# Patient Record
Sex: FEMALE | Race: OTHER | Employment: OTHER | URBAN - METROPOLITAN AREA
[De-identification: names, ages, dates, MRNs, and addresses within clinical notes are randomized per-mention and may not be internally consistent; named-entity substitution may affect disease eponyms.]

---

## 2017-01-02 ENCOUNTER — APPOINTMENT (OUTPATIENT)
Dept: RADIATION THERAPY | Age: 47
End: 2017-01-02

## 2017-01-08 ENCOUNTER — HOSPITAL ENCOUNTER (EMERGENCY)
Age: 47
Discharge: HOME | End: 2017-01-08
Attending: EMERGENCY MEDICINE
Payer: MEDICAID

## 2017-01-08 ENCOUNTER — APPOINTMENT (OUTPATIENT)
Dept: GENERAL RADIOLOGY | Age: 47
End: 2017-01-08
Attending: NURSE PRACTITIONER
Payer: MEDICAID

## 2017-01-08 VITALS
OXYGEN SATURATION: 99 % | BODY MASS INDEX: 27.42 KG/M2 | TEMPERATURE: 99.3 F | DIASTOLIC BLOOD PRESSURE: 75 MMHG | HEART RATE: 75 BPM | RESPIRATION RATE: 16 BRPM | SYSTOLIC BLOOD PRESSURE: 117 MMHG | HEIGHT: 62 IN | WEIGHT: 149 LBS

## 2017-01-08 DIAGNOSIS — M54.50 ACUTE BILATERAL LOW BACK PAIN WITHOUT SCIATICA: Primary | ICD-10-CM
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
## 2017-01-08 PROCEDURE — 72080 X-RAY EXAM THORACOLMB 2/> VW: CPT

## 2017-01-08 PROCEDURE — 99283 EMERGENCY DEPT VISIT LOW MDM: CPT

## 2017-01-08 NOTE — ED PROVIDER NOTES
HPI Comments: Denae Coyle is a 51year old female who presents to the ED with low and mid back pain post fall 2 days ago. States that 2 days ago she felt dizzy. He right leg gave out on her, and she fell landing on her back. This is causing her pain at this time. She has retained hardware in her back, and fears she may have damaged it. Patient is a 51 y.o. female presenting with fall. The history is provided by the patient and the EMS personnel. History limited by: no communication barrier. Fall   The accident occurred 2 days ago. Fall occurred: while walking in the home. Past Medical History:   Diagnosis Date    Alcohol abuse     Cancer [facility])      breast cancer, right    Cancer ([facility] [address]      Breast CA    Depression     Diabetes ([facility] [address]     Drug abuse     Gastrointestinal disorder      cholitis    Hyperlipidemia     Hypertension     Spine injury        Past Surgical History:   Procedure Laterality Date    Hx breast lumpectomy  06/2013     right    Hx other surgical       mediport    Hx tubal ligation      Hx tonsillectomy      Hx orthopaedic       Back fusion surgery 4/18/14.  Hx hysterectomy      Colonoscopy N/A 7/18/2016     COLONOSCOPY performed by Silver Pastor MD at [facility]COPY         Family History:   Problem Relation Age of Onset    Heart Disease Mother     Stroke Father     Heart Disease Father 48    Diabetes Other     Hypertension Other     Cancer Maternal Grandmother        Social History     Social History    Marital status:      Spouse name: N/A    Number of children: N/A    Years of education: N/A     Occupational History    Not on file.      Social History Main Topics    Smoking status: Never Smoker    Smokeless tobacco: Never Used    Alcohol use No      Comment: \"Occasionally\"    Drug use: No    Sexual activity: No     Other Topics Concern    Not on file     Social History Narrative    ** Merged History Encounter ** ALLERGIES: Latex; Other food; Amoxicillin; Aspirin; Fentanyl; Levaquin [levofloxacin]; Chlorhexidine; Norco [hydrocodone-acetaminophen]; Acetaminophen; Harrison; Codeine; Glycopyrrolate; Morphine; Pcn [penicillins]; Percocet [oxycodone-acetaminophen]; Tape [adhesive]; and Tramadol    Review of Systems   Constitutional:        Fall     HENT: Negative. Eyes: Negative. Respiratory: Negative. Cardiovascular: Negative. Gastrointestinal: Negative. Endocrine: Negative. Genitourinary: Negative. Musculoskeletal:        Back pain     Skin: Negative. Allergic/Immunologic: Negative. Neurological: Negative. Hematological: Negative. Psychiatric/Behavioral: Negative. Vitals:    01/08/17 1740   Weight: 67.6 kg (149 lb)   Height: 5' 2\" (1.575 m)            Physical Exam   Constitutional: She is oriented to person, place, and time. She appears well-developed and well-nourished. No distress. Pt is sobbing secondary to her pain. HENT:   Head: Normocephalic and atraumatic. Eyes: EOM are normal. Pupils are equal, round, and reactive to light. Neck: Normal range of motion. Neck supple. Cardiovascular: Normal rate, regular rhythm and normal heart sounds. Pulmonary/Chest: Effort normal and breath sounds normal. No respiratory distress. She has no wheezes. She has no rales. Abdominal: Soft. Bowel sounds are normal. There is no tenderness. There is no rebound and no guarding. Genitourinary:   Genitourinary Comments: NE   Musculoskeletal: Normal range of motion. She exhibits tenderness. She exhibits no edema. Bilateral lumbar Paraspinous muscles TTP. No vertebral bony deformity noted. Neurological: She is alert and oriented to person, place, and time. No cranial nerve deficit. She exhibits normal muscle tone. Coordination normal.   Skin: Skin is warm and dry. Psychiatric: She has a normal mood and affect. Nursing note and vitals reviewed.        MDM  Number of Diagnoses or Management Options  Diagnosis management comments: PROGRESS NOTE:  Care of the Pt turned over to JESSICA Quan Pa-C at time of shift change.   Toñito Clay NP  6:10 PM        Risk of Complications, Morbidity, and/or Mortality  Presenting problems: moderate  Diagnostic procedures: moderate  Management options: moderate      ED Course       Procedures

## 2017-01-08 NOTE — ED NOTES
Purposeful rounding completed:    Side rails up x 2:  YES  Bed in low position and wheels locked: YES  Call bell within reach: YES  Comfort addressed: YES    Toileting needs addressed: YES  Plan of care reviewed/updated with patient and or family members: YES  IV site assessed: N/A  Pain assessed and addressed: YES, 7

## 2017-01-08 NOTE — ED NOTES
0600 PM:  Accepted care of patient from Eboni Solano NP. Pt with dizziness and fall 2 days ago. Fell onto back. Concerned about prior hardware. XR's pending. Perri Davis PA-C    1408 PM:  NAP on XRs. Will DC home with pcp follow-up. At this time, patient is stable and appropriate for discharge home. Patient demonstrates understanding of current diagnoses and is in agreement with the treatment plan. They are advised that while the likelihood of serious underlying condition is low at this point given the evaluation performed today, we cannot fully rule it out. They are advised to immediately return with any new symptoms or worsening of current condition. All questions have been answered. Patient is given educational material regarding their diagnoses, including danger symptoms and when to return to the ED. Follow-up with pcp.   Perri Davis PA-C

## 2017-01-09 NOTE — DISCHARGE INSTRUCTIONS
Back Pain, Emergency or Urgent Symptoms: Care Instructions  Your Care Instructions  Many people have back pain at one time or another. In most cases, pain gets better with self-care that includes over-the-counter pain medicine, ice, heat, and exercises. Unless you have symptoms of a severe injury or heart attack, you may be able to give yourself a few days before you call a doctor. But some back problems are very serious. Do not ignore symptoms that need to be checked right away. Follow-up care is a key part of your treatment and safety. Be sure to make and go to all appointments, and call your doctor if you are having problems. It's also a good idea to know your test results and keep a list of the medicines you take. How can you care for yourself at home? · Sit or lie in positions that are most comfortable and that reduce your pain. Try one of these positions when you lie down:  ¨ Lie on your back with your knees bent and supported by large pillows. ¨ Lie on the floor with your legs on the seat of a sofa or chair. Rekha Settle on your side with your knees and hips bent and a pillow between your legs. ¨ Lie on your stomach if it does not make pain worse. · Do not sit up in bed, and avoid soft couches and twisted positions. Bed rest can help relieve pain at first, but it delays healing. Avoid bed rest after the first day. · Change positions every 30 minutes. If you must sit for long periods of time, take breaks from sitting. Get up and walk around, or lie flat. · Try using a heating pad on a low or medium setting, for 15 to 20 minutes every 2 or 3 hours. Try a warm shower in place of one session with the heating pad. You can also buy single-use heat wraps that last up to 8 hours. You can also try ice or cold packs on your back for 10 to 20 minutes at a time, several times a day. (Put a thin cloth between the ice pack and your skin.) This reduces pain and makes it easier to be active and exercise.   · Take pain medicines exactly as directed. ¨ If the doctor gave you a prescription medicine for pain, take it as prescribed. ¨ If you are not taking a prescription pain medicine, ask your doctor if you can take an over-the-counter medicine. When should you call for help? Call 911 anytime you think you may need emergency care. For example, call if:  · You are unable to move a leg at all. · You have back pain with severe belly pain. · You have symptoms of a heart attack. These may include:  ¨ Chest pain or pressure, or a strange feeling in the chest.  ¨ Sweating. ¨ Shortness of breath. ¨ Nausea or vomiting. ¨ Pain, pressure, or a strange feeling in the back, neck, jaw, or upper belly or in one or both shoulders or arms. ¨ Lightheadedness or sudden weakness. ¨ A fast or irregular heartbeat. After you call 911, the  may tell you to chew 1 adult-strength or 2 to 4 low-dose aspirin. Wait for an ambulance. Do not try to drive yourself. Call your doctor now or seek immediate medical care if:  · You have new or worse symptoms in your arms, legs, chest, belly, or buttocks. Symptoms may include:  ¨ Numbness or tingling. ¨ Weakness. ¨ Pain. · You lose bladder or bowel control. · You have back pain and:  ¨ You have injured your back while lifting or doing some other activity. Call if the pain is severe, has not gone away after 1 or 2 days, and you cannot do your normal daily activities. ¨ You have had a back injury before that needed treatment. ¨ Your pain has lasted longer than 4 weeks. ¨ You have had weight loss you cannot explain. ¨ You are age 48 or older. ¨ You have cancer now or have had it before. Watch closely for changes in your health, and be sure to contact your doctor if you are not getting better as expected. Where can you learn more? Go to http://imani-jenny.info/.   Enter Z041 in the search box to learn more about \"Back Pain, Emergency or Urgent Symptoms: Care Instructions. \"  Current as of: May 27, 2016  Content Version: 11.1  © 6242-4070 [employer]. Care instructions adapted under license by [employer] (which disclaims liability or warranty for this information). If you have questions about a medical condition or this instruction, always ask your healthcare professional. [address] any warranty or liability for your use of this information. Preventing Falls: Care Instructions  Your Care Instructions  Getting around your home safely can be a challenge if you have injuries or health problems that make it easy for you to fall. Loose rugs and furniture in walkways are among the dangers for many older people who have problems walking or who have poor eyesight. People who have conditions such as arthritis, osteoporosis, or dementia also have to be careful not to fall. You can make your home safer with a few simple measures. Follow-up care is a key part of your treatment and safety. Be sure to make and go to all appointments, and call your doctor if you are having problems. It's also a good idea to know your test results and keep a list of the medicines you take. How can you care for yourself at home? Taking care of yourself  · You may get dizzy if you do not drink enough water. To prevent dehydration, drink plenty of fluids, enough so that your urine is light yellow or clear like water. Choose water and other caffeine-free clear liquids. If you have kidney, heart, or liver disease and have to limit fluids, talk with your doctor before you increase the amount of fluids you drink. · Exercise regularly to improve your strength, muscle tone, and balance. Walk if you can. Swimming may be a good choice if you cannot walk easily. · Have your vision and hearing checked each year or any time you notice a change.  If you have trouble seeing and hearing, you might not be able to avoid objects and could lose your balance. · Know the side effects of the medicines you take. Ask your doctor or pharmacist whether the medicines you take can affect your balance. Sleeping pills or sedatives can affect your balance. · Limit the amount of alcohol you drink. Alcohol can impair your balance and other senses. · Ask your doctor whether calluses or corns on your feet need to be removed. If you wear loose-fitting shoes because of calluses or corns, you can lose your balance and fall. · Talk to your doctor if you have numbness in your feet. Preventing falls at home  · Remove raised doorway thresholds, throw rugs, and clutter. Repair loose carpet or raised areas in the floor. · Move furniture and electrical cords to keep them out of walking paths. · Use nonskid floor wax, and wipe up spills right away, especially on ceramic tile floors. · If you use a walker or cane, put rubber tips on it. If you use crutches, clean the bottoms of them regularly with an abrasive pad, such as steel wool. · Keep your house well lit, especially [place], and outside walkways. Use night-lights in areas such as hallways and bathrooms. Add extra light switches or use remote switches (such as switches that go on or off when you clap your hands) to make it easier to turn lights on if you have to get up during the night. · Install sturdy handrails on stairways. · Move items in your cabinets so that the things you use a lot are on the lower shelves (about waist level). · Keep a cordless phone and a flashlight with new batteries by your bed. If possible, put a phone in each of the main rooms of your house, or carry a cell phone in case you fall and cannot reach a phone. Or, you can wear a device around your neck or wrist. You push a button that sends a signal for help. · Wear low-heeled shoes that fit well and give your feet good support. Use footwear with nonskid soles. Check the heels and soles of your shoes for wear.  Repair or replace worn heels or soles. · Do not wear socks without shoes on wood floors. · Walk on the grass when the sidewalks are slippery. If you live in an area that gets snow and ice in the winter, sprinkle salt on slippery steps and sidewalks. Preventing falls in the bath  · Install grab bars and nonskid mats inside and outside your shower or tub and near the toilet and sinks. · Use shower chairs and bath benches. · Use a hand-held shower head that will allow you to sit while showering. · Get into a tub or shower by putting the weaker leg in first. Get out of a tub or shower with your strong side first.  · Repair loose toilet seats and consider installing a raised toilet seat to make getting on and off the toilet easier. · Keep your bathroom door unlocked while you are in the shower. Where can you learn more? Go to http://imani-jenny.info/. Enter 0476 [id number] 69 [id number] in the search box to learn more about \"Preventing Falls: Care Instructions. \"  Current as of: August 4, 2016  Content Version: 11.1  © 3999-7224 [employer]. Care instructions adapted under license by [employer] (which disclaims liability or warranty for this information). If you have questions about a medical condition or this instruction, always ask your healthcare professional. [place] [address] any warranty or liability for your use of this information.

## 2017-01-09 NOTE — ED NOTES
Bedside shift change report given to Xiao Carrizales RN (oncoming nurse) by Jesus Olivo RN (offgoing nurse). Report included the following information SBAR.

## 2017-01-23 ENCOUNTER — HOSPITAL ENCOUNTER (OUTPATIENT)
Dept: LAB | Age: 47
Discharge: HOME | End: 2017-01-23

## 2017-01-23 ENCOUNTER — OFFICE VISIT (OUTPATIENT)
Dept: FAMILY MEDICINE CLINIC | Age: 47
End: 2017-01-23

## 2017-01-23 VITALS
HEIGHT: 62 IN | WEIGHT: 148.6 LBS | SYSTOLIC BLOOD PRESSURE: 142 MMHG | RESPIRATION RATE: 19 BRPM | BODY MASS INDEX: 27.34 KG/M2 | OXYGEN SATURATION: 97 % | HEART RATE: 94 BPM | TEMPERATURE: 98 F | DIASTOLIC BLOOD PRESSURE: 94 MMHG

## 2017-01-23 DIAGNOSIS — T40.2X5A THERAPEUTIC OPIOID INDUCED CONSTIPATION: ICD-10-CM

## 2017-01-23 DIAGNOSIS — Z79.4 TYPE 2 DIABETES MELLITUS WITH HYPERGLYCEMIA, WITH LONG-TERM CURRENT USE OF INSULIN (HCC): ICD-10-CM

## 2017-01-23 DIAGNOSIS — E11.65 TYPE 2 DIABETES MELLITUS WITH HYPERGLYCEMIA, WITH LONG-TERM CURRENT USE OF INSULIN (HCC): ICD-10-CM

## 2017-01-23 DIAGNOSIS — I10 ESSENTIAL HYPERTENSION: ICD-10-CM

## 2017-01-23 DIAGNOSIS — K59.03 THERAPEUTIC OPIOID INDUCED CONSTIPATION: ICD-10-CM

## 2017-01-23 DIAGNOSIS — G89.4 CHRONIC PAIN SYNDROME: Primary | ICD-10-CM

## 2017-01-23 PROCEDURE — 99001 SPECIMEN HANDLING PT-LAB: CPT | Performed by: FAMILY MEDICINE

## 2017-01-23 RX ORDER — LISINOPRIL 10 MG/1
10 TABLET ORAL DAILY
Qty: 30 TAB | Refills: 1 | Status: SHIPPED | OUTPATIENT
Start: 2017-01-23 | End: 2018-03-07 | Stop reason: ALTCHOICE

## 2017-01-23 RX ORDER — DULOXETIN HYDROCHLORIDE 20 MG/1
20 CAPSULE, DELAYED RELEASE ORAL DAILY
Qty: 30 CAP | Refills: 1 | Status: SHIPPED | OUTPATIENT
Start: 2017-01-23 | End: 2017-03-23 | Stop reason: DRUGHIGH

## 2017-01-23 RX ORDER — KETOROLAC TROMETHAMINE 30 MG/ML
60 INJECTION, SOLUTION INTRAMUSCULAR; INTRAVENOUS ONCE
Qty: 1 VIAL | Refills: 0
Start: 2017-01-23 | End: 2017-01-23

## 2017-01-23 RX ORDER — POLYETHYLENE GLYCOL 3350 17 G/17G
17 POWDER, FOR SOLUTION ORAL DAILY
Qty: 30 EACH | Refills: 1 | Status: SHIPPED | OUTPATIENT
Start: 2017-01-23 | End: 2018-09-05 | Stop reason: SDUPTHER

## 2017-01-23 NOTE — MR AVS SNAPSHOT
Visit Information Date & Time Provider Department Dept. Phone Encounter #  
 1/23/2017  3:00 PM José Chowdhury [phone] [id number] Follow-up Instructions Return in about 1 month (around 2/23/2017) for pain. Your Appointments 1/25/2017  2:15 PM  
Office Visit with Merlin Grandchild, MD  
[facility] ([address] [facility]) Appt Note: 8 WK  [facility] [address] [address]  
[address]  
  
   
 [address] [facility] [place] [address] [address] Upcoming Health Maintenance Date Due  
 FOOT EXAM Q1 11/21/1979 MICROALBUMIN Q1 11/21/1979 EYE EXAM RETINAL OR DILATED Q1 11/21/1979 DTaP/Tdap/Td series (1 - Tdap) 11/21/1990 PAP AKA CERVICAL CYTOLOGY 11/21/1990 LIPID PANEL Q1 12/26/2014 HEMOGLOBIN A1C Q6M 12/22/2016 Allergies as of 1/23/2017  Review Complete On: 1/8/2017 By: Pio Kerr RN Severity Noted Reaction Type Reactions Latex High 04/26/2010    Hives, Itching Other Food  07/15/2016    Rash Almonds Amoxicillin High 11/26/2013    Swelling Other reaction(s): mild rash/itching Aspirin High 09/04/2013    Not Reported This Time, Swelling Mouth swells Fentanyl High 12/17/2013   Systemic Anaphylaxis, Swelling Patches cause mouth to swell Swelling in mouth from fentanyl patch Levaquin [Levofloxacin] High 09/04/2013    Not Reported This Time, Swelling Other reaction(s): mild rash/itching Chlorhexidine Medium 04/18/2014   Topical Rash Norco [Hydrocodone-acetaminophen] Medium 03/12/2015    Nausea and Vomiting Other reaction(s): gi distress Acetaminophen  07/28/2016    Other (comments) [place]  04/03/2016    Rash, Itching Codeine  11/26/2013    Other (comments) Glycopyrrolate  07/04/2014    Swelling Pt  States  faciAL  SWELLING   POST  ROBINUL  IV Pt  States  faciAL  SWELLING   POST  ROBINUL  IV   
 Morphine  05/31/2016    Nausea and Vomiting Pcn [Penicillins]  11/26/2013    Swelling Percocet [Oxycodone-acetaminophen]  01/30/2015    Nausea and Vomiting Other reaction(s): gi distress 
nausea Other reaction(s): anaphylaxis/angioedema Per pt. She is allergic Tape [Adhesive]  05/31/2016    Rash Tramadol  12/05/2013    Swelling Current Immunizations  Reviewed on 8/3/2016 Name Date Influenza Vaccine 11/7/2016, 2/24/2016, 3/23/2015, 12/1/2014, 10/9/2013, 10/20/2012 Pneumococcal Polysaccharide (PPSV-23) 3/22/2015, 10/22/2012 Pneumococcal Vaccine (Unspecified Type) 3/4/2016, 1/2/2013 Not reviewed this visit You Were Diagnosed With   
  
 Codes Comments Chronic pain syndrome    -  Primary ICD-10-CM: G89.4 ICD-9-CM: 338. 4 Type 2 diabetes mellitus with hyperglycemia, with long-term current use of insulin (HCC)     ICD-10-CM: E11.65, Z79.4 ICD-9-CM: 250.00, 790.29, V58.67 Essential hypertension     ICD-10-CM: I10 
ICD-9-CM: 401.9 Therapeutic opioid induced constipation     ICD-10-CM: K59.03, T40.2X5A 
ICD-9-CM: 564.09, E935.2 Vitals BP Pulse Temp Resp Height(growth percentile) Weight(growth percentile) (!) 142/94 94 98 °F (36.7 °C) (Oral) 19 5' 2\" (1.575 m) 148 lb 9.6 oz (67.4 kg) LMP SpO2 BMI OB Status Smoking Status 07/04/2013 97% 27.18 kg/m2 Hysterectomy Never Smoker Vitals History BMI and BSA Data Body Mass Index Body Surface Area  
 27.18 kg/m 2 1.72 m 2 Preferred Pharmacy Pharmacy Name Phone [facility] [address] Your Updated Medication List  
  
   
This list is accurate as of: 1/23/17  4:06 PM.  Always use your most recent med list.  
  
  
  
  
 ALBUTEROL IN Take  by inhalation. DULoxetine 20 mg capsule Commonly known as:  CYMBALTA Take 1 Cap by mouth daily. EPINEPHrine 0.3 mg/0.3 mL injection Commonly known as:  EPIPEN  
0.3 mL by IntraMUSCular route once as needed for up to 1 dose. gabapentin 300 mg capsule Commonly known as:  NEURONTIN Take 1 capsule by mouth three (3) times daily. insulin nph-regular human rec 100 unit/mL (70-30) Inpn Commonly known as:  HUMULIN 70-30 Give 25 units in the QAM and 30 units at bedtime  
  
 ketorolac tromethamine 60 mg/2 mL Soln Commonly known as:  TORADOL  
2 mL by IntraMUSCular route once for 1 dose. lisinopril 10 mg tablet Commonly known as:  Mavis November Take 1 Tab by mouth daily. LORazepam 1 mg tablet Commonly known as:  ATIVAN  
1 mg.  
  
 ondansetron 8 mg disintegrating tablet Commonly known as:  ZOFRAN ODT Take 1 Tab by mouth every eight (8) hours as needed for Nausea. polyethylene glycol 17 gram packet Commonly known as:  Taras Deck Take 1 Packet by mouth daily. traZODone 300 mg tablet Commonly known as:  Getachew [place] Take 300 mg by mouth nightly. Prescriptions Sent to Pharmacy Refills  
 lisinopril (PRINIVIL, ZESTRIL) 10 mg tablet 1 Sig: Take 1 Tab by mouth daily. Class: Normal  
 Pharmacy: [address] [place] [address] Ph #: [phone] Route: Oral  
 DULoxetine (CYMBALTA) 20 mg capsule 1 Sig: Take 1 Cap by mouth daily. Class: Normal  
 Pharmacy: [address] [place] [address] Ph #: [phone] Route: Oral  
 polyethylene glycol (MIRALAX) 17 gram packet 1 Sig: Take 1 Packet by mouth daily. Class: Normal  
 Pharmacy: [address] [place] [address] Ph #: [phone] Route: Oral  
 insulin nph-regular human rec (HUMULIN 70-30) 100 unit/mL (70-30) inpn 3 Sig: Give 25 units in the QAM and 30 units at bedtime  Class: Normal  
 Pharmacy: [address] [place] [address], [address] [place] [address] #: [phone] We Performed the Following  DIABETES FOOT EXAM [7 Custom] KETOROLAC TROMETHAMINE INJ [ [facility]] AL THER/PROPH/DIAG INJECTION, SUBCUT/IM [id number] CPT(R)] Follow-up Instructions Return in about 1 month (around 2/23/2017) for pain. To-Do List   
 01/23/2017 Lab:  HEMOGLOBIN A1C WITH EAG   
  
 01/23/2017 Lab:  LIPID PANEL   
  
 01/23/2017 Lab:  MICROALBUMIN, UR, RAND W/ MICROALBUMIN/CREA RATIO Patient Instructions Chronic Pain: Care Instructions Your Care Instructions Chronic pain is pain that lasts a long time (months or even years) and may or may not have a clear cause. It is different from acute pain, which usually does have a clear causelike an injury or illnessand gets better over time. Chronic pain: 
· Lasts over time but may vary from day to day. · Does not go away despite efforts to end it. · May disrupt your sleep and lead to fatigue. · May cause depression or anxiety. · May make your muscles tense, causing more pain. · Can disrupt your work, hobbies, home life, and relationships with friends and family. Chronic pain is a very real condition. It is not just in your head. Treatment can help and usually includes several methods used together, such as medicines, physical therapy, exercise, and other treatments. Learning how to relax and changing negative thought patterns can also help you cope. Chronic pain is complex. Taking an active role in your treatment will help you better manage your pain. Tell your doctor if you have trouble dealing with your pain. You may have to try several things before you find what works best for you. Follow-up care is a key part of your treatment and safety. Be sure to make and go to all appointments, and call your doctor if you are having problems. Its also a good idea to know your test results and keep a list of the medicines you take. How can you care for yourself at home? · Pace yourself. Break up large jobs into smaller tasks. Save harder tasks for days when you have less pain, or go back and forth between hard tasks and easier ones. Take rest breaks. · Relax, and reduce stress. Relaxation techniques such as deep breathing or meditation can help. · Keep moving. Gentle, daily exercise can help reduce pain over the long run. Try low- or no-impact exercises such as walking, swimming, and stationary biking. Do stretches to stay flexible. · Try heat, cold packs, and massage. · Get enough sleep. Chronic pain can make you tired and drain your energy. Talk with your doctor if you have trouble sleeping because of pain. · Think positive. Your thoughts can affect your pain level. Do things that you enjoy to distract yourself when you have pain instead of focusing on the pain. See a movie, read a book, listen to music, or spend time with a friend. · If you think you are depressed, talk to your doctor about treatment. · Keep a daily pain diary. Record how your moods, thoughts, sleep patterns, activities, and medicine affect your pain. You may find that your pain is worse during or after certain activities or when you are feeling a certain emotion. Having a record of your pain can help you and your doctor find the best ways to treat your pain. · Take pain medicines exactly as directed. ¨ If the doctor gave you a prescription medicine for pain, take it as prescribed. ¨ If you are not taking a prescription pain medicine, ask your doctor if you can take an over-the-counter medicine. Reducing constipation caused by pain medicine · Include fruits, vegetables, beans, and whole grains in your diet each day. These foods are high in fiber. · Drink plenty of fluids, enough so that your urine is light yellow or clear like water.  If you have kidney, heart, or liver disease and have to limit fluids, talk with your doctor before you increase the amount of fluids you drink. · If your doctor recommends it, get more exercise. Walking is a good choice. Bit by bit, increase the amount you walk every day. Try for at least 30 minutes on most days of the week. · Schedule time each day for a bowel movement. A daily routine may help. Take your time and do not strain when having a bowel movement. When should you call for help? Call your doctor now or seek immediate medical care if: 
· Your pain gets worse or is out of control. · You feel down or blue, or you do not enjoy things like you once did. You may be depressed, which is common in people with chronic pain. Depression can be treated. · You have vomiting or cramps for more than 2 hours. Watch closely for changes in your health, and be sure to contact your doctor if: 
· You cannot sleep because of pain. · You are very worried or anxious about your pain. · You have trouble taking your pain medicine. · You have any concerns about your pain medicine. · You have trouble with bowel movements, such as: 
¨ No bowel movement in 3 days. ¨ Blood in the anal area, in your stool, or on the toilet paper. ¨ Diarrhea for more than 24 hours. Where can you learn more? Go to http://imani-jenny.info/. Enter N004 in the search box to learn more about \"Chronic Pain: Care Instructions. \" Current as of: February 19, 2016 Content Version: 11.1 © 5100-5379 [employer]. Care instructions adapted under license by [employer] (which disclaims liability or warranty for this information). If you have questions about a medical condition or this instruction, always ask your healthcare professional. [address] any warranty or liability for your use of this information. Introducing [facility]! Ame Lopez introduces [employer] patient portal. Now you can access parts of your medical record, email your doctor's office, and request medication refills online. 1. In your internet browser, go to [email or web address] 2. Click on the First Time User? Click Here link in the Sign In box. You will see the New Member Sign Up page. 3. Enter your [employer] Access Code exactly as it appears below. You will not need to use this code after youve completed the sign-up process. If you do not sign up before the expiration date, you must request a new code. · [employer] Access Code: [email or web address] Expires: 2/4/2017  7:44 PM 
 
4. Enter the last four digits of your Social Security Number (xxxx) and Date of Birth (mm/dd/yyyy) as indicated and click Submit. You will be taken to the next sign-up page. 5. Create a Hyper[employer] ID. This will be your [employer] login ID and cannot be changed, so think of one that is secure and easy to remember. 6. Create a [employer] password. You can change your password at any time. 7. Enter your Password Reset Question and Answer. This can be used at a later time if you forget your password. 8. Enter your e-mail address. You will receive e-mail notification when new information is available in [address] 9. Click Sign Up. You can now view and download portions of your medical record. 10. Click the Download Summary menu link to download a portable copy of your medical information. If you have questions, please visit the Frequently Asked Questions section of the [employer] website. Remember, [employer] is NOT to be used for urgent needs. For medical emergencies, dial 911. Now available from your iPhone and Android! Please provide this summary of care documentation to your next provider. Your primary care clinician is listed as Dash Dai. If you have any questions after today's visit, please call [phone].

## 2017-01-23 NOTE — PATIENT INSTRUCTIONS

## 2017-01-23 NOTE — PROGRESS NOTES
Lauren Ledezma is a 51 y.o.  female and presents with    Chief Complaint   Patient presents with   [facility] Follow Up       Subjective:  Ms. Burke Lora presents with low and mid back pain post fall 17 days ago. She went to the ER 2 weeks ago. She states that 2 days prior to ER visit she felt dizzy. He right leg gave out on her, and she fell landing on her back. This is causing her pain at this time. She has retained hardware in her back, and fears she may have damaged it.      Osteoarthritis and Chronic Pain:  Patient has myalgias, primarily affecting the legs. Symptoms onset: problem is longstanding. Rheumatological ROS: ongoing significant pain in legs which is stable and controlled by PRN meds. Response to treatment plan: waxing and waning but worse overall. Diabetes Mellitus:  She has diabetes mellitus, and hypertension and hyperlipidemia. Diabetic ROS - medication compliance: compliant all of the time, diabetic diet compliance: compliant most of the time, home glucose monitoring: fasting values range . Lab review: labs reviewed, I note that glycosylated hemoglobin abnormal 9.8. Depression Review:  Patient is seen for followup of depression. Treatment includes depakote and no other therapies. Ongoing symptoms include depressed mood, weight gain, insomnia, fatigue, feelings of worthlessness/guilt, difficulty concentrating and hopelessness. She denies hypersomnia and recurrent thoughts of death. She experiences the following side effects from the treatment: none.         Patient Active Problem List   Diagnosis Code    Breast cancer ([address] C50.919    Diabetes mellitus ([address] E11.9    Chronic pain G89.29    Hypercholesterolemia E78.00    Essential hypertension I10    Depression F32.9    Nausea & vomiting R11.2    Chemotherapy induced nausea and vomiting R11.2, T45.1X5A    Antineoplastic chemotherapy induced anemia D64.81, T45.1X5A    Mouth sore secondary to chemotherapy K13.79    Anemia D64.9    Chronic abdominal pain R10.9, G89.29    Gastrointestinal hemorrhage K92.2    Anemia due to antineoplastic chemotherapy D64.81    Bipolar affective disorder (HCC) F31.9    Cellulitis of breast N61.0    Chest pain R07.9    Chronic low back pain M54.5, G89.29    Narcotic drug use F11.90    Secondary diabetes mellitus ([address] E13.9    Persistent vomiting R11.10    Family history of coronary artery disease Z82.49    Type 2 diabetes mellitus with autonomic neuropathy (HCC) E11.43    Abnormal glucose R73.09    Leukocytopenia D72.819    Menorrhagia N92.0    Nausea with vomiting R11.2    History of bilateral mastectomy Z90.13    History of bilateral oophorectomy Z90.722    History of hysterectomy Z90.710    Type 2 diabetes mellitus (HCC) E11.9    Urinary tract infection N39.0    Peptic ulcer disease K27.9     Patient Active Problem List    Diagnosis Date Noted    [facility] disorder ([facility] 07/28/2016    Chronic low back pain 07/28/2016    Family history of coronary artery disease 07/28/2016    Type 2 diabetes mellitus ([address] 07/28/2016    Peptic ulcer disease 07/28/2016    Gastrointestinal hemorrhage 07/18/2016    Chronic abdominal pain 07/14/2016    Anemia 06/22/2016    Mouth sore secondary to chemotherapy 06/16/2016    Nausea & vomiting 04/07/2016    Chemotherapy induced nausea and vomiting 04/07/2016    Antineoplastic chemotherapy induced anemia 04/07/2016    Persistent vomiting 04/03/2016    Leukocytopenia 04/03/2016    Urinary tract infection 04/03/2016    Secondary diabetes mellitus ([address] 03/25/2016    Narcotic drug use 02/24/2016    Cellulitis of breast 02/23/2016    History of bilateral mastectomy 11/13/2015    Depression 04/28/2015    Type 2 diabetes mellitus with autonomic neuropathy ([address] 03/25/2015    Chest pain 12/18/2014    Hypercholesterolemia 12/01/2014    Essential hypertension 12/01/2014    History of bilateral oophorectomy 06/19/2014    History of hysterectomy 06/19/2014    Menorrhagia 05/22/2014    Abnormal glucose 01/31/2014    Chronic pain 12/26/2013    Anemia due to antineoplastic chemotherapy 09/26/2013    Nausea with vomiting 09/24/2013    Breast cancer ([address] 09/05/2013    Diabetes mellitus ([address] 09/05/2013     Current Outpatient Prescriptions   Medication Sig Dispense Refill    traZODone (DESYREL) 300 mg tablet Take 300 mg by mouth nightly.  ALBUTEROL IN Take  by inhalation.  LORazepam (ATIVAN) 1 mg tablet 1 mg.  ondansetron (ZOFRAN ODT) 8 mg disintegrating tablet Take 1 Tab by mouth every eight (8) hours as needed for Nausea. 20 Tab 0    insulin nph-regular human rec (HUMULIN 70-30) 100 unit/mL (70-30) flexpen Give 20 units in the QAM and 30 units at bedtime (Patient taking differently: Give 25 units in the QAM and 30 units at bedtime) 1 Package 2    gabapentin (NEURONTIN) 300 mg capsule Take 1 capsule by mouth three (3) times daily. 90 capsule 3    EPINEPHrine (EPIPEN) 0.3 mg/0.3 mL (1:1,000) injection 0.3 mL by IntraMUSCular route once as needed for up to 1 dose.  1 Each 1     Allergies   Allergen Reactions    Latex Hives and Itching    Other Food Rash     Almonds    Amoxicillin Swelling     Other reaction(s): mild rash/itching    Aspirin Not Reported This Time and Swelling     Mouth swells    Fentanyl Anaphylaxis and Swelling     Patches cause mouth to swell  Swelling in mouth from fentanyl patch    Levaquin [Levofloxacin] Not Reported This Time and Swelling     Other reaction(s): mild rash/itching    Chlorhexidine Rash    Norco [Hydrocodone-Acetaminophen] Nausea and Vomiting     Other reaction(s): gi distress    Acetaminophen Other (comments)    Buckner Rash and Itching    Codeine Other (comments)    Glycopyrrolate Swelling     Pt  States  faciAL  SWELLING   POST  ROBINUL  IV   Pt  States  faciAL  SWELLING   POST  ROBINUL  IV     Morphine Nausea and Vomiting    Pcn [Penicillins] Swelling    Percocet [Oxycodone-Acetaminophen] Nausea and Vomiting     Other reaction(s): gi distress  nausea  Other reaction(s): anaphylaxis/angioedema  Per pt. She is allergic    Tape [Adhesive] Rash    Tramadol Swelling     Past Medical History   Diagnosis Date    Alcohol abuse     Cancer [address]      breast cancer, right    Cancer ([facility] [facility]      Breast CA    Depression     Diabetes ([facility]     Drug abuse     Gastrointestinal disorder      cholitis    Hyperlipidemia     Hypertension     Spine injury      Past Surgical History   Procedure Laterality Date    Hx breast lumpectomy  06/2013     right    Hx other surgical       mediport    Hx tubal ligation      Hx tonsillectomy      Hx orthopaedic       Back fusion surgery 4/18/14.      Hx hysterectomy      Colonoscopy N/A 7/18/2016     COLONOSCOPY performed by Reji Gifford MD at [facility] ENDOSCOPY     Family History   Problem Relation Age of Onset    Heart Disease Mother     Stroke Father     Heart Disease Father 48    Diabetes Other     Hypertension Other     Cancer Maternal Grandmother      Social History   Substance Use Topics    Smoking status: Never Smoker    Smokeless tobacco: Never Used    Alcohol use No      Comment: \"Occasionally\"       ROS   General ROS: positive for  - hot flashes  negative for - chills  Psychological ROS: positive for - anxiety  Ophthalmic ROS: positive for - uses glasses  ENT ROS: negative for - headaches  Endocrine ROS: negative for - polydipsia/polyuria or temperature intolerance  Respiratory ROS: no cough, shortness of breath, or wheezing  Cardiovascular ROS: no chest pain or dyspnea on exertion  Gastrointestinal ROS: no abdominal pain, change in bowel habits, or black or bloody stools  Genito-Urinary ROS: no dysuria, trouble voiding, or hematuria  Musculoskeletal ROS: positive for - pain in back - bilateral  Neurological ROS: no TIA or stroke symptoms  Dermatological ROS: negative for - rash or skin lesion changes    All other systems reviewed and are negative. Objective:  Vitals:    01/23/17 1511 01/23/17 1516   BP: 147/86 (!) 142/94   Pulse: 94    Resp: 19    Temp: 98 °F (36.7 °C)    TempSrc: Oral    SpO2: 97%    Height: 5' 2\" (1.575 m)    PainSc:   8    PainLoc: Leg    LMP: 07/04/2013       alert, well appearing, and in no distress, oriented to person, place, and time and overweight  Mental status - normal mood, behavior, speech, dress, motor activity, and thought processes  Chest - clear to auscultation, no wheezes, rales or rhonchi, symmetric air entry  Heart - normal rate, regular rhythm, normal S1, S2, no murmurs, rubs, clicks or gallops  Neurological - cranial nerves II through XII intact, antalgic gait and station    Diabetic foot exam:     Left: Filament test reduced sensation with micro filament   Pulse DP: 2+ (normal)   Deformities: None  Right:  Filament test reduced sensation with micro filament   Pulse DP: 2+ (normal)   Deformities: None    LABS   Hgb A1C 7.7  TESTS  Xray spine  IMPRESSION:     Postsurgical changes and mild degenerative discogenic changes as above without  evidence for acute fracture or dislocation. Assessment/Plan:    1. Chronic pain syndrome  Reviewed ; pain improved to 2/10 after toradol injection; start duloxetine at home  - ketorolac tromethamine (TORADOL) 60 mg/2 mL soln; 2 mL by IntraMUSCular route once for 1 dose. Dispense: 1 Vial; Refill: 0  - KETOROLAC TROMETHAMINE INJ  - [place] THER/PROPH/DIAG INJECTION, SUBCUT/IM  - DULoxetine (CYMBALTA) 20 mg capsule; Take 1 Cap by mouth daily. Dispense: 30 Cap; Refill: 1    2. Type 2 diabetes mellitus with hyperglycemia, with long-term current use of insulin ([facility])  Borderline controlled; goal Hgb a1C <7; foot care reivewed  -  DIABETES FOOT EXAM  - HEMOGLOBIN A1C WITH EAG; Future  - MICROALBUMIN, UR, RAND W/ MICROALBUMIN/CREA RATIO; Future  - LIPID PANEL;  Future  - insulin nph-regular human rec (HUMULIN 70-30) 100 unit/mL (70-30) inpn; Give 25 units in the QAM and 30 units at bedtime  Dispense: 5 Pen; Refill: 3    3. Essential hypertension  Goal < 140/90; continue medication and encourage exercise as tolerated  - lisinopril (PRINIVIL, ZESTRIL) 10 mg tablet; Take 1 Tab by mouth daily. Dispense: 30 Tab; Refill: 1    4. Therapeutic opioid induced constipation  Start treatment  - polyethylene glycol (MIRALAX) 17 gram packet; Take 1 Packet by mouth daily. Dispense: 30 Each; Refill: 1      Lab review: labs reviewed, I note that glycosylated hemoglobin borderlined , orders written for new lab studies as appropriate; see orders      I have discussed the diagnosis with the patient and the intended plan as seen in the above orders. The patient has received an after-visit summary and questions were answered concerning future plans. I have discussed medication side effects and warnings with the patient as well. I have reviewed the plan of care with the patient, accepted their input and they are in agreement with the treatment goals. Follow-up Disposition:  Return in about 1 month (around 2/23/2017) for pain.

## 2017-01-23 NOTE — PROGRESS NOTES
Ane Seen is a 51 y.o. female presents today for ER follow up for a fall. Patient c/o of swelling of her legs .

## 2017-01-24 ENCOUNTER — DOCUMENTATION ONLY (OUTPATIENT)
Dept: FAMILY MEDICINE CLINIC | Age: 47
End: 2017-01-24

## 2017-01-24 LAB
ALBUMIN/CREAT UR: 63.2 MG/G CREAT (ref 0–30)
CHOLEST SERPL-MCNC: 220 MG/DL (ref 100–199)
CREAT UR-MCNC: 204.3 MG/DL
EST. AVERAGE GLUCOSE BLD GHB EST-MCNC: 174 MG/DL
HBA1C MFR BLD: 7.7 % (ref 4.8–5.6)
HDLC SERPL-MCNC: 48 MG/DL
INTERPRETATION, 910389: NORMAL
LDLC SERPL CALC-MCNC: 144 MG/DL (ref 0–99)
MICROALBUMIN UR-MCNC: 129.2 UG/ML
TRIGL SERPL-MCNC: 140 MG/DL (ref 0–149)
VLDLC SERPL CALC-MCNC: 28 MG/DL (ref 5–40)

## 2017-01-24 NOTE — LETTER
1/24/2017 Raphael Bonner [address] [facility] [phone] Dear Ms. Raphael Bonner, We had an appointment reserved for you 1/12/2017 and were concerned when you did not show or call within 24 hours to cancel the appointment. As mentioned in a previous letter to you, appointment time is limited and no-showed appointments may prevent another sick individual who needs to be seen from getting a preferred appointment time. Please note that a continued pattern of not showing for appointments may result in your inability to pre-book future appointments as well as possible discharge from the practice. Please call us at your earliest convenience to reschedule your appointment as your provider felt it was important to see you. Thank you for your anticipated cooperation. Sincerely, The scheduling staff [address] [place] [phone]

## 2017-01-25 ENCOUNTER — HOSPITAL ENCOUNTER (OUTPATIENT)
Dept: ONCOLOGY | Age: 47
Discharge: HOME | End: 2017-01-25

## 2017-01-25 ENCOUNTER — OFFICE VISIT (OUTPATIENT)
Dept: ONCOLOGY | Age: 47
End: 2017-01-25

## 2017-01-25 VITALS — HEART RATE: 95 BPM | DIASTOLIC BLOOD PRESSURE: 82 MMHG | SYSTOLIC BLOOD PRESSURE: 130 MMHG | TEMPERATURE: 98.1 F

## 2017-01-25 DIAGNOSIS — T45.1X5A ANTINEOPLASTIC CHEMOTHERAPY INDUCED ANEMIA: ICD-10-CM

## 2017-01-25 DIAGNOSIS — T45.1X5A CHEMOTHERAPY INDUCED NEUTROPENIA (HCC): ICD-10-CM

## 2017-01-25 DIAGNOSIS — C50.919 MALIGNANT NEOPLASM OF FEMALE BREAST, UNSPECIFIED LATERALITY, UNSPECIFIED SITE OF BREAST: ICD-10-CM

## 2017-01-25 DIAGNOSIS — D64.81 ANTINEOPLASTIC CHEMOTHERAPY INDUCED ANEMIA: ICD-10-CM

## 2017-01-25 DIAGNOSIS — C50.411 MALIGNANT NEOPLASM OF UPPER-OUTER QUADRANT OF RIGHT FEMALE BREAST (HCC): Primary | ICD-10-CM

## 2017-01-25 DIAGNOSIS — D70.1 CHEMOTHERAPY INDUCED NEUTROPENIA (HCC): ICD-10-CM

## 2017-01-25 LAB
BASO+EOS+MONOS # BLD AUTO: 0.4 K/UL (ref 0–2.3)
BASO+EOS+MONOS # BLD AUTO: 6 % (ref 0.1–17)
DIFFERENTIAL METHOD BLD: ABNORMAL
ERYTHROCYTE [DISTWIDTH] IN BLOOD BY AUTOMATED COUNT: 14.4 % (ref 11.5–14.5)
HCT VFR BLD AUTO: 30.5 % (ref 36–48)
HGB BLD-MCNC: 10.1 G/DL (ref 12–16)
LYMPHOCYTES # BLD AUTO: 22 % (ref 14–44)
LYMPHOCYTES # BLD: 1.3 K/UL (ref 1.1–5.9)
MCH RBC QN AUTO: 26.6 PG (ref 25–35)
MCHC RBC AUTO-ENTMCNC: 33.1 G/DL (ref 31–37)
MCV RBC AUTO: 80.5 FL (ref 78–102)
NEUTS SEG # BLD: 4.2 K/UL (ref 1.8–9.5)
NEUTS SEG NFR BLD AUTO: 72 % (ref 40–70)
PLATELET # BLD AUTO: 261 K/UL (ref 140–440)
RBC # BLD AUTO: 3.79 M/UL (ref 4.1–5.1)
WBC # BLD AUTO: 5.9 K/UL (ref 4.5–13)

## 2017-01-25 NOTE — PROGRESS NOTES
Hematology/Oncology  Progress Note    Name: John Mccullough  Date: 2017  : 1970    Anastacia Neville MD     Ms. Zunilda Fabian is a 51 y.o. woman who has a history of invasive ductal adenocarcinoma the right breast with locally advanced disease. Current therapy: The patient completed a full course of carboplatin, Taxotere, Herceptin, and Perjeda 8/3/2016. She is currently receiving adjuvant Herceptin ongoing but has missed several clinic visits due to personal and family circumstances. Subjective:     Mrs. Zunilda Fabian is a 71-year-old woman who has locally advanced invasive ductal adenocarcinoma the right breast.  She has undergone a modified radical mastectomy and completed neoadjuvant systemic chemotherapy and monoclonal antibody therapy against HER-2/karolina. She has been scheduled to receive ongoing adjuvant Herceptin but due to family dynamics she has not been able to keep to her schedule. She also was not able to keep to her radiation treatment schedule. She is here today explaining how her seemingly noncompliance was not done on purpose. She states that her family situation is under better control now and she desires to resume her treatments. She does have occasional right sided chest pain and some discomfort in her right axilla as well. Past medical history, family history, and social history: these were reviewed and remains unchanged. Past Medical History   Diagnosis Date    Alcohol abuse     Cancer [facility])      breast cancer, right    Cancer ([address]      Breast CA    Depression     Diabetes ([address]     Drug abuse     Gastrointestinal disorder      cholitis    Hyperlipidemia     Hypertension     Spine injury      Past Surgical History   Procedure Laterality Date    Hx breast lumpectomy  2013     right    Hx other surgical       mediport    Hx tubal ligation      Hx tonsillectomy      Hx orthopaedic       Back fusion surgery 14.      Hx hysterectomy      Colonoscopy N/A 2016 COLONOSCOPY performed by Maria Luisa Benjamin MD at [facility] ENDOSCOPY     Social History     Social History    Marital status:      Spouse name: N/A    Number of children: N/A    Years of education: N/A     Occupational History    Not on file. Social History Main Topics    Smoking status: Never Smoker    Smokeless tobacco: Never Used    Alcohol use No      Comment: \"Occasionally\"    Drug use: No    Sexual activity: No     Other Topics Concern    Not on file     Social History Narrative    ** Merged History Encounter **          Family History   Problem Relation Age of Onset    Heart Disease Mother     Stroke Father     Heart Disease Father 48    Diabetes Other     Hypertension Other     Cancer Maternal Grandmother      Current Outpatient Prescriptions   Medication Sig Dispense Refill    lisinopril (PRINIVIL, ZESTRIL) 10 mg tablet Take 1 Tab by mouth daily. 30 Tab 1    DULoxetine (CYMBALTA) 20 mg capsule Take 1 Cap by mouth daily. 30 Cap 1    polyethylene glycol (MIRALAX) 17 gram packet Take 1 Packet by mouth daily. 30 Each 1    insulin nph-regular human rec (HUMULIN 70-30) 100 unit/mL (70-30) inpn Give 25 units in the QAM and 30 units at bedtime 5 Pen 3    traZODone (DESYREL) 300 mg tablet Take 300 mg by mouth nightly.  ALBUTEROL IN Take  by inhalation.  LORazepam (ATIVAN) 1 mg tablet 1 mg.  ondansetron (ZOFRAN ODT) 8 mg disintegrating tablet Take 1 Tab by mouth every eight (8) hours as needed for Nausea. 20 Tab 0    EPINEPHrine (EPIPEN) 0.3 mg/0.3 mL (1:1,000) injection 0.3 mL by IntraMUSCular route once as needed for up to 1 dose. 1 Each 1    gabapentin (NEURONTIN) 300 mg capsule Take 1 capsule by mouth three (3) times daily. 90 capsule 3       Review of Systems  Constitutional: The patient has complaints of a moderate degree of fatigue.   HEENT: The patient denies recent head trauma, eye pain, blurred vision,  hearing deficit, oropharyngeal mucosal pain or lesions, and the patient denies throat pain or discomfort. Lymphatics: The patient denies palpable peripheral lymphadenopathy. Hematologic: The patient denies having bruising, bleeding, or progressive fatigue. Respiratory: Patient denies having shortness of breath, cough, sputum production, fever, or dyspnea on exertion. Cardiovascular: The patient denies having leg pain, leg swelling, heart palpitations, chest permit, chest pain, or lightheadedness. The patient denies having dyspnea on exertion. Gastrointestinal: The patient denies having nausea, emesis, or diarrhea. The patient denies having any hematemesis or blood in the stool. Genitourinary: Patient denies having urinary urgency, frequency, or dysuria. The patient denies having blood in the urine. Psychological: The patient denies having symptoms of nervousness, anxiety, depression, or thoughts of harming self. Skin: Patient denies having skin rashes, skin, ulcerations, or unexplained itching or pruritus. Musculoskeletal: The patient complains of some right-sided chest pain and some discomfort in the axilla. Objective:     Visit Vitals    /82    Pulse 95    Temp 98.1 °F (36.7 °C)    LMP 07/04/2013     ECOG PS=0; Pain score 2/10  Physical Exam:   Gen. Appearance: The patient is in no acute distress. Skin: There is no bruise or rash. HEENT: The exam is unremarkable. Neck: Supple without lymphadenopathy or thyromegaly. Lungs: Clear to auscultation and percussion; there are no wheezes or rhonchi. Heart: Regular rate and rhythm; there are no murmurs, gallops, or rubs. Anterior chest wall and breast: The patient is status post right modified radical mastectomy. The skin is healed well. There is no evidence of local recurrence of disease. She has full range of motion of the right arm at the shoulder joint. Abdomen: Bowel sounds are present and normal.  There is no guarding, tenderness, or hepatosplenomegaly. Extremities:  There is no clubbing, cyanosis, or edema. Neurologic: There are no focal neurologic deficits. Lymphatics: There is no palpable peripheral lymphadenopathy. Musculoskeletal: The patient has full range of motion at all joints. There is no evidence of joint deformity or effusions. There is no focal joint tenderness. Psychological/psychiatric: There is no clinical evidence of anxiety, depression, or melancholy. Lab data:      Results for orders placed or performed during the hospital encounter of 01/25/17   CBC WITH 3 PART DIFF     Status: Abnormal   Result Value Ref Range Status    WBC 5.9 4.5 - 13.0 K/uL Final    RBC 3.79 (L) 4.10 - 5.10 M/uL Final    HGB 10.1 (L) 12.0 - 16.0 g/dL Final    HCT 30.5 (L) 36 - 48 % Final    MCV 80.5 78 - 102 FL Final    MCH 26.6 25.0 - 35.0 PG Final    MCHC 33.1 31 - 37 g/dL Final    RDW 14.4 11.5 - 14.5 % Final    PLATELET 470 979 - 729 K/uL Final    NEUTROPHILS 72 (H) 40 - 70 % Final    MIXED CELLS 6 0.1 - 17 % Final    LYMPHOCYTES 22 14 - 44 % Final    ABS. NEUTROPHILS 4.2 1.8 - 9.5 K/UL Final    ABS. MIXED CELLS 0.4 0.0 - 2.3 K/uL Final    ABS. LYMPHOCYTES 1.3 1.1 - 5.9 K/UL Final     Comment: Test performed at [address] Results Reviewed by Medical Director. DF AUTOMATED   Final           Assessment:     1. Malignant neoplasm of upper-outer quadrant of right female breast ([address]    2. Antineoplastic chemotherapy induced anemia    3. Chemotherapy induced neutropenia (HCC)        Plan:   Malignant neoplasm of the right breast: I will reschedule the patient to resume her Herceptin therapy every 3 weeks at 6 mg. At this time I will check a CA-27-29 level and plan to see her back in clinic in 8 weeks. Chemotherapy induced anemia: I have explained to the patient that her hemoglobin today is 10.1 g/dL with hematocrit of 30.5%. She was instructed to begin taking an over-the-counter iron supplement in the form of ferrous sulfate 1 tablet twice daily.   I will check her iron profile and ferritin levels at this time. Chemotherapy-induced neutropenia: I have explained to the patient that her neutropenia has resolved. Her WBC count today is 5.9 with an absolute neutrophil count of 4.2 and an absolute lymphocyte count of 1.3. I will continue to monitor blood counts at 8 week intervals in the future. Follow-up in 8 weeks. Orders Placed This Encounter    COMPLETE CBC & AUTO DIFF WBC    InHouse CBC ([employer])     Standing Status:   Future     Number of Occurrences:   1     Standing Expiration Date:   2/1/2017    FERRITIN     Standing Status:   Future     Standing Expiration Date:   7/56/9394    METABOLIC PANEL, COMPREHENSIVE     Standing Status:   Future     Standing Expiration Date:   1/26/2018    IRON PROFILE     Standing Status:   Future     Standing Expiration Date:   1/26/2018    CA 27.29     Standing Status:   Future     Standing Expiration Date:   1/26/2018       Nishi Camacho MD  1/25/2017      Please note: This document has been produced using voice recognition software. Unrecognized errors in transcription may be present.

## 2017-01-25 NOTE — MR AVS SNAPSHOT
Visit Information Date & Time Provider Department Dept. Phone Encounter #  
 1/25/2017  3:00 PM Teresa Us MD [facility] [phone] [id number] Follow-up Instructions Return in about 8 weeks (around 3/22/2017). Your Appointments 3/22/2017  2:30 PM  
[facility] Appt Note: 8 WK  [facility] [address] [address]  
[address]  
  
   
 [facility] Kristian [address] [address] Upcoming Health Maintenance Date Due  
 EYE EXAM RETINAL OR DILATED Q1 11/21/1979 DTaP/Tdap/Td series (1 - Tdap) 11/21/1990 PAP AKA CERVICAL CYTOLOGY 11/21/1990 HEMOGLOBIN A1C Q6M 7/23/2017 FOOT EXAM Q1 1/23/2018 MICROALBUMIN Q1 1/23/2018 LIPID PANEL Q1 1/23/2018 Allergies as of 1/25/2017  Review Complete On: 1/8/2017 By: Lloyd Pina RN Severity Noted Reaction Type Reactions Latex High 04/26/2010    Hives, Itching Other Food  07/15/2016    Rash Almonds Amoxicillin High 11/26/2013    Swelling Other reaction(s): mild rash/itching Aspirin High 09/04/2013    Not Reported This Time, Swelling Mouth swells Fentanyl High 12/17/2013   Systemic Anaphylaxis, Swelling Patches cause mouth to swell Swelling in mouth from fentanyl patch Levaquin [Levofloxacin] High 09/04/2013    Not Reported This Time, Swelling Other reaction(s): mild rash/itching Chlorhexidine Medium 04/18/2014   Topical Rash Norco [Hydrocodone-acetaminophen] Medium 03/12/2015    Nausea and Vomiting Other reaction(s): gi distress Acetaminophen  07/28/2016    Other (comments) [place]  04/03/2016    Rash, Itching Codeine  11/26/2013    Other (comments) Glycopyrrolate  07/04/2014    Swelling Pt  States  faciAL  SWELLING   POST  ROBINUL  IV Pt  States  faciAL  SWELLING   POST  ROBINUL  IV   
 Morphine  05/31/2016    Nausea and Vomiting Pcn [Penicillins]  11/26/2013    Swelling Percocet [Oxycodone-acetaminophen]  01/30/2015    Nausea and Vomiting Other reaction(s): gi distress 
nausea Other reaction(s): anaphylaxis/angioedema Per pt. She is allergic Tape [Adhesive]  05/31/2016    Rash Tramadol  12/05/2013    Swelling Current Immunizations  Reviewed on 8/3/2016 Name Date Influenza Vaccine 11/7/2016, 2/24/2016, 3/23/2015, 12/1/2014, 10/9/2013, 10/20/2012 Pneumococcal Polysaccharide (PPSV-23) 3/22/2015, 10/22/2012 Pneumococcal Vaccine (Unspecified Type) 3/4/2016, 1/2/2013 Not reviewed this visit You Were Diagnosed With   
  
 Codes Comments Malignant neoplasm of upper-outer quadrant of right female breast ([facility] [address]    -  Primary ICD-10-CM: C50.411 ICD-9-CM: 174.4 Antineoplastic chemotherapy induced anemia     ICD-10-CM: D64.81, T45.1X5A 
ICD-9-CM: 285.3, E933.1 Chemotherapy induced neutropenia (HCC)     ICD-10-CM: D70.1, T45.1X5A 
ICD-9-CM: 288.03, E933.1 Vitals BP Pulse Temp LMP OB Status Smoking Status 130/82 95 98.1 °F (36.7 °C) 07/04/2013 Hysterectomy Never Smoker Preferred Pharmacy Pharmacy Name Phone [address] Your Updated Medication List  
  
   
This list is accurate as of: 1/25/17  4:09 PM.  Always use your most recent med list.  
  
  
  
  
 ALBUTEROL IN Take  by inhalation. DULoxetine 20 mg capsule Commonly known as:  CYMBALTA Take 1 Cap by mouth daily. EPINEPHrine 0.3 mg/0.3 mL injection Commonly known as:  EPIPEN  
0.3 mL by IntraMUSCular route once as needed for up to 1 dose. gabapentin 300 mg capsule Commonly known as:  NEURONTIN Take 1 capsule by mouth three (3) times daily. insulin nph-regular human rec 100 unit/mL (70-30) Inpn Commonly known as:  HUMULIN 70-30 Give 25 units in the QAM and 30 units at bedtime  
  
 lisinopril 10 mg tablet Commonly known as:  Thersia Kubas Take 1 Tab by mouth daily. LORazepam 1 mg tablet Commonly known as:  ATIVAN  
1 mg.  
  
 ondansetron 8 mg disintegrating tablet Commonly known as:  ZOFRAN ODT Take 1 Tab by mouth every eight (8) hours as needed for Nausea. polyethylene glycol 17 gram packet Commonly known as:  Patricia Mt Take 1 Packet by mouth daily. traZODone 300 mg tablet Commonly known as:  Madolyn Saas Take 300 mg by mouth nightly. We Performed the Following COMPLETE CBC & AUTO DIFF WBC [37755 CPT(R)] Follow-up Instructions Return in about 8 weeks (around 3/22/2017). To-Do List   
 01/25/2017 Lab:  CA 27.29   
  
 01/25/2017 Lab:  CBC WITH 3 PART DIFF   
  
 01/25/2017 Lab:  FERRITIN   
  
 01/25/2017 Lab:  IRON PROFILE   
  
 01/25/2017 Lab:  METABOLIC PANEL, COMPREHENSIVE   
  
 01/30/2017 11:00 AM  
  Appointment with [address] [place] [address] at [facility] ([phone]) Introducing [facility]! [employer] introduces [employer] patient portal. Now you can access parts of your medical record, email your doctor's office, and request medication refills online. 1. In your internet browser, go to [email or web address] 2. Click on the First Time User? Click Here link in the Sign In box. You will see the New Member Sign Up page. 3. Enter your [employer] Access Code exactly as it appears below. You will not need to use this code after youve completed the sign-up process. If you do not sign up before the expiration date, you must request a new code. · [employer] Access Code: [email or web address] Expires: 2/4/2017  7:44 PM 
 
4. Enter the last four digits of your Social Security Number (xxxx) and Date of Birth (mm/dd/yyyy) as indicated and click Submit. You will be taken to the next sign-up page. 5. Create a DuckDuckGo ID. This will be your [employer] login ID and cannot be changed, so think of one that is secure and easy to remember. 6. Create a [employer]uck[employer] password. You can change your password at any time. 7. Enter your Password Reset Question and Answer. This can be used at a later time if you forget your password. 8. Enter your e-mail address. You will receive e-mail notification when new information is available in [address] 9. Click Sign Up. You can now view and download portions of your medical record. 10. Click the Download Summary menu link to download a portable copy of your medical information. If you have questions, please visit the Frequently Asked Questions section of the [employer] website. Remember, [employer]uckGo is NOT to be used for urgent needs. For medical emergencies, dial 911. Now available from your iPhone and Android! Please provide this summary of care documentation to your next provider. Your primary care clinician is listed as Shakira Mckeon. If you have any questions after today's visit, please call [phone].

## 2017-01-26 DIAGNOSIS — R11.0 NAUSEA: Primary | ICD-10-CM

## 2017-01-26 LAB
ALBUMIN SERPL-MCNC: 4.2 G/DL (ref 3.5–5.5)
ALBUMIN/GLOB SERPL: 1.4 {RATIO} (ref 1.1–2.5)
ALP SERPL-CCNC: 147 IU/L (ref 39–117)
ALT SERPL-CCNC: 18 IU/L (ref 0–32)
AST SERPL-CCNC: 17 IU/L (ref 0–40)
BILIRUB SERPL-MCNC: <0.2 MG/DL (ref 0–1.2)
BUN SERPL-MCNC: 15 MG/DL (ref 6–24)
BUN/CREAT SERPL: 23 (ref 9–23)
CALCIUM SERPL-MCNC: 9.1 MG/DL (ref 8.7–10.2)
CANCER AG27-29 SERPL-ACNC: 7.9 U/ML (ref 0–38.6)
CHLORIDE SERPL-SCNC: 105 MMOL/L (ref 96–106)
CO2 SERPL-SCNC: 24 MMOL/L (ref 18–29)
CREAT SERPL-MCNC: 0.66 MG/DL (ref 0.57–1)
FERRITIN SERPL-MCNC: 11 NG/ML (ref 15–150)
GLOBULIN SER CALC-MCNC: 2.9 G/DL (ref 1.5–4.5)
GLUCOSE SERPL-MCNC: 205 MG/DL (ref 65–99)
IRON SATN MFR SERPL: 9 % (ref 15–55)
IRON SERPL-MCNC: 35 UG/DL (ref 27–159)
POTASSIUM SERPL-SCNC: 3.7 MMOL/L (ref 3.5–5.2)
PROT SERPL-MCNC: 7.1 G/DL (ref 6–8.5)
SODIUM SERPL-SCNC: 143 MMOL/L (ref 134–144)
TIBC SERPL-MCNC: 383 UG/DL (ref 250–450)
UIBC SERPL-MCNC: 348 UG/DL (ref 131–425)

## 2017-01-26 RX ORDER — ONDANSETRON 8 MG/1
8 TABLET, ORALLY DISINTEGRATING ORAL
Qty: 20 TAB | Refills: 0 | Status: SHIPPED | OUTPATIENT
Start: 2017-01-26 | End: 2017-02-03

## 2017-01-26 NOTE — PROGRESS NOTES
Pt called reporting that after starting duloxetine she had nausea. She called her pharmacist and was told this is an allergic reaction. She is not have any rash, wheezing or swelling. She was reassured that nausea is a common initial side effect when starting duloxetine. Reorder zofran for symptom relief.

## 2017-01-30 ENCOUNTER — HOSPITAL ENCOUNTER (OUTPATIENT)
Dept: INFUSION THERAPY | Age: 47
Discharge: HOME | End: 2017-01-30
Payer: MEDICAID

## 2017-01-30 VITALS
SYSTOLIC BLOOD PRESSURE: 121 MMHG | BODY MASS INDEX: 27.27 KG/M2 | TEMPERATURE: 97 F | DIASTOLIC BLOOD PRESSURE: 80 MMHG | WEIGHT: 148.2 LBS | OXYGEN SATURATION: 99 % | RESPIRATION RATE: 18 BRPM | HEIGHT: 62 IN | HEART RATE: 74 BPM

## 2017-01-30 PROCEDURE — 77030012965 HC NDL HUBR BBMI -A

## 2017-01-30 PROCEDURE — 74011250636 HC RX REV CODE- 250/636: Performed by: INTERNAL MEDICINE

## 2017-01-30 PROCEDURE — 96375 TX/PRO/DX INJ NEW DRUG ADDON: CPT

## 2017-01-30 PROCEDURE — 96417 CHEMO IV INFUS EACH ADDL SEQ: CPT

## 2017-01-30 PROCEDURE — 96413 CHEMO IV INFUSION 1 HR: CPT

## 2017-01-30 RX ORDER — SODIUM CHLORIDE 9 MG/ML
25 INJECTION, SOLUTION INTRAVENOUS ONCE
Status: COMPLETED | OUTPATIENT
Start: 2017-01-30 | End: 2017-01-30

## 2017-01-30 RX ORDER — ONDANSETRON 2 MG/ML
8 INJECTION INTRAMUSCULAR; INTRAVENOUS ONCE
Status: COMPLETED | OUTPATIENT
Start: 2017-01-30 | End: 2017-01-30

## 2017-01-30 RX ORDER — HEPARIN 100 UNIT/ML
500 SYRINGE INTRAVENOUS AS NEEDED
Status: DISCONTINUED | OUTPATIENT
Start: 2017-01-30 | End: 2017-02-02 | Stop reason: HOSPADM

## 2017-01-30 RX ORDER — SODIUM CHLORIDE 0.9 % (FLUSH) 0.9 %
10-40 SYRINGE (ML) INJECTION AS NEEDED
Status: DISCONTINUED | OUTPATIENT
Start: 2017-01-30 | End: 2017-02-02 | Stop reason: HOSPADM

## 2017-01-30 RX ADMIN — SODIUM CHLORIDE 25 ML/HR: 900 INJECTION, SOLUTION INTRAVENOUS at 11:54

## 2017-01-30 RX ADMIN — TRASTUZUMAB 402 MG: KIT at 13:04

## 2017-01-30 RX ADMIN — ONDANSETRON 8 MG: 2 INJECTION INTRAMUSCULAR; INTRAVENOUS at 11:57

## 2017-01-30 NOTE — PROGRESS NOTES
[facility] Progress Note    Date: 2017    Name: Christine Ledezma              MRN: [id number]              : 1970    Chemotherapy Cycle:7    Ms. Caro was assessed and education was provided. Ms. Betzaida Bueno vitals were reviewed and patient was observed for 5 minutes prior to treatment. Visit Vitals    /76 (BP 1 Location: Left arm, BP Patient Position: Sitting)    Pulse (!) 106    Temp 98.2 °F (36.8 °C)    Resp 18    Ht 5' 2\" (1.575 m)    Wt 67.2 kg (148 lb 3.2 oz)    BMI 27.11 kg/m2       Ms Natalie Ochoa was accompanied by  at this visit. Lab results were obtained opn 17 and reviewed OK for chemo. No results found for this or any previous visit (from the past 12 hour(s)). Pre-medications were administered as ordered  Zofran 8mg IVP and chemotherapy was initiated. Herceptin 402 mg was infused at  166.7 ml/hr. After infusion complete line flushed with normal saline followed by Heparin per protocol. Jeong needle removed band aid placed at site. Ms. Natalie Ochoa tolerated infusion, and had no complaints at this time. Patient armband removed and shredded. Ms. Natalie Ochoa was discharged from [place] [address] in stable condition at 1455. She is to return on 17 at 1100 for her next appointment.      Yovani Max RN  2017  3:41 PM

## 2017-02-02 PROCEDURE — 96374 THER/PROPH/DIAG INJ IV PUSH: CPT

## 2017-02-02 PROCEDURE — 99283 EMERGENCY DEPT VISIT LOW MDM: CPT

## 2017-02-02 PROCEDURE — 96375 TX/PRO/DX INJ NEW DRUG ADDON: CPT

## 2017-02-03 ENCOUNTER — HOSPITAL ENCOUNTER (EMERGENCY)
Age: 47
Discharge: HOME | End: 2017-02-03
Attending: EMERGENCY MEDICINE | Admitting: EMERGENCY MEDICINE
Payer: MEDICAID

## 2017-02-03 VITALS
RESPIRATION RATE: 16 BRPM | TEMPERATURE: 100 F | DIASTOLIC BLOOD PRESSURE: 62 MMHG | HEART RATE: 98 BPM | SYSTOLIC BLOOD PRESSURE: 117 MMHG | BODY MASS INDEX: 27.07 KG/M2 | OXYGEN SATURATION: 99 % | WEIGHT: 148 LBS

## 2017-02-03 DIAGNOSIS — G89.4 CHRONIC PAIN SYNDROME: ICD-10-CM

## 2017-02-03 DIAGNOSIS — R11.0 NAUSEA: ICD-10-CM

## 2017-02-03 DIAGNOSIS — R11.2 NON-INTRACTABLE VOMITING WITH NAUSEA, UNSPECIFIED VOMITING TYPE: ICD-10-CM

## 2017-02-03 DIAGNOSIS — R10.9 RECURRENT ABDOMINAL PAIN: Primary | ICD-10-CM

## 2017-02-03 LAB
ALBUMIN SERPL BCP-MCNC: 3.7 G/DL (ref 3.4–5)
ALBUMIN/GLOB SERPL: 1 {RATIO} (ref 0.8–1.7)
ALP SERPL-CCNC: 150 U/L (ref 45–117)
ALT SERPL-CCNC: 24 U/L (ref 13–56)
ANION GAP BLD CALC-SCNC: 8 MMOL/L (ref 3–18)
AST SERPL W P-5'-P-CCNC: 15 U/L (ref 15–37)
BASOPHILS # BLD AUTO: 0 K/UL (ref 0–0.06)
BASOPHILS # BLD: 0 % (ref 0–2)
BILIRUB SERPL-MCNC: 0.3 MG/DL (ref 0.2–1)
BUN SERPL-MCNC: 13 MG/DL (ref 7–18)
BUN/CREAT SERPL: 19 (ref 12–20)
CALCIUM SERPL-MCNC: 8.6 MG/DL (ref 8.5–10.1)
CHLORIDE SERPL-SCNC: 104 MMOL/L (ref 100–108)
CO2 SERPL-SCNC: 29 MMOL/L (ref 21–32)
CREAT SERPL-MCNC: 0.67 MG/DL (ref 0.6–1.3)
DIFFERENTIAL METHOD BLD: ABNORMAL
EOSINOPHIL # BLD: 0.1 K/UL (ref 0–0.4)
EOSINOPHIL NFR BLD: 1 % (ref 0–5)
ERYTHROCYTE [DISTWIDTH] IN BLOOD BY AUTOMATED COUNT: 15.2 % (ref 11.6–14.5)
GLOBULIN SER CALC-MCNC: 3.6 G/DL (ref 2–4)
GLUCOSE SERPL-MCNC: 166 MG/DL (ref 74–99)
HCT VFR BLD AUTO: 31.8 % (ref 35–45)
HGB BLD-MCNC: 10.3 G/DL (ref 12–16)
LIPASE SERPL-CCNC: 221 U/L (ref 73–393)
LYMPHOCYTES # BLD AUTO: 5 % (ref 21–52)
LYMPHOCYTES # BLD: 0.6 K/UL (ref 0.9–3.6)
MCH RBC QN AUTO: 26.5 PG (ref 24–34)
MCHC RBC AUTO-ENTMCNC: 32.4 G/DL (ref 31–37)
MCV RBC AUTO: 81.7 FL (ref 74–97)
MONOCYTES # BLD: 0.4 K/UL (ref 0.05–1.2)
MONOCYTES NFR BLD AUTO: 3 % (ref 3–10)
NEUTS SEG # BLD: 10.8 K/UL (ref 1.8–8)
NEUTS SEG NFR BLD AUTO: 91 % (ref 40–73)
PLATELET # BLD AUTO: 221 K/UL (ref 135–420)
PMV BLD AUTO: 9.7 FL (ref 9.2–11.8)
POTASSIUM SERPL-SCNC: 3.5 MMOL/L (ref 3.5–5.5)
PROT SERPL-MCNC: 7.3 G/DL (ref 6.4–8.2)
RBC # BLD AUTO: 3.89 M/UL (ref 4.2–5.3)
SODIUM SERPL-SCNC: 141 MMOL/L (ref 136–145)
WBC # BLD AUTO: 11.9 K/UL (ref 4.6–13.2)

## 2017-02-03 PROCEDURE — 74011250636 HC RX REV CODE- 250/636

## 2017-02-03 PROCEDURE — 74011250636 HC RX REV CODE- 250/636: Performed by: EMERGENCY MEDICINE

## 2017-02-03 PROCEDURE — 74011000250 HC RX REV CODE- 250: Performed by: EMERGENCY MEDICINE

## 2017-02-03 PROCEDURE — 80053 COMPREHEN METABOLIC PANEL: CPT | Performed by: EMERGENCY MEDICINE

## 2017-02-03 PROCEDURE — 77030003560 HC NDL HUBR BARD -A

## 2017-02-03 PROCEDURE — 85025 COMPLETE CBC W/AUTO DIFF WBC: CPT | Performed by: EMERGENCY MEDICINE

## 2017-02-03 PROCEDURE — 83690 ASSAY OF LIPASE: CPT | Performed by: EMERGENCY MEDICINE

## 2017-02-03 RX ORDER — LORAZEPAM 2 MG/ML
1 INJECTION INTRAMUSCULAR
Status: COMPLETED | OUTPATIENT
Start: 2017-02-03 | End: 2017-02-03

## 2017-02-03 RX ORDER — ONDANSETRON 8 MG/1
8 TABLET, ORALLY DISINTEGRATING ORAL
Qty: 20 TAB | Refills: 0 | Status: SHIPPED | OUTPATIENT
Start: 2017-02-03 | End: 2018-01-03

## 2017-02-03 RX ORDER — LORAZEPAM 2 MG/ML
INJECTION INTRAMUSCULAR
Status: COMPLETED
Start: 2017-02-03 | End: 2017-02-03

## 2017-02-03 RX ORDER — PROMETHAZINE HYDROCHLORIDE 25 MG/ML
INJECTION, SOLUTION INTRAMUSCULAR; INTRAVENOUS
Status: DISCONTINUED
Start: 2017-02-03 | End: 2017-02-03 | Stop reason: HOSPADM

## 2017-02-03 RX ORDER — PROMETHAZINE HYDROCHLORIDE 25 MG/1
25 TABLET ORAL
Qty: 12 TAB | Refills: 0 | Status: SHIPPED | OUTPATIENT
Start: 2017-02-03 | End: 2017-07-27

## 2017-02-03 RX ORDER — HEPARIN 100 UNIT/ML
SYRINGE INTRAVENOUS
Status: DISCONTINUED
Start: 2017-02-03 | End: 2017-02-03 | Stop reason: HOSPADM

## 2017-02-03 RX ORDER — HEPARIN SODIUM (PORCINE) LOCK FLUSH IV SOLN 100 UNIT/ML 100 UNIT/ML
500 SOLUTION INTRAVENOUS
Status: COMPLETED | OUTPATIENT
Start: 2017-02-03 | End: 2017-02-03

## 2017-02-03 RX ADMIN — LORAZEPAM 1 MG: 2 INJECTION INTRAMUSCULAR at 01:37

## 2017-02-03 RX ADMIN — HEPARIN SODIUM (PORCINE) LOCK FLUSH IV SOLN 100 UNIT/ML 500 UNITS: 100 SOLUTION at 03:01

## 2017-02-03 RX ADMIN — PROMETHAZINE HYDROCHLORIDE 25 MG: 25 INJECTION INTRAMUSCULAR; INTRAVENOUS at 01:35

## 2017-02-03 RX ADMIN — LORAZEPAM 1 MG: 2 INJECTION, SOLUTION INTRAMUSCULAR; INTRAVENOUS at 01:37

## 2017-02-03 NOTE — ED NOTES
I have reviewed discharge instructions with the patient. The patient verbalized understanding. Patient armband removed and shredded. Patient discharged with family.

## 2017-02-03 NOTE — ED PROVIDER NOTES
HPI Comments: Waqar Sanchez is a 51 y.o. Female who is well known to this facility with h/o recurrent abd pain, nv who was with daughter at [facility] eating and started having abd pain, nv, diarrhea shortly after with chills. Tried to take zofran at home without relief. C/o diffuse, severe cramping, nausea, worse with attempted po intake  Just at [facility] 2/1 for similar issue, left ama and refused treatment when she discovered she was not going to get dilaudid    The history is provided by the patient and medical records. Past Medical History:   Diagnosis Date    Alcohol abuse     Cancer [facility])      breast cancer, right    Cancer ([facility] [address]      Breast CA    Depression     Diabetes ([facility] [address]     Drug abuse     Gastrointestinal disorder      cholitis    Hyperlipidemia     Hypertension     Spine injury        Past Surgical History:   Procedure Laterality Date    Hx breast lumpectomy  06/2013     right    Hx other surgical       mediport    Hx tubal ligation      Hx tonsillectomy      Hx orthopaedic       Back fusion surgery 4/18/14.  Hx hysterectomy      Colonoscopy N/A 7/18/2016     COLONOSCOPY performed by Jono Ruano MD at [facility] ENDOSCOPY         Family History:   Problem Relation Age of Onset    Heart Disease Mother     Stroke Father     Heart Disease Father 48    Diabetes Other     Hypertension Other     Cancer Maternal Grandmother        Social History     Social History    Marital status:      Spouse name: N/A    Number of children: N/A    Years of education: N/A     Occupational History    Not on file. Social History Main Topics    Smoking status: Never Smoker    Smokeless tobacco: Never Used    Alcohol use No      Comment: \"Occasionally\"    Drug use: No    Sexual activity: No     Other Topics Concern    Not on file     Social History Narrative    ** Merged History Encounter **              ALLERGIES: Latex; Other food;  Amoxicillin; Aspirin; Fentanyl; Levaquin [levofloxacin]; Chlorhexidine; Norco [hydrocodone-acetaminophen]; Acetaminophen; Hegins; Codeine; Glycopyrrolate; Morphine; Pcn [penicillins]; Percocet [oxycodone-acetaminophen]; Tape [adhesive]; and Tramadol    Review of Systems   Constitutional: Positive for chills. HENT: Negative for sore throat. Eyes: Negative for visual disturbance. Respiratory: Negative for shortness of breath. Cardiovascular: Negative for chest pain. Gastrointestinal: Positive for abdominal pain. Negative for blood in stool. Endocrine: Negative for polyuria. Genitourinary: Negative for difficulty urinating. Musculoskeletal: Positive for gait problem (due to pain and chronic condition). Skin: Negative for rash. Neurological: Positive for weakness (generalized). Psychiatric/Behavioral: Positive for sleep disturbance. Vitals:    02/02/17 2341 02/03/17 0149   BP: (!) 131/95 130/87   Pulse: 98    Resp: 16    Temp: 100 °F (37.8 °C)    SpO2: 99%    Weight: 67.1 kg (148 lb)             Physical Exam   Constitutional: She is oriented to person, place, and time. She appears well-developed and well-nourished. No distress. HENT:   Head: Normocephalic and atraumatic. Right Ear: External ear normal.   Left Ear: External ear normal.   Nose: Nose normal.   Mouth/Throat: Uvula is midline, oropharynx is clear and moist and mucous membranes are normal.   Eyes: Conjunctivae are normal. No scleral icterus. Neck: Neck supple. Cardiovascular: Normal rate, regular rhythm, normal heart sounds and intact distal pulses. Pulmonary/Chest: Effort normal and breath sounds normal.   Abdominal: Soft. She exhibits no distension and no mass. There is tenderness. There is no rebound and no guarding. Mild diffuse ttp. No rigidity   Musculoskeletal: She exhibits no edema. Neurological: She is alert and oriented to person, place, and time. Gait normal.   Skin: Skin is warm and dry. She is not diaphoretic.    Psychiatric: Her behavior is normal.   Nursing note and vitals reviewed. [facility]  ED Course       Procedures  Vitals:  Patient Vitals for the past 12 hrs:   Temp Pulse Resp BP SpO2   02/03/17 0149 - - - 130/87 -   02/02/17 2341 100 °F (37.8 °C) 98 16 (!) 131/95 99 %         Medications ordered:   Medications   promethazine (PHENERGAN) 25 mg/mL injection (0 mg  Held 2/3/17 0119)   promethazine (PHENERGAN) with saline injection 25 mg (25 mg IntraVENous Given 2/3/17 0135)   LORazepam (ATIVAN) injection 1 mg (1 mg IntraVENous Given 2/3/17 0137)         Lab findings:  Recent Results (from the past 12 hour(s))   CBC WITH AUTOMATED DIFF    Collection Time: 02/03/17  1:35 AM   Result Value Ref Range    WBC 11.9 4.6 - 13.2 K/uL    RBC 3.89 (L) 4.20 - 5.30 M/uL    HGB 10.3 (L) 12.0 - 16.0 g/dL    HCT 31.8 (L) 35.0 - 45.0 %    MCV 81.7 74.0 - 97.0 FL    MCH 26.5 24.0 - 34.0 PG    MCHC 32.4 31.0 - 37.0 g/dL    RDW 15.2 (H) 11.6 - 14.5 %    PLATELET 033 915 - 193 K/uL    MPV 9.7 9.2 - 11.8 FL    NEUTROPHILS 91 (H) 40 - 73 %    LYMPHOCYTES 5 (L) 21 - 52 %    MONOCYTES 3 3 - 10 %    EOSINOPHILS 1 0 - 5 %    BASOPHILS 0 0 - 2 %    ABS. NEUTROPHILS 10.8 (H) 1.8 - 8.0 K/UL    ABS. LYMPHOCYTES 0.6 (L) 0.9 - 3.6 K/UL    ABS. MONOCYTES 0.4 0.05 - 1.2 K/UL    ABS. EOSINOPHILS 0.1 0.0 - 0.4 K/UL    ABS.  BASOPHILS 0.0 0.0 - 0.06 K/UL    DF AUTOMATED     LIPASE    Collection Time: 02/03/17  1:35 AM   Result Value Ref Range    Lipase 221 73 - 745 U/L   METABOLIC PANEL, COMPREHENSIVE    Collection Time: 02/03/17  1:35 AM   Result Value Ref Range    Sodium 141 136 - 145 mmol/L    Potassium 3.5 3.5 - 5.5 mmol/L    Chloride 104 100 - 108 mmol/L    CO2 29 21 - 32 mmol/L    Anion gap 8 3.0 - 18 mmol/L    Glucose 166 (H) 74 - 99 mg/dL    BUN 13 7.0 - 18 MG/DL    Creatinine 0.67 0.6 - 1.3 MG/DL    BUN/Creatinine ratio 19 12 - 20      GFR est AA >60 >60 ml/min/1.73m2    GFR est non-AA >60 >60 ml/min/1.73m2    Calcium 8.6 8.5 - 10.1 MG/DL    Bilirubin, total 0.3 0.2 - 1.0 MG/DL ALT (SGPT) 24 13 - 56 U/L    AST (SGOT) 15 15 - 37 U/L    Alk. phosphatase 150 (H) 45 - 117 U/L    Protein, total 7.3 6.4 - 8.2 g/dL    Albumin 3.7 3.4 - 5.0 g/dL    Globulin 3.6 2.0 - 4.0 g/dL    A-G Ratio 1.0 0.8 - 1.7         EKG interpretation by ED Physician:      X-Ray, CT or other radiology findings or impressions:  No orders to display       Progress notes, Consult notes or additional Procedure notes:   Sig improved. No further vomiting. Doubt need for imaging, other work up  D/w pt results, rec treatment plan    Reevaluation of patient:   Stable for d/c     Disposition:  Diagnosis:   1. Recurrent abdominal pain    2. Chronic pain syndrome    3. Non-intractable vomiting with nausea, unspecified vomiting type    4. Food poisoning, accidental or unintentional, initial encounter    5. Nausea        Disposition: home      Follow-up Information     Follow up With Details Comments [address], [place] Schedule an appointment as soon as possible for a visit  [id number] [place] [address]  [phone]              Patient's Medications   Start Taking    PROMETHAZINE (PHENERGAN) 25 MG TABLET    Take 1 Tab by mouth every six (6) hours as needed. Continue Taking    ALBUTEROL IN    Take  by inhalation. DULOXETINE (CYMBALTA) 20 MG CAPSULE    Take 1 Cap by mouth daily. EPINEPHRINE (EPIPEN) 0.3 MG/0.3 ML (1:1,000) INJECTION    0.3 mL by IntraMUSCular route once as needed for up to 1 dose. GABAPENTIN (NEURONTIN) 300 MG CAPSULE    Take 1 capsule by mouth three (3) times daily. INSULIN NPH-REGULAR HUMAN REC (HUMULIN 70-30) 100 UNIT/ML (70-30) INPN    Give 25 units in the QAM and 30 units at bedtime    LISINOPRIL (PRINIVIL, ZESTRIL) 10 MG TABLET    Take 1 Tab by mouth daily. LORAZEPAM (ATIVAN) 1 MG TABLET    1 mg. POLYETHYLENE GLYCOL (MIRALAX) 17 GRAM PACKET    Take 1 Packet by mouth daily.     TRAZODONE (DESYREL) 300 MG TABLET    Take 300 mg by mouth nightly. These Medications have changed    Modified Medication Previous Medication    ONDANSETRON (ZOFRAN ODT) 8 MG DISINTEGRATING TABLET ondansetron (ZOFRAN ODT) 8 mg disintegrating tablet       Take 1 Tab by mouth every eight (8) hours as needed for Nausea. Take 1 Tab by mouth every eight (8) hours as needed for Nausea.    Stop Taking    No medications on file

## 2017-02-17 RX ORDER — SODIUM CHLORIDE 0.9 % (FLUSH) 0.9 %
5-10 SYRINGE (ML) INJECTION AS NEEDED
Status: CANCELLED | OUTPATIENT
Start: 2017-02-20

## 2017-02-17 RX ORDER — HEPARIN 100 UNIT/ML
500 SYRINGE INTRAVENOUS AS NEEDED
Status: CANCELLED | OUTPATIENT
Start: 2017-02-20

## 2017-02-17 RX ORDER — ONDANSETRON 2 MG/ML
8 INJECTION INTRAMUSCULAR; INTRAVENOUS ONCE
Status: CANCELLED | OUTPATIENT
Start: 2017-02-20 | End: 2017-02-20

## 2017-02-20 ENCOUNTER — HOSPITAL ENCOUNTER (OUTPATIENT)
Dept: INFUSION THERAPY | Age: 47
Discharge: HOME | End: 2017-02-20
Payer: MEDICAID

## 2017-02-20 VITALS
SYSTOLIC BLOOD PRESSURE: 150 MMHG | HEART RATE: 95 BPM | HEIGHT: 62 IN | DIASTOLIC BLOOD PRESSURE: 99 MMHG | OXYGEN SATURATION: 97 % | WEIGHT: 151.4 LBS | BODY MASS INDEX: 27.86 KG/M2 | TEMPERATURE: 97.8 F | RESPIRATION RATE: 18 BRPM

## 2017-02-20 LAB
BASO+EOS+MONOS # BLD AUTO: 0.4 K/UL (ref 0–2.3)
BASO+EOS+MONOS # BLD AUTO: 7 % (ref 0.1–17)
DIFFERENTIAL METHOD BLD: ABNORMAL
ERYTHROCYTE [DISTWIDTH] IN BLOOD BY AUTOMATED COUNT: 14.1 % (ref 11.5–14.5)
HCT VFR BLD AUTO: 31.5 % (ref 36–48)
HGB BLD-MCNC: 10 G/DL (ref 12–16)
LYMPHOCYTES # BLD AUTO: 22 % (ref 14–44)
LYMPHOCYTES # BLD: 1.4 K/UL (ref 1.1–5.9)
MCH RBC QN AUTO: 26 PG (ref 25–35)
MCHC RBC AUTO-ENTMCNC: 31.7 G/DL (ref 31–37)
MCV RBC AUTO: 82 FL (ref 78–102)
NEUTS SEG # BLD: 4.3 K/UL (ref 1.8–9.5)
NEUTS SEG NFR BLD AUTO: 71 % (ref 40–70)
PLATELET # BLD AUTO: 225 K/UL (ref 140–440)
RBC # BLD AUTO: 3.84 M/UL (ref 4.1–5.1)
WBC # BLD AUTO: 6.1 K/UL (ref 4.5–13)

## 2017-02-20 PROCEDURE — 36591 DRAW BLOOD OFF VENOUS DEVICE: CPT

## 2017-02-20 PROCEDURE — 96375 TX/PRO/DX INJ NEW DRUG ADDON: CPT

## 2017-02-20 PROCEDURE — 74011250636 HC RX REV CODE- 250/636: Performed by: INTERNAL MEDICINE

## 2017-02-20 PROCEDURE — 96413 CHEMO IV INFUSION 1 HR: CPT

## 2017-02-20 PROCEDURE — 77030012965 HC NDL HUBR BBMI -A

## 2017-02-20 PROCEDURE — 85025 COMPLETE CBC W/AUTO DIFF WBC: CPT | Performed by: INTERNAL MEDICINE

## 2017-02-20 RX ORDER — HEPARIN 100 UNIT/ML
500 SYRINGE INTRAVENOUS AS NEEDED
Status: DISCONTINUED | OUTPATIENT
Start: 2017-02-20 | End: 2017-02-23 | Stop reason: HOSPADM

## 2017-02-20 RX ORDER — SODIUM CHLORIDE 0.9 % (FLUSH) 0.9 %
5-10 SYRINGE (ML) INJECTION AS NEEDED
Status: DISCONTINUED | OUTPATIENT
Start: 2017-02-20 | End: 2017-02-23 | Stop reason: HOSPADM

## 2017-02-20 RX ORDER — SODIUM CHLORIDE 0.9 % (FLUSH) 0.9 %
10-40 SYRINGE (ML) INJECTION AS NEEDED
Status: DISCONTINUED | OUTPATIENT
Start: 2017-02-20 | End: 2017-02-23 | Stop reason: HOSPADM

## 2017-02-20 RX ORDER — ONDANSETRON 2 MG/ML
8 INJECTION INTRAMUSCULAR; INTRAVENOUS ONCE
Status: COMPLETED | OUTPATIENT
Start: 2017-02-20 | End: 2017-02-20

## 2017-02-20 RX ORDER — SODIUM CHLORIDE 9 MG/ML
25 INJECTION, SOLUTION INTRAVENOUS ONCE
Status: COMPLETED | OUTPATIENT
Start: 2017-02-20 | End: 2017-02-20

## 2017-02-20 RX ADMIN — Medication 10 ML: at 14:51

## 2017-02-20 RX ADMIN — SODIUM CHLORIDE 25 ML/HR: 900 INJECTION, SOLUTION INTRAVENOUS at 13:03

## 2017-02-20 RX ADMIN — Medication 500 UNITS: at 14:51

## 2017-02-20 RX ADMIN — TRASTUZUMAB 402 MG: KIT at 14:25

## 2017-02-20 RX ADMIN — ONDANSETRON 8 MG: 2 INJECTION INTRAMUSCULAR; INTRAVENOUS at 13:10

## 2017-02-20 NOTE — PROGRESS NOTES
[facility] Progress Note    Date: 2017    Name: Dominic Leyva    MRN: [id number]         : 1970      Ms. Caro [facility] to [facility] at [phone]. Pt is here today for Herceptin, Cycle 8. Ms. Olivia Lim was assessed and education was provided. Ms. Henrietta Welsh vitals were reviewed. Visit Vitals    BP (!) 150/99 (BP 1 Location: Left arm, BP Patient Position: Sitting)    Pulse 95    Temp 97.8 °F (36.6 °C)    Resp 18    Ht 5' 2\" (1.575 m)    Wt 68.7 kg (151 lb 6.4 oz)    SpO2 97%    BMI 27.69 kg/m2       Patient's left upper chest port accessed and blood drawn for labs. Lab results were obtained and reviewed. Labs okay for chemo. Recent Results (from the past 12 hour(s))   CBC WITH 3 PART DIFF    Collection Time: 17 12:40 PM   Result Value Ref Range    WBC 6.1 4.5 - 13.0 K/uL    RBC 3.84 (L) 4.10 - 5.10 M/uL    HGB 10.0 (L) 12.0 - 16.0 g/dL    HCT 31.5 (L) 36 - 48 %    MCV 82.0 78 - 102 FL    MCH 26.0 25.0 - 35.0 PG    MCHC 31.7 31 - 37 g/dL    RDW 14.1 11.5 - 14.5 %    PLATELET 738 634 - 156 K/uL    NEUTROPHILS 71 (H) 40 - 70 %    MIXED CELLS 7 0.1 - 17 %    LYMPHOCYTES 22 14 - 44 %    ABS. NEUTROPHILS 4.3 1.8 - 9.5 K/UL    ABS. MIXED CELLS 0.4 0.0 - 2.3 K/uL    ABS. LYMPHOCYTES 1.4 1.1 - 5.9 K/UL    DF AUTOMATED       Ms Olivia Lim c/o pain to foot and was requesting pain medicine. Attempt made to call but after being on hold, advised patient to wait until she gets home. She reports she did not take her pain medicine before coming to her appointment    Pre-medications were administered as ordered Zofran 8mg and chemotherapy was initiated. Herceptin 402 mg administered as ordered. Ms. Olivia Lim tolerated infusion without complaints. Port flushed with heparin per order and de-accessed. Band-aid applied to site. Ms. Olivia Lim was discharged from [place] [address] in stable condition at 1455. She is to return on  at 1100 for her next appointment.     Dana URIBE Russ Gustafson  February 20, 2017

## 2017-02-21 ENCOUNTER — TELEPHONE (OUTPATIENT)
Dept: FAMILY MEDICINE CLINIC | Age: 47
End: 2017-02-21

## 2017-02-21 NOTE — TELEPHONE ENCOUNTER
Patient called in stating her pre op was never sent to her dentist office she states her procedure is at 8 Am

## 2017-02-21 NOTE — TELEPHONE ENCOUNTER
The pre-op has been faxed to [facility] @ [phone]. Conformation received at 3:42pm. I also notified Shanta Longo that patient was cleared for surgery. I called and informed patient, that this was already taken care of. Patient stated an understanding. Forms placed in scanning.

## 2017-03-10 RX ORDER — ONDANSETRON 2 MG/ML
8 INJECTION INTRAMUSCULAR; INTRAVENOUS
Status: CANCELLED | OUTPATIENT
Start: 2017-03-13

## 2017-03-10 RX ORDER — SODIUM CHLORIDE 0.9 % (FLUSH) 0.9 %
5-10 SYRINGE (ML) INJECTION AS NEEDED
Status: CANCELLED | OUTPATIENT
Start: 2017-03-13

## 2017-03-10 RX ORDER — HEPARIN 100 UNIT/ML
500 SYRINGE INTRAVENOUS AS NEEDED
Status: CANCELLED | OUTPATIENT
Start: 2017-03-13

## 2017-03-13 ENCOUNTER — HOSPITAL ENCOUNTER (OUTPATIENT)
Dept: INFUSION THERAPY | Age: 47
Discharge: HOME | End: 2017-03-13
Payer: MEDICAID

## 2017-03-21 RX ORDER — ONDANSETRON 2 MG/ML
8 INJECTION INTRAMUSCULAR; INTRAVENOUS
Status: CANCELLED | OUTPATIENT
Start: 2017-03-22 | End: 2017-03-23

## 2017-03-22 ENCOUNTER — HOSPITAL ENCOUNTER (OUTPATIENT)
Dept: INFUSION THERAPY | Age: 47
Discharge: HOME | End: 2017-03-22
Payer: MEDICAID

## 2017-03-23 ENCOUNTER — OFFICE VISIT (OUTPATIENT)
Dept: FAMILY MEDICINE CLINIC | Age: 47
End: 2017-03-23

## 2017-03-23 VITALS
TEMPERATURE: 97.6 F | SYSTOLIC BLOOD PRESSURE: 124 MMHG | HEIGHT: 62 IN | HEART RATE: 115 BPM | DIASTOLIC BLOOD PRESSURE: 92 MMHG | OXYGEN SATURATION: 99 % | WEIGHT: 157 LBS | RESPIRATION RATE: 16 BRPM | BODY MASS INDEX: 28.89 KG/M2

## 2017-03-23 DIAGNOSIS — E11.65 TYPE 2 DIABETES MELLITUS WITH HYPERGLYCEMIA, WITH LONG-TERM CURRENT USE OF INSULIN (HCC): ICD-10-CM

## 2017-03-23 DIAGNOSIS — F31.32 BIPOLAR AFFECTIVE DISORDER, CURRENTLY DEPRESSED, MODERATE (HCC): ICD-10-CM

## 2017-03-23 DIAGNOSIS — R10.13 EPIGASTRIC PAIN: Primary | ICD-10-CM

## 2017-03-23 DIAGNOSIS — G89.4 CHRONIC PAIN SYNDROME: ICD-10-CM

## 2017-03-23 DIAGNOSIS — Z79.4 TYPE 2 DIABETES MELLITUS WITH HYPERGLYCEMIA, WITH LONG-TERM CURRENT USE OF INSULIN (HCC): ICD-10-CM

## 2017-03-23 RX ORDER — ONDANSETRON 2 MG/ML
8 INJECTION INTRAMUSCULAR; INTRAVENOUS ONCE
Status: CANCELLED | OUTPATIENT
Start: 2017-03-24 | End: 2017-03-24

## 2017-03-23 RX ORDER — DULOXETIN HYDROCHLORIDE 60 MG/1
60 CAPSULE, DELAYED RELEASE ORAL DAILY
Qty: 30 CAP | Refills: 1 | Status: SHIPPED | OUTPATIENT
Start: 2017-03-23 | End: 2018-01-03 | Stop reason: CLARIF

## 2017-03-23 RX ORDER — HEPARIN 100 UNIT/ML
500 SYRINGE INTRAVENOUS AS NEEDED
Status: CANCELLED | OUTPATIENT
Start: 2017-03-24

## 2017-03-23 RX ORDER — ESOMEPRAZOLE MAGNESIUM 20 MG/1
20 TABLET, DELAYED RELEASE ORAL DAILY
Qty: 30 TAB | Refills: 0 | Status: SHIPPED | OUTPATIENT
Start: 2017-03-23 | End: 2018-03-07

## 2017-03-23 RX ORDER — SODIUM CHLORIDE 0.9 % (FLUSH) 0.9 %
5-10 SYRINGE (ML) INJECTION ONCE
Status: CANCELLED | OUTPATIENT
Start: 2017-03-24 | End: 2017-03-24

## 2017-03-23 NOTE — PROGRESS NOTES
Reuben Hadley is a 51 y.o.  female and presents with    Chief Complaint   Patient presents with    Abdominal Pain     2 days      Subjective:  Abdominal Pain  Patient complains of abdominal pain. The pain is described as stabbing, and is 8/10 in intensity. Pain is located in the epigastric without radiation. Onset was 2 days ago. Symptoms have been unchanged since. Aggravating factors: pressure and eating. Alleviating factors: miralax. Associated symptoms: nausea and vomiting. The patient denies arthralgias. She reports that she has had increased stress after her son was imprisoned. She has worry. She feels like she has a rock in her stomach. Osteoarthritis and Chronic Pain:  Patient has myalgias, primarily affecting the legs. Symptoms onset: problem is longstanding. Rheumatological ROS: ongoing significant pain in legs which is stable and controlled by PRN meds. Response to treatment plan: waxing and waning but worse overall. Diabetes Mellitus:  She has diabetes mellitus, and  hypertension and hyperlipidemia. Diabetic ROS - medication compliance: compliant most of the time, diabetic diet compliance: noncompliant some of the time. Lab review: labs reviewed, I note that glycosylated hemoglobin abnormal.   Anxiety Review:  Patient is seen for anxiety disorder. Current treatment includes trazodone and duloxetine. Ongoing symptoms include: palpitations, sweating, chest pain, insomnia, racing thoughts, feelings of losing control, difficulty concentrating. Patient denies: suicidal ideation. Reported side effects from the treatment: none.         Patient Active Problem List   Diagnosis Code    Breast cancer ([address] C50.919    Diabetes mellitus ([facility] E11.9    Chronic pain G89.29    Hypercholesterolemia E78.00    Essential hypertension I10    Depression F32.9    Nausea & vomiting R11.2    Chemotherapy induced nausea and vomiting R11.2, T45.1X5A    Antineoplastic chemotherapy induced anemia D64.81, T45.1X5A    Mouth sore secondary to chemotherapy K13.79    Anemia D64.9    Chronic abdominal pain R10.9, G89.29    Gastrointestinal hemorrhage K92.2    Anemia due to antineoplastic chemotherapy D64.81    Bipolar affective disorder (HCC) F31.9    Cellulitis of breast N61.0    Chest pain R07.9    Chronic low back pain M54.5, G89.29    Narcotic drug use F11.90    Secondary diabetes mellitus (HCC) E13.9    Persistent vomiting R11.10    Family history of coronary artery disease Z82.49    Type 2 diabetes mellitus with autonomic neuropathy (HCC) E11.43    Abnormal glucose R73.09    Leukocytopenia D72.819    Menorrhagia N92.0    Nausea with vomiting R11.2    History of bilateral mastectomy Z90.13    History of bilateral oophorectomy Z90.722    History of hysterectomy Z90.710    Type 2 diabetes mellitus (HCC) E11.9    Urinary tract infection N39.0    Peptic ulcer disease K27.9    Chemotherapy induced neutropenia (HCC) D70.1, T45.1X5A    Malignant neoplasm of upper-outer quadrant of right female breast ([address] C50.411     Patient Active Problem List    Diagnosis Date Noted    Chemotherapy induced neutropenia ([address] 01/25/2017    Malignant neoplasm of upper-outer quadrant of right female breast ([address] 01/25/2017    Bipolar affective disorder ([address] 07/28/2016    Chronic low back pain 07/28/2016    Family history of coronary artery disease 07/28/2016    Type 2 diabetes mellitus ([address] 07/28/2016    Peptic ulcer disease 07/28/2016    Gastrointestinal hemorrhage 07/18/2016    Chronic abdominal pain 07/14/2016    Anemia 06/22/2016    Mouth sore secondary to chemotherapy 06/16/2016    Nausea & vomiting 04/07/2016    Chemotherapy induced nausea and vomiting 04/07/2016    Antineoplastic chemotherapy induced anemia 04/07/2016    Persistent vomiting 04/03/2016    Leukocytopenia 04/03/2016    Urinary tract infection 04/03/2016    Secondary diabetes mellitus ([address] 03/25/2016    Narcotic drug use 02/24/2016    Cellulitis of breast 02/23/2016    History of bilateral mastectomy 11/13/2015    Depression 04/28/2015    Type 2 diabetes mellitus with autonomic neuropathy ([facility] 03/25/2015    Chest pain 12/18/2014    Hypercholesterolemia 12/01/2014    Essential hypertension 12/01/2014    History of bilateral oophorectomy 06/19/2014    History of hysterectomy 06/19/2014    Menorrhagia 05/22/2014    Abnormal glucose 01/31/2014    Chronic pain 12/26/2013    Anemia due to antineoplastic chemotherapy 09/26/2013    Nausea with vomiting 09/24/2013    Breast cancer ([facility] 09/05/2013    Diabetes mellitus ([facility] 09/05/2013     Current Outpatient Prescriptions   Medication Sig Dispense Refill    dicyclomine (BENTYL) 20 mg tablet Take 1 Tab by mouth every six (6) hours. Prn abdominal pain 15 Tab 0    ondansetron (ZOFRAN ODT) 8 mg disintegrating tablet Take 1 Tab by mouth every eight (8) hours as needed for Nausea. 20 Tab 0    promethazine (PHENERGAN) 25 mg tablet Take 1 Tab by mouth every six (6) hours as needed. 12 Tab 0    lisinopril (PRINIVIL, ZESTRIL) 10 mg tablet Take 1 Tab by mouth daily. 30 Tab 1    DULoxetine (CYMBALTA) 20 mg capsule Take 1 Cap by mouth daily. 30 Cap 1    polyethylene glycol (MIRALAX) 17 gram packet Take 1 Packet by mouth daily. 30 Each 1    insulin nph-regular human rec (HUMULIN 70-30) 100 unit/mL (70-30) inpn Give 25 units in the QAM and 30 units at bedtime 5 Pen 3    traZODone (DESYREL) 300 mg tablet Take 300 mg by mouth nightly.  ALBUTEROL IN Take  by inhalation.  LORazepam (ATIVAN) 1 mg tablet 1 mg.  EPINEPHrine (EPIPEN) 0.3 mg/0.3 mL (1:1,000) injection 0.3 mL by IntraMUSCular route once as needed for up to 1 dose. 1 Each 1    gabapentin (NEURONTIN) 300 mg capsule Take 1 capsule by mouth three (3) times daily.  90 capsule 3     Allergies   Allergen Reactions    Latex Hives and Itching    Other Food Rash     Almonds    Amoxicillin Swelling Other reaction(s): mild rash/itching    Aspirin Not Reported This Time and Swelling     Mouth swells    Fentanyl Anaphylaxis and Swelling     Patches cause mouth to swell  Swelling in mouth from fentanyl patch    Levaquin [Levofloxacin] Not Reported This Time and Swelling     Other reaction(s): mild rash/itching    Chlorhexidine Rash    Norco [Hydrocodone-Acetaminophen] Nausea and Vomiting     Other reaction(s): gi distress    Acetaminophen Other (comments)    Emerado Rash and Itching    Codeine Other (comments)    Glycopyrrolate Swelling     Pt  States  faciAL  SWELLING   POST  ROBINUL  IV   Pt  States  faciAL  SWELLING   POST  ROBINUL  IV     Morphine Nausea and Vomiting    Pcn [Penicillins] Swelling    Percocet [Oxycodone-Acetaminophen] Nausea and Vomiting     Other reaction(s): gi distress  nausea  Other reaction(s): anaphylaxis/angioedema  Per pt. She is allergic    Tape [Adhesive] Rash    Tramadol Swelling     Past Medical History:   Diagnosis Date    Alcohol abuse     Cancer ([address]     breast cancer, right    Cancer ([address]     Breast CA    Depression     Diabetes ([address]     Drug abuse     Gastrointestinal disorder     cholitis    Hyperlipidemia     Hypertension     Spine injury      Past Surgical History:   Procedure Laterality Date    COLONOSCOPY N/A 7/18/2016    COLONOSCOPY performed by Floresita Weaver [facility] at [facility] END[facility]COPY    HX BREAST LUMPECTOMY  06/2013    right    HX HYSTERECTOMY      HX ORTHOPAEDIC      Back fusion surgery 4/18/14.      HX OTHER SURGICAL      mediport    HX TONSILLECTOMY      HX TUBAL LIGATION       Family History   Problem Relation Age of Onset    Heart Disease Mother     Stroke Father     Heart Disease Father 48    Diabetes Other     Hypertension Other     Cancer Maternal Grandmother      Social History   Substance Use Topics    Smoking status: Never Smoker    Smokeless tobacco: Never Used    Alcohol use No      Comment: \"Occasionally\"       ROS Constitutional: denies fever, fatigue, chills, changes in weight or appetite  HEENT: denies rhinorrhea, discharge, sinus pain, ear pain  CV: denies chest pain, palpitations, swelling of the extremities, orthopnea  Resp: denies SOB, wheezing, cough  GI: Endorses nausea with 1 episode of non-bloody emesis containing undigested food; denies changes in bowel habits  : denies burning on urination, frequency, or hesitancy  MSK: denies muscle pain, back pain, joint pain, or stiffness  Neuro: denies headaches, weakness, numbness, tingling, or ataxia  Skin: denies new rash or itching    All other systems reviewed and are negative. Objective:  Vitals:    03/23/17 1259   BP: (!) 124/92   Pulse: (!) 115   Resp: 16   Temp: 97.6 °F (36.4 °C)   TempSrc: Oral   SpO2: 99%   Weight: 157 lb (71.2 kg)   Height: 5' 2\" (1.575 m)   PainSc:   9   PainLoc: Abdomen   LMP: 07/04/2013       alert, well appearing, and in no distress, oriented to person, place, and time and overweight  Mental status - normal mood, behavior, speech, dress, motor activity, and thought processes  Chest - clear to auscultation, no wheezes, rales or rhonchi, symmetric air entry  Heart - normal rate, regular rhythm, normal S1, S2, no murmurs, rubs, clicks or gallops  Abdomen - TTP epigastrium; soft, no masses  Neurological - cranial nerves II through XII intact, antalgic gait and station    Assessment/Plan:    1. Chronic pain syndrome  Tolerated duloxetine 20 mg; ongoing pain; increase dose to 60 mg  - DULoxetine (CYMBALTA) 60 mg capsule; Take 1 Cap by mouth daily. Dispense: 30 Cap; Refill: 1    2. Epigastric pain  Start PPI; no melena  - esomeprazole magnesium (NEXIUM 24HR) 20 mg TbEC; Take 20 mg by mouth daily. Dispense: 30 Tab; Refill: 0    3. Bipolar affective disorder, currently depressed, moderate ([address]  F/u with psychiatrist as scheduled    4.  Type 2 diabetes mellitus with hyperglycemia, with long-term current use of insulin (HCC)  Goal hgb a1c <7; poorly controlled; encourage healthy diet and exercise      Lab review: labs reviewed, I note that glycosylated hemoglobin abnormal high      I have discussed the diagnosis with the patient and the intended plan as seen in the above orders. The patient has received an after-visit summary and questions were answered concerning future plans. I have discussed medication side effects and warnings with the patient as well. I have reviewed the plan of care with the patient, accepted their input and they are in agreement with the treatment goals. Follow-up Disposition:  Return in about 2 weeks (around 4/6/2017) for medication evaluation.

## 2017-03-23 NOTE — MR AVS SNAPSHOT
Visit Information Date & Time Provider Department Dept. Phone Encounter #  
 3/23/2017  1:00 PM Gianna Cevallos, [address] [facility] [phone] [id number] Follow-up Instructions Return in about 2 weeks (around 4/6/2017) for medication evaluation. Your Appointments 3/29/2017  2:00 PM  
Office Visit with Yusuf Hughes MD  
[facility] Appt Note: [address]; [address] [place] [address] [address] [facility] [address] [address]  
[address]  
  
   
 [address] [facility] [address] [address] Upcoming Health Maintenance Date Due  
 EYE EXAM RETINAL OR DILATED Q1 11/21/1979 DTaP/Tdap/Td series (1 - Tdap) 11/21/1990 PAP AKA CERVICAL CYTOLOGY 11/21/1990 HEMOGLOBIN A1C Q6M 7/23/2017 FOOT EXAM Q1 1/23/2018 MICROALBUMIN Q1 1/23/2018 LIPID PANEL Q1 1/23/2018 Allergies as of 3/23/2017  Review Complete On: 3/23/2017 By: Gianna Cevallos MD  
  
 Severity Noted Reaction Type Reactions Latex High 04/26/2010    Hives, Itching Other Food  07/15/2016    Rash Almonds Amoxicillin High 11/26/2013    Swelling Other reaction(s): mild rash/itching Aspirin High 09/04/2013    Not Reported This Time, Swelling Mouth swells Fentanyl High 12/17/2013   Systemic Anaphylaxis, Swelling Patches cause mouth to swell Swelling in mouth from fentanyl patch Levaquin [Levofloxacin] High 09/04/2013    Not Reported This Time, Swelling Other reaction(s): mild rash/itching Chlorhexidine Medium 04/18/2014   Topical Rash Norco [Hydrocodone-acetaminophen] Medium 03/12/2015    Nausea and Vomiting Other reaction(s): gi distress Acetaminophen  07/28/2016    Other (comments) [place]  04/03/2016    Rash, Itching Codeine  11/26/2013    Other (comments) Glycopyrrolate  07/04/2014    Swelling Pt  States  faciAL  SWELLING   POST  ROBINUL  IV Pt  States  faciAL  SWELLING   POST  ROBINUL  IV   
 Morphine  05/31/2016    Nausea and Vomiting Pcn [Penicillins]  11/26/2013    Swelling Percocet [Oxycodone-acetaminophen]  01/30/2015    Nausea and Vomiting Other reaction(s): gi distress 
nausea Other reaction(s): anaphylaxis/angioedema Per pt. She is allergic Tape [Adhesive]  05/31/2016    Rash Tramadol  12/05/2013    Swelling Current Immunizations  Reviewed on 2/20/2017 Name Date Influenza Vaccine 11/7/2016, 2/24/2016, 3/23/2015, 12/1/2014, 10/9/2013, 10/20/2012 Pneumococcal Polysaccharide (PPSV-23) 3/22/2015, 10/22/2012 Pneumococcal Vaccine (Unspecified Type) 3/4/2016, 1/2/2013 Not reviewed this visit You Were Diagnosed With   
  
 Codes Comments Epigastric pain    -  Primary ICD-10-CM: R10.13 ICD-9-CM: 789.06 Chronic pain syndrome     ICD-10-CM: G89.4 ICD-9-CM: 338.4 Bipolar affective disorder, currently depressed, moderate ([facility]     ICD-10-CM: F31.32 
ICD-9-CM: 296.52 Type 2 diabetes mellitus with hyperglycemia, with long-term current use of insulin ([facility])     ICD-10-CM: E11.65, Z79.4 ICD-9-CM: 250.00, 790.29, V58.67 Vitals BP Pulse Temp Resp Height(growth percentile) Weight(growth percentile) (!) 124/92 (BP 1 Location: Left arm, BP Patient Position: Sitting) (!) 115 97.6 °F (36.4 °C) (Oral) 16 5' 2\" (1.575 m) 157 lb (71.2 kg) LMP SpO2 BMI OB Status Smoking Status 07/04/2013 99% 28.72 kg/m2 Hysterectomy Never Smoker Vitals History BMI and BSA Data Body Mass Index Body Surface Area 28.72 kg/m 2 1.76 m 2 Preferred Pharmacy Pharmacy Name Phone [address] [place] [address] Your Updated Medication List  
  
   
This list is accurate as of: 3/23/17  1:31 PM.  Always use your most recent med list.  
  
  
  
  
 ALBUTEROL IN Take  by inhalation. dicyclomine 20 mg tablet Commonly known as:  BENTYL Take 1 Tab by mouth every six (6) hours. Prn abdominal pain DULoxetine 60 mg capsule Commonly known as:  CYMBALTA Take 1 Cap by mouth daily. EPINEPHrine 0.3 mg/0.3 mL injection Commonly known as:  EPIPEN  
0.3 mL by IntraMUSCular route once as needed for up to 1 dose. esomeprazole magnesium 20 mg Tbec Commonly known as:  NexIUM 24HR Take 20 mg by mouth daily. gabapentin 300 mg capsule Commonly known as:  NEURONTIN Take 1 capsule by mouth three (3) times daily. insulin nph-regular human rec 100 unit/mL (70-30) Inpn Commonly known as:  HUMULIN 70-30 Give 25 units in the QAM and 30 units at bedtime  
  
 lisinopril 10 mg tablet Commonly known as:  Robertha Roup Take 1 Tab by mouth daily. LORazepam 1 mg tablet Commonly known as:  ATIVAN  
1 mg.  
  
 ondansetron 8 mg disintegrating tablet Commonly known as:  ZOFRAN ODT Take 1 Tab by mouth every eight (8) hours as needed for Nausea. polyethylene glycol 17 gram packet Commonly known as:  Lelan Situ Take 1 Packet by mouth daily. promethazine 25 mg tablet Commonly known as:  PHENERGAN Take 1 Tab by mouth every six (6) hours as needed. traZODone 300 mg tablet Commonly known as:  Dee Salome Take 300 mg by mouth nightly. Prescriptions Sent to Pharmacy Refills DULoxetine (CYMBALTA) 60 mg capsule 1 Sig: Take 1 Cap by mouth daily. Class: Normal  
 Pharmacy: [address] [place] [address] Ph #: [phone] Route: Oral  
 esomeprazole magnesium (NEXIUM 24HR) 20 mg TbEC 0 Sig: Take 20 mg by mouth daily. Class: Normal  
 Pharmacy: [address] [place] [address] Ph #: [phone] Route: Oral  
  
Follow-up Instructions Return in about 2 weeks (around 4/6/2017) for medication evaluation.   
  
To-Do List   
 03/24/2017 8:00 AM  
 Appointment with [facility]IR 7 at [address] [facility] ([phone]) Patient Instructions Abdominal Pain: Care Instructions Your Care Instructions Abdominal pain has many possible causes. Some aren't serious and get better on their own in a few days. Others need more testing and treatment. If your pain continues or gets worse, you need to be rechecked and may need more tests to find out what is wrong. You may need surgery to correct the problem. Don't ignore new symptoms, such as fever, nausea and vomiting, urination problems, pain that gets worse, and dizziness. These may be signs of a more serious problem. Your doctor may have recommended a follow-up visit in the next 8 to 12 hours. If you are not getting better, you may need more tests or treatment. The doctor has checked you carefully, but problems can develop later. If you notice any problems or new symptoms, get medical treatment right away. Follow-up care is a key part of your treatment and safety. Be sure to make and go to all appointments, and call your doctor if you are having problems. It's also a good idea to know your test results and keep a list of the medicines you take. How can you care for yourself at home? · Rest until you feel better. · To prevent dehydration, drink plenty of fluids, enough so that your urine is light yellow or clear like water. Choose water and other caffeine-free clear liquids until you feel better. If you have kidney, heart, or liver disease and have to limit fluids, talk with your doctor before you increase the amount of fluids you drink. · If your stomach is upset, eat mild foods, such as rice, dry toast or crackers, bananas, and applesauce. Try eating several small meals instead of two or three large ones. · Wait until 48 hours after all symptoms have gone away before you have spicy foods, alcohol, and drinks that contain caffeine. · Do not eat foods that are high in fat. · Avoid anti-inflammatory medicines such as aspirin, ibuprofen (Advil, Motrin), and naproxen (Aleve). These can cause stomach upset. Talk to your doctor if you take daily aspirin for another health problem. When should you call for help? Call 911 anytime you think you may need emergency care. For example, call if: 
· You passed out (lost consciousness). · You pass maroon or very bloody stools. · You vomit blood or what looks like coffee grounds. · You have new, severe belly pain. Call your doctor now or seek immediate medical care if: 
· Your pain gets worse, especially if it becomes focused in one area of your belly. · You have a new or higher fever. · Your stools are black and look like tar, or they have streaks of blood. · You have unexpected vaginal bleeding. · You have symptoms of a urinary tract infection. These may include: 
¨ Pain when you urinate. ¨ Urinating more often than usual. 
¨ Blood in your urine. · You are dizzy or lightheaded, or you feel like you may faint. Watch closely for changes in your health, and be sure to contact your doctor if: 
· You are not getting better after 1 day (24 hours). Where can you learn more? Go to http://imani-jenny.info/. Enter H660 in the search box to learn more about \"Abdominal Pain: Care Instructions. \" Current as of: May 27, 2016 Content Version: 11.1 © 3655-1921 [employer]. Care instructions adapted under license by [employer] (which disclaims liability or warranty for this information). If you have questions about a medical condition or this instruction, always ask your healthcare professional. [address] any warranty or liability for your use of this information. Introducing [facility]! Kilo Emery introduces [employer] patient portal. Now you can access parts of your medical record, email your doctor's office, and request medication refills online. 1. In your internet browser, go to [email or web address] 2. Click on the First Time User? Click Here link in the Sign In box. You will see the New Member Sign Up page. 3. Enter your i-marker Access Code exactly as it appears below. You will not need to use this code after youve completed the sign-up process. If you do not sign up before the expiration date, you must request a new code. · i-marker Access Code: [email or web address] Expires: 5/14/2017  1:28 PM 
 
4. Enter the last four digits of your Social Security Number (xxxx) and Date of Birth (mm/dd/yyyy) as indicated and click Submit. You will be taken to the next sign-up page. 5. Create a WeLiket ID. This will be your i-marker login ID and cannot be changed, so think of one that is secure and easy to remember. 6. Create a i-marker password. You can change your password at any time. 7. Enter your Password Reset Question and Answer. This can be used at a later time if you forget your password. 8. Enter your e-mail address. You will receive e-mail notification when new information is available in [address] 9. Click Sign Up. You can now view and download portions of your medical record. 10. Click the Download Summary menu link to download a portable copy of your medical information. If you have questions, please visit the Frequently Asked Questions section of the i-marker website. Remember, i-marker is NOT to be used for urgent needs. For medical emergencies, dial 911. Now available from your iPhone and Android! Please provide this summary of care documentation to your next provider. Your primary care clinician is listed as Balta Ortega. If you have any questions after today's visit, please call [phone].

## 2017-03-23 NOTE — PATIENT INSTRUCTIONS

## 2017-03-24 ENCOUNTER — HOSPITAL ENCOUNTER (OUTPATIENT)
Dept: INFUSION THERAPY | Age: 47
Discharge: HOME | End: 2017-03-24
Payer: MEDICAID

## 2017-03-29 ENCOUNTER — OFFICE VISIT (OUTPATIENT)
Dept: ONCOLOGY | Age: 47
End: 2017-03-29

## 2017-03-29 ENCOUNTER — HOSPITAL ENCOUNTER (OUTPATIENT)
Dept: ONCOLOGY | Age: 47
Discharge: HOME | End: 2017-03-29

## 2017-03-29 VITALS
BODY MASS INDEX: 28.89 KG/M2 | HEART RATE: 91 BPM | SYSTOLIC BLOOD PRESSURE: 143 MMHG | DIASTOLIC BLOOD PRESSURE: 96 MMHG | TEMPERATURE: 97.9 F | HEIGHT: 62 IN | WEIGHT: 157 LBS

## 2017-03-29 DIAGNOSIS — D64.2: ICD-10-CM

## 2017-03-29 DIAGNOSIS — D70.1 CHEMOTHERAPY INDUCED NEUTROPENIA (HCC): ICD-10-CM

## 2017-03-29 DIAGNOSIS — T45.1X5A CHEMOTHERAPY INDUCED NEUTROPENIA (HCC): ICD-10-CM

## 2017-03-29 DIAGNOSIS — C50.919 MALIGNANT NEOPLASM OF FEMALE BREAST, UNSPECIFIED LATERALITY, UNSPECIFIED SITE OF BREAST: ICD-10-CM

## 2017-03-29 DIAGNOSIS — C50.919 MALIGNANT NEOPLASM OF FEMALE BREAST, UNSPECIFIED LATERALITY, UNSPECIFIED SITE OF BREAST: Primary | ICD-10-CM

## 2017-03-29 LAB
BASO+EOS+MONOS # BLD AUTO: 0.3 K/UL (ref 0–2.3)
BASO+EOS+MONOS # BLD AUTO: 7 % (ref 0.1–17)
DIFFERENTIAL METHOD BLD: ABNORMAL
ERYTHROCYTE [DISTWIDTH] IN BLOOD BY AUTOMATED COUNT: 14 % (ref 11.5–14.5)
HCT VFR BLD AUTO: 33.5 % (ref 36–48)
HGB BLD-MCNC: 10.6 G/DL (ref 12–16)
LYMPHOCYTES # BLD AUTO: 26 % (ref 14–44)
LYMPHOCYTES # BLD: 1.2 K/UL (ref 1.1–5.9)
MCH RBC QN AUTO: 25.5 PG (ref 25–35)
MCHC RBC AUTO-ENTMCNC: 31.6 G/DL (ref 31–37)
MCV RBC AUTO: 80.5 FL (ref 78–102)
NEUTS SEG # BLD: 3.2 K/UL (ref 1.8–9.5)
NEUTS SEG NFR BLD AUTO: 66 % (ref 40–70)
PLATELET # BLD AUTO: 267 K/UL (ref 140–440)
RBC # BLD AUTO: 4.16 M/UL (ref 4.1–5.1)
WBC # BLD AUTO: 4.7 K/UL (ref 4.5–13)

## 2017-03-29 NOTE — PATIENT INSTRUCTIONS
Peptic Ulcer Disease: Care Instructions  Your Care Instructions    Peptic ulcers are sores on the inside of the stomach or the small intestine. They are usually caused by an infection with bacteria or from use of nonsteroidal anti-inflammatory drugs (NSAIDs). NSAIDs include aspirin, ibuprofen (Advil), and naproxen (Aleve). Your doctor may have prescribed medicine to reduce stomach acid. You also may need to take antibiotics if your peptic ulcers are caused by an infection. You can help your stomach heal and keep ulcers from coming back by making some changes in your lifestyle. Quit smoking, limit caffeine and alcohol, and reduce stress. Follow-up care is a key part of your treatment and safety. Be sure to make and go to all appointments, and call your doctor if you are having problems. Its also a good idea to know your test results and keep a list of the medicines you take. How can you care for yourself at home? · Take your medicines exactly as prescribed. Call your doctor if you think you are having a problem with your medicine. · Do not take aspirin or other NSAIDs such as ibuprofen (Advil or Motrin) or naproxen (Aleve). Ask your doctor what you can take for pain. · Do not smoke. Smoking can make ulcers worse. If you need help quitting, talk to your doctor about stop-smoking programs and medicines. These can increase your chances of quitting for good. · Drink in moderation or avoid drinking alcohol. · Do not drink beverages that have caffeine if they bother your stomach. These include coffee, tea, and soda. · Eat a balanced diet of small, frequent meals. Make an appointment with a dietitian if you need help planning your meals. · Reduce stress. Avoid people and places that make you feel anxious, if you can. Learn ways to reduce stress, such as biofeedback, guided imagery, and meditation. When should you call for help? Call 911 anytime you think you may need emergency care.  For example, call if:  · You passed out (lost consciousness). · You vomit blood or what looks like coffee grounds. · You pass maroon or very bloody stools. Call your doctor now or seek immediate medical care if:  · You have severe pain in your belly, back, or shoulders. · You have new or worsening belly pain. · You are dizzy or lightheaded, or you feel like you may faint. · Your stools are black and tarlike or have streaks of blood. Watch closely for changes in your health, and be sure to contact your doctor if:  · You have new symptoms such as weight loss, nausea or vomiting. · You do not feel better as expected. Where can you learn more? Go to http://imani-jenny.info/. Enter V019 in the search box to learn more about \"Peptic Ulcer Disease: Care Instructions. \"  Current as of: August 9, 2016  Content Version: 11.2  © 3236-9067 [employer]. Care instructions adapted under license by [employer] (which disclaims liability or warranty for this information). If you have questions about a medical condition or this instruction, always ask your healthcare professional. [address] any warranty or liability for your use of this information. Upper Digestive Tract: Anatomy Sketch    Current as of: January 5, 2017  Content Version: 11.2  © 1797-8525 [employer]. Care instructions adapted under license by [employer] (which disclaims liability or warranty for this information). If you have questions about a medical condition or this instruction, always ask your healthcare professional. [address] any warranty or liability for your use of this information. Upper Digestive Tract: Anatomy Sketch    Current as of: January 5, 2017  Content Version: 11.2  © 1002-0141 [employer]. Care instructions adapted under license by [employer] (which disclaims liability or warranty for this information). If you have questions about a medical condition or this instruction, always ask your healthcare professional. [place] [id number] any warranty or liability for your use of this information.

## 2017-03-29 NOTE — MR AVS SNAPSHOT
Visit Information Date & Time Provider Department Dept. Phone Encounter #  
 3/29/2017  2:00 PM Earnstine Bence, MD [facility] [phone] [id number] Follow-up Instructions Return in about 2 months (around 5/29/2017). Your Appointments 4/6/2017 11:00 AM  
Follow Up with Curt Mac MD  
[address] [address] [facility]) Appt Note: Return in 2 weeks (4/6/17) for Med Eval.  
 [address]  
  
   
 [address] 5/31/2017  2:00 PM  
[facility] App[facility]: 2 MO  [facility] [address] [address]  
[phone]  
  
   
 [address] [facility] [place] [address] [address] Upcoming Health Maintenance Date Due  
 EYE EXAM RETINAL OR DILATED Q1 11/21/1979 DTaP/Tdap/Td series (1 - Tdap) 11/21/1990 PAP AKA CERVICAL CYTOLOGY 11/21/1990 HEMOGLOBIN A1C Q6M 7/23/2017 FOOT EXAM Q1 1/23/2018 MICROALBUMIN Q1 1/23/2018 LIPID PANEL Q1 1/23/2018 Allergies as of 3/29/2017  Review Complete On: 3/29/2017 By: Shahriar Richter NP Severity Noted Reaction Type Reactions Latex High 04/26/2010    Hives, Itching Other Food  07/15/2016    Rash Almonds Amoxicillin High 11/26/2013    Swelling Other reaction(s): mild rash/itching Aspirin High 09/04/2013    Not Reported This Time, Swelling Mouth swells Fentanyl High 12/17/2013   Systemic Anaphylaxis, Swelling Patches cause mouth to swell Swelling in mouth from fentanyl patch Levaquin [Levofloxacin] High 09/04/2013    Not Reported This Time, Swelling Other reaction(s): mild rash/itching Chlorhexidine Medium 04/18/2014   Topical Rash Norco [Hydrocodone-acetaminophen] Medium 03/12/2015    Nausea and Vomiting Other reaction(s): gi distress Acetaminophen  07/28/2016    Other (comments) Bennett  04/03/2016    Rash, Itching Codeine  11/26/2013    Other (comments) Glycopyrrolate  07/04/2014    Swelling Pt  States  faciAL  SWELLING   POST  ROBINUL  IV Pt  States  faciAL  SWELLING   POST  ROBINUL  IV Morphine  05/31/2016    Nausea and Vomiting Pcn [Penicillins]  11/26/2013    Swelling Percocet [Oxycodone-acetaminophen]  01/30/2015    Nausea and Vomiting Other reaction(s): gi distress 
nausea Other reaction(s): anaphylaxis/angioedema Per pt. She is allergic Tape [Adhesive]  05/31/2016    Rash Tramadol  12/05/2013    Swelling Current Immunizations  Reviewed on 2/20/2017 Name Date Influenza Vaccine 11/7/2016, 2/24/2016, 3/23/2015, 12/1/2014, 10/9/2013, 10/20/2012 Pneumococcal Polysaccharide (PPSV-23) 3/22/2015, 10/22/2012 Pneumococcal Vaccine (Unspecified Type) 3/4/2016, 1/2/2013 Not reviewed this visit You Were Diagnosed With   
  
 Codes Comments Malignant neoplasm of female breast, unspecified laterality, unspecified site of breast ([facility] [address]    -  Primary ICD-10-CM: C50.919 ICD-9-CM: 174.9 Chemotherapy induced neutropenia (HCC)     ICD-10-CM: D70.1, T45.1X5A 
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  
 (!) 143/96 (BP 1 Location: Left arm, BP Patient Position: Sitting) 91 97.9 °F (36.6 °C) (Oral) 5' 2\" (1.575 m) 157 lb (71.2 kg) 07/04/2013 BMI OB Status Smoking Status 28.72 kg/m2 Hysterectomy Never Smoker Vitals History BMI and BSA Data Body Mass Index Body Surface Area 28.72 kg/m 2 1.76 m 2 Preferred Pharmacy Pharmacy Name Phone [facility] [address] [place] [address] Your Updated Medication List  
  
   
 This list is accurate as of: 3/29/17  2:46 PM.  Always use your most recent med list.  
  
  
  
  
 ALBUTEROL IN Take  by inhalation. dicyclomine 20 mg tablet Commonly known as:  BENTYL Take 1 Tab by mouth every six (6) hours. Prn abdominal pain DULoxetine 60 mg capsule Commonly known as:  CYMBALTA Take 1 Cap by mouth daily. EPINEPHrine 0.3 mg/0.3 mL injection Commonly known as:  EPIPEN  
0.3 mL by IntraMUSCular route once as needed for up to 1 dose. esomeprazole magnesium 20 mg Tbec Commonly known as:  NexIUM 24HR Take 20 mg by mouth daily. gabapentin 300 mg capsule Commonly known as:  NEURONTIN Take 1 capsule by mouth three (3) times daily. insulin nph-regular human rec 100 unit/mL (70-30) Inpn Commonly known as:  HUMULIN 70-30 Give 25 units in the QAM and 30 units at bedtime  
  
 lisinopril 10 mg tablet Commonly known as:  [place] Take 1 Tab by mouth daily. LORazepam 1 mg tablet Commonly known as:  ATIVAN  
1 mg.  
  
 ondansetron 8 mg disintegrating tablet Commonly known as:  ZOFRAN ODT Take 1 Tab by mouth every eight (8) hours as needed for Nausea. polyethylene glycol 17 gram packet Commonly known as:  Reda Harinder Take 1 Packet by mouth daily. promethazine 25 mg tablet Commonly known as:  PHENERGAN Take 1 Tab by mouth every six (6) hours as needed. traZODone 300 mg tablet Commonly known as:  [place] Take 300 mg by mouth nightly. We Performed the Following COMPLETE CBC & AUTO DIFF WBC [79462 CPT(R)] Follow-up Instructions Return in about 2 months (around 5/29/2017). To-Do List   
 03/29/2017 Lab:  CBC WITH 3 PART DIFF   
  
 03/30/2017 Lab:  CA 27.29   
  
 03/30/2017 Lab:  FERRITIN   
  
 03/30/2017 Lab:  IRON PROFILE   
  
 03/30/2017   Lab:  METABOLIC PANEL, COMPREHENSIVE   
  
 03/31/2017 11:00 AM  
 Appointment with [facility] INFUSION CHAIR 4 at [facility] ([phone]) Patient Instructions Peptic Ulcer Disease: Care Instructions Your Care Instructions Peptic ulcers are sores on the inside of the stomach or the small intestine. They are usually caused by an infection with bacteria or from use of nonsteroidal anti-inflammatory drugs (NSAIDs). NSAIDs include aspirin, ibuprofen (Advil), and naproxen (Aleve). Your doctor may have prescribed medicine to reduce stomach acid. You also may need to take antibiotics if your peptic ulcers are caused by an infection. You can help your stomach heal and keep ulcers from coming back by making some changes in your lifestyle. Quit smoking, limit caffeine and alcohol, and reduce stress. Follow-up care is a key part of your treatment and safety. Be sure to make and go to all appointments, and call your doctor if you are having problems. Its also a good idea to know your test results and keep a list of the medicines you take. How can you care for yourself at home? · Take your medicines exactly as prescribed. Call your doctor if you think you are having a problem with your medicine. · Do not take aspirin or other NSAIDs such as ibuprofen (Advil or Motrin) or naproxen (Aleve). Ask your doctor what you can take for pain. · Do not smoke. Smoking can make ulcers worse. If you need help quitting, talk to your doctor about stop-smoking programs and medicines. These can increase your chances of quitting for good. · Drink in moderation or avoid drinking alcohol. · Do not drink beverages that have caffeine if they bother your stomach. These include coffee, tea, and soda. · Eat a balanced diet of small, frequent meals. Make an appointment with a dietitian if you need help planning your meals. · Reduce stress. Avoid people and places that make you feel anxious, if you can. Learn ways to reduce stress, such as biofeedback, guided imagery, and meditation. When should you call for help? Call 911 anytime you think you may need emergency care. For example, call if: 
· You passed out (lost consciousness). · You vomit blood or what looks like coffee grounds. · You pass maroon or very bloody stools. Call your doctor now or seek immediate medical care if: 
· You have severe pain in your belly, back, or shoulders. · You have new or worsening belly pain. · You are dizzy or lightheaded, or you feel like you may faint. · Your stools are black and tarlike or have streaks of blood. Watch closely for changes in your health, and be sure to contact your doctor if: 
· You have new symptoms such as weight loss, nausea or vomiting. · You do not feel better as expected. Where can you learn more? Go to http://imani-jenny.info/. Enter Z872 in the search box to learn more about \"Peptic Ulcer Disease: Care Instructions. \" Current as of: August 9, 2016 Content Version: 11.2 © 4519-3366 [employer]. Care instructions adapted under license by [employer] (which disclaims liability or warranty for this information). If you have questions about a medical condition or this instruction, always ask your healthcare professional. [address] any warranty or liability for your use of this information. Upper Digestive Tract: Anatomy Sketch Current as of: January 5, 2017 Content Version: 11.2 © 8302-7974 [employer]. Care instructions adapted under license by [employer] (which disclaims liability or warranty for this information). If you have questions about a medical condition or this instruction, always ask your healthcare professional. [address] any warranty or liability for your use of this information. Upper Digestive Tract: Anatomy Sketch Current as of: January 5, 2017 Content Version: 11.2 © 3810-7234 Healthwise[employer] Incorporated. Care instructions adapted under license by [employer] (which disclaims liability or warranty for this information). If you have questions about a medical condition or this instruction, always ask your healthcare professional. [address] any warranty or liability for your use of this information. Introducing [facility]! Monica Muhammad introduces Lango patient portal. Now you can access parts of your medical record, email your doctor's office, and request medication refills online. 1. In your internet browser, go to [email or web address] 2. Click on the First Time User? Click Here link in the Sign In box. You will see the New Member Sign Up page. 3. Enter your Lango Access Code exactly as it appears below. You will not need to use this code after youve completed the sign-up process. If you do not sign up before the expiration date, you must request a new code. · Lango Access Code: [email or web address] Expires: 5/14/2017  1:28 PM 
 
4. Enter the last four digits of your Social Security Number (xxxx) and Date of Birth (mm/dd/yyyy) as indicated and click Submit. You will be taken to the next sign-up page. 5. Create a Lango ID. This will be your Lango login ID and cannot be changed, so think of one that is secure and easy to remember. 6. Create a Lango password. You can change your password at any time. 7. Enter your Password Reset Question and Answer. This can be used at a later time if you forget your password. 8. Enter your e-mail address. You will receive e-mail notification when new information is available in [address] 9. Click Sign Up. You can now view and download portions of your medical record. 10. Click the Download Summary menu link to download a portable copy of your medical information.  
 
If you have questions, please visit the Frequently Asked Questions section of the Discourse. Remember, iFithart is NOT to be used for urgent needs. For medical emergencies, dial 911. Now available from your iPhone and Android! Please provide this summary of care documentation to your next provider. Your primary care clinician is listed as Viki Bolden. If you have any questions after today's visit, please call [phone].

## 2017-03-29 NOTE — PROGRESS NOTES
Hematology/Oncology  Progress Note    Name: Reuben Hadley  Date: 3/29/2017  : 1970    Carlo Fenton MD     Ms. Kate Newby is a 51 y.o. woman who has a history of invasive ductal adenocarcinoma the right breast with locally advanced disease. Current therapy: The patient completed a full course of carboplatin, Taxotere, Herceptin, and Perjeda 8/3/2016. She is currently receiving adjuvant Herceptin ongoing but has missed several clinic visits due to personal and family circumstances. Subjective:     Mrs. Kate Newby is a 43-year-old woman who has locally advanced invasive ductal adenocarcinoma the right breast.  She has undergone a modified radical mastectomy and completed neoadjuvant systemic chemotherapy and monoclonal antibody therapy against HER-2/karolina. She states she missed cycle 9 due to pain in her stomach but is rescheduled to receive it on 2017. She states she has been diagnosed to have ulcers but has not been seen for follow up since being in the hospital. She states she still experiences discomfort in the right side of her chest and axilla but this is manageable. She has no other complaints today. Past medical history, family history, and social history: these were reviewed and remains unchanged. Past Medical History:   Diagnosis Date    Alcohol abuse     Cancer [facility])     breast cancer, right    Cancer ([facility] [address]     Breast CA    Depression     Diabetes ([facility] [address]     Drug abuse     Gastrointestinal disorder     cholitis    Hyperlipidemia     Hypertension     Spine injury      Past Surgical History:   Procedure Laterality Date    COLONOSCOPY N/A 2016    COL[facility]COPY    HX BREAST LUMPECTOMY  2013    right    HX HYSTERECTOMY      HX ORTHOPAEDIC      Back fusion surgery 14.      HX OTHER SURGICAL      mediport    HX TONSILLECTOMY      HX TUBAL LIGATION       Social History     Social History    Marital status:      Spouse name: N/A    Number of children: N/A    Years of education: N/A     Occupational History    Not on file. Social History Main Topics    Smoking status: Never Smoker    Smokeless tobacco: Never Used    Alcohol use No      Comment: \"Occasionally\"    Drug use: No    Sexual activity: No     Other Topics Concern    Not on file     Social History Narrative    ** Merged History Encounter **          Family History   Problem Relation Age of Onset    Heart Disease Mother     Stroke Father     Heart Disease Father 48    Diabetes Other     Hypertension Other     Cancer Maternal Grandmother      Current Outpatient Prescriptions   Medication Sig Dispense Refill    DULoxetine (CYMBALTA) 60 mg capsule Take 1 Cap by mouth daily. 30 Cap 1    esomeprazole magnesium (NEXIUM 24HR) 20 mg TbEC Take 20 mg by mouth daily. 30 Tab 0    dicyclomine (BENTYL) 20 mg tablet Take 1 Tab by mouth every six (6) hours. Prn abdominal pain 15 Tab 0    ondansetron (ZOFRAN ODT) 8 mg disintegrating tablet Take 1 Tab by mouth every eight (8) hours as needed for Nausea. 20 Tab 0    promethazine (PHENERGAN) 25 mg tablet Take 1 Tab by mouth every six (6) hours as needed. 12 Tab 0    lisinopril (PRINIVIL, ZESTRIL) 10 mg tablet Take 1 Tab by mouth daily. 30 Tab 1    polyethylene glycol (MIRALAX) 17 gram packet Take 1 Packet by mouth daily. 30 Each 1    insulin nph-regular human rec (HUMULIN 70-30) 100 unit/mL (70-30) inpn Give 25 units in the QAM and 30 units at bedtime 5 Pen 3    traZODone (DESYREL) 300 mg tablet Take 300 mg by mouth nightly.  ALBUTEROL IN Take  by inhalation.  LORazepam (ATIVAN) 1 mg tablet 1 mg.  EPINEPHrine (EPIPEN) 0.3 mg/0.3 mL (1:1,000) injection 0.3 mL by IntraMUSCular route once as needed for up to 1 dose. 1 Each 1    gabapentin (NEURONTIN) 300 mg capsule Take 1 capsule by mouth three (3) times daily.  90 capsule 3       Review of Systems  Constitutional: The patient has complaints of a moderate degree of fatigue. HEENT: The patient denies recent head trauma, eye pain, blurred vision,  hearing deficit, oropharyngeal mucosal pain or lesions, and the patient denies throat pain or discomfort. Lymphatics: The patient denies palpable peripheral lymphadenopathy. Hematologic: The patient denies having bruising, bleeding, or progressive fatigue. Respiratory: Patient denies having shortness of breath, cough, sputum production, fever, or dyspnea on exertion. Cardiovascular: The patient denies having leg pain, leg swelling, heart palpitations, chest permit, chest pain, or lightheadedness. The patient denies having dyspnea on exertion. Gastrointestinal: The patient denies having nausea, emesis, or diarrhea. The patient denies having any hematemesis or blood in the stool. Genitourinary: Patient denies having urinary urgency, frequency, or dysuria. The patient denies having blood in the urine. Psychological: The patient denies having symptoms of nervousness, anxiety, depression, or thoughts of harming self. Skin: Patient denies having skin rashes, skin, ulcerations, or unexplained itching or pruritus. Musculoskeletal: The patient complains of some right-sided chest pain and some discomfort in the axilla. Objective:     Visit Vitals    BP (!) 143/96 (BP 1 Location: Left arm, BP Patient Position: Sitting)    Pulse 91    Temp 97.9 °F (36.6 °C) (Oral)    Ht 5' 2\" (1.575 m)    Wt 71.2 kg (157 lb)    LMP 07/04/2013    BMI 28.72 kg/m2     ECOG PS=0; Pain score 2/10  Physical Exam:   Gen. Appearance: The patient is in no acute distress. Skin: There is no bruise or rash. HEENT: The exam is unremarkable. Neck: Supple without lymphadenopathy or thyromegaly. Lungs: Clear to auscultation and percussion; there are no wheezes or rhonchi. Heart: Regular rate and rhythm; there are no murmurs, gallops, or rubs.   Anterior chest wall and breast: The patient is status post right modified radical mastectomy. The skin is healed well. There is no evidence of local recurrence of disease. She has full range of motion of the right arm at the shoulder joint. Abdomen: Bowel sounds are present and normal.  There is no guarding, tenderness, or hepatosplenomegaly. Extremities: There is no clubbing, cyanosis, or edema. Neurologic: There are no focal neurologic deficits. Lymphatics: There is no palpable peripheral lymphadenopathy. Musculoskeletal: The patient has full range of motion at all joints. There is no evidence of joint deformity or effusions. There is no focal joint tenderness. Psychological/psychiatric: There is no clinical evidence of anxiety, depression, or melancholy. Lab data:      Results for orders placed or performed during the hospital encounter of 03/29/17   CBC WITH 3 PART DIFF     Status: Abnormal   Result Value Ref Range Status    WBC 4.7 4.5 - 13.0 K/uL Final    RBC 4.16 4.10 - 5.10 M/uL Final    HGB 10.6 (L) 12.0 - 16.0 g/dL Final    HCT 33.5 (L) 36 - 48 % Final    MCV 80.5 78 - 102 FL Final    MCH 25.5 25.0 - 35.0 PG Final    MCHC 31.6 31 - 37 g/dL Final    RDW 14.0 11.5 - 14.5 % Final    PLATELET 481 180 - 972 K/uL Final    NEUTROPHILS 66 40 - 70 % Final    MIXED CELLS 7 0.1 - 17 % Final    LYMPHOCYTES 26 14 - 44 % Final    ABS. NEUTROPHILS 3.2 1.8 - 9.5 K/UL Final    ABS. MIXED CELLS 0.3 0.0 - 2.3 K/uL Final    ABS. LYMPHOCYTES 1.2 1.1 - 5.9 K/UL Final     Comment: Test performed at [place] [address] Location. Results Reviewed by Medical Director. DF AUTOMATED   Final           Assessment:     1. Malignant neoplasm of female breast, unspecified laterality, unspecified site of breast ([facility] [address]    2. Chemotherapy induced neutropenia (HCC)    3. 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)        Plan:   Malignant neoplasm of the right breast: She will continue the  Herceptin therapy every 3 weeks at 6 mg.   At this time I will check a CA-27-29 level and plan to see her back in clinic in 8 weeks. Her last CA 27.29 level on 1/25/17 was 7.9. Chemotherapy induced anemia: I have explained to the patient that her hemoglobin today is 10.6 g/dL with hematocrit of 33.5%. She was instructed to begin taking an over-the-counter iron supplement in the form of ferrous sulfate 1 tablet twice daily. I will check her iron profile and ferritin levels at this time. If her ferritin level is markedly decreased she il be offered IV iron in the form of venofer or injectofer. Chemotherapy-induced neutropenia: I have explained to the patient that her neutropenia has resolved. Her WBC count today is 4.7 with an absolute neutrophil count of 3.2 and an absolute lymphocyte count of 1.2. I will continue to monitor blood counts at 8 week intervals in the future. Follow-up in 8 weeks. Orders Placed This Encounter    COMPLETE CBC & AUTO DIFF WBC    InHouse CBC ([employer])     Standing Status:   Future     Number of Occurrences:   1     Standing Expiration Date:   3/5/0768    METABOLIC PANEL, COMPREHENSIVE     Standing Status:   Future     Standing Expiration Date:   3/30/2018    IRON PROFILE     Standing Status:   Future     Standing Expiration Date:   3/30/2018    FERRITIN     Standing Status:   Future     Standing Expiration Date:   3/30/2018    CA 27.29     Standing Status:   Future     Standing Expiration Date:   3/30/2018       Amina Lou NP  3/29/2017       I have assessed the patient independently and  agree with the full assessment as outlined.   Keara Kelley MD, [address]

## 2017-03-30 LAB
ALBUMIN SERPL-MCNC: 4.5 G/DL (ref 3.5–5.5)
ALBUMIN/GLOB SERPL: 1.6 {RATIO} (ref 1.2–2.2)
ALP SERPL-CCNC: 187 IU/L (ref 39–117)
ALT SERPL-CCNC: 15 IU/L (ref 0–32)
AST SERPL-CCNC: 18 IU/L (ref 0–40)
BILIRUB SERPL-MCNC: 0.3 MG/DL (ref 0–1.2)
BUN SERPL-MCNC: 16 MG/DL (ref 6–24)
BUN/CREAT SERPL: 24 (ref 9–23)
CALCIUM SERPL-MCNC: 9.6 MG/DL (ref 8.7–10.2)
CANCER AG27-29 SERPL-ACNC: 13.6 U/ML (ref 0–38.6)
CHLORIDE SERPL-SCNC: 98 MMOL/L (ref 96–106)
CO2 SERPL-SCNC: 25 MMOL/L (ref 18–29)
CREAT SERPL-MCNC: 0.66 MG/DL (ref 0.57–1)
FERRITIN SERPL-MCNC: 8 NG/ML (ref 15–150)
GLOBULIN SER CALC-MCNC: 2.8 G/DL (ref 1.5–4.5)
GLUCOSE SERPL-MCNC: 213 MG/DL (ref 65–99)
IRON SATN MFR SERPL: 8 % (ref 15–55)
IRON SERPL-MCNC: 40 UG/DL (ref 27–159)
POTASSIUM SERPL-SCNC: 4.1 MMOL/L (ref 3.5–5.2)
PROT SERPL-MCNC: 7.3 G/DL (ref 6–8.5)
SODIUM SERPL-SCNC: 138 MMOL/L (ref 134–144)
TIBC SERPL-MCNC: 481 UG/DL (ref 250–450)
UIBC SERPL-MCNC: 441 UG/DL (ref 131–425)

## 2017-03-31 ENCOUNTER — HOSPITAL ENCOUNTER (OUTPATIENT)
Dept: INFUSION THERAPY | Age: 47
Discharge: HOME | End: 2017-03-31
Payer: MEDICAID

## 2017-03-31 VITALS
DIASTOLIC BLOOD PRESSURE: 75 MMHG | SYSTOLIC BLOOD PRESSURE: 133 MMHG | HEART RATE: 76 BPM | TEMPERATURE: 97 F | RESPIRATION RATE: 18 BRPM | OXYGEN SATURATION: 99 %

## 2017-03-31 LAB
BASO+EOS+MONOS # BLD AUTO: 0.4 K/UL (ref 0–2.3)
BASO+EOS+MONOS # BLD AUTO: 7 % (ref 0.1–17)
DIFFERENTIAL METHOD BLD: ABNORMAL
ERYTHROCYTE [DISTWIDTH] IN BLOOD BY AUTOMATED COUNT: 14.1 % (ref 11.5–14.5)
HCT VFR BLD AUTO: 32.8 % (ref 36–48)
HGB BLD-MCNC: 10.1 G/DL (ref 12–16)
LYMPHOCYTES # BLD AUTO: 25 % (ref 14–44)
LYMPHOCYTES # BLD: 1.3 K/UL (ref 1.1–5.9)
MCH RBC QN AUTO: 24.9 PG (ref 25–35)
MCHC RBC AUTO-ENTMCNC: 30.8 G/DL (ref 31–37)
MCV RBC AUTO: 81 FL (ref 78–102)
NEUTS SEG # BLD: 3.7 K/UL (ref 1.8–9.5)
NEUTS SEG NFR BLD AUTO: 69 % (ref 40–70)
PLATELET # BLD AUTO: 241 K/UL (ref 140–440)
RBC # BLD AUTO: 4.05 M/UL (ref 4.1–5.1)
WBC # BLD AUTO: 5.4 K/UL (ref 4.5–13)

## 2017-03-31 PROCEDURE — 74011250636 HC RX REV CODE- 250/636: Performed by: INTERNAL MEDICINE

## 2017-03-31 PROCEDURE — 77030012965 HC NDL HUBR BBMI -A

## 2017-03-31 PROCEDURE — 96375 TX/PRO/DX INJ NEW DRUG ADDON: CPT

## 2017-03-31 PROCEDURE — 85025 COMPLETE CBC W/AUTO DIFF WBC: CPT | Performed by: INTERNAL MEDICINE

## 2017-03-31 PROCEDURE — 96413 CHEMO IV INFUSION 1 HR: CPT

## 2017-03-31 RX ORDER — SODIUM CHLORIDE 0.9 % (FLUSH) 0.9 %
10-40 SYRINGE (ML) INJECTION AS NEEDED
Status: DISCONTINUED | OUTPATIENT
Start: 2017-03-31 | End: 2017-04-04 | Stop reason: HOSPADM

## 2017-03-31 RX ORDER — HEPARIN 100 UNIT/ML
500 SYRINGE INTRAVENOUS AS NEEDED
Status: DISCONTINUED | OUTPATIENT
Start: 2017-03-31 | End: 2017-04-04 | Stop reason: HOSPADM

## 2017-03-31 RX ORDER — ONDANSETRON 2 MG/ML
8 INJECTION INTRAMUSCULAR; INTRAVENOUS ONCE
Status: COMPLETED | OUTPATIENT
Start: 2017-03-31 | End: 2017-03-31

## 2017-03-31 RX ORDER — SODIUM CHLORIDE 0.9 % (FLUSH) 0.9 %
5-10 SYRINGE (ML) INJECTION ONCE
Status: COMPLETED | OUTPATIENT
Start: 2017-03-31 | End: 2017-03-31

## 2017-03-31 RX ORDER — SODIUM CHLORIDE 9 MG/ML
25 INJECTION, SOLUTION INTRAVENOUS ONCE
Status: COMPLETED | OUTPATIENT
Start: 2017-03-31 | End: 2017-03-31

## 2017-03-31 RX ADMIN — TRASTUZUMAB 402 MG: KIT at 13:51

## 2017-03-31 RX ADMIN — Medication 500 UNITS: at 14:37

## 2017-03-31 RX ADMIN — Medication 10 ML: at 11:50

## 2017-03-31 RX ADMIN — SODIUM CHLORIDE 25 ML/HR: 0.9 INJECTION, SOLUTION INTRAVENOUS at 11:58

## 2017-03-31 RX ADMIN — ONDANSETRON 8 MG: 2 INJECTION INTRAMUSCULAR; INTRAVENOUS at 12:46

## 2017-03-31 RX ADMIN — Medication 10 ML: at 14:37

## 2017-03-31 NOTE — PROGRESS NOTES
[facility] Progress Note    Date: 2017    Name: Penny Coronel    MRN: [id number]         : 1970      Ms. Caro arrived to [facility] at 1140. Pt is here today for Herceptin, Cycle 9. Ms. J Carlos Hatfield was assessed and education was provided. Ms. Malinda Bey vitals were reviewed. Visit Vitals    /75 (BP 1 Location: Right arm, BP Patient Position: Sitting)    Pulse 76    Temp 97 °F (36.1 °C)    Resp 18    SpO2 99%       Patient's left upper chest port accessed and blood drawn for labs. Lab results were obtained and reviewed. Labs okay for chemo. Recent Results (from the past 12 hour(s))   CBC WITH 3 PART DIFF    Collection Time: 17 11:55 AM   Result Value Ref Range    WBC 5.4 4.5 - 13.0 K/uL    RBC 4.05 (L) 4.10 - 5.10 M/uL    HGB 10.1 (L) 12.0 - 16.0 g/dL    HCT 32.8 (L) 36 - 48 %    MCV 81.0 78 - 102 FL    MCH 24.9 (L) 25.0 - 35.0 PG    MCHC 30.8 (L) 31 - 37 g/dL    RDW 14.1 11.5 - 14.5 %    PLATELET 897 691 - 557 K/uL    NEUTROPHILS 69 40 - 70 %    MIXED CELLS 7 0.1 - 17 %    LYMPHOCYTES 25 14 - 44 %    ABS. NEUTROPHILS 3.7 1.8 - 9.5 K/UL    ABS. MIXED CELLS 0.4 0.0 - 2.3 K/uL    ABS. LYMPHOCYTES 1.3 1.1 - 5.9 K/UL    DF AUTOMATED         Pre-medications were administered as ordered Zofran 8mg and chemotherapy was initiated. Herceptin 402 mg administered as ordered. Ms. J Carlos Hatfield tolerated infusion without complaints. Port flushed with heparin per order and de-accessed. Band-aid applied to site. Ms. J Carlos Hatfield was discharged from [place] [address] in stable condition at 1440. She is to return on 17 at 0800 for her next appointment.     Tiffanie Mendoza RN  2017

## 2017-04-03 ENCOUNTER — APPOINTMENT (OUTPATIENT)
Dept: INFUSION THERAPY | Age: 47
End: 2017-04-03

## 2017-04-06 ENCOUNTER — HOSPITAL ENCOUNTER (EMERGENCY)
Age: 47
Discharge: HOME | End: 2017-04-06
Attending: EMERGENCY MEDICINE | Admitting: EMERGENCY MEDICINE
Payer: MEDICAID

## 2017-04-06 VITALS
WEIGHT: 160 LBS | SYSTOLIC BLOOD PRESSURE: 126 MMHG | TEMPERATURE: 98.4 F | HEIGHT: 62 IN | DIASTOLIC BLOOD PRESSURE: 70 MMHG | RESPIRATION RATE: 16 BRPM | OXYGEN SATURATION: 99 % | HEART RATE: 88 BPM | BODY MASS INDEX: 29.44 KG/M2

## 2017-04-06 DIAGNOSIS — E86.0 DEHYDRATION: ICD-10-CM

## 2017-04-06 DIAGNOSIS — R73.9 HYPERGLYCEMIA: ICD-10-CM

## 2017-04-06 DIAGNOSIS — R11.2 NON-INTRACTABLE VOMITING WITH NAUSEA, UNSPECIFIED VOMITING TYPE: ICD-10-CM

## 2017-04-06 DIAGNOSIS — G89.29 CHRONIC GENERALIZED ABDOMINAL PAIN: Primary | ICD-10-CM

## 2017-04-06 DIAGNOSIS — R10.84 CHRONIC GENERALIZED ABDOMINAL PAIN: Primary | ICD-10-CM

## 2017-04-06 LAB
ALBUMIN SERPL BCP-MCNC: 3.4 G/DL (ref 3.4–5)
ALBUMIN/GLOB SERPL: 0.9 {RATIO} (ref 0.8–1.7)
ALP SERPL-CCNC: 190 U/L (ref 45–117)
ALT SERPL-CCNC: 24 U/L (ref 13–56)
ANION GAP BLD CALC-SCNC: 9 MMOL/L (ref 3–18)
APPEARANCE UR: CLEAR
AST SERPL W P-5'-P-CCNC: 16 U/L (ref 15–37)
BACTERIA URNS QL MICRO: NEGATIVE /HPF
BASOPHILS # BLD AUTO: 0 K/UL (ref 0–0.06)
BASOPHILS # BLD: 0 % (ref 0–2)
BILIRUB DIRECT SERPL-MCNC: <0.1 MG/DL (ref 0–0.2)
BILIRUB SERPL-MCNC: 0.2 MG/DL (ref 0.2–1)
BILIRUB UR QL: NEGATIVE
BUN SERPL-MCNC: 15 MG/DL (ref 7–18)
BUN/CREAT SERPL: 16 (ref 12–20)
CALCIUM SERPL-MCNC: 8.5 MG/DL (ref 8.5–10.1)
CHLORIDE SERPL-SCNC: 107 MMOL/L (ref 100–108)
CO2 SERPL-SCNC: 27 MMOL/L (ref 21–32)
COLOR UR: YELLOW
CREAT SERPL-MCNC: 0.92 MG/DL (ref 0.6–1.3)
DIFFERENTIAL METHOD BLD: ABNORMAL
EOSINOPHIL # BLD: 0.1 K/UL (ref 0–0.4)
EOSINOPHIL NFR BLD: 1 % (ref 0–5)
EPITH CASTS URNS QL MICRO: NEGATIVE /LPF (ref 0–5)
ERYTHROCYTE [DISTWIDTH] IN BLOOD BY AUTOMATED COUNT: 15.2 % (ref 11.6–14.5)
GLOBULIN SER CALC-MCNC: 3.7 G/DL (ref 2–4)
GLUCOSE SERPL-MCNC: 298 MG/DL (ref 74–99)
GLUCOSE UR STRIP.AUTO-MCNC: >1000 MG/DL
HCT VFR BLD AUTO: 28.9 % (ref 35–45)
HGB BLD-MCNC: 9.2 G/DL (ref 12–16)
HGB UR QL STRIP: NEGATIVE
KETONES UR QL STRIP.AUTO: NEGATIVE MG/DL
LEUKOCYTE ESTERASE UR QL STRIP.AUTO: NEGATIVE
LIPASE SERPL-CCNC: 234 U/L (ref 73–393)
LYMPHOCYTES # BLD AUTO: 21 % (ref 21–52)
LYMPHOCYTES # BLD: 1.5 K/UL (ref 0.9–3.6)
MCH RBC QN AUTO: 25.5 PG (ref 24–34)
MCHC RBC AUTO-ENTMCNC: 31.8 G/DL (ref 31–37)
MCV RBC AUTO: 80.1 FL (ref 74–97)
MONOCYTES # BLD: 0.4 K/UL (ref 0.05–1.2)
MONOCYTES NFR BLD AUTO: 6 % (ref 3–10)
NEUTS SEG # BLD: 5 K/UL (ref 1.8–8)
NEUTS SEG NFR BLD AUTO: 72 % (ref 40–73)
NITRITE UR QL STRIP.AUTO: NEGATIVE
PH UR STRIP: 6 [PH] (ref 5–8)
PLATELET # BLD AUTO: 196 K/UL (ref 135–420)
PMV BLD AUTO: 9.4 FL (ref 9.2–11.8)
POTASSIUM SERPL-SCNC: 3.4 MMOL/L (ref 3.5–5.5)
PROT SERPL-MCNC: 7.1 G/DL (ref 6.4–8.2)
PROT UR STRIP-MCNC: 30 MG/DL
RBC # BLD AUTO: 3.61 M/UL (ref 4.2–5.3)
RBC #/AREA URNS HPF: NORMAL /HPF (ref 0–5)
SODIUM SERPL-SCNC: 143 MMOL/L (ref 136–145)
SP GR UR REFRACTOMETRY: >1.03 (ref 1–1.03)
UROBILINOGEN UR QL STRIP.AUTO: 0.2 EU/DL (ref 0.2–1)
WBC # BLD AUTO: 6.9 K/UL (ref 4.6–13.2)
WBC URNS QL MICRO: NORMAL /HPF (ref 0–4)

## 2017-04-06 PROCEDURE — 80048 BASIC METABOLIC PNL TOTAL CA: CPT | Performed by: PHYSICIAN ASSISTANT

## 2017-04-06 PROCEDURE — 96374 THER/PROPH/DIAG INJ IV PUSH: CPT

## 2017-04-06 PROCEDURE — 96376 TX/PRO/DX INJ SAME DRUG ADON: CPT

## 2017-04-06 PROCEDURE — 80076 HEPATIC FUNCTION PANEL: CPT | Performed by: PHYSICIAN ASSISTANT

## 2017-04-06 PROCEDURE — 96361 HYDRATE IV INFUSION ADD-ON: CPT

## 2017-04-06 PROCEDURE — 96375 TX/PRO/DX INJ NEW DRUG ADDON: CPT

## 2017-04-06 PROCEDURE — 81001 URINALYSIS AUTO W/SCOPE: CPT | Performed by: PHYSICIAN ASSISTANT

## 2017-04-06 PROCEDURE — 74011000250 HC RX REV CODE- 250: Performed by: PHYSICIAN ASSISTANT

## 2017-04-06 PROCEDURE — 99284 EMERGENCY DEPT VISIT MOD MDM: CPT

## 2017-04-06 PROCEDURE — 77030003560 HC NDL HUBR BARD -A

## 2017-04-06 PROCEDURE — 74011250637 HC RX REV CODE- 250/637: Performed by: PHYSICIAN ASSISTANT

## 2017-04-06 PROCEDURE — 85025 COMPLETE CBC W/AUTO DIFF WBC: CPT | Performed by: PHYSICIAN ASSISTANT

## 2017-04-06 PROCEDURE — 74011250636 HC RX REV CODE- 250/636: Performed by: PHYSICIAN ASSISTANT

## 2017-04-06 PROCEDURE — 83690 ASSAY OF LIPASE: CPT | Performed by: PHYSICIAN ASSISTANT

## 2017-04-06 RX ORDER — DICYCLOMINE HYDROCHLORIDE 10 MG/1
20 CAPSULE ORAL
Status: COMPLETED | OUTPATIENT
Start: 2017-04-06 | End: 2017-04-06

## 2017-04-06 RX ORDER — ONDANSETRON 4 MG/1
4 TABLET, ORALLY DISINTEGRATING ORAL
Qty: 15 TAB | Refills: 0 | Status: SHIPPED | OUTPATIENT
Start: 2017-04-06 | End: 2018-01-03

## 2017-04-06 RX ORDER — ONDANSETRON 2 MG/ML
4 INJECTION INTRAMUSCULAR; INTRAVENOUS ONCE
Status: COMPLETED | OUTPATIENT
Start: 2017-04-06 | End: 2017-04-06

## 2017-04-06 RX ORDER — DICYCLOMINE HYDROCHLORIDE 10 MG/1
10 CAPSULE ORAL
Status: DISCONTINUED | OUTPATIENT
Start: 2017-04-06 | End: 2017-04-06

## 2017-04-06 RX ORDER — HEPARIN 100 UNIT/ML
300 SYRINGE INTRAVENOUS AS NEEDED
Status: DISCONTINUED | OUTPATIENT
Start: 2017-04-06 | End: 2017-04-06 | Stop reason: HOSPADM

## 2017-04-06 RX ORDER — DICYCLOMINE HYDROCHLORIDE 20 MG/1
20 TABLET ORAL EVERY 6 HOURS
Qty: 10 TAB | Refills: 0 | Status: SHIPPED | OUTPATIENT
Start: 2017-04-06 | End: 2017-04-11

## 2017-04-06 RX ADMIN — DICYCLOMINE HYDROCHLORIDE 20 MG: 10 CAPSULE ORAL at 01:23

## 2017-04-06 RX ADMIN — Medication 500 UNITS: at 01:52

## 2017-04-06 RX ADMIN — ONDANSETRON 4 MG: 2 INJECTION INTRAMUSCULAR; INTRAVENOUS at 00:41

## 2017-04-06 RX ADMIN — ONDANSETRON 4 MG: 2 INJECTION INTRAMUSCULAR; INTRAVENOUS at 01:24

## 2017-04-06 RX ADMIN — SODIUM CHLORIDE 1000 ML: 900 INJECTION, SOLUTION INTRAVENOUS at 00:41

## 2017-04-06 RX ADMIN — LIDOCAINE HYDROCHLORIDE 50 ML: 20 SOLUTION ORAL; TOPICAL at 00:55

## 2017-04-06 NOTE — ED NOTES
Pt reports sx improved at time of dc, pain 7/10, tolerates PO. Pt ambulatory to ED lobby accompanied by significant other, pt in nad at time of dc, agreeable to dc plan. I have reviewed discharge instructions with the patient. The patient verbalized understanding.

## 2017-04-06 NOTE — DISCHARGE INSTRUCTIONS
Take suggested medications for control of abdominal pain. Increase fluid intake (water). Return to ER if pain continues in 12-24 hours. Return to ER immediately if you develop new or worsening symptoms including vomiting, high fevers, worsening pain. Abdominal Pain: Care Instructions  Your Care Instructions    Abdominal pain has many possible causes. Some aren't serious and get better on their own in a few days. Others need more testing and treatment. If your pain continues or gets worse, you need to be rechecked and may need more tests to find out what is wrong. You may need surgery to correct the problem. Don't ignore new symptoms, such as fever, nausea and vomiting, urination problems, pain that gets worse, and dizziness. These may be signs of a more serious problem. Your doctor may have recommended a follow-up visit in the next 8 to 12 hours. If you are not getting better, you may need more tests or treatment. The doctor has checked you carefully, but problems can develop later. If you notice any problems or new symptoms, get medical treatment right away. Follow-up care is a key part of your treatment and safety. Be sure to make and go to all appointments, and call your doctor if you are having problems. It's also a good idea to know your test results and keep a list of the medicines you take. How can you care for yourself at home? · Rest until you feel better. · To prevent dehydration, drink plenty of fluids, enough so that your urine is light yellow or clear like water. Choose water and other caffeine-free clear liquids until you feel better. If you have kidney, heart, or liver disease and have to limit fluids, talk with your doctor before you increase the amount of fluids you drink. · If your stomach is upset, eat mild foods, such as rice, dry toast or crackers, bananas, and applesauce. Try eating several small meals instead of two or three large ones.   · Wait until 48 hours after all symptoms have gone away before you have spicy foods, alcohol, and drinks that contain caffeine. · Do not eat foods that are high in fat. · Avoid anti-inflammatory medicines such as aspirin, ibuprofen (Advil, Motrin), and naproxen (Aleve). These can cause stomach upset. Talk to your doctor if you take daily aspirin for another health problem. When should you call for help? Call 911 anytime you think you may need emergency care. For example, call if:  · You passed out (lost consciousness). · You pass maroon or very bloody stools. · You vomit blood or what looks like coffee grounds. · You have new, severe belly pain. Call your doctor now or seek immediate medical care if:  · Your pain gets worse, especially if it becomes focused in one area of your belly. · You have a new or higher fever. · Your stools are black and look like tar, or they have streaks of blood. · You have unexpected vaginal bleeding. · You have symptoms of a urinary tract infection. These may include:  ¨ Pain when you urinate. ¨ Urinating more often than usual.  ¨ Blood in your urine. · You are dizzy or lightheaded, or you feel like you may faint. Watch closely for changes in your health, and be sure to contact your doctor if:  · You are not getting better after 1 day (24 hours). Where can you learn more? Go to http://imani-jenny.info/. Enter W521 in the search box to learn more about \"Abdominal Pain: Care Instructions. \"  Current as of: May 27, 2016  Content Version: 11.2  © 8948-3958 "[employer]. Care instructions adapted under license by [employer] (which disclaims liability or warranty for this information). If you have questions about a medical condition or this instruction, always ask your healthcare professional. [address] any warranty or liability for your use of this information.          Dehydration: Care Instructions  Your Care Instructions  Dehydration happens when your body loses too much fluid. This might happen when you do not drink enough water or you lose large amounts of fluids from your body because of diarrhea, vomiting, or sweating. Severe dehydration can be life-threatening. Water and minerals called electrolytes help put your body fluids back in balance. Learn the early signs of fluid loss, and drink more fluids to prevent dehydration. Follow-up care is a key part of your treatment and safety. Be sure to make and go to all appointments, and call your doctor if you are having problems. It's also a good idea to know your test results and keep a list of the medicines you take. How can you care for yourself at home? · To prevent dehydration, drink plenty of fluids, enough so that your urine is light yellow or clear like water. Choose water and other caffeine-free clear liquids until you feel better. If you have kidney, heart, or liver disease and have to limit fluids, talk with your doctor before you increase the amount of fluids you drink. · If you do not feel like eating or drinking, try taking small sips of water, sports drinks, or other rehydration drinks. · Get plenty of rest.  To prevent dehydration  · Add more fluids to your diet and daily routine, unless your doctor has told you not to. · During hot weather, drink more fluids. Drink even more fluids if you exercise a lot. Stay away from drinks with alcohol or caffeine. · Watch for the symptoms of dehydration. These include:  ¨ A dry, sticky mouth. ¨ Dark yellow urine, and not much of it. ¨ Dry and sunken eyes. ¨ Feeling very tired. · Learn what problems can lead to dehydration. These include:  ¨ Diarrhea, fever, and vomiting. ¨ Any illness with a fever, such as pneumonia or the flu. ¨ Activities that cause heavy sweating, such as endurance races and heavy outdoor work in hot or humid weather.   ¨ Alcohol or drug abuse or withdrawal.  ¨ Certain medicines, such as cold and allergy pills (antihistamines), diet pills (diuretics), and laxatives. ¨ Certain diseases, such as diabetes, cancer, and heart or kidney disease. When should you call for help? Call 911 anytime you think you may need emergency care. For example, call if:  · You passed out (lost consciousness). Call your doctor now or seek immediate medical care if:  · You are confused and cannot think clearly. · You are dizzy or lightheaded, or you feel like you may faint. · You have signs of needing more fluids. You have sunken eyes and a dry mouth, and you pass only a little dark urine. · You cannot keep fluids down. Watch closely for changes in your health, and be sure to contact your doctor if:  · You are not making tears. · Your skin is very dry and sags slowly back into place after you pinch it. · Your mouth and eyes are very dry. Where can you learn more? Go to http://imani-jenny.info/. Enter X315 in the search box to learn more about \"Dehydration: Care Instructions. \"  Current as of: May 27, 2016  Content Version: 11.2  © 4270-4192 [employer]. Care instructions adapted under license by [employer] (which disclaims liability or warranty for this information). If you have questions about a medical condition or this instruction, always ask your healthcare professional. [address] any warranty or liability for your use of this information.

## 2017-04-06 NOTE — ED PROVIDER NOTES
HPI Comments: 54yo F c/o abdominal pain, diarrhea, nausea and vomiting x 1 week. Symptoms worsen after times of stress which occurred today. She has seen her PCP who treated her with duloxetine for chronic abdominal pain and referred her to GI; she has an appt later this month per pt. She has been unable to keep down food or liquids this week. Pain is mostly on the right but is also all over. Pain in lower back as well. She was also seen at [facility] yesterday but states that they \"did nothing\". Patient had work up at [facility] and left [facility] after being told that she would not be treated with dilaudid. She has history of chronic abdominal pain, HTN, DM, colitis, opiate dependency, drug abuse, right breast cancer, and depression. She has frequent visits to the ED and multiple negative scans. Patient is a 51 y.o. female presenting with abdominal pain. Abdominal Pain    Associated symptoms include diarrhea, nausea and vomiting. Pertinent negatives include no fever, no constipation, no dysuria and no chest pain. Past Medical History:   Diagnosis Date    Alcohol abuse     Cancer [facility])     breast cancer, right    Cancer ([address]     Breast CA    Depression     Diabetes ([address]     Drug abuse     Gastrointestinal disorder     cholitis    Hyperlipidemia     Hypertension     Spine injury        Past Surgical History:   Procedure Laterality Date    COLONOSCOPY N/A 7/18/2016    COLONOSCOPY performed by Dorita Drake MD at [facility] ENDOSCOPY    HX BREAST LUMPECTOMY  06/2013    right    HX HYSTERECTOMY      HX ORTHOPAEDIC      Back fusion surgery 4/18/14.      HX OTHER SURGICAL      mediport    HX TONSILLECTOMY      HX TUBAL LIGATION           Family History:   Problem Relation Age of Onset    Heart Disease Mother     Stroke Father     Heart Disease Father 48    Diabetes Other     Hypertension Other     Cancer Maternal Grandmother        Social History     Social History    Marital status:      Spouse name: N/A    Number of children: N/A    Years of education: N/A     Occupational History    Not on file. Social History Main Topics    Smoking status: Never Smoker    Smokeless tobacco: Never Used    Alcohol use No      Comment: \"Occasionally\"    Drug use: No    Sexual activity: No     Other Topics Concern    Not on file     Social History Narrative    ** Merged History Encounter **              ALLERGIES: Latex; Other food; Amoxicillin; Aspirin; Fentanyl; Levaquin [levofloxacin]; Chlorhexidine; Norco [hydrocodone-acetaminophen]; Acetaminophen; Holly; Codeine; Glycopyrrolate; Morphine; Pcn [penicillins]; Percocet [oxycodone-acetaminophen]; Tape [adhesive]; and Tramadol    Review of Systems   Constitutional: Negative for fever. HENT: Negative for facial swelling. Eyes: Negative for visual disturbance. Respiratory: Negative for shortness of breath. Cardiovascular: Negative for chest pain. Gastrointestinal: Positive for abdominal pain, diarrhea, nausea and vomiting. Negative for constipation. Genitourinary: Negative for dysuria. Musculoskeletal: Negative for neck pain. Skin: Negative for rash. Neurological: Negative for dizziness. Psychiatric/Behavioral: Negative for confusion. All other systems reviewed and are negative. Vitals:    04/06/17 0022   BP: 147/79   Pulse: 94   Resp: 16   Temp: 98.4 °F (36.9 °C)   SpO2: 99%   Weight: 72.6 kg (160 lb)   Height: 5' 2\" (1.575 m)            Physical Exam   Constitutional: She is oriented to person, place, and time. She appears well-developed and well-nourished. No distress. HENT:   Head: Normocephalic and atraumatic. Eyes: Conjunctivae are normal.   Neck: Normal range of motion. Cardiovascular: Normal rate and regular rhythm. Pulmonary/Chest: Effort normal.   Abdominal: Soft. She exhibits no distension. There is generalized tenderness. There is no CVA tenderness. Musculoskeletal: Normal range of motion. Neurological: She is alert and oriented to person, place, and time. Skin: Skin is warm and dry. She is not diaphoretic. Psychiatric: She has a normal mood and affect. Nursing note and vitals reviewed. MDM  Number of Diagnoses or Management Options  Chronic generalized abdominal pain: established and worsening  Dehydration: new and requires workup  Hyperglycemia: new and requires workup  Non-intractable vomiting with nausea, unspecified vomiting type: new and requires workup  Diagnosis management comments: 52yo F with h/o chronic abdominal pain c/o generalized abdominal pain x 1 week. Already evaluated by PCP and at [facility] for same. Vitals stable, afebrile. Avoid narcotics due to h/o drug seeking behavior and opiate dependency. This is a chronic issue and pain should be managed by PCP or pain management specialist.      6527: Labs WNL. Hyperglycemia and dehydration treated with fluids. No signs of acute abdominal pathology. Since this is a chronic issue recommend GI f/u for continued workup. Discussed treatment plan, return precautions, symptomatic relief, and expected time to improvement. All questions answered. Patient is stable for discharge and outpatient management. ED Course       Procedures      Diagnosis:   1. Chronic generalized abdominal pain    2. Non-intractable vomiting with nausea, unspecified vomiting type    3. Dehydration    4.  Hyperglycemia          Disposition: home    Follow-up Information     Follow up With Details Comments [address], [place] Schedule an appointment as soon as possible for a visit  [address] [place] [address]  [phone]      [facility] EMERGENCY DEPT  Immediately if symptoms worsen [address] [place] [address]  [phone]    Indio Kern MD Schedule an appointment as soon as possible for a visit  [address] [facility]  [address] [place] [address],[address]  [address] [place] [address]  [phone]            Patient's Medications   Start Taking    DICYCLOMINE (BENTYL) 20 MG TABLET    Take 1 Tab by mouth every six (6) hours for 20 doses. ONDANSETRON (ZOFRAN ODT) 4 MG DISINTEGRATING TABLET    Take 1 Tab by mouth every eight (8) hours as needed for Nausea. Continue Taking    ALBUTEROL IN    Take  by inhalation. DICYCLOMINE (BENTYL) 20 MG TABLET    Take 1 Tab by mouth every six (6) hours. Prn abdominal pain    DULOXETINE (CYMBALTA) 60 MG CAPSULE    Take 1 Cap by mouth daily. EPINEPHRINE (EPIPEN) 0.3 MG/0.3 ML (1:1,000) INJECTION    0.3 mL by IntraMUSCular route once as needed for up to 1 dose. ESOMEPRAZOLE MAGNESIUM (NEXIUM 24HR) 20 MG TBEC    Take 20 mg by mouth daily. GABAPENTIN (NEURONTIN) 300 MG CAPSULE    Take 1 capsule by mouth three (3) times daily. INSULIN NPH-REGULAR HUMAN REC (HUMULIN 70-30) 100 UNIT/ML (70-30) INPN    Give 25 units in the QAM and 30 units at bedtime    LISINOPRIL (PRINIVIL, ZESTRIL) 10 MG TABLET    Take 1 Tab by mouth daily. LORAZEPAM (ATIVAN) 1 MG TABLET    1 mg. ONDANSETRON (ZOFRAN ODT) 8 MG DISINTEGRATING TABLET    Take 1 Tab by mouth every eight (8) hours as needed for Nausea. POLYETHYLENE GLYCOL (MIRALAX) 17 GRAM PACKET    Take 1 Packet by mouth daily. PROMETHAZINE (PHENERGAN) 25 MG TABLET    Take 1 Tab by mouth every six (6) hours as needed. TRAZODONE (DESYREL) 300 MG TABLET    Take 300 mg by mouth nightly.    These Medications have changed    No medications on file   Stop Taking    No medications on file     Adam Alonso PA-C

## 2017-04-06 NOTE — ED NOTES
Pt reports sx unimproved, danny charles notified, await further orders. Pt is tolerating po at this time, still c/o nausea.
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
## 2017-04-14 RX ORDER — SODIUM CHLORIDE 9 MG/ML
25 INJECTION, SOLUTION INTRAVENOUS CONTINUOUS
Status: CANCELLED | OUTPATIENT
Start: 2017-04-18 | End: 2017-04-18

## 2017-04-18 ENCOUNTER — HOSPITAL ENCOUNTER (OUTPATIENT)
Dept: INFUSION THERAPY | Age: 47
Discharge: HOME | End: 2017-04-18
Payer: MEDICAID

## 2017-04-21 ENCOUNTER — APPOINTMENT (OUTPATIENT)
Dept: INFUSION THERAPY | Age: 47
End: 2017-04-21
Payer: MEDICAID

## 2017-04-21 RX ORDER — ONDANSETRON 2 MG/ML
8 INJECTION INTRAMUSCULAR; INTRAVENOUS
Status: DISCONTINUED | OUTPATIENT
Start: 2017-04-24 | End: 2017-04-21

## 2017-04-21 RX ORDER — SODIUM CHLORIDE 0.9 % (FLUSH) 0.9 %
10-40 SYRINGE (ML) INJECTION AS NEEDED
Status: DISCONTINUED | OUTPATIENT
Start: 2017-04-24 | End: 2017-04-21

## 2017-04-21 RX ORDER — HEPARIN 100 UNIT/ML
500 SYRINGE INTRAVENOUS AS NEEDED
Status: DISCONTINUED | OUTPATIENT
Start: 2017-04-24 | End: 2017-04-21

## 2017-04-24 ENCOUNTER — APPOINTMENT (OUTPATIENT)
Dept: INFUSION THERAPY | Age: 47
End: 2017-04-24

## 2017-04-24 ENCOUNTER — HOSPITAL ENCOUNTER (OUTPATIENT)
Dept: INFUSION THERAPY | Age: 47
Discharge: HOME | End: 2017-04-24
Payer: MEDICAID

## 2017-04-24 VITALS
OXYGEN SATURATION: 100 % | RESPIRATION RATE: 18 BRPM | BODY MASS INDEX: 29.04 KG/M2 | DIASTOLIC BLOOD PRESSURE: 85 MMHG | SYSTOLIC BLOOD PRESSURE: 141 MMHG | WEIGHT: 157.8 LBS | TEMPERATURE: 97.3 F | HEIGHT: 62 IN | HEART RATE: 95 BPM

## 2017-04-24 LAB
BASO+EOS+MONOS # BLD AUTO: 0.5 K/UL (ref 0–2.3)
BASO+EOS+MONOS # BLD AUTO: 8 % (ref 0.1–17)
DIFFERENTIAL METHOD BLD: ABNORMAL
ERYTHROCYTE [DISTWIDTH] IN BLOOD BY AUTOMATED COUNT: 13.7 % (ref 11.5–14.5)
HCT VFR BLD AUTO: 30.6 % (ref 36–48)
HGB BLD-MCNC: 9.8 G/DL (ref 12–16)
LYMPHOCYTES # BLD AUTO: 23 % (ref 14–44)
LYMPHOCYTES # BLD: 1.6 K/UL (ref 1.1–5.9)
MCH RBC QN AUTO: 25.3 PG (ref 25–35)
MCHC RBC AUTO-ENTMCNC: 32 G/DL (ref 31–37)
MCV RBC AUTO: 79.1 FL (ref 78–102)
NEUTS SEG # BLD: 5.1 K/UL (ref 1.8–9.5)
NEUTS SEG NFR BLD AUTO: 70 % (ref 40–70)
PLATELET # BLD AUTO: 248 K/UL (ref 140–440)
RBC # BLD AUTO: 3.87 M/UL (ref 4.1–5.1)
WBC # BLD AUTO: 7.2 K/UL (ref 4.5–13)

## 2017-04-24 PROCEDURE — 36591 DRAW BLOOD OFF VENOUS DEVICE: CPT

## 2017-04-24 PROCEDURE — 77030012965 HC NDL HUBR BBMI -A

## 2017-04-24 PROCEDURE — 85025 COMPLETE CBC W/AUTO DIFF WBC: CPT | Performed by: INTERNAL MEDICINE

## 2017-04-24 PROCEDURE — 74011250636 HC RX REV CODE- 250/636: Performed by: INTERNAL MEDICINE

## 2017-04-24 PROCEDURE — 96375 TX/PRO/DX INJ NEW DRUG ADDON: CPT

## 2017-04-24 PROCEDURE — 96413 CHEMO IV INFUSION 1 HR: CPT

## 2017-04-24 RX ORDER — ONDANSETRON 2 MG/ML
8 INJECTION INTRAMUSCULAR; INTRAVENOUS
Status: DISCONTINUED | OUTPATIENT
Start: 2017-04-24 | End: 2017-04-28 | Stop reason: HOSPADM

## 2017-04-24 RX ORDER — SODIUM CHLORIDE 0.9 % (FLUSH) 0.9 %
10-40 SYRINGE (ML) INJECTION AS NEEDED
Status: DISCONTINUED | OUTPATIENT
Start: 2017-04-24 | End: 2017-04-28 | Stop reason: HOSPADM

## 2017-04-24 RX ORDER — HEPARIN 100 UNIT/ML
500 SYRINGE INTRAVENOUS AS NEEDED
Status: DISCONTINUED | OUTPATIENT
Start: 2017-04-24 | End: 2017-04-28 | Stop reason: HOSPADM

## 2017-04-24 RX ADMIN — Medication 500 UNITS: at 13:45

## 2017-04-24 RX ADMIN — Medication 10 ML: at 13:45

## 2017-04-24 RX ADMIN — TRASTUZUMAB 402 MG: KIT at 12:35

## 2017-04-24 RX ADMIN — ONDANSETRON 8 MG: 2 INJECTION INTRAMUSCULAR; INTRAVENOUS at 11:40

## 2017-04-24 NOTE — PROGRESS NOTES
[facility] Progress Note    Date: 2017    Name: Arnoldo Benitez    MRN: [id number]         : 1970      Ms. Caro arrived to [facility] at 1020. Pt is here today for Herceptin, Cycle 10. Ms. Marlen Gimenez was assessed and education was provided. Ms. Vlad Recio vitals were reviewed. Visit Vitals    /85 (BP 1 Location: Right arm, BP Patient Position: Sitting)    Pulse 95    Temp 97.3 °F (36.3 °C)    Resp 18    Ht 5' 2\" (1.575 m)    Wt 71.6 kg (157 lb 12.8 oz)    SpO2 100%    BMI 28.86 kg/m2       Patient's left upper chest port accessed and blood drawn for labs. Lab results were obtained and reviewed. Labs okay for chemo. Recent Results (from the past 12 hour(s))   CBC WITH 3 PART DIFF    Collection Time: 17 10:45 AM   Result Value Ref Range    WBC 7.2 4.5 - 13.0 K/uL    RBC 3.87 (L) 4.10 - 5.10 M/uL    HGB 9.8 (L) 12.0 - 16.0 g/dL    HCT 30.6 (L) 36 - 48 %    MCV 79.1 78 - 102 FL    MCH 25.3 25.0 - 35.0 PG    MCHC 32.0 31 - 37 g/dL    RDW 13.7 11.5 - 14.5 %    PLATELET 225 339 - 615 K/uL    NEUTROPHILS 70 40 - 70 %    MIXED CELLS 8 0.1 - 17 %    LYMPHOCYTES 23 14 - 44 %    ABS. NEUTROPHILS 5.1 1.8 - 9.5 K/UL    ABS. MIXED CELLS 0.5 0.0 - 2.3 K/uL    ABS. LYMPHOCYTES 1.6 1.1 - 5.9 K/UL    DF AUTOMATED         Pre-medications were administered as ordered Zofran 8mg and chemotherapy was initiated. Herceptin 402 mg administered as ordered. Ms. Marlen Gimenez tolerated infusion without complaints. Port flushed with heparin per order and de-accessed. Band-aid applied to site. Ms. Marlen Gimenez was discharged from [place] [address] in stable condition at 1350. She is to return on 17 at 1100 for her next appointment for injectafer.     Laura Corey RN  2017

## 2017-04-25 ENCOUNTER — APPOINTMENT (OUTPATIENT)
Dept: INFUSION THERAPY | Age: 47
End: 2017-04-25
Payer: MEDICAID

## 2017-04-27 RX ORDER — SODIUM CHLORIDE 9 MG/ML
25 INJECTION, SOLUTION INTRAVENOUS ONCE
Status: CANCELLED | OUTPATIENT
Start: 2017-04-28 | End: 2017-04-28

## 2017-04-28 ENCOUNTER — HOSPITAL ENCOUNTER (OUTPATIENT)
Dept: INFUSION THERAPY | Age: 47
Discharge: HOME | End: 2017-04-28
Payer: MEDICAID

## 2017-05-12 ENCOUNTER — APPOINTMENT (OUTPATIENT)
Dept: INFUSION THERAPY | Age: 47
End: 2017-05-12

## 2017-05-14 RX ORDER — ONDANSETRON 2 MG/ML
8 INJECTION INTRAMUSCULAR; INTRAVENOUS
Status: CANCELLED | OUTPATIENT
Start: 2017-05-15

## 2017-05-14 RX ORDER — HEPARIN 100 UNIT/ML
500 SYRINGE INTRAVENOUS AS NEEDED
Status: CANCELLED | OUTPATIENT
Start: 2017-05-15

## 2017-05-14 RX ORDER — SODIUM CHLORIDE 0.9 % (FLUSH) 0.9 %
10-40 SYRINGE (ML) INJECTION AS NEEDED
Status: CANCELLED | OUTPATIENT
Start: 2017-05-15

## 2017-05-15 ENCOUNTER — HOSPITAL ENCOUNTER (OUTPATIENT)
Dept: INFUSION THERAPY | Age: 47
Discharge: HOME | End: 2017-05-15

## 2017-05-15 ENCOUNTER — APPOINTMENT (OUTPATIENT)
Dept: INFUSION THERAPY | Age: 47
End: 2017-05-15

## 2017-05-18 RX ORDER — ONDANSETRON 2 MG/ML
8 INJECTION INTRAMUSCULAR; INTRAVENOUS
Status: CANCELLED | OUTPATIENT
Start: 2017-05-19

## 2017-05-18 RX ORDER — SODIUM CHLORIDE 0.9 % (FLUSH) 0.9 %
10-40 SYRINGE (ML) INJECTION AS NEEDED
Status: CANCELLED | OUTPATIENT
Start: 2017-05-19

## 2017-05-18 RX ORDER — HEPARIN 100 UNIT/ML
500 SYRINGE INTRAVENOUS AS NEEDED
Status: CANCELLED | OUTPATIENT
Start: 2017-05-19

## 2017-05-19 ENCOUNTER — HOSPITAL ENCOUNTER (OUTPATIENT)
Dept: INFUSION THERAPY | Age: 47
Discharge: HOME | End: 2017-05-19

## 2017-05-25 RX ORDER — SODIUM CHLORIDE 9 MG/ML
25 INJECTION, SOLUTION INTRAVENOUS CONTINUOUS
Status: CANCELLED | OUTPATIENT
Start: 2017-05-26

## 2017-05-25 RX ORDER — SODIUM CHLORIDE 0.9 % (FLUSH) 0.9 %
10-40 SYRINGE (ML) INJECTION AS NEEDED
Status: CANCELLED | OUTPATIENT
Start: 2017-05-26

## 2017-05-26 ENCOUNTER — HOSPITAL ENCOUNTER (OUTPATIENT)
Dept: INFUSION THERAPY | Age: 47
Discharge: HOME | End: 2017-05-26

## 2017-06-02 ENCOUNTER — PATIENT OUTREACH (OUTPATIENT)
Dept: FAMILY MEDICINE CLINIC | Age: 47
End: 2017-06-02
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
## 2017-06-05 ENCOUNTER — APPOINTMENT (OUTPATIENT)
Dept: INFUSION THERAPY | Age: 47
End: 2017-06-05
Payer: MEDICAID

## 2017-06-07 ENCOUNTER — PATIENT OUTREACH (OUTPATIENT)
Dept: FAMILY MEDICINE CLINIC | Age: 47
End: 2017-06-07

## 2017-06-07 NOTE — PROGRESS NOTES
Patient scheduled for PCP appt today 06/07/17 at 2:30pm, appt cancelled by patient. Attempted to reach patient to follow up. Unable to reach. Left message on voicemail requesting call back. Also noted patient was seen at [facility] ED 06/03/17. Will call patient again to f/u and re-schedule PCP appt.

## 2017-06-26 ENCOUNTER — HOSPITAL ENCOUNTER (OUTPATIENT)
Dept: INFUSION THERAPY | Age: 47
Discharge: HOME | End: 2017-06-26
Payer: MEDICAID

## 2017-06-26 ENCOUNTER — APPOINTMENT (OUTPATIENT)
Dept: INFUSION THERAPY | Age: 47
End: 2017-06-26
Payer: MEDICAID

## 2017-06-27 ENCOUNTER — HOSPITAL ENCOUNTER (OUTPATIENT)
Dept: INFUSION THERAPY | Age: 47
Discharge: HOME | End: 2017-06-27
Payer: MEDICAID

## 2017-06-27 VITALS
SYSTOLIC BLOOD PRESSURE: 148 MMHG | RESPIRATION RATE: 18 BRPM | DIASTOLIC BLOOD PRESSURE: 91 MMHG | BODY MASS INDEX: 29.26 KG/M2 | TEMPERATURE: 97.5 F | OXYGEN SATURATION: 99 % | WEIGHT: 160 LBS | HEART RATE: 73 BPM

## 2017-06-27 PROCEDURE — 77030012965 HC NDL HUBR BBMI -A

## 2017-06-27 PROCEDURE — 74011250636 HC RX REV CODE- 250/636: Performed by: INTERNAL MEDICINE

## 2017-06-27 PROCEDURE — 96365 THER/PROPH/DIAG IV INF INIT: CPT

## 2017-06-27 PROCEDURE — 96368 THER/DIAG CONCURRENT INF: CPT

## 2017-06-27 RX ORDER — SODIUM CHLORIDE 0.9 % (FLUSH) 0.9 %
10-40 SYRINGE (ML) INJECTION AS NEEDED
Status: DISCONTINUED | OUTPATIENT
Start: 2017-06-27 | End: 2017-07-01 | Stop reason: HOSPADM

## 2017-06-27 RX ORDER — HEPARIN 100 UNIT/ML
500 SYRINGE INTRAVENOUS AS NEEDED
Status: DISCONTINUED | OUTPATIENT
Start: 2017-06-27 | End: 2017-07-01 | Stop reason: HOSPADM

## 2017-06-27 RX ORDER — SODIUM CHLORIDE 9 MG/ML
25 INJECTION, SOLUTION INTRAVENOUS ONCE
Status: COMPLETED | OUTPATIENT
Start: 2017-06-27 | End: 2017-06-27

## 2017-06-27 RX ADMIN — Medication 500 UNITS: at 15:15

## 2017-06-27 RX ADMIN — Medication 10 ML: at 15:15

## 2017-06-27 RX ADMIN — FERRIC CARBOXYMALTOSE INJECTION 750 MG: 50 INJECTION, SOLUTION INTRAVENOUS at 14:20

## 2017-06-27 RX ADMIN — SODIUM CHLORIDE 25 ML/HR: 900 INJECTION, SOLUTION INTRAVENOUS at 14:18

## 2017-06-27 NOTE — PROGRESS NOTES
[facility] Progress Note    Date: 2017    Name: Zahira Pacheco    MRN: [id number]         : 1970    Ms. Caro arrived at [facility] ambulatory at 1400. Ms. Stanley Naranjo was assessed and education was provided. Ms. Caitlin Bowman vitals were reviewed and patient was observed for 5 minutes prior to treatment. Visit Vitals    BP (!) 148/91 (BP 1 Location: Right arm, BP Patient Position: Sitting)    Pulse 73    Temp 97.5 °F (36.4 °C)    Resp 18    Wt 72.6 kg (160 lb)    SpO2 99%    BMI 29.26 kg/m2       No results found for this or any previous visit (from the past 12 hour(s)). Mediport accessed under sterile technique using 20g 1\" levy needle x1 attempt blood visualized, line flushed. Injectafer 750 mg was infused over 15 mins. After infusion complete line flushed with normal saline followed by Heparin then de -accessed. Bandaid placed at site. Ms. Stanley Naranjo tolerated the infusion, and had no complaints. Patient armband removed and shredded. Ms. Stanley Naranjo was discharged from [place] [address] in stable condition at 1515. She is to return on 17 at 1500 for her next appointment.     Ruth Oh RN  2017  4:51 PM

## 2017-07-05 ENCOUNTER — HOSPITAL ENCOUNTER (OUTPATIENT)
Dept: INFUSION THERAPY | Age: 47
Discharge: HOME | End: 2017-07-05
Payer: MEDICAID

## 2017-07-05 NOTE — PROGRESS NOTES
Patient did not show for 1500 Injectafer appointment. Patient called and stated she was unable to come today. Patient transferred to [facility] to reschedule [facility] appointment for a different day.

## 2017-07-13 ENCOUNTER — HOSPITAL ENCOUNTER (OUTPATIENT)
Dept: INFUSION THERAPY | Age: 47
End: 2017-07-13
Payer: MEDICAID

## 2017-07-17 ENCOUNTER — APPOINTMENT (OUTPATIENT)
Dept: INFUSION THERAPY | Age: 47
End: 2017-07-17

## 2017-07-19 ENCOUNTER — HOSPITAL ENCOUNTER (EMERGENCY)
Age: 47
Discharge: HOME | End: 2017-07-20
Attending: EMERGENCY MEDICINE
Payer: MEDICAID

## 2017-07-19 ENCOUNTER — HOSPITAL ENCOUNTER (OUTPATIENT)
Dept: INFUSION THERAPY | Age: 47
Discharge: HOME | End: 2017-07-19
Payer: MEDICAID

## 2017-07-19 VITALS
SYSTOLIC BLOOD PRESSURE: 138 MMHG | RESPIRATION RATE: 18 BRPM | OXYGEN SATURATION: 98 % | DIASTOLIC BLOOD PRESSURE: 93 MMHG | TEMPERATURE: 97.6 F | HEART RATE: 79 BPM

## 2017-07-19 DIAGNOSIS — F41.1 ANXIETY STATE: ICD-10-CM

## 2017-07-19 DIAGNOSIS — G89.29 CHRONIC ABDOMINAL PAIN: Primary | ICD-10-CM

## 2017-07-19 DIAGNOSIS — R10.9 CHRONIC ABDOMINAL PAIN: Primary | ICD-10-CM

## 2017-07-19 DIAGNOSIS — F10.929 ALCOHOL INTOXICATION, WITH UNSPECIFIED COMPLICATION (HCC): ICD-10-CM

## 2017-07-19 LAB
AMPHET UR QL SCN: NEGATIVE
APPEARANCE UR: CLEAR
BARBITURATES UR QL SCN: NEGATIVE
BASOPHILS # BLD AUTO: 0 K/UL (ref 0–0.06)
BASOPHILS # BLD: 0 % (ref 0–2)
BENZODIAZ UR QL: NEGATIVE
BILIRUB UR QL: NEGATIVE
CANNABINOIDS UR QL SCN: NEGATIVE
COCAINE UR QL SCN: NEGATIVE
COLOR UR: YELLOW
DIFFERENTIAL METHOD BLD: ABNORMAL
EOSINOPHIL # BLD: 0 K/UL (ref 0–0.4)
EOSINOPHIL NFR BLD: 0 % (ref 0–5)
ERYTHROCYTE [DISTWIDTH] IN BLOOD BY AUTOMATED COUNT: 22.4 % (ref 11.6–14.5)
GLUCOSE UR STRIP.AUTO-MCNC: >1000 MG/DL
HCT VFR BLD AUTO: 36.2 % (ref 35–45)
HDSCOM,HDSCOM: NORMAL
HGB BLD-MCNC: 12 G/DL (ref 12–16)
HGB UR QL STRIP: NEGATIVE
KETONES UR QL STRIP.AUTO: NEGATIVE MG/DL
LEUKOCYTE ESTERASE UR QL STRIP.AUTO: NEGATIVE
LYMPHOCYTES # BLD AUTO: 25 % (ref 21–52)
LYMPHOCYTES # BLD: 1.3 K/UL (ref 0.9–3.6)
MCH RBC QN AUTO: 27.7 PG (ref 24–34)
MCHC RBC AUTO-ENTMCNC: 33.1 G/DL (ref 31–37)
MCV RBC AUTO: 83.6 FL (ref 74–97)
METHADONE UR QL: NEGATIVE
MONOCYTES # BLD: 0.2 K/UL (ref 0.05–1.2)
MONOCYTES NFR BLD AUTO: 4 % (ref 3–10)
NEUTS SEG # BLD: 3.7 K/UL (ref 1.8–8)
NEUTS SEG NFR BLD AUTO: 71 % (ref 40–73)
NITRITE UR QL STRIP.AUTO: NEGATIVE
OPIATES UR QL: NEGATIVE
PCP UR QL: NEGATIVE
PH UR STRIP: 6 [PH] (ref 5–8)
PLATELET # BLD AUTO: 214 K/UL (ref 135–420)
PMV BLD AUTO: 9.3 FL (ref 9.2–11.8)
PROT UR STRIP-MCNC: NEGATIVE MG/DL
RBC # BLD AUTO: 4.33 M/UL (ref 4.2–5.3)
SP GR UR REFRACTOMETRY: 1.01 (ref 1–1.03)
UROBILINOGEN UR QL STRIP.AUTO: 0.2 EU/DL (ref 0.2–1)
WBC # BLD AUTO: 5.2 K/UL (ref 4.6–13.2)

## 2017-07-19 PROCEDURE — 74011250636 HC RX REV CODE- 250/636: Performed by: EMERGENCY MEDICINE

## 2017-07-19 PROCEDURE — 81003 URINALYSIS AUTO W/O SCOPE: CPT | Performed by: EMERGENCY MEDICINE

## 2017-07-19 PROCEDURE — 96360 HYDRATION IV INFUSION INIT: CPT

## 2017-07-19 PROCEDURE — 80307 DRUG TEST PRSMV CHEM ANLYZR: CPT | Performed by: EMERGENCY MEDICINE

## 2017-07-19 PROCEDURE — 96365 THER/PROPH/DIAG IV INF INIT: CPT

## 2017-07-19 PROCEDURE — 74011250636 HC RX REV CODE- 250/636

## 2017-07-19 PROCEDURE — 85025 COMPLETE CBC W/AUTO DIFF WBC: CPT | Performed by: EMERGENCY MEDICINE

## 2017-07-19 PROCEDURE — 80053 COMPREHEN METABOLIC PANEL: CPT | Performed by: EMERGENCY MEDICINE

## 2017-07-19 PROCEDURE — 83690 ASSAY OF LIPASE: CPT | Performed by: EMERGENCY MEDICINE

## 2017-07-19 PROCEDURE — 99285 EMERGENCY DEPT VISIT HI MDM: CPT

## 2017-07-19 PROCEDURE — 74011250636 HC RX REV CODE- 250/636: Performed by: INTERNAL MEDICINE

## 2017-07-19 PROCEDURE — 96374 THER/PROPH/DIAG INJ IV PUSH: CPT

## 2017-07-19 PROCEDURE — 77030012965 HC NDL HUBR BBMI -A

## 2017-07-19 RX ORDER — HEPARIN SODIUM (PORCINE) LOCK FLUSH IV SOLN 100 UNIT/ML 100 UNIT/ML
500 SOLUTION INTRAVENOUS AS NEEDED
Status: DISPENSED | OUTPATIENT
Start: 2017-07-19 | End: 2017-07-20

## 2017-07-19 RX ORDER — FAMOTIDINE 10 MG/ML
20 INJECTION INTRAVENOUS
Status: DISCONTINUED | OUTPATIENT
Start: 2017-07-19 | End: 2017-07-20 | Stop reason: HOSPADM

## 2017-07-19 RX ORDER — SODIUM CHLORIDE 9 MG/ML
25 INJECTION, SOLUTION INTRAVENOUS ONCE
Status: COMPLETED | OUTPATIENT
Start: 2017-07-19 | End: 2017-07-19

## 2017-07-19 RX ORDER — DICYCLOMINE HYDROCHLORIDE 10 MG/ML
20 INJECTION INTRAMUSCULAR
Status: DISCONTINUED | OUTPATIENT
Start: 2017-07-19 | End: 2017-07-20 | Stop reason: HOSPADM

## 2017-07-19 RX ORDER — ONDANSETRON 2 MG/ML
4 INJECTION INTRAMUSCULAR; INTRAVENOUS
Status: DISCONTINUED | OUTPATIENT
Start: 2017-07-19 | End: 2017-07-20 | Stop reason: HOSPADM

## 2017-07-19 RX ORDER — HEPARIN 100 UNIT/ML
SYRINGE INTRAVENOUS
Status: COMPLETED
Start: 2017-07-19 | End: 2017-07-19

## 2017-07-19 RX ORDER — SODIUM CHLORIDE 0.9 % (FLUSH) 0.9 %
10-40 SYRINGE (ML) INJECTION AS NEEDED
Status: DISCONTINUED | OUTPATIENT
Start: 2017-07-19 | End: 2017-07-22 | Stop reason: HOSPADM

## 2017-07-19 RX ADMIN — HEPARIN 500 UNITS: 100 SYRINGE at 14:46

## 2017-07-19 RX ADMIN — FERRIC CARBOXYMALTOSE INJECTION 750 MG: 50 INJECTION, SOLUTION INTRAVENOUS at 14:19

## 2017-07-19 RX ADMIN — Medication 20 ML: at 14:19

## 2017-07-19 RX ADMIN — SODIUM CHLORIDE 1000 ML: 900 INJECTION, SOLUTION INTRAVENOUS at 23:09

## 2017-07-19 RX ADMIN — SODIUM CHLORIDE 25 ML/HR: 900 INJECTION, SOLUTION INTRAVENOUS at 14:19

## 2017-07-19 NOTE — PROGRESS NOTES
[facility] Progress Note    Date: 2017    Name: Heidi Islas    MRN: [id number]         : 1970    Ms. Caro arrived at [facility] ambulatory at 1400. Ms. Jone Bone was assessed and education was provided. Ms. Radha Solano vitals were reviewed and patient was observed for 5 minutes prior to treatment. Visit Vitals    BP (!) 138/93 (BP 1 Location: Left arm, BP Patient Position: At rest)    Pulse 79    Temp 97.6 °F (36.4 °C)    Resp 18    SpO2 98%     Mediport accessed under sterile technique using 20g 1\" levy needle x1 attempt blood visualized, line flushed. Injectafer 750 mg was infused over 20 mins as ordered. After infusion complete line flushed with normal saline followed by Heparin then de -accessed. Bandaid placed at site. Ms. Jone Bone tolerated the infusion, and had no complaints. Patient armband removed and shredded. Ms. Jone Bone was discharged from [place] [address] in stable condition at 1515. She is to follow up with Dr. Massiel Leon.     Jules Patrick RN  2017

## 2017-07-20 VITALS
OXYGEN SATURATION: 95 % | SYSTOLIC BLOOD PRESSURE: 149 MMHG | HEART RATE: 87 BPM | HEIGHT: 62 IN | RESPIRATION RATE: 16 BRPM | BODY MASS INDEX: 29.44 KG/M2 | WEIGHT: 160 LBS | DIASTOLIC BLOOD PRESSURE: 91 MMHG | TEMPERATURE: 97.8 F

## 2017-07-20 LAB
ALBUMIN SERPL BCP-MCNC: 3.9 G/DL (ref 3.4–5)
ALBUMIN/GLOB SERPL: 1 {RATIO} (ref 0.8–1.7)
ALP SERPL-CCNC: 169 U/L (ref 45–117)
ALT SERPL-CCNC: 36 U/L (ref 13–56)
ANION GAP BLD CALC-SCNC: 13 MMOL/L (ref 3–18)
APAP SERPL-MCNC: <2 UG/ML (ref 10–30)
AST SERPL W P-5'-P-CCNC: 26 U/L (ref 15–37)
BILIRUB SERPL-MCNC: 0.2 MG/DL (ref 0.2–1)
BUN SERPL-MCNC: 7 MG/DL (ref 7–18)
BUN/CREAT SERPL: 9 (ref 12–20)
CALCIUM SERPL-MCNC: 9.6 MG/DL (ref 8.5–10.1)
CHLORIDE SERPL-SCNC: 102 MMOL/L (ref 100–108)
CO2 SERPL-SCNC: 25 MMOL/L (ref 21–32)
CREAT SERPL-MCNC: 0.81 MG/DL (ref 0.6–1.3)
ETHANOL SERPL-MCNC: 143 MG/DL (ref 0–3)
GLOBULIN SER CALC-MCNC: 4 G/DL (ref 2–4)
GLUCOSE SERPL-MCNC: 292 MG/DL (ref 74–99)
LIPASE SERPL-CCNC: 264 U/L (ref 73–393)
POTASSIUM SERPL-SCNC: 3.4 MMOL/L (ref 3.5–5.5)
PROT SERPL-MCNC: 7.9 G/DL (ref 6.4–8.2)
SALICYLATES SERPL-MCNC: <2.8 MG/DL (ref 2.8–20)
SODIUM SERPL-SCNC: 140 MMOL/L (ref 136–145)

## 2017-07-20 PROCEDURE — 74011250636 HC RX REV CODE- 250/636: Performed by: EMERGENCY MEDICINE

## 2017-07-20 RX ORDER — HEPARIN 100 UNIT/ML
SYRINGE INTRAVENOUS
Status: DISCONTINUED
Start: 2017-07-20 | End: 2017-07-20 | Stop reason: HOSPADM

## 2017-07-20 RX ORDER — HEPARIN SODIUM (PORCINE) LOCK FLUSH IV SOLN 100 UNIT/ML 100 UNIT/ML
500 SOLUTION INTRAVENOUS AS NEEDED
Status: DISCONTINUED | OUTPATIENT
Start: 2017-07-20 | End: 2017-07-20 | Stop reason: HOSPADM

## 2017-07-20 RX ADMIN — HEPARIN SODIUM (PORCINE) LOCK FLUSH IV SOLN 100 UNIT/ML 500 UNITS: 100 SOLUTION at 00:44

## 2017-07-20 NOTE — ED PROVIDER NOTES
HPI Comments: Marlee Bernstein is a 51 y.o. Female well known to our er with h/o chronic abd pain who was brought in by police because there was some concern she felt suicidal after drinking a lot of alcohol today after being upset over loss of her friend a month ago. States the alcohol made her abd worse. No blood in stool. Adamantly denies any current thoughts of suicide or plan to harm herself. Has been seen mult times recently at [facility] for abd pain, vomiting with unremarkable labs. She typically will leave ama once she is told she will not get narcotics. Pain is constant, sharp, cramping in mid abd same as previous episodes. The history is provided by the patient. Past Medical History:   Diagnosis Date    Alcohol abuse     Cancer [facility])     breast cancer, right    Cancer ([facility] [address]     Breast CA    Depression     [facility]     Drug abuse     Gastrointestinal disorder     cholitis    Hyperlipidemia     Hypertension     Spine injury        Past Surgical History:   Procedure Laterality Date    COLONOSCOPY N/A 7/18/2016    COLONOSCOPY performed by Swathi Barron MD at [facility] ENDOSCOPY    HX BREAST LUMPECTOMY  06/2013    right    HX HYSTERECTOMY      HX ORTHOPAEDIC      Back fusion surgery 4/18/14.  HX OTHER SURGICAL      mediport    HX TONSILLECTOMY      HX TUBAL LIGATION           Family History:   Problem Relation Age of Onset    Heart Disease Mother     Stroke Father     Heart Disease Father 48    Diabetes Other     Hypertension Other     Cancer Maternal Grandmother        Social History     Social History    Marital status:      Spouse name: N/A    Number of children: N/A    Years of education: N/A     Occupational History    Not on file.      Social History Main Topics    Smoking status: Never Smoker    Smokeless tobacco: Never Used    Alcohol use No      Comment: \"Occasionally\"    Drug use: No    Sexual activity: No     Other Topics Concern    Not on file     Social History Narrative    ** Merged History Encounter **              ALLERGIES: Latex; Other food; Amoxicillin; Aspirin; Fentanyl; Levaquin [levofloxacin]; Chlorhexidine; Norco [hydrocodone-acetaminophen]; Acetaminophen; [place]; Codeine; Glycopyrrolate; Morphine; Pcn [penicillins]; Percocet [oxycodone-acetaminophen]; Tape [adhesive]; and Tramadol    Review of Systems   Constitutional: Negative for fever. HENT: Negative for sore throat. Eyes: Negative for visual disturbance. Respiratory: Negative for shortness of breath. Cardiovascular: Negative for leg swelling. Gastrointestinal: Positive for abdominal pain. Negative for blood in stool. Endocrine: Negative for polyuria. Genitourinary: Positive for frequency. Negative for difficulty urinating. Musculoskeletal: Negative for gait problem. Skin: Negative for rash. Neurological: Negative for syncope. Hematological: Does not bruise/bleed easily. Psychiatric/Behavioral: Positive for sleep disturbance. Negative for suicidal ideas. The patient is nervous/anxious. Vitals:    07/19/17 2200   BP: 156/72   Pulse: 89   Resp: 18   Temp: 98.2 °F (36.8 °C)   SpO2: 98%   Weight: 72.6 kg (160 lb)   Height: 5' 2\" (1.575 m)            Physical Exam   Constitutional: She is oriented to person, place, and time. She appears well-developed and well-nourished. No distress. Pt was very tearful, anxious upon arrival initially but very calm when I saw in her bed. HENT:   Head: Normocephalic and atraumatic. Right Ear: External ear normal.   Left Ear: External ear normal.   Nose: Nose normal.   Mouth/Throat: Uvula is midline, oropharynx is clear and moist and mucous membranes are normal.   Eyes: Conjunctivae are normal. No scleral icterus. Neck: Neck supple. Cardiovascular: Normal rate, regular rhythm, normal heart sounds and intact distal pulses. Pulmonary/Chest: Effort normal and breath sounds normal.   Abdominal: Soft.  Normal appearance. She exhibits no distension and no mass. There is no hepatosplenomegaly. There is tenderness. There is no rigidity, no rebound, no guarding and no CVA tenderness. Musculoskeletal: She exhibits no edema. Neurological: She is alert and oriented to person, place, and time. Gait normal.   Skin: Skin is warm and dry. She is not diaphoretic. Psychiatric: Her speech is normal and behavior is normal. Her mood appears not anxious. Her affect is not angry. Thought content is not paranoid. She exhibits a depressed mood. She expresses no homicidal and no suicidal ideation. Nursing note and vitals reviewed.        [facility]  ED Course       Procedures    Vitals:  Patient Vitals for the past 12 hrs:   Temp Pulse Resp BP SpO2   07/19/17 2200 98.2 °F (36.8 °C) 89 18 156/72 98 %         Medications ordered:   Medications   dicyclomine (BENTYL) 10 mg/mL injection 20 mg (20 mg IntraMUSCular Refused 7/19/17 2325)   ondansetron (ZOFRAN) injection 4 mg (4 mg IntraVENous Refused 7/19/17 2325)   famotidine (PF) (PEPCID) injection 20 mg (20 mg IntraVENous Refused 7/19/17 2325)   sodium chloride 0.9 % bolus infusion 1,000 mL (1,000 mL IntraVENous New Bag 7/19/17 2309)         Lab findings:  Recent Results (from the past 12 hour(s))   URINALYSIS W/ RFLX MICROSCOPIC    Collection Time: 07/19/17 10:59 PM   Result Value Ref Range    Color YELLOW      Appearance CLEAR      Specific gravity 1.011 1.005 - 1.030      pH (UA) 6.0 5.0 - 8.0      Protein NEGATIVE  NEG mg/dL    Glucose >1000 (A) NEG mg/dL    Ketone NEGATIVE  NEG mg/dL    Bilirubin NEGATIVE  NEG      Blood NEGATIVE  NEG      Urobilinogen 0.2 0.2 - 1.0 EU/dL    Nitrites NEGATIVE  NEG      Leukocyte Esterase NEGATIVE  NEG     DRUG SCREEN, URINE    Collection Time: 07/19/17 10:59 PM   Result Value Ref Range    BENZODIAZEPINE NEGATIVE  NEG      BARBITURATES NEGATIVE  NEG      THC (TH-CANNABINOL) NEGATIVE  NEG      OPIATES NEGATIVE  NEG      PCP(PHENCYCLIDINE) NEGATIVE  NEG COCAINE NEGATIVE  NEG      AMPHETAMINES NEGATIVE  NEG      METHADONE NEGATIVE       HDSCOM (NOTE)    CBC WITH AUTOMATED DIFF    Collection Time: 07/19/17 11:09 PM   Result Value Ref Range    WBC 5.2 4.6 - 13.2 K/uL    RBC 4.33 4.20 - 5.30 M/uL    HGB 12.0 12.0 - 16.0 g/dL    HCT 36.2 35.0 - 45.0 %    MCV 83.6 74.0 - 97.0 FL    MCH 27.7 24.0 - 34.0 PG    MCHC 33.1 31.0 - 37.0 g/dL    RDW 22.4 (H) 11.6 - 14.5 %    PLATELET 052 000 - 535 K/uL    MPV 9.3 9.2 - 11.8 FL    NEUTROPHILS 71 40 - 73 %    LYMPHOCYTES 25 21 - 52 %    MONOCYTES 4 3 - 10 %    EOSINOPHILS 0 0 - 5 %    BASOPHILS 0 0 - 2 %    ABS. NEUTROPHILS 3.7 1.8 - 8.0 K/UL    ABS. LYMPHOCYTES 1.3 0.9 - 3.6 K/UL    ABS. MONOCYTES 0.2 0.05 - 1.2 K/UL    ABS. EOSINOPHILS 0.0 0.0 - 0.4 K/UL    ABS. BASOPHILS 0.0 0.0 - 0.06 K/UL    DF AUTOMATED     METABOLIC PANEL, COMPREHENSIVE    Collection Time: 07/19/17 11:09 PM   Result Value Ref Range    Sodium 140 136 - 145 mmol/L    Potassium 3.4 (L) 3.5 - 5.5 mmol/L    Chloride 102 100 - 108 mmol/L    CO2 25 21 - 32 mmol/L    Anion gap 13 3.0 - 18 mmol/L    Glucose 292 (H) 74 - 99 mg/dL    BUN 7 7.0 - 18 MG/DL    Creatinine 0.81 0.6 - 1.3 MG/DL    BUN/Creatinine ratio 9 (L) 12 - 20      GFR est AA >60 >60 ml/min/1.73m2    GFR est non-AA >60 >60 ml/min/1.73m2    Calcium 9.6 8.5 - 10.1 MG/DL    Bilirubin, total 0.2 0.2 - 1.0 MG/DL    ALT (SGPT) 36 13 - 56 U/L    AST (SGOT) 26 15 - 37 U/L    Alk.  phosphatase 169 (H) 45 - 117 U/L    Protein, total 7.9 6.4 - 8.2 g/dL    Albumin 3.9 3.4 - 5.0 g/dL    Globulin 4.0 2.0 - 4.0 g/dL    A-G Ratio 1.0 0.8 - 1.7     LIPASE    Collection Time: 07/19/17 11:09 PM   Result Value Ref Range    Lipase 264 73 - 261 U/L   SALICYLATE    Collection Time: 07/19/17 11:09 PM   Result Value Ref Range    SALICYLATE <4.1 (L) 2.8 - 20.0 MG/DL   ACETAMINOPHEN    Collection Time: 07/19/17 11:09 PM   Result Value Ref Range    Acetaminophen level <2 (L) 10 - 30 ug/mL   ETHYL ALCOHOL    Collection Time: 07/19/17 11:09 PM   Result Value Ref Range    ALCOHOL(ETHYL),SERUM 143 (H) 0 - 3 MG/DL       EKG interpretation by ED Physician:      X-Ray, CT or other radiology findings or impressions:  No orders to display       Progress notes, Consult notes or additional Procedure notes:   Doubt suicidal at risk for self harm or need for telepsych evaluation. No acute medical condition that would warrant imaging, other work up at this time. Pt told would not give her narcotics for pain and offered alternative treatment which she refused  I have discussed with patient and/or family/sig other the results, interpretation of any imaging if performed, suspected diagnosis and treatment plan to include instructions regarding the diagnoses listed to which understanding was expressed with all questions answered      Reevaluation of patient:   Stable for dc    Disposition:  Diagnosis:   1. Chronic abdominal pain    2. Anxiety state    3. Alcohol intoxication, with unspecified complication ([facility] [address]        Disposition: home      Follow-up Information     Follow up With Details Comments [address], [place] Schedule an appointment as soon as possible for a visit  [address] [place] [address]  [phone]      [facility] EMERGENCY DEPT  If symptoms worsen [address]  [phone]            Patient's Medications   Start Taking    No medications on file   Continue Taking    ALBUTEROL IN    Take  by inhalation. DICYCLOMINE (BENTYL) 20 MG TABLET    Take 1 Tab by mouth every six (6) hours. Prn abdominal pain    DULOXETINE (CYMBALTA) 60 MG CAPSULE    Take 1 Cap by mouth daily. EPINEPHRINE (EPIPEN) 0.3 MG/0.3 ML (1:1,000) INJECTION    0.3 mL by IntraMUSCular route once as needed for up to 1 dose. ESOMEPRAZOLE MAGNESIUM (NEXIUM 24HR) 20 MG TBEC    Take 20 mg by mouth daily. GABAPENTIN (NEURONTIN) 300 MG CAPSULE    Take 1 capsule by mouth three (3) times daily. INSULIN NPH-REGULAR HUMAN REC (HUMULIN 70-30) 100 UNIT/ML (70-30) INPN    Give 25 units in the QAM and 30 units at bedtime    LISINOPRIL (PRINIVIL, ZESTRIL) 10 MG TABLET    Take 1 Tab by mouth daily. LORAZEPAM (ATIVAN) 1 MG TABLET    1 mg. ONDANSETRON (ZOFRAN ODT) 4 MG DISINTEGRATING TABLET    Take 1 Tab by mouth every eight (8) hours as needed for Nausea. ONDANSETRON (ZOFRAN ODT) 8 MG DISINTEGRATING TABLET    Take 1 Tab by mouth every eight (8) hours as needed for Nausea. POLYETHYLENE GLYCOL (MIRALAX) 17 GRAM PACKET    Take 1 Packet by mouth daily. PROMETHAZINE (PHENERGAN) 25 MG TABLET    Take 1 Tab by mouth every six (6) hours as needed. TRAZODONE (DESYREL) 300 MG TABLET    Take 300 mg by mouth nightly.    These Medications have changed    No medications on file   Stop Taking    No medications on file

## 2017-07-20 NOTE — ED TRIAGE NOTES
Pt arrived via [employer] after calling from home with suicidal thoughts. Pt admits to \"drinking a lot\" today. Pt very tearful and upset \"I lost my best friend to colon cancer\". Pt very uncooperative and non-compliant at first, then more cooperative with [employer] persuasion.

## 2017-07-20 NOTE — ED NOTES
Patient brought in by [employer]. Patient appears to be intoxicated and states she is SI, Patientty doesn't have a plan but is sad and depressed that her friend  today.

## 2017-07-20 NOTE — ED NOTES
Patient discharged home with family, A&Ox4, no apparent distress. Patient verbalized understanding of discharge instructions and follow up.

## 2017-07-26 ENCOUNTER — OFFICE VISIT (OUTPATIENT)
Dept: ONCOLOGY | Age: 47
End: 2017-07-26

## 2017-07-26 ENCOUNTER — HOSPITAL ENCOUNTER (OUTPATIENT)
Dept: ONCOLOGY | Age: 47
Discharge: HOME | End: 2017-07-26

## 2017-07-26 DIAGNOSIS — D70.1 CHEMOTHERAPY INDUCED NEUTROPENIA (HCC): ICD-10-CM

## 2017-07-26 DIAGNOSIS — C50.411 MALIGNANT NEOPLASM OF UPPER-OUTER QUADRANT OF RIGHT FEMALE BREAST (HCC): Primary | ICD-10-CM

## 2017-07-26 DIAGNOSIS — T45.1X5A ANEMIA DUE TO ANTINEOPLASTIC CHEMOTHERAPY: ICD-10-CM

## 2017-07-26 DIAGNOSIS — T45.1X5A CHEMOTHERAPY INDUCED NEUTROPENIA (HCC): ICD-10-CM

## 2017-07-26 DIAGNOSIS — D64.81 ANEMIA DUE TO ANTINEOPLASTIC CHEMOTHERAPY: ICD-10-CM

## 2017-07-26 DIAGNOSIS — C50.411 MALIGNANT NEOPLASM OF UPPER-OUTER QUADRANT OF RIGHT FEMALE BREAST (HCC): ICD-10-CM

## 2017-07-26 NOTE — MR AVS SNAPSHOT
Visit Information Date & Time Provider Department Dept. Phone Encounter #  
 7/26/2017  4:00 PM [phone] [id number] Follow-up Instructions Return in about 3 months (around 10/26/2017). Your Appointments 10/25/2017  2:15 PM  
[place] ([facility] Appt Note: 3 MO  [facility] [address] [address]  
[address]  
  
   
 [address] [facility] Kristian [address] [address] Upcoming Health Maintenance Date Due  
 EYE EXAM RETINAL OR DILATED Q1 11/21/1979 DTaP/Tdap/Td series (1 - Tdap) 11/21/1990 PAP AKA CERVICAL CYTOLOGY 11/21/1990 HEMOGLOBIN A1C Q6M 7/23/2017 INFLUENZA AGE 9 TO ADULT 8/1/2017 FOOT EXAM Q1 1/23/2018 MICROALBUMIN Q1 1/23/2018 LIPID PANEL Q1 1/23/2018 Allergies as of 7/26/2017  Review Complete On: 7/19/2017 By: Sanket Brewster RN Severity Noted Reaction Type Reactions Latex High 04/26/2010    Hives, Itching Other Food  07/15/2016    Rash Almonds Amoxicillin High 11/26/2013    Swelling Other reaction(s): mild rash/itching Aspirin High 09/04/2013    Not Reported This Time, Swelling Mouth swells Fentanyl High 12/17/2013   Systemic Anaphylaxis, Swelling Patches cause mouth to swell Swelling in mouth from fentanyl patch Levaquin [Levofloxacin] High 09/04/2013    Not Reported This Time, Swelling Other reaction(s): mild rash/itching Chlorhexidine Medium 04/18/2014   Topical Rash Norco [Hydrocodone-acetaminophen] Medium 03/12/2015    Nausea and Vomiting Other reaction(s): gi distress Acetaminophen  07/28/2016    Other (comments) [place]  04/03/2016    Rash, Itching Codeine  11/26/2013    Other (comments) Glycopyrrolate  07/04/2014    Swelling Pt  States  faciAL  SWELLING   POST  ROBINUL  IV Pt  States  faciAL  SWELLING   POST  ROBINUL  IV   
 Morphine  05/31/2016    Nausea and Vomiting Pt states she is not allergic Pcn [Penicillins]  11/26/2013    Swelling Percocet [Oxycodone-acetaminophen]  01/30/2015    Nausea and Vomiting Other reaction(s): gi distress 
nausea Other reaction(s): anaphylaxis/angioedema Per pt. She is allergic Tape [Adhesive]  05/31/2016    Rash Tramadol  12/05/2013    Swelling Current Immunizations  Reviewed on 4/24/2017 Name Date Influenza Vaccine 11/7/2016, 2/24/2016, 3/23/2015, 12/1/2014, 10/9/2013, 10/20/2012 Pneumococcal Polysaccharide (PPSV-23) 3/22/2015, 10/22/2012 Pneumococcal Vaccine (Unspecified Type) 3/4/2016, 1/2/2013 Not reviewed this visit You Were Diagnosed With   
  
 Codes Comments Malignant neoplasm of upper-outer quadrant of right female breast ([facility] [address]    -  Primary ICD-10-CM: C50.411 ICD-9-CM: 174.4 Vitals LMP OB Status Smoking Status 07/04/2013 Hysterectomy Never Smoker Preferred Pharmacy Pharmacy Name Phone [facility] [address] Your Updated Medication List  
  
   
This list is accurate as of: 7/26/17  5:06 PM.  Always use your most recent med list.  
  
  
  
  
 ALBUTEROL IN Take  by inhalation. dicyclomine 20 mg tablet Commonly known as:  BENTYL Take 1 Tab by mouth every six (6) hours. Prn abdominal pain DULoxetine 60 mg capsule Commonly known as:  CYMBALTA Take 1 Cap by mouth daily. EPINEPHrine 0.3 mg/0.3 mL injection Commonly known as:  EPIPEN  
0.3 mL by IntraMUSCular route once as needed for up to 1 dose. esomeprazole magnesium 20 mg Tbec Commonly known as:  NexIUM 24HR Take 20 mg by mouth daily. gabapentin 300 mg capsule Commonly known as:  NEURONTIN Take 1 capsule by mouth three (3) times daily. insulin nph-regular human rec 100 unit/mL (70-30) Inpn Commonly known as:  HUMULIN 70-30 Give 25 units in the QAM and 30 units at bedtime  
  
 lisinopril 10 mg tablet Commonly known as:  Abdirashid Stacey Take 1 Tab by mouth daily. LORazepam 1 mg tablet Commonly known as:  ATIVAN  
1 mg.  
  
 * ondansetron 8 mg disintegrating tablet Commonly known as:  ZOFRAN ODT Take 1 Tab by mouth every eight (8) hours as needed for Nausea. * ondansetron 4 mg disintegrating tablet Commonly known as:  ZOFRAN ODT Take 1 Tab by mouth every eight (8) hours as needed for Nausea. polyethylene glycol 17 gram packet Commonly known as:  Oralee Donath Take 1 Packet by mouth daily. promethazine 25 mg tablet Commonly known as:  PHENERGAN Take 1 Tab by mouth every six (6) hours as needed. traZODone 300 mg tablet Commonly known as:  Rayma Medal Take 300 mg by mouth nightly. * Notice: This list has 2 medication(s) that are the same as other medications prescribed for you. Read the directions carefully, and ask your doctor or other care provider to review them with you. We Performed the Following CA 27.29 [84929 CPT(R)] COMPLETE CBC & AUTO DIFF WBC [11319 CPT(R)] FERRITIN [75775 CPT(R)] IRON PROFILE U351990 CPT(R)] METABOLIC PANEL, COMPREHENSIVE [54540 CPT(R)] Follow-up Instructions Return in about 3 months (around 10/26/2017). To-Do List   
 07/26/2017 Lab:  CA 27.29   
  
 07/26/2017 Lab:  CBC WITH 3 PART DIFF   
  
 07/26/2017 Lab:  FERRITIN   
  
 07/26/2017 Lab:  IRON PROFILE   
  
 07/26/2017 Lab:  METABOLIC PANEL, COMPREHENSIVE Patient Instructions Breast Cancer: Care Instructions Your Care Instructions Breast cancer occurs when abnormal cells grow out of control in the breast. These cancer cells can spread within the breast, to nearby lymph nodes and other tissues, and to other parts of the body.  
Being treated for cancer can weaken your body, and you may feel very tired. Get the rest your body needs so you can feel better. Finding out that you have cancer is scary. You may feel many emotions and may need some help coping. Seek out family, friends, and counselors for support. You also can do things at home to make yourself feel better while you go through treatment. Call the [employer] ([phone]) or visit its website at [address] for more information. Follow-up care is a key part of your treatment and safety. Be sure to make and go to all appointments, and call your doctor if you are having problems. It's also a good idea to know your test results and keep a list of the medicines you take. How can you care for yourself at home? · Take your medicines exactly as prescribed. Call your doctor if you think you are having a problem with your medicine. You may get medicine for nausea and vomiting if you have these side effects. · Follow your doctor's instructions to relieve pain. Pain from cancer and surgery can almost always be controlled. Use pain medicine when you first notice pain, before it becomes severe. · Eat healthy food. If you do not feel like eating, try to eat food that has protein and extra calories to keep up your strength and prevent weight loss. Drink liquid meal replacements for extra calories and protein. Try to eat your main meal early. · Get some physical activity every day, but do not get too tired. Keep doing the hobbies you enjoy as your energy allows. · Do not smoke. Smoking can make your cancer worse. If you need help quitting, talk to your doctor about stop-smoking programs and medicines. These can increase your chances of quitting for good. · Take steps to control your stress and workload. Learn relaxation techniques. ¨ Share your feelings. Stress and tension affect our emotions. By expressing your feelings to others, you may be able to understand and cope with them. ¨ Consider joining a support group. Talking about a problem with your spouse, a good friend, or other people with similar problems is a good way to reduce tension and stress. ¨ Express yourself through art. Try writing, crafts, dance, or art to relieve stress. Some dance, writing, or art groups may be available just for people who have cancer. ¨ Be kind to your body and mind. Getting enough sleep, eating a healthy diet, and taking time to do things you enjoy can contribute to an overall feeling of balance in your life and can help reduce stress. ¨ Get help if you need it. Discuss your concerns with your doctor or counselor. · If you are vomiting or have diarrhea: ¨ Drink plenty of fluids (enough so that your urine is light yellow or clear like water) to prevent dehydration. Choose water and other caffeine-free clear liquids. If you have kidney, heart, or liver disease and have to limit fluids, talk with your doctor before you increase the amount of fluids you drink. ¨ When you are able to eat, try clear soups, mild foods, and liquids until all symptoms are gone for 12 to 48 hours. Other good choices include dry toast, crackers, cooked cereal, and gelatin dessert, such as Jell-O. · If you have not already done so, prepare a list of advance directives. Advance directives are instructions to your doctor and family members about what kind of care you want if you become unable to speak or express yourself. When should you call for help? Call your doctor now or seek immediate medical care if: 
· You have a fever. · Any part of your breast becomes red, tender, swollen, or hot. · You have pain, redness, or swelling in the arm on the same side as your breast cancer. Watch closely for changes in your health, and be sure to contact your doctor if: 
· You have pain that is not controlled by medicine. · You have nausea or vomiting. · You are constipated or have diarrhea. Where can you learn more? Go to http://imani-jenny.info/. Enter V321 in the search box to learn more about \"Breast Cancer: Care Instructions. \" Current as of: July 26, 2016 Content Version: 11.3 © 7012-6806 [employer] [place], [employer]. Care instructions adapted under license by [employer] (which disclaims liability or warranty for this information). If you have questions about a medical condition or this instruction, always ask your healthcare professional. [address] any warranty or liability for your use of this information. Introducing [facility]! Pam Galan introduces [facility] patient portal. Now you can access parts of your medical record, email your doctor's office, and request medication refills online. 1. In your internet browser, go to [email or web address] 2. Click on the First Time User? Click Here link in the Sign In box. You will see the New Member Sign Up page. 3. Enter your [facility] Access Code exactly as it appears below. You will not need to use this code after youve completed the sign-up process. If you do not sign up before the expiration date, you must request a new code. · [facility] Access Code: [email or web address] Expires: 8/13/2017  7:51 AM 
 
4. Enter the last four digits of your Social Security Number (xxxx) and Date of Birth (mm/dd/yyyy) as indicated and click Submit. You will be taken to the next sign-up page. 5. Create a [facility] ID. This will be your [facility] login ID and cannot be changed, so think of one that is secure and easy to remember. 6. Create a [facility] password. You can change your password at any time. 7. Enter your Password Reset Question and Answer. This can be used at a later time if you forget your password. 8. Enter your e-mail address. You will receive e-mail notification when new information is available in [address] 9. Click Sign Up. You can now view and download portions of your medical record. 10. Click the Download Summary menu link to download a portable copy of your medical information. If you have questions, please visit the Frequently Asked Questions section of the [employer] website. Remember, [employer] is NOT to be used for urgent needs. For medical emergencies, dial 911. Now available from your iPhone and Android! Please provide this summary of care documentation to your next provider. Your primary care clinician is listed as [facility] [address] If you have any questions after today's visit, please call [phone].

## 2017-07-26 NOTE — PATIENT INSTRUCTIONS
Breast Cancer: Care Instructions  Your Care Instructions  Breast cancer occurs when abnormal cells grow out of control in the breast. These cancer cells can spread within the breast, to nearby lymph nodes and other tissues, and to other parts of the body. Being treated for cancer can weaken your body, and you may feel very tired. Get the rest your body needs so you can feel better. Finding out that you have cancer is scary. You may feel many emotions and may need some help coping. Seek out family, friends, and counselors for support. You also can do things at home to make yourself feel better while you go through treatment. Call the [facility] [employer] (8-496.342.8344) or visit its website at [employer] for more information. Follow-up care is a key part of your treatment and safety. Be sure to make and go to all appointments, and call your doctor if you are having problems. It's also a good idea to know your test results and keep a list of the medicines you take. How can you care for yourself at home? · Take your medicines exactly as prescribed. Call your doctor if you think you are having a problem with your medicine. You may get medicine for nausea and vomiting if you have these side effects. · Follow your doctor's instructions to relieve pain. Pain from cancer and surgery can almost always be controlled. Use pain medicine when you first notice pain, before it becomes severe. · Eat healthy food. If you do not feel like eating, try to eat food that has protein and extra calories to keep up your strength and prevent weight loss. Drink liquid meal replacements for extra calories and protein. Try to eat your main meal early. · Get some physical activity every day, but do not get too tired. Keep doing the hobbies you enjoy as your energy allows. · Do not smoke. Smoking can make your cancer worse. If you need help quitting, talk to your doctor about stop-smoking programs and medicines.  These can increase your chances of quitting for good. · Take steps to control your stress and workload. Learn relaxation techniques. ¨ Share your feelings. Stress and tension affect our emotions. By expressing your feelings to others, you may be able to understand and cope with them. ¨ Consider joining a support group. Talking about a problem with your spouse, a good friend, or other people with similar problems is a good way to reduce tension and stress. ¨ Express yourself through art. Try writing, crafts, dance, or art to relieve stress. Some dance, writing, or art groups may be available just for people who have cancer. ¨ Be kind to your body and mind. Getting enough sleep, eating a healthy diet, and taking time to do things you enjoy can contribute to an overall feeling of balance in your life and can help reduce stress. ¨ Get help if you need it. Discuss your concerns with your doctor or counselor. · If you are vomiting or have diarrhea:  ¨ Drink plenty of fluids (enough so that your urine is light yellow or clear like water) to prevent dehydration. Choose water and other caffeine-free clear liquids. If you have kidney, heart, or liver disease and have to limit fluids, talk with your doctor before you increase the amount of fluids you drink. ¨ When you are able to eat, try clear soups, mild foods, and liquids until all symptoms are gone for 12 to 48 hours. Other good choices include dry toast, crackers, cooked cereal, and gelatin dessert, such as Jell-O.  · If you have not already done so, prepare a list of advance directives. Advance directives are instructions to your doctor and family members about what kind of care you want if you become unable to speak or express yourself. When should you call for help? Call your doctor now or seek immediate medical care if:  · You have a fever. · Any part of your breast becomes red, tender, swollen, or hot.   · You have pain, redness, or swelling in the arm on the same side as your breast cancer. Watch closely for changes in your health, and be sure to contact your doctor if:  · You have pain that is not controlled by medicine. · You have nausea or vomiting. · You are constipated or have diarrhea. Where can you learn more? Go to http://imani-jenny.info/. Enter V321 in the search box to learn more about \"Breast Cancer: Care Instructions. \"  Current as of: July 26, 2016  Content Version: 11.3  © 9625-1164 [employer]. Care instructions adapted under license by [employer] (which disclaims liability or warranty for this information). If you have questions about a medical condition or this instruction, always ask your healthcare professional. [address] any warranty or liability for your use of this information.

## 2017-07-26 NOTE — PROGRESS NOTES
Hematology/Oncology  Progress Note    Name: Everardo Chavarria  Date: 2017  : 1970    Kristen Castellanos MD     Ms. Elena Little is a 51 y.o. woman who has a history of invasive ductal adenocarcinoma the right breast with locally advanced disease. Current therapy: The patient completed a full course of carboplatin, Taxotere, Herceptin, and Perjeda 8/3/2016. She is here today in the clinic and states that she is feeling tired and fatigued. She has no other concerns to report at this time. Subjective:     Mrs. Elena Little is a 54-year-old woman who has locally advanced invasive ductal adenocarcinoma the right breast.  She has undergone a modified radical mastectomy and completed neoadjuvant systemic chemotherapy and monoclonal antibody therapy against HER-2/karolina. Past medical history, family history, and social history: these were reviewed and remains unchanged. Past Medical History:   Diagnosis Date    Alcohol abuse     Cancer [facility])     breast cancer, right    Cancer ([facility] [address]     Breast CA    Depression     Diabetes ([facility] [address]     Drug abuse     Gastrointestinal disorder     cholitis    Hyperlipidemia     Hypertension     Spine injury      Past Surgical History:   Procedure Laterality Date    COLONOSCOPY N/A 2016    COLONOSCOPY performed by Mayur Martinez MD at [address] [place] [address] Drive ENDOSCOPY    HX BREAST LUMPECTOMY  2013    right    HX HYSTERECTOMY      HX ORTHOPAEDIC      Back fusion surgery 14.  HX OTHER SURGICAL      mediport    HX TONSILLECTOMY      HX TUBAL LIGATION       Social History     Social History    Marital status:      Spouse name: N/A    Number of children: N/A    Years of education: N/A     Occupational History    Not on file.      Social History Main Topics    Smoking status: Never Smoker    Smokeless tobacco: Never Used    Alcohol use No      Comment: \"Occasionally\"    Drug use: No    Sexual activity: No     Other Topics Concern    Not on file     Social History Narrative    ** Merged History Encounter **          Family History   Problem Relation Age of Onset    Heart Disease Mother     Stroke Father     Heart Disease Father 48    Diabetes Other     Hypertension Other     Cancer Maternal Grandmother      Current Outpatient Prescriptions   Medication Sig Dispense Refill    promethazine (PHENERGAN) 25 mg tablet Take 1 Tab by mouth every six (6) hours as needed. 12 Tab 0    dicyclomine (BENTYL) 20 mg tablet Take 1 Tab by mouth every six (6) hours. Prn abdominal pain 15 Tab 0    ondansetron (ZOFRAN ODT) 4 mg disintegrating tablet Take 1 Tab by mouth every eight (8) hours as needed for Nausea. 15 Tab 0    DULoxetine (CYMBALTA) 60 mg capsule Take 1 Cap by mouth daily. 30 Cap 1    esomeprazole magnesium (NEXIUM 24HR) 20 mg TbEC Take 20 mg by mouth daily. 30 Tab 0    ondansetron (ZOFRAN ODT) 8 mg disintegrating tablet Take 1 Tab by mouth every eight (8) hours as needed for Nausea. 20 Tab 0    lisinopril (PRINIVIL, ZESTRIL) 10 mg tablet Take 1 Tab by mouth daily. 30 Tab 1    polyethylene glycol (MIRALAX) 17 gram packet Take 1 Packet by mouth daily. 30 Each 1    insulin nph-regular human rec (HUMULIN 70-30) 100 unit/mL (70-30) inpn Give 25 units in the QAM and 30 units at bedtime 5 Pen 3    traZODone (DESYREL) 300 mg tablet Take 300 mg by mouth nightly.  ALBUTEROL IN Take  by inhalation.  LORazepam (ATIVAN) 1 mg tablet 1 mg.  EPINEPHrine (EPIPEN) 0.3 mg/0.3 mL (1:1,000) injection 0.3 mL by IntraMUSCular route once as needed for up to 1 dose. 1 Each 1    gabapentin (NEURONTIN) 300 mg capsule Take 1 capsule by mouth three (3) times daily. 90 capsule 3       Review of Systems  Constitutional: The patient has complaints of a moderate degree of fatigue. HEENT: The patient denies recent head trauma, eye pain, blurred vision,  hearing deficit, oropharyngeal mucosal pain or lesions, and the patient denies throat pain or discomfort. Lymphatics:  The patient denies palpable peripheral lymphadenopathy. Hematologic: The patient denies having bruising, bleeding, or progressive fatigue. Respiratory: Patient denies having shortness of breath, cough, sputum production, fever, or dyspnea on exertion. Cardiovascular: The patient denies having leg pain, leg swelling, heart palpitations, chest permit, chest pain, or lightheadedness. The patient denies having dyspnea on exertion. Gastrointestinal: The patient denies having nausea, emesis, or diarrhea. The patient denies having any hematemesis or blood in the stool. Genitourinary: Patient denies having urinary urgency, frequency, or dysuria. The patient denies having blood in the urine. Psychological: The patient denies having symptoms of nervousness, anxiety, depression, or thoughts of harming self. Skin: Patient denies having skin rashes, skin, ulcerations, or unexplained itching or pruritus. Musculoskeletal: The patient complains of some right-sided chest pain and some discomfort in the axilla. Objective:     Visit Vitals    LMP 07/04/2013     ECOG PS=0; Pain score 2/10  Physical Exam:   Gen. Appearance: The patient is in no acute distress. Skin: There is no bruise or rash. HEENT: The exam is unremarkable. Neck: Supple without lymphadenopathy or thyromegaly. Lungs: Clear to auscultation and percussion; there are no wheezes or rhonchi. Heart: Regular rate and rhythm; there are no murmurs, gallops, or rubs. Anterior chest wall and breast: The patient is status post right modified radical mastectomy. The skin is healed well. There is no evidence of local recurrence of disease. She has full range of motion of the right arm at the shoulder joint. Abdomen: Bowel sounds are present and normal.  There is no guarding, tenderness, or hepatosplenomegaly. Extremities: There is no clubbing, cyanosis, or edema. Neurologic: There are no focal neurologic deficits. Lymphatics:  There is no palpable peripheral lymphadenopathy. Musculoskeletal: The patient has full range of motion at all joints. There is no evidence of joint deformity or effusions. There is no focal joint tenderness. Psychological/psychiatric: There is no clinical evidence of anxiety, depression, or melancholy. Lab data:      Results for orders placed or performed during the hospital encounter of 04/24/17   CBC WITH 3 PART DIFF     Status: Abnormal   Result Value Ref Range Status    WBC 7.2 4.5 - 13.0 K/uL Final    RBC 3.87 (L) 4.10 - 5.10 M/uL Final    HGB 9.8 (L) 12.0 - 16.0 g/dL Final    HCT 30.6 (L) 36 - 48 % Final    MCV 79.1 78 - 102 FL Final    MCH 25.3 25.0 - 35.0 PG Final    MCHC 32.0 31 - 37 g/dL Final    RDW 13.7 11.5 - 14.5 % Final    PLATELET 724 150 - 409 K/uL Final    NEUTROPHILS 70 40 - 70 % Final    MIXED CELLS 8 0.1 - 17 % Final    LYMPHOCYTES 23 14 - 44 % Final    ABS. NEUTROPHILS 5.1 1.8 - 9.5 K/UL Final    ABS. MIXED CELLS 0.5 0.0 - 2.3 K/uL Final    ABS. LYMPHOCYTES 1.6 1.1 - 5.9 K/UL Final     Comment: Test performed at [place] [address] Location. Results Reviewed by Medical Director. DF AUTOMATED   Final           Assessment:     1. Malignant neoplasm of upper-outer quadrant of right female breast ([address]    2. Chemotherapy induced neutropenia (HCC)    3. Anemia due to antineoplastic chemotherapy        Plan:     Malignant neoplasm of the right breast: On 3/29 her CA27.29 was 13.6. We plan to see her back in the clinic in 3 months. Chemotherapy induced anemia: I have explained to the patient that her hemoglobin on 07/21 was 12.4g/dL with hematocrit of 37.3%. She states that she is feeling tired all the time. I advised the patient to begin an exercise program to build her metabolism. Chemotherapy-induced neutropenia: I have explained to the patient that her neutropenia has resolved. Her WBC count on 7/21 was 8.2 with an absolute neutrophil count of 5.9 and an absolute lymphocyte count of 1.62.   I will continue to monitor blood counts at 3 month intervals. Follow-up in 3 months or sooner if indicated.     Orders Placed This Encounter    COMPLETE CBC & AUTO DIFF WBC       Wm Bradford MD  7/26/2017

## 2017-07-31 DIAGNOSIS — E11.65 TYPE 2 DIABETES MELLITUS WITH HYPERGLYCEMIA, WITH LONG-TERM CURRENT USE OF INSULIN (HCC): ICD-10-CM

## 2017-07-31 DIAGNOSIS — Z79.4 TYPE 2 DIABETES MELLITUS WITH HYPERGLYCEMIA, WITH LONG-TERM CURRENT USE OF INSULIN (HCC): ICD-10-CM

## 2017-07-31 NOTE — TELEPHONE ENCOUNTER
Patient is out of the Testing strips for the True Results     Patient is also requesting an order Alcohol pad

## 2017-08-01 NOTE — TELEPHONE ENCOUNTER
Patient requesting the following medication refill     Medication requesting Insulin nph regular human (Humulin 70-30) 100 unit/ml     Date last prescribed 1/23/2017    QTY 5 Refills 3    Date patient last seen 03/23/2017    Follow up appt scheduled for No Scheduled Appt    Please advise: Please see message below.  Could not pend

## 2017-08-07 ENCOUNTER — APPOINTMENT (OUTPATIENT)
Dept: INFUSION THERAPY | Age: 47
End: 2017-08-07
Payer: MEDICAID

## 2017-08-28 ENCOUNTER — APPOINTMENT (OUTPATIENT)
Dept: INFUSION THERAPY | Age: 47
End: 2017-08-28
Payer: MEDICAID

## 2017-10-17 ENCOUNTER — DOCUMENTATION ONLY (OUTPATIENT)
Dept: FAMILY MEDICINE CLINIC | Age: 47
End: 2017-10-17

## 2017-10-17 NOTE — LETTER
10/17/2017 John Dodgeileana [address] [facility] [phone] Dear Ms. John Mccullough, We had an appointment reserved for you 8/8/2017 and were concerned when you did not show or call within 24 hours to cancel the appointment. As indicated in a previous letter, we will no longer be able to schedule appointments for you in advance. If you need to make an appointment, please call the office and we will attempt to schedule, but cannot guarantee, a work-in appointment. In order to provide optimal care to you, we expect you to make it to your appointments. Regrettably, if you miss a work-in appointment, we may have to discharge you from the practice. Sincerely, The scheduling staff [address] [place] [id number] [phone]

## 2017-10-24 ENCOUNTER — DOCUMENTATION ONLY (OUTPATIENT)
Dept: FAMILY MEDICINE CLINIC | Age: 47
End: 2017-10-24

## 2017-10-24 NOTE — PROGRESS NOTES
Patient walked in the office asking for Dr. Eliezer Tripp to fill out her paperwork so that her power could be turned back on. I then informed her that I was told by Dr. Arron Hsieh nurse that she will need an appointment. However, after patient getting her health insurance card, I informed her that I was told by 's nurse (Nurse Odilon Bruner) that she will not be able to be seen due to multiple no shows. I also informed patient that she is not discharged as of yet when she asked. I offered the patient to maybe see other doctor from [facility], if she really needs her paperwork filled out, but she will have to be seen there permanently. Patient then stated that it is okay and she will just call her insurance to switch provider.

## 2017-10-25 ENCOUNTER — OFFICE VISIT (OUTPATIENT)
Dept: ONCOLOGY | Age: 47
End: 2017-10-25

## 2017-10-25 ENCOUNTER — HOSPITAL ENCOUNTER (OUTPATIENT)
Dept: ONCOLOGY | Age: 47
Discharge: HOME | End: 2017-10-25

## 2017-10-25 VITALS — DIASTOLIC BLOOD PRESSURE: 93 MMHG | HEART RATE: 87 BPM | SYSTOLIC BLOOD PRESSURE: 136 MMHG | TEMPERATURE: 97.6 F

## 2017-10-25 DIAGNOSIS — D64.81 ANEMIA DUE TO ANTINEOPLASTIC CHEMOTHERAPY: ICD-10-CM

## 2017-10-25 DIAGNOSIS — C50.411 MALIGNANT NEOPLASM OF UPPER-OUTER QUADRANT OF RIGHT BREAST IN FEMALE, ESTROGEN RECEPTOR NEGATIVE (HCC): Primary | ICD-10-CM

## 2017-10-25 DIAGNOSIS — D64.81 ANTINEOPLASTIC CHEMOTHERAPY INDUCED ANEMIA: ICD-10-CM

## 2017-10-25 DIAGNOSIS — T45.1X5A ANTINEOPLASTIC CHEMOTHERAPY INDUCED ANEMIA: ICD-10-CM

## 2017-10-25 DIAGNOSIS — Z17.1 MALIGNANT NEOPLASM OF UPPER-OUTER QUADRANT OF RIGHT BREAST IN FEMALE, ESTROGEN RECEPTOR NEGATIVE (HCC): Primary | ICD-10-CM

## 2017-10-25 DIAGNOSIS — C50.411 MALIGNANT NEOPLASM OF UPPER-OUTER QUADRANT OF RIGHT BREAST IN FEMALE, ESTROGEN RECEPTOR NEGATIVE (HCC): ICD-10-CM

## 2017-10-25 DIAGNOSIS — Z17.1 MALIGNANT NEOPLASM OF UPPER-OUTER QUADRANT OF RIGHT BREAST IN FEMALE, ESTROGEN RECEPTOR NEGATIVE (HCC): ICD-10-CM

## 2017-10-25 DIAGNOSIS — T45.1X5A ANEMIA DUE TO ANTINEOPLASTIC CHEMOTHERAPY: ICD-10-CM

## 2017-10-25 LAB
BASO+EOS+MONOS # BLD AUTO: 0.3 K/UL (ref 0–2.3)
BASO+EOS+MONOS # BLD AUTO: 4 % (ref 0.1–17)
DIFFERENTIAL METHOD BLD: NORMAL
ERYTHROCYTE [DISTWIDTH] IN BLOOD BY AUTOMATED COUNT: 11.7 % (ref 11.5–14.5)
HCT VFR BLD AUTO: 40.7 % (ref 36–48)
HGB BLD-MCNC: 14.3 G/DL (ref 12–16)
LYMPHOCYTES # BLD: 2.1 K/UL (ref 1.1–5.9)
LYMPHOCYTES NFR BLD: 26 % (ref 14–44)
MCH RBC QN AUTO: 32.9 PG (ref 25–35)
MCHC RBC AUTO-ENTMCNC: 35.1 G/DL (ref 31–37)
MCV RBC AUTO: 93.6 FL (ref 78–102)
NEUTS SEG # BLD: 5.7 K/UL (ref 1.8–9.5)
NEUTS SEG NFR BLD: 70 % (ref 40–70)
PLATELET # BLD AUTO: 229 K/UL (ref 140–440)
RBC # BLD AUTO: 4.35 M/UL (ref 4.1–5.1)
WBC # BLD AUTO: 8.1 K/UL (ref 4.5–13)

## 2017-10-25 NOTE — PROGRESS NOTES
Hematology/Oncology  Progress Note    Name: Sarah Gorman  Date: 2017  : 1970    Viki Bolden MD     Ms. Ramonita Valladares is a 51 y.o. woman who has a history of invasive ductal adenocarcinoma the right breast with locally advanced disease. Current therapy: The patient completed a full course of carboplatin, Taxotere, Herceptin, and Perjeda 8/3/2016. She is here today in the clinic and states that she is feeling tired and fatigued. She has no other concerns to report at this time. Subjective:     Mrs. Ramonita Valladares is a 65-year-old woman who has locally advanced invasive ductal adenocarcinoma the right breast.  She has undergone a modified radical mastectomy and completed neoadjuvant systemic chemotherapy and monoclonal antibody therapy against HER-2/karolina. Past medical history, family history, and social history: these were reviewed and remains unchanged. Past Medical History:   Diagnosis Date    Alcohol abuse     Cancer [facility])     breast cancer, right    Cancer ([facility] [address]     Breast CA    Depression     Diabetes ([facility] [address]     Drug abuse     Gastrointestinal disorder     cholitis    Hyperlipidemia     Hypertension     Spine injury      Past Surgical History:   Procedure Laterality Date    COLONOSCOPY N/A 2016    COLONOSCOPY performed by Silver Pastor MD at [facility] ENDOSCOPY    HX BREAST LUMPECTOMY  2013    right    HX HYSTERECTOMY      HX ORTHOPAEDIC      Back fusion surgery 14.  HX OTHER SURGICAL      mediport    HX TONSILLECTOMY      HX TUBAL LIGATION       Social History     Social History    Marital status:      Spouse name: N/A    Number of children: N/A    Years of education: N/A     Occupational History    Not on file.      Social History Main Topics    Smoking status: Never Smoker    Smokeless tobacco: Never Used    Alcohol use No      Comment: \"Occasionally\"    Drug use: No    Sexual activity: No     Other Topics Concern    Not on file     Social History Narrative    ** Merged History Encounter **          Family History   Problem Relation Age of Onset    Heart Disease Mother     Stroke Father     Heart Disease Father 48    Diabetes Other     Hypertension Other     Cancer Maternal Grandmother      Current Outpatient Prescriptions   Medication Sig Dispense Refill    promethazine (PHENERGAN) 25 mg tablet Take 1 Tab by mouth every six (6) hours as needed. 12 Tab 0    dicyclomine (BENTYL) 20 mg tablet Take 1 Tab by mouth every six (6) hours. Prn abdominal pain 15 Tab 0    ondansetron (ZOFRAN ODT) 4 mg disintegrating tablet Take 1 Tab by mouth every eight (8) hours as needed for Nausea. 15 Tab 0    DULoxetine (CYMBALTA) 60 mg capsule Take 1 Cap by mouth daily. 30 Cap 1    esomeprazole magnesium (NEXIUM 24HR) 20 mg TbEC Take 20 mg by mouth daily. 30 Tab 0    ondansetron (ZOFRAN ODT) 8 mg disintegrating tablet Take 1 Tab by mouth every eight (8) hours as needed for Nausea. 20 Tab 0    lisinopril (PRINIVIL, ZESTRIL) 10 mg tablet Take 1 Tab by mouth daily. 30 Tab 1    polyethylene glycol (MIRALAX) 17 gram packet Take 1 Packet by mouth daily. 30 Each 1    insulin nph-regular human rec (HUMULIN 70-30) 100 unit/mL (70-30) inpn Give 25 units in the QAM and 30 units at bedtime 5 Pen 3    traZODone (DESYREL) 300 mg tablet Take 300 mg by mouth nightly.  ALBUTEROL IN Take  by inhalation.  LORazepam (ATIVAN) 1 mg tablet 1 mg.  EPINEPHrine (EPIPEN) 0.3 mg/0.3 mL (1:1,000) injection 0.3 mL by IntraMUSCular route once as needed for up to 1 dose. 1 Each 1    gabapentin (NEURONTIN) 300 mg capsule Take 1 capsule by mouth three (3) times daily. 90 capsule 3       Review of Systems  Constitutional: The patient has complaints of a moderate degree of fatigue. HEENT: The patient denies recent head trauma, eye pain, blurred vision,  hearing deficit, oropharyngeal mucosal pain or lesions, and the patient denies throat pain or discomfort. Lymphatics:  The patient denies palpable peripheral lymphadenopathy. Hematologic: The patient denies having bruising, bleeding, or progressive fatigue. Respiratory: Patient denies having shortness of breath, cough, sputum production, fever, or dyspnea on exertion. Cardiovascular: The patient denies having leg pain, leg swelling, heart palpitations, chest permit, chest pain, or lightheadedness. The patient denies having dyspnea on exertion. Gastrointestinal: The patient denies having nausea, emesis, or diarrhea. The patient denies having any hematemesis or blood in the stool. Genitourinary: Patient denies having urinary urgency, frequency, or dysuria. The patient denies having blood in the urine. Psychological: The patient denies having symptoms of nervousness, anxiety, depression, or thoughts of harming self. Skin: Patient denies having skin rashes, skin, ulcerations, or unexplained itching or pruritus. Musculoskeletal: The patient complains of some right-sided chest pain and some discomfort in the axilla. Objective:     Visit Vitals    LMP 07/04/2013     ECOG PS=0; Pain score 2/10  Physical Exam:   Gen. Appearance: The patient is in no acute distress. Skin: There is no bruise or rash. HEENT: The exam is unremarkable. Neck: Supple without lymphadenopathy or thyromegaly. Lungs: Clear to auscultation and percussion; there are no wheezes or rhonchi. Heart: Regular rate and rhythm; there are no murmurs, gallops, or rubs. Anterior chest wall and breast: The patient is status post right modified radical mastectomy. The skin is healed well. There is no evidence of local recurrence of disease. She has full range of motion of the right arm at the shoulder joint. Abdomen: Bowel sounds are present and normal.  There is no guarding, tenderness, or hepatosplenomegaly. Extremities: There is no clubbing, cyanosis, or edema. Neurologic: There are no focal neurologic deficits. Lymphatics:  There is no palpable peripheral the patient that her neutropenia has resolved. Her WBC count today, 10/25/2017 is 8.1 with an absolute neutrophil count of 5.7 and an absolute lymphocyte count of 2.1. I will continue to monitor blood counts at 3 month intervals. Follow-up in 3 months or sooner if indicated.     Orders Placed This Encounter    METABOLIC PANEL, COMPREHENSIVE     Standing Status:   Future     Standing Expiration Date:   10/26/2018    CA 27.29     Standing Status:   Future     Standing Expiration Date:   10/26/2018       Doris Juan MD  10/25/2017

## 2017-10-25 NOTE — MR AVS SNAPSHOT
Visit Information Date & Time Provider Department Dept. Phone Encounter #  
 10/25/2017  2:15 PM Glendon Hammans, MD [facility] Office [id number] Follow-up Instructions Return in about 3 months (around 1/25/2018). Follow-up and Disposition History Your Appointments 1/24/2018  2:30 PM  
Office Visit with [facility]on [facility]) Appt Note: [address] [address]  
[address]  
  
   
 [facility] [address] [address] Upcoming Health Maintenance Date Due  
 EYE EXAM RETINAL OR DILATED Q1 11/21/1979 DTaP/Tdap/Td series (1 - Tdap) 11/21/1990 PAP AKA CERVICAL CYTOLOGY 11/21/1990 HEMOGLOBIN A1C Q6M 7/23/2017 INFLUENZA AGE 9 TO ADULT 8/1/2017 FOOT EXAM Q1 1/23/2018 MICROALBUMIN Q1 1/23/2018 LIPID PANEL Q1 1/23/2018 Allergies as of 10/25/2017  Review Complete On: 7/26/2017 By: Cloyde Halsted, RN Severity Noted Reaction Type Reactions Latex High 04/26/2010    Hives, Itching Other Food  07/15/2016    Rash Almonds Amoxicillin High 11/26/2013    Swelling Other reaction(s): mild rash/itching Aspirin High 09/04/2013    Not Reported This Time, Swelling Mouth swells Fentanyl High 12/17/2013   Systemic Anaphylaxis, Swelling Patches cause mouth to swell Swelling in mouth from fentanyl patch Levaquin [Levofloxacin] High 09/04/2013    Not Reported This Time, Swelling Other reaction(s): mild rash/itching Chlorhexidine Medium 04/18/2014   Topical Rash Norco [Hydrocodone-acetaminophen] Medium 03/12/2015    Nausea and Vomiting Other reaction(s): gi distress Acetaminophen  07/28/2016    Other (comments) [place]  04/03/2016    Rash, Itching Codeine  11/26/2013    Other (comments) Glycopyrrolate  07/04/2014    Swelling  Pt  States  faciAL  SWELLING   POST  ROBINUL  IV  
 Pt  States  faciAL  SWELLING   POST  ROBINUL  IV Morphine  05/31/2016    Nausea and Vomiting Pt states she is not allergic Pcn [Penicillins]  11/26/2013    Swelling Percocet [Oxycodone-acetaminophen]  01/30/2015    Nausea and Vomiting Other reaction(s): gi distress 
nausea Other reaction(s): anaphylaxis/angioedema Per pt. She is allergic Tape [Adhesive]  05/31/2016    Rash Tramadol  12/05/2013    Swelling Current Immunizations  Reviewed on 4/24/2017 Name Date Influenza Vaccine 11/7/2016, 2/24/2016, 3/23/2015, 12/1/2014, 10/9/2013, 10/20/2012 Pneumococcal Polysaccharide (PPSV-23) 3/22/2015, 10/22/2012 Pneumococcal Vaccine (Unspecified Type) 3/4/2016, 1/2/2013 Not reviewed this visit You Were Diagnosed With   
  
 Codes Comments Malignant neoplasm of upper-outer quadrant of right breast in female, estrogen receptor negative ([facility] [address]    -  Primary ICD-10-CM: C50.411, Z17.1 ICD-9-CM: 174.4, V86.1 Antineoplastic chemotherapy induced anemia     ICD-10-CM: D64.81, T45.1X5A 
ICD-9-CM: 285.3, E933.1 Anemia due to antineoplastic chemotherapy     ICD-10-CM: D64.81, T45.1X5A 
ICD-9-CM: 285.3, E933.1 Vitals BP Pulse Temp LMP OB Status Smoking Status (!) 136/93 (BP 1 Location: Left arm, BP Patient Position: Sitting) 87 97.6 °F (36.4 °C) (Oral) 07/04/2013 Hysterectomy Never Smoker Preferred Pharmacy Pharmacy Name Phone [facility] [address] [place] [address] Your Updated Medication List  
  
   
This list is accurate as of: 10/25/17  3:24 PM.  Always use your most recent med list.  
  
  
  
  
 ALBUTEROL IN Take  by inhalation. dicyclomine 20 mg tablet Commonly known as:  BENTYL Take 1 Tab by mouth every six (6) hours. Prn abdominal pain DULoxetine 60 mg capsule Commonly known as:  CYMBALTA Take 1 Cap by mouth daily. EPINEPHrine 0.3 mg/0.3 mL injection Commonly known as:  EPIPEN  
0.3 mL by IntraMUSCular route once as needed for up to 1 dose. esomeprazole magnesium 20 mg Tbec Commonly known as:  NexIUM 24HR Take 20 mg by mouth daily. gabapentin 300 mg capsule Commonly known as:  NEURONTIN Take 1 capsule by mouth three (3) times daily. insulin nph-regular human rec 100 unit/mL (70-30) Inpn Commonly known as:  HUMULIN 70-30 Give 25 units in the QAM and 30 units at bedtime  
  
 lisinopril 10 mg tablet Commonly known as:  Merilynn [place] Take 1 Tab by mouth daily. LORazepam 1 mg tablet Commonly known as:  ATIVAN  
1 mg.  
  
 * ondansetron 8 mg disintegrating tablet Commonly known as:  ZOFRAN ODT Take 1 Tab by mouth every eight (8) hours as needed for Nausea. * ondansetron 4 mg disintegrating tablet Commonly known as:  ZOFRAN ODT Take 1 Tab by mouth every eight (8) hours as needed for Nausea. polyethylene glycol 17 gram packet Commonly known as:  [place] Take 1 Packet by mouth daily. promethazine 25 mg tablet Commonly known as:  PHENERGAN Take 1 Tab by mouth every six (6) hours as needed. traZODone 300 mg tablet Commonly known as:  Jr Lipps Take 300 mg by mouth nightly. * Notice: This list has 2 medication(s) that are the same as other medications prescribed for you. Read the directions carefully, and ask your doctor or other care provider to review them with you. We Performed the Following COMPLETE CBC & AUTO DIFF WBC [54790 CPT(R)] Follow-up Instructions Return in about 3 months (around 1/25/2018). To-Do List   
 10/25/2017 Lab:  CA 27.29   
  
 10/25/2017 Lab:  CBC WITH 3 PART DIFF   
  
 10/25/2017 Lab:  METABOLIC PANEL, COMPREHENSIVE Patient Instructions Breast Cancer: Care Instructions Your Care Instructions Breast cancer occurs when abnormal cells grow out of control in the breast. These cancer cells can spread within the breast, to nearby lymph nodes and other tissues, and to other parts of the body. Being treated for cancer can weaken your body, and you may feel very tired. Get the rest your body needs so you can feel better. Finding out that you have cancer is scary. You may feel many emotions and may need some help coping. Seek out family, friends, and counselors for support. You also can do things at home to make yourself feel better while you go through treatment. Call the [employer] (0-848.250.9409) or visit its website at [address] [employer] for more information. Follow-up care is a key part of your treatment and safety. Be sure to make and go to all appointments, and call your doctor if you are having problems. It's also a good idea to know your test results and keep a list of the medicines you take. How can you care for yourself at home? · Take your medicines exactly as prescribed. Call your doctor if you think you are having a problem with your medicine. You may get medicine for nausea and vomiting if you have these side effects. · Follow your doctor's instructions to relieve pain. Pain from cancer and surgery can almost always be controlled. Use pain medicine when you first notice pain, before it becomes severe. · Eat healthy food. If you do not feel like eating, try to eat food that has protein and extra calories to keep up your strength and prevent weight loss. Drink liquid meal replacements for extra calories and protein. Try to eat your main meal early. · Get some physical activity every day, but do not get too tired. Keep doing the hobbies you enjoy as your energy allows. · Do not smoke. Smoking can make your cancer worse. If you need help quitting, talk to your doctor about stop-smoking programs and medicines. These can increase your chances of quitting for good. · Take steps to control your stress and workload. Learn relaxation techniques. ¨ Share your feelings. Stress and tension affect our emotions. By expressing your feelings to others, you may be able to understand and cope with them. ¨ Consider joining a support group. Talking about a problem with your spouse, a good friend, or other people with similar problems is a good way to reduce tension and stress. ¨ Express yourself through art. Try writing, crafts, dance, or art to relieve stress. Some dance, writing, or art groups may be available just for people who have cancer. ¨ Be kind to your body and mind. Getting enough sleep, eating a healthy diet, and taking time to do things you enjoy can contribute to an overall feeling of balance in your life and can help reduce stress. ¨ Get help if you need it. Discuss your concerns with your doctor or counselor. · If you are vomiting or have diarrhea: ¨ Drink plenty of fluids (enough so that your urine is light yellow or clear like water) to prevent dehydration. Choose water and other caffeine-free clear liquids. If you have kidney, heart, or liver disease and have to limit fluids, talk with your doctor before you increase the amount of fluids you drink. ¨ When you are able to eat, try clear soups, mild foods, and liquids until all symptoms are gone for 12 to 48 hours. Other good choices include dry toast, crackers, cooked cereal, and gelatin dessert, such as Jell-O. · If you have not already done so, prepare a list of advance directives. Advance directives are instructions to your doctor and family members about what kind of care you want if you become unable to speak or express yourself. When should you call for help? Call your doctor now or seek immediate medical care if: 
· You have a fever. · Any part of your breast becomes red, tender, swollen, or hot. · You have pain, redness, or swelling in the arm on the same side as your breast cancer. Watch closely for changes in your health, and be sure to contact your doctor if: · You have pain that is not controlled by medicine. · You have nausea or vomiting. · You are constipated or have diarrhea. Where can you learn more? Go to http://imani-jenny.info/. Enter V321 in the search box to learn more about \"Breast Cancer: Care Instructions. \" Current as of: July 26, 2016 Content Version: 11.3 © 2324-6860 [employer]. Care instructions adapted under license by [employer] (which disclaims liability or warranty for this information). If you have questions about a medical condition or this instruction, always ask your healthcare professional. [address] any warranty or liability for your use of this information. Introducing \[facility]\"" & HEALTH SERVICES! Steve Matute introduces Accu[employer]raft patient portal. Now you can access parts of your medical record, email your doctor's office, and request medication refills online. 1. In your internet browser, go to [email or web address] 2. Click on the First Time User? Click Here link in the Sign In box. You will see the New Member Sign Up page. 3. Enter your AccuDraft Access Code exactly as it appears below. You will not need to use this code after youve completed the sign-up process. If you do not sign up before the expiration date, you must request a new code. · AccuDraft Access Code: [email or web address] Expires: 12/5/2017  3:22 PM 
 
4. Enter the last four digits of your Social Security Number (xxxx) and Date of Birth (mm/dd/yyyy) as indicated and click Submit. You will be taken to the next sign-up page. 5. Create a AccuDraft ID. This will be your AccuDraft login ID and cannot be changed, so think of one that is secure and easy to remember. 6. Create a Acc[employer]Draft password. You can change your password at any time. 7. Enter your Password Reset Question and Answer. This can be used at a later time if you forget your password. 8. Enter your e-mail address. You will receive e-mail notification when new information is available in [address] 9. Click Sign Up. You can now view and download portions of your medical record. 10. Click the Download Summary menu link to download a portable copy of your medical information. If you have questions, please visit the Frequently Asked Questions section of the [employer] website. Remember, [employer] is NOT to be used for urgent needs. For medical emergencies, dial 911. Now available from your iPhone and Android! Please provide this summary of care documentation to your next provider. Your primary care clinician is listed as Dash Dai. If you have any questions after today's visit, please call [phone].

## 2017-10-25 NOTE — PATIENT INSTRUCTIONS
Breast Cancer: Care Instructions  Your Care Instructions  Breast cancer occurs when abnormal cells grow out of control in the breast. These cancer cells can spread within the breast, to nearby lymph nodes and other tissues, and to other parts of the body. Being treated for cancer can weaken your body, and you may feel very tired. Get the rest your body needs so you can feel better. Finding out that you have cancer is scary. You may feel many emotions and may need some help coping. Seek out family, friends, and counselors for support. You also can do things at home to make yourself feel better while you go through treatment. Call the [employer] (3-595.202.4954) or visit its website at [employer] for more information. Follow-up care is a key part of your treatment and safety. Be sure to make and go to all appointments, and call your doctor if you are having problems. It's also a good idea to know your test results and keep a list of the medicines you take. How can you care for yourself at home? · Take your medicines exactly as prescribed. Call your doctor if you think you are having a problem with your medicine. You may get medicine for nausea and vomiting if you have these side effects. · Follow your doctor's instructions to relieve pain. Pain from cancer and surgery can almost always be controlled. Use pain medicine when you first notice pain, before it becomes severe. · Eat healthy food. If you do not feel like eating, try to eat food that has protein and extra calories to keep up your strength and prevent weight loss. Drink liquid meal replacements for extra calories and protein. Try to eat your main meal early. · Get some physical activity every day, but do not get too tired. Keep doing the hobbies you enjoy as your energy allows. · Do not smoke. Smoking can make your cancer worse. If you need help quitting, talk to your doctor about stop-smoking programs and medicines.  These can increase your chances of quitting for good. · Take steps to control your stress and workload. Learn relaxation techniques. ¨ Share your feelings. Stress and tension affect our emotions. By expressing your feelings to others, you may be able to understand and cope with them. ¨ Consider joining a support group. Talking about a problem with your spouse, a good friend, or other people with similar problems is a good way to reduce tension and stress. ¨ Express yourself through art. Try writing, crafts, dance, or art to relieve stress. Some dance, writing, or art groups may be available just for people who have cancer. ¨ Be kind to your body and mind. Getting enough sleep, eating a healthy diet, and taking time to do things you enjoy can contribute to an overall feeling of balance in your life and can help reduce stress. ¨ Get help if you need it. Discuss your concerns with your doctor or counselor. · If you are vomiting or have diarrhea:  ¨ Drink plenty of fluids (enough so that your urine is light yellow or clear like water) to prevent dehydration. Choose water and other caffeine-free clear liquids. If you have kidney, heart, or liver disease and have to limit fluids, talk with your doctor before you increase the amount of fluids you drink. ¨ When you are able to eat, try clear soups, mild foods, and liquids until all symptoms are gone for 12 to 48 hours. Other good choices include dry toast, crackers, cooked cereal, and gelatin dessert, such as Jell-O.  · If you have not already done so, prepare a list of advance directives. Advance directives are instructions to your doctor and family members about what kind of care you want if you become unable to speak or express yourself. When should you call for help? Call your doctor now or seek immediate medical care if:  · You have a fever. · Any part of your breast becomes red, tender, swollen, or hot.   · You have pain, redness, or swelling in the arm on the same side as your breast cancer. Watch closely for changes in your health, and be sure to contact your doctor if:  · You have pain that is not controlled by medicine. · You have nausea or vomiting. · You are constipated or have diarrhea. Where can you learn more? Go to http://imani-jenny.info/. Enter V321 in the search box to learn more about \"Breast Cancer: Care Instructions. \"  Current as of: July 26, 2016  Content Version: 11.3  © 0050-7751 [employer]. Care instructions adapted under license by [employer] (which disclaims liability or warranty for this information). If you have questions about a medical condition or this instruction, always ask your healthcare professional. [address] any warranty or liability for your use of this information.

## 2017-11-16 ENCOUNTER — TELEPHONE (OUTPATIENT)
Dept: ONCOLOGY | Age: 47
End: 2017-11-16

## 2017-11-16 NOTE — TELEPHONE ENCOUNTER
Lilibeth PT-Pt states she saw Dr Manuel Dunham in Oct and the MA was supposed to be calling her regarding an apt with the plastic Surgeon for reconstructive surgery. She states that no one has called her regarding an apt. Also pt states that someone was supposed to be calling her regarding getting her port flushed. She states she has had this port for over a year and have not had it flushed yet.

## 2017-11-20 NOTE — TELEPHONE ENCOUNTER
LMOM regarding pt appt for PF 11/28/17 @ 11am [facility].   appt for 1/3/18 @ 5pm with DR Sachin Conner

## 2017-11-28 ENCOUNTER — HOSPITAL ENCOUNTER (OUTPATIENT)
Dept: INFUSION THERAPY | Age: 47
End: 2017-11-28

## 2017-12-05 ENCOUNTER — HOSPITAL ENCOUNTER (EMERGENCY)
Age: 47
Discharge: HOME | End: 2017-12-05
Attending: EMERGENCY MEDICINE
Payer: MEDICAID

## 2017-12-05 VITALS
TEMPERATURE: 97.8 F | SYSTOLIC BLOOD PRESSURE: 160 MMHG | OXYGEN SATURATION: 100 % | DIASTOLIC BLOOD PRESSURE: 110 MMHG | HEART RATE: 110 BPM | RESPIRATION RATE: 14 BRPM

## 2017-12-05 DIAGNOSIS — R03.0 ELEVATED BLOOD PRESSURE READING: ICD-10-CM

## 2017-12-05 DIAGNOSIS — R58 BLEEDING: ICD-10-CM

## 2017-12-05 DIAGNOSIS — R23.8 PIMPLES: Primary | ICD-10-CM

## 2017-12-05 PROCEDURE — 99283 EMERGENCY DEPT VISIT LOW MDM: CPT

## 2017-12-05 RX ORDER — BACITRACIN 500 UNIT/G
1 PACKET (EA) TOPICAL ONCE
Status: DISCONTINUED | OUTPATIENT
Start: 2017-12-05 | End: 2017-12-06 | Stop reason: HOSPADM

## 2017-12-06 NOTE — ED PROVIDER NOTES
EMERGENCY DEPARTMENT HISTORY AND PHYSICAL EXAM    10:03 PM      Date: 12/5/2017  Patient Name: Tracy Zambrano    History of Presenting Illness     Chief Complaint   Patient presents with    Wound Check         History Provided By: Patient    Chief Complaint: wound   Duration:  just PTA   Timing:  Improving  Location: right side of mouth  Quality:   Severity: 6 out of 10  Modifying Factors: none  Associated Symptoms:        Additional History (Context): Tracy Zambrano is a 49 y.o. female who presents to the ED via EMS  complaining of a painful pimple to the right side of her mouth that she popped just PTA. The patient explains that she picked her pimple this evening and when she started to eat dinner the pimple began to bleed and would not stop. Pressure applied via medic en route, there is no active bleeding upon arrival. The patient denies taking blood thinners. No other complaints or concerns at this time. PCP: None     Current Facility-Administered Medications   Medication Dose Route Frequency Provider Last Rate Last Dose    bacitracin 500 unit/gram packet 1 Packet  1 Packet Topical ONCE Rosalinda Lara, DO         Current Outpatient Prescriptions   Medication Sig Dispense Refill    promethazine (PHENERGAN) 25 mg tablet Take 1 Tab by mouth every six (6) hours as needed. 12 Tab 0    dicyclomine (BENTYL) 20 mg tablet Take 1 Tab by mouth every six (6) hours. Prn abdominal pain 15 Tab 0    ondansetron (ZOFRAN ODT) 4 mg disintegrating tablet Take 1 Tab by mouth every eight (8) hours as needed for Nausea. 15 Tab 0    DULoxetine (CYMBALTA) 60 mg capsule Take 1 Cap by mouth daily. 30 Cap 1    esomeprazole magnesium (NEXIUM 24HR) 20 mg TbEC Take 20 mg by mouth daily. 30 Tab 0    ondansetron (ZOFRAN ODT) 8 mg disintegrating tablet Take 1 Tab by mouth every eight (8) hours as needed for Nausea. 20 Tab 0    lisinopril (PRINIVIL, ZESTRIL) 10 mg tablet Take 1 Tab by mouth daily.  30 Tab 1    polyethylene glycol (MIRALAX) 17 gram packet Take 1 Packet by mouth daily. 30 Each 1    insulin nph-regular human rec (HUMULIN 70-30) 100 unit/mL (70-30) inpn Give 25 units in the QAM and 30 units at bedtime 5 Pen 3    traZODone (DESYREL) 300 mg tablet Take 300 mg by mouth nightly.  ALBUTEROL IN Take  by inhalation.  LORazepam (ATIVAN) 1 mg tablet 1 mg.  EPINEPHrine (EPIPEN) 0.3 mg/0.3 mL (1:1,000) injection 0.3 mL by IntraMUSCular route once as needed for up to 1 dose. 1 Each 1    gabapentin (NEURONTIN) 300 mg capsule Take 1 capsule by mouth three (3) times daily. 90 capsule 3       Past History     Past Medical History:  Past Medical History:   Diagnosis Date    Alcohol abuse     Cancer ([address]     breast cancer, right    Cancer ([address]     Breast CA    Depression     Diabetes [address] [facility]     Drug abuse     Gastrointestinal disorder     cholitis    Hyperlipidemia     Hypertension     Spine injury        Past Surgical History:  Past Surgical History:   Procedure Laterality Date    COLONOSCOPY N/A 7/18/2016    COL[facility]COPY performed [facility] ENDOSCOPY    HX BREAST LUMPECTOMY  06/2013    right    HX HYSTERECTOMY      HX ORTHOPAEDIC      Back fusion surgery 4/18/14.  HX OTHER SURGICAL      mediport    HX TONSILLECTOMY      HX TUBAL LIGATION         Family History:  Family History   Problem Relation Age of Onset    Heart Disease Mother     Stroke Father     Heart Disease Father 48    Diabetes Other     Hypertension Other     Cancer Maternal Grandmother        Social History:  Social History   Substance Use Topics    Smoking status: Never Smoker    Smokeless tobacco: Never Used    Alcohol use No      Comment: \"Occasionally\"       Allergies:   Allergies   Allergen Reactions    Latex Hives and Itching    Other Food Rash     Almonds    Amoxicillin Swelling     Other reaction(s): mild rash/itching    Aspirin Not Reported This Time and Swelling     Mouth swells    Fentanyl Anaphylaxis and Swelling     Patches cause mouth to swell  Swelling in mouth from fentanyl patch    Levaquin [Levofloxacin] Not Reported This Time and Swelling     Other reaction(s): mild rash/itching    Chlorhexidine Rash    Norco [Hydrocodone-Acetaminophen] Nausea and Vomiting     Other reaction(s): gi distress    Acetaminophen Other (comments)    [place] Rash and Itching    Codeine Other (comments)    Glycopyrrolate Swelling     Pt  States  faciAL  SWELLING   POST  ROBINUL  IV   Pt  States  faciAL  SWELLING   POST  ROBINUL  IV     Morphine Nausea and Vomiting     Pt states she is not allergic    Pcn [Penicillins] Swelling    Percocet [Oxycodone-Acetaminophen] Nausea and Vomiting     Other reaction(s): gi distress  nausea  Other reaction(s): anaphylaxis/angioedema  Per pt. She is allergic    Tape [Adhesive] Rash    Tramadol Swelling         Review of Systems       Review of Systems   Skin: Positive for wound (popped pimple right side of mouth). Hematological: Does not bruise/bleed easily. All other systems reviewed and are negative. Physical Exam     Visit Vitals    BP (!) 160/110    Pulse (!) 110    Temp 97.8 °F (36.6 °C)    Resp 14    LMP 07/04/2013    SpO2 100%         Physical Exam   Constitutional: She is oriented to person, place, and time. She appears well-developed and well-nourished. HENT:   Head: Normocephalic and atraumatic. Neck: Neck supple. No JVD present. Musculoskeletal: She exhibits no edema. Neurological: She is alert and oriented to person, place, and time. Skin: Skin is warm and dry. No erythema. Medical Decision Making   I am the first provider for this patient. I reviewed the vital signs, available nursing notes, past medical history, past surgical history, family history and social history. Vital Signs-Reviewed the patient's vital signs.     Provider Notes (Medical Decision Making): 48 y/o female presents with bleeding from pimple, now resolved. Will observe  Pt not on anticoagulation, no indication for labs. No sign of overlying infection. 10:42 PM No continued bleeding. Will apply bacitracin and discharge home. Pt noted to have elevated BP. Pt is asymptomatic and was referred to PCP for follow up. Diagnosis     Clinical Impression:   1. Pimples    2. Bleeding    3. Elevated blood pressure reading        Disposition: Discharge     Follow-up Information     None           Patient's Medications   Start Taking    No medications on file   Continue Taking    ALBUTEROL IN    Take  by inhalation. DICYCLOMINE (BENTYL) 20 MG TABLET    Take 1 Tab by mouth every six (6) hours. Prn abdominal pain    DULOXETINE (CYMBALTA) 60 MG CAPSULE    Take 1 Cap by mouth daily. EPINEPHRINE (EPIPEN) 0.3 MG/0.3 ML (1:1,000) INJECTION    0.3 mL by IntraMUSCular route once as needed for up to 1 dose. ESOMEPRAZOLE MAGNESIUM (NEXIUM 24HR) 20 MG TBEC    Take 20 mg by mouth daily. GABAPENTIN (NEURONTIN) 300 MG CAPSULE    Take 1 capsule by mouth three (3) times daily. INSULIN NPH-REGULAR HUMAN REC (HUMULIN 70-30) 100 UNIT/ML (70-30) INPN    Give 25 units in the QAM and 30 units at bedtime    LISINOPRIL (PRINIVIL, ZESTRIL) 10 MG TABLET    Take 1 Tab by mouth daily. LORAZEPAM (ATIVAN) 1 MG TABLET    1 mg. ONDANSETRON (ZOFRAN ODT) 4 MG DISINTEGRATING TABLET    Take 1 Tab by mouth every eight (8) hours as needed for Nausea. ONDANSETRON (ZOFRAN ODT) 8 MG DISINTEGRATING TABLET    Take 1 Tab by mouth every eight (8) hours as needed for Nausea. POLYETHYLENE GLYCOL (MIRALAX) 17 GRAM PACKET    Take 1 Packet by mouth daily. PROMETHAZINE (PHENERGAN) 25 MG TABLET    Take 1 Tab by mouth every six (6) hours as needed. TRAZODONE (DESYREL) 300 MG TABLET    Take 300 mg by mouth nightly.    These Medications have changed    No medications on file   Stop Taking    No medications on file     _______________________________    Attestations:  Hugo Attestation     [place] Held acting as a scribe for and in the presence of Darylene Richard, DO      December 05, 2017 at 10:03 PM       Provider Attestation:      I personally performed the services described in the documentation, reviewed the documentation, as recorded by the scribe in my presence, and it accurately and completely records my words and actions.  December 05, 2017 at 10:03 PM - Darylene Richard, DO    _______________________________

## 2017-12-06 NOTE — ED NOTES
10:55 PM  12/05/17     Discharge instructions given to patient (name) with verbalization of understanding. Patient accompanied by spouse. Patient discharged with the following prescriptions none. Patient discharged to home (destination).       Miracle Dasilva RN

## 2017-12-06 NOTE — ED TRIAGE NOTES
Presents after picking a \"pimple\" on her face. Unable to control bleeding. Pressure applied en route via medic, and bleeding currently controlled.

## 2017-12-06 NOTE — DISCHARGE INSTRUCTIONS
Elevated Blood Pressure: Care Instructions  Your Care Instructions    Blood pressure is a measure of how hard the blood pushes against the walls of your arteries. It's normal for blood pressure to go up and down throughout the day. But if it stays up over time, you have high blood pressure. Two numbers tell you your blood pressure. The first number is the systolic pressure. It shows how hard the blood pushes when your heart is pumping. The second number is the diastolic pressure. It shows how hard the blood pushes between heartbeats, when your heart is relaxed and filling with blood. An ideal blood pressure in adults is less than 120/80 (say \"120 over 80\"). High blood pressure is 140/90 or higher. You have high blood pressure if your top number is 140 or higher or your bottom number is 90 or higher, or both. The main test for high blood pressure is simple, fast, and painless. To diagnose high blood pressure, your doctor will test your blood pressure at different times. After testing your blood pressure, your doctor may ask you to test it again when you are home. If you are diagnosed with high blood pressure, you can work with your doctor to make a long-term plan to manage it. Follow-up care is a key part of your treatment and safety. Be sure to make and go to all appointments, and call your doctor if you are having problems. It's also a good idea to know your test results and keep a list of the medicines you take. How can you care for yourself at home? · Do not smoke. Smoking increases your risk for heart attack and stroke. If you need help quitting, talk to your doctor about stop-smoking programs and medicines. These can increase your chances of quitting for good. · Stay at a healthy weight. · Try to limit how much sodium you eat to less than 2,300 milligrams (mg) a day. Your doctor may ask you to try to eat less than 1,500 mg a day. · Be physically active.  Get at least 30 minutes of exercise on most days of the week. Walking is a good choice. You also may want to do other activities, such as running, swimming, cycling, or playing tennis or team sports. · Avoid or limit alcohol. Talk to your doctor about whether you can drink any alcohol. · Eat plenty of fruits, vegetables, and low-fat dairy products. Eat less saturated and total fats. · Learn how to check your blood pressure at home. When should you call for help? Call your doctor now or seek immediate medical care if:  ? · Your blood pressure is much higher than normal (such as 180/110 or higher). ? · You think high blood pressure is causing symptoms such as:  ¨ Severe headache. ¨ Blurry vision. ? Watch closely for changes in your health, and be sure to contact your doctor if:  ? · You do not get better as expected. Where can you learn more? Go to http://imaniInsightixjenny.info/. Enter Q623 in the search box to learn more about \"Elevated Blood Pressure: Care Instructions. \"  Current as of: September 21, 2016  Content Version: 11.4  © 8966-2757 [employer]. Care instructions adapted under license by [employer] (which disclaims liability or warranty for this information). If you have questions about a medical condition or this instruction, always ask your healthcare professional. [address] any warranty or liability for your use of this information. Acne: Care Instructions  Your Care Instructions  Acne is a skin problem that shows up as blackheads, whiteheads, and pimples. It most often affects the face, neck, and upper body. Acne occurs when oil and dead skin cells clog the skin's pores. Acne usually starts during the teen years and often lasts into adulthood. Gentle cleansing every day controls most mild acne. If home treatment does not work, your doctor may prescribe creams, antibiotics, or a stronger medicine called isotretinoin.  Sometimes birth control pills help women who have monthly acne flare-ups. Follow-up care is a key part of your treatment and safety. Be sure to make and go to all appointments, and call your doctor if you are having problems. It's also a good idea to know your test results and keep a list of the medicines you take. How can you care for yourself at home? · Gently wash your face 1 or 2 times a day with warm (not hot) water and a mild soap or cleanser. Always rinse well. · Use an over-the-counter lotion or gel that contains benzoyl peroxide. Start with a small amount of 2.5% benzoyl peroxide and increase the strength as needed. Benzoyl peroxide works well for acne, but you may need to use it for up to 2 months before your acne starts to improve. · Apply acne cream, lotion, or gel to all the places you get pimples, blackheads, or whiteheads, not just where you have them now. Follow the instructions carefully. If your skin gets too dry and scaly or red and sore, reduce the amount. For the best results, apply medicines as directed. Try not to miss doses. · Do not squeeze or pick pimples and blackheads. This can cause infection and scarring. · Use only oil-free makeup, sunscreen, and other skin care products that will not clog your pores. · Wash your hair every day, and try to keep it off your face and shoulders. Consider pinning it back or cutting it short. When should you call for help? Watch closely for changes in your health, and be sure to contact your doctor if:  ? · You have tried home treatment for 6 to 8 weeks and your acne is not better or gets worse. Your doctor may need to add to or change your treatment. ? · Your pimples become large and hard or filled with fluid. ? · Scars form after pimples heal.   ? · You feel sad or hopeless, lack energy, or have other signs of depression while you are taking the prescription medicine isotretinoin. ? · You start to have other symptoms, such as facial hair growth in women or bone and muscle pain.    Where can you learn more? Go to http://imani-jenny.info/. Enter V108 in the search box to learn more about \"Acne: Care Instructions. \"  Current as of: October 13, 2016  Content Version: 11.4  © 7268-1165 [employer]. Care instructions adapted under license by [employer] (which disclaims liability or warranty for this information). If you have questions about a medical condition or this instruction, always ask your healthcare professional. [place] [address] any warranty or liability for your use of this information.

## 2017-12-22 ENCOUNTER — HOSPITAL ENCOUNTER (OUTPATIENT)
Dept: INFUSION THERAPY | Age: 47
Discharge: HOME | End: 2017-12-22
Payer: MEDICAID

## 2017-12-22 VITALS
HEART RATE: 96 BPM | OXYGEN SATURATION: 96 % | TEMPERATURE: 97.4 F | DIASTOLIC BLOOD PRESSURE: 97 MMHG | SYSTOLIC BLOOD PRESSURE: 141 MMHG

## 2017-12-22 PROCEDURE — 77030012965 HC NDL HUBR BBMI -A

## 2017-12-22 PROCEDURE — 96523 IRRIG DRUG DELIVERY DEVICE: CPT

## 2017-12-22 RX ORDER — HEPARIN 100 UNIT/ML
SYRINGE INTRAVENOUS
Status: DISPENSED
Start: 2017-12-22 | End: 2017-12-22

## 2017-12-22 RX ORDER — SODIUM CHLORIDE 0.9 % (FLUSH) 0.9 %
10-40 SYRINGE (ML) INJECTION AS NEEDED
Status: DISCONTINUED | OUTPATIENT
Start: 2017-12-22 | End: 2017-12-26 | Stop reason: HOSPADM

## 2017-12-22 RX ORDER — HEPARIN 100 UNIT/ML
500 SYRINGE INTRAVENOUS ONCE
Status: ACTIVE | OUTPATIENT
Start: 2017-12-22 | End: 2017-12-22

## 2017-12-22 NOTE — PROGRESS NOTES
[facility] Progress Note    Date: 2017    Name: Sebastian Suarez    MRN: [id number]         : 1970    Monthly Port flush     Ms. Omkar Connor to [facility], ambulatory, at 1115. Pt was assessed and education was provided. Ms. Steinberg Dear vitals were reviewed. Visit Vitals    BP (!) 141/97    Pulse 96    Temp 97.4 °F (36.3 °C)    SpO2 96%       Left chest mediport was accessed with 20 gauge 1in levy(s) after chloroprep. Port flushed easily and had brisk blood return. Mediport flushed with NS 20 ml and Heparin 500 units then de-accessed. No irritation or bleeding noted. Band-Aid applied. Ms. Omkar Connor tolerated the procedure, and had no complaints. Patient armband removed and shredded. Ms. Omkar Connor was discharged from [place] [id number] in stable condition at 1130. She is to return on 2018 at 1100 for her next appointment.     Opal Castillo RN  2017

## 2018-01-02 ENCOUNTER — HOSPITAL ENCOUNTER (EMERGENCY)
Age: 48
Discharge: HOME | End: 2018-01-02
Attending: EMERGENCY MEDICINE | Admitting: EMERGENCY MEDICINE
Payer: MEDICAID

## 2018-01-02 VITALS
DIASTOLIC BLOOD PRESSURE: 105 MMHG | TEMPERATURE: 97.4 F | RESPIRATION RATE: 16 BRPM | OXYGEN SATURATION: 98 % | HEART RATE: 89 BPM | SYSTOLIC BLOOD PRESSURE: 174 MMHG

## 2018-01-02 DIAGNOSIS — K08.89 PAIN, DENTAL: Primary | ICD-10-CM

## 2018-01-02 PROCEDURE — 96372 THER/PROPH/DIAG INJ SC/IM: CPT

## 2018-01-02 PROCEDURE — 99283 EMERGENCY DEPT VISIT LOW MDM: CPT

## 2018-01-02 PROCEDURE — 74011250636 HC RX REV CODE- 250/636: Performed by: EMERGENCY MEDICINE

## 2018-01-02 RX ORDER — CLINDAMYCIN HYDROCHLORIDE 300 MG/1
300 CAPSULE ORAL 3 TIMES DAILY
Qty: 21 CAP | Refills: 0 | Status: SHIPPED | OUTPATIENT
Start: 2018-01-02 | End: 2018-01-09

## 2018-01-02 RX ORDER — INDOMETHACIN 25 MG/1
25 CAPSULE ORAL 3 TIMES DAILY
Qty: 30 CAP | Refills: 0 | Status: SHIPPED | OUTPATIENT
Start: 2018-01-02 | End: 2018-01-03

## 2018-01-02 RX ORDER — KETOROLAC TROMETHAMINE 30 MG/ML
30 INJECTION, SOLUTION INTRAMUSCULAR; INTRAVENOUS ONCE
Status: COMPLETED | OUTPATIENT
Start: 2018-01-02 | End: 2018-01-02

## 2018-01-02 RX ADMIN — KETOROLAC TROMETHAMINE 30 MG: 30 INJECTION, SOLUTION INTRAMUSCULAR at 05:56

## 2018-01-02 NOTE — ED NOTES
I have reviewed discharge instructions with the patient. The patient verbalized understanding.  Pt agreeable to dc plan, pt in no distress ambulatory to ED lobby to meet taxicab

## 2018-01-02 NOTE — ED PROVIDER NOTES
EMERGENCY DEPARTMENT HISTORY AND PHYSICAL EXAM    5:44 AM      Date: 1/2/2018  Patient Name: Sandeep Barnett    History of Presenting Illness     Chief Complaint   Patient presents with    Dental Pain         History Provided By: Patient    Chief Complaint: Dental pain  Duration:3  Days  Timing:  Constant  Location: Right upper back tooth  Quality: Aching  Severity: Severe  Modifying Factors: Short term relief from Naprosyn and Oral Gel  Associated Symptoms: facial swelling      Additional History (Context): 5:46 AM Sandeep Barnett is a 49 y.o. female with h/o HTN, DM, and Cancer who presents to ED complaining of aching severe constant right upper back dental pain associated with right sided facial swelling onset 3 days ago. Patient states that she has been taking naprosyn and using oral gel at home with mild short term relief. States she had an infection a couple years ago and was treated but does not have the same bump that presented with that infection. No other concerns or symptoms at this time. PCP: None      Current Outpatient Prescriptions   Medication Sig Dispense Refill    clindamycin (CLEOCIN) 300 mg capsule Take 1 Cap by mouth three (3) times daily for 7 days. 21 Cap 0    indomethacin (INDOCIN) 25 mg capsule Take 1 Cap by mouth three (3) times daily for 10 days. PRN pain. With food 30 Cap 0    promethazine (PHENERGAN) 25 mg tablet Take 1 Tab by mouth every six (6) hours as needed. 12 Tab 0    dicyclomine (BENTYL) 20 mg tablet Take 1 Tab by mouth every six (6) hours. Prn abdominal pain 15 Tab 0    ondansetron (ZOFRAN ODT) 4 mg disintegrating tablet Take 1 Tab by mouth every eight (8) hours as needed for Nausea. 15 Tab 0    DULoxetine (CYMBALTA) 60 mg capsule Take 1 Cap by mouth daily. 30 Cap 1    esomeprazole magnesium (NEXIUM 24HR) 20 mg TbEC Take 20 mg by mouth daily.  30 Tab 0    ondansetron (ZOFRAN ODT) 8 mg disintegrating tablet Take 1 Tab by mouth every eight (8) hours as needed for Nausea. 20 Tab 0    lisinopril (PRINIVIL, ZESTRIL) 10 mg tablet Take 1 Tab by mouth daily. 30 Tab 1    polyethylene glycol (MIRALAX) 17 gram packet Take 1 Packet by mouth daily. 30 Each 1    insulin nph-regular human rec (HUMULIN 70-30) 100 unit/mL (70-30) inpn Give 25 units in the QAM and 30 units at bedtime 5 Pen 3    traZODone (DESYREL) 300 mg tablet Take 300 mg by mouth nightly.  ALBUTEROL IN Take  by inhalation.  LORazepam (ATIVAN) 1 mg tablet 1 mg.  EPINEPHrine (EPIPEN) 0.3 mg/0.3 mL (1:1,000) injection 0.3 mL by IntraMUSCular route once as needed for up to 1 dose. 1 Each 1    gabapentin (NEURONTIN) 300 mg capsule Take 1 capsule by mouth three (3) times daily. 90 capsule 3       Past History     Past Medical History:  Past Medical History:   Diagnosis Date    Alcohol abuse     Cancer ([address]     breast cancer, right    Cancer ([address]     Breast CA    Depression     Diabetes ([address]     Drug abuse     Gastrointestinal disorder     cholitis    Hyperlipidemia     Hypertension     Spine injury        Past Surgical History:  Past Surgical History:   Procedure Laterality Date    COLONOSCOPY N/A 7/18/2016    COLONOS[facility] performed by Sahil Rios [facility] at [facility] ENDOSCOPY    HX BREAST LUMPECTOMY  06/2013    right    HX HYSTERECTOMY      HX ORTHOPAEDIC      Back fusion surgery 4/18/14.  HX OTHER SURGICAL      mediport    HX TONSILLECTOMY      HX TUBAL LIGATION         Family History:  Family History   Problem Relation Age of Onset    Heart Disease Mother     Stroke Father     Heart Disease Father 48    Diabetes Other     Hypertension Other     Cancer Maternal Grandmother        Social History:  Social History   Substance Use Topics    Smoking status: Never Smoker    Smokeless tobacco: Never Used    Alcohol use No      Comment: \"Occasionally\"       Allergies:   Allergies   Allergen Reactions    Latex Hives and Itching    Other Food Rash     Almonds    Amoxicillin Swelling Other reaction(s): mild rash/itching    Aspirin Not Reported This Time and Swelling     Mouth swells    Fentanyl Anaphylaxis and Swelling     Patches cause mouth to swell  Swelling in mouth from fentanyl patch    Levaquin [Levofloxacin] Not Reported This Time and Swelling     Other reaction(s): mild rash/itching    Chlorhexidine Rash    Norco [Hydrocodone-Acetaminophen] Nausea and Vomiting     Other reaction(s): gi distress    Acetaminophen Other (comments)    Jachin Rash and Itching    Codeine Other (comments)    Glycopyrrolate Swelling     Pt  States  faciAL  SWELLING   POST  ROBINUL  IV   Pt  States  faciAL  SWELLING   POST  ROBINUL  IV     Morphine Nausea and Vomiting     Pt states she is not allergic    Pcn [Penicillins] Swelling    Percocet [Oxycodone-Acetaminophen] Nausea and Vomiting     Other reaction(s): gi distress  nausea  Other reaction(s): anaphylaxis/angioedema  Per pt. She is allergic    Tape [Adhesive] Rash    Tramadol Swelling         Review of Systems       Review of Systems   Constitutional: Negative for chills and fever. HENT: Positive for dental problem and facial swelling. Negative for rhinorrhea. Respiratory: Negative for shortness of breath. Cardiovascular: Negative for chest pain. Gastrointestinal: Negative for abdominal pain, nausea and vomiting. Endocrine: Negative for polyuria. Genitourinary: Negative for dysuria. Musculoskeletal: Negative for back pain. Skin: Negative for rash. Neurological: Negative for headaches. Physical Exam     Patient Vitals for the past 12 hrs:   Temp Pulse Resp BP SpO2   01/02/18 0540 97.4 °F (36.3 °C) 89 16 (!) 174/105 98 %         Physical Exam   Constitutional: She is oriented to person, place, and time. She appears well-developed and well-nourished. Speaking in full sentences   HENT:   Head: Normocephalic and atraumatic. Mouth/Throat: No trismus in the jaw. Poor dentition.   Tender along superior lateral gumline without swelling, erythema, and periapical abscess. No facial swelling. Eyes: Conjunctivae are normal. Pupils are equal, round, and reactive to light. Neck: Normal range of motion. Neck supple. Cardiovascular: Normal rate and regular rhythm. Pulmonary/Chest: Effort normal and breath sounds normal. No respiratory distress. She has no wheezes. Abdominal: Soft. Bowel sounds are normal. She exhibits no distension. There is no tenderness. There is no rebound and no guarding. Musculoskeletal: Normal range of motion. Neurological: She is alert and oriented to person, place, and time. Skin: Skin is warm and dry. Psychiatric: She has a normal mood and affect. Thought content normal.   Nursing note and vitals reviewed. Diagnostic Study Results     Labs -  No results found for this or any previous visit (from the past 12 hour(s)). Radiologic Studies -   No results found. Medical Decision Making   I am the first provider for this patient. I reviewed the vital signs, available nursing notes, past medical history, past surgical history, family history and social history. Vital Signs-Reviewed the patient's vital signs. Records Reviewed: Nursing Notes and Old Medical Records (Time of Review: 5:44 AM)    Provider Notes (Medical Decision Making):   Patient with chronic dental pain - no signs of abscess or intervention needed. Will give abx and start on indomethacin. Diagnosis     Clinical Impression:   1. Pain, dental        Disposition: DC    Follow-up Information     Follow up With Details Comments Contact Info    [facility] EMERGENCY DEPT  If symptoms worsen [address] [address] [phone]           Patient's Medications   Start Taking    CLINDAMYCIN (CLEOCIN) 300 MG CAPSULE    Take 1 Cap by mouth three (3) times daily for 7 days. INDOMETHACIN (INDOCIN) 25 MG CAPSULE    Take 1 Cap by mouth three (3) times daily for 10 days. PRN pain.  With food   Continue Taking    ALBUTEROL IN    Take  by inhalation. DICYCLOMINE (BENTYL) 20 MG TABLET    Take 1 Tab by mouth every six (6) hours. Prn abdominal pain    DULOXETINE (CYMBALTA) 60 MG CAPSULE    Take 1 Cap by mouth daily. EPINEPHRINE (EPIPEN) 0.3 MG/0.3 ML (1:1,000) INJECTION    0.3 mL by IntraMUSCular route once as needed for up to 1 dose. ESOMEPRAZOLE MAGNESIUM (NEXIUM 24HR) 20 MG TBEC    Take 20 mg by mouth daily. GABAPENTIN (NEURONTIN) 300 MG CAPSULE    Take 1 capsule by mouth three (3) times daily. INSULIN NPH-REGULAR HUMAN REC (HUMULIN 70-30) 100 UNIT/ML (70-30) INPN    Give 25 units in the QAM and 30 units at bedtime    LISINOPRIL (PRINIVIL, ZESTRIL) 10 MG TABLET    Take 1 Tab by mouth daily. LORAZEPAM (ATIVAN) 1 MG TABLET    1 mg. ONDANSETRON (ZOFRAN ODT) 4 MG DISINTEGRATING TABLET    Take 1 Tab by mouth every eight (8) hours as needed for Nausea. ONDANSETRON (ZOFRAN ODT) 8 MG DISINTEGRATING TABLET    Take 1 Tab by mouth every eight (8) hours as needed for Nausea. POLYETHYLENE GLYCOL (MIRALAX) 17 GRAM PACKET    Take 1 Packet by mouth daily. PROMETHAZINE (PHENERGAN) 25 MG TABLET    Take 1 Tab by mouth every six (6) hours as needed. TRAZODONE (DESYREL) 300 MG TABLET    Take 300 mg by mouth nightly. These Medications have changed    No medications on file   Stop Taking    No medications on file     _______________________________    Attestations:  [facility] Amandeep Rod acting as a scribe for and in the presence of Qi Gleason MD      January 02, 2018 at 5:44 AM       Provider Attestation:      I personally performed the services described in the documentation, reviewed the documentation, as recorded by the scribe in my presence, and it accurately and completely records my words and actions.  January 02, 2018 at 5:44 AM - Qi Gleason MD    _______________________________

## 2018-01-02 NOTE — DISCHARGE INSTRUCTIONS
Tooth and Gum Pain: Care Instructions  Your Care Instructions    The most common causes of dental pain are tooth decay and gum disease. Pain can also be caused by an infection of the tooth (abscess) or the gums. Or you may have pain from a broken or cracked tooth. Other causes of pain include infection and damage to a tooth from nervous grinding of your teeth. A wisdom tooth can be painful when it is coming in but cannot break through the gum. It can also be painful when the tooth is only partway in and extra gum tissue has formed around it. The tissue can get inflamed (pericoronitis), and sometimes it gets infected. Prompt dental care can help find the cause of your toothache and keep the tooth from dying or gum disease from getting worse. Self-care at home may reduce your pain and discomfort. Follow-up care is a key part of your treatment and safety. Be sure to make and go to all appointments, and call your dentist or doctor if you are having problems. It's also a good idea to know your test results and keep a list of the medicines you take. How can you care for yourself at home? · To reduce pain and facial swelling, put an ice or cold pack on the outside of your cheek for 10 to 20 minutes at a time. Put a thin cloth between the ice and your skin. Do not use heat. · If your doctor prescribed antibiotics, take them as directed. Do not stop taking them just because you feel better. You need to take the full course of antibiotics. · Ask your doctor if you can take an over-the-counter pain medicine, such as acetaminophen (Tylenol), ibuprofen (Advil, Motrin), or naproxen (Aleve). Be safe with medicines. Read and follow all instructions on the label. · Avoid very hot, cold, or sweet foods and drinks if they increase your pain. · Rinse your mouth with warm salt water every 2 hours to help relieve pain and swelling. Mix 1 teaspoon of salt in 8 ounces of water.   · Talk to your dentist about using special toothpaste for sensitive teeth. To reduce pain on contact with heat or cold or when brushing, brush with this toothpaste regularly or rub a small amount of the paste on the sensitive area with a clean finger 2 or 3 times a day. Floss gently between your teeth. · Do not smoke or use spit tobacco. Tobacco use can make gum problems worse, decreases your ability to fight infection in your gums, and delays healing. If you need help quitting, talk to your doctor about stop-smoking programs and medicines. These can increase your chances of quitting for good. When should you call for help? Call 911 anytime you think you may need emergency care. For example, call if:  ? · You have trouble breathing. ?Call your dentist or doctor now or seek immediate medical care if:  ? · You have signs of infection, such as:  ¨ Increased pain, swelling, warmth, or redness. ¨ Red streaks leading from the area. ¨ Pus draining from the area. ¨ A fever. ? Watch closely for changes in your health, and be sure to contact your doctor if:  ? · You do not get better as expected. Where can you learn more? Go to http://imani-jenny.info/. Enter 0363 [id number] in the search box to learn more about \"Tooth and Gum Pain: Care Instructions. \"  Current as of: May 12, 2017  Content Version: 11.4  © 9589-5888 [employer]. Care instructions adapted under license by [employer] (which disclaims liability or warranty for this information). If you have questions about a medical condition or this instruction, always ask your healthcare professional. [place] [address] any warranty or liability for your use of this information.

## 2018-01-03 ENCOUNTER — HOSPITAL ENCOUNTER (EMERGENCY)
Age: 48
Discharge: HOME | End: 2018-01-03
Attending: EMERGENCY MEDICINE
Payer: MEDICAID

## 2018-01-03 ENCOUNTER — APPOINTMENT (OUTPATIENT)
Dept: GENERAL RADIOLOGY | Age: 48
End: 2018-01-03
Attending: EMERGENCY MEDICINE
Payer: MEDICAID

## 2018-01-03 VITALS
WEIGHT: 148 LBS | OXYGEN SATURATION: 98 % | RESPIRATION RATE: 18 BRPM | DIASTOLIC BLOOD PRESSURE: 81 MMHG | BODY MASS INDEX: 27.23 KG/M2 | HEART RATE: 93 BPM | SYSTOLIC BLOOD PRESSURE: 145 MMHG | HEIGHT: 62 IN | TEMPERATURE: 97.5 F

## 2018-01-03 DIAGNOSIS — R09.81 SINUS CONGESTION: ICD-10-CM

## 2018-01-03 DIAGNOSIS — R07.9 CHEST PAIN, UNSPECIFIED TYPE: ICD-10-CM

## 2018-01-03 DIAGNOSIS — R11.2 NON-INTRACTABLE VOMITING WITH NAUSEA, UNSPECIFIED VOMITING TYPE: Primary | ICD-10-CM

## 2018-01-03 LAB
ANION GAP SERPL CALC-SCNC: 8 MMOL/L (ref 3–18)
BASOPHILS # BLD: 0 K/UL (ref 0–0.06)
BASOPHILS NFR BLD: 0 % (ref 0–2)
BUN SERPL-MCNC: 10 MG/DL (ref 7–18)
BUN/CREAT SERPL: 12 (ref 12–20)
CALCIUM SERPL-MCNC: 9.4 MG/DL (ref 8.5–10.1)
CHLORIDE SERPL-SCNC: 100 MMOL/L (ref 100–108)
CK MB CFR SERPL CALC: ABNORMAL % (ref 0–4)
CK MB SERPL-MCNC: <1 NG/ML (ref 5–25)
CK SERPL-CCNC: 19 U/L (ref 26–192)
CO2 SERPL-SCNC: 28 MMOL/L (ref 21–32)
CREAT SERPL-MCNC: 0.83 MG/DL (ref 0.6–1.3)
D DIMER PPP FEU-MCNC: 0.46 UG/ML(FEU)
DIFFERENTIAL METHOD BLD: ABNORMAL
EOSINOPHIL # BLD: 0.1 K/UL (ref 0–0.4)
EOSINOPHIL NFR BLD: 1 % (ref 0–5)
ERYTHROCYTE [DISTWIDTH] IN BLOOD BY AUTOMATED COUNT: 12.9 % (ref 11.6–14.5)
GLUCOSE SERPL-MCNC: 249 MG/DL (ref 74–99)
HCT VFR BLD AUTO: 37.6 % (ref 35–45)
HGB BLD-MCNC: 12.3 G/DL (ref 12–16)
LIPASE SERPL-CCNC: 116 U/L (ref 73–393)
LYMPHOCYTES # BLD: 1.7 K/UL (ref 0.9–3.6)
LYMPHOCYTES NFR BLD: 24 % (ref 21–52)
MCH RBC QN AUTO: 29.4 PG (ref 24–34)
MCHC RBC AUTO-ENTMCNC: 32.7 G/DL (ref 31–37)
MCV RBC AUTO: 89.7 FL (ref 74–97)
MONOCYTES # BLD: 0.4 K/UL (ref 0.05–1.2)
MONOCYTES NFR BLD: 6 % (ref 3–10)
NEUTS SEG # BLD: 4.9 K/UL (ref 1.8–8)
NEUTS SEG NFR BLD: 69 % (ref 40–73)
PLATELET # BLD AUTO: 206 K/UL (ref 135–420)
PMV BLD AUTO: 10.3 FL (ref 9.2–11.8)
POTASSIUM SERPL-SCNC: 3.5 MMOL/L (ref 3.5–5.5)
RBC # BLD AUTO: 4.19 M/UL (ref 4.2–5.3)
SODIUM SERPL-SCNC: 136 MMOL/L (ref 136–145)
TROPONIN I SERPL-MCNC: <0.02 NG/ML (ref 0–0.06)
WBC # BLD AUTO: 7.1 K/UL (ref 4.6–13.2)

## 2018-01-03 PROCEDURE — 83690 ASSAY OF LIPASE: CPT | Performed by: EMERGENCY MEDICINE

## 2018-01-03 PROCEDURE — 82550 ASSAY OF CK (CPK): CPT | Performed by: EMERGENCY MEDICINE

## 2018-01-03 PROCEDURE — 96361 HYDRATE IV INFUSION ADD-ON: CPT

## 2018-01-03 PROCEDURE — 93005 ELECTROCARDIOGRAM TRACING: CPT

## 2018-01-03 PROCEDURE — 80048 BASIC METABOLIC PNL TOTAL CA: CPT | Performed by: EMERGENCY MEDICINE

## 2018-01-03 PROCEDURE — 85379 FIBRIN DEGRADATION QUANT: CPT | Performed by: EMERGENCY MEDICINE

## 2018-01-03 PROCEDURE — 74011250636 HC RX REV CODE- 250/636: Performed by: EMERGENCY MEDICINE

## 2018-01-03 PROCEDURE — 99283 EMERGENCY DEPT VISIT LOW MDM: CPT

## 2018-01-03 PROCEDURE — 96374 THER/PROPH/DIAG INJ IV PUSH: CPT

## 2018-01-03 PROCEDURE — 96375 TX/PRO/DX INJ NEW DRUG ADDON: CPT

## 2018-01-03 PROCEDURE — 85025 COMPLETE CBC W/AUTO DIFF WBC: CPT | Performed by: EMERGENCY MEDICINE

## 2018-01-03 PROCEDURE — 71046 X-RAY EXAM CHEST 2 VIEWS: CPT

## 2018-01-03 RX ORDER — ONDANSETRON 2 MG/ML
4 INJECTION INTRAMUSCULAR; INTRAVENOUS
Status: COMPLETED | OUTPATIENT
Start: 2018-01-03 | End: 2018-01-03

## 2018-01-03 RX ORDER — GUAIFENESIN 600 MG/1
600 TABLET, EXTENDED RELEASE ORAL 2 TIMES DAILY
Qty: 8 TAB | Refills: 0 | Status: SHIPPED | OUTPATIENT
Start: 2018-01-03 | End: 2018-01-07

## 2018-01-03 RX ORDER — ONDANSETRON 4 MG/1
4 TABLET, ORALLY DISINTEGRATING ORAL
Qty: 14 TAB | Refills: 0 | Status: SHIPPED | OUTPATIENT
Start: 2018-01-03 | End: 2018-03-29

## 2018-01-03 RX ORDER — FAMOTIDINE 20 MG/1
20 TABLET, FILM COATED ORAL DAILY
Qty: 10 TAB | Refills: 0 | Status: SHIPPED | OUTPATIENT
Start: 2018-01-03 | End: 2018-01-13

## 2018-01-03 RX ORDER — SODIUM CHLORIDE 9 MG/ML
1000 INJECTION, SOLUTION INTRAVENOUS ONCE
Status: COMPLETED | OUTPATIENT
Start: 2018-01-03 | End: 2018-01-03

## 2018-01-03 RX ORDER — HYDROMORPHONE HYDROCHLORIDE 1 MG/ML
0.5 INJECTION, SOLUTION INTRAMUSCULAR; INTRAVENOUS; SUBCUTANEOUS ONCE
Status: COMPLETED | OUTPATIENT
Start: 2018-01-03 | End: 2018-01-03

## 2018-01-03 RX ORDER — HEPARIN SODIUM (PORCINE) LOCK FLUSH IV SOLN 100 UNIT/ML 100 UNIT/ML
500 SOLUTION INTRAVENOUS ONCE
Status: COMPLETED | OUTPATIENT
Start: 2018-01-03 | End: 2018-01-03

## 2018-01-03 RX ADMIN — ONDANSETRON 4 MG: 2 INJECTION INTRAMUSCULAR; INTRAVENOUS at 19:47

## 2018-01-03 RX ADMIN — HEPARIN SODIUM (PORCINE) LOCK FLUSH IV SOLN 100 UNIT/ML 500 UNITS: 100 SOLUTION at 21:50

## 2018-01-03 RX ADMIN — SODIUM CHLORIDE 1000 ML: 900 INJECTION, SOLUTION INTRAVENOUS at 19:48

## 2018-01-03 RX ADMIN — HYDROMORPHONE HYDROCHLORIDE 0.5 MG: 1 INJECTION, SOLUTION INTRAMUSCULAR; INTRAVENOUS; SUBCUTANEOUS at 19:48

## 2018-01-03 NOTE — ED TRIAGE NOTES
Pt  Co  epigastric pain and cp.  Pt also co dental pain r side states saw PMD yest for dental pain,  Prescribed  Clindamycin  And another med  States  Took  One dose of clindamycin  And started vomiting  About 10 minutes later  Pt  States  She becomes sob   With the pain

## 2018-01-04 LAB
ATRIAL RATE: 97 BPM
CALCULATED P AXIS, ECG09: 50 DEGREES
CALCULATED R AXIS, ECG10: -38 DEGREES
CALCULATED T AXIS, ECG11: 70 DEGREES
DIAGNOSIS, 93000: NORMAL
P-R INTERVAL, ECG05: 152 MS
Q-T INTERVAL, ECG07: 370 MS
QRS DURATION, ECG06: 80 MS
QTC CALCULATION (BEZET), ECG08: 469 MS
VENTRICULAR RATE, ECG03: 97 BPM

## 2018-01-04 NOTE — ED NOTES
MD made aware that patient is c/o difficulty breathing. Patient points to nose and throat when c/o congestion and difficulty breathing.

## 2018-01-04 NOTE — DISCHARGE INSTRUCTIONS
Return for any new or worsening pain, any fever, shortness of breath, vomiting, decreased fluid intake, weakness, numbness, dizziness, or any change or concerns. Nausea and Vomiting: Care Instructions  Your Care Instructions    When you are nauseated, you may feel weak and sweaty and notice a lot of saliva in your mouth. Nausea often leads to vomiting. Most of the time you do not need to worry about nausea and vomiting, but they can be signs of other illnesses. Two common causes of nausea and vomiting are stomach flu and food poisoning. Nausea and vomiting from viral stomach flu will usually start to improve within 24 hours. Nausea and vomiting from food poisoning may last from 12 to 48 hours. The doctor has checked you carefully, but problems can develop later. If you notice any problems or new symptoms, get medical treatment right away. Follow-up care is a key part of your treatment and safety. Be sure to make and go to all appointments, and call your doctor if you are having problems. It's also a good idea to know your test results and keep a list of the medicines you take. How can you care for yourself at home? · To prevent dehydration, drink plenty of fluids, enough so that your urine is light yellow or clear like water. Choose water and other caffeine-free clear liquids until you feel better. If you have kidney, heart, or liver disease and have to limit fluids, talk with your doctor before you increase the amount of fluids you drink. · Rest in bed until you feel better. · When you are able to eat, try clear soups, mild foods, and liquids until all symptoms are gone for 12 to 48 hours. Other good choices include dry toast, crackers, cooked cereal, and gelatin dessert, such as Jell-O. When should you call for help? Call 911 anytime you think you may need emergency care. For example, call if:  ? · You passed out (lost consciousness).    ?Call your doctor now or seek immediate medical care if:  ? · You have symptoms of dehydration, such as:  ¨ Dry eyes and a dry mouth. ¨ Passing only a little dark urine. ¨ Feeling thirstier than usual.   ? · You have new or worsening belly pain. ? · You have a new or higher fever. ? · You vomit blood or what looks like coffee grounds. ? Watch closely for changes in your health, and be sure to contact your doctor if:  ? · You have ongoing nausea and vomiting. ? · Your vomiting is getting worse. ? · Your vomiting lasts longer than 2 days. ? · You are not getting better as expected. Where can you learn more? Go to http://imani-jenny.info/. Enter [id number] in the search box to learn more about \"Nausea and Vomiting: Care Instructions. \"  Current as of: March 20, 2017  Content Version: 11.4  © 3880-4955 [employer]. Care instructions adapted under license by [employer] (which disclaims liability or warranty for this information). If you have questions about a medical condition or this instruction, always ask your healthcare professional. [place] [address] any warranty or liability for your use of this information. Chest Pain: Care Instructions  Your Care Instructions    There are many things that can cause chest pain. Some are not serious and will get better on their own in a few days. But some kinds of chest pain need more testing and treatment. Your doctor may have recommended a follow-up visit in the next 8 to 12 hours. If you are not getting better, you may need more tests or treatment. Even though your doctor has released you, you still need to watch for any problems. The doctor carefully checked you, but sometimes problems can develop later. If you have new symptoms or if your symptoms do not get better, get medical care right away.   If you have worse or different chest pain or pressure that lasts more than 5 minutes or you passed out (lost consciousness), call 911 or seek other emergency help right away. A medical visit is only one step in your treatment. Even if you feel better, you still need to do what your doctor recommends, such as going to all suggested follow-up appointments and taking medicines exactly as directed. This will help you recover and help prevent future problems. How can you care for yourself at home? · Rest until you feel better. · Take your medicine exactly as prescribed. Call your doctor if you think you are having a problem with your medicine. · Do not drive after taking a prescription pain medicine. When should you call for help? Call 911 if:  ? · You passed out (lost consciousness). ? · You have severe difficulty breathing. ? · You have symptoms of a heart attack. These may include:  ¨ Chest pain or pressure, or a strange feeling in your chest.  ¨ Sweating. ¨ Shortness of breath. ¨ Nausea or vomiting. ¨ Pain, pressure, or a strange feeling in your back, neck, jaw, or upper belly or in one or both shoulders or arms. ¨ Lightheadedness or sudden weakness. ¨ A fast or irregular heartbeat. After you call 911, the  may tell you to chew 1 adult-strength or 2 to 4 low-dose aspirin. Wait for an ambulance. Do not try to drive yourself. ?Call your doctor today if:  ? · You have any trouble breathing. ? · Your chest pain gets worse. ? · You are dizzy or lightheaded, or you feel like you may faint. ? · You are not getting better as expected. ? · You are having new or different chest pain. Where can you learn more? Go to http://imani-jenny.info/. Enter A120 in the search box to learn more about \"Chest Pain: Care Instructions. \"  Current as of: March 20, 2017  Content Version: 11.4  © 4091-3487 [employer]. Care instructions adapted under license by [employer] (which disclaims liability or warranty for this information).  If you have questions about a medical condition or this instruction, always ask your healthcare professional. [place] [address] any warranty or liability for your use of this information.

## 2018-01-04 NOTE — ED PROVIDER NOTES
EMERGENCY DEPARTMENT HISTORY AND PHYSICAL EXAM    7:15 PM      Date: 1/3/2018  Patient Name: Bridger Pérez    History of Presenting Illness     Chief Complaint   Patient presents with    Chest Pain    Shortness of Breath         History Provided By: Patient    Chief Complaint: Chest Pain   Duration:Couple Hours  Timing:  Intermittent  Location: Midsternal   Quality: N/A  Severity: 10 out of 10  Modifying Factors: CP worsens with movement   Associated Symptoms: nausea, vomiting, chills, generalized body aches, SOB, cough and bilateral leg pain      Additional History (Context): Bridger Pérez is a 49 y.o. female with hx of HTN, DM, HLD, colitis, breast cancer, spine injury, chronic back pain, drug and alcohol abuse presenting to the ED with c/o intermittent midsternal CP that began a couple hours ago. Pt was seen in the ED yesterday for dental pain, reports nausea and vomiting began shortly after ED visit in the evening. States she has had 4-5 vomiting episodes today and notes she has CP when she vomits as well as with movements. Pt denies fever, diaphoresis, leg swelling, diarrhea, abdominal pain or any urinary sx. Associated sx include SOB, chills, generalized body aches, cough and bilateral leg pain. Severity is 10/10. Notes SOB began an hour ago, \"I feel like I can't finish my sentences\". Denies smoking. Pt also is c/o back pain due to vomiting, but this is chronic. States she is not followed by an orthopedics or in pain management for back pain. No other sx or complaints given at this time. PCP: None    Current Outpatient Prescriptions   Medication Sig Dispense Refill    ondansetron (ZOFRAN ODT) 4 mg disintegrating tablet Take 1 Tab by mouth every eight (8) hours as needed for Nausea. 14 Tab 0    famotidine (PEPCID) 20 mg tablet Take 1 Tab by mouth daily for 10 days. 10 Tab 0    guaiFENesin ER (MUCINEX) 600 mg ER tablet Take 1 Tab by mouth two (2) times a day for 4 days.  8 Tab 0    clindamycin (CLEOCIN) 300 mg capsule Take 1 Cap by mouth three (3) times daily for 7 days. 21 Cap 0    lisinopril (PRINIVIL, ZESTRIL) 10 mg tablet Take 1 Tab by mouth daily. 30 Tab 1    polyethylene glycol (MIRALAX) 17 gram packet Take 1 Packet by mouth daily. 30 Each 1    insulin nph-regular human rec (HUMULIN 70-30) 100 unit/mL (70-30) inpn Give 25 units in the QAM and 30 units at bedtime 5 Pen 3    traZODone (DESYREL) 300 mg tablet Take 300 mg by mouth nightly.  ALBUTEROL IN Take  by inhalation.  LORazepam (ATIVAN) 1 mg tablet 1 mg.  EPINEPHrine (EPIPEN) 0.3 mg/0.3 mL (1:1,000) injection 0.3 mL by IntraMUSCular route once as needed for up to 1 dose. 1 Each 1    esomeprazole magnesium (NEXIUM 24HR) 20 mg TbEC Take 20 mg by mouth daily. 30 Tab 0       Past History     Past Medical History:  Past Medical History:   Diagnosis Date    Alcohol abuse     Cancer ([address]     breast cancer, right    Cancer ([address]     Breast CA    Depression     Diabetes [address] [facility]     Drug abuse     Gastrointestinal disorder     cholitis    Hyperlipidemia     Hypertension     Spine injury        Past Surgical History:  Past Surgical History:   Procedure Laterality Date    COLONOSCOPY N/A 7/18/2016    COLONOSCOPY performed by [facility] Fel[facility] at [facility] ENDOSCOPY    HX BREAST LUMPECTOMY  06/2013    right    HX HYSTERECTOMY      HX ORTHOPAEDIC      Back fusion surgery 4/18/14.  HX OTHER SURGICAL      mediport    HX TONSILLECTOMY      HX TUBAL LIGATION         Family History:  Family History   Problem Relation Age of Onset    Heart Disease Mother     Stroke Father     Heart Disease Father 48    Diabetes Other     Hypertension Other     Cancer Maternal Grandmother        Social History:  Social History   Substance Use Topics    Smoking status: Never Smoker    Smokeless tobacco: Never Used    Alcohol use No      Comment: \"Occasionally\"       Allergies:   Allergies   Allergen Reactions    Latex Hives and Itching  Other Food Rash     Almonds    Amoxicillin Swelling     Other reaction(s): mild rash/itching    Aspirin Not Reported This Time and Swelling     Mouth swells    Fentanyl Anaphylaxis and Swelling     Patches cause mouth to swell  Swelling in mouth from fentanyl patch    Levaquin [Levofloxacin] Not Reported This Time and Swelling     Other reaction(s): mild rash/itching    Chlorhexidine Rash    Norco [Hydrocodone-Acetaminophen] Nausea and Vomiting     Other reaction(s): gi distress    Acetaminophen Other (comments)    [place] Rash and Itching    Codeine Other (comments)    Glycopyrrolate Swelling     Pt  States  faciAL  SWELLING   POST  ROBINUL  IV   Pt  States  faciAL  SWELLING   POST  ROBINUL  IV     Morphine Nausea and Vomiting     Pt states she is not allergic    Pcn [Penicillins] Swelling    Percocet [Oxycodone-Acetaminophen] Nausea and Vomiting     Other reaction(s): gi distress  nausea  Other reaction(s): anaphylaxis/angioedema  Per pt. She is allergic    Tape [Adhesive] Rash    Tramadol Swelling         Review of Systems       Review of Systems   Constitutional: Positive for chills. Negative for fever. Generalized body aches    HENT: Negative for congestion. Respiratory: Positive for cough and shortness of breath. Cardiovascular: Positive for chest pain. Gastrointestinal: Positive for nausea and vomiting. Negative for abdominal pain, blood in stool and diarrhea. Musculoskeletal: Positive for back pain. Skin: Negative for rash. Neurological: Negative for light-headedness. All other systems reviewed and are negative. Physical Exam     Visit Vitals    /81    Pulse 93    Temp 97.5 °F (36.4 °C)    Resp 18    Ht 5' 2\" (1.575 m)    Wt 67.1 kg (148 lb)    LMP 08/20/2013    SpO2 98%    BMI 27.07 kg/m2         Physical Exam   Constitutional: She is oriented to person, place, and time. She appears well-developed. HENT:   Head: Normocephalic. Mouth/Throat: Oropharynx is clear and moist.   Eyes: Pupils are equal, round, and reactive to light. Neck: Normal range of motion. Cardiovascular: Normal rate and normal heart sounds. No murmur heard. Pulmonary/Chest: Effort normal. She has no wheezes. She has no rales. Midsternal chest wall tenderness with palpation    Abdominal: Soft. There is no tenderness. Musculoskeletal: Normal range of motion. She exhibits no edema. Diffused back tenderness      Neurological: She is alert and oriented to person, place, and time. Skin: Skin is warm and dry. Nursing note and vitals reviewed. Diagnostic Study Results     Labs -  No results found for this or any previous visit (from the past 12 hour(s)). Radiologic Studies -   XR CHEST PA LAT   IMPRESSION:  No radiographic findings of acute cardiopulmonary disease.                   Medical Decision Making   I am the first provider for this patient. I reviewed the vital signs, available nursing notes, past medical history, past surgical history, family history and social history. Vital Signs-Reviewed the patient's vital signs. Pulse Oximetry Analysis -  100% on room air (Interpretation), stable     Cardiac Monitor:  Rate: 93 bpm   Rhythm:  Normal Sinus Rhythm     EKG: Interpreted by the EP. Time Interpreted:    Rate:    Rhythm: Normal Sinus Rhythm    Interpretation:   Comparison: no st/t changes, no hypertrophy, no ectopy    Records Reviewed: Nursing Notes and Old Medical Records (Time of Review: 7:15 PM)    ED Course: Progress Notes, Reevaluation, and Consults:    9:32 PM: Pt states she is feeling better and declines any further treatment. Provider Notes (Medical Decision Making): not c/w cad/pe/ptx/pna. Stable for dc and close f/u. Detailed ret inst given. No emc. Stable for dc and close f/u. Diagnosis     Clinical Impression:   1. Non-intractable vomiting with nausea, unspecified vomiting type    2.  Chest pain, unspecified type    3. Sinus congestion        Disposition: Discharge     Follow-up Information     Follow up With Details Comments [address] Schedule an appointment as soon as possible for a visit in 1 day or your physician Mary [address] [facility]  [phone]           Discharge Medication List as of 1/3/2018  9:36 PM      START taking these medications    Details   famotidine (PEPCID) 20 mg tablet Take 1 Tab by mouth daily for 10 days. , Print, Disp-10 Tab, R-0      guaiFENesin ER (MUCINEX) 600 mg ER tablet Take 1 Tab by mouth two (2) times a day for 4 days. , Print, Disp-8 Tab, R-0         CONTINUE these medications which have CHANGED    Details   ondansetron (ZOFRAN ODT) 4 mg disintegrating tablet Take 1 Tab by mouth every eight (8) hours as needed for Nausea. , Print, Disp-14 Tab, R-0         CONTINUE these medications which have NOT CHANGED    Details   clindamycin (CLEOCIN) 300 mg capsule Take 1 Cap by mouth three (3) times daily for 7 days. , Print, Disp-21 Cap, R-0      lisinopril (PRINIVIL, ZESTRIL) 10 mg tablet Take 1 Tab by mouth daily. , Normal, Disp-30 Tab, R-1      polyethylene glycol (MIRALAX) 17 gram packet Take 1 Packet by mouth daily. , Normal, Disp-30 Each, R-1      insulin nph-regular human rec (HUMULIN 70-30) 100 unit/mL (70-30) inpn Give 25 units in the QAM and 30 units at bedtime, Normal, Disp-5 Pen, R-3      traZODone (DESYREL) 300 mg tablet Take 300 mg by mouth nightly., Historical Med      ALBUTEROL IN Take  by inhalation. , Historical Med      LORazepam (ATIVAN) 1 mg tablet 1 mg., Historical Med      EPINEPHrine (EPIPEN) 0.3 mg/0.3 mL (1:1,000) injection 0.3 mL by IntraMUSCular route once as needed for up to 1 dose., Normal, Disp-1 Each, R-1      esomeprazole magnesium (NEXIUM 24HR) 20 mg TbEC Take 20 mg by mouth daily. , Normal, Disp-30 Tab, R-0           _______________________________    Attestations:  Hugo [address] [place] [address] acting as a scribe for and in the presence of Jose Butler MD      January 03, 2018 at 7:15 PM       Provider Attestation:      I personally performed the services described in the documentation, reviewed the documentation, as recorded by the scribe in my presence, and it accurately and completely records my words and actions.  January 03, 2018 at 7:15 PM - Jose Butler MD    _______________________________

## 2018-02-16 ENCOUNTER — APPOINTMENT (OUTPATIENT)
Dept: INFUSION THERAPY | Age: 48
End: 2018-02-16

## 2018-03-01 ENCOUNTER — HOSPITAL ENCOUNTER (OUTPATIENT)
Dept: INFUSION THERAPY | Age: 48
Discharge: HOME | End: 2018-03-01
Payer: MEDICAID

## 2018-03-01 RX ORDER — SODIUM CHLORIDE 0.9 % (FLUSH) 0.9 %
10-40 SYRINGE (ML) INJECTION AS NEEDED
Status: CANCELLED | OUTPATIENT
Start: 2018-03-01

## 2018-03-01 RX ORDER — HEPARIN 100 UNIT/ML
500 SYRINGE INTRAVENOUS AS NEEDED
Status: CANCELLED | OUTPATIENT
Start: 2018-03-01

## 2018-03-01 NOTE — PROGRESS NOTES
Phoned patient to remind her of appointment. States that she is still not feeling well and needs to reschedule for Monday, 3/5/2018. States she will call back with time that works for her. Given [phone] as available for an appointment.

## 2018-03-05 ENCOUNTER — HOSPITAL ENCOUNTER (OUTPATIENT)
Dept: INFUSION THERAPY | Age: 48
Discharge: HOME | End: 2018-03-05
Payer: MEDICAID

## 2018-03-05 VITALS
OXYGEN SATURATION: 99 % | RESPIRATION RATE: 18 BRPM | TEMPERATURE: 97.4 F | HEART RATE: 88 BPM | DIASTOLIC BLOOD PRESSURE: 98 MMHG | SYSTOLIC BLOOD PRESSURE: 151 MMHG

## 2018-03-05 PROCEDURE — 96523 IRRIG DRUG DELIVERY DEVICE: CPT

## 2018-03-05 PROCEDURE — 77030012965 HC NDL HUBR BBMI -A

## 2018-03-05 PROCEDURE — 74011250636 HC RX REV CODE- 250/636

## 2018-03-05 RX ORDER — SODIUM CHLORIDE 0.9 % (FLUSH) 0.9 %
10-40 SYRINGE (ML) INJECTION AS NEEDED
Status: DISCONTINUED | OUTPATIENT
Start: 2018-03-05 | End: 2018-03-09 | Stop reason: HOSPADM

## 2018-03-05 RX ORDER — HEPARIN 100 UNIT/ML
500 SYRINGE INTRAVENOUS AS NEEDED
Status: DISCONTINUED | OUTPATIENT
Start: 2018-03-05 | End: 2018-03-09 | Stop reason: HOSPADM

## 2018-03-05 RX ORDER — HEPARIN 100 UNIT/ML
SYRINGE INTRAVENOUS
Status: COMPLETED
Start: 2018-03-05 | End: 2018-03-05

## 2018-03-05 RX ADMIN — HEPARIN 500 UNITS: 100 SYRINGE at 10:50

## 2018-03-05 RX ADMIN — Medication 500 UNITS: at 10:50

## 2018-03-05 RX ADMIN — Medication 20 ML: at 10:50

## 2018-03-05 NOTE — PROGRESS NOTES
[facility] Progress Note    Date: 2018    Name: Jamel Milton    MRN: [id number]         : 1970    [place]    Ms. Jenny Urbina arrived to [facility] at [id number]. Ms. Jenny Urbina was assessed and education was provided. Ms. Laura Kenney vitals were reviewed. Visit Vitals    BP (!) 151/98    Pulse 88    Temp 97.4 °F (36.3 °C)    Resp 18    SpO2 99%       Ms. Jenny Urbina reports that she was late for her appointment due to issues at home last night with her son. Patient's Left upper chest port accessed. Blood return visualized. Flushed with normal saline and heparin per protocol. De-accessed and band-aid applied to site. Ms. Jenny Urbina tolerated well without complaints. Ms. Jenny Urbina was discharged from [place] [address] in stable condition at 1100. She is to return on 18 at 0900 for her next appointment.     Henry Cabrera RN  2018  4:35 PM

## 2018-03-07 ENCOUNTER — OFFICE VISIT (OUTPATIENT)
Dept: FAMILY MEDICINE CLINIC | Facility: CLINIC | Age: 48
End: 2018-03-07

## 2018-03-07 VITALS
SYSTOLIC BLOOD PRESSURE: 146 MMHG | TEMPERATURE: 98.4 F | HEART RATE: 95 BPM | DIASTOLIC BLOOD PRESSURE: 85 MMHG | OXYGEN SATURATION: 98 % | BODY MASS INDEX: 27.79 KG/M2 | HEIGHT: 62 IN | WEIGHT: 151 LBS | RESPIRATION RATE: 16 BRPM

## 2018-03-07 DIAGNOSIS — E11.8 TYPE 2 DIABETES MELLITUS WITH COMPLICATION, WITH LONG-TERM CURRENT USE OF INSULIN (HCC): ICD-10-CM

## 2018-03-07 DIAGNOSIS — Z23 ENCOUNTER FOR IMMUNIZATION: ICD-10-CM

## 2018-03-07 DIAGNOSIS — I10 ESSENTIAL HYPERTENSION: Primary | ICD-10-CM

## 2018-03-07 DIAGNOSIS — Z79.4 TYPE 2 DIABETES MELLITUS WITH COMPLICATION, WITH LONG-TERM CURRENT USE OF INSULIN (HCC): ICD-10-CM

## 2018-03-07 DIAGNOSIS — Z00.00 ROUTINE GENERAL MEDICAL EXAMINATION AT A HEALTH CARE FACILITY: ICD-10-CM

## 2018-03-07 DIAGNOSIS — M79.662 PAIN OF LEFT CALF: ICD-10-CM

## 2018-03-07 DIAGNOSIS — E78.5 HYPERLIPIDEMIA, UNSPECIFIED HYPERLIPIDEMIA TYPE: ICD-10-CM

## 2018-03-07 RX ORDER — LISINOPRIL AND HYDROCHLOROTHIAZIDE 12.5; 2 MG/1; MG/1
1 TABLET ORAL DAILY
Qty: 30 TAB | Refills: 1 | Status: SHIPPED | OUTPATIENT
Start: 2018-03-07 | End: 2018-09-05 | Stop reason: SDUPTHER

## 2018-03-07 NOTE — MR AVS SNAPSHOT
[address] [place] [address] 
[phone] Patient: Henri Dakins MRN: [id number] :1970 Visit Information Date & Time Provider Department Dept. Phone Encounter #  
 3/7/2018  8:00 AM Alexander Hogan  Rodrigo Roman [phone] [id number] Your Appointments 3/15/2018  3:15 PM  
Office Visit with Keiko Lee MD  
[facility] ([facility]) Appt Note: OV SICK VISIT  
 [address] [facility] [address] [address]  
[address]  
  
   
 640 Formerly named [facility] Kristian [address] [address] Upcoming Health Maintenance Date Due  
 EYE EXAM RETINAL OR DILATED Q1 1979 DTaP/Tdap/Td series (1 - Tdap) 1990 PAP AKA CERVICAL CYTOLOGY 1990 HEMOGLOBIN A1C Q6M 2017 Influenza Age 5 to Adult 2017 FOOT EXAM Q1 2018 MICROALBUMIN Q1 2018 LIPID PANEL Q1 2018 Allergies as of 3/7/2018  Review Complete On: 3/7/2018 By: Shea Richardson LPN Severity Noted Reaction Type Reactions Latex High 2010    Hives, Itching Other Food  07/15/2016    Rash Almonds Amoxicillin High 2013    Swelling Other reaction(s): mild rash/itching Aspirin High 2013    Not Reported This Time, Swelling Mouth swells Fentanyl High 2013   Systemic Anaphylaxis, Swelling Patches cause mouth to swell Swelling in mouth from fentanyl patch Levaquin [Levofloxacin] High 2013    Not Reported This Time, Swelling Other reaction(s): mild rash/itching Chlorhexidine Medium 2014   Topical Rash Norco [Hydrocodone-acetaminophen] Medium 2015    Nausea and Vomiting Other reaction(s): gi distress Acetaminophen  2016    Other (comments) [place]  2016    Rash, Itching Codeine  2013    Other (comments) Glycopyrrolate  2014    Swelling Pt  States  faciAL  SWELLING   POST  ROBINUL  IV Pt  States  faciAL  SWELLING   POST  ROBINUL  IV Morphine  05/31/2016    Nausea and Vomiting Pt states she is not allergic Pcn [Penicillins]  11/26/2013    Swelling Percocet [Oxycodone-acetaminophen]  01/30/2015    Nausea and Vomiting Other reaction(s): gi distress 
nausea Other reaction(s): anaphylaxis/angioedema Per pt. She is allergic Tape [Adhesive]  05/31/2016    Rash Tramadol  12/05/2013    Swelling Current Immunizations  Reviewed on 4/24/2017 Name Date Influenza Vaccine 11/7/2016, 2/24/2016, 3/23/2015, 12/1/2014, 10/9/2013, 10/20/2012 Pneumococcal Polysaccharide (PPSV-23) 3/22/2015, 10/22/2012 Pneumococcal Vaccine (Unspecified Type) 3/4/2016, 1/2/2013 Not reviewed this visit You Were Diagnosed With   
  
 Codes Comments Essential hypertension    -  Primary ICD-10-CM: I10 
ICD-9-CM: 401.9 Encounter for immunization     ICD-10-CM: G13 ICD-9-CM: V03.89 Hyperlipidemia, unspecified hyperlipidemia type     ICD-10-CM: E78.5 ICD-9-CM: 272.4 Routine general medical examination at a health care facility     ICD-10-CM: Z00.00 ICD-9-CM: V70.0 Type 2 diabetes mellitus with complication, with long-term current use of insulin (HCC)     ICD-10-CM: E11.8, Z79.4 ICD-9-CM: 250.90, V58.67 Pain of left calf     ICD-10-CM: O85.167 ICD-9-CM: 729.5 Vitals BP Pulse Temp Resp Height(growth percentile) Weight(growth percentile) 146/85 (BP 1 Location: Right arm, BP Patient Position: Sitting) 95 98.4 °F (36.9 °C) (Oral) 16 5' 2\" (1.575 m) 151 lb (68.5 kg) LMP SpO2 BMI OB Status Smoking Status 08/20/2013 98% 27.62 kg/m2 Hysterectomy Never Smoker BMI and BSA Data Body Mass Index Body Surface Area  
 27.62 kg/m 2 1.73 m 2 Preferred Pharmacy Pharmacy Name Phone [facility] [phone] Your Updated Medication List  
  
   
This list is accurate as of 3/7/18  9:29 AM.  Always use your most recent med list.  
  
  
  
  
 ALBUTEROL IN Take  by inhalation. diph,Pertuss(Acell),Tet Vac-PF 2 Lf-(2.5-5-3-5 mcg)-5Lf/0.5 mL susp Commonly known as:  ADACEL  
0.5 mL by IntraMUSCular route once for 1 dose. EPINEPHrine 0.3 mg/0.3 mL injection Commonly known as:  EPIPEN  
0.3 mL by IntraMUSCular route once as needed for up to 1 dose. insulin nph-regular human rec 100 unit/mL (70-30) Inpn Commonly known as:  HUMULIN 70-30 Give 25 units in the QAM and 30 units at bedtime  
  
 lisinopril-hydroCHLOROthiazide 20-12.5 mg per tablet Commonly known as:  Hiled Saint Take 1 Tab by mouth daily. LORazepam 1 mg tablet Commonly known as:  ATIVAN  
1 mg.  
  
 ondansetron 4 mg disintegrating tablet Commonly known as:  ZOFRAN ODT Take 1 Tab by mouth every eight (8) hours as needed for Nausea. polyethylene glycol 17 gram packet Commonly known as:  Sigrid Session Take 1 Packet by mouth daily. traZODone 300 mg tablet Commonly known as:  Natali Pacific Take 300 mg by mouth nightly. Prescriptions Printed Refills diph,Pertuss,Acell,,Tet Vac-PF (ADACEL) 2 Lf-(2.5-5-3-5 mcg)-5Lf/0.5 mL susp 0 Si.5 mL by IntraMUSCular route once for 1 dose. Class: Print Route: IntraMUSCular Prescriptions Sent to Pharmacy Refills  
 lisinopril-hydroCHLOROthiazide (PRINZIDE, ZESTORETIC) 20-12.5 mg per tablet 1 Sig: Take 1 Tab by mouth daily. Class: Normal  
 Pharmacy: [facility] [address] [place] [address] Ph #: [phone] Route: Oral  
  
To-Do List   
 2018 Lab:  CBC WITH AUTOMATED DIFF   
  
 2018 Imaging:  DUPLEX LOWER EXT VENOUS LEFT   
  
 2018 Lab:  HEMOGLOBIN A1C WITH EAG   
  
 2018 Lab:  LIPID PANEL   
  
 2018 Lab: METABOLIC PANEL, COMPREHENSIVE   
  
 03/07/2018 Lab:  MICROALBUMIN, UR, RAND W/ MICROALB/CREAT RATIO   
  
 03/07/2018 Lab:  T4, FREE   
  
 03/07/2018 Lab:  TSH 3RD GENERATION   
  
 03/07/2018 Lab:  URINALYSIS W/ RFLX MICROSCOPIC   
  
 03/07/2018 Lab:  VITAMIN D, 25 HYDROXY   
  
 04/02/2018 9:00 AM  
  Appointment with [address] [facility] [address] at [facility] ([phone]) 05/07/2018 9:00 AM  
  Appointment with [address] [facility] [address] at [facility] ([phone]) Patient Instructions Learning About Meal Planning for Diabetes Why plan your meals? Meal planning can be a key part of managing diabetes. Planning meals and snacks with the right balance of carbohydrate, protein, and fat can help you keep your blood sugar at the target level you set with your doctor. You don't have to eat special foods. You can eat what your family eats, including sweets once in a while. But you do have to pay attention to how often you eat and how much you eat of certain foods. You may want to work with a dietitian or a certified diabetes educator. He or she can give you tips and meal ideas and can answer your questions about meal planning. This health professional can also help you reach a healthy weight if that is one of your goals. What plan is right for you? Your dietitian or diabetes educator may suggest that you start with the plate format or carbohydrate counting. The plate format The plate format is a simple way to help you manage how you eat. You plan meals by learning how much space each food should take on a plate. Using the plate format helps you spread carbohydrate throughout the day. It can make it easier to keep your blood sugar level within your target range. It also helps you see if you're eating healthy portion sizes.  
To use the plate format, you put non-starchy vegetables on half your plate. Add meat or meat substitutes on one-quarter of the plate. Put a grain or starchy vegetable (such as brown rice or a potato) on the final quarter of the plate. You can add a small piece of fruit and some low-fat or fat-free milk or yogurt, depending on your carbohydrate goal for each meal. 
Here are some tips for using the plate format: · Make sure that you are not using an oversized plate. A 9-inch plate is best. Many restaurants use larger plates. · Get used to using the plate format at home. Then you can use it when you eat out. · Write down your questions about using the plate format. Talk to your doctor, a dietitian, or a diabetes educator about your concerns. Carbohydrate counting With carbohydrate counting, you plan meals based on the amount of carbohydrate in each food. Carbohydrate raises blood sugar higher and more quickly than any other nutrient. It is found in desserts, breads and cereals, and fruit. It's also found in starchy vegetables such as potatoes and corn, grains such as rice and pasta, and milk and yogurt. Spreading carbohydrate throughout the day helps keep your blood sugar levels within your target range. Your daily amount depends on several things, including your weight, how active you are, which diabetes medicines you take, and what your goals are for your blood sugar levels. A registered dietitian or diabetes educator can help you plan how much carbohydrate to include in each meal and snack. A guideline for your daily amount of carbohydrate is: · 45 to 60 grams at each meal. That's about the same as 3 to 4 carbohydrate servings. · 15 to 20 grams at each snack. That's about the same as 1 carbohydrate serving. The Nutrition Facts label on packaged foods tells you how much carbohydrate is in a serving of the food. First, look at the serving size on the food label. Is that the amount you eat in a serving?  All of the nutrition information on a food label is based on that serving size. So if you eat more or less than that, you'll need to adjust the other numbers. Total carbohydrate is the next thing you need to look for on the label. If you count carbohydrate servings, one serving of carbohydrate is 15 grams. For foods that don't come with labels, such as fresh fruits and vegetables, you'll need a guide that lists carbohydrate in these foods. Ask your doctor, dietitian, or diabetes educator about books or other nutrition guides you can use. If you take insulin, you need to know how many grams of carbohydrate are in a meal. This lets you know how much rapid-acting insulin to take before you eat. If you use an insulin pump, you get a constant rate of insulin during the day. So the pump must be programmed at meals to give you extra insulin to cover the rise in blood sugar after meals. When you know how much carbohydrate you will eat, you can take the right amount of insulin. Or, if you always use the same amount of insulin, you need to make sure that you eat the same amount of carbohydrate at meals. If you need more help to understand carbohydrate counting and food labels, ask your doctor, dietitian, or diabetes educator. How do you get started with meal planning? Here are some tips to get started: 
· Plan your meals a week at a time. Don't forget to include snacks too. · Use cookbooks or online recipes to plan several main meals. Plan some quick meals for busy nights. You also can double some recipes that freeze well. Then you can save half for other busy nights when you don't have time to cook. · Make sure you have the ingredients you need for your recipes. If you're running low on basic items, put these items on your shopping list too. · List foods that you use to make breakfasts, lunches, and snacks. List plenty of fruits and vegetables. · Post this list on the refrigerator.  Add to it as you think of more things you need. · Take the list to the store to do your weekly shopping. Follow-up care is a key part of your treatment and safety. Be sure to make and go to all appointments, and call your doctor if you are having problems. It's also a good idea to know your test results and keep a list of the medicines you take. Where can you learn more? Go to http://imani-jenny.info/. Aidee Miranda in the search box to learn more about \"Learning About Meal Planning for Diabetes. \" Current as of: March 13, 2017 Content Version: 11.4 © 9567-5050 [facility]. Care instructions adapted under license by [employer] (which disclaims liability or warranty for this information). If you have questions about a medical condition or this instruction, always ask your healthcare professional. [address] any warranty or liability for your use of this information. Learning About Diabetes and Your Teeth How does diabetes affect your teeth and gums? When you have diabetes, managing blood sugar levels and taking good care of your teeth and gums are both important. When blood sugar levels are high, there's a greater risk for: · Gum (periodontal) disease. · Tooth decay. · Fungal infections in the mouth, like thrush. · Dry mouth, or xerostomia (say \"spencer-oscar-STO-sia-\"). The mouth needs saliva to neutralize the acids in your mouth. These acids can lead to gum disease and tooth decay. Keeping your blood sugar levels in your target range can help prevent problems with the teeth and gums. If you have any problems with your teeth or gums, see your dentist. 
How do you care for your teeth and gums when you have diabetes? · Brush your teeth twice a day. · Floss daily. Make sure to press the floss against your teeth and not your gums. · Check each day for areas where your gums might be red or painful. Be sure to let your dentist know of any sores in your mouth. · See your dentist regularly for professional cleaning of your teeth and to look for gum problems. Many dentists recommend getting checkups twice a year. Remind your dentist that you have diabetes before any work is done. · Don't smoke or use smokeless tobacco. Tobacco use with diabetes can lead to a greater risk of severe gum disease. If you need help quitting, talk to your doctor about stop-smoking programs and medicines. These can increase your chances of quitting for good. Follow-up care is a key part of your treatment and safety. Be sure to make and go to all appointments, and call your doctor if you are having problems. It's also a good idea to know your test results and keep a list of the medicines you take. Where can you learn more? Go to http://imani-jenny.info/. Enter R214 in the search box to learn more about \"Learning About Diabetes and Your Teeth. \" 
Current as of: March 13, 2017 Content Version: 11.4 © 1814-7162 [facility]. Care instructions adapted under license by [employer] (which disclaims liability or warranty for this information). If you have questions about a medical condition or this instruction, always ask your healthcare professional. [place] [address] any warranty or liability for your use of this information. High Blood Pressure: Care Instructions Your Care Instructions If your blood pressure is usually above 140/90, you have high blood pressure, or hypertension. That means the top number is 140 or higher or the bottom number is 90 or higher, or both. Despite what a lot of people think, high blood pressure usually doesn't cause headaches or make you feel dizzy or lightheaded. It usually has no symptoms. But it does increase your risk for heart attack, stroke, and kidney or eye damage. The higher your blood pressure, the more your risk increases. Your doctor will give you a goal for your blood pressure. Your goal will be based on your health and your age. An example of a goal is to keep your blood pressure below 140/90. Lifestyle changes, such as eating healthy and being active, are always important to help lower blood pressure. You might also take medicine to reach your blood pressure goal. 
Follow-up care is a key part of your treatment and safety. Be sure to make and go to all appointments, and call your doctor if you are having problems. It's also a good idea to know your test results and keep a list of the medicines you take. How can you care for yourself at home? Medical treatment · If you stop taking your medicine, your blood pressure will go back up. You may take one or more types of medicine to lower your blood pressure. Be safe with medicines. Take your medicine exactly as prescribed. Call your doctor if you think you are having a problem with your medicine. · Talk to your doctor before you start taking aspirin every day. Aspirin can help certain people lower their risk of a heart attack or stroke. But taking aspirin isn't right for everyone, because it can cause serious bleeding. · See your doctor regularly. You may need to see the doctor more often at first or until your blood pressure comes down. · If you are taking blood pressure medicine, talk to your doctor before you take decongestants or anti-inflammatory medicine, such as ibuprofen. Some of these medicines can raise blood pressure. · Learn how to check your blood pressure at home. Lifestyle changes · Stay at a healthy weight. This is especially important if you put on weight around the waist. Losing even 10 pounds can help you lower your blood pressure. · If your doctor recommends it, get more exercise. Walking is a good choice. Bit by bit, increase the amount you walk every day. Try for at least 30 minutes on most days of the week.  You also may want to swim, bike, or do other activities. · Avoid or limit alcohol. Talk to your doctor about whether you can drink any alcohol. · Try to limit how much sodium you eat to less than 2,300 milligrams (mg) a day. Your doctor may ask you to try to eat less than 1,500 mg a day. · Eat plenty of fruits (such as bananas and oranges), vegetables, legumes, whole grains, and low-fat dairy products. · Lower the amount of saturated fat in your diet. Saturated fat is found in animal products such as milk, cheese, and meat. Limiting these foods may help you lose weight and also lower your risk for heart disease. · Do not smoke. Smoking increases your risk for heart attack and stroke. If you need help quitting, talk to your doctor about stop-smoking programs and medicines. These can increase your chances of quitting for good. When should you call for help? Call 911 anytime you think you may need emergency care. This may mean having symptoms that suggest that your blood pressure is causing a serious heart or blood vessel problem. Your blood pressure may be over 180/110. ? For example, call 911 if: 
? · You have symptoms of a heart attack. These may include: ¨ Chest pain or pressure, or a strange feeling in the chest. 
¨ Sweating. ¨ Shortness of breath. ¨ Nausea or vomiting. ¨ Pain, pressure, or a strange feeling in the back, neck, jaw, or upper belly or in one or both shoulders or arms. ¨ Lightheadedness or sudden weakness. ¨ A fast or irregular heartbeat. ? · You have symptoms of a stroke. These may include: 
¨ Sudden numbness, tingling, weakness, or loss of movement in your face, arm, or leg, especially on only one side of your body. ¨ Sudden vision changes. ¨ Sudden trouble speaking. ¨ Sudden confusion or trouble understanding simple statements. ¨ Sudden problems with walking or balance. ¨ A sudden, severe headache that is different from past headaches. ? · You have severe back or belly pain. ?Do not wait until your blood pressure comes down on its own. Get help right away. ?Call your doctor now or seek immediate care if: 
? · Your blood pressure is much higher than normal (such as 180/110 or higher), but you don't have symptoms. ? · You think high blood pressure is causing symptoms, such as: ¨ Severe headache. ¨ Blurry vision. ? Watch closely for changes in your health, and be sure to contact your doctor if: 
? · Your blood pressure measures 140/90 or higher at least 2 times. That means the top number is 140 or higher or the bottom number is 90 or higher, or both. ? · You think you may be having side effects from your blood pressure medicine. ? · Your blood pressure is usually normal, but it goes above normal at least 2 times. Where can you learn more? Go to http://imani-jenny.info/. Enter P502 in the search box to learn more about \"High Blood Pressure: Care Instructions. \" Current as of: September 21, 2016 Content Version: 11.4 © 9520-4854 [employer]. Care instructions adapted under license by [employer] (which disclaims liability or warranty for this information). If you have questions about a medical condition or this instruction, always ask your healthcare professional. [place] [address] any warranty or liability for your use of this information. Type 2 Diabetes: Care Instructions Your Care Instructions Type 2 diabetes is a disease that develops when the body's tissues cannot use insulin properly. Over time, the pancreas cannot make enough insulin. Insulin is a hormone that helps the body's cells use sugar (glucose) for energy. It also helps the body store extra sugar in muscle, fat, and liver cells. Without insulin, the sugar cannot get into the cells to do its work. It stays in the blood instead. This can cause high blood sugar levels.  A person has diabetes when the blood sugar stays too high too much of the time. Over time, diabetes can lead to diseases of the heart, blood vessels, nerves, kidneys, and eyes. You may be able to control your blood sugar by losing weight, eating a healthy diet, and getting daily exercise. You may also have to take insulin or other diabetes medicine. Follow-up care is a key part of your treatment and safety. Be sure to make and go to all appointments. Call your doctor if you are having problems. It's also a good idea to know your test results and keep a list of the medicines you take. How can you care for yourself at home? · Keep your blood sugar at a target level (which you set with your doctor). ¨ Eat a good diet that spreads carbohydrate throughout the day. Carbohydrate-the body's main source of fuel-affects blood sugar more than any other nutrient. Carbohydrate is in fruits, vegetables, milk, and yogurt. It also is in breads, cereals, vegetables such as potatoes and corn, and sugary foods such as candy and cakes. ¨ Aim for 30 minutes of exercise on most, preferably all, days of the week. Walking is a good choice. You also may want to do other activities, such as running, swimming, cycling, or playing tennis or team sports. If your doctor says it's okay, do muscle-strengthening exercises at least 2 times a week. ¨ Take your medicines exactly as prescribed. Call your doctor if you think you are having a problem with your medicine. You will get more details on the specific medicines your doctor prescribes. · Check your blood sugar as often as your doctor recommends. It is important to keep track of any symptoms you have, such as low blood sugar. Also tell your doctor if you have any changes in your activities, diet, or insulin use. · Talk to your doctor before you start taking aspirin every day. Aspirin can help certain people lower their risk of a heart attack or stroke. But taking aspirin isn't right for everyone, because it can cause serious bleeding. · Do not smoke. If you need help quitting, talk to your doctor about stop-smoking programs and medicines. These can increase your chances of quitting for good. · Keep your cholesterol and blood pressure at normal levels. You may need to take one or more medicines to reach your goals. Take them exactly as directed. Do not stop or change a medicine without talking to your doctor first. 
When should you call for help? Call 911 anytime you think you may need emergency care. For example, call if: 
? · You passed out (lost consciousness), or you suddenly become very sleepy or confused. (You may have very low blood sugar.) ? Call your doctor now or seek immediate medical care if: 
? · Your blood sugar is 300 mg/dL or is higher than the level your doctor has set for you. ? · You have symptoms of low blood sugar, such as: ¨ Sweating. ¨ Feeling nervous, shaky, and weak. ¨ Extreme hunger and slight nausea. ¨ Dizziness and headache. ¨ Blurred vision. ¨ Confusion. ? Watch closely for changes in your health, and be sure to contact your doctor if: 
? · You often have problems controlling your blood sugar. ? · You have symptoms of long-term diabetes problems, such as: ¨ New vision changes. ¨ New pain, numbness, or tingling in your hands or feet. ¨ Skin problems. Where can you learn more? Go to http://imani-jenny.info/. Enter C553 in the search box to learn more about \"Type 2 Diabetes: Care Instructions. \" Current as of: March 13, 2017 Content Version: 11.4 © 7732-2400 [employer]. Care instructions adapted under license by [employer] (which disclaims liability or warranty for this information). If you have questions about a medical condition or this instruction, always ask your healthcare professional. [address] any warranty or liability for your use of this information. Tdap (Tetanus, Diphtheria, Pertussis) Vaccine: What You Need to Know Why get vaccinated? Tetanus, diphtheria, and pertussis are very serious diseases. Tdap vaccine can protect us from these diseases. And Tdap vaccine given to pregnant women can protect  babies against pertussis. Tetanus (lockjaw) is rare in the [facility] today. It causes painful muscle tightening and stiffness, usually all over the body. · It can lead to tightening of muscles in the head and neck so you can't open your mouth, swallow, or sometimes even breathe. Tetanus kills about 1 out of 10 people who are infected even after receiving the best medical care. Diphtheria is also rare in the [place] today. It can cause a thick coating to form in the back of the throat. · It can lead to breathing problems, heart failure, paralysis, and death. Pertussis (whooping cough) causes severe coughing spells, which can cause difficulty breathing, vomiting, and disturbed sleep. · It can also lead to weight loss, incontinence, and rib fractures. Up to 2 in 100 adolescents and 5 in 100 adults with pertussis are hospitalized or have complications, which could include pneumonia or death. These diseases are caused by bacteria. Diphtheria and pertussis are spread from person to person through secretions from coughing or sneezing. Tetanus enters the body through cuts, scratches, or wounds. Before vaccines, as many as 200,000 cases of diphtheria, 200,000 cases of pertussis, and hundreds of cases of tetanus were reported in the [place] each year. Since vaccination began, reports of cases for tetanus and diphtheria have dropped by about 99% and for pertussis by about 80%. Tdap vaccine The Tdap vaccine can protect adolescents and adults from tetanus, diphtheria, and pertussis. One dose of Tdap is routinely given at age 6 or 15. People who did not get Tdap at that age should get it as soon as possible. Tdap is especially important for health care professionals and anyone having close contact with a baby younger than 12 months. Pregnant women should get a dose of Tdap during every pregnancy, to protect the  from pertussis. Infants are most at risk for severe, life-threatening complications from pertussis. Another vaccine, called Td, protects against tetanus and diphtheria, but not pertussis. A Td booster should be given every 10 years. Tdap may be given as one of these boosters if you have never gotten Tdap before. Tdap may also be given after a severe cut or burn to prevent tetanus infection. Your doctor or the person giving you the vaccine can give you more information. Tdap may safely be given at the same time as other vaccines. Some people should not get this vaccine · A person who has ever had a life-threatening allergic reaction after a previous dose of any diphtheria-, tetanus-, or pertussis-containing vaccine, OR has a severe allergy to any part of this vaccine, should not get Tdap vaccine. Tell the person giving the vaccine about any severe allergies. · Anyone who had coma or long repeated seizures within 7 days after a childhood dose of DTP or DTaP, or a previous dose of Tdap, should not get Tdap, unless a cause other than the vaccine was found. They can still get Td. · Talk to your doctor if you: 
¨ Have seizures or another nervous system problem. ¨ Had severe pain or swelling after any vaccine containing diphtheria, tetanus, or pertussis. ¨ Ever had a condition called Guillain-Barré Syndrome (GBS). ¨ Aren't feeling well on the day the shot is scheduled. Risks With any medicine, including vaccines, there is a chance of side effects. These are usually mild and go away on their own. Serious reactions are also possible but are rare. Most people who get Tdap vaccine do not have any problems with it. Mild problems following Tdap 
(Did not interfere with activities) · Pain where the shot was given (about 3 in 4 adolescents or 2 in 3 adults) · Redness or swelling where the shot was given (about 1 person in 5) · Mild fever of at least 100.4°F (up to about 1 in 25 adolescents or 1 in 100 adults) · Headache (about 3 or 4 people in 10) · Tiredness (about 1 person in 3 or 4) · Nausea, vomiting, diarrhea, stomachache (up to 1 in 4 adolescents or 1 in 10 adults) · Chills, sore joints (about 1 person in 10) · Body aches (about 1 person in 3 or 4) · Rash, swollen glands (uncommon) Moderate problems following Tdap (Interfered with activities, but did not require medical attention) · Pain where the shot was given (up to 1 in 5 or 6) · Redness or swelling where the shot was given (up to about 1 in 16 adolescents or 1 in 12 adults) · Fever over 102°F (about 1 in 100 adolescents or 1 in 250 adults) · Headache (about 1 in 7 adolescents or 1 in 10 adults) · Nausea, vomiting, diarrhea, stomachache (up to 1 to 3 people in 100) · Swelling of the entire arm where the shot was given (up to about 1 in 500) Severe problems following Tdap 
(Unable to perform usual activities; required medical attention) · Swelling, severe pain, bleeding and redness in the arm where the shot was given (rare) Problems that could happen after any vaccine: · People sometimes faint after a medical procedure, including vaccination. Sitting or lying down for about 15 minutes can help prevent fainting, and injuries caused by a fall. Tell your doctor if you feel dizzy or have vision changes or ringing in the ears. · Some people get severe pain in the shoulder and have difficulty moving the arm where a shot was given. This happens very rarely. · Any medication can cause a severe allergic reaction. Such reactions from a vaccine are very rare, estimated at fewer than 1 in a million doses, and would happen within a few minutes to a few hours after the vaccination. As with any medicine, there is a very remote chance of a vaccine causing a serious injury or death. The safety of vaccines is always being monitored. For more information, visit: www.cdc.gov/vaccinesafety. What if there is a serious problem? What should I look for? · Look for anything that concerns you, such as signs of a severe allergic reaction, very high fever, or unusual behavior. Signs of a severe allergic reaction can include hives, swelling of the face and throat, difficulty breathing, a fast heartbeat, dizziness, and weakness. These would usually start a few minutes to a few hours after the vaccination. What should I do? · If you think it is a severe allergic reaction or other emergency that can't wait, call 9-1-1 or get the person to the nearest hospital. Otherwise, call your doctor. · Afterward, the reaction should be reported to the Vaccine Adverse Event Reporting System (VAERS). Your doctor might file this report, or you can do it yourself through the VAERS web site at www.vaers. [facility].gov, or by calling 5-516.724.3943. VAERS does not give medical advice. The National Vaccine Injury Compensation Program 
The National Vaccine Injury Compensation Program (VICP) is a federal program that was created to compensate people who may have been injured by certain vaccines. Persons who believe they may have been injured by a vaccine can learn about the program and about filing a claim by calling 0-413.636.2511 or visiting the [employer] website at www.[facility].gov/vaccinecompensation. There is a time limit to file a claim for compensation. How can I learn more? · Ask your doctor. He or she can give you the vaccine package insert or suggest other sources of information. · Call your local or state health department. · Contact the Centers for Disease Control and Prevention (CDC): 
¨ Call 8-570.528.1921 (1-800-CDC-INFO) or ¨ Visit CDC's website at www.cdc.gov/vaccines Vaccine Information Statement (Interim) Tdap Vaccine 
(2/24/15) 42 JESUS Villalobos 430QF-31 [facility] and [employer] Centers for Disease Control and Prevention Many Vaccine Information Statements are available in [place] and other languages. See www.immunize.org/vis. Muchas hojas de información sobre vacunas están disponibles en español y en otros idiomas. Visite www.immunize.org/vis. Care instructions adapted under license by [employer] (which disclaims liability or warranty for this information). If you have questions about a medical condition or this instruction, always ask your healthcare professional. [place] [address] any warranty or liability for your use of this information. Well Visit, Ages 25 to 48: Care Instructions Your Care Instructions Physical exams can help you stay healthy. Your doctor has checked your overall health and may have suggested ways to take good care of yourself. He or she also may have recommended tests. At home, you can help prevent illness with healthy eating, regular exercise, and other steps. Follow-up care is a key part of your treatment and safety. Be sure to make and go to all appointments, and call your doctor if you are having problems. It's also a good idea to know your test results and keep a list of the medicines you take. How can you care for yourself at home? · Reach and stay at a healthy weight. This will lower your risk for many problems, such as obesity, diabetes, heart disease, and high blood pressure. · Get at least 30 minutes of physical activity on most days of the week. Walking is a good choice. You also may want to do other activities, such as running, swimming, cycling, or playing tennis or team sports. Discuss any changes in your exercise program with your doctor. · Do not smoke or allow others to smoke around you. If you need help quitting, talk to your doctor about stop-smoking programs and medicines. These can increase your chances of quitting for good. · Talk to your doctor about whether you have any risk factors for sexually transmitted infections (STIs). Having one sex partner (who does not have STIs and does not have sex with anyone else) is a good way to avoid these infections. · Use birth control if you do not want to have children at this time. Talk with your doctor about the choices available and what might be best for you. · Protect your skin from too much sun. When you're outdoors from 10 a.m. to 4 p.m., stay in the shade or cover up with clothing and a hat with a wide brim. Wear sunglasses that block UV rays. Even when it's cloudy, put broad-spectrum sunscreen (SPF 30 or higher) on any exposed skin. · See a dentist one or two times a year for checkups and to have your teeth cleaned. · Wear a seat belt in the car. · Drink alcohol in moderation, if at all. That means no more than 2 drinks a day for men and 1 drink a day for women. Follow your doctor's advice about when to have certain tests. These tests can spot problems early. For everyone · Cholesterol. Have the fat (cholesterol) in your blood tested after age 21. Your doctor will tell you how often to have this done based on your age, family history, or other things that can increase your risk for heart disease. · Blood pressure. Have your blood pressure checked during a routine doctor visit. Your doctor will tell you how often to check your blood pressure based on your age, your blood pressure results, and other factors. · Vision. Talk with your doctor about how often to have a glaucoma test. 
· Diabetes. Ask your doctor whether you should have tests for diabetes. · Colon cancer. Have a test for colon cancer at age 48. You may have one of several tests. If you are younger than 48, you may need a test earlier if you have any risk factors.  Risk factors include whether you already had a precancerous polyp removed from your colon or whether your parent, brother, sister, or child has had colon cancer. For women · Breast exam and mammogram. Talk to your doctor about when you should have a clinical breast exam and a mammogram. Medical experts differ on whether and how often women under 50 should have these tests. Your doctor can help you decide what is right for you. · Pap test and pelvic exam. Begin Pap tests at age 24. A Pap test is the best way to find cervical cancer. The test often is part of a pelvic exam. Ask how often to have this test. 
· Tests for sexually transmitted infections (STIs). Ask whether you should have tests for STIs. You may be at risk if you have sex with more than one person, especially if your partners do not wear condoms. For men · Tests for sexually transmitted infections (STIs). Ask whether you should have tests for STIs. You may be at risk if you have sex with more than one person, especially if you do not wear a condom. · Testicular cancer exam. Ask your doctor whether you should check your testicles regularly. · Prostate exam. Talk to your doctor about whether you should have a blood test (called a PSA test) for prostate cancer. Experts differ on whether and when men should have this test. Some experts suggest it if you are older than 39 and are -American or have a father or brother who got prostate cancer when he was younger than 72. When should you call for help? Watch closely for changes in your health, and be sure to contact your doctor if you have any problems or symptoms that concern you. Where can you learn more? Go to http://imani-jenny.info/. Enter P072 in the search box to learn more about \"Well Visit, Ages 25 to 48: Care Instructions. \" Current as of: May 12, 2017 Content Version: 11.4 © 5040-5788 [employer]wise[employer].  Care instructions adapted under license by [address] (which disclaims liability or warranty for this information). If you have questions about a medical condition or this instruction, always ask your healthcare professional. [address] any warranty or liability for your use of this information. Leg Pain: Care Instructions Your Care Instructions Many things can cause leg pain. Too much exercise or overuse can cause a muscle cramp (or charley horse). You can get leg cramps from not eating a balanced diet that has enough potassium, calcium, and other minerals. If you do not drink enough fluids or are taking certain medicines, you may develop leg cramps. Other causes of leg pain include injuries, blood flow problems, nerve damage, and twisted and enlarged veins (varicose veins). You can usually ease pain with self-care. Your doctor may recommend that you rest your leg and keep it elevated. Follow-up care is a key part of your treatment and safety. Be sure to make and go to all appointments, and call your doctor if you are having problems. It's also a good idea to know your test results and keep a list of the medicines you take. How can you care for yourself at home? · Take pain medicines exactly as directed. ¨ If the doctor gave you a prescription medicine for pain, take it as prescribed. ¨ If you are not taking a prescription pain medicine, ask your doctor if you can take an over-the-counter medicine. · Take any other medicines exactly as prescribed. Call your doctor if you think you are having a problem with your medicine. · Rest your leg while you have pain, and avoid standing for long periods of time. · Prop up your leg at or above the level of your heart when possible. · Make sure you are eating a balanced diet that is rich in calcium, potassium, and magnesium, especially if you are pregnant.  
· If directed by your doctor, put ice or a cold pack on the area for 10 to 20 minutes at a time. Put a thin cloth between the ice and your skin. · Your leg may be in a splint, a brace, or an elastic bandage, and you may have crutches to help you walk. Follow your doctor's directions about how long to wear supports and how to use the crutches. When should you call for help? Call 911 anytime you think you may need emergency care. For example, call if: 
? · You have sudden chest pain and shortness of breath, or you cough up blood. ? · Your leg is cool or pale or changes color. ?Call your doctor now or seek immediate medical care if: 
? · You have increasing or severe pain. ? · Your leg suddenly feels weak and you cannot move it. ? · You have signs of a blood clot, such as: 
¨ Pain in your calf, back of the knee, thigh, or groin. ¨ Redness and swelling in your leg or groin. ? · You have signs of infection, such as: 
¨ Increased pain, swelling, warmth, or redness. ¨ Red streaks leading from the sore area. ¨ Pus draining from a place on your leg. ¨ A fever. ? · You cannot bear weight on your leg. ? Watch closely for changes in your health, and be sure to contact your doctor if: 
? · You do not get better as expected. Where can you learn more? Go to http://imani-jenny.info/. Enter X522 in the search box to learn more about \"Leg Pain: Care Instructions. \" Current as of: March 20, 2017 Content Version: 11.4 © 4950-8024 [employer]. Care instructions adapted under license by [employer] (which disclaims liability or warranty for this information). If you have questions about a medical condition or this instruction, always ask your healthcare professional. [place] [address] any warranty or liability for your use of this information. Introducing [facility] [place] [facility]!    
 [facility] introduces [employer] patient portal. Now you can access parts of your medical record, email your doctor's office, and request medication refills online. 1. In your internet browser, go to [email or web address] 2. Click on the First Time User? Click Here link in the Sign In box. You will see the New Member Sign Up page. 3. Enter your [employer] Access Code exactly as it appears below. You will not need to use this code after youve completed the sign-up process. If you do not sign up before the expiration date, you must request a new code. · [employer] Access Code: [email or web address] Expires: 6/5/2018  9:29 AM 
 
4. Enter the last four digits of your Social Security Number (xxxx) and Date of Birth (mm/dd/yyyy) as indicated and click Submit. You will be taken to the next sign-up page. 5. Create a DCWafers ID. This will be your [employer] login ID and cannot be changed, so think of one that is secure and easy to remember. 6. Create a DC[employer] password. You can change your password at any time. 7. Enter your Password Reset Question and Answer. This can be used at a later time if you forget your password. 8. Enter your e-mail address. You will receive e-mail notification when new information is available in [address] 9. Click Sign Up. You can now view and download portions of your medical record. 10. Click the Download Summary menu link to download a portable copy of your medical information. If you have questions, please visit the Frequently Asked Questions section of the [employer] website. Remember, [employer] is NOT to be used for urgent needs. For medical emergencies, dial 911. Now available from your iPhone and Android! Please provide this summary of care documentation to your next provider. Your primary care clinician is listed as Sarah Vogel. If you have any questions after today's visit, please call [phone].

## 2018-03-07 NOTE — PROGRESS NOTES
Teresa Monte is a 49 y.o.  female presents today for office visit for establish care. Pt would also like to discuss diabetes and hypertension. Pt is  fasting. Pt is in Room # 8      1. Have you been to the ER, urgent care clinic since your last visit? Hospitalized since your last visit? No    2. Have you seen or consulted any other health care providers outside of the [address] [place] [address] since your last visit? Include any pap smears or colon screening. Dr. Erie Robes Dr. Shauna Meckel [facility]    [place] Copier: No orders of the defined types were placed in this encounter.   Kenny Thompson LPN

## 2018-03-07 NOTE — PATIENT INSTRUCTIONS
Learning About Meal Planning for Diabetes  Why plan your meals? Meal planning can be a key part of managing diabetes. Planning meals and snacks with the right balance of carbohydrate, protein, and fat can help you keep your blood sugar at the target level you set with your doctor. You don't have to eat special foods. You can eat what your family eats, including sweets once in a while. But you do have to pay attention to how often you eat and how much you eat of certain foods. You may want to work with a dietitian or a certified diabetes educator. He or she can give you tips and meal ideas and can answer your questions about meal planning. This health professional can also help you reach a healthy weight if that is one of your goals. What plan is right for you? Your dietitian or diabetes educator may suggest that you start with the plate format or carbohydrate counting. The plate format  The plate format is a simple way to help you manage how you eat. You plan meals by learning how much space each food should take on a plate. Using the plate format helps you spread carbohydrate throughout the day. It can make it easier to keep your blood sugar level within your target range. It also helps you see if you're eating healthy portion sizes. To use the plate format, you put non-starchy vegetables on half your plate. Add meat or meat substitutes on one-quarter of the plate. Put a grain or starchy vegetable (such as brown rice or a potato) on the final quarter of the plate. You can add a small piece of fruit and some low-fat or fat-free milk or yogurt, depending on your carbohydrate goal for each meal.  Here are some tips for using the plate format:  · Make sure that you are not using an oversized plate. A 9-inch plate is best. Many restaurants use larger plates. · Get used to using the plate format at home. Then you can use it when you eat out. · Write down your questions about using the plate format.  Talk to your doctor, a dietitian, or a diabetes educator about your concerns. Carbohydrate counting  With carbohydrate counting, you plan meals based on the amount of carbohydrate in each food. Carbohydrate raises blood sugar higher and more quickly than any other nutrient. It is found in desserts, breads and cereals, and fruit. It's also found in starchy vegetables such as potatoes and corn, grains such as rice and pasta, and milk and yogurt. Spreading carbohydrate throughout the day helps keep your blood sugar levels within your target range. Your daily amount depends on several things, including your weight, how active you are, which diabetes medicines you take, and what your goals are for your blood sugar levels. A registered dietitian or diabetes educator can help you plan how much carbohydrate to include in each meal and snack. A guideline for your daily amount of carbohydrate is:  · 45 to 60 grams at each meal. That's about the same as 3 to 4 carbohydrate servings. · 15 to 20 grams at each snack. That's about the same as 1 carbohydrate serving. The Nutrition Facts label on packaged foods tells you how much carbohydrate is in a serving of the food. First, look at the serving size on the food label. Is that the amount you eat in a serving? All of the nutrition information on a food label is based on that serving size. So if you eat more or less than that, you'll need to adjust the other numbers. Total carbohydrate is the next thing you need to look for on the label. If you count carbohydrate servings, one serving of carbohydrate is 15 grams. For foods that don't come with labels, such as fresh fruits and vegetables, you'll need a guide that lists carbohydrate in these foods. Ask your doctor, dietitian, or diabetes educator about books or other nutrition guides you can use.   If you take insulin, you need to know how many grams of carbohydrate are in a meal. This lets you know how much rapid-acting insulin to take before you eat. If you use an insulin pump, you get a constant rate of insulin during the day. So the pump must be programmed at meals to give you extra insulin to cover the rise in blood sugar after meals. When you know how much carbohydrate you will eat, you can take the right amount of insulin. Or, if you always use the same amount of insulin, you need to make sure that you eat the same amount of carbohydrate at meals. If you need more help to understand carbohydrate counting and food labels, ask your doctor, dietitian, or diabetes educator. How do you get started with meal planning? Here are some tips to get started:  · Plan your meals a week at a time. Don't forget to include snacks too. · Use cookbooks or online recipes to plan several main meals. Plan some quick meals for busy nights. You also can double some recipes that freeze well. Then you can save half for other busy nights when you don't have time to cook. · Make sure you have the ingredients you need for your recipes. If you're running low on basic items, put these items on your shopping list too. · List foods that you use to make breakfasts, lunches, and snacks. List plenty of fruits and vegetables. · Post this list on the refrigerator. Add to it as you think of more things you need. · Take the list to the store to do your weekly shopping. Follow-up care is a key part of your treatment and safety. Be sure to make and go to all appointments, and call your doctor if you are having problems. It's also a good idea to know your test results and keep a list of the medicines you take. Where can you learn more? Go to http://imani-jenny.info/. Melissa Hernandez in the search box to learn more about \"Learning About Meal Planning for Diabetes. \"  Current as of: March 13, 2017  Content Version: 11.4  © 2384-7894 [employer].  Care instructions adapted under license by [employer] (which disclaims liability or warranty for this information). If you have questions about a medical condition or this instruction, always ask your healthcare professional. [address] any warranty or liability for your use of this information. Learning About Diabetes and Your Teeth  How does diabetes affect your teeth and gums? When you have diabetes, managing blood sugar levels and taking good care of your teeth and gums are both important. When blood sugar levels are high, there's a greater risk for:  · Gum (periodontal) disease. · Tooth decay. · Fungal infections in the mouth, like thrush. · Dry mouth, or xerostomia (say \"zee-ruh-STO-sia-uh\"). The mouth needs saliva to neutralize the acids in your mouth. These acids can lead to gum disease and tooth decay. Keeping your blood sugar levels in your target range can help prevent problems with the teeth and gums. If you have any problems with your teeth or gums, see your dentist.  How do you care for your teeth and gums when you have diabetes? · Brush your teeth twice a day. · Floss daily. Make sure to press the floss against your teeth and not your gums. · Check each day for areas where your gums might be red or painful. Be sure to let your dentist know of any sores in your mouth. · See your dentist regularly for professional cleaning of your teeth and to look for gum problems. Many dentists recommend getting checkups twice a year. Remind your dentist that you have diabetes before any work is done. · Don't smoke or use smokeless tobacco. Tobacco use with diabetes can lead to a greater risk of severe gum disease. If you need help quitting, talk to your doctor about stop-smoking programs and medicines. These can increase your chances of quitting for good. Follow-up care is a key part of your treatment and safety. Be sure to make and go to all appointments, and call your doctor if you are having problems.  It's also a good idea to know your test results and keep a list of the medicines you take. Where can you learn more? Go to http://imani-jenny.info/. Enter C198 in the search box to learn more about \"Learning About Diabetes and Your Teeth. \"  Current as of: March 13, 2017  Content Version: 11.4  © 6946-4952 [employer]. Care instructions adapted under license by [employer] (which disclaims liability or warranty for this information). If you have questions about a medical condition or this instruction, always ask your healthcare professional. [address] any warranty or liability for your use of this information. High Blood Pressure: Care Instructions  Your Care Instructions    If your blood pressure is usually above 140/90, you have high blood pressure, or hypertension. That means the top number is 140 or higher or the bottom number is 90 or higher, or both. Despite what a lot of people think, high blood pressure usually doesn't cause headaches or make you feel dizzy or lightheaded. It usually has no symptoms. But it does increase your risk for heart attack, stroke, and kidney or eye damage. The higher your blood pressure, the more your risk increases. Your doctor will give you a goal for your blood pressure. Your goal will be based on your health and your age. An example of a goal is to keep your blood pressure below 140/90. Lifestyle changes, such as eating healthy and being active, are always important to help lower blood pressure. You might also take medicine to reach your blood pressure goal.  Follow-up care is a key part of your treatment and safety. Be sure to make and go to all appointments, and call your doctor if you are having problems. It's also a good idea to know your test results and keep a list of the medicines you take. How can you care for yourself at home? Medical treatment  · If you stop taking your medicine, your blood pressure will go back up.  You may take one or more types of medicine to lower your blood pressure. Be safe with medicines. Take your medicine exactly as prescribed. Call your doctor if you think you are having a problem with your medicine. · Talk to your doctor before you start taking aspirin every day. Aspirin can help certain people lower their risk of a heart attack or stroke. But taking aspirin isn't right for everyone, because it can cause serious bleeding. · See your doctor regularly. You may need to see the doctor more often at first or until your blood pressure comes down. · If you are taking blood pressure medicine, talk to your doctor before you take decongestants or anti-inflammatory medicine, such as ibuprofen. Some of these medicines can raise blood pressure. · Learn how to check your blood pressure at home. Lifestyle changes  · Stay at a healthy weight. This is especially important if you put on weight around the waist. Losing even 10 pounds can help you lower your blood pressure. · If your doctor recommends it, get more exercise. Walking is a good choice. Bit by bit, increase the amount you walk every day. Try for at least 30 minutes on most days of the week. You also may want to swim, bike, or do other activities. · Avoid or limit alcohol. Talk to your doctor about whether you can drink any alcohol. · Try to limit how much sodium you eat to less than 2,300 milligrams (mg) a day. Your doctor may ask you to try to eat less than 1,500 mg a day. · Eat plenty of fruits (such as bananas and oranges), vegetables, legumes, whole grains, and low-fat dairy products. · Lower the amount of saturated fat in your diet. Saturated fat is found in animal products such as milk, cheese, and meat. Limiting these foods may help you lose weight and also lower your risk for heart disease. · Do not smoke. Smoking increases your risk for heart attack and stroke. If you need help quitting, talk to your doctor about stop-smoking programs and medicines.  These can increase your chances of quitting for good. When should you call for help? Call 911 anytime you think you may need emergency care. This may mean having symptoms that suggest that your blood pressure is causing a serious heart or blood vessel problem. Your blood pressure may be over 180/110. ? For example, call 911 if:  ? · You have symptoms of a heart attack. These may include:  ¨ Chest pain or pressure, or a strange feeling in the chest.  ¨ Sweating. ¨ Shortness of breath. ¨ Nausea or vomiting. ¨ Pain, pressure, or a strange feeling in the back, neck, jaw, or upper belly or in one or both shoulders or arms. ¨ Lightheadedness or sudden weakness. ¨ A fast or irregular heartbeat. ? · You have symptoms of a stroke. These may include:  ¨ Sudden numbness, tingling, weakness, or loss of movement in your face, arm, or leg, especially on only one side of your body. ¨ Sudden vision changes. ¨ Sudden trouble speaking. ¨ Sudden confusion or trouble understanding simple statements. ¨ Sudden problems with walking or balance. ¨ A sudden, severe headache that is different from past headaches. ? · You have severe back or belly pain. ?Do not wait until your blood pressure comes down on its own. Get help right away. ?Call your doctor now or seek immediate care if:  ? · Your blood pressure is much higher than normal (such as 180/110 or higher), but you don't have symptoms. ? · You think high blood pressure is causing symptoms, such as:  ¨ Severe headache. ¨ Blurry vision. ? Watch closely for changes in your health, and be sure to contact your doctor if:  ? · Your blood pressure measures 140/90 or higher at least 2 times. That means the top number is 140 or higher or the bottom number is 90 or higher, or both. ? · You think you may be having side effects from your blood pressure medicine. ? · Your blood pressure is usually normal, but it goes above normal at least 2 times. Where can you learn more?   Go to http://imani-jenny.info/. Enter C848 in the search box to learn more about \"High Blood Pressure: Care Instructions. \"  Current as of: September 21, 2016  Content Version: 11.4  © 2006-2017 [employer]. Care instructions adapted under license by [employer] (which disclaims liability or warranty for this information). If you have questions about a medical condition or this instruction, always ask your healthcare professional. [place] [address] any warranty or liability for your use of this information. Type 2 Diabetes: Care Instructions  Your Care Instructions    Type 2 diabetes is a disease that develops when the body's tissues cannot use insulin properly. Over time, the pancreas cannot make enough insulin. Insulin is a hormone that helps the body's cells use sugar (glucose) for energy. It also helps the body store extra sugar in muscle, fat, and liver cells. Without insulin, the sugar cannot get into the cells to do its work. It stays in the blood instead. This can cause high blood sugar levels. A person has diabetes when the blood sugar stays too high too much of the time. Over time, diabetes can lead to diseases of the heart, blood vessels, nerves, kidneys, and eyes. You may be able to control your blood sugar by losing weight, eating a healthy diet, and getting daily exercise. You may also have to take insulin or other diabetes medicine. Follow-up care is a key part of your treatment and safety. Be sure to make and go to all appointments. Call your doctor if you are having problems. It's also a good idea to know your test results and keep a list of the medicines you take. How can you care for yourself at home? · Keep your blood sugar at a target level (which you set with your doctor). ¨ Eat a good diet that spreads carbohydrate throughout the day.  Carbohydrate-the body's main source of fuel-affects blood sugar more than any other nutrient. Carbohydrate is in fruits, vegetables, milk, and yogurt. It also is in breads, cereals, vegetables such as potatoes and corn, and sugary foods such as candy and cakes. ¨ Aim for 30 minutes of exercise on most, preferably all, days of the week. Walking is a good choice. You also may want to do other activities, such as running, swimming, cycling, or playing tennis or team sports. If your doctor says it's okay, do muscle-strengthening exercises at least 2 times a week. ¨ Take your medicines exactly as prescribed. Call your doctor if you think you are having a problem with your medicine. You will get more details on the specific medicines your doctor prescribes. · Check your blood sugar as often as your doctor recommends. It is important to keep track of any symptoms you have, such as low blood sugar. Also tell your doctor if you have any changes in your activities, diet, or insulin use. · Talk to your doctor before you start taking aspirin every day. Aspirin can help certain people lower their risk of a heart attack or stroke. But taking aspirin isn't right for everyone, because it can cause serious bleeding. · Do not smoke. If you need help quitting, talk to your doctor about stop-smoking programs and medicines. These can increase your chances of quitting for good. · Keep your cholesterol and blood pressure at normal levels. You may need to take one or more medicines to reach your goals. Take them exactly as directed. Do not stop or change a medicine without talking to your doctor first.  When should you call for help? Call 911 anytime you think you may need emergency care. For example, call if:  ? · You passed out (lost consciousness), or you suddenly become very sleepy or confused. (You may have very low blood sugar.)   ? Call your doctor now or seek immediate medical care if:  ? · Your blood sugar is 300 mg/dL or is higher than the level your doctor has set for you.    ? · You have symptoms of low blood sugar, such as:  ¨ Sweating. ¨ Feeling nervous, shaky, and weak. ¨ Extreme hunger and slight nausea. ¨ Dizziness and headache. ¨ Blurred vision. ¨ Confusion. ? Watch closely for changes in your health, and be sure to contact your doctor if:  ? · You often have problems controlling your blood sugar. ? · You have symptoms of long-term diabetes problems, such as:  ¨ New vision changes. ¨ New pain, numbness, or tingling in your hands or feet. ¨ Skin problems. Where can you learn more? Go to http://imani-jenny.info/. Enter C553 in the search box to learn more about \"Type 2 Diabetes: Care Instructions. \"  Current as of: 2017  Content Version: 11.4  © 7015-5145 [employer]. Care instructions adapted under license by [employer] (which disclaims liability or warranty for this information). If you have questions about a medical condition or this instruction, always ask your healthcare professional. [place] [address] any warranty or liability for your use of this information. Tdap (Tetanus, Diphtheria, Pertussis) Vaccine: What You Need to Know  Why get vaccinated? Tetanus, diphtheria, and pertussis are very serious diseases. Tdap vaccine can protect us from these diseases. And Tdap vaccine given to pregnant women can protect  babies against pertussis. Tetanus (lockjaw) is rare in the [facility] today. It causes painful muscle tightening and stiffness, usually all over the body. · It can lead to tightening of muscles in the head and neck so you can't open your mouth, swallow, or sometimes even breathe. Tetanus kills about 1 out of 10 people who are infected even after receiving the best medical care. Diphtheria is also rare in the [place] today. It can cause a thick coating to form in the back of the throat. · It can lead to breathing problems, heart failure, paralysis, and death.   Pertussis (whooping cough) causes severe coughing spells, which can cause difficulty breathing, vomiting, and disturbed sleep. · It can also lead to weight loss, incontinence, and rib fractures. Up to 2 in 100 adolescents and 5 in 100 adults with pertussis are hospitalized or have complications, which could include pneumonia or death. These diseases are caused by bacteria. Diphtheria and pertussis are spread from person to person through secretions from coughing or sneezing. Tetanus enters the body through cuts, scratches, or wounds. Before vaccines, as many as 200,000 cases of diphtheria, 200,000 cases of pertussis, and hundreds of cases of tetanus were reported in the [place] each year. Since vaccination began, reports of cases for tetanus and diphtheria have dropped by about 99% and for pertussis by about 80%. Tdap vaccine  The Tdap vaccine can protect adolescents and adults from tetanus, diphtheria, and pertussis. One dose of Tdap is routinely given at age 6 or 15. People who did not get Tdap at that age should get it as soon as possible. Tdap is especially important for health care professionals and anyone having close contact with a baby younger than 12 months. Pregnant women should get a dose of Tdap during every pregnancy, to protect the  from pertussis. Infants are most at risk for severe, life-threatening complications from pertussis. Another vaccine, called Td, protects against tetanus and diphtheria, but not pertussis. A Td booster should be given every 10 years. Tdap may be given as one of these boosters if you have never gotten Tdap before. Tdap may also be given after a severe cut or burn to prevent tetanus infection. Your doctor or the person giving you the vaccine can give you more information. Tdap may safely be given at the same time as other vaccines.   Some people should not get this vaccine  · A person who has ever had a life-threatening allergic reaction after a previous dose of any diphtheria-, tetanus-, or pertussis-containing vaccine, OR has a severe allergy to any part of this vaccine, should not get Tdap vaccine. Tell the person giving the vaccine about any severe allergies. · Anyone who had coma or long repeated seizures within 7 days after a childhood dose of DTP or DTaP, or a previous dose of Tdap, should not get Tdap, unless a cause other than the vaccine was found. They can still get Td. · Talk to your doctor if you:  ¨ Have seizures or another nervous system problem. ¨ Had severe pain or swelling after any vaccine containing diphtheria, tetanus, or pertussis. ¨ Ever had a condition called Guillain-Barré Syndrome (GBS). ¨ Aren't feeling well on the day the shot is scheduled. Risks  With any medicine, including vaccines, there is a chance of side effects. These are usually mild and go away on their own. Serious reactions are also possible but are rare. Most people who get Tdap vaccine do not have any problems with it.   Mild problems following Tdap  (Did not interfere with activities)  · Pain where the shot was given (about 3 in 4 adolescents or 2 in 3 adults)  · Redness or swelling where the shot was given (about 1 person in 5)  · Mild fever of at least 100.4°F (up to about 1 in 25 adolescents or 1 in 100 adults)  · Headache (about 3 or 4 people in 10)  · Tiredness (about 1 person in 3 or 4)  · Nausea, vomiting, diarrhea, stomachache (up to 1 in 4 adolescents or 1 in 10 adults)  · Chills, sore joints (about 1 person in 10)  · Body aches (about 1 person in 3 or 4)  · Rash, swollen glands (uncommon)  Moderate problems following Tdap  (Interfered with activities, but did not require medical attention)  · Pain where the shot was given (up to 1 in 5 or 6)  · Redness or swelling where the shot was given (up to about 1 in 16 adolescents or 1 in 12 adults)  · Fever over 102°F (about 1 in 100 adolescents or 1 in 250 adults)  · Headache (about 1 in 7 adolescents or 1 in 10 adults)  · Nausea, vomiting, diarrhea, stomachache (up to 1 to 3 people in 100)  · Swelling of the entire arm where the shot was given (up to about 1 in 500)  Severe problems following Tdap  (Unable to perform usual activities; required medical attention)  · Swelling, severe pain, bleeding and redness in the arm where the shot was given (rare)  Problems that could happen after any vaccine:  · People sometimes faint after a medical procedure, including vaccination. Sitting or lying down for about 15 minutes can help prevent fainting, and injuries caused by a fall. Tell your doctor if you feel dizzy or have vision changes or ringing in the ears. · Some people get severe pain in the shoulder and have difficulty moving the arm where a shot was given. This happens very rarely. · Any medication can cause a severe allergic reaction. Such reactions from a vaccine are very rare, estimated at fewer than 1 in a million doses, and would happen within a few minutes to a few hours after the vaccination. As with any medicine, there is a very remote chance of a vaccine causing a serious injury or death. The safety of vaccines is always being monitored. For more information, visit: www.cdc.gov/vaccinesafety. What if there is a serious problem? What should I look for? · Look for anything that concerns you, such as signs of a severe allergic reaction, very high fever, or unusual behavior. Signs of a severe allergic reaction can include hives, swelling of the face and throat, difficulty breathing, a fast heartbeat, dizziness, and weakness. These would usually start a few minutes to a few hours after the vaccination. What should I do? · If you think it is a severe allergic reaction or other emergency that can't wait, call 9-1-1 or get the person to the nearest hospital. Otherwise, call your doctor. · Afterward, the reaction should be reported to the Vaccine Adverse Event Reporting System (VAERS).  Your doctor might file this report, or you can do it yourself through the VAERS web site at www.vaers. [facility].gov, or by calling 0-157.158.1940. [facility] does not give medical advice. The National Vaccine Injury Compensation Program  The National Vaccine Injury Compensation Program (VICP) is a federal program that was created to compensate people who may have been injured by certain vaccines. Persons who believe they may have been injured by a vaccine can learn about the program and about filing a claim by calling 7-549.500.8858 or visiting the [employer] website at www.[facility].gov/vaccinecompensation. There is a time limit to file a claim for compensation. How can I learn more? · Ask your doctor. He or she can give you the vaccine package insert or suggest other sources of information. · Call your local or state health department. · Contact the Centers for Disease Control and Prevention (CDC):  ¨ Call 4-719.819.6888 (1-800-CDC-INFO) or  ¨ Visit CDC's website at www.cdc.gov/vaccines  Vaccine Information Statement (Interim)  Tdap Vaccine  (2/24/15)  [address] [id number]-[id number]  Department of Health and Human Services  Centers for Disease Control and Prevention  Many Vaccine Information Statements are available in Faroese and other languages. See www.immunize.org/vis. Muchas hojas de información sobre vacunas están disponibles en español y en otros idiomas. Visite www.immunize.org/vis. Care instructions adapted under license by [employer] (which disclaims liability or warranty for this information). If you have questions about a medical condition or this instruction, always ask your healthcare professional. [place] [address] any warranty or liability for your use of this information. Well Visit, Ages 25 to 48: Care Instructions  Your Care Instructions    Physical exams can help you stay healthy. Your doctor has checked your overall health and may have suggested ways to take good care of yourself. He or she also may have recommended tests.  At home, you can help prevent illness with healthy eating, regular exercise, and other steps. Follow-up care is a key part of your treatment and safety. Be sure to make and go to all appointments, and call your doctor if you are having problems. It's also a good idea to know your test results and keep a list of the medicines you take. How can you care for yourself at home? · Reach and stay at a healthy weight. This will lower your risk for many problems, such as obesity, diabetes, heart disease, and high blood pressure. · Get at least 30 minutes of physical activity on most days of the week. Walking is a good choice. You also may want to do other activities, such as running, swimming, cycling, or playing tennis or team sports. Discuss any changes in your exercise program with your doctor. · Do not smoke or allow others to smoke around you. If you need help quitting, talk to your doctor about stop-smoking programs and medicines. These can increase your chances of quitting for good. · Talk to your doctor about whether you have any risk factors for sexually transmitted infections (STIs). Having one sex partner (who does not have STIs and does not have sex with anyone else) is a good way to avoid these infections. · Use birth control if you do not want to have children at this time. Talk with your doctor about the choices available and what might be best for you. · Protect your skin from too much sun. When you're outdoors from 10 a.m. to 4 p.m., stay in the shade or cover up with clothing and a hat with a wide brim. Wear sunglasses that block UV rays. Even when it's cloudy, put broad-spectrum sunscreen (SPF 30 or higher) on any exposed skin. · See a dentist one or two times a year for checkups and to have your teeth cleaned. · Wear a seat belt in the car. · Drink alcohol in moderation, if at all. That means no more than 2 drinks a day for men and 1 drink a day for women.   Follow your doctor's advice about when to have certain tests. These tests can spot problems early. For everyone  · Cholesterol. Have the fat (cholesterol) in your blood tested after age 21. Your doctor will tell you how often to have this done based on your age, family history, or other things that can increase your risk for heart disease. · Blood pressure. Have your blood pressure checked during a routine doctor visit. Your doctor will tell you how often to check your blood pressure based on your age, your blood pressure results, and other factors. · Vision. Talk with your doctor about how often to have a glaucoma test.  · Diabetes. Ask your doctor whether you should have tests for diabetes. · Colon cancer. Have a test for colon cancer at age 48. You may have one of several tests. If you are younger than 48, you may need a test earlier if you have any risk factors. Risk factors include whether you already had a precancerous polyp removed from your colon or whether your parent, brother, sister, or child has had colon cancer. For women  · Breast exam and mammogram. Talk to your doctor about when you should have a clinical breast exam and a mammogram. Medical experts differ on whether and how often women under 50 should have these tests. Your doctor can help you decide what is right for you. · Pap test and pelvic exam. Begin Pap tests at age 24. A Pap test is the best way to find cervical cancer. The test often is part of a pelvic exam. Ask how often to have this test.  · Tests for sexually transmitted infections (STIs). Ask whether you should have tests for STIs. You may be at risk if you have sex with more than one person, especially if your partners do not wear condoms. For men  · Tests for sexually transmitted infections (STIs). Ask whether you should have tests for STIs. You may be at risk if you have sex with more than one person, especially if you do not wear a condom.   · Testicular cancer exam. Ask your doctor whether you should check your testicles regularly. · Prostate exam. Talk to your doctor about whether you should have a blood test (called a PSA test) for prostate cancer. Experts differ on whether and when men should have this test. Some experts suggest it if you are older than 39 and are -American or have a father or brother who got prostate cancer when he was younger than 72. When should you call for help? Watch closely for changes in your health, and be sure to contact your doctor if you have any problems or symptoms that concern you. Where can you learn more? Go to http://imani-jenny.info/. Enter P072 in the search box to learn more about \"Well Visit, Ages 25 to 48: Care Instructions. \"  Current as of: May 12, 2017  Content Version: 11.4  © 4941-7075 [employer]. Care instructions adapted under license by [employer] (which disclaims liability or warranty for this information). If you have questions about a medical condition or this instruction, always ask your healthcare professional. [place] [address] any warranty or liability for your use of this information. Leg Pain: Care Instructions  Your Care Instructions  Many things can cause leg pain. Too much exercise or overuse can cause a muscle cramp (or charley horse). You can get leg cramps from not eating a balanced diet that has enough potassium, calcium, and other minerals. If you do not drink enough fluids or are taking certain medicines, you may develop leg cramps. Other causes of leg pain include injuries, blood flow problems, nerve damage, and twisted and enlarged veins (varicose veins). You can usually ease pain with self-care. Your doctor may recommend that you rest your leg and keep it elevated. Follow-up care is a key part of your treatment and safety. Be sure to make and go to all appointments, and call your doctor if you are having problems.  It's also a good idea to know your test results and keep a list of the medicines you take. How can you care for yourself at home? · Take pain medicines exactly as directed. ¨ If the doctor gave you a prescription medicine for pain, take it as prescribed. ¨ If you are not taking a prescription pain medicine, ask your doctor if you can take an over-the-counter medicine. · Take any other medicines exactly as prescribed. Call your doctor if you think you are having a problem with your medicine. · Rest your leg while you have pain, and avoid standing for long periods of time. · Prop up your leg at or above the level of your heart when possible. · Make sure you are eating a balanced diet that is rich in calcium, potassium, and magnesium, especially if you are pregnant. · If directed by your doctor, put ice or a cold pack on the area for 10 to 20 minutes at a time. Put a thin cloth between the ice and your skin. · Your leg may be in a splint, a brace, or an elastic bandage, and you may have crutches to help you walk. Follow your doctor's directions about how long to wear supports and how to use the crutches. When should you call for help? Call 911 anytime you think you may need emergency care. For example, call if:  ? · You have sudden chest pain and shortness of breath, or you cough up blood. ? · Your leg is cool or pale or changes color. ?Call your doctor now or seek immediate medical care if:  ? · You have increasing or severe pain. ? · Your leg suddenly feels weak and you cannot move it. ? · You have signs of a blood clot, such as:  ¨ Pain in your calf, back of the knee, thigh, or groin. ¨ Redness and swelling in your leg or groin. ? · You have signs of infection, such as:  ¨ Increased pain, swelling, warmth, or redness. ¨ Red streaks leading from the sore area. ¨ Pus draining from a place on your leg. ¨ A fever. ? · You cannot bear weight on your leg. ? Watch closely for changes in your health, and be sure to contact your doctor if:  ? · You do not get better as expected. Where can you learn more? Go to http://imani-jenny.info/. Enter F601 in the search box to learn more about \"Leg Pain: Care Instructions. \"  Current as of: March 20, 2017  Content Version: 11.4  © 6287-8909 [employer]. Care instructions adapted under license by [employer] (which disclaims liability or warranty for this information). If you have questions about a medical condition or this instruction, always ask your healthcare professional. [address] any warranty or liability for your use of this information.

## 2018-03-07 NOTE — PROGRESS NOTES
Today's Date:  3/7/2018   Patient's Name: Sandeep Barnett   Patient's :  1970     History:     Chief Complaint   Patient presents with    Diabetes    Hypertension    Cholesterol Problem       Sandeep Barnett is a 49 y.o. female presenting for initial visit to establish care. She does not have a PCP. She see psych. Dr. Lyudmila Nina  She sees oncology Dr. Bradley Burleson    PCP-Dr. Fco Moreno saw PCP last year  stopped seeing Dr. Serenity Chavez  5/15/17. Started seeing Dr. Roxanne Shields  may 2017. She states she did not like her previous PCP and she was discharged by Dr. Serafin Brand office due to not showing up for appointments. HTN- she takes Lisinopril 10 mg her blood pressure is elevated today. DM- She is on insulin which she started two years ago. Novolin 70/30. Her last A1c was 9.6 on 2016 Will check lab  HDL- she is on no medication. Will check lab  She is complaining of left calf pain. Breast cancer-Diagnosed two years ago. She has a Mediport in her leftt chest. Being followed by Oncology Dr. Bradley Burleson. Next thomas is 2/15/18. She is supposed to be having bilateral breast reconstructive surgery with expander. Was told by surgeon that she needs to see a pcp to manage her blood pressure and diabetes prior to surgery. [facility] . Past Medical History:   Diagnosis Date    Alcohol abuse     Cancer [facility])     breast cancer, right    Cancer ([address]     Breast CA    Depression     Diabetes ([address]     Drug abuse     Gastrointestinal disorder     cholitis    Hyperlipidemia     Hypertension     Spine injury      Past Surgical History:   Procedure Laterality Date    COLONOSCOPY N/A 2016    COLONOS[facility] at [facility] ENDOSCOPY    HX BREAST LUMPECTOMY  2013    right    HX HYSTERECTOMY      HX ORTHOPAEDIC      Back fusion surgery 14.      HX OTHER SURGICAL      mediport    HX TONSILLECTOMY      HX TUBAL LIGATION        reports that she has never smoked. She has never used smokeless tobacco. She reports that she does not drink alcohol or use illicit drugs. Family History   Problem Relation Age of Onset    Heart Disease Mother     Stroke Father     Heart Disease Father 48    Diabetes Other     Hypertension Other     Cancer Maternal Grandmother      Allergies   Allergen Reactions    Latex Hives and Itching    Other Food Rash     Almonds    Amoxicillin Swelling     Other reaction(s): mild rash/itching    Aspirin Not Reported This Time and Swelling     Mouth swells    Fentanyl Anaphylaxis and Swelling     Patches cause mouth to swell  Swelling in mouth from fentanyl patch    Levaquin [Levofloxacin] Not Reported This Time and Swelling     Other reaction(s): mild rash/itching    Chlorhexidine Rash    Norco [Hydrocodone-Acetaminophen] Nausea and Vomiting     Other reaction(s): gi distress    Acetaminophen Other (comments)    [place] Rash and Itching    Codeine Other (comments)    Glycopyrrolate Swelling     Pt  States  faciAL  SWELLING   POST  ROBINUL  IV   Pt  States  faciAL  SWELLING   POST  ROBINUL  IV     Morphine Nausea and Vomiting     Pt states she is not allergic    Pcn [Penicillins] Swelling    Percocet [Oxycodone-Acetaminophen] Nausea and Vomiting     Other reaction(s): gi distress  nausea  Other reaction(s): anaphylaxis/angioedema  Per pt.  She is allergic    Tape [Adhesive] Rash    Tramadol Swelling       Problem List:      Patient Active Problem List   Diagnosis Code    Breast cancer ([address] C50.919    Diabetes mellitus ([address] E11.9    Chronic pain G89.29    Hypercholesterolemia E78.00    Essential hypertension I10    Depression F32.9    Nausea & vomiting R11.2    Chemotherapy induced nausea and vomiting R11.2, T45.1X5A    Antineoplastic chemotherapy induced anemia D64.81, T45.1X5A    Mouth sore secondary to chemotherapy K13.79    Anemia D64.9    Chronic abdominal pain R10.9, G89.29    Gastrointestinal hemorrhage K92.2    Anemia due to antineoplastic chemotherapy D64.81, T45.1X5A    Bipolar affective disorder (HCC) F31.9    Cellulitis of breast N61.0    Chest pain R07.9    Chronic low back pain M54.5, G89.29    Narcotic drug use F11.90    Secondary diabetes mellitus (HCC) E13.9    Persistent vomiting R11.10    Family history of coronary artery disease Z82.49    Type 2 diabetes mellitus with autonomic neuropathy (HCC) E11.43    Abnormal glucose R73.09    Leukocytopenia D72.819    Menorrhagia N92.0    Nausea with vomiting R11.2    History of bilateral mastectomy Z90.13    History of bilateral oophorectomy Z90.722    History of hysterectomy Z90.710    Type 2 diabetes mellitus (HCC) E11.9    Urinary tract infection N39.0    Peptic ulcer disease K27.9    Chemotherapy induced neutropenia ([facility]) D70.1, T45.1X5A    Malignant neoplasm of upper-outer quadrant of right female breast (HCC) C50.411       Medications:     Current Outpatient Prescriptions   Medication Sig    traZODone (DESYREL) 300 mg tablet Take 300 mg by mouth nightly.  LORazepam (ATIVAN) 1 mg tablet 1 mg.  ondansetron (ZOFRAN ODT) 4 mg disintegrating tablet Take 1 Tab by mouth every eight (8) hours as needed for Nausea.  esomeprazole magnesium (NEXIUM 24HR) 20 mg TbEC Take 20 mg by mouth daily.  lisinopril (PRINIVIL, ZESTRIL) 10 mg tablet Take 1 Tab by mouth daily.  polyethylene glycol (MIRALAX) 17 gram packet Take 1 Packet by mouth daily.  insulin nph-regular human rec (HUMULIN 70-30) 100 unit/mL (70-30) inpn Give 25 units in the QAM and 30 units at bedtime    ALBUTEROL IN Take  by inhalation.  EPINEPHrine (EPIPEN) 0.3 mg/0.3 mL (1:1,000) injection 0.3 mL by IntraMUSCular route once as needed for up to 1 dose. No current facility-administered medications for this visit.       Facility-Administered Medications Ordered in Other Visits   Medication Dose Route Frequency    sodium chloride (NS) flush 10-40 mL  10-40 mL IntraVENous PRN    heparin (porcine) pf 500 Units  500 Units InterCATHeter PRN       Review of Systems:   CONST:   Denies fatigue, weight change, appetite change  HEAD:   Denies headaches, dizziness, loss of consciousness  EENT:  Denies vision changes, dysphagia, hearing loss  CV:      Denies chest pain, palpitations, orthopnea, PND  PULM: Denies SOB, wheezing, cough, hemoptysis  GI:             Denies nausea, vomiting, abdominal pain, greasy stools, blood in stool, diarrhea, constipation  :       Denies dysuria, hematuria, change in urine  MSK:      muscle joint pain, joint swelling  SKIN:        Denies rashes, skin changes  ALLERGY: Denies seasonal allergies, itchy eyes  HEME: Denies easy bleeding/bruising  PSYCH: Denies changes in mood or anxiety    Physical Assessment:   VS:    Visit Vitals    /85 (BP 1 Location: Right arm, BP Patient Position: Sitting)    Pulse 95    Temp 98.4 °F (36.9 °C) (Oral)    Resp 16    Ht 5' 2\" (1.575 m)    Wt 151 lb (68.5 kg)    LMP 08/20/2013    SpO2 98%    BMI 27.62 kg/m2     General:   Well-groomed, well-nourished, alert, appropriate and conversant  Eyes:    PERRL, EOMI  Mouth:  MMM, good dentition, oropharynx WNL  Neck:   Neck supple, no swelling, mass or tenderness, no thyromegaly, no LAD  Cardiovasc:   No JVD. RRR, no MRG. Pulses 2+ and symmetric at distal extremities. Pulmonary:   Lungs clear bilaterally. Normal respiratory effort. Abdomen:   Abdomen soft, NT, ND, NAB. No masses or organomegaly. Extremities:   LEs warm and well-perfused. Left leg swelling x one week. Neuro:   Alert and oriented, no focal deficits. No facial asymmetry noted. Skin:    No rash or jaundice mediport to left chest.  MSK:   Normal ROM, 5/5 muscle strength  Psych:  No pressured speech or abnormal thought content    Assessment/Plan & Orders:       1. Essential hypertension    2. Encounter for immunization    3. Hyperlipidemia, unspecified hyperlipidemia type    4.  Routine general medical examination at a health care facility    5. Type 2 diabetes mellitus with complication, with long-term current use of insulin (HCC)    6. Pain of left calf        Orders Placed This Encounter    DUPLEX LOWER EXT VENOUS LEFT    CBC WITH AUTOMATED DIFF    METABOLIC PANEL, COMPREHENSIVE    LIPID PANEL    T4, FREE    TSH 3RD GENERATION    HEMOGLOBIN A1C WITH EAG    VITAMIN D, 25 HYDROXY    MICROALBUMIN, UR, RAND W/ MICROALB/CREAT RATIO    URINALYSIS W/ RFLX MICROSCOPIC    diph,Pertuss,Acell,,Tet Vac-PF (ADACEL) 2 Lf-(2.5-5-3-5 mcg)-5Lf/0.5 mL susp    lisinopril-hydroCHLOROthiazide (PRINZIDE, ZESTORETIC) 20-12.5 mg per tablet         Health maintenance screening    -- Dyslipidemia: will check FLP and CMP   -- Diabetes mellitus: will check FBG, HbA1C     -- Vaccinations:     Influenza vaccine: done    Pneumococcal vaccine: Declined     (after age 65x 2 doses, 5 years apart; Risk factors: influenza, smoking, COPD/asthma, hyposplenism/splenectomy, immunocompromise (MM, SLE, s/p transplant), homeless, incarceration, pregnancy, crack cocaine use)    Tdap: ordered     (1 booster of Td after age 48 or 1 booster Tdap after age 72)       (liver dz, DM 19-63)   -- Weight:  Body mass index is 27.62 kg/(m^2). Discussed the patient's BMI with her.   The BMI follow up plan is as follows: Improve diet and 30 min of moderate activity at least 5 times a week   -- Cervical cancer:  pap smear due      (annually age 21-65, then q6y; every 2-3 yrs if 3 consecutive negatives)   -- Breast Cancer: will order mammogram    (baseline at 36, every 1-2 yrs ages 54-69)     Follow up in 1 month for well woman exam    Felisa CASEY  [facility]  [address] [place], [address]  Phone [phone]  Fax [phone]

## 2018-03-09 ENCOUNTER — HOSPITAL ENCOUNTER (OUTPATIENT)
Dept: VASCULAR SURGERY | Age: 48
Discharge: HOME | End: 2018-03-09
Attending: NURSE PRACTITIONER
Payer: MEDICAID

## 2018-03-09 DIAGNOSIS — M79.662 PAIN OF LEFT CALF: ICD-10-CM

## 2018-03-09 PROCEDURE — 93971 EXTREMITY STUDY: CPT

## 2018-03-09 NOTE — PROCEDURES
[facility]  *** FINAL REPORT ***    Name: Magui Klein  MRN: [id number]    Outpatient  : 1970  HIS Order #: [id number]  [address] [facility] Visit #: [id number]  Date: 09 Mar 2018    TYPE OF TEST: Peripheral Venous Testing    REASON FOR TEST  Pain in limb    Left Leg:-  Deep venous thrombosis:           No  Superficial venous thrombosis:    No  Deep venous insufficiency:        No  Superficial venous insufficiency: Not examined      INTERPRETATION/FINDINGS  Duplex images were obtained using 2-D gray scale, color flow, and  spectral Doppler analysis. Left leg :  1. Deep vein(s) visualized include the common femoral, deep femoral,  proximal femoral, mid femoral, distal femoral, popliteal(above knee),  popliteal(fossa), popliteal(below knee), gastrocnemius, posterior  tibial and peroneal veins. 2. No evidence of deep venous thrombosis detected in the veins  visualized. 3. No evidence of deep vein thrombosis in the contralateral common  femoral vein. 4. Superficial vein(s) visualized include the great saphenous and  small saphenous veins. 5. No evidence of superficial thrombosis detected. 6. No evidence of reflux detected in the deep veins visualized. ADDITIONAL COMMENTS  No change since previous examination done on 16. I have personally reviewed the data relevant to the interpretation of  this  study. TECHNOLOGIST: Brett Castro. Ventura Claros  Signed: 2018 02:02 PM    PHYSICIAN: Tejas Watson.  Alejandra Rabago MD  Signed: 03/10/2018 12:46 AM

## 2018-03-13 ENCOUNTER — TELEPHONE (OUTPATIENT)
Dept: FAMILY MEDICINE CLINIC | Facility: CLINIC | Age: 48
End: 2018-03-13

## 2018-03-13 DIAGNOSIS — G89.4 CHRONIC PAIN SYNDROME: Primary | ICD-10-CM

## 2018-03-13 NOTE — TELEPHONE ENCOUNTER
Patient would like pain medication for left leg pain. Per patient has tried tylenol and ibuprofen medications are not helping.

## 2018-03-14 DIAGNOSIS — G89.4 CHRONIC PAIN SYNDROME: Primary | Chronic | ICD-10-CM

## 2018-03-14 RX ORDER — TRAZODONE HYDROCHLORIDE 300 MG/1
300 TABLET ORAL
Qty: 30 TAB | Refills: 0 | Status: SHIPPED | OUTPATIENT
Start: 2018-03-14 | End: 2018-09-05

## 2018-03-14 NOTE — TELEPHONE ENCOUNTER
Patient declined trazodone for pain. Patient wanted percocet or dilaudid. PCP declined and reccommended pain management.

## 2018-03-15 ENCOUNTER — HOSPITAL ENCOUNTER (OUTPATIENT)
Dept: ONCOLOGY | Age: 48
Discharge: HOME | End: 2018-03-15

## 2018-03-15 ENCOUNTER — HOSPITAL ENCOUNTER (OUTPATIENT)
Dept: LAB | Age: 48
Discharge: HOME | End: 2018-03-15
Payer: MEDICAID

## 2018-03-15 ENCOUNTER — OFFICE VISIT (OUTPATIENT)
Dept: ONCOLOGY | Age: 48
End: 2018-03-15

## 2018-03-15 VITALS
HEART RATE: 112 BPM | DIASTOLIC BLOOD PRESSURE: 94 MMHG | TEMPERATURE: 98 F | WEIGHT: 149 LBS | SYSTOLIC BLOOD PRESSURE: 150 MMHG | BODY MASS INDEX: 27.25 KG/M2

## 2018-03-15 DIAGNOSIS — Z17.1 MALIGNANT NEOPLASM OF UPPER-OUTER QUADRANT OF RIGHT BREAST IN FEMALE, ESTROGEN RECEPTOR NEGATIVE (HCC): ICD-10-CM

## 2018-03-15 DIAGNOSIS — C50.411 MALIGNANT NEOPLASM OF UPPER-OUTER QUADRANT OF RIGHT BREAST IN FEMALE, ESTROGEN RECEPTOR NEGATIVE (HCC): ICD-10-CM

## 2018-03-15 DIAGNOSIS — Z17.1 MALIGNANT NEOPLASM OF UPPER-OUTER QUADRANT OF RIGHT BREAST IN FEMALE, ESTROGEN RECEPTOR NEGATIVE (HCC): Primary | ICD-10-CM

## 2018-03-15 DIAGNOSIS — D72.819 CHRONIC LEUKOPENIA: ICD-10-CM

## 2018-03-15 DIAGNOSIS — M79.18 MUSCULOSKELETAL PAIN: ICD-10-CM

## 2018-03-15 DIAGNOSIS — C50.411 MALIGNANT NEOPLASM OF UPPER-OUTER QUADRANT OF RIGHT BREAST IN FEMALE, ESTROGEN RECEPTOR NEGATIVE (HCC): Primary | ICD-10-CM

## 2018-03-15 LAB
ALBUMIN SERPL-MCNC: 4.7 G/DL (ref 3.4–5)
ALBUMIN/GLOB SERPL: 1.2 {RATIO} (ref 0.8–1.7)
ALP SERPL-CCNC: 178 U/L (ref 45–117)
ALT SERPL-CCNC: 41 U/L (ref 13–56)
ANION GAP SERPL CALC-SCNC: 10 MMOL/L (ref 3–18)
AST SERPL-CCNC: 25 U/L (ref 15–37)
BASO+EOS+MONOS # BLD AUTO: 0.4 K/UL (ref 0–2.3)
BASO+EOS+MONOS # BLD AUTO: 4 % (ref 0.1–17)
BILIRUB SERPL-MCNC: 0.8 MG/DL (ref 0.2–1)
BUN SERPL-MCNC: 23 MG/DL (ref 7–18)
BUN/CREAT SERPL: 22 (ref 12–20)
CALCIUM SERPL-MCNC: 10.1 MG/DL (ref 8.5–10.1)
CHLORIDE SERPL-SCNC: 100 MMOL/L (ref 100–108)
CO2 SERPL-SCNC: 26 MMOL/L (ref 21–32)
CREAT SERPL-MCNC: 1.05 MG/DL (ref 0.6–1.3)
DIFFERENTIAL METHOD BLD: NORMAL
ERYTHROCYTE [DISTWIDTH] IN BLOOD BY AUTOMATED COUNT: 13 % (ref 11.5–14.5)
GLOBULIN SER CALC-MCNC: 3.8 G/DL (ref 2–4)
GLUCOSE SERPL-MCNC: 135 MG/DL (ref 74–99)
HCT VFR BLD AUTO: 41.7 % (ref 36–48)
HGB BLD-MCNC: 14.2 G/DL (ref 12–16)
LYMPHOCYTES # BLD: 2.4 K/UL (ref 1.1–5.9)
LYMPHOCYTES NFR BLD: 26 % (ref 14–44)
MCH RBC QN AUTO: 31.2 PG (ref 25–35)
MCHC RBC AUTO-ENTMCNC: 34.1 G/DL (ref 31–37)
MCV RBC AUTO: 91.6 FL (ref 78–102)
NEUTS SEG # BLD: 6.6 K/UL (ref 1.8–9.5)
NEUTS SEG NFR BLD: 70 % (ref 40–70)
PLATELET # BLD AUTO: 307 K/UL (ref 140–440)
POTASSIUM SERPL-SCNC: 3.5 MMOL/L (ref 3.5–5.5)
PROT SERPL-MCNC: 8.5 G/DL (ref 6.4–8.2)
RBC # BLD AUTO: 4.55 M/UL (ref 4.1–5.1)
SODIUM SERPL-SCNC: 136 MMOL/L (ref 136–145)
WBC # BLD AUTO: 9.4 K/UL (ref 4.5–13)

## 2018-03-15 PROCEDURE — 86300 IMMUNOASSAY TUMOR CA 15-3: CPT | Performed by: INTERNAL MEDICINE

## 2018-03-15 PROCEDURE — 36415 COLL VENOUS BLD VENIPUNCTURE: CPT | Performed by: INTERNAL MEDICINE

## 2018-03-15 PROCEDURE — 80053 COMPREHEN METABOLIC PANEL: CPT | Performed by: INTERNAL MEDICINE

## 2018-03-15 RX ORDER — IBUPROFEN 800 MG/1
800 TABLET ORAL
Qty: 90 TAB | Refills: 3 | Status: SHIPPED | OUTPATIENT
Start: 2018-03-15 | End: 2018-03-30

## 2018-03-15 NOTE — PROGRESS NOTES
Hematology/Oncology  Progress Note    Name: Toy Barron  Date: 3/15/2018  : 1970    Shaw Reza NP     Ms. Lonell Brittle is a 49 y.o. woman who has a history of invasive ductal adenocarcinoma the right breast with locally advanced disease. Current therapy: The patient completed a full course of carboplatin, Taxotere, Herceptin, and Perjeda 8/3/2016. She is here today in the clinic and states that she is feeling tired and fatigued. Over the past several weeks she has noticed progressive recurrent pain over the right anterior lateral chest wall. She has taken Tylenol and over-the-counter ibuprofen with marginal benefit. She has no other concerns to report at this time. Subjective:     Mrs. Lonell Brittle is a 79-year-old woman who has locally advanced invasive ductal adenocarcinoma the right breast.  She has undergone a modified radical mastectomy and completed neoadjuvant systemic chemotherapy and monoclonal antibody therapy against HER-2/karolina. Her primary complaints of the musculoskeletal pain over the right anterior chest wall and rib cage. Past medical history, family history, and social history: these were reviewed and remains unchanged. Past Medical History:   Diagnosis Date    Alcohol abuse     Cancer [facility])     breast cancer, right    Cancer ([facility] [address]     Breast CA    Depression     Diabetes ([facility] [address]     Drug abuse     Gastrointestinal disorder     cholitis    Hyperlipidemia     Hypertension     Spine injury      Past Surgical History:   Procedure Laterality Date    COLONOSCOPY N/A 2016    COLONOSCOPY performed by Ramses Sheldon MD at [facility] ENDOSCOPY    HX BREAST LUMPECTOMY  2013    right    HX HYSTERECTOMY      HX ORTHOPAEDIC      Back fusion surgery 14.      HX OTHER SURGICAL      mediport    HX TONSILLECTOMY      HX TUBAL LIGATION       Social History     Social History    Marital status:      Spouse name: N/A    Number of children: N/A    Years of education: N/A     Occupational History    Not on file. Social History Main Topics    Smoking status: Never Smoker    Smokeless tobacco: Never Used    Alcohol use No      Comment: \"Occasionally\"    Drug use: No    Sexual activity: No     Other Topics Concern    Not on file     Social History Narrative    ** Merged History Encounter **          Family History   Problem Relation Age of Onset    Heart Disease Mother     Stroke Father     Heart Disease Father 48    Diabetes Other     Hypertension Other     Cancer Maternal Grandmother      Current Outpatient Prescriptions   Medication Sig Dispense Refill    ibuprofen (MOTRIN) 800 mg tablet Take 1 Tab by mouth every eight (8) hours as needed for Pain. Take every 8 hours with food 90 Tab 3    traZODone (DESYREL) 300 mg tablet Take 1 Tab by mouth nightly. 30 Tab 0    lisinopril-hydroCHLOROthiazide (PRINZIDE, ZESTORETIC) 20-12.5 mg per tablet Take 1 Tab by mouth daily. 30 Tab 1    ondansetron (ZOFRAN ODT) 4 mg disintegrating tablet Take 1 Tab by mouth every eight (8) hours as needed for Nausea. 14 Tab 0    polyethylene glycol (MIRALAX) 17 gram packet Take 1 Packet by mouth daily. 30 Each 1    insulin nph-regular human rec (HUMULIN 70-30) 100 unit/mL (70-30) inpn Give 25 units in the QAM and 30 units at bedtime 5 Pen 3    ALBUTEROL IN Take  by inhalation.  LORazepam (ATIVAN) 1 mg tablet 1 mg.  EPINEPHrine (EPIPEN) 0.3 mg/0.3 mL (1:1,000) injection 0.3 mL by IntraMUSCular route once as needed for up to 1 dose. 1 Each 1       Review of Systems  Constitutional: The patient has complaints of a moderate degree of fatigue. HEENT: The patient denies recent head trauma, eye pain, blurred vision,  hearing deficit, oropharyngeal mucosal pain or lesions, and the patient denies throat pain or discomfort. Lymphatics: The patient denies palpable peripheral lymphadenopathy.   Hematologic: The patient denies having bruising, bleeding, or progressive fatigue. Respiratory: Patient denies having shortness of breath, cough, sputum production, fever, or dyspnea on exertion. Cardiovascular: The patient denies having leg pain, leg swelling, heart palpitations, chest permit, chest pain, or lightheadedness. The patient denies having dyspnea on exertion. Gastrointestinal: The patient denies having nausea, emesis, or diarrhea. The patient denies having any hematemesis or blood in the stool. Genitourinary: Patient denies having urinary urgency, frequency, or dysuria. The patient denies having blood in the urine. Psychological: The patient denies having symptoms of nervousness, anxiety, depression, or thoughts of harming self. Skin: Patient denies having skin rashes, skin, ulcerations, or unexplained itching or pruritus. Musculoskeletal: The patient complains of some right-sided chest pain, musculoskeletal discomfort. Objective:     Visit Vitals    LMP 08/20/2013     ECOG PS=0; Pain score 2/10  Physical Exam:   Gen. Appearance: The patient is in no acute distress. Skin: There is no bruise or rash. HEENT: The exam is unremarkable. Neck: Supple without lymphadenopathy or thyromegaly. Lungs: Clear to auscultation and percussion; there are no wheezes or rhonchi. Heart: Regular rate and rhythm; there are no murmurs, gallops, or rubs. Anterior chest wall and breast: The patient is status post right modified radical mastectomy. The skin is healed well. There is no evidence of local recurrence of disease. She has full range of motion of the right arm at the shoulder joint. Abdomen: Bowel sounds are present and normal.  There is no guarding, tenderness, or hepatosplenomegaly. Extremities: There is no clubbing, cyanosis, or edema. Neurologic: There are no focal neurologic deficits. Lymphatics: There is no palpable peripheral lymphadenopathy. Musculoskeletal: The patient has full range of motion at all joints.   There is no evidence of joint deformity or effusions. There is focal tenderness over the right anterior lateral rib cage. Psychological/psychiatric: There is no clinical evidence of anxiety, depression, or melancholy. Lab data:      Results for orders placed or performed during the hospital encounter of 03/15/18   CBC WITH 3 PART DIFF     Status: None   Result Value Ref Range Status    WBC 9.4 4.5 - 13.0 K/uL Final    RBC 4.55 4.10 - 5.10 M/uL Final    HGB 14.2 12.0 - 16.0 g/dL Final    HCT 41.7 36 - 48 % Final    MCV 91.6 78 - 102 FL Final    MCH 31.2 25.0 - 35.0 PG Final    MCHC 34.1 31 - 37 g/dL Final    RDW 13.0 11.5 - 14.5 % Final    PLATELET 534 772 - 006 K/uL Final    NEUTROPHILS 70 40 - 70 % Final    MIXED CELLS 4 0.1 - 17 % Final    LYMPHOCYTES 26 14 - 44 % Final    ABS. NEUTROPHILS 6.6 1.8 - 9.5 K/UL Final    ABS. MIXED CELLS 0.4 0.0 - 2.3 K/uL Final    ABS. LYMPHOCYTES 2.4 1.1 - 5.9 K/UL Final     Comment: Test performed at [place] [address] Location. Results Reviewed by Medical Director. DF AUTOMATED   Final           Assessment:     1. Malignant neoplasm of upper-outer quadrant of right breast in female, estrogen receptor negative ([facility] [address]    2. Musculoskeletal pain    3. Chronic leukopenia        Plan:     Malignant neoplasm of the right breast: On 3/29 her CA27.29 was 13.6. This time I will check her CA-27-29 level. Musculoskeletal chest pain (new problem): At this time I will order an x-ray of the chest wall. She will be given a prescription for Motrin 800 mg with instructions to take 1 tablet every 8 hours with food. Chemotherapy induced anemia: I have explained to the patient that her hemoglobin today, 3/15/2018, remains normal at 14.2 g/dL with hematocrit of 41.7%. She states that she is feeling tired all the time. I advised the patient to begin an exercise program to build her metabolism. Chemotherapy-induced neutropenia: I have explained to the patient that her neutropenia has resolved.   The CBC from today shows that her WBC count is normal at 9.4 with a normal absolute neutrophil count of 6.4 and normal absolute lymphocyte count of 2.4. Follow-up in 2 weeks to discuss the x-ray findings and to determine whether or not additional pain medication is required. Orders Placed This Encounter    COMPLETE CBC & AUTO DIFF WBC    InHouse CBC ([employer])     Standing Status:   Future     Number of Occurrences:   1     Standing Expiration Date:   2/56/4069    METABOLIC PANEL, COMPREHENSIVE     Standing Status:   Future     Standing Expiration Date:   3/16/2019    CA 27.29     Standing Status:   Future     Standing Expiration Date:   3/16/2019    ibuprofen (MOTRIN) 800 mg tablet     Sig: Take 1 Tab by mouth every eight (8) hours as needed for Pain.  Take every 8 hours with food     Dispense:  90 Tab     Refill:  3       Keiko Lee MD  3/15/2018

## 2018-03-17 LAB — CANCER AG27-29 SERPL-ACNC: 14.6 U/ML (ref 0–38.6)

## 2018-03-28 ENCOUNTER — HOSPITAL ENCOUNTER (EMERGENCY)
Age: 48
Discharge: HOME | End: 2018-03-29
Attending: EMERGENCY MEDICINE
Payer: MEDICAID

## 2018-03-28 VITALS
DIASTOLIC BLOOD PRESSURE: 78 MMHG | HEIGHT: 62 IN | BODY MASS INDEX: 27.23 KG/M2 | HEART RATE: 84 BPM | WEIGHT: 148 LBS | OXYGEN SATURATION: 99 % | RESPIRATION RATE: 15 BRPM | TEMPERATURE: 97.9 F | SYSTOLIC BLOOD PRESSURE: 127 MMHG

## 2018-03-28 DIAGNOSIS — N39.0 ACUTE UTI: ICD-10-CM

## 2018-03-28 DIAGNOSIS — R11.2 NON-INTRACTABLE VOMITING WITH NAUSEA, UNSPECIFIED VOMITING TYPE: ICD-10-CM

## 2018-03-28 DIAGNOSIS — R10.9 RECURRENT ABDOMINAL PAIN: Primary | ICD-10-CM

## 2018-03-28 LAB
ALBUMIN SERPL-MCNC: 3.9 G/DL (ref 3.4–5)
ALBUMIN/GLOB SERPL: 1.1 {RATIO} (ref 0.8–1.7)
ALP SERPL-CCNC: 132 U/L (ref 45–117)
ALT SERPL-CCNC: 44 U/L (ref 13–56)
ANION GAP SERPL CALC-SCNC: 9 MMOL/L (ref 3–18)
AST SERPL-CCNC: 24 U/L (ref 15–37)
BASOPHILS # BLD: 0 K/UL (ref 0–0.06)
BASOPHILS NFR BLD: 0 % (ref 0–2)
BILIRUB DIRECT SERPL-MCNC: 0.1 MG/DL (ref 0–0.2)
BILIRUB SERPL-MCNC: 0.4 MG/DL (ref 0.2–1)
BUN SERPL-MCNC: 31 MG/DL (ref 7–18)
BUN/CREAT SERPL: 26 (ref 12–20)
CALCIUM SERPL-MCNC: 9.3 MG/DL (ref 8.5–10.1)
CHLORIDE SERPL-SCNC: 106 MMOL/L (ref 100–108)
CO2 SERPL-SCNC: 25 MMOL/L (ref 21–32)
CREAT SERPL-MCNC: 1.17 MG/DL (ref 0.6–1.3)
DIFFERENTIAL METHOD BLD: ABNORMAL
EOSINOPHIL # BLD: 0.2 K/UL (ref 0–0.4)
EOSINOPHIL NFR BLD: 2 % (ref 0–5)
ERYTHROCYTE [DISTWIDTH] IN BLOOD BY AUTOMATED COUNT: 13.5 % (ref 11.6–14.5)
GLOBULIN SER CALC-MCNC: 3.5 G/DL (ref 2–4)
GLUCOSE SERPL-MCNC: 125 MG/DL (ref 74–99)
HCT VFR BLD AUTO: 36.8 % (ref 35–45)
HGB BLD-MCNC: 12.5 G/DL (ref 12–16)
INR PPP: 1 (ref 0.8–1.2)
LIPASE SERPL-CCNC: 145 U/L (ref 73–393)
LYMPHOCYTES # BLD: 2.6 K/UL (ref 0.9–3.6)
LYMPHOCYTES NFR BLD: 31 % (ref 21–52)
MCH RBC QN AUTO: 30.9 PG (ref 24–34)
MCHC RBC AUTO-ENTMCNC: 34 G/DL (ref 31–37)
MCV RBC AUTO: 90.9 FL (ref 74–97)
MONOCYTES # BLD: 0.5 K/UL (ref 0.05–1.2)
MONOCYTES NFR BLD: 5 % (ref 3–10)
NEUTS SEG # BLD: 5.1 K/UL (ref 1.8–8)
NEUTS SEG NFR BLD: 62 % (ref 40–73)
PLATELET # BLD AUTO: 235 K/UL (ref 135–420)
PMV BLD AUTO: 10 FL (ref 9.2–11.8)
POTASSIUM SERPL-SCNC: 3.8 MMOL/L (ref 3.5–5.5)
PROT SERPL-MCNC: 7.4 G/DL (ref 6.4–8.2)
PROTHROMBIN TIME: 12.9 SEC (ref 11.5–15.2)
RBC # BLD AUTO: 4.05 M/UL (ref 4.2–5.3)
SODIUM SERPL-SCNC: 140 MMOL/L (ref 136–145)
WBC # BLD AUTO: 8.3 K/UL (ref 4.6–13.2)

## 2018-03-28 PROCEDURE — 80048 BASIC METABOLIC PNL TOTAL CA: CPT | Performed by: EMERGENCY MEDICINE

## 2018-03-28 PROCEDURE — 85610 PROTHROMBIN TIME: CPT | Performed by: EMERGENCY MEDICINE

## 2018-03-28 PROCEDURE — 96375 TX/PRO/DX INJ NEW DRUG ADDON: CPT

## 2018-03-28 PROCEDURE — 99284 EMERGENCY DEPT VISIT MOD MDM: CPT

## 2018-03-28 PROCEDURE — 74011000258 HC RX REV CODE- 258: Performed by: EMERGENCY MEDICINE

## 2018-03-28 PROCEDURE — 85025 COMPLETE CBC W/AUTO DIFF WBC: CPT | Performed by: EMERGENCY MEDICINE

## 2018-03-28 PROCEDURE — 96361 HYDRATE IV INFUSION ADD-ON: CPT

## 2018-03-28 PROCEDURE — 74011250636 HC RX REV CODE- 250/636: Performed by: EMERGENCY MEDICINE

## 2018-03-28 PROCEDURE — 77030003560 HC NDL HUBR BARD -A

## 2018-03-28 PROCEDURE — 96372 THER/PROPH/DIAG INJ SC/IM: CPT

## 2018-03-28 PROCEDURE — 80076 HEPATIC FUNCTION PANEL: CPT | Performed by: EMERGENCY MEDICINE

## 2018-03-28 PROCEDURE — 94762 N-INVAS EAR/PLS OXIMTRY CONT: CPT

## 2018-03-28 PROCEDURE — 74011000250 HC RX REV CODE- 250: Performed by: EMERGENCY MEDICINE

## 2018-03-28 PROCEDURE — 83690 ASSAY OF LIPASE: CPT | Performed by: EMERGENCY MEDICINE

## 2018-03-28 RX ORDER — FAMOTIDINE 10 MG/ML
20 INJECTION INTRAVENOUS
Status: COMPLETED | OUTPATIENT
Start: 2018-03-28 | End: 2018-03-28

## 2018-03-28 RX ORDER — ONDANSETRON 2 MG/ML
4 INJECTION INTRAMUSCULAR; INTRAVENOUS
Status: COMPLETED | OUTPATIENT
Start: 2018-03-28 | End: 2018-03-28

## 2018-03-28 RX ORDER — KETOROLAC TROMETHAMINE 30 MG/ML
15 INJECTION, SOLUTION INTRAMUSCULAR; INTRAVENOUS
Status: COMPLETED | OUTPATIENT
Start: 2018-03-28 | End: 2018-03-28

## 2018-03-28 RX ORDER — DIPHENHYDRAMINE HYDROCHLORIDE 50 MG/ML
25 INJECTION, SOLUTION INTRAMUSCULAR; INTRAVENOUS
Status: DISCONTINUED | OUTPATIENT
Start: 2018-03-28 | End: 2018-03-29 | Stop reason: HOSPADM

## 2018-03-28 RX ORDER — DICYCLOMINE HYDROCHLORIDE 10 MG/ML
20 INJECTION INTRAMUSCULAR
Status: COMPLETED | OUTPATIENT
Start: 2018-03-28 | End: 2018-03-28

## 2018-03-28 RX ADMIN — ONDANSETRON 4 MG: 2 INJECTION INTRAMUSCULAR; INTRAVENOUS at 22:24

## 2018-03-28 RX ADMIN — DICYCLOMINE HYDROCHLORIDE 20 MG: 20 INJECTION, SOLUTION INTRAMUSCULAR at 22:24

## 2018-03-28 RX ADMIN — PROMETHAZINE HYDROCHLORIDE 25 MG: 25 INJECTION INTRAMUSCULAR; INTRAVENOUS at 22:53

## 2018-03-28 RX ADMIN — SODIUM CHLORIDE 1000 ML: 900 INJECTION, SOLUTION INTRAVENOUS at 22:01

## 2018-03-28 RX ADMIN — FAMOTIDINE 20 MG: 10 INJECTION, SOLUTION INTRAVENOUS at 22:24

## 2018-03-28 RX ADMIN — KETOROLAC TROMETHAMINE 15 MG: 30 INJECTION, SOLUTION INTRAMUSCULAR at 22:53

## 2018-03-29 LAB
APPEARANCE UR: ABNORMAL
BACTERIA URNS QL MICRO: ABNORMAL /HPF
BILIRUB UR QL: NEGATIVE
COLOR UR: YELLOW
EPITH CASTS URNS QL MICRO: ABNORMAL /LPF (ref 0–5)
GLUCOSE UR STRIP.AUTO-MCNC: NEGATIVE MG/DL
HGB UR QL STRIP: NEGATIVE
HYALINE CASTS URNS QL MICRO: ABNORMAL /LPF (ref 0–2)
KETONES UR QL STRIP.AUTO: NEGATIVE MG/DL
LEUKOCYTE ESTERASE UR QL STRIP.AUTO: ABNORMAL
NITRITE UR QL STRIP.AUTO: NEGATIVE
PH UR STRIP: 5.5 [PH] (ref 5–8)
PROT UR STRIP-MCNC: NEGATIVE MG/DL
RBC #/AREA URNS HPF: NEGATIVE /HPF (ref 0–5)
SP GR UR REFRACTOMETRY: 1.02 (ref 1–1.03)
UROBILINOGEN UR QL STRIP.AUTO: 0.2 EU/DL (ref 0.2–1)
WBC URNS QL MICRO: ABNORMAL /HPF (ref 0–4)

## 2018-03-29 PROCEDURE — 81001 URINALYSIS AUTO W/SCOPE: CPT | Performed by: EMERGENCY MEDICINE

## 2018-03-29 PROCEDURE — 74011000258 HC RX REV CODE- 258: Performed by: EMERGENCY MEDICINE

## 2018-03-29 PROCEDURE — 87077 CULTURE AEROBIC IDENTIFY: CPT | Performed by: EMERGENCY MEDICINE

## 2018-03-29 PROCEDURE — 87186 SC STD MICRODIL/AGAR DIL: CPT | Performed by: EMERGENCY MEDICINE

## 2018-03-29 PROCEDURE — 87086 URINE CULTURE/COLONY COUNT: CPT | Performed by: EMERGENCY MEDICINE

## 2018-03-29 PROCEDURE — 74011250636 HC RX REV CODE- 250/636: Performed by: EMERGENCY MEDICINE

## 2018-03-29 PROCEDURE — 74011250637 HC RX REV CODE- 250/637: Performed by: EMERGENCY MEDICINE

## 2018-03-29 PROCEDURE — 96365 THER/PROPH/DIAG IV INF INIT: CPT

## 2018-03-29 RX ORDER — ONDANSETRON 4 MG/1
4-8 TABLET, ORALLY DISINTEGRATING ORAL
Qty: 15 TAB | Refills: 0 | Status: SHIPPED | OUTPATIENT
Start: 2018-03-29 | End: 2018-03-30

## 2018-03-29 RX ORDER — ACETAMINOPHEN 500 MG
1000 TABLET ORAL
Status: COMPLETED | OUTPATIENT
Start: 2018-03-29 | End: 2018-03-29

## 2018-03-29 RX ORDER — CEFDINIR 300 MG/1
300 CAPSULE ORAL 2 TIMES DAILY
Qty: 14 CAP | Refills: 0 | Status: SHIPPED | OUTPATIENT
Start: 2018-03-29 | End: 2018-09-05 | Stop reason: ALTCHOICE

## 2018-03-29 RX ADMIN — CEFTRIAXONE 1 G: 1 INJECTION, POWDER, FOR SOLUTION INTRAMUSCULAR; INTRAVENOUS at 01:42

## 2018-03-29 RX ADMIN — ACETAMINOPHEN 1000 MG: 500 TABLET ORAL at 01:42

## 2018-03-29 NOTE — ED PROVIDER NOTES
HPI Comments: Naveen Monreal is a 49 y.o. Female with h/o cancer, recurrent pain, dsb, with c/o onset of left sided abd pain that started earlier today. Vomited 3 times without blood. Diarrhea with small amount of brb on tissue. No melena. No urinary sx. No hematuria. No fever. No syncope. Pain is constant, severe, worse with movement. Nothing taken    The history is provided by the patient and medical records. Past Medical History:   Diagnosis Date    Alcohol abuse     Cancer [facility])     breast cancer, right    Cancer ([facility] [address]     Breast CA    Depression     Diabetes ([facility]     Drug abuse     Gastrointestinal disorder     cholitis    Hyperlipidemia     Hypertension     Spine injury        Past Surgical History:   Procedure Laterality Date    COLONOSCOPY N/A 7/18/2016    COL[facility] by Yung Richards [facility] at [facility] ENDOSCOPY    HX BREAST LUMPECTOMY  06/2013    right    HX HYSTERECTOMY      HX ORTHOPAEDIC      Back fusion surgery 4/18/14.  HX OTHER SURGICAL      mediport    HX TONSILLECTOMY      HX TUBAL LIGATION           Family History:   Problem Relation Age of Onset    Heart Disease Mother     Stroke Father     Heart Disease Father 48    Diabetes Other     Hypertension Other     Cancer Maternal Grandmother        Social History     Social History    Marital status:      Spouse name: N/A    Number of children: N/A    Years of education: N/A     Occupational History    Not on file. Social History Main Topics    Smoking status: Never Smoker    Smokeless tobacco: Never Used    Alcohol use No      Comment: \"Occasionally\"    Drug use: No    Sexual activity: No     Other Topics Concern    Not on file     Social History Narrative    ** Merged History Encounter **              ALLERGIES: Latex; Other food; Amoxicillin; Aspirin; Fentanyl; Levaquin [levofloxacin]; Chlorhexidine; Norco [hydrocodone-acetaminophen];  Acetaminophen; [place]; Codeine; Glycopyrrolate; Morphine; Pcn [penicillins]; Percocet [oxycodone-acetaminophen]; Tape [adhesive]; and Tramadol    Review of Systems   Constitutional: Negative for fever. HENT: Negative for sore throat. Eyes: Negative for visual disturbance. Respiratory: Negative for cough. Cardiovascular: Negative for chest pain. Gastrointestinal: Positive for abdominal pain. Endocrine: Negative for polyuria. Genitourinary: Positive for flank pain. Negative for difficulty urinating. Musculoskeletal: Negative for gait problem. Skin: Negative for rash. Allergic/Immunologic: Negative for immunocompromised state. Neurological: Negative for syncope. Psychiatric/Behavioral: Positive for sleep disturbance. Vitals:    03/28/18 2200 03/28/18 2204 03/28/18 2230 03/28/18 2300   BP: 103/73 103/73 116/81 127/78   Pulse: 94  (!) 104 84   Resp: 12  26 15   Temp:       SpO2: 100%  100% 99%   Weight:       Height:                Physical Exam   Constitutional: She is oriented to person, place, and time. She appears well-developed and well-nourished. She appears distressed (appears mildly uncomfortable but no ill, toxic). HENT:   Head: Normocephalic and atraumatic. Right Ear: External ear normal.   Left Ear: External ear normal.   Nose: Nose normal.   Mouth/Throat: Uvula is midline, oropharynx is clear and moist and mucous membranes are normal.   Eyes: Conjunctivae are normal. No scleral icterus. Neck: Neck supple. Cardiovascular: Normal rate, regular rhythm, normal heart sounds and intact distal pulses. Pulmonary/Chest: Effort normal and breath sounds normal.   Abdominal: Soft. Normal appearance. She exhibits no distension and no mass. There is no hepatosplenomegaly. There is tenderness. There is CVA tenderness. There is no rebound and no guarding. Musculoskeletal: She exhibits no edema. Neurological: She is alert and oriented to person, place, and time. Gait normal.   Skin: Skin is warm and dry.  She is not diaphoretic. Psychiatric: Her behavior is normal.   Nursing note and vitals reviewed. [facility]      ED Course       Procedures  Vitals:  Patient Vitals for the past 12 hrs:   Temp Pulse Resp BP SpO2   03/28/18 2300 - 84 15 127/78 99 %   03/28/18 2230 - (!) 104 26 116/81 100 %   03/28/18 2204 - - - 103/73 -   03/28/18 2200 - 94 12 103/73 100 %   03/28/18 2130 - 94 15 136/87 100 %   03/28/18 2031 97.9 °F (36.6 °C) (!) 106 16 123/87 100 %         Medications ordered:   Medications   diphenhydrAMINE (BENADRYL) injection 25 mg (25 mg IntraVENous Refused 3/28/18 2254)   cefTRIAXone (ROCEPHIN) 1 g in 0.9% sodium chloride (MBP/ADV) 50 mL MBP (not administered)   acetaminophen (TYLENOL) tablet 1,000 mg (not administered)   sodium chloride 0.9 % bolus infusion 1,000 mL (0 mL IntraVENous IV Completed 3/28/18 2352)   ondansetron (ZOFRAN) injection 4 mg (4 mg IntraVENous Given 3/28/18 2224)   dicyclomine (BENTYL) 10 mg/mL injection 20 mg (20 mg IntraMUSCular Given 3/28/18 2224)   famotidine (PF) (PEPCID) injection 20 mg (20 mg IntraVENous Given 3/28/18 2224)   ketorolac (TORADOL) injection 15 mg (15 mg IntraVENous Given 3/28/18 2253)   promethazine (PHENERGAN) 25 mg in 0.9% sodium chloride 50 mL IVPB (25 mg IntraVENous Given 3/28/18 2253)         Lab findings:  Recent Results (from the past 12 hour(s))   CBC WITH AUTOMATED DIFF    Collection Time: 03/28/18 10:00 PM   Result Value Ref Range    WBC 8.3 4.6 - 13.2 K/uL    RBC 4.05 (L) 4.20 - 5.30 M/uL    HGB 12.5 12.0 - 16.0 g/dL    HCT 36.8 35.0 - 45.0 %    MCV 90.9 74.0 - 97.0 FL    MCH 30.9 24.0 - 34.0 PG    MCHC 34.0 31.0 - 37.0 g/dL    RDW 13.5 11.6 - 14.5 %    PLATELET 816 340 - 902 K/uL    MPV 10.0 9.2 - 11.8 FL    NEUTROPHILS 62 40 - 73 %    LYMPHOCYTES 31 21 - 52 %    MONOCYTES 5 3 - 10 %    EOSINOPHILS 2 0 - 5 %    BASOPHILS 0 0 - 2 %    ABS. NEUTROPHILS 5.1 1.8 - 8.0 K/UL    ABS. LYMPHOCYTES 2.6 0.9 - 3.6 K/UL    ABS. MONOCYTES 0.5 0.05 - 1.2 K/UL    ABS.  EOSINOPHILS 0.2 0.0 - 0.4 K/UL    ABS. BASOPHILS 0.0 0.0 - 0.06 K/UL    DF AUTOMATED     PROTHROMBIN TIME + INR    Collection Time: 03/28/18 10:00 PM   Result Value Ref Range    Prothrombin time 12.9 11.5 - 15.2 sec    INR 1.0 0.8 - 1.2     HEPATIC FUNCTION PANEL    Collection Time: 03/28/18 10:00 PM   Result Value Ref Range    Protein, total 7.4 6.4 - 8.2 g/dL    Albumin 3.9 3.4 - 5.0 g/dL    Globulin 3.5 2.0 - 4.0 g/dL    A-G Ratio 1.1 0.8 - 1.7      Bilirubin, total 0.4 0.2 - 1.0 MG/DL    Bilirubin, direct 0.1 0.0 - 0.2 MG/DL    Alk.  phosphatase 132 (H) 45 - 117 U/L    AST (SGOT) 24 15 - 37 U/L    ALT (SGPT) 44 13 - 56 U/L   METABOLIC PANEL, BASIC    Collection Time: 03/28/18 10:00 PM   Result Value Ref Range    Sodium 140 136 - 145 mmol/L    Potassium 3.8 3.5 - 5.5 mmol/L    Chloride 106 100 - 108 mmol/L    CO2 25 21 - 32 mmol/L    Anion gap 9 3.0 - 18 mmol/L    Glucose 125 (H) 74 - 99 mg/dL    BUN 31 (H) 7.0 - 18 MG/DL    Creatinine 1.17 0.6 - 1.3 MG/DL    BUN/Creatinine ratio 26 (H) 12 - 20      GFR est AA 60 (L) >60 ml/min/1.73m2    GFR est non-AA 49 (L) >60 ml/min/1.73m2    Calcium 9.3 8.5 - 10.1 MG/DL   LIPASE    Collection Time: 03/28/18 10:00 PM   Result Value Ref Range    Lipase 145 73 - 393 U/L   URINALYSIS W/ RFLX MICROSCOPIC    Collection Time: 03/29/18 12:45 AM   Result Value Ref Range    Color YELLOW      Appearance CLOUDY      Specific gravity 1.017 1.005 - 1.030      pH (UA) 5.5 5.0 - 8.0      Protein NEGATIVE  NEG mg/dL    Glucose NEGATIVE  NEG mg/dL    Ketone NEGATIVE  NEG mg/dL    Bilirubin NEGATIVE  NEG      Blood NEGATIVE  NEG      Urobilinogen 0.2 0.2 - 1.0 EU/dL    Nitrites NEGATIVE  NEG      Leukocyte Esterase SMALL (A) NEG     URINE MICROSCOPIC ONLY    Collection Time: 03/29/18 12:45 AM   Result Value Ref Range    WBC 4 to 10 0 - 4 /hpf    RBC NEGATIVE  0 - 5 /hpf    Epithelial cells 4+ 0 - 5 /lpf    Bacteria 4+ (A) NEG /hpf    Hyaline cast 0 to 3 0 - 2 /lpf       EKG interpretation by ED Physician:      X-Ray, CT or other radiology findings or impressions:  No orders to display       Progress notes, Consult notes or additional Procedure notes:   Feeling better doubt need for imaging. Will treat for uti. abx selected based upon previous culture  Pt with sig dsb history so will withhold from narcotics  I have discussed with patient and/or family/sig other the results, interpretation of any imaging if performed, suspected diagnosis and treatment plan to include instructions regarding the diagnoses listed to which understanding was expressed with all questions answered      Reevaluation of patient:   stable    Disposition:  Diagnosis:   1. Recurrent abdominal pain    2. Acute UTI    3. Non-intractable vomiting with nausea, unspecified vomiting type        Disposition: home    Follow-up Information     Follow up With Details Comments [address] [facility], NP Schedule an appointment as soon as possible for a visit  [address] [place] [address]  [phone]              Patient's Medications   Start Taking    CEFDINIR (OMNICEF) 300 MG CAPSULE    Take 1 Cap by mouth two (2) times a day. ONDANSETRON (ZOFRAN ODT) 4 MG DISINTEGRATING TABLET    Take 1-2 Tabs by mouth every eight (8) hours as needed for Nausea. Continue Taking    ALBUTEROL IN    Take  by inhalation. EPINEPHRINE (EPIPEN) 0.3 MG/0.3 ML (1:1,000) INJECTION    0.3 mL by IntraMUSCular route once as needed for up to 1 dose. IBUPROFEN (MOTRIN) 800 MG TABLET    Take 1 Tab by mouth every eight (8) hours as needed for Pain. Take every 8 hours with food    INSULIN NPH-REGULAR HUMAN REC (HUMULIN 70-30) 100 UNIT/ML (70-30) INPN    Give 25 units in the QAM and 30 units at bedtime    LISINOPRIL-HYDROCHLOROTHIAZIDE (PRINZIDE, ZESTORETIC) 20-12.5 MG PER TABLET    Take 1 Tab by mouth daily. LORAZEPAM (ATIVAN) 1 MG TABLET    1 mg. POLYETHYLENE GLYCOL (MIRALAX) 17 GRAM PACKET    Take 1 Packet by mouth daily. TRAZODONE (DESYREL) 300 MG TABLET    Take 1 Tab by mouth nightly. These Medications have changed    No medications on file   Stop Taking    ONDANSETRON (ZOFRAN ODT) 4 MG DISINTEGRATING TABLET    Take 1 Tab by mouth every eight (8) hours as needed for Nausea.

## 2018-03-29 NOTE — ED NOTES
2:22 AM  03/29/18     Discharge instructions given to PATIENT (name) with verbalization of understanding. Patient accompanied by Danielle Barnes. Patient discharged with the following prescriptions ZOFRAN, CEFDINIR. Patient discharged to HOME (destination).       Benja Hood RN

## 2018-03-30 ENCOUNTER — APPOINTMENT (OUTPATIENT)
Dept: CT IMAGING | Age: 48
End: 2018-03-30
Attending: EMERGENCY MEDICINE
Payer: MEDICAID

## 2018-03-30 ENCOUNTER — HOSPITAL ENCOUNTER (EMERGENCY)
Age: 48
Discharge: HOME | End: 2018-03-30
Attending: EMERGENCY MEDICINE | Admitting: EMERGENCY MEDICINE
Payer: MEDICAID

## 2018-03-30 VITALS
OXYGEN SATURATION: 99 % | SYSTOLIC BLOOD PRESSURE: 112 MMHG | RESPIRATION RATE: 14 BRPM | HEIGHT: 62 IN | WEIGHT: 147.93 LBS | HEART RATE: 84 BPM | TEMPERATURE: 98.2 F | BODY MASS INDEX: 27.22 KG/M2 | DIASTOLIC BLOOD PRESSURE: 68 MMHG

## 2018-03-30 DIAGNOSIS — R11.2 NON-INTRACTABLE VOMITING WITH NAUSEA, UNSPECIFIED VOMITING TYPE: ICD-10-CM

## 2018-03-30 DIAGNOSIS — R19.7 DIARRHEA, UNSPECIFIED TYPE: ICD-10-CM

## 2018-03-30 DIAGNOSIS — R10.84 GENERALIZED ABDOMINAL CRAMPING: Primary | ICD-10-CM

## 2018-03-30 LAB
ALBUMIN SERPL-MCNC: 3.5 G/DL (ref 3.4–5)
ALBUMIN/GLOB SERPL: 1 {RATIO} (ref 0.8–1.7)
ALP SERPL-CCNC: 149 U/L (ref 45–117)
ALT SERPL-CCNC: 34 U/L (ref 13–56)
ANION GAP SERPL CALC-SCNC: 7 MMOL/L (ref 3–18)
APPEARANCE UR: CLEAR
AST SERPL-CCNC: 15 U/L (ref 15–37)
BASOPHILS # BLD: 0 K/UL (ref 0–0.06)
BASOPHILS NFR BLD: 0 % (ref 0–2)
BILIRUB SERPL-MCNC: 0.1 MG/DL (ref 0.2–1)
BILIRUB UR QL: NEGATIVE
BUN SERPL-MCNC: 23 MG/DL (ref 7–18)
BUN/CREAT SERPL: 24 (ref 12–20)
CALCIUM SERPL-MCNC: 9 MG/DL (ref 8.5–10.1)
CHLORIDE SERPL-SCNC: 108 MMOL/L (ref 100–108)
CO2 SERPL-SCNC: 27 MMOL/L (ref 21–32)
COLOR UR: YELLOW
CREAT SERPL-MCNC: 0.95 MG/DL (ref 0.6–1.3)
DIFFERENTIAL METHOD BLD: ABNORMAL
EOSINOPHIL # BLD: 0.3 K/UL (ref 0–0.4)
EOSINOPHIL NFR BLD: 4 % (ref 0–5)
ERYTHROCYTE [DISTWIDTH] IN BLOOD BY AUTOMATED COUNT: 13.4 % (ref 11.6–14.5)
GLOBULIN SER CALC-MCNC: 3.4 G/DL (ref 2–4)
GLUCOSE SERPL-MCNC: 162 MG/DL (ref 74–99)
GLUCOSE UR STRIP.AUTO-MCNC: NEGATIVE MG/DL
HCT VFR BLD AUTO: 35.3 % (ref 35–45)
HGB BLD-MCNC: 11.7 G/DL (ref 12–16)
HGB UR QL STRIP: NEGATIVE
KETONES UR QL STRIP.AUTO: NEGATIVE MG/DL
LEUKOCYTE ESTERASE UR QL STRIP.AUTO: NEGATIVE
LIPASE SERPL-CCNC: 280 U/L (ref 73–393)
LYMPHOCYTES # BLD: 2.7 K/UL (ref 0.9–3.6)
LYMPHOCYTES NFR BLD: 38 % (ref 21–52)
MCH RBC QN AUTO: 30.6 PG (ref 24–34)
MCHC RBC AUTO-ENTMCNC: 33.1 G/DL (ref 31–37)
MCV RBC AUTO: 92.4 FL (ref 74–97)
MONOCYTES # BLD: 0.4 K/UL (ref 0.05–1.2)
MONOCYTES NFR BLD: 5 % (ref 3–10)
NEUTS SEG # BLD: 3.7 K/UL (ref 1.8–8)
NEUTS SEG NFR BLD: 53 % (ref 40–73)
NITRITE UR QL STRIP.AUTO: NEGATIVE
PH UR STRIP: 6 [PH] (ref 5–8)
PLATELET # BLD AUTO: 205 K/UL (ref 135–420)
PMV BLD AUTO: 10.3 FL (ref 9.2–11.8)
POTASSIUM SERPL-SCNC: 4.1 MMOL/L (ref 3.5–5.5)
PROT SERPL-MCNC: 6.9 G/DL (ref 6.4–8.2)
PROT UR STRIP-MCNC: NEGATIVE MG/DL
RBC # BLD AUTO: 3.82 M/UL (ref 4.2–5.3)
SODIUM SERPL-SCNC: 142 MMOL/L (ref 136–145)
SP GR UR REFRACTOMETRY: 1.02 (ref 1–1.03)
UROBILINOGEN UR QL STRIP.AUTO: 0.2 EU/DL (ref 0.2–1)
WBC # BLD AUTO: 7.1 K/UL (ref 4.6–13.2)

## 2018-03-30 PROCEDURE — 74011636320 HC RX REV CODE- 636/320: Performed by: EMERGENCY MEDICINE

## 2018-03-30 PROCEDURE — 96361 HYDRATE IV INFUSION ADD-ON: CPT

## 2018-03-30 PROCEDURE — 99284 EMERGENCY DEPT VISIT MOD MDM: CPT

## 2018-03-30 PROCEDURE — 96372 THER/PROPH/DIAG INJ SC/IM: CPT

## 2018-03-30 PROCEDURE — 85025 COMPLETE CBC W/AUTO DIFF WBC: CPT | Performed by: EMERGENCY MEDICINE

## 2018-03-30 PROCEDURE — 74177 CT ABD & PELVIS W/CONTRAST: CPT

## 2018-03-30 PROCEDURE — 80053 COMPREHEN METABOLIC PANEL: CPT | Performed by: EMERGENCY MEDICINE

## 2018-03-30 PROCEDURE — 81003 URINALYSIS AUTO W/O SCOPE: CPT | Performed by: EMERGENCY MEDICINE

## 2018-03-30 PROCEDURE — 74011250636 HC RX REV CODE- 250/636: Performed by: EMERGENCY MEDICINE

## 2018-03-30 PROCEDURE — 96374 THER/PROPH/DIAG INJ IV PUSH: CPT

## 2018-03-30 PROCEDURE — 74011250636 HC RX REV CODE- 250/636: Performed by: PHYSICIAN ASSISTANT

## 2018-03-30 PROCEDURE — 96375 TX/PRO/DX INJ NEW DRUG ADDON: CPT

## 2018-03-30 PROCEDURE — 83690 ASSAY OF LIPASE: CPT | Performed by: EMERGENCY MEDICINE

## 2018-03-30 RX ORDER — HEPARIN 100 UNIT/ML
300 SYRINGE INTRAVENOUS
Status: COMPLETED | OUTPATIENT
Start: 2018-03-30 | End: 2018-03-30

## 2018-03-30 RX ORDER — MORPHINE SULFATE 4 MG/ML
2 INJECTION, SOLUTION INTRAMUSCULAR; INTRAVENOUS
Status: COMPLETED | OUTPATIENT
Start: 2018-03-30 | End: 2018-03-30

## 2018-03-30 RX ORDER — HEPARIN 100 UNIT/ML
300 SYRINGE INTRAVENOUS AS NEEDED
Status: DISCONTINUED | OUTPATIENT
Start: 2018-03-30 | End: 2018-03-30

## 2018-03-30 RX ORDER — IBUPROFEN 600 MG/1
600 TABLET ORAL
Qty: 20 TAB | Refills: 0 | Status: SHIPPED | OUTPATIENT
Start: 2018-03-30 | End: 2018-07-22

## 2018-03-30 RX ORDER — DICYCLOMINE HYDROCHLORIDE 20 MG/1
20 TABLET ORAL
Qty: 20 TAB | Refills: 0 | Status: SHIPPED | OUTPATIENT
Start: 2018-03-30 | End: 2019-01-10

## 2018-03-30 RX ORDER — ONDANSETRON 2 MG/ML
4 INJECTION INTRAMUSCULAR; INTRAVENOUS
Status: COMPLETED | OUTPATIENT
Start: 2018-03-30 | End: 2018-03-30

## 2018-03-30 RX ORDER — KETOROLAC TROMETHAMINE 30 MG/ML
30 INJECTION, SOLUTION INTRAMUSCULAR; INTRAVENOUS
Status: COMPLETED | OUTPATIENT
Start: 2018-03-30 | End: 2018-03-30

## 2018-03-30 RX ORDER — DICYCLOMINE HYDROCHLORIDE 10 MG/ML
20 INJECTION INTRAMUSCULAR
Status: COMPLETED | OUTPATIENT
Start: 2018-03-30 | End: 2018-03-30

## 2018-03-30 RX ORDER — ONDANSETRON 4 MG/1
TABLET, ORALLY DISINTEGRATING ORAL
Qty: 12 TAB | Refills: 0 | Status: SHIPPED | OUTPATIENT
Start: 2018-03-30 | End: 2018-09-05 | Stop reason: ALTCHOICE

## 2018-03-30 RX ORDER — DICYCLOMINE HYDROCHLORIDE 10 MG/ML
20 INJECTION INTRAMUSCULAR
Status: DISCONTINUED | OUTPATIENT
Start: 2018-03-30 | End: 2018-03-30

## 2018-03-30 RX ADMIN — MORPHINE SULFATE 2 MG: 4 INJECTION, SOLUTION INTRAMUSCULAR; INTRAVENOUS at 09:35

## 2018-03-30 RX ADMIN — IOPAMIDOL 100 ML: 612 INJECTION, SOLUTION INTRAVENOUS at 08:34

## 2018-03-30 RX ADMIN — SODIUM CHLORIDE 1000 ML: 900 INJECTION, SOLUTION INTRAVENOUS at 07:26

## 2018-03-30 RX ADMIN — KETOROLAC TROMETHAMINE 30 MG: 30 INJECTION, SOLUTION INTRAMUSCULAR at 07:50

## 2018-03-30 RX ADMIN — Medication 300 UNITS: at 09:45

## 2018-03-30 RX ADMIN — DICYCLOMINE HYDROCHLORIDE 20 MG: 20 INJECTION, SOLUTION INTRAMUSCULAR at 09:44

## 2018-03-30 RX ADMIN — ONDANSETRON 4 MG: 2 INJECTION INTRAMUSCULAR; INTRAVENOUS at 07:26

## 2018-03-30 NOTE — DISCHARGE INSTRUCTIONS
Abdominal Pain: Care Instructions  Your Care Instructions    Abdominal pain has many possible causes. Some aren't serious and get better on their own in a few days. Others need more testing and treatment. If your pain continues or gets worse, you need to be rechecked and may need more tests to find out what is wrong. You may need surgery to correct the problem. Don't ignore new symptoms, such as fever, nausea and vomiting, urination problems, pain that gets worse, and dizziness. These may be signs of a more serious problem. Your doctor may have recommended a follow-up visit in the next 8 to 12 hours. If you are not getting better, you may need more tests or treatment. The doctor has checked you carefully, but problems can develop later. If you notice any problems or new symptoms, get medical treatment right away. Follow-up care is a key part of your treatment and safety. Be sure to make and go to all appointments, and call your doctor if you are having problems. It's also a good idea to know your test results and keep a list of the medicines you take. How can you care for yourself at home? · Rest until you feel better. · To prevent dehydration, drink plenty of fluids, enough so that your urine is light yellow or clear like water. Choose water and other caffeine-free clear liquids until you feel better. If you have kidney, heart, or liver disease and have to limit fluids, talk with your doctor before you increase the amount of fluids you drink. · If your stomach is upset, eat mild foods, such as rice, dry toast or crackers, bananas, and applesauce. Try eating several small meals instead of two or three large ones. · Wait until 48 hours after all symptoms have gone away before you have spicy foods, alcohol, and drinks that contain caffeine. · Do not eat foods that are high in fat. · Avoid anti-inflammatory medicines such as aspirin, ibuprofen (Advil, Motrin), and naproxen (Aleve).  These can cause stomach upset. Talk to your doctor if you take daily aspirin for another health problem. When should you call for help? Call 911 anytime you think you may need emergency care. For example, call if:  ? · You passed out (lost consciousness). ? · You pass maroon or very bloody stools. ? · You vomit blood or what looks like coffee grounds. ? · You have new, severe belly pain. ?Call your doctor now or seek immediate medical care if:  ? · Your pain gets worse, especially if it becomes focused in one area of your belly. ? · You have a new or higher fever. ? · Your stools are black and look like tar, or they have streaks of blood. ? · You have unexpected vaginal bleeding. ? · You have symptoms of a urinary tract infection. These may include:  ¨ Pain when you urinate. ¨ Urinating more often than usual.  ¨ Blood in your urine. ? · You are dizzy or lightheaded, or you feel like you may faint. ? Watch closely for changes in your health, and be sure to contact your doctor if:  ? · You are not getting better after 1 day (24 hours). Where can you learn more? Go to http://imaniFotechjenny.info/. Enter M650 in the search box to learn more about \"Abdominal Pain: Care Instructions. \"  Current as of: March 20, 2017  Content Version: 11.4  © 0759-5072 [employer]. Care instructions adapted under license by [employer] (which disclaims liability or warranty for this information). If you have questions about a medical condition or this instruction, always ask your healthcare professional. [place] [address] any warranty or liability for your use of this information. Diarrhea: Care Instructions  Your Care Instructions    Diarrhea is loose, watery stools (bowel movements). The exact cause is often hard to find. Sometimes diarrhea is your body's way of getting rid of what caused an upset stomach.  Viruses, food poisoning, and many medicines can cause diarrhea. Some people get diarrhea in response to emotional stress, anxiety, or certain foods. Almost everyone has diarrhea now and then. It usually isn't serious, and your stools will return to normal soon. The important thing to do is replace the fluids you have lost, so you can prevent dehydration. The doctor has checked you carefully, but problems can develop later. If you notice any problems or new symptoms, get medical treatment right away. Follow-up care is a key part of your treatment and safety. Be sure to make and go to all appointments, and call your doctor if you are having problems. It's also a good idea to know your test results and keep a list of the medicines you take. How can you care for yourself at home? · Watch for signs of dehydration, which means your body has lost too much water. Dehydration is a serious condition and should be treated right away. Signs of dehydration are:  ¨ Increasing thirst and dry eyes and mouth. ¨ Feeling faint or lightheaded. ¨ Darker urine, and a smaller amount of urine than normal.  · To prevent dehydration, drink plenty of fluids, enough so that your urine is light yellow or clear like water. Choose water and other caffeine-free clear liquids until you feel better. If you have kidney, heart, or liver disease and have to limit fluids, talk with your doctor before you increase the amount of fluids you drink. · Begin eating small amounts of mild foods the next day, if you feel like it. ¨ Try yogurt that has live cultures of Lactobacillus. (Check the label.)  ¨ Avoid spicy foods, fruits, alcohol, and caffeine until 48 hours after all symptoms are gone. ¨ Avoid chewing gum that contains sorbitol. ¨ Avoid dairy products (except for yogurt with Lactobacillus) while you have diarrhea and for 3 days after symptoms are gone. · The doctor may recommend that you take over-the-counter medicine, such as loperamide (Imodium), if you still have diarrhea after 6 hours. Read and follow all instructions on the label. Do not use this medicine if you have bloody diarrhea, a high fever, or other signs of serious illness. Call your doctor if you think you are having a problem with your medicine. When should you call for help? Call 911 anytime you think you may need emergency care. For example, call if:  ? · You passed out (lost consciousness). ? · Your stools are maroon or very bloody. ?Call your doctor now or seek immediate medical care if:  ? · You are dizzy or lightheaded, or you feel like you may faint. ? · Your stools are black and look like tar, or they have streaks of blood. ? · You have new or worse belly pain. ? · You have symptoms of dehydration, such as:  ¨ Dry eyes and a dry mouth. ¨ Passing only a little dark urine. ¨ Feeling thirstier than usual.   ? · You have a new or higher fever. ? Watch closely for changes in your health, and be sure to contact your doctor if:  ? · Your diarrhea is getting worse. ? · You see pus in the diarrhea. ? · You are not getting better after 2 days (48 hours). Where can you learn more? Go to http://imani-jenny.info/. Enter H038 in the search box to learn more about \"Diarrhea: Care Instructions. \"  Current as of: March 20, 2017  Content Version: 11.4  © 5750-9091 [employer]. Care instructions adapted under license by [employer] (which disclaims liability or warranty for this information). If you have questions about a medical condition or this instruction, always ask your healthcare professional. [place] [address] any warranty or liability for your use of this information.

## 2018-03-30 NOTE — ED NOTES
Assume care of patient, patient getting [employer] accessed at this time, Nader Ledesma in seeing patient, patient with persistent abdominal pain that she has had for 1 week, patient also with nausea,  Purposeful rounding completed:    Side rails up x 1:  YES  Bed in low position and wheels locked: YES  Call bell within reach: YES  Comfort addressed: YES    Toileting needs addressed: YES  Plan of care reviewed/updated with patient and or family members: YES  IV site assessed: N/A  Pain assessed and addressed: YES, 8

## 2018-03-30 NOTE — ED PROVIDER NOTES
EMERGENCY DEPARTMENT HISTORY AND PHYSICAL EXAM    Date: 3/30/2018  Patient Name: Henri Dakins    History of Presenting Illness     Chief Complaint   Patient presents with    Abdominal Pain    Vomiting    Diarrhea         History Provided By: Patient    Chief Complaint: abd pain  Duration: 4 Days  Timing:  Constant and Worsening  Location: generalized abd  Quality: Stabbing  Severity: 10 out of 10  Modifying Factors: nausea worse with eating  Associated Symptoms: N/V, diarrhea with occasional \"red flecks\" in it      Additional History (Context): Henri Dakins is a 49 y.o. female with diabetes, hypertension, hyperlipidemia and malignancy (breast cancer, not on chemo, cancer free x 1 year) who presents with abd pain x 4 days. States pain is generalized, constant, stabbing in quality, worsening, currently 10/10 in severity. Also notes N/V only with eating; diarrhea, approx 3-4 episodes per day, with some \"red flecks\" in it, no profuse bleeding. Urine is slightly darker than normal.  No decrease in appetite and can tolerate fluids PO. Denies CP, SOB, cough, URI sx's, dysuria, urinary frequency/urgency, urine odor appearance. Treatments tried: abx for recent dx UTI here 2 days ago. Most recent other abx was clindamycin \"a couple of months ago\" for dental infection. Treatments tried: advil, zofran. Pt currently waiting for pain management appt, scheduled 05/2018.   Pt concerned this could be an obstruction, mentions that she has had this in the past.      PCP: Alexander Hogan NP    Current Facility-Administered Medications   Medication Dose Route Frequency Provider Last Rate Last Dose    morphine injection 2 mg  2 mg IntraVENous NOW Valencia Peters PA-C        dicyclomine (BENTYL) 10 mg/mL injection 20 mg  20 mg IntraMUSCular AC&HS Valencia Peters PA-C        heparin (porcine) pf 300 Units  300 Units InterCATHeter PRN Valencia Peters PA-C         Current Outpatient Prescriptions   Medication Sig Dispense Refill    dicyclomine (BENTYL) 20 mg tablet Take 1 Tab by mouth every six (6) hours as needed (abdominal cramps) for up to 20 doses. 20 Tab 0    ondansetron (ZOFRAN ODT) 4 mg disintegrating tablet Take 1-2 tablets every 6-8 hours as needed for nausea and vomiting. 12 Tab 0    ibuprofen (MOTRIN) 600 mg tablet Take 1 Tab by mouth every six (6) hours as needed for Pain. 20 Tab 0    cefdinir (OMNICEF) 300 mg capsule Take 1 Cap by mouth two (2) times a day. 14 Cap 0    traZODone (DESYREL) 300 mg tablet Take 1 Tab by mouth nightly. 30 Tab 0    lisinopril-hydroCHLOROthiazide (PRINZIDE, ZESTORETIC) 20-12.5 mg per tablet Take 1 Tab by mouth daily. 30 Tab 1    polyethylene glycol (MIRALAX) 17 gram packet Take 1 Packet by mouth daily. 30 Each 1    insulin nph-regular human rec (HUMULIN 70-30) 100 unit/mL (70-30) inpn Give 25 units in the QAM and 30 units at bedtime 5 Pen 3    ALBUTEROL IN Take  by inhalation.  LORazepam (ATIVAN) 1 mg tablet 1 mg.  EPINEPHrine (EPIPEN) 0.3 mg/0.3 mL (1:1,000) injection 0.3 mL by IntraMUSCular route once as needed for up to 1 dose. 1 Each 1       Past History     Past Medical History:  Past Medical History:   Diagnosis Date    Alcohol abuse     Cancer ([address]     breast cancer, right    Cancer ([facility] [address]     Breast CA    Depression     Diabetes ([facility] [address]     Drug abuse     Gastrointestinal disorder     cholitis    Hyperlipidemia     Hypertension     Spine injury        Past Surgical History:  Past Surgical History:   Procedure Laterality Date    COLONOSCOPY N/A 7/18/2016    COLONOSCOPY performed by Ramses Sheldon [facility] at [facility] ENDOSCOPY    HX BREAST LUMPECTOMY  06/2013    right    HX HYSTERECTOMY      HX ORTHOPAEDIC      Back fusion surgery 4/18/14.      HX OTHER SURGICAL      mediport    HX TONSILLECTOMY      HX TUBAL LIGATION         Family History:  Family History   Problem Relation Age of Onset    Heart Disease Mother     Stroke Father     Heart Disease Father 48    Diabetes Other     Hypertension Other     Cancer Maternal Grandmother        Social History:  Social History   Substance Use Topics    Smoking status: Never Smoker    Smokeless tobacco: Never Used    Alcohol use No      Comment: \"Occasionally\"       Allergies: Allergies   Allergen Reactions    Latex Hives and Itching    Other Food Rash     Almonds    Amoxicillin Swelling     Other reaction(s): mild rash/itching    Aspirin Not Reported This Time and Swelling     Mouth swells    Fentanyl Anaphylaxis and Swelling     Patches cause mouth to swell  Swelling in mouth from fentanyl patch    Levaquin [Levofloxacin] Not Reported This Time and Swelling     Other reaction(s): mild rash/itching    Chlorhexidine Rash    Norco [Hydrocodone-Acetaminophen] Nausea and Vomiting     Other reaction(s): gi distress    Acetaminophen Other (comments)    [place] Rash and Itching    Codeine Other (comments)    Glycopyrrolate Swelling     Pt  States  faciAL  SWELLING   POST  ROBINUL  IV   Pt  States  faciAL  SWELLING   POST  ROBINUL  IV     Morphine Nausea and Vomiting     Pt states she is not allergic    Pcn [Penicillins] Swelling    Percocet [Oxycodone-Acetaminophen] Nausea and Vomiting     Other reaction(s): gi distress  nausea  Other reaction(s): anaphylaxis/angioedema  Per pt. She is allergic    Tape [Adhesive] Rash    Tramadol Swelling         Review of Systems   Review of Systems   Constitutional: Positive for chills and fatigue. Negative for activity change, appetite change and fever. HENT: Negative for congestion, rhinorrhea and sore throat. Eyes: Negative for pain, discharge and itching. Respiratory: Negative for cough, chest tightness, shortness of breath and wheezing. Cardiovascular: Negative for chest pain and leg swelling. Gastrointestinal: Positive for abdominal pain, blood in stool (\"flecks of red\"), nausea and vomiting. Negative for constipation.    Genitourinary: Negative for difficulty urinating, dysuria, flank pain, frequency, pelvic pain and urgency. + urine darker than normal   Musculoskeletal: Negative for myalgias. Skin: Negative for color change and rash. All other systems reviewed and are negative. All Other Systems Negative  Physical Exam     Vitals:    03/30/18 0730 03/30/18 0745 03/30/18 0800 03/30/18 0815   BP: 119/70 113/63 131/76 117/66   Pulse:       Resp:       Temp:       SpO2: 100% 100%  99%   Weight:       Height:         Physical Exam   Constitutional: Vital signs are normal. She appears well-developed and well-nourished. Non-toxic appearance. She does not have a sickly appearance. She does not appear ill. She appears distressed (appears uncomfortable, holding abd). HENT:   Head: Normocephalic and atraumatic. Eyes: Conjunctivae are normal. No scleral icterus. Neck: Normal range of motion. Neck supple. No JVD present. Cardiovascular: Normal rate, regular rhythm and intact distal pulses. Pulmonary/Chest: Effort normal. No respiratory distress. Abdominal: Soft. Normal appearance and bowel sounds are normal. There is generalized tenderness (moderate). There is no rigidity, no rebound, no guarding, no CVA tenderness, no tenderness at McBurney's point and negative Mayo's sign. Genitourinary: Rectal exam shows internal hemorrhoid. Rectal exam shows no external hemorrhoid, no fissure, no mass, no tenderness, anal tone normal and guaiac negative stool. Musculoskeletal: Normal range of motion. Neurological: She is alert. Skin: Skin is warm and dry. Psychiatric: Judgment and thought content normal.   Nursing note and vitals reviewed.      Diagnostic Study Results     Labs -     Recent Results (from the past 12 hour(s))   CBC WITH AUTOMATED DIFF    Collection Time: 03/30/18  7:15 AM   Result Value Ref Range    WBC 7.1 4.6 - 13.2 K/uL    RBC 3.82 (L) 4.20 - 5.30 M/uL    HGB 11.7 (L) 12.0 - 16.0 g/dL    HCT 35.3 35.0 - 45.0 %    MCV 92.4 74.0 - 97.0 FL    MCH 30.6 24.0 - 34.0 PG    MCHC 33.1 31.0 - 37.0 g/dL    RDW 13.4 11.6 - 14.5 %    PLATELET 140 609 - 744 K/uL    MPV 10.3 9.2 - 11.8 FL    NEUTROPHILS 53 40 - 73 %    LYMPHOCYTES 38 21 - 52 %    MONOCYTES 5 3 - 10 %    EOSINOPHILS 4 0 - 5 %    BASOPHILS 0 0 - 2 %    ABS. NEUTROPHILS 3.7 1.8 - 8.0 K/UL    ABS. LYMPHOCYTES 2.7 0.9 - 3.6 K/UL    ABS. MONOCYTES 0.4 0.05 - 1.2 K/UL    ABS. EOSINOPHILS 0.3 0.0 - 0.4 K/UL    ABS. BASOPHILS 0.0 0.0 - 0.06 K/UL    DF AUTOMATED     METABOLIC PANEL, COMPREHENSIVE    Collection Time: 03/30/18  7:15 AM   Result Value Ref Range    Sodium 142 136 - 145 mmol/L    Potassium 4.1 3.5 - 5.5 mmol/L    Chloride 108 100 - 108 mmol/L    CO2 27 21 - 32 mmol/L    Anion gap 7 3.0 - 18 mmol/L    Glucose 162 (H) 74 - 99 mg/dL    BUN 23 (H) 7.0 - 18 MG/DL    Creatinine 0.95 0.6 - 1.3 MG/DL    BUN/Creatinine ratio 24 (H) 12 - 20      GFR est AA >60 >60 ml/min/1.73m2    GFR est non-AA >60 >60 ml/min/1.73m2    Calcium 9.0 8.5 - 10.1 MG/DL    Bilirubin, total 0.1 (L) 0.2 - 1.0 MG/DL    ALT (SGPT) 34 13 - 56 U/L    AST (SGOT) 15 15 - 37 U/L    Alk.  phosphatase 149 (H) 45 - 117 U/L    Protein, total 6.9 6.4 - 8.2 g/dL    Albumin 3.5 3.4 - 5.0 g/dL    Globulin 3.4 2.0 - 4.0 g/dL    A-G Ratio 1.0 0.8 - 1.7     LIPASE    Collection Time: 03/30/18  7:15 AM   Result Value Ref Range    Lipase 280 73 - 393 U/L   URINALYSIS W/ RFLX MICROSCOPIC    Collection Time: 03/30/18  7:58 AM   Result Value Ref Range    Color YELLOW      Appearance CLEAR      Specific gravity 1.022 1.005 - 1.030      pH (UA) 6.0 5.0 - 8.0      Protein NEGATIVE  NEG mg/dL    Glucose NEGATIVE  NEG mg/dL    Ketone NEGATIVE  NEG mg/dL    Bilirubin NEGATIVE  NEG      Blood NEGATIVE  NEG      Urobilinogen 0.2 0.2 - 1.0 EU/dL    Nitrites NEGATIVE  NEG      Leukocyte Esterase NEGATIVE  NEG         Radiologic Studies -   CT ABD PELV W CONT   Final Result        CT Results  (Last 48 hours)               03/30/18 0843  CT ABD PELV W CONT Final result    Impression:  IMPRESSION:       1. No acute intra-abdominal or pelvic abnormality. 2. No hydronephrosis. No nephrolithiasis. 3. Normal caliber small and large intestine. No bowel obstruction. Narrative:  EXAM: CT of the abdomen and pelvis       INDICATION: Abdominal pain, radiating features to the back. Vomiting. COMPARISON: CT 9/21/2016; CT chest 11/6/2016       TECHNIQUE: Axial CT imaging of the abdomen and pelvis was performed without oral   and with intravenous contrast. Multiplanar reformats were generated. One or more dose reduction techniques were used on this CT: automated exposure   control, adjustment of the mAs and/or kVp according to patient's size, and   iterative reconstruction techniques. The specific techniques utilized on this CT   exam have been documented in the patient's electronic medical record.   _______________       FINDINGS:       LOWER CHEST: Minor dependent atelectasis is noted at each lung base. No pleural   effusion nor pneumonic opacity. Partial visualization of a MediPort catheter. Normal cardiac size. No pericardial effusion. LIVER, BILIARY: Hepatic parenchymal enhancement is uniform. Small subcapsular   right hepatic lobe cyst noted, unchanged. No biliary dilation. Gallbladder is   unremarkable. PANCREAS: Normal.       SPLEEN: Normal.       ADRENALS: Normal.       KIDNEYS/URETERS/BLADDER: Renal parenchymal enhancement is symmetric. No   hydronephrosis. No nephrolithiasis. No distal ureteral or bladder stone. PELVIC ORGANS: Prior hysterectomy. VASCULATURE: Normal aortic course and caliber. There is a circumaortic left   renal vein. LYMPH NODES: No enlarged lymph nodes. GASTROINTESTINAL TRACT: The stomach, small intestine, and large intestine are   normal in course and caliber. No bowel obstruction. No free intraperitoneal gas.    Scattered colonic diverticula are present without evidence of diverticulitis. BONES: No acute or aggressive osseous abnormalities identified. Prior posterior   decompression and instrumented fusion L4-S1. Interbody fixation L5-S1.       OTHER: None.       _______________               CXR Results  (Last 48 hours)    None            Medical Decision Making   I am the first provider for this patient. I reviewed the vital signs, available nursing notes, past medical history, past surgical history, family history and social history. Vital Signs-Reviewed the patient's vital signs. Pulse Oximetry Analysis - 100% on RA    Records Reviewed: Nursing Notes and Old Medical Records    Procedures:  Procedures    Provider Notes (Medical Decision Making):   49 y.o. presenting with NVD and generalized abd cramping x 4 days. Exam with generalized TTP, no guarding, nonsurgical abd, otherwise unremarkable. Hemoccult negative. VSS. Labs, fluids, imaging ordered prior to my arrival by night physician Dr Renée Morrow.    UA, CBC, CMP, lipase WNL. CT abd/pelvis:   \" 1.  No acute intra-abdominal or pelvic abnormality. 2. No hydronephrosis. No nephrolithiasis. 3. Normal caliber small and large intestine. No bowel obstruction. \"    No evidence of intraabdominal pathology, including but not limited to obstruction, UTI, cholecystitis, appendicitits, diverticulitis, colitis, urinary calculus, GI hemorrhage. I don't believe this is C diff as diarrhea is not profuse/bloody, and most recent abx prior to 2 days ago (which was initiated 2 days after onset of sx's) were 1-2 months ago; additionally. Pain improved with ED interventions. Will treat symptomatically, rx provided.     MED RECONCILIATION:  Current Facility-Administered Medications   Medication Dose Route Frequency    morphine injection 2 mg  2 mg IntraVENous NOW    dicyclomine (BENTYL) 10 mg/mL injection 20 mg  20 mg IntraMUSCular AC&HS    heparin (porcine) pf 300 Units  300 Units InterCATHeter PRN     Current Outpatient Prescriptions   Medication Sig    dicyclomine (BENTYL) 20 mg tablet Take 1 Tab by mouth every six (6) hours as needed (abdominal cramps) for up to 20 doses.  ondansetron (ZOFRAN ODT) 4 mg disintegrating tablet Take 1-2 tablets every 6-8 hours as needed for nausea and vomiting.  ibuprofen (MOTRIN) 600 mg tablet Take 1 Tab by mouth every six (6) hours as needed for Pain.  cefdinir (OMNICEF) 300 mg capsule Take 1 Cap by mouth two (2) times a day.  traZODone (DESYREL) 300 mg tablet Take 1 Tab by mouth nightly.  lisinopril-hydroCHLOROthiazide (PRINZIDE, ZESTORETIC) 20-12.5 mg per tablet Take 1 Tab by mouth daily.  polyethylene glycol (MIRALAX) 17 gram packet Take 1 Packet by mouth daily.  insulin nph-regular human rec (HUMULIN 70-30) 100 unit/mL (70-30) inpn Give 25 units in the QAM and 30 units at bedtime    ALBUTEROL IN Take  by inhalation.  LORazepam (ATIVAN) 1 mg tablet 1 mg.  EPINEPHrine (EPIPEN) 0.3 mg/0.3 mL (1:1,000) injection 0.3 mL by IntraMUSCular route once as needed for up to 1 dose. Disposition:  Home    DISCHARGE NOTE:     Pt has been reexamined. Feeling better. Patient has no new complaints, changes, or physical findings. Care plan outlined and precautions discussed. Results of exam, labs, imaging were reviewed with the patient. All medications were reviewed with the patient; will d/c home with bentyl, zofran, motrin. All of pt's questions and concerns were addressed. Patient was instructed and agrees to follow up with PCP, gastroenterologist, as well as to return to the ED upon further deterioration. Patient is ready to go home.     Follow-up Information     Follow up With Details Comments [address] Rosalinda Bowden, NP Schedule an appointment as soon as possible for a visit in 1 day As needed [address] [facility] [id number]  [phone]      [facility] EMERGENCY DEPT Go to As needed [id number]  [phone] Current Discharge Medication List      START taking these medications    Details   dicyclomine (BENTYL) 20 mg tablet Take 1 Tab by mouth every six (6) hours as needed (abdominal cramps) for up to 20 doses. Qty: 20 Tab, Refills: 0         CONTINUE these medications which have CHANGED    Details   ondansetron (ZOFRAN ODT) 4 mg disintegrating tablet Take 1-2 tablets every 6-8 hours as needed for nausea and vomiting. Qty: 12 Tab, Refills: 0      ibuprofen (MOTRIN) 600 mg tablet Take 1 Tab by mouth every six (6) hours as needed for Pain. Qty: 20 Tab, Refills: 0           Diagnosis     Clinical Impression:   1. Generalized abdominal cramping    2. Diarrhea, unspecified type    3.  Non-intractable vomiting with nausea, unspecified vomiting type

## 2018-03-30 NOTE — ED NOTES
Patient discharged to home with Rx and instructions given to patient, patient voices understanding, home with family

## 2018-03-30 NOTE — ED NOTES
Pt with c/o abdominal pain and diarrhea x 4 days, Not improving. Has hx of Right breast CA in remission.

## 2018-03-31 LAB
BACTERIA SPEC CULT: ABNORMAL
SERVICE CMNT-IMP: ABNORMAL

## 2018-04-05 ENCOUNTER — APPOINTMENT (OUTPATIENT)
Dept: INFUSION THERAPY | Age: 48
End: 2018-04-05

## 2018-04-11 ENCOUNTER — HOSPITAL ENCOUNTER (EMERGENCY)
Age: 48
Discharge: HOME | End: 2018-04-11
Attending: EMERGENCY MEDICINE
Payer: MEDICAID

## 2018-04-11 VITALS
DIASTOLIC BLOOD PRESSURE: 79 MMHG | TEMPERATURE: 98.5 F | OXYGEN SATURATION: 100 % | HEART RATE: 90 BPM | SYSTOLIC BLOOD PRESSURE: 126 MMHG | RESPIRATION RATE: 18 BRPM

## 2018-04-11 DIAGNOSIS — R11.0 NAUSEA WITHOUT VOMITING: ICD-10-CM

## 2018-04-11 DIAGNOSIS — R10.84 ABDOMINAL PAIN, GENERALIZED: Primary | ICD-10-CM

## 2018-04-11 LAB
ALBUMIN SERPL-MCNC: 3.8 G/DL (ref 3.4–5)
ALBUMIN/GLOB SERPL: 1.1 {RATIO} (ref 0.8–1.7)
ALP SERPL-CCNC: 124 U/L (ref 45–117)
ALT SERPL-CCNC: 51 U/L (ref 13–56)
ANION GAP SERPL CALC-SCNC: 9 MMOL/L (ref 3–18)
AST SERPL-CCNC: 29 U/L (ref 15–37)
BASOPHILS # BLD: 0 K/UL (ref 0–0.06)
BASOPHILS NFR BLD: 0 % (ref 0–2)
BILIRUB DIRECT SERPL-MCNC: <0.1 MG/DL (ref 0–0.2)
BILIRUB SERPL-MCNC: 0.2 MG/DL (ref 0.2–1)
BUN SERPL-MCNC: 22 MG/DL (ref 7–18)
BUN/CREAT SERPL: 27 (ref 12–20)
CALCIUM SERPL-MCNC: 9 MG/DL (ref 8.5–10.1)
CHLORIDE SERPL-SCNC: 105 MMOL/L (ref 100–108)
CO2 SERPL-SCNC: 25 MMOL/L (ref 21–32)
CREAT SERPL-MCNC: 0.81 MG/DL (ref 0.6–1.3)
DIFFERENTIAL METHOD BLD: ABNORMAL
EOSINOPHIL # BLD: 0.2 K/UL (ref 0–0.4)
EOSINOPHIL NFR BLD: 2 % (ref 0–5)
ERYTHROCYTE [DISTWIDTH] IN BLOOD BY AUTOMATED COUNT: 13.4 % (ref 11.6–14.5)
GLOBULIN SER CALC-MCNC: 3.6 G/DL (ref 2–4)
GLUCOSE SERPL-MCNC: 214 MG/DL (ref 74–99)
HCT VFR BLD AUTO: 36.2 % (ref 35–45)
HGB BLD-MCNC: 12 G/DL (ref 12–16)
LIPASE SERPL-CCNC: 291 U/L (ref 73–393)
LYMPHOCYTES # BLD: 2.3 K/UL (ref 0.9–3.6)
LYMPHOCYTES NFR BLD: 27 % (ref 21–52)
MCH RBC QN AUTO: 30.5 PG (ref 24–34)
MCHC RBC AUTO-ENTMCNC: 33.1 G/DL (ref 31–37)
MCV RBC AUTO: 92.1 FL (ref 74–97)
MONOCYTES # BLD: 0.6 K/UL (ref 0.05–1.2)
MONOCYTES NFR BLD: 6 % (ref 3–10)
NEUTS SEG # BLD: 5.6 K/UL (ref 1.8–8)
NEUTS SEG NFR BLD: 65 % (ref 40–73)
PLATELET # BLD AUTO: 294 K/UL (ref 135–420)
PMV BLD AUTO: 9.8 FL (ref 9.2–11.8)
POTASSIUM SERPL-SCNC: 3.8 MMOL/L (ref 3.5–5.5)
PROT SERPL-MCNC: 7.4 G/DL (ref 6.4–8.2)
RBC # BLD AUTO: 3.93 M/UL (ref 4.2–5.3)
SODIUM SERPL-SCNC: 139 MMOL/L (ref 136–145)
WBC # BLD AUTO: 8.6 K/UL (ref 4.6–13.2)

## 2018-04-11 PROCEDURE — 96375 TX/PRO/DX INJ NEW DRUG ADDON: CPT

## 2018-04-11 PROCEDURE — 85025 COMPLETE CBC W/AUTO DIFF WBC: CPT | Performed by: EMERGENCY MEDICINE

## 2018-04-11 PROCEDURE — 96372 THER/PROPH/DIAG INJ SC/IM: CPT

## 2018-04-11 PROCEDURE — 80048 BASIC METABOLIC PNL TOTAL CA: CPT | Performed by: EMERGENCY MEDICINE

## 2018-04-11 PROCEDURE — 74011250636 HC RX REV CODE- 250/636: Performed by: PHYSICIAN ASSISTANT

## 2018-04-11 PROCEDURE — 74011000258 HC RX REV CODE- 258: Performed by: EMERGENCY MEDICINE

## 2018-04-11 PROCEDURE — 74011250636 HC RX REV CODE- 250/636: Performed by: EMERGENCY MEDICINE

## 2018-04-11 PROCEDURE — 99282 EMERGENCY DEPT VISIT SF MDM: CPT

## 2018-04-11 PROCEDURE — 96374 THER/PROPH/DIAG INJ IV PUSH: CPT

## 2018-04-11 PROCEDURE — 96361 HYDRATE IV INFUSION ADD-ON: CPT

## 2018-04-11 PROCEDURE — 80076 HEPATIC FUNCTION PANEL: CPT | Performed by: EMERGENCY MEDICINE

## 2018-04-11 PROCEDURE — 83690 ASSAY OF LIPASE: CPT | Performed by: EMERGENCY MEDICINE

## 2018-04-11 PROCEDURE — 74011250636 HC RX REV CODE- 250/636

## 2018-04-11 RX ORDER — DICYCLOMINE HYDROCHLORIDE 10 MG/1
10 CAPSULE ORAL 4 TIMES DAILY
Qty: 20 CAP | Refills: 0 | Status: SHIPPED | OUTPATIENT
Start: 2018-04-11 | End: 2018-04-16

## 2018-04-11 RX ORDER — ONDANSETRON 4 MG/1
4 TABLET, ORALLY DISINTEGRATING ORAL
Qty: 15 TAB | Refills: 0 | Status: SHIPPED | OUTPATIENT
Start: 2018-04-11 | End: 2019-01-10

## 2018-04-11 RX ORDER — DICYCLOMINE HYDROCHLORIDE 10 MG/ML
20 INJECTION INTRAMUSCULAR
Status: COMPLETED | OUTPATIENT
Start: 2018-04-11 | End: 2018-04-11

## 2018-04-11 RX ORDER — HEPARIN 100 UNIT/ML
SYRINGE INTRAVENOUS
Status: COMPLETED
Start: 2018-04-11 | End: 2018-04-11

## 2018-04-11 RX ORDER — HEPARIN SODIUM (PORCINE) LOCK FLUSH IV SOLN 100 UNIT/ML 100 UNIT/ML
500 SOLUTION INTRAVENOUS AS NEEDED
Status: DISCONTINUED | OUTPATIENT
Start: 2018-04-11 | End: 2018-04-12 | Stop reason: HOSPADM

## 2018-04-11 RX ORDER — KETOROLAC TROMETHAMINE 30 MG/ML
30 INJECTION, SOLUTION INTRAMUSCULAR; INTRAVENOUS
Status: COMPLETED | OUTPATIENT
Start: 2018-04-11 | End: 2018-04-11

## 2018-04-11 RX ORDER — HEPARIN SODIUM 200 [USP'U]/100ML
500 INJECTION, SOLUTION INTRAVENOUS CONTINUOUS
Status: DISCONTINUED | OUTPATIENT
Start: 2018-04-11 | End: 2018-04-11

## 2018-04-11 RX ADMIN — KETOROLAC TROMETHAMINE 30 MG: 30 INJECTION, SOLUTION INTRAMUSCULAR at 22:34

## 2018-04-11 RX ADMIN — SODIUM CHLORIDE 1000 ML: 900 INJECTION, SOLUTION INTRAVENOUS at 20:21

## 2018-04-11 RX ADMIN — DICYCLOMINE HYDROCHLORIDE 20 MG: 10 INJECTION INTRAMUSCULAR at 20:47

## 2018-04-11 RX ADMIN — PROMETHAZINE HYDROCHLORIDE 25 MG: 25 INJECTION INTRAMUSCULAR; INTRAVENOUS at 20:20

## 2018-04-11 RX ADMIN — Medication 500 UNITS: at 22:36

## 2018-04-12 NOTE — ED TRIAGE NOTES
\"I still have abdominal pain. \" Pt states she has an appt scheduled tomorrow. Has been returning mult times to ED for same problem.

## 2018-04-12 NOTE — DISCHARGE INSTRUCTIONS
Abdominal Pain: Care Instructions  Your Care Instructions    Abdominal pain has many possible causes. Some aren't serious and get better on their own in a few days. Others need more testing and treatment. If your pain continues or gets worse, you need to be rechecked and may need more tests to find out what is wrong. You may need surgery to correct the problem. Don't ignore new symptoms, such as fever, nausea and vomiting, urination problems, pain that gets worse, and dizziness. These may be signs of a more serious problem. Your doctor may have recommended a follow-up visit in the next 8 to 12 hours. If you are not getting better, you may need more tests or treatment. The doctor has checked you carefully, but problems can develop later. If you notice any problems or new symptoms, get medical treatment right away. Follow-up care is a key part of your treatment and safety. Be sure to make and go to all appointments, and call your doctor if you are having problems. It's also a good idea to know your test results and keep a list of the medicines you take. How can you care for yourself at home? · Rest until you feel better. · To prevent dehydration, drink plenty of fluids, enough so that your urine is light yellow or clear like water. Choose water and other caffeine-free clear liquids until you feel better. If you have kidney, heart, or liver disease and have to limit fluids, talk with your doctor before you increase the amount of fluids you drink. · If your stomach is upset, eat mild foods, such as rice, dry toast or crackers, bananas, and applesauce. Try eating several small meals instead of two or three large ones. · Wait until 48 hours after all symptoms have gone away before you have spicy foods, alcohol, and drinks that contain caffeine. · Do not eat foods that are high in fat. · Avoid anti-inflammatory medicines such as aspirin, ibuprofen (Advil, Motrin), and naproxen (Aleve).  These can cause stomach upset. Talk to your doctor if you take daily aspirin for another health problem. When should you call for help? Call 911 anytime you think you may need emergency care. For example, call if:  ? · You passed out (lost consciousness). ? · You pass maroon or very bloody stools. ? · You vomit blood or what looks like coffee grounds. ? · You have new, severe belly pain. ?Call your doctor now or seek immediate medical care if:  ? · Your pain gets worse, especially if it becomes focused in one area of your belly. ? · You have a new or higher fever. ? · Your stools are black and look like tar, or they have streaks of blood. ? · You have unexpected vaginal bleeding. ? · You have symptoms of a urinary tract infection. These may include:  ¨ Pain when you urinate. ¨ Urinating more often than usual.  ¨ Blood in your urine. ? · You are dizzy or lightheaded, or you feel like you may faint. ? Watch closely for changes in your health, and be sure to contact your doctor if:  ? · You are not getting better after 1 day (24 hours). Where can you learn more? Go to http://imani-jenny.info/. Enter H179 in the search box to learn more about \"Abdominal Pain: Care Instructions. \"  Current as of: March 20, 2017  Content Version: 11.4  © 2773-4543 [employer]. Care instructions adapted under license by [employer] (which disclaims liability or warranty for this information). If you have questions about a medical condition or this instruction, always ask your healthcare professional. [place] [address] any warranty or liability for your use of this information.

## 2018-04-12 NOTE — ED PROVIDER NOTES
HPI Comments: Patient is a 49 y. o.female with h/o cancer, recurrent pain, who presents to the ER c/o abd pain that started earlier today. Patient states the pain is in her right lower quadrant. She rates her pain 10/10 and has had some associated nausea as well. She has been seen multiple times for this same complaint, and reports she has a GI appt scheduled for tomorrow. She ran out of her Bentyl and Zofran at home. Patient states these meds have been helping her. She has had normal bowel movements, and denied any fevers, chills, dysuria and has no other complaints. Patient is a 49 y.o. female presenting with abdominal pain. The history is provided by the patient. Abdominal Pain    Pertinent negatives include no fever, no diarrhea, no nausea, no vomiting, no constipation, no dysuria, no headaches and no chest pain. Past Medical History:   Diagnosis Date    Alcohol abuse     Cancer [facility])     breast cancer, right    Cancer ([facility] [address]     Breast CA    Depression     Diabetes ([facility] [address]     Drug abuse     Gastrointestinal disorder     cholitis    Hyperlipidemia     Hypertension     Spine injury        Past Surgical History:   Procedure Laterality Date    COLONOSCOPY N/A 7/18/2016    COLONOSCOPY performed by Linda Guzman MD at [facility] ENDOSCOPY    HX BREAST LUMPECTOMY  06/2013    right    HX HYSTERECTOMY      HX ORTHOPAEDIC      Back fusion surgery 4/18/14.  HX OTHER SURGICAL      mediport    HX TONSILLECTOMY      HX TUBAL LIGATION           Family History:   Problem Relation Age of Onset    Heart Disease Mother     Stroke Father     Heart Disease Father 48    Diabetes Other     Hypertension Other     Cancer Maternal Grandmother        Social History     Social History    Marital status:      Spouse name: N/A    Number of children: N/A    Years of education: N/A     Occupational History    Not on file.      Social History Main Topics    Smoking status: Never Smoker    Smokeless tobacco: Never Used    Alcohol use No      Comment: \"Occasionally\"    Drug use: No    Sexual activity: No     Other Topics Concern    Not on file     Social History Narrative    ** Merged History Encounter **              ALLERGIES: Latex; Other food; Amoxicillin; Aspirin; Fentanyl; Levaquin [levofloxacin]; Chlorhexidine; Norco [hydrocodone-acetaminophen]; Acetaminophen; [place]; Codeine; Glycopyrrolate; Morphine; Pcn [penicillins]; Percocet [oxycodone-acetaminophen]; Tape [adhesive]; and Tramadol    Review of Systems   Constitutional: Negative for chills, fatigue and fever. HENT: Negative. Negative for sore throat. Eyes: Negative. Respiratory: Negative for cough and shortness of breath. Cardiovascular: Negative for chest pain and palpitations. Gastrointestinal: Positive for abdominal pain. Negative for constipation, diarrhea, nausea and vomiting. Genitourinary: Negative for dysuria. Musculoskeletal: Negative. Skin: Negative. Neurological: Negative for dizziness, weakness, light-headedness and headaches. Psychiatric/Behavioral: Negative. All other systems reviewed and are negative. Vitals:    04/11/18 2013   BP: 126/79   Pulse: 90   Resp: 18   Temp: 98.5 °F (36.9 °C)   SpO2: 100%            Physical Exam   Constitutional: She is oriented to person, place, and time. She appears well-developed and well-nourished. HENT:   Head: Normocephalic and atraumatic. Mouth/Throat: Oropharynx is clear and moist.   Eyes: Conjunctivae are normal. Pupils are equal, round, and reactive to light. No scleral icterus. Neck: Normal range of motion. Neck supple. No JVD present. No tracheal deviation present. Cardiovascular: Normal rate, regular rhythm and normal heart sounds. Pulmonary/Chest: Effort normal and breath sounds normal. No respiratory distress. She has no wheezes. Port noted at left upper chest wall; BL well healed breast scars    Abdominal: Soft.  Normal appearance and bowel sounds are normal. She exhibits no distension and no mass. There is tenderness in the right lower quadrant and periumbilical area. There is no rigidity, no rebound, no guarding, no CVA tenderness, no tenderness at McBurney's point and negative Mayo's sign. No peritoneal signs   Musculoskeletal: Normal range of motion. Neurological: She is alert and oriented to person, place, and time. She has normal strength. Gait normal. GCS eye subscore is 4. GCS verbal subscore is 5. GCS motor subscore is 6. Skin: Skin is warm and dry. Psychiatric: She has a normal mood and affect. Nursing note and vitals reviewed. MDM  Number of Diagnoses or Management Options  Abdominal pain, generalized:   Nausea without vomiting:   Diagnosis management comments: 9:08 PM  50 y/o female c/o acute on chronic abd pain. Was seen about 2 weeks prior and prescribed bentyl and zofran for pain and nausea. Pt states this helped her symptoms, however she is out of her meds. She has a f/u appt with GI tomorrow. No peritoneal signs on exam.  Pt noted to have frequent visits for same complaints. Will plan on lab, meds and will reeval.  Shyann Staley PA-C    10:15 PM  Pt reports improvement in nausea, however still having abd pain. Requesting morphine. Had conversation with pt and advised we will not treat her pain with narcotics; negative work up. Pt has GI appt tomorrow and I instructed her on the importance of keeping appt. All questions answered and patient in agreement with plan of care. Will plan for discharge.   Shyann Staley PA-C      Clinical Impression:  abd pain, nausea         Amount and/or Complexity of Data Reviewed  Clinical lab tests: ordered and reviewed    Risk of Complications, Morbidity, and/or Mortality  Presenting problems: moderate  Diagnostic procedures: moderate  Management options: moderate    Patient Progress  Patient progress: stable        ED Course       Procedures      Vitals:  Patient Vitals for the past 12 hrs:   Temp Pulse Resp BP SpO2   04/11/18 2013 98.5 °F (36.9 °C) 90 18 126/79 100 %         Medications ordered:   Medications   heparin (PF) 2 units/ml in NS infusion (not administered)   ketorolac (TORADOL) injection 30 mg (not administered)   sodium chloride 0.9 % bolus infusion 1,000 mL (1,000 mL IntraVENous New Bag 4/11/18 2021)   promethazine (PHENERGAN) 25 mg in 0.9% sodium chloride 50 mL IVPB (25 mg IntraVENous Given 4/11/18 2020)   dicyclomine (BENTYL) 10 mg/mL injection 20 mg (20 mg IntraMUSCular Given 4/11/18 2047)         Lab findings:  Recent Results (from the past 12 hour(s))   CBC WITH AUTOMATED DIFF    Collection Time: 04/11/18  8:36 PM   Result Value Ref Range    WBC 8.6 4.6 - 13.2 K/uL    RBC 3.93 (L) 4.20 - 5.30 M/uL    HGB 12.0 12.0 - 16.0 g/dL    HCT 36.2 35.0 - 45.0 %    MCV 92.1 74.0 - 97.0 FL    MCH 30.5 24.0 - 34.0 PG    MCHC 33.1 31.0 - 37.0 g/dL    RDW 13.4 11.6 - 14.5 %    PLATELET 126 159 - 951 K/uL    MPV 9.8 9.2 - 11.8 FL    NEUTROPHILS 65 40 - 73 %    LYMPHOCYTES 27 21 - 52 %    MONOCYTES 6 3 - 10 %    EOSINOPHILS 2 0 - 5 %    BASOPHILS 0 0 - 2 %    ABS. NEUTROPHILS 5.6 1.8 - 8.0 K/UL    ABS. LYMPHOCYTES 2.3 0.9 - 3.6 K/UL    ABS. MONOCYTES 0.6 0.05 - 1.2 K/UL    ABS. EOSINOPHILS 0.2 0.0 - 0.4 K/UL    ABS. BASOPHILS 0.0 0.0 - 0.06 K/UL    DF AUTOMATED     HEPATIC FUNCTION PANEL    Collection Time: 04/11/18  8:36 PM   Result Value Ref Range    Protein, total 7.4 6.4 - 8.2 g/dL    Albumin 3.8 3.4 - 5.0 g/dL    Globulin 3.6 2.0 - 4.0 g/dL    A-G Ratio 1.1 0.8 - 1.7      Bilirubin, total 0.2 0.2 - 1.0 MG/DL    Bilirubin, direct <0.1 0.0 - 0.2 MG/DL    Alk.  phosphatase 124 (H) 45 - 117 U/L    AST (SGOT) 29 15 - 37 U/L    ALT (SGPT) 51 13 - 56 U/L   LIPASE    Collection Time: 04/11/18  8:36 PM   Result Value Ref Range    Lipase 291 73 - 155 U/L   METABOLIC PANEL, BASIC    Collection Time: 04/11/18  8:36 PM   Result Value Ref Range    Sodium 139 136 - 145 mmol/L Potassium 3.8 3.5 - 5.5 mmol/L    Chloride 105 100 - 108 mmol/L    CO2 25 21 - 32 mmol/L    Anion gap 9 3.0 - 18 mmol/L    Glucose 214 (H) 74 - 99 mg/dL    BUN 22 (H) 7.0 - 18 MG/DL    Creatinine 0.81 0.6 - 1.3 MG/DL    BUN/Creatinine ratio 27 (H) 12 - 20      GFR est AA >60 >60 ml/min/1.73m2    GFR est non-AA >60 >60 ml/min/1.73m2    Calcium 9.0 8.5 - 10.1 MG/DL       EKG interpretation by ED Physician:      X-Ray, CT or other radiology findings or impressions:  No orders to display       Progress notes, Consult notes or additional Procedure notes:       Reevaluation of patient:       Disposition:  Diagnosis:   1. Abdominal pain, generalized    2. Nausea without vomiting        Disposition: Discharged    Follow-up Information     Follow up With Details Comments Contact [facility], located within [facility] EMERGENCY DEPT  If symptoms worsen [address] [place] [address] 104, NP Call in 1 day As needed for ER follow up [address] [place] [address]  [phone]             Patient's Medications   Start Taking    DICYCLOMINE (BENTYL) 10 MG CAPSULE    Take 1 Cap by mouth four (4) times daily for 5 days. ONDANSETRON (ZOFRAN ODT) 4 MG DISINTEGRATING TABLET    Take 1 Tab by mouth every eight (8) hours as needed for Nausea. Continue Taking    ALBUTEROL IN    Take  by inhalation. CEFDINIR (OMNICEF) 300 MG CAPSULE    Take 1 Cap by mouth two (2) times a day. DICYCLOMINE (BENTYL) 20 MG TABLET    Take 1 Tab by mouth every six (6) hours as needed (abdominal cramps) for up to 20 doses. EPINEPHRINE (EPIPEN) 0.3 MG/0.3 ML (1:1,000) INJECTION    0.3 mL by IntraMUSCular route once as needed for up to 1 dose. IBUPROFEN (MOTRIN) 600 MG TABLET    Take 1 Tab by mouth every six (6) hours as needed for Pain.     INSULIN NPH-REGULAR HUMAN REC (HUMULIN 70-30) 100 UNIT/ML (70-30) INPN    Give 25 units in the QAM and 30 units at bedtime    LISINOPRIL-HYDROCHLOROTHIAZIDE (PRINZIDE, ZESTORETIC) 20-12.5 MG PER TABLET    Take 1 Tab by mouth daily. LORAZEPAM (ATIVAN) 1 MG TABLET    1 mg. ONDANSETRON (ZOFRAN ODT) 4 MG DISINTEGRATING TABLET    Take 1-2 tablets every 6-8 hours as needed for nausea and vomiting. POLYETHYLENE GLYCOL (MIRALAX) 17 GRAM PACKET    Take 1 Packet by mouth daily. TRAZODONE (DESYREL) 300 MG TABLET    Take 1 Tab by mouth nightly.    These Medications have changed    No medications on file   Stop Taking    No medications on file

## 2018-04-25 ENCOUNTER — HOSPITAL ENCOUNTER (OUTPATIENT)
Dept: LAB | Age: 48
Discharge: HOME | End: 2018-04-25
Payer: MEDICAID

## 2018-04-25 ENCOUNTER — OFFICE VISIT (OUTPATIENT)
Dept: FAMILY MEDICINE CLINIC | Facility: CLINIC | Age: 48
End: 2018-04-25

## 2018-04-25 VITALS
WEIGHT: 160 LBS | BODY MASS INDEX: 29.44 KG/M2 | TEMPERATURE: 97.9 F | RESPIRATION RATE: 16 BRPM | HEART RATE: 98 BPM | DIASTOLIC BLOOD PRESSURE: 93 MMHG | SYSTOLIC BLOOD PRESSURE: 153 MMHG | HEIGHT: 62 IN | OXYGEN SATURATION: 99 %

## 2018-04-25 DIAGNOSIS — T45.1X5A ANEMIA DUE TO ANTINEOPLASTIC CHEMOTHERAPY: ICD-10-CM

## 2018-04-25 DIAGNOSIS — E11.8 TYPE 2 DIABETES MELLITUS WITH COMPLICATION, WITH LONG-TERM CURRENT USE OF INSULIN (HCC): ICD-10-CM

## 2018-04-25 DIAGNOSIS — E78.5 HYPERLIPIDEMIA, UNSPECIFIED HYPERLIPIDEMIA TYPE: ICD-10-CM

## 2018-04-25 DIAGNOSIS — Z79.4 TYPE 2 DIABETES MELLITUS WITH COMPLICATION, WITH LONG-TERM CURRENT USE OF INSULIN (HCC): ICD-10-CM

## 2018-04-25 DIAGNOSIS — R10.9 CHRONIC ABDOMINAL PAIN: ICD-10-CM

## 2018-04-25 DIAGNOSIS — J30.9 ALLERGIC RHINITIS, UNSPECIFIED SEASONALITY, UNSPECIFIED TRIGGER: ICD-10-CM

## 2018-04-25 DIAGNOSIS — D64.81 ANEMIA DUE TO ANTINEOPLASTIC CHEMOTHERAPY: ICD-10-CM

## 2018-04-25 DIAGNOSIS — E11.65 TYPE 2 DIABETES MELLITUS WITH HYPERGLYCEMIA, WITH LONG-TERM CURRENT USE OF INSULIN (HCC): ICD-10-CM

## 2018-04-25 DIAGNOSIS — Z79.4 TYPE 2 DIABETES MELLITUS WITH HYPERGLYCEMIA, WITH LONG-TERM CURRENT USE OF INSULIN (HCC): ICD-10-CM

## 2018-04-25 DIAGNOSIS — L98.9 SKIN LESIONS: ICD-10-CM

## 2018-04-25 DIAGNOSIS — I10 ESSENTIAL HYPERTENSION: Primary | ICD-10-CM

## 2018-04-25 DIAGNOSIS — G89.29 CHRONIC ABDOMINAL PAIN: ICD-10-CM

## 2018-04-25 DIAGNOSIS — Z00.00 ROUTINE GENERAL MEDICAL EXAMINATION AT A HEALTH CARE FACILITY: ICD-10-CM

## 2018-04-25 LAB
25(OH)D3 SERPL-MCNC: 9.5 NG/ML (ref 30–100)
ALBUMIN SERPL-MCNC: 3.9 G/DL (ref 3.4–5)
ALBUMIN/GLOB SERPL: 1 {RATIO} (ref 0.8–1.7)
ALP SERPL-CCNC: 146 U/L (ref 45–117)
ALT SERPL-CCNC: 29 U/L (ref 13–56)
ANION GAP SERPL CALC-SCNC: 8 MMOL/L (ref 3–18)
APPEARANCE UR: CLEAR
AST SERPL-CCNC: 14 U/L (ref 15–37)
BACTERIA URNS QL MICRO: NEGATIVE /HPF
BASOPHILS # BLD: 0 K/UL (ref 0–0.06)
BASOPHILS NFR BLD: 0 % (ref 0–2)
BILIRUB SERPL-MCNC: 0.2 MG/DL (ref 0.2–1)
BILIRUB UR QL: NEGATIVE
BUN SERPL-MCNC: 18 MG/DL (ref 7–18)
BUN/CREAT SERPL: 20 (ref 12–20)
CALCIUM SERPL-MCNC: 9.5 MG/DL (ref 8.5–10.1)
CHLORIDE SERPL-SCNC: 104 MMOL/L (ref 100–108)
CHOLEST SERPL-MCNC: 244 MG/DL
CO2 SERPL-SCNC: 24 MMOL/L (ref 21–32)
COLOR UR: YELLOW
CREAT SERPL-MCNC: 0.9 MG/DL (ref 0.6–1.3)
CREAT UR-MCNC: 132.99 MG/DL (ref 30–125)
DIFFERENTIAL METHOD BLD: ABNORMAL
EOSINOPHIL # BLD: 0.1 K/UL (ref 0–0.4)
EOSINOPHIL NFR BLD: 2 % (ref 0–5)
EPITH CASTS URNS QL MICRO: NORMAL /LPF (ref 0–5)
ERYTHROCYTE [DISTWIDTH] IN BLOOD BY AUTOMATED COUNT: 13.4 % (ref 11.6–14.5)
EST. AVERAGE GLUCOSE BLD GHB EST-MCNC: 171 MG/DL
GLOBULIN SER CALC-MCNC: 3.9 G/DL (ref 2–4)
GLUCOSE SERPL-MCNC: 221 MG/DL (ref 74–99)
GLUCOSE UR STRIP.AUTO-MCNC: 500 MG/DL
HBA1C MFR BLD: 7.6 % (ref 4.2–5.6)
HCT VFR BLD AUTO: 37.3 % (ref 35–45)
HDLC SERPL-MCNC: 46 MG/DL (ref 40–60)
HDLC SERPL: 5.3 {RATIO} (ref 0–5)
HGB BLD-MCNC: 12.3 G/DL (ref 12–16)
HGB UR QL STRIP: NEGATIVE
KETONES UR QL STRIP.AUTO: NEGATIVE MG/DL
LDLC SERPL CALC-MCNC: 160 MG/DL (ref 0–100)
LEUKOCYTE ESTERASE UR QL STRIP.AUTO: ABNORMAL
LIPID PROFILE,FLP: ABNORMAL
LYMPHOCYTES # BLD: 1.8 K/UL (ref 0.9–3.6)
LYMPHOCYTES NFR BLD: 29 % (ref 21–52)
MCH RBC QN AUTO: 30.4 PG (ref 24–34)
MCHC RBC AUTO-ENTMCNC: 33 G/DL (ref 31–37)
MCV RBC AUTO: 92.3 FL (ref 74–97)
MICROALBUMIN UR-MCNC: 1.5 MG/DL (ref 0–3)
MICROALBUMIN/CREAT UR-RTO: 11 MG/G (ref 0–30)
MONOCYTES # BLD: 0.3 K/UL (ref 0.05–1.2)
MONOCYTES NFR BLD: 6 % (ref 3–10)
NEUTS SEG # BLD: 3.8 K/UL (ref 1.8–8)
NEUTS SEG NFR BLD: 63 % (ref 40–73)
NITRITE UR QL STRIP.AUTO: NEGATIVE
PH UR STRIP: 5 [PH] (ref 5–8)
PLATELET # BLD AUTO: 272 K/UL (ref 135–420)
PMV BLD AUTO: 10.6 FL (ref 9.2–11.8)
POTASSIUM SERPL-SCNC: 4 MMOL/L (ref 3.5–5.5)
PROT SERPL-MCNC: 7.8 G/DL (ref 6.4–8.2)
PROT UR STRIP-MCNC: NEGATIVE MG/DL
RBC # BLD AUTO: 4.04 M/UL (ref 4.2–5.3)
RBC #/AREA URNS HPF: NEGATIVE /HPF (ref 0–5)
SODIUM SERPL-SCNC: 136 MMOL/L (ref 136–145)
SP GR UR REFRACTOMETRY: 1.03 (ref 1–1.03)
T4 FREE SERPL-MCNC: 1.2 NG/DL (ref 0.7–1.5)
TRIGL SERPL-MCNC: 190 MG/DL (ref ?–150)
TSH SERPL DL<=0.05 MIU/L-ACNC: 1.8 UIU/ML (ref 0.36–3.74)
UROBILINOGEN UR QL STRIP.AUTO: 0.2 EU/DL (ref 0.2–1)
VLDLC SERPL CALC-MCNC: 38 MG/DL
WBC # BLD AUTO: 6 K/UL (ref 4.6–13.2)
WBC URNS QL MICRO: NORMAL /HPF (ref 0–4)

## 2018-04-25 PROCEDURE — 84439 ASSAY OF FREE THYROXINE: CPT | Performed by: NURSE PRACTITIONER

## 2018-04-25 PROCEDURE — 82043 UR ALBUMIN QUANTITATIVE: CPT | Performed by: NURSE PRACTITIONER

## 2018-04-25 PROCEDURE — 82306 VITAMIN D 25 HYDROXY: CPT | Performed by: NURSE PRACTITIONER

## 2018-04-25 PROCEDURE — 80061 LIPID PANEL: CPT | Performed by: NURSE PRACTITIONER

## 2018-04-25 PROCEDURE — 81001 URINALYSIS AUTO W/SCOPE: CPT | Performed by: NURSE PRACTITIONER

## 2018-04-25 PROCEDURE — 36415 COLL VENOUS BLD VENIPUNCTURE: CPT | Performed by: NURSE PRACTITIONER

## 2018-04-25 PROCEDURE — 80053 COMPREHEN METABOLIC PANEL: CPT | Performed by: NURSE PRACTITIONER

## 2018-04-25 PROCEDURE — 84443 ASSAY THYROID STIM HORMONE: CPT | Performed by: NURSE PRACTITIONER

## 2018-04-25 PROCEDURE — 83036 HEMOGLOBIN GLYCOSYLATED A1C: CPT | Performed by: NURSE PRACTITIONER

## 2018-04-25 PROCEDURE — 85025 COMPLETE CBC W/AUTO DIFF WBC: CPT | Performed by: NURSE PRACTITIONER

## 2018-04-25 RX ORDER — INSULIN PUMP SYRINGE, 3 ML
EACH MISCELLANEOUS
Qty: 1 KIT | Refills: 0 | Status: SHIPPED | OUTPATIENT
Start: 2018-04-25 | End: 2019-01-10

## 2018-04-25 RX ORDER — LORATADINE 10 MG/1
10 TABLET ORAL DAILY
Qty: 30 TAB | Refills: 3 | Status: SHIPPED | OUTPATIENT
Start: 2018-04-25 | End: 2018-09-05 | Stop reason: ALTCHOICE

## 2018-04-25 RX ORDER — MUPIROCIN 20 MG/G
OINTMENT TOPICAL
COMMUNITY
Start: 2018-04-17 | End: 2019-01-11

## 2018-04-25 RX ORDER — FLUTICASONE PROPIONATE 50 MCG
SPRAY, SUSPENSION (ML) NASAL
Qty: 1 BOTTLE | Refills: 3 | Status: SHIPPED | OUTPATIENT
Start: 2018-04-25 | End: 2018-09-05 | Stop reason: ALTCHOICE

## 2018-04-25 RX ORDER — LANOLIN ALCOHOL/MO/W.PET/CERES
325 CREAM (GRAM) TOPICAL
Qty: 30 TAB | Refills: 3 | Status: SHIPPED | OUTPATIENT
Start: 2018-04-25 | End: 2018-09-05 | Stop reason: SDUPTHER

## 2018-04-25 RX ORDER — VALACYCLOVIR HYDROCHLORIDE 1 G/1
TABLET, FILM COATED ORAL
COMMUNITY
End: 2019-01-11

## 2018-04-25 NOTE — PATIENT INSTRUCTIONS
Iron-Rich Diet: Care Instructions  Your Care Instructions    Your body needs iron to make hemoglobin. Hemoglobin is a substance in red blood cells that carries oxygen from the lungs to cells all through your body. If you do not get enough iron, your body makes fewer and smaller red blood cells. As a result, your body's cells may not get enough oxygen. Adult men need 8 milligrams of iron a day; adult women need 18 milligrams of iron a day. After menopause, women need 8 milligrams of iron a day. A pregnant woman needs 27 milligrams of iron a day. Infants and young children have higher iron needs relative to their size than other age groups. People who have lost blood because of ulcers or heavy menstrual periods may become very low in iron and may develop anemia. Most people can get the iron their bodies need by eating enough of certain iron-rich foods. Your doctor may recommend that you take an iron supplement along with eating an iron-rich diet. Follow-up care is a key part of your treatment and safety. Be sure to make and go to all appointments, and call your doctor if you are having problems. It's also a good idea to know your test results and keep a list of the medicines you take. How can you care for yourself at home? · Make iron-rich foods a part of your daily diet. Iron-rich foods include:  ¨ All meats, such as chicken, beef, lamb, pork, fish, and shellfish. Liver is especially high in iron. ¨ Leafy green vegetables. ¨ Raisins, peas, beans, lentils, barley, and eggs. ¨ Iron-fortified breakfast cereals. · Eat foods with vitamin C along with iron-rich foods. Vitamin C helps you absorb more iron from food. Drink a glass of orange juice or another citrus juice with your food. · Eat meat and vegetables or grains together. The iron in meat helps your body absorb the iron in other foods. Where can you learn more? Go to http://imani-jenny.info/.   Enter [id number] [id number] in the search box to learn more about \"Iron-Rich Diet: Care Instructions. \"  Current as of: May 12, 2017  Content Version: 11.4  © 4834-8069 [employer]. Care instructions adapted under license by [employer] (which disclaims liability or warranty for this information). If you have questions about a medical condition or this instruction, always ask your healthcare professional. [address] any warranty or liability for your use of this information. Iron-Rich Diet: Care Instructions  Your Care Instructions    Your body needs iron to make hemoglobin. Hemoglobin is a substance in red blood cells that carries oxygen from the lungs to cells all through your body. If you do not get enough iron, your body makes fewer and smaller red blood cells. As a result, your body's cells may not get enough oxygen. Adult men need 8 milligrams of iron a day; adult women need 18 milligrams of iron a day. After menopause, women need 8 milligrams of iron a day. A pregnant woman needs 27 milligrams of iron a day. Infants and young children have higher iron needs relative to their size than other age groups. People who have lost blood because of ulcers or heavy menstrual periods may become very low in iron and may develop anemia. Most people can get the iron their bodies need by eating enough of certain iron-rich foods. Your doctor may recommend that you take an iron supplement along with eating an iron-rich diet. Follow-up care is a key part of your treatment and safety. Be sure to make and go to all appointments, and call your doctor if you are having problems. It's also a good idea to know your test results and keep a list of the medicines you take. How can you care for yourself at home? · Make iron-rich foods a part of your daily diet. Iron-rich foods include:  ¨ All meats, such as chicken, beef, lamb, pork, fish, and shellfish. Liver is especially high in iron. ¨ Leafy green vegetables.   ¨ Raisins, peas, beans, lentils, barley, and eggs. ¨ Iron-fortified breakfast cereals. · Eat foods with vitamin C along with iron-rich foods. Vitamin C helps you absorb more iron from food. Drink a glass of orange juice or another citrus juice with your food. · Eat meat and vegetables or grains together. The iron in meat helps your body absorb the iron in other foods. Where can you learn more? Go to http://imani-jenny.info/. Enter [id number] in the search box to learn more about \"Iron-Rich Diet: Care Instructions. \"  Current as of: May 12, 2017  Content Version: 11.4  © 2556-8682 [employer]. Care instructions adapted under license by [employer] (which disclaims liability or warranty for this information). If you have questions about a medical condition or this instruction, always ask your healthcare professional. [address] any warranty or liability for your use of this information. Iron: About This Test  What is it? This test measures the amount of iron in your blood. It is done on blood taken from a vein in your arm. Too little iron can cause anemia, which can cause weakness, tiredness, or lack of stamina. Too much iron can cause hemochromatosis. This is usually an inherited disease that can cause tiredness, weakness, liver problems, or pain in the joints. Why is this test done? A test for iron is done to:  · Check for too much or too little iron in the blood. · Check to see if your diet is providing enough iron. · Check to see if treatment to keep the right amount of iron in your blood is working. How can you prepare for the test?  · Do not take iron supplements for 12 hours before your iron test.  What happens during the test?  A health professional takes a sample of your blood. What else should you know about the test?  · Results are usually available within a few days.   What happens after the test?  · You will be able to go home right away.  · You can go back to your usual activities right away. When should you call for help? Watch closely for changes in your health, and be sure to contact your doctor if you have any problems. Follow-up care is a key part of your treatment and safety. Be sure to make and go to all appointments, and call your doctor if you are having problems. It's also a good idea to keep a list of the medicines you take. Ask your doctor when you can expect to have your test results. Where can you learn more? Go to http://imani-jenny.info/. Enter C148 in the search box to learn more about \"Iron: About This Test.\"  Current as of: October 14, 2016  Content Version: 11.4  © 0719-4684 [employer]. Care instructions adapted under license by [employer] (which disclaims liability or warranty for this information). If you have questions about a medical condition or this instruction, always ask your healthcare professional. [place] [address] any warranty or liability for your use of this information. Learning About Meal Planning for Diabetes  Why plan your meals? Meal planning can be a key part of managing diabetes. Planning meals and snacks with the right balance of carbohydrate, protein, and fat can help you keep your blood sugar at the target level you set with your doctor. You don't have to eat special foods. You can eat what your family eats, including sweets once in a while. But you do have to pay attention to how often you eat and how much you eat of certain foods. You may want to work with a dietitian or a certified diabetes educator. He or she can give you tips and meal ideas and can answer your questions about meal planning. This health professional can also help you reach a healthy weight if that is one of your goals. What plan is right for you?   Your dietitian or diabetes educator may suggest that you start with the plate format or carbohydrate counting. The plate format  The plate format is a simple way to help you manage how you eat. You plan meals by learning how much space each food should take on a plate. Using the plate format helps you spread carbohydrate throughout the day. It can make it easier to keep your blood sugar level within your target range. It also helps you see if you're eating healthy portion sizes. To use the plate format, you put non-starchy vegetables on half your plate. Add meat or meat substitutes on one-quarter of the plate. Put a grain or starchy vegetable (such as brown rice or a potato) on the final quarter of the plate. You can add a small piece of fruit and some low-fat or fat-free milk or yogurt, depending on your carbohydrate goal for each meal.  Here are some tips for using the plate format:  · Make sure that you are not using an oversized plate. A 9-inch plate is best. Many restaurants use larger plates. · Get used to using the plate format at home. Then you can use it when you eat out. · Write down your questions about using the plate format. Talk to your doctor, a dietitian, or a diabetes educator about your concerns. Carbohydrate counting  With carbohydrate counting, you plan meals based on the amount of carbohydrate in each food. Carbohydrate raises blood sugar higher and more quickly than any other nutrient. It is found in desserts, breads and cereals, and fruit. It's also found in starchy vegetables such as potatoes and corn, grains such as rice and pasta, and milk and yogurt. Spreading carbohydrate throughout the day helps keep your blood sugar levels within your target range. Your daily amount depends on several things, including your weight, how active you are, which diabetes medicines you take, and what your goals are for your blood sugar levels. A registered dietitian or diabetes educator can help you plan how much carbohydrate to include in each meal and snack.   A guideline for your daily amount of carbohydrate is:  · 45 to 60 grams at each meal. That's about the same as 3 to 4 carbohydrate servings. · 15 to 20 grams at each snack. That's about the same as 1 carbohydrate serving. The Nutrition Facts label on packaged foods tells you how much carbohydrate is in a serving of the food. First, look at the serving size on the food label. Is that the amount you eat in a serving? All of the nutrition information on a food label is based on that serving size. So if you eat more or less than that, you'll need to adjust the other numbers. Total carbohydrate is the next thing you need to look for on the label. If you count carbohydrate servings, one serving of carbohydrate is 15 grams. For foods that don't come with labels, such as fresh fruits and vegetables, you'll need a guide that lists carbohydrate in these foods. Ask your doctor, dietitian, or diabetes educator about books or other nutrition guides you can use. If you take insulin, you need to know how many grams of carbohydrate are in a meal. This lets you know how much rapid-acting insulin to take before you eat. If you use an insulin pump, you get a constant rate of insulin during the day. So the pump must be programmed at meals to give you extra insulin to cover the rise in blood sugar after meals. When you know how much carbohydrate you will eat, you can take the right amount of insulin. Or, if you always use the same amount of insulin, you need to make sure that you eat the same amount of carbohydrate at meals. If you need more help to understand carbohydrate counting and food labels, ask your doctor, dietitian, or diabetes educator. How do you get started with meal planning? Here are some tips to get started:  · Plan your meals a week at a time. Don't forget to include snacks too. · Use cookbooks or online recipes to plan several main meals. Plan some quick meals for busy nights. You also can double some recipes that freeze well.  Then you can save half for other busy nights when you don't have time to cook. · Make sure you have the ingredients you need for your recipes. If you're running low on basic items, put these items on your shopping list too. · List foods that you use to make breakfasts, lunches, and snacks. List plenty of fruits and vegetables. · Post this list on the refrigerator. Add to it as you think of more things you need. · Take the list to the store to do your weekly shopping. Follow-up care is a key part of your treatment and safety. Be sure to make and go to all appointments, and call your doctor if you are having problems. It's also a good idea to know your test results and keep a list of the medicines you take. Where can you learn more? Go to http://imani-jenny.info/. Na Jc in the search box to learn more about \"Learning About Meal Planning for Diabetes. \"  Current as of: March 13, 2017  Content Version: 11.4  © 1315-9776 [employer]. Care instructions adapted under license by [employer] (which disclaims liability or warranty for this information). If you have questions about a medical condition or this instruction, always ask your healthcare professional. [address] any warranty or liability for your use of this information. Learning About Diabetes Food Guidelines  Your Care Instructions    Meal planning is important to manage diabetes. It helps keep your blood sugar at a target level (which you set with your doctor). You don't have to eat special foods. You can eat what your family eats, including sweets once in a while. But you do have to pay attention to how often you eat and how much you eat of certain foods. You may want to work with a dietitian or a certified diabetes educator (CDE) to help you plan meals and snacks. A dietitian or CDE can also help you lose weight if that is one of your goals. What should you know about eating carbs?   Managing the amount of carbohydrate (carbs) you eat is an important part of healthy meals when you have diabetes. Carbohydrate is found in many foods. · Learn which foods have carbs. And learn the amounts of carbs in different foods. ¨ Bread, cereal, pasta, and rice have about 15 grams of carbs in a serving. A serving is 1 slice of bread (1 ounce), ½ cup of cooked cereal, or 1/3 cup of cooked pasta or rice. ¨ Fruits have 15 grams of carbs in a serving. A serving is 1 small fresh fruit, such as an apple or orange; ½ of a banana; ½ cup of cooked or canned fruit; ½ cup of fruit juice; 1 cup of melon or raspberries; or 2 tablespoons of dried fruit. ¨ Milk and no-sugar-added yogurt have 15 grams of carbs in a serving. A serving is 1 cup of milk or 2/3 cup of no-sugar-added yogurt. ¨ Starchy vegetables have 15 grams of carbs in a serving. A serving is ½ cup of mashed potatoes or sweet potato; 1 cup winter squash; ½ of a small baked potato; ½ cup of cooked beans; or ½ cup cooked corn or green peas. · Learn how much carbs to eat each day and at each meal. A dietitian or CDE can teach you how to keep track of the amount of carbs you eat. This is called carbohydrate counting. · If you are not sure how to count carbohydrate grams, use the Plate Method to plan meals. It is a good, quick way to make sure that you have a balanced meal. It also helps you spread carbs throughout the day. ¨ Divide your plate by types of foods. Put non-starchy vegetables on half the plate, meat or other protein food on one-quarter of the plate, and a grain or starchy vegetable in the final quarter of the plate. To this you can add a small piece of fruit and 1 cup of milk or yogurt, depending on how many carbs you are supposed to eat at a meal.  · Try to eat about the same amount of carbs at each meal. Do not \"save up\" your daily allowance of carbs to eat at one meal.  · Proteins have very little or no carbs per serving.  Examples of proteins are beef, chicken, turkey, fish, eggs, tofu, cheese, cottage cheese, and peanut butter. A serving size of meat is 3 ounces, which is about the size of a deck of cards. Examples of meat substitute serving sizes (equal to 1 ounce of meat) are 1/4 cup of cottage cheese, 1 egg, 1 tablespoon of peanut butter, and ½ cup of tofu. How can you eat out and still eat healthy? · Learn to estimate the serving sizes of foods that have carbohydrate. If you measure food at home, it will be easier to estimate the amount in a serving of restaurant food. · If the meal you order has too much carbohydrate (such as potatoes, corn, or baked beans), ask to have a low-carbohydrate food instead. Ask for a salad or green vegetables. · If you use insulin, check your blood sugar before and after eating out to help you plan how much to eat in the future. · If you eat more carbohydrate at a meal than you had planned, take a walk or do other exercise. This will help lower your blood sugar. What else should you know? · Limit saturated fat, such as the fat from meat and dairy products. This is a healthy choice because people who have diabetes are at higher risk of heart disease. So choose lean cuts of meat and nonfat or low-fat dairy products. Use olive or canola oil instead of butter or shortening when cooking. · Don't skip meals. Your blood sugar may drop too low if you skip meals and take insulin or certain medicines for diabetes. · Check with your doctor before you drink alcohol. Alcohol can cause your blood sugar to drop too low. Alcohol can also cause a bad reaction if you take certain diabetes medicines. Follow-up care is a key part of your treatment and safety. Be sure to make and go to all appointments, and call your doctor if you are having problems. It's also a good idea to know your test results and keep a list of the medicines you take. Where can you learn more? Go to http://imani-jenny.info/.   Enter P601 in the search box to learn more about \"Learning About Diabetes Food Guidelines. \"  Current as of: March 13, 2017  Content Version: 11.4  © 1283-3609 [email or web address]. Care instructions adapted under license by [employer] (which disclaims liability or warranty for this information). If you have questions about a medical condition or this instruction, always ask your healthcare professional. [address] any warranty or liability for your use of this information. Using a Nasal Steroid Spray: Care Instructions  Your Care Instructions    Your doctor may suggest using a corticosteroid nasal spray for your allergy symptoms or sinus problems. These sprays reduce the swelling inside the nose and sinuses. Unlike decongestant nasal sprays, steroid sprays won't lead to more swelling when you stop taking them. These sprays start working in a few days, but it may take several weeks before you get the full effect. Most side effects are minor. The most common complaint is a burning feeling in the nose right after the spray is used. Some people get nosebleeds. Follow-up care is a key part of your treatment and safety. Be sure to make and go to all appointments, and call your doctor if you are having problems. It's also a good idea to know your test results and keep a list of the medicines you take. How can you care for yourself at home? Here are some tips for using these sprays:  · You may need to prime the sprayer before you use it. This means spraying it into the air a few times to make sure you get the right amount of medicine. Follow the directions on the label. · Blow your nose before you spray. This will help clear out your nostrils. · Gently sniff the medicine into your nose as you spray. Don't snort, or the medicine will go all the way into your throat where it won't do much good. · Aim the nozzle straight toward the outer wall of your nostril.  This will help keep the medicine from irritating the inner walls of your nose, especially your septum (the wall that separates your left and right nostrils). · Don't blow your nose for 10 minutes or so after you spray. And try not to sneeze. · Be safe with medicines. Use this medicine exactly as prescribed. Call your doctor if you think you are having a problem with your medicine. · Clean your sprayer once a week. Read the label to learn how. When should you call for help? Watch closely for changes in your health, and be sure to contact your doctor if you have any problems. Where can you learn more? Go to http://imani-jenny.info/. Enter G842 in the search box to learn more about \"Using a Nasal Steroid Spray: Care Instructions. \"  Current as of: May 12, 2017  Content Version: 11.4  © 5739-5136 "[employer]. Care instructions adapted under license by [employer] (which disclaims liability or warranty for this information). If you have questions about a medical condition or this instruction, always ask your healthcare professional. [place] [address] any warranty or liability for your use of this information. Seasonal Allergies: Care Instructions  Your Care Instructions  Allergies occur when your body's defense system (immune system) overreacts to certain substances. The immune system treats a harmless substance as if it were a harmful germ or virus. Many things can cause this to happen. Examples include pollens, medicine, food, dust, animal dander, and mold. Your allergies are seasonal if you have symptoms just at certain times of the year. In that case, you are probably allergic to pollens from certain trees, grasses, or weeds. Allergies can be mild or severe. Over-the-counter allergy medicine may help with some symptoms. Read and follow all instructions on the label. Managing your allergies is an important part of staying healthy.  Your doctor may suggest that you have tests to help find the cause of your allergies. When you know what things trigger your symptoms, you can avoid them. This can prevent allergy symptoms and other health problems. In some cases, immunotherapy might help. For this treatment, you get shots or use pills that have a small amount of certain allergens in them. Your body \"gets used to\" the allergen, so you react less to it over time. This kind of treatment may help prevent or reduce some allergy symptoms. Follow-up care is a key part of your treatment and safety. Be sure to make and go to all appointments, and call your doctor if you are having problems. It's also a good idea to know your test results and keep a list of the medicines you take. How can you care for yourself at home? · Be safe with medicines. Take your medicines exactly as prescribed. Call your doctor if you think you are having a problem with your medicine. · During your allergy season, keep windows closed. If you need to use air-conditioning, change or clean all filters every month. Take a shower and change your clothes after you have been outside. · Stay inside when pollen counts are high. Vacuum once or twice a week. Use a vacuum  with a HEPA filter or a double-thickness filter. When should you call for help? Give an epinephrine shot if:  ? · You think you are having a severe allergic reaction. ? After giving an epinephrine shot, call 911, even if you feel better. ?Call 911 if:  ? · You have symptoms of a severe allergic reaction. These may include:  ¨ Sudden raised, red areas (hives) all over your body. ¨ Swelling of the throat, mouth, lips, or tongue. ¨ Trouble breathing. ¨ Passing out (losing consciousness). Or you may feel very lightheaded or suddenly feel weak, confused, or restless. ? · You have been given an epinephrine shot, even if you feel better.    ?Call your doctor now or seek immediate medical care if:  ? · You have symptoms of an allergic reaction, such as:  ¨ A rash or hives (raised, red areas on the skin). ¨ Itching. ¨ Swelling. ¨ Belly pain, nausea, or vomiting. ? Watch closely for changes in your health, and be sure to contact your doctor if:  ? · You do not get better as expected. Where can you learn more? Go to http://imani-jenny.info/. Enter J912 in the search box to learn more about \"Seasonal Allergies: Care Instructions. \"  Current as of: September 29, 2016  Content Version: 11.4  © 4946-7581 [employer]. Care instructions adapted under license by [employer] (which disclaims liability or warranty for this information). If you have questions about a medical condition or this instruction, always ask your healthcare professional. [address] any warranty or liability for your use of this information. Anemia: Care Instructions  Your Care Instructions    Anemia is a low level of red blood cells, which carry oxygen throughout your body. Many things can cause anemia. Lack of iron is one of the most common causes. Your body needs iron to make hemoglobin, a substance in red blood cells that carries oxygen from the lungs to your body's cells. Without enough iron, the body produces fewer and smaller red blood cells. As a result, your body's cells do not get enough oxygen, and you feel tired and weak. And you may have trouble concentrating. Bleeding is the most common cause of a lack of iron. You may have heavy menstrual bleeding or bleeding caused by conditions such as ulcers, hemorrhoids, or cancer. Regular use of aspirin or other anti-inflammatory medicines (such as ibuprofen) also can cause bleeding in some people. A lack of iron in your diet also can cause anemia, especially at times when the body needs more iron, such as during pregnancy, infancy, and the teen years. Your doctor may have prescribed iron pills.  It may take several months of treatment for your iron levels to return to normal. Your doctor also may suggest that you eat foods that are rich in iron, such as meat and beans. There are many other causes of anemia. It is not always due to a lack of iron. Finding the specific cause of your anemia will help your doctor find the right treatment for you. Follow-up care is a key part of your treatment and safety. Be sure to make and go to all appointments, and call your doctor if you are having problems. It's also a good idea to know your test results and keep a list of the medicines you take. How can you care for yourself at home? · Take your medicines exactly as prescribed. Call your doctor if you think you are having a problem with your medicine. · If your doctor recommends iron pills, take them as directed:  ¨ Try to take the pills on an empty stomach about 1 hour before or 2 hours after meals. But you may need to take iron with food to avoid an upset stomach. ¨ Do not take antacids or drink milk or caffeine drinks (such as coffee, tea, or cola) at the same time or within 2 hours of the time that you take your iron. They can make it hard for your body to absorb the iron. ¨ Vitamin C (from food or supplements) helps your body absorb iron. Try taking iron pills with a glass of orange juice or some other food that is high in vitamin C, such as citrus fruits. ¨ Iron pills may cause stomach problems, such as heartburn, nausea, diarrhea, constipation, and cramps. Be sure to drink plenty of fluids, and include fruits, vegetables, and fiber in your diet each day. Iron pills often make your bowel movements dark or green. ¨ If you forget to take an iron pill, do not take a double dose of iron the next time you take a pill. ¨ Keep iron pills out of the reach of small children. An overdose of iron can be very dangerous. · Follow your doctor's advice about eating iron-rich foods.  These include red meat, shellfish, poultry, eggs, beans, raisins, whole-grain bread, and leafy green vegetables. · Steam vegetables to help them keep their iron content. When should you call for help? Call 911 anytime you think you may need emergency care. For example, call if:  ? · You have symptoms of a heart attack. These may include:  ¨ Chest pain or pressure, or a strange feeling in the chest.  ¨ Sweating. ¨ Shortness of breath. ¨ Nausea or vomiting. ¨ Pain, pressure, or a strange feeling in the back, neck, jaw, or upper belly or in one or both shoulders or arms. ¨ Lightheadedness or sudden weakness. ¨ A fast or irregular heartbeat. After you call 911, the  may tell you to chew 1 adult-strength or 2 to 4 low-dose aspirin. Wait for an ambulance. Do not try to drive yourself. ? · You passed out (lost consciousness). ?Call your doctor now or seek immediate medical care if:  ? · You have new or increased shortness of breath. ? · You are dizzy or lightheaded, or you feel like you may faint. ? · Your fatigue and weakness continue or get worse. ? · You have any abnormal bleeding, such as:  ¨ Nosebleeds. ¨ Vaginal bleeding that is different (heavier, more frequent, at a different time of the month) than what you are used to. ¨ Bloody or black stools, or rectal bleeding. ¨ Bloody or pink urine. ? Watch closely for changes in your health, and be sure to contact your doctor if:  ? · You do not get better as expected. Where can you learn more? Go to http://imani-jenny.info/. Enter R301 in the search box to learn more about \"Anemia: Care Instructions. \"  Current as of: October 13, 2016  Content Version: 11.4  © 1838-9111 [employer]. Care instructions adapted under license by [employer] (which disclaims liability or warranty for this information).  If you have questions about a medical condition or this instruction, always ask your healthcare professional. [address] any warranty or liability for your use of this information.

## 2018-04-25 NOTE — MR AVS SNAPSHOT
[address] [id number] 
[phone] Patient: Pati Lema MRN: [id number] :1970 Visit Information Date & Time Provider Department Dept. Phone Encounter #  
 2018  9:00 AM Adis Medina NP [facility] [phone] [id number] Follow-up Instructions Return in about 3 months (around 2018). Upcoming Health Maintenance Date Due  
 EYE EXAM RETINAL OR DILATED Q1 1979 DTaP/Tdap/Td series (1 - Tdap) 1990 PAP AKA CERVICAL CYTOLOGY 1990 HEMOGLOBIN A1C Q6M 2017 Influenza Age 5 to Adult 2017 FOOT EXAM Q1 2018 MICROALBUMIN Q1 2018 LIPID PANEL Q1 2018 Allergies as of 2018  Review Complete On: 2018 By: Lima Diggs LPN Severity Noted Reaction Type Reactions Latex High 2010    Hives, Itching Other Food  07/15/2016    Rash Almonds Amoxicillin High 2013    Swelling Other reaction(s): mild rash/itching Aspirin High 2013    Not Reported This Time, Swelling Mouth swells Fentanyl High 2013   Systemic Anaphylaxis, Swelling Patches cause mouth to swell Swelling in mouth from fentanyl patch Levaquin [Levofloxacin] High 2013    Not Reported This Time, Swelling Other reaction(s): mild rash/itching Chlorhexidine Medium 2014   Topical Rash Norco [Hydrocodone-acetaminophen] Medium 2015    Nausea and Vomiting Other reaction(s): gi distress Acetaminophen  2016    Other (comments) [place]  2016    Rash, Itching Codeine  2013    Other (comments) Glycopyrrolate  2014    Swelling Pt  States  faciAL  SWELLING   POST  ROBINUL  IV Pt  States  faciAL  SWELLING   POST  ROBINUL  IV Morphine  2016    Nausea and Vomiting Pt states she is not allergic Pcn [Penicillins]  2013    Swelling Percocet [Oxycodone-acetaminophen]  01/30/2015    Nausea and Vomiting Other reaction(s): gi distress 
nausea Other reaction(s): anaphylaxis/angioedema Per pt. She is allergic Tape [Adhesive]  05/31/2016    Rash Tramadol  12/05/2013    Swelling Current Immunizations  Reviewed on 4/24/2017 Name Date Influenza Vaccine 11/7/2016, 2/24/2016, 3/23/2015, 12/1/2014, 10/9/2013, 10/20/2012 Pneumococcal Polysaccharide (PPSV-23) 3/22/2015, 10/22/2012 Pneumococcal Vaccine (Unspecified Type) 3/4/2016, 1/2/2013 Not reviewed this visit You Were Diagnosed With   
  
 Codes Comments Essential hypertension    -  Primary ICD-10-CM: I10 
ICD-9-CM: 401.9 Type 2 diabetes mellitus with complication, with long-term current use of insulin (HCC)     ICD-10-CM: E11.8, Z79.4 ICD-9-CM: 250.90, V58.67 Chronic abdominal pain     ICD-10-CM: R10.9, G89.29 ICD-9-CM: 789.00, 338.29 Anemia due to antineoplastic chemotherapy     ICD-10-CM: D64.81, T45.1X5A 
ICD-9-CM: 285.3, E933.1 Allergic rhinitis, unspecified seasonality, unspecified trigger     ICD-10-CM: J30.9 ICD-9-CM: 477.9 Type 2 diabetes mellitus with hyperglycemia, with long-term current use of insulin (HCC)     ICD-10-CM: E11.65, Z79.4 ICD-9-CM: 250.00, 790.29, V58.67 Skin lesions     ICD-10-CM: L98.9 ICD-9-CM: 709.9 Vitals BP Pulse Temp Resp Height(growth percentile) Weight(growth percentile) (!) 153/93 (BP 1 Location: Right arm, BP Patient Position: Sitting) 98 97.9 °F (36.6 °C) (Oral) 16 5' 2\" (1.575 m) 160 lb (72.6 kg) LMP SpO2 BMI OB Status Smoking Status 08/20/2013 99% 29.26 kg/m2 Hysterectomy Never Smoker BMI and BSA Data Body Mass Index Body Surface Area  
 29.26 kg/m 2 1.78 m 2 Preferred Pharmacy Pharmacy Name Phone [facility] [phone] Your Updated Medication List  
  
   
 This list is accurate as of 4/25/18 10:20 AM.  Always use your most recent med list.  
  
  
  
  
 ALBUTEROL IN Take  by inhalation. bacitracin-neomycin-polymyxin b-hydrocortisone 1 % ointment Commonly known as:  CORTISPORIN Apply  to affected area two (2) times a day. Blood-Glucose Meter monitoring kit Use daily to check blood sugar  
  
 cefdinir 300 mg capsule Commonly known as:  OMNICEF Take 1 Cap by mouth two (2) times a day. dicyclomine 20 mg tablet Commonly known as:  BENTYL Take 1 Tab by mouth every six (6) hours as needed (abdominal cramps) for up to 20 doses. EPINEPHrine 0.3 mg/0.3 mL injection Commonly known as:  EPIPEN  
0.3 mL by IntraMUSCular route once as needed for up to 1 dose. ferrous sulfate 325 mg (65 mg iron) tablet Take 1 Tab by mouth Daily (before breakfast). fluticasone 50 mcg/actuation nasal spray Commonly known as:  Vee Raheel Use one to two sprays in each nostril daily for congestion and allergy. Indications: Allergic Rhinitis  
  
 ibuprofen 600 mg tablet Commonly known as:  MOTRIN Take 1 Tab by mouth every six (6) hours as needed for Pain. insulin nph-regular human rec 100 unit/mL (70-30) Inpn Commonly known as:  HUMULIN 70-30 Give 25 units in the QAM and 30 units at bedtime  
  
 lisinopril-hydroCHLOROthiazide 20-12.5 mg per tablet Commonly known as:  Yoel Zazueta Take 1 Tab by mouth daily. loratadine 10 mg tablet Commonly known as:  Gennett Caroli Take 1 Tab by mouth daily. Indications: Allergic Rhinitis LORazepam 1 mg tablet Commonly known as:  ATIVAN  
1 mg. mupirocin 2 % ointment Commonly known as:  [facility] Use 1 Application to affected area 3 Times Daily.  
  
 nut.tx.gluc.intol,lac-free,soy Liqd Commonly known as:  [address] [employer] [place] Take 1 Bottle by mouth three (3) times daily for 30 days. * ondansetron 4 mg disintegrating tablet Commonly known as:  ZOFRAN ODT Take 1-2 tablets every 6-8 hours as needed for nausea and vomiting. * ondansetron 4 mg disintegrating tablet Commonly known as:  ZOFRAN ODT Take 1 Tab by mouth every eight (8) hours as needed for Nausea. polyethylene glycol 17 gram packet Commonly known as:  Laureano Bimler Take 1 Packet by mouth daily. traZODone 300 mg tablet Commonly known as:  Samule Grills Take 1 Tab by mouth nightly. VALTREX 1 gram tablet Generic drug:  valACYclovir Take  by mouth. vit B comp-C-FA-iron-vit E 500 mg-400 mcg- 18 mg iron Tab Commonly known as:  VITAMINS B COMPLEX Take 1 Tab by mouth daily. * Notice: This list has 2 medication(s) that are the same as other medications prescribed for you. Read the directions carefully, and ask your doctor or other care provider to review them with you. Prescriptions Sent to Pharmacy Refills  
 ferrous sulfate 325 mg (65 mg iron) tablet 3 Sig: Take 1 Tab by mouth Daily (before breakfast). Class: Normal  
 Pharmacy: [address] Ph #: [phone] Route: Oral  
 nut.tx.gluc.intol,lac-free,soy (GLUCERNA THERAPEUTIC NUTRITION) liqd 3 Sig: Take 1 Bottle by mouth three (3) times daily for 30 days. Class: Normal  
 Pharmacy: [address] Ph #: [phone] Route: Oral  
 Blood-Glucose Meter monitoring kit 0 Sig: Use daily to check blood sugar Class: Normal  
 Pharmacy: [facility] [address]  Ph #: [phone]  
 vit B comp-C-FA-iron-vit E (VITAMINS B COMPLEX) 500 mg-400 mcg- 18 mg iron tab 3 Sig: Take 1 Tab by mouth daily. Class: Normal  
 Pharmacy: [address] Ph #: [phone]  Route: Oral  
 fluticasone (FLONASE) 50 mcg/actuation nasal spray 3 Sig: Use one to two sprays in each nostril daily for congestion and allergy. Indications: Allergic Rhinitis Class: Normal  
 Pharmacy: [address] Ph #: [phone]  
 loratadine (CLARITIN) 10 mg tablet 3 Sig: Take 1 Tab by mouth daily. Indications: Allergic Rhinitis Class: Normal  
 Pharmacy: [address] Ph #: [phone] Route: Oral  
 insulin nph-regular human rec (HUMULIN 70-30) 100 unit/mL (70-30) inpn 3 Sig: Give 25 units in the QAM and 30 units at bedtime Class: Normal  
 Pharmacy: [address] Ph #: [phone]  
 bacitracin-neomycin-polymyxin b-hydrocortisone (CORTISPORIN) 1 % ointment 0 Sig: Apply  to affected area two (2) times a day. Class: Normal  
 Pharmacy: [address] Ph #: [phone] Route: Topical  
  
Follow-up Instructions Return in about 3 months (around 7/25/2018). To-Do List   
 05/09/2018 9:00 AM  
  Appointment with [address] at [facility] ([phone]) Patient Instructions Iron-Rich Diet: Care Instructions Your Care Instructions Your body needs iron to make hemoglobin. Hemoglobin is a substance in red blood cells that carries oxygen from the lungs to cells all through your body. If you do not get enough iron, your body makes fewer and smaller red blood cells. As a result, your body's cells may not get enough oxygen. Adult men need 8 milligrams of iron a day; adult women need 18 milligrams of iron a day. After menopause, women need 8 milligrams of iron a day. A pregnant woman needs 27 milligrams of iron a day.  Infants and young children have higher iron needs relative to their size than other age groups. People who have lost blood because of ulcers or heavy menstrual periods may become very low in iron and may develop anemia. Most people can get the iron their bodies need by eating enough of certain iron-rich foods. Your doctor may recommend that you take an iron supplement along with eating an iron-rich diet. Follow-up care is a key part of your treatment and safety. Be sure to make and go to all appointments, and call your doctor if you are having problems. It's also a good idea to know your test results and keep a list of the medicines you take. How can you care for yourself at home? · Make iron-rich foods a part of your daily diet. Iron-rich foods include: ¨ All meats, such as chicken, beef, lamb, pork, fish, and shellfish. Liver is especially high in iron. ¨ Leafy green vegetables. ¨ Raisins, peas, beans, lentils, barley, and eggs. ¨ Iron-fortified breakfast cereals. · Eat foods with vitamin C along with iron-rich foods. Vitamin C helps you absorb more iron from food. Drink a glass of orange juice or another citrus juice with your food. · Eat meat and vegetables or grains together. The iron in meat helps your body absorb the iron in other foods. Where can you learn more? Go to http://imani-jenny.info/. Enter [id number] in the search box to learn more about \"Iron-Rich Diet: Care Instructions. \" Current as of: May 12, 2017 Content Version: 11.4 © 6150-2521 [employer]. Care instructions adapted under license by [employer] (which disclaims liability or warranty for this information). If you have questions about a medical condition or this instruction, always ask your healthcare professional. [place] [address] any warranty or liability for your use of this information. Iron-Rich Diet: Care Instructions Your Care Instructions Your body needs iron to make hemoglobin.  Hemoglobin is a substance in red blood cells that carries oxygen from the lungs to cells all through your body. If you do not get enough iron, your body makes fewer and smaller red blood cells. As a result, your body's cells may not get enough oxygen. Adult men need 8 milligrams of iron a day; adult women need 18 milligrams of iron a day. After menopause, women need 8 milligrams of iron a day. A pregnant woman needs 27 milligrams of iron a day. Infants and young children have higher iron needs relative to their size than other age groups. People who have lost blood because of ulcers or heavy menstrual periods may become very low in iron and may develop anemia. Most people can get the iron their bodies need by eating enough of certain iron-rich foods. Your doctor may recommend that you take an iron supplement along with eating an iron-rich diet. Follow-up care is a key part of your treatment and safety. Be sure to make and go to all appointments, and call your doctor if you are having problems. It's also a good idea to know your test results and keep a list of the medicines you take. How can you care for yourself at home? · Make iron-rich foods a part of your daily diet. Iron-rich foods include: ¨ All meats, such as chicken, beef, lamb, pork, fish, and shellfish. Liver is especially high in iron. ¨ Leafy green vegetables. ¨ Raisins, peas, beans, lentils, barley, and eggs. ¨ Iron-fortified breakfast cereals. · Eat foods with vitamin C along with iron-rich foods. Vitamin C helps you absorb more iron from food. Drink a glass of orange juice or another citrus juice with your food. · Eat meat and vegetables or grains together. The iron in meat helps your body absorb the iron in other foods. Where can you learn more? Go to http://imani-jenny.info/. Enter [id number] in the search box to learn more about \"Iron-Rich Diet: Care Instructions. \" Current as of: May 12, 2017 Content Version: 11.4 © 2725-3535 [employer]. Care instructions adapted under license by [employer] (which disclaims liability or warranty for this information). If you have questions about a medical condition or this instruction, always ask your healthcare professional. [address] any warranty or liability for your use of this information. Iron: About This Test 
What is it? This test measures the amount of iron in your blood. It is done on blood taken from a vein in your arm. Too little iron can cause anemia, which can cause weakness, tiredness, or lack of stamina. Too much iron can cause hemochromatosis. This is usually an inherited disease that can cause tiredness, weakness, liver problems, or pain in the joints. Why is this test done? A test for iron is done to: · Check for too much or too little iron in the blood. · Check to see if your diet is providing enough iron. · Check to see if treatment to keep the right amount of iron in your blood is working. How can you prepare for the test? 
· Do not take iron supplements for 12 hours before your iron test. 
What happens during the test? 
A health professional takes a sample of your blood. What else should you know about the test? 
· Results are usually available within a few days. What happens after the test? 
· You will be able to go home right away. · You can go back to your usual activities right away. When should you call for help? Watch closely for changes in your health, and be sure to contact your doctor if you have any problems. Follow-up care is a key part of your treatment and safety. Be sure to make and go to all appointments, and call your doctor if you are having problems. It's also a good idea to keep a list of the medicines you take. Ask your doctor when you can expect to have your test results. Where can you learn more? Go to http://imani-jenny.info/. Enter W772 in the search box to learn more about \"Iron: About This Test.\" Current as of: October 14, 2016 Content Version: 11.4 © 5401-9633 [employer]. Care instructions adapted under license by [employer] (which disclaims liability or warranty for this information). If you have questions about a medical condition or this instruction, always ask your healthcare professional. [address] any warranty or liability for your use of this information. Learning About Meal Planning for Diabetes Why plan your meals? Meal planning can be a key part of managing diabetes. Planning meals and snacks with the right balance of carbohydrate, protein, and fat can help you keep your blood sugar at the target level you set with your doctor. You don't have to eat special foods. You can eat what your family eats, including sweets once in a while. But you do have to pay attention to how often you eat and how much you eat of certain foods. You may want to work with a dietitian or a certified diabetes educator. He or she can give you tips and meal ideas and can answer your questions about meal planning. This health professional can also help you reach a healthy weight if that is one of your goals. What plan is right for you? Your dietitian or diabetes educator may suggest that you start with the plate format or carbohydrate counting. The plate format The plate format is a simple way to help you manage how you eat. You plan meals by learning how much space each food should take on a plate. Using the plate format helps you spread carbohydrate throughout the day. It can make it easier to keep your blood sugar level within your target range. It also helps you see if you're eating healthy portion sizes. To use the plate format, you put non-starchy vegetables on half your plate. Add meat or meat substitutes on one-quarter of the plate.  Put a grain or starchy vegetable (such as brown rice or a potato) on the final quarter of the plate. You can add a small piece of fruit and some low-fat or fat-free milk or yogurt, depending on your carbohydrate goal for each meal. 
Here are some tips for using the plate format: · Make sure that you are not using an oversized plate. A 9-inch plate is best. Many restaurants use larger plates. · Get used to using the plate format at home. Then you can use it when you eat out. · Write down your questions about using the plate format. Talk to your doctor, a dietitian, or a diabetes educator about your concerns. Carbohydrate counting With carbohydrate counting, you plan meals based on the amount of carbohydrate in each food. Carbohydrate raises blood sugar higher and more quickly than any other nutrient. It is found in desserts, breads and cereals, and fruit. It's also found in starchy vegetables such as potatoes and corn, grains such as rice and pasta, and milk and yogurt. Spreading carbohydrate throughout the day helps keep your blood sugar levels within your target range. Your daily amount depends on several things, including your weight, how active you are, which diabetes medicines you take, and what your goals are for your blood sugar levels. A registered dietitian or diabetes educator can help you plan how much carbohydrate to include in each meal and snack. A guideline for your daily amount of carbohydrate is: · 45 to 60 grams at each meal. That's about the same as 3 to 4 carbohydrate servings. · 15 to 20 grams at each snack. That's about the same as 1 carbohydrate serving. The Nutrition Facts label on packaged foods tells you how much carbohydrate is in a serving of the food. First, look at the serving size on the food label. Is that the amount you eat in a serving? All of the nutrition information on a food label is based on that serving size.  So if you eat more or less than that, you'll need to adjust the other numbers. Total carbohydrate is the next thing you need to look for on the label. If you count carbohydrate servings, one serving of carbohydrate is 15 grams. For foods that don't come with labels, such as fresh fruits and vegetables, you'll need a guide that lists carbohydrate in these foods. Ask your doctor, dietitian, or diabetes educator about books or other nutrition guides you can use. If you take insulin, you need to know how many grams of carbohydrate are in a meal. This lets you know how much rapid-acting insulin to take before you eat. If you use an insulin pump, you get a constant rate of insulin during the day. So the pump must be programmed at meals to give you extra insulin to cover the rise in blood sugar after meals. When you know how much carbohydrate you will eat, you can take the right amount of insulin. Or, if you always use the same amount of insulin, you need to make sure that you eat the same amount of carbohydrate at meals. If you need more help to understand carbohydrate counting and food labels, ask your doctor, dietitian, or diabetes educator. How do you get started with meal planning? Here are some tips to get started: 
· Plan your meals a week at a time. Don't forget to include snacks too. · Use cookbooks or online recipes to plan several main meals. Plan some quick meals for busy nights. You also can double some recipes that freeze well. Then you can save half for other busy nights when you don't have time to cook. · Make sure you have the ingredients you need for your recipes. If you're running low on basic items, put these items on your shopping list too. · List foods that you use to make breakfasts, lunches, and snacks. List plenty of fruits and vegetables. · Post this list on the refrigerator. Add to it as you think of more things you need. · Take the list to the store to do your weekly shopping. Follow-up care is a key part of your treatment and safety. Be sure to make and go to all appointments, and call your doctor if you are having problems. It's also a good idea to know your test results and keep a list of the medicines you take. Where can you learn more? Go to http://imani-jenny.info/. Donya Kid in the search box to learn more about \"Learning About Meal Planning for Diabetes. \" Current as of: March 13, 2017 Content Version: 11.4 © 4186-4649 [employer]. Care instructions adapted under license by [employer] (which disclaims liability or warranty for this information). If you have questions about a medical condition or this instruction, always ask your healthcare professional. [address] any warranty or liability for your use of this information. Learning About Diabetes Food Guidelines Your Care Instructions Meal planning is important to manage diabetes. It helps keep your blood sugar at a target level (which you set with your doctor). You don't have to eat special foods. You can eat what your family eats, including sweets once in a while. But you do have to pay attention to how often you eat and how much you eat of certain foods. You may want to work with a dietitian or a certified diabetes educator (CDE) to help you plan meals and snacks. A dietitian or CDE can also help you lose weight if that is one of your goals. What should you know about eating carbs? Managing the amount of carbohydrate (carbs) you eat is an important part of healthy meals when you have diabetes. Carbohydrate is found in many foods. · Learn which foods have carbs. And learn the amounts of carbs in different foods. ¨ Bread, cereal, pasta, and rice have about 15 grams of carbs in a serving. A serving is 1 slice of bread (1 ounce), ½ cup of cooked cereal, or 1/3 cup of cooked pasta or rice. ¨ Fruits have 15 grams of carbs in a serving. A serving is 1 small fresh fruit, such as an apple or orange; ½ of a banana; ½ cup of cooked or canned fruit; ½ cup of fruit juice; 1 cup of melon or raspberries; or 2 tablespoons of dried fruit. ¨ Milk and no-sugar-added yogurt have 15 grams of carbs in a serving. A serving is 1 cup of milk or 2/3 cup of no-sugar-added yogurt. ¨ Starchy vegetables have 15 grams of carbs in a serving. A serving is ½ cup of mashed potatoes or sweet potato; 1 cup winter squash; ½ of a small baked potato; ½ cup of cooked beans; or ½ cup cooked corn or green peas. · Learn how much carbs to eat each day and at each meal. A dietitian or CDE can teach you how to keep track of the amount of carbs you eat. This is called carbohydrate counting. · If you are not sure how to count carbohydrate grams, use the Plate Method to plan meals. It is a good, quick way to make sure that you have a balanced meal. It also helps you spread carbs throughout the day. ¨ Divide your plate by types of foods. Put non-starchy vegetables on half the plate, meat or other protein food on one-quarter of the plate, and a grain or starchy vegetable in the final quarter of the plate. To this you can add a small piece of fruit and 1 cup of milk or yogurt, depending on how many carbs you are supposed to eat at a meal. 
· Try to eat about the same amount of carbs at each meal. Do not \"save up\" your daily allowance of carbs to eat at one meal. 
· Proteins have very little or no carbs per serving. Examples of proteins are beef, chicken, turkey, fish, eggs, tofu, cheese, cottage cheese, and peanut butter. A serving size of meat is 3 ounces, which is about the size of a deck of cards. Examples of meat substitute serving sizes (equal to 1 ounce of meat) are 1/4 cup of cottage cheese, 1 egg, 1 tablespoon of peanut butter, and ½ cup of tofu. How can you eat out and still eat healthy? · Learn to estimate the serving sizes of foods that have carbohydrate. If you measure food at home, it will be easier to estimate the amount in a serving of restaurant food. · If the meal you order has too much carbohydrate (such as potatoes, corn, or baked beans), ask to have a low-carbohydrate food instead. Ask for a salad or green vegetables. · If you use insulin, check your blood sugar before and after eating out to help you plan how much to eat in the future. · If you eat more carbohydrate at a meal than you had planned, take a walk or do other exercise. This will help lower your blood sugar. What else should you know? · Limit saturated fat, such as the fat from meat and dairy products. This is a healthy choice because people who have diabetes are at higher risk of heart disease. So choose lean cuts of meat and nonfat or low-fat dairy products. Use olive or canola oil instead of butter or shortening when cooking. · Don't skip meals. Your blood sugar may drop too low if you skip meals and take insulin or certain medicines for diabetes. · Check with your doctor before you drink alcohol. Alcohol can cause your blood sugar to drop too low. Alcohol can also cause a bad reaction if you take certain diabetes medicines. Follow-up care is a key part of your treatment and safety. Be sure to make and go to all appointments, and call your doctor if you are having problems. It's also a good idea to know your test results and keep a list of the medicines you take. Where can you learn more? Go to http://imani-jenny.info/. Enter C122 in the search box to learn more about \"Learning About Diabetes Food Guidelines. \" Current as of: March 13, 2017 Content Version: 11.4 © 8234-0703 [employer]of [employer]. Care instructions adapted under license by [employer] (which disclaims liability or warranty for this information).  If you have questions about a medical condition or this instruction, always ask your healthcare professional. [address] any warranty or liability for your use of this information. Using a Nasal Steroid Spray: Care Instructions Your Care Instructions Your doctor may suggest using a corticosteroid nasal spray for your allergy symptoms or sinus problems. These sprays reduce the swelling inside the nose and sinuses. Unlike decongestant nasal sprays, steroid sprays won't lead to more swelling when you stop taking them. These sprays start working in a few days, but it may take several weeks before you get the full effect. Most side effects are minor. The most common complaint is a burning feeling in the nose right after the spray is used. Some people get nosebleeds. Follow-up care is a key part of your treatment and safety. Be sure to make and go to all appointments, and call your doctor if you are having problems. It's also a good idea to know your test results and keep a list of the medicines you take. How can you care for yourself at home? Here are some tips for using these sprays: 
· You may need to prime the sprayer before you use it. This means spraying it into the air a few times to make sure you get the right amount of medicine. Follow the directions on the label. · Blow your nose before you spray. This will help clear out your nostrils. · Gently sniff the medicine into your nose as you spray. Don't snort, or the medicine will go all the way into your throat where it won't do much good. · Aim the nozzle straight toward the outer wall of your nostril. This will help keep the medicine from irritating the inner walls of your nose, especially your septum (the wall that separates your left and right nostrils). · Don't blow your nose for 10 minutes or so after you spray. And try not to sneeze. · Be safe with medicines. Use this medicine exactly as prescribed.  Call your doctor if you think you are having a problem with your medicine. · Clean your sprayer once a week. Read the label to learn how. When should you call for help? Watch closely for changes in your health, and be sure to contact your doctor if you have any problems. Where can you learn more? Go to http://imani-jenny.info/. Enter M457 in the search box to learn more about \"Using a Nasal Steroid Spray: Care Instructions. \" Current as of: May 12, 2017 Content Version: 11.4 © 5200-5784 [employer]ge. Care instructions adapted under license by [employer] (which disclaims liability or warranty for this information). If you have questions about a medical condition or this instruction, always ask your healthcare professional. [address] any warranty or liability for your use of this information. Seasonal Allergies: Care Instructions Your Care Instructions Allergies occur when your body's defense system (immune system) overreacts to certain substances. The immune system treats a harmless substance as if it were a harmful germ or virus. Many things can cause this to happen. Examples include pollens, medicine, food, dust, animal dander, and mold. Your allergies are seasonal if you have symptoms just at certain times of the year. In that case, you are probably allergic to pollens from certain trees, grasses, or weeds. Allergies can be mild or severe. Over-the-counter allergy medicine may help with some symptoms. Read and follow all instructions on the label. Managing your allergies is an important part of staying healthy. Your doctor may suggest that you have tests to help find the cause of your allergies. When you know what things trigger your symptoms, you can avoid them. This can prevent allergy symptoms and other health problems. In some cases, immunotherapy might help.  For this treatment, you get shots or use pills that have a small amount of certain allergens in them. Your body \"gets used to\" the allergen, so you react less to it over time. This kind of treatment may help prevent or reduce some allergy symptoms. Follow-up care is a key part of your treatment and safety. Be sure to make and go to all appointments, and call your doctor if you are having problems. It's also a good idea to know your test results and keep a list of the medicines you take. How can you care for yourself at home? · Be safe with medicines. Take your medicines exactly as prescribed. Call your doctor if you think you are having a problem with your medicine. · During your allergy season, keep windows closed. If you need to use air-conditioning, change or clean all filters every month. Take a shower and change your clothes after you have been outside. · Stay inside when pollen counts are high. Vacuum once or twice a week. Use a vacuum  with a HEPA filter or a double-thickness filter. When should you call for help? Give an epinephrine shot if: 
? · You think you are having a severe allergic reaction. ? After giving an epinephrine shot, call 911, even if you feel better. ?Call 911 if: 
? · You have symptoms of a severe allergic reaction. These may include: 
¨ Sudden raised, red areas (hives) all over your body. ¨ Swelling of the throat, mouth, lips, or tongue. ¨ Trouble breathing. ¨ Passing out (losing consciousness). Or you may feel very lightheaded or suddenly feel weak, confused, or restless. ? · You have been given an epinephrine shot, even if you feel better. ?Call your doctor now or seek immediate medical care if: 
? · You have symptoms of an allergic reaction, such as: ¨ A rash or hives (raised, red areas on the skin). ¨ Itching. ¨ Swelling. ¨ Belly pain, nausea, or vomiting. ? Watch closely for changes in your health, and be sure to contact your doctor if: 
? · You do not get better as expected. Where can you learn more? Go to http://imani-jenny.info/. Enter J912 in the search box to learn more about \"Seasonal Allergies: Care Instructions. \" Current as of: September 29, 2016 Content Version: 11.4 © 8625-4693 [employer]That. Care instructions adapted under license by [employer] (which disclaims liability or warranty for this information). If you have questions about a medical condition or this instruction, always ask your healthcare professional. [address] any warranty or liability for your use of this information. Anemia: Care Instructions Your Care Instructions Anemia is a low level of red blood cells, which carry oxygen throughout your body. Many things can cause anemia. Lack of iron is one of the most common causes. Your body needs iron to make hemoglobin, a substance in red blood cells that carries oxygen from the lungs to your body's cells. Without enough iron, the body produces fewer and smaller red blood cells. As a result, your body's cells do not get enough oxygen, and you feel tired and weak. And you may have trouble concentrating. Bleeding is the most common cause of a lack of iron. You may have heavy menstrual bleeding or bleeding caused by conditions such as ulcers, hemorrhoids, or cancer. Regular use of aspirin or other anti-inflammatory medicines (such as ibuprofen) also can cause bleeding in some people. A lack of iron in your diet also can cause anemia, especially at times when the body needs more iron, such as during pregnancy, infancy, and the teen years. Your doctor may have prescribed iron pills. It may take several months of treatment for your iron levels to return to normal. Your doctor also may suggest that you eat foods that are rich in iron, such as meat and beans. There are many other causes of anemia.  It is not always due to a lack of iron. Finding the specific cause of your anemia will help your doctor find the right treatment for you. Follow-up care is a key part of your treatment and safety. Be sure to make and go to all appointments, and call your doctor if you are having problems. It's also a good idea to know your test results and keep a list of the medicines you take. How can you care for yourself at home? · Take your medicines exactly as prescribed. Call your doctor if you think you are having a problem with your medicine. · If your doctor recommends iron pills, take them as directed: ¨ Try to take the pills on an empty stomach about 1 hour before or 2 hours after meals. But you may need to take iron with food to avoid an upset stomach. ¨ Do not take antacids or drink milk or caffeine drinks (such as coffee, tea, or cola) at the same time or within 2 hours of the time that you take your iron. They can make it hard for your body to absorb the iron. ¨ Vitamin C (from food or supplements) helps your body absorb iron. Try taking iron pills with a glass of orange juice or some other food that is high in vitamin C, such as citrus fruits. ¨ Iron pills may cause stomach problems, such as heartburn, nausea, diarrhea, constipation, and cramps. Be sure to drink plenty of fluids, and include fruits, vegetables, and fiber in your diet each day. Iron pills often make your bowel movements dark or green. ¨ If you forget to take an iron pill, do not take a double dose of iron the next time you take a pill. ¨ Keep iron pills out of the reach of small children. An overdose of iron can be very dangerous. · Follow your doctor's advice about eating iron-rich foods. These include red meat, shellfish, poultry, eggs, beans, raisins, whole-grain bread, and leafy green vegetables. · Steam vegetables to help them keep their iron content. When should you call for help? Call 911 anytime you think you may need emergency care. For example, call if: ? · You have symptoms of a heart attack. These may include: ¨ Chest pain or pressure, or a strange feeling in the chest. 
¨ Sweating. ¨ Shortness of breath. ¨ Nausea or vomiting. ¨ Pain, pressure, or a strange feeling in the back, neck, jaw, or upper belly or in one or both shoulders or arms. ¨ Lightheadedness or sudden weakness. ¨ A fast or irregular heartbeat. After you call 911, the  may tell you to chew 1 adult-strength or 2 to 4 low-dose aspirin. Wait for an ambulance. Do not try to drive yourself. ? · You passed out (lost consciousness). ?Call your doctor now or seek immediate medical care if: 
? · You have new or increased shortness of breath. ? · You are dizzy or lightheaded, or you feel like you may faint. ? · Your fatigue and weakness continue or get worse. ? · You have any abnormal bleeding, such as: 
¨ Nosebleeds. ¨ Vaginal bleeding that is different (heavier, more frequent, at a different time of the month) than what you are used to. ¨ Bloody or black stools, or rectal bleeding. ¨ Bloody or pink urine. ? Watch closely for changes in your health, and be sure to contact your doctor if: 
? · You do not get better as expected. Where can you learn more? Go to http://imani-jenny.info/. Enter R301 in the search box to learn more about \"Anemia: Care Instructions. \" Current as of: October 13, 2016 Content Version: 11.4 © 1637-6928 Sensee. Care instructions adapted under license by [employer] (which disclaims liability or warranty for this information). If you have questions about a medical condition or this instruction, always ask your healthcare professional. [place] [address] any warranty or liability for your use of this information. Introducing [facility]!    
 [employer] introduces Artimi patient portal. Now you can access parts of your medical record, email your doctor's office, and request medication refills online. 1. In your internet browser, go to [email or web address] 2. Click on the First Time User? Click Here link in the Sign In box. You will see the New Member Sign Up page. 3. Enter your MileIQ Access Code exactly as it appears below. You will not need to use this code after youve completed the sign-up process. If you do not sign up before the expiration date, you must request a new code. · MileIQ Access Code: [email or web address] Expires: 6/5/2018 10:29 AM 
 
4. Enter the last four digits of your Social Security Number (xxxx) and Date of Birth (mm/dd/yyyy) as indicated and click Submit. You will be taken to the next sign-up page. 5. Create a MileIQ ID. This will be your MileIQ login ID and cannot be changed, so think of one that is secure and easy to remember. 6. Create a MileIQ password. You can change your password at any time. 7. Enter your Password Reset Question and Answer. This can be used at a later time if you forget your password. 8. Enter your e-mail address. You will receive e-mail notification when new information is available in [address] 9. Click Sign Up. You can now view and download portions of your medical record. 10. Click the Download Summary menu link to download a portable copy of your medical information. If you have questions, please visit the Frequently Asked Questions section of the MileIQ website. Remember, MileIQ is NOT to be used for urgent needs. For medical emergencies, dial 911. Now available from your iPhone and Android! Please provide this summary of care documentation to your next provider. Your primary care clinician is listed as Judd Marcano. If you have any questions after today's visit, please call [phone].

## 2018-04-25 NOTE — PROGRESS NOTES
Progress Note  Today's Date:  2018   Patient:  Samantha Gusman  Patient :  1970    Subjective:   Samantha Gusman is a 49 y.o. female who presents for follow up. Hypertension-she takes lisinopril-HCTZ her blood pressure is elevated today. She does not monitor her blood pressures at home. Diabetes Mellitus- she uses humulin 70/30 BID, she is requesting a blood glucose monitor kit. Will check A1C. Chronic abdominal pain/constipation- she takes Zofran, Miralax, and Bentyl she reports she is complaint with meds- was not compliant in past. Patient has had multiple ED visits for this condition and usually requests narcotics for pain. Diagnostic exams have been negative - she is now followed by GI. She denies pain today. Changes in appetite/Anemia-She Is requesting nutritional and vitamin supplements. States her appetite has been poor due to metallic taste in mouth. She reports fatigue and weight loss. CBC shows normal H/H 19. Allergic Rhinitis-She reports symptoms of Allergic Rhinitis including, sneezing, itchy eyes, and nasal congestion- did not try OTC antihistamine. Bipolar affective disorder- takes Trazadone, and Ativan followed by Psych. Panchito She states was diagnosed on 19 at [facility] ED  Valtrex and Mupirocin prescribed. Requests alternative to mupirocin-States mupirocin is not working. Breast CA- followed by Oncology. Current Outpatient Meds and Allergies     Current Outpatient Prescriptions on File Prior to Visit   Medication Sig Dispense Refill    ondansetron (ZOFRAN ODT) 4 mg disintegrating tablet Take 1 Tab by mouth every eight (8) hours as needed for Nausea. 15 Tab 0    dicyclomine (BENTYL) 20 mg tablet Take 1 Tab by mouth every six (6) hours as needed (abdominal cramps) for up to 20 doses. 20 Tab 0    ondansetron (ZOFRAN ODT) 4 mg disintegrating tablet Take 1-2 tablets every 6-8 hours as needed for nausea and vomiting.  12 Tab 0    ibuprofen (MOTRIN) 600 mg tablet Take 1 Tab by mouth every six (6) hours as needed for Pain. 20 Tab 0    cefdinir (OMNICEF) 300 mg capsule Take 1 Cap by mouth two (2) times a day. 14 Cap 0    traZODone (DESYREL) 300 mg tablet Take 1 Tab by mouth nightly. 30 Tab 0    lisinopril-hydroCHLOROthiazide (PRINZIDE, ZESTORETIC) 20-12.5 mg per tablet Take 1 Tab by mouth daily. 30 Tab 1    polyethylene glycol (MIRALAX) 17 gram packet Take 1 Packet by mouth daily. 30 Each 1    insulin nph-regular human rec (HUMULIN 70-30) 100 unit/mL (70-30) inpn Give 25 units in the QAM and 30 units at bedtime 5 Pen 3    ALBUTEROL IN Take  by inhalation.  LORazepam (ATIVAN) 1 mg tablet 1 mg.  EPINEPHrine (EPIPEN) 0.3 mg/0.3 mL (1:1,000) injection 0.3 mL by IntraMUSCular route once as needed for up to 1 dose. 1 Each 1     No current facility-administered medications on file prior to visit. These medications have been reviewed and reconciled with the patient during today's visit.       Allergies   Allergen Reactions    Latex Hives and Itching    Other Food Rash     Almonds    Amoxicillin Swelling     Other reaction(s): mild rash/itching    Aspirin Not Reported This Time and Swelling     Mouth swells    Fentanyl Anaphylaxis and Swelling     Patches cause mouth to swell  Swelling in mouth from fentanyl patch    Levaquin [Levofloxacin] Not Reported This Time and Swelling     Other reaction(s): mild rash/itching    Chlorhexidine Rash    Norco [Hydrocodone-Acetaminophen] Nausea and Vomiting     Other reaction(s): gi distress    Acetaminophen Other (comments)    Dundas Rash and Itching    Codeine Other (comments)    Glycopyrrolate Swelling     Pt  States  faciAL  SWELLING   POST  ROBINUL  IV   Pt  States  faciAL  SWELLING   POST  ROBINUL  IV     Morphine Nausea and Vomiting     Pt states she is not allergic    Pcn [Penicillins] Swelling    Percocet [Oxycodone-Acetaminophen] Nausea and Vomiting     Other reaction(s): gi distress  nausea  Other reaction(s): anaphylaxis/angioedema  Per pt. She is allergic    Tape [Adhesive] Rash    Tramadol Swelling       ROS:       Positive symptoms are BOLDED:    CONST:   Fatigue, weight change, appetite change  NEURO:   Headaches, vision changes, dizziness, loss of consciousness  CV:      Chest pain, palpitations, orthopnea, PND  PULM:             SOB, wheezing, cough, hemoptysis  GI:             Nausea, vomiting, abdominal pain, greasy stools, blood in stool,     diarrhea, constipation  :       Dysuria, hematuria, change in urine  MS:      Muscle/joint pain, joint swelling  SKIN:        Rashes, skin changes  ALLERGY: Seasonal allergies, itchy eyes  HEME: Easy bleeding/bruising      Objective:     VS:    Visit Vitals    BP (!) 153/93 (BP 1 Location: Right arm, BP Patient Position: Sitting)    Pulse 98    Temp 97.9 °F (36.6 °C) (Oral)    Resp 16    Ht 5' 2\" (1.575 m)    Wt 160 lb (72.6 kg)    LMP 08/20/2013    SpO2 99%    BMI 29.26 kg/m2       General:   Well-nourished, well-groomed, pleasant, alert, in no acute distress. Head:  Normocephalic, atraumatic, MMM, normal dentition  Ears:  External ears normaL, Bilateral TMs fluids seen   Eyes:  EOMI, PERRL  Nose:  External nares WNL nasal turbinates swollen , clear drainage   Neck:  Neck supple with normal ROM for age, no thyromegaly, No LAD  Throat: Clear tonsils non-enlarged  Cardiovasc:   Regular rate and rhythm, no murmurs, no rubs, no gallops,   Pulmonary:   Clear breath sounds bilaterally, good air movement, no wheezing, no rales, no rhonchi, normal respiratory effort  Abdomen:   Abdomen soft, generalized tenderness to deep palpation, nondistended, NABS  Extremities:   No edema, no tenderness with palpation of calves, warm and well-perfused  Neuro:   Alert, conversant, appropriate, following commands, no focal deficits.      Pertinent diagnostic procedures include:  No results found for this or any previous visit (from the past 24 hour(s)). Assessment:       1. Essential hypertension    2. Type 2 diabetes mellitus with complication, with long-term current use of insulin ([address]    3. Chronic abdominal pain    4. Anemia due to antineoplastic chemotherapy    5. Allergic rhinitis, unspecified seasonality, unspecified trigger    6. Type 2 diabetes mellitus with hyperglycemia, with long-term current use of insulin (HCC)    7. Skin lesions        Plan:       Orders Placed This Encounter    mupirocin (BACTROBAN) 2 % ointment     Sig: Use 1 Application to affected area 3 Times Daily.  valACYclovir (VALTREX) 1 gram tablet     Sig: Take  by mouth.  ferrous sulfate 325 mg (65 mg iron) tablet     Sig: Take 1 Tab by mouth Daily (before breakfast). Dispense:  30 Tab     Refill:  3    nut.tx.gluc.intol,lac-free,soy (GLUCERNA THERAPEUTIC NUTRITION) liqd     Sig: Take 1 Bottle by mouth three (3) times daily for 30 days. Dispense:  90 Bottle     Refill:  3    Blood-Glucose Meter monitoring kit     Sig: Use daily to check blood sugar     Dispense:  1 Kit     Refill:  0    vit B comp-C-FA-iron-vit E (VITAMINS B COMPLEX) 500 mg-400 mcg- 18 mg iron tab     Sig: Take 1 Tab by mouth daily. Dispense:  30 Tab     Refill:  3    fluticasone (FLONASE) 50 mcg/actuation nasal spray     Sig: Use one to two sprays in each nostril daily for congestion and allergy. Indications: Allergic Rhinitis     Dispense:  1 Bottle     Refill:  3    loratadine (CLARITIN) 10 mg tablet     Sig: Take 1 Tab by mouth daily. Indications: Allergic Rhinitis     Dispense:  30 Tab     Refill:  3    insulin nph-regular human rec (HUMULIN 70-30) 100 unit/mL (70-30) inpn     Sig: Give 25 units in the QAM and 30 units at bedtime     Dispense:  5 Pen     Refill:  3    bacitracin-neomycin-polymyxin b-hydrocortisone (CORTISPORIN) 1 % ointment     Sig: Apply  to affected area two (2) times a day.      Dispense:  15 g     Refill:  0     Follow up in three months for routine care or prn for acute care    Healthy lifestyle has been encouraged including avoidance of tobacco, limiting or avoiding alcohol intake, heart healthy diet which is low in cholesterol and saturated fat and contains fresh fruits, vegetables and whole grains and fiber, regular exercise with goals of 20-30 minutes 3-5 days weekly and maintaining an optimal BMI. I have discussed the diagnosis with the patient and the intended plan as seen in the above orders. The patient has received an after-visit summary along with patient information handout. I have discussed medication side effects and warnings with the patient as well. Pt verbalized understanding.     Giselle CASEY  [facility]  [address] [place], [address]  Phone [phone]  Fax [phone]

## 2018-05-01 ENCOUNTER — TELEPHONE (OUTPATIENT)
Dept: FAMILY MEDICINE CLINIC | Facility: CLINIC | Age: 48
End: 2018-05-01

## 2018-05-01 DIAGNOSIS — Z79.4 TYPE 2 DIABETES MELLITUS WITH HYPERGLYCEMIA, WITH LONG-TERM CURRENT USE OF INSULIN (HCC): ICD-10-CM

## 2018-05-01 DIAGNOSIS — E11.43 TYPE 2 DIABETES MELLITUS WITH DIABETIC AUTONOMIC NEUROPATHY, WITH LONG-TERM CURRENT USE OF INSULIN (HCC): Primary | ICD-10-CM

## 2018-05-01 DIAGNOSIS — E11.8 TYPE 2 DIABETES MELLITUS WITH COMPLICATION, WITH LONG-TERM CURRENT USE OF INSULIN (HCC): ICD-10-CM

## 2018-05-01 DIAGNOSIS — Z79.4 TYPE 2 DIABETES MELLITUS WITH DIABETIC AUTONOMIC NEUROPATHY, WITH LONG-TERM CURRENT USE OF INSULIN (HCC): Primary | ICD-10-CM

## 2018-05-01 DIAGNOSIS — E55.9 VITAMIN D DEFICIENCY: ICD-10-CM

## 2018-05-01 DIAGNOSIS — E11.65 TYPE 2 DIABETES MELLITUS WITH HYPERGLYCEMIA, WITH LONG-TERM CURRENT USE OF INSULIN (HCC): ICD-10-CM

## 2018-05-01 DIAGNOSIS — Z79.4 TYPE 2 DIABETES MELLITUS WITH COMPLICATION, WITH LONG-TERM CURRENT USE OF INSULIN (HCC): ICD-10-CM

## 2018-05-01 RX ORDER — ASPIRIN 325 MG
50000 TABLET, DELAYED RELEASE (ENTERIC COATED) ORAL
Qty: 12 CAP | Refills: 1 | Status: SHIPPED | OUTPATIENT
Start: 2018-05-01 | End: 2018-07-18

## 2018-05-01 NOTE — TELEPHONE ENCOUNTER
Thyroid labs are normal   Your urine is negative for bacteria   Your urine creatinine is elevated- @132 it should be less than 125. Increase fluids- exercise, increase fiber, increase  Water, decrease proteins in your diet. A1C is elevated @7.6, glucose is elevated @221,  will add lantus to your insulin regime. Will order the flexipen use 10 units  Subcutaneous at bedtime   Cholesterol is 244 should be less than 200 lifestyle modification. Increase your current insulin dosage to 30 in the morning and 35 in the evening    Spoke with pt in regards to results. Pt acknowledges understanding and voices no concerns at this time.

## 2018-05-01 NOTE — PROGRESS NOTES
Thyroid labs are normal  Your urine is negative for bacteria  Your urine creatinine is elevated- @132 it should be less than 125. Increase fluids- exercise, increase fiber, increase  Water, decrease proteins in your diet. A1C is elevated @7.6, glucose is elevated @221,  will add lantus to your insulin regime.  Will order the flexipen use 10 units  Subcutaneous at bedtime  Cholesterol is 244 should be less than 200 lifestyle modification

## 2018-05-02 ENCOUNTER — DOCUMENTATION ONLY (OUTPATIENT)
Dept: FAMILY MEDICINE CLINIC | Facility: CLINIC | Age: 48
End: 2018-05-02

## 2018-05-02 ENCOUNTER — HOSPITAL ENCOUNTER (EMERGENCY)
Age: 48
Discharge: HOME | End: 2018-05-03
Attending: EMERGENCY MEDICINE
Payer: MEDICAID

## 2018-05-02 VITALS
OXYGEN SATURATION: 100 % | HEART RATE: 86 BPM | SYSTOLIC BLOOD PRESSURE: 154 MMHG | WEIGHT: 150 LBS | DIASTOLIC BLOOD PRESSURE: 99 MMHG | TEMPERATURE: 98 F | BODY MASS INDEX: 27.6 KG/M2 | RESPIRATION RATE: 16 BRPM | HEIGHT: 62 IN

## 2018-05-02 DIAGNOSIS — R03.0 ELEVATED BLOOD PRESSURE READING: ICD-10-CM

## 2018-05-02 DIAGNOSIS — T14.8XXA MULTIPLE WOUNDS OF SKIN: ICD-10-CM

## 2018-05-02 DIAGNOSIS — R53.83 FATIGUE, UNSPECIFIED TYPE: Primary | ICD-10-CM

## 2018-05-02 DIAGNOSIS — E86.0 DEHYDRATION: ICD-10-CM

## 2018-05-02 DIAGNOSIS — R10.84 ABDOMINAL PAIN, GENERALIZED: ICD-10-CM

## 2018-05-02 LAB
ABO + RH BLD: NORMAL
ALBUMIN SERPL-MCNC: 4 G/DL (ref 3.4–5)
ALBUMIN/GLOB SERPL: 1.1 {RATIO} (ref 0.8–1.7)
ALP SERPL-CCNC: 144 U/L (ref 45–117)
ALT SERPL-CCNC: 31 U/L (ref 13–56)
ANION GAP SERPL CALC-SCNC: 9 MMOL/L (ref 3–18)
AST SERPL-CCNC: 22 U/L (ref 15–37)
BASOPHILS # BLD: 0 K/UL (ref 0–0.06)
BASOPHILS NFR BLD: 0 % (ref 0–2)
BILIRUB SERPL-MCNC: 0.2 MG/DL (ref 0.2–1)
BLOOD GROUP ANTIBODIES SERPL: NORMAL
BUN SERPL-MCNC: 26 MG/DL (ref 7–18)
BUN/CREAT SERPL: 28 (ref 12–20)
CALCIUM SERPL-MCNC: 9.2 MG/DL (ref 8.5–10.1)
CHLORIDE SERPL-SCNC: 107 MMOL/L (ref 100–108)
CO2 SERPL-SCNC: 22 MMOL/L (ref 21–32)
CREAT SERPL-MCNC: 0.93 MG/DL (ref 0.6–1.3)
DIFFERENTIAL METHOD BLD: ABNORMAL
EOSINOPHIL # BLD: 0.1 K/UL (ref 0–0.4)
EOSINOPHIL NFR BLD: 2 % (ref 0–5)
ERYTHROCYTE [DISTWIDTH] IN BLOOD BY AUTOMATED COUNT: 13.4 % (ref 11.6–14.5)
GLOBULIN SER CALC-MCNC: 3.6 G/DL (ref 2–4)
GLUCOSE SERPL-MCNC: 202 MG/DL (ref 74–99)
HCT VFR BLD AUTO: 35.9 % (ref 35–45)
HGB BLD-MCNC: 12.3 G/DL (ref 12–16)
INR PPP: 1 (ref 0.8–1.2)
LIPASE SERPL-CCNC: 226 U/L (ref 73–393)
LYMPHOCYTES # BLD: 2.1 K/UL (ref 0.9–3.6)
LYMPHOCYTES NFR BLD: 26 % (ref 21–52)
MCH RBC QN AUTO: 31.2 PG (ref 24–34)
MCHC RBC AUTO-ENTMCNC: 34.3 G/DL (ref 31–37)
MCV RBC AUTO: 91.1 FL (ref 74–97)
MONOCYTES # BLD: 0.3 K/UL (ref 0.05–1.2)
MONOCYTES NFR BLD: 4 % (ref 3–10)
NEUTS SEG # BLD: 5.5 K/UL (ref 1.8–8)
NEUTS SEG NFR BLD: 68 % (ref 40–73)
PLATELET # BLD AUTO: 262 K/UL (ref 135–420)
PMV BLD AUTO: 9.7 FL (ref 9.2–11.8)
POTASSIUM SERPL-SCNC: 3.9 MMOL/L (ref 3.5–5.5)
PROT SERPL-MCNC: 7.6 G/DL (ref 6.4–8.2)
PROTHROMBIN TIME: 12.2 SEC (ref 11.5–15.2)
RBC # BLD AUTO: 3.94 M/UL (ref 4.2–5.3)
SODIUM SERPL-SCNC: 138 MMOL/L (ref 136–145)
SPECIMEN EXP DATE BLD: NORMAL
WBC # BLD AUTO: 8.1 K/UL (ref 4.6–13.2)

## 2018-05-02 PROCEDURE — 80053 COMPREHEN METABOLIC PANEL: CPT | Performed by: EMERGENCY MEDICINE

## 2018-05-02 PROCEDURE — 86900 BLOOD TYPING SEROLOGIC ABO: CPT | Performed by: EMERGENCY MEDICINE

## 2018-05-02 PROCEDURE — 96374 THER/PROPH/DIAG INJ IV PUSH: CPT

## 2018-05-02 PROCEDURE — 85610 PROTHROMBIN TIME: CPT | Performed by: EMERGENCY MEDICINE

## 2018-05-02 PROCEDURE — 81003 URINALYSIS AUTO W/O SCOPE: CPT | Performed by: EMERGENCY MEDICINE

## 2018-05-02 PROCEDURE — 85025 COMPLETE CBC W/AUTO DIFF WBC: CPT | Performed by: EMERGENCY MEDICINE

## 2018-05-02 PROCEDURE — 74011250636 HC RX REV CODE- 250/636: Performed by: NURSE PRACTITIONER

## 2018-05-02 PROCEDURE — 74011250637 HC RX REV CODE- 250/637: Performed by: NURSE PRACTITIONER

## 2018-05-02 PROCEDURE — 99283 EMERGENCY DEPT VISIT LOW MDM: CPT

## 2018-05-02 PROCEDURE — 83690 ASSAY OF LIPASE: CPT | Performed by: EMERGENCY MEDICINE

## 2018-05-02 PROCEDURE — 96361 HYDRATE IV INFUSION ADD-ON: CPT

## 2018-05-02 RX ORDER — KETOROLAC TROMETHAMINE 15 MG/ML
15 INJECTION, SOLUTION INTRAMUSCULAR; INTRAVENOUS
Status: COMPLETED | OUTPATIENT
Start: 2018-05-02 | End: 2018-05-02

## 2018-05-02 RX ORDER — HYDROXYZINE 25 MG/1
25 TABLET, FILM COATED ORAL
Status: DISCONTINUED | OUTPATIENT
Start: 2018-05-02 | End: 2018-05-03 | Stop reason: HOSPADM

## 2018-05-02 RX ADMIN — KETOROLAC TROMETHAMINE 15 MG: 15 INJECTION, SOLUTION INTRAMUSCULAR; INTRAVENOUS at 23:48

## 2018-05-02 RX ADMIN — HYDROXYZINE HYDROCHLORIDE 25 MG: 25 TABLET, FILM COATED ORAL at 23:48

## 2018-05-02 RX ADMIN — SODIUM CHLORIDE 1000 ML: 900 INJECTION, SOLUTION INTRAVENOUS at 23:49

## 2018-05-02 NOTE — PROGRESS NOTES
Patient called answering service last night @approx. 10:30 pm. Call was transferred to me. Patient reported that she wasn't feeling well. Stated that she was feeling tired, and upset about her son. She states she is weak, and did not have energy to do anything. Patient denied chest pain or shortness of breath. Patient was told to go to the ED if she felt she was having an emergency situation, or come to the office in the morning for evaluation. Patient stated that she was going to go to the ED either [facility] , or [facility].     Pam CASEY  [facility]  [address] [place], [address]  Phone [phone]  Fax [phone]

## 2018-05-03 LAB
APPEARANCE UR: CLEAR
BILIRUB UR QL: NEGATIVE
COLOR UR: YELLOW
GLUCOSE UR STRIP.AUTO-MCNC: NEGATIVE MG/DL
HGB UR QL STRIP: NEGATIVE
KETONES UR QL STRIP.AUTO: ABNORMAL MG/DL
LEUKOCYTE ESTERASE UR QL STRIP.AUTO: NEGATIVE
NITRITE UR QL STRIP.AUTO: NEGATIVE
PH UR STRIP: 5 [PH] (ref 5–8)
PROT UR STRIP-MCNC: NEGATIVE MG/DL
SP GR UR REFRACTOMETRY: 1.03 (ref 1–1.03)
UROBILINOGEN UR QL STRIP.AUTO: 1 EU/DL (ref 0.2–1)

## 2018-05-03 RX ORDER — HYDROXYZINE 25 MG/1
TABLET, FILM COATED ORAL
Qty: 30 TAB | Refills: 0 | Status: SHIPPED | OUTPATIENT
Start: 2018-05-03 | End: 2018-09-05 | Stop reason: SDUPTHER

## 2018-05-03 NOTE — ED TRIAGE NOTES
Abdominal pain increasing over past few days. Itchy, painful rash x 1 month. Doctor called yesterday and told patient her blood counts where low and she needed a transfusion.

## 2018-05-03 NOTE — ED PROVIDER NOTES
HPI Comments: 11:14 PM   49 y.o. female presents to ED C/O abnormal labs and abdominal pain. Patient has a HX of breast cancer, mastectomy, hysterectomy, depression, drug abuse, diabetes. Patient reports she was sent in by PCP due to anemia and she needs a blood transfusion, she reports feeling tired for a few days, denies CP, SOB. Patient also reports generalized abdominal pain, described as \"bubble gut\"  consistent with chronic abdominal pain, no vomiting or diarrhea associated with it. Patient also reports rash for over a week that itches all over, points to scab on arm when asked where rash is locate. Patient denies fever, dysuria, dizziness. Patient is a nonsmoker  Patient denies any other symptoms or complaints. The history is provided by the patient. History limited by: no language barrier. Past Medical History:   Diagnosis Date    Alcohol abuse     Cancer [facility])     breast cancer, right    Cancer ([facility] [address]     Breast CA    Depression     Diabetes ([facility] [address]     Drug abuse     Gastrointestinal disorder     cholitis    Hyperlipidemia     Hypertension     Spine injury     Vitamin D deficiency 5/1/2018       Past Surgical History:   Procedure Laterality Date    COLONOSCOPY N/A 7/18/2016    COLONOSCOPY performed by Rebeca Bonds MD at [facility] ENDOSCOPY    HX BREAST LUMPECTOMY  06/2013    right    HX HYSTERECTOMY      HX ORTHOPAEDIC      Back fusion surgery 4/18/14.  HX OTHER SURGICAL      mediport    HX TONSILLECTOMY      HX TUBAL LIGATION           Family History:   Problem Relation Age of Onset    Heart Disease Mother     Stroke Father     Heart Disease Father 48    Diabetes Other     Hypertension Other     Cancer Maternal Grandmother        Social History     Social History    Marital status:      Spouse name: N/A    Number of children: N/A    Years of education: N/A     Occupational History    Not on file.      Social History Main Topics    Smoking status: Never Smoker    Smokeless tobacco: Never Used    Alcohol use No      Comment: \"Occasionally\"    Drug use: No    Sexual activity: No     Other Topics Concern    Not on file     Social History Narrative    ** Merged History Encounter **              ALLERGIES: Latex; Other food; Amoxicillin; Aspirin; Fentanyl; Levaquin [levofloxacin]; Chlorhexidine; Norco [hydrocodone-acetaminophen]; Acetaminophen; [place]; Codeine; Glycopyrrolate; Morphine; Pcn [penicillins]; Percocet [oxycodone-acetaminophen]; Tape [adhesive]; and Tramadol    Review of Systems   Constitutional: Positive for fatigue. Negative for appetite change and fever. HENT: Negative for congestion, rhinorrhea and sore throat. Respiratory: Negative for cough, shortness of breath and wheezing. Cardiovascular: Negative for chest pain and leg swelling. Gastrointestinal: Positive for abdominal pain. Negative for constipation, diarrhea, nausea and vomiting. Genitourinary: Negative for dysuria. Musculoskeletal: Negative for arthralgias and back pain. Skin: Positive for rash. Neurological: Negative for dizziness, syncope and headaches. All other systems reviewed and are negative. Vitals:    05/02/18 2105   BP: (!) 154/99   Pulse: 86   Resp: 16   Temp: 98 °F (36.7 °C)   SpO2: 100%   Weight: 68 kg (150 lb)   Height: 5' 2\" (1.575 m)            Physical Exam   Constitutional: She is oriented to person, place, and time. She appears well-developed and well-nourished. No distress. HENT:   Mouth/Throat: Oropharynx is clear and moist.   Eyes: Conjunctivae and EOM are normal.   Cardiovascular: Normal rate and regular rhythm. Pulmonary/Chest: Effort normal and breath sounds normal. No respiratory distress. She has no wheezes. She has no rales. Abdominal: Soft. Normal appearance. Bowel sounds are increased. There is generalized tenderness. There is CVA tenderness. Musculoskeletal: Normal range of motion.    Neurological: She is alert and oriented to person, place, and time. She exhibits normal muscle tone. Coordination normal.   Skin: She is not diaphoretic. Nursing note and vitals reviewed. MDM  Number of Diagnoses or Management Options  Abdominal pain, generalized:   Dehydration:   Elevated blood pressure reading:   Fatigue, unspecified type:   Multiple wounds of skin:   Diagnosis management comments: MDM:  Plan - CBC, CMP, UA, lipase  Progress - no evidence of UTI, H&H is WNL, lipase is WNL, and coags normal  -patient informed of results, reports fatigue may be from increased stress and thinks her doctor was mistaken when she told her she needed transfusion. Abdominal pain improved with toradol. Patient tolerating PO, afebrile, unremarkable labs. Rash has been going over for over 1 month, although area appears like scabs she has picked at, not pruritic rash, she states she will use a cream from PCP that as worked well in the past.  Also referred patient to dermatology for further evaluation. Patient educated to return to the ED for any new or worsening symptoms. Questions denied         ED Course       Procedures             RESULTS:    No orders to display       Labs Reviewed   CBC WITH AUTOMATED DIFF - Abnormal; Notable for the following:        Result Value    RBC 3.94 (*)     All other components within normal limits   METABOLIC PANEL, COMPREHENSIVE - Abnormal; Notable for the following:     Glucose 202 (*)     BUN 26 (*)     BUN/Creatinine ratio 28 (*)     Alk. phosphatase 144 (*)     All other components within normal limits   URINALYSIS W/ RFLX MICROSCOPIC - Abnormal; Notable for the following:     Ketone TRACE (*)     All other components within normal limits   LIPASE   PROTHROMBIN TIME + INR   TYPE & SCREEN       No results found for this or any previous visit (from the past 12 hour(s)). PROGRESS NOTE:   11:14 PM   Initial assessment completed.   Written by Harish CASEY    One or more blood pressure readings were noted elevated during the Pt's presentation in the emergency department this date. This abnormal reading has been cited in the Pt's diagnosis, and they have been encouraged to follow up with their primary care physician, or referred to a consultant for further evaluation and treatment. DISCHARGE NOTE:    Faizan Aponte  results have been reviewed with her. She has been counseled regarding her diagnosis, treatment, and plan. She verbally conveys understanding and agreement of the signs, symptoms, diagnosis, treatment and prognosis and additionally agrees to follow up as discussed. She also agrees with the care-plan and conveys that all of her questions have been answered. I have also provided discharge instructions for her that include: educational information regarding their diagnosis and treatment, and list of reasons why they would want to return to the ED prior to their follow-up appointment, should her condition change. CLINICAL IMPRESSION:    1. Fatigue, unspecified type    2. Dehydration    3. Multiple wounds of skin    4. Elevated blood pressure reading    5. Abdominal pain, generalized        AFTER VISIT PLAN:    Discharge Medication List as of 5/3/2018 12:24 AM      START taking these medications    Details   hydrOXYzine HCl (ATARAX) 25 mg tablet Take 1-2 tablets by mouth every 6 hours as needed. , Print, Disp-30 Tab, R-0         CONTINUE these medications which have NOT CHANGED    Details   cholecalciferol (VITAMIN D3) 50,000 unit capsule Take 1 Cap by mouth every seven (7) days for 12 doses.  Indications: Vitamin D Deficiency, Normal, Disp-12 Cap, R-1      insulin nph-regular human rec (HUMULIN 70-30) 100 unit/mL (70-30) inpn Give 30 units in the QAM and 35 units at bedtime, Normal, Disp-5 Pen, R-3      mupirocin (BACTROBAN) 2 % ointment Use 1 Application to affected area 3 Times Daily., Historical Med      valACYclovir (VALTREX) 1 gram tablet Take  by mouth., Historical Med ferrous sulfate 325 mg (65 mg iron) tablet Take 1 Tab by mouth Daily (before breakfast). , Normal, Disp-30 Tab, R-3      nut.tx.gluc.intol,lac-free,soy (GLUCERNA THERAPEUTIC NUTRITION) liqd Take 1 Bottle by mouth three (3) times daily for 30 days. , Normal, Disp-90 Bottle, R-3      Blood-Glucose Meter monitoring kit Use daily to check blood sugar, Normal, Disp-1 Kit, R-0      vit B comp-C-FA-iron-vit E (VITAMINS B COMPLEX) 500 mg-400 mcg- 18 mg iron tab Take 1 Tab by mouth daily. , Normal, Disp-30 Tab, R-3      fluticasone (FLONASE) 50 mcg/actuation nasal spray Use one to two sprays in each nostril daily for congestion and allergy. Indications: Allergic Rhinitis, Normal, Disp-1 Bottle, R-3      loratadine (CLARITIN) 10 mg tablet Take 1 Tab by mouth daily. Indications: Allergic Rhinitis, Normal, Disp-30 Tab, R-3      bacitracin-neomycin-polymyxin b-hydrocortisone (CORTISPORIN) 1 % ointment Apply  to affected area two (2) times a day., Normal, Disp-15 g, R-0      !! ondansetron (ZOFRAN ODT) 4 mg disintegrating tablet Take 1 Tab by mouth every eight (8) hours as needed for Nausea. , Print, Disp-15 Tab, R-0      dicyclomine (BENTYL) 20 mg tablet Take 1 Tab by mouth every six (6) hours as needed (abdominal cramps) for up to 20 doses. , Print, Disp-20 Tab, R-0      !! ondansetron (ZOFRAN ODT) 4 mg disintegrating tablet Take 1-2 tablets every 6-8 hours as needed for nausea and vomiting., Print, Disp-12 Tab, R-0      ibuprofen (MOTRIN) 600 mg tablet Take 1 Tab by mouth every six (6) hours as needed for Pain., Normal, Disp-20 Tab, R-0      cefdinir (OMNICEF) 300 mg capsule Take 1 Cap by mouth two (2) times a day., Print, Disp-14 Cap, R-0      traZODone (DESYREL) 300 mg tablet Take 1 Tab by mouth nightly., Normal, Disp-30 Tab, R-0      lisinopril-hydroCHLOROthiazide (PRINZIDE, ZESTORETIC) 20-12.5 mg per tablet Take 1 Tab by mouth daily. , Normal, Disp-30 Tab, R-1      polyethylene glycol (MIRALAX) 17 gram packet Take 1 Packet by mouth daily. , Normal, Disp-30 Each, R-1      ALBUTEROL IN Take  by inhalation. , Historical Med      LORazepam (ATIVAN) 1 mg tablet 1 mg., Historical Med      EPINEPHrine (EPIPEN) 0.3 mg/0.3 mL (1:1,000) injection 0.3 mL by IntraMUSCular route once as needed for up to 1 dose., Normal, Disp-1 Each, R-1       !! - Potential duplicate medications found. Please discuss with provider.            Follow-up Information     Follow up With Details Comments [address] [facility] [place], NP Schedule an appointment as soon as possible for a visit in 3 days Further evaluation [address] [place] [id number] [facility] D[facility] 55 Schedule an appointment as soon as possible for a visit in 2 days Further evaluation [place] [address]  [phone]           Written by Diamond GERARDC

## 2018-05-03 NOTE — ED NOTES
I have reviewed discharge instructions with the patient. The patient verbalized understanding. Pt agreeable to dc plan, pt in nad ambulatory to ED lobby accompanied by gelacio.

## 2018-05-03 NOTE — ED NOTES
Pt reports lower abd \"throbbing\" pain started yesterday. Pt also c/o 3 itchy rashes approx 0.5-1cm: upper back chest and R forearm, no discharge noted. Pt states she's been scratching it, started 1 week ago.

## 2018-05-07 ENCOUNTER — APPOINTMENT (OUTPATIENT)
Dept: INFUSION THERAPY | Age: 48
End: 2018-05-07

## 2018-05-07 ENCOUNTER — TELEPHONE (OUTPATIENT)
Dept: FAMILY MEDICINE CLINIC | Facility: CLINIC | Age: 48
End: 2018-05-07

## 2018-05-09 ENCOUNTER — HOSPITAL ENCOUNTER (OUTPATIENT)
Dept: INFUSION THERAPY | Age: 48
End: 2018-05-09

## 2018-05-09 ENCOUNTER — OFFICE VISIT (OUTPATIENT)
Dept: FAMILY MEDICINE CLINIC | Facility: CLINIC | Age: 48
End: 2018-05-09

## 2018-05-09 VITALS
HEART RATE: 81 BPM | SYSTOLIC BLOOD PRESSURE: 126 MMHG | RESPIRATION RATE: 17 BRPM | TEMPERATURE: 97.7 F | WEIGHT: 155 LBS | OXYGEN SATURATION: 97 % | DIASTOLIC BLOOD PRESSURE: 80 MMHG | BODY MASS INDEX: 28.52 KG/M2 | HEIGHT: 62 IN

## 2018-05-09 DIAGNOSIS — Z23 ENCOUNTER FOR IMMUNIZATION: ICD-10-CM

## 2018-05-09 DIAGNOSIS — Z17.1 MALIGNANT NEOPLASM OF UPPER-OUTER QUADRANT OF RIGHT BREAST IN FEMALE, ESTROGEN RECEPTOR NEGATIVE (HCC): ICD-10-CM

## 2018-05-09 DIAGNOSIS — M54.5 CHRONIC LOW BACK PAIN, UNSPECIFIED BACK PAIN LATERALITY, WITH SCIATICA PRESENCE UNSPECIFIED: ICD-10-CM

## 2018-05-09 DIAGNOSIS — G89.29 CHRONIC ABDOMINAL PAIN: Primary | ICD-10-CM

## 2018-05-09 DIAGNOSIS — C50.411 MALIGNANT NEOPLASM OF UPPER-OUTER QUADRANT OF RIGHT BREAST IN FEMALE, ESTROGEN RECEPTOR NEGATIVE (HCC): ICD-10-CM

## 2018-05-09 DIAGNOSIS — G89.29 CHRONIC LOW BACK PAIN, UNSPECIFIED BACK PAIN LATERALITY, WITH SCIATICA PRESENCE UNSPECIFIED: ICD-10-CM

## 2018-05-09 DIAGNOSIS — R10.9 CHRONIC ABDOMINAL PAIN: Primary | ICD-10-CM

## 2018-05-09 RX ORDER — LIDOCAINE 50 MG/G
2 PATCH TOPICAL EVERY 24 HOURS
Qty: 90 EACH | Refills: 3 | Status: SHIPPED | OUTPATIENT
Start: 2018-05-09 | End: 2019-01-10

## 2018-05-09 NOTE — PROGRESS NOTES
Progress Note  Today's Date:  2018   Patient:  Ruddy Skaggs  Patient :  1970    Subjective:   Ruddy Skaggs is a 49 y.o. female who presents for follow up for fatigue, chronic low back pain, chronic abdominal pain,  Patient states her low back pain is getting worse she denies recent injury. She takes ibuprofen which provides mild relief  She was referred to pain management- her appointment is scheduled for 19 May 2018. Chronic abdominal pain- she states her ulcers are getting worse. She takes Bentyl - states it provides minimum relief. Chronic fatigue. Patient is requesting order for blood transfusion. Patient advised that her labs does not warrant a blood transfusion. Patient informed to follow up with Dr. Lakeshia Robison her Hem/Onc provided- she reports that she already discussed this issue with Dr. Lakeshia Robison who feels that she does not need a blood transfusion. Patient is requesting a referral for second opinion. Current Outpatient Meds and Allergies     Current Outpatient Prescriptions on File Prior to Visit   Medication Sig Dispense Refill    hydrOXYzine HCl (ATARAX) 25 mg tablet Take 1-2 tablets by mouth every 6 hours as needed. 30 Tab 0    cholecalciferol (VITAMIN D3) 50,000 unit capsule Take 1 Cap by mouth every seven (7) days for 12 doses. Indications: Vitamin D Deficiency 12 Cap 1    insulin nph-regular human rec (HUMULIN 70-30) 100 unit/mL (70-30) inpn Give 30 units in the QAM and 35 units at bedtime 5 Pen 3    mupirocin (BACTROBAN) 2 % ointment Use 1 Application to affected area 3 Times Daily.  valACYclovir (VALTREX) 1 gram tablet Take  by mouth.  ferrous sulfate 325 mg (65 mg iron) tablet Take 1 Tab by mouth Daily (before breakfast). 30 Tab 3    nut.tx.gluc.intol,lac-free,soy (GLUCERNA THERAPEUTIC NUTRITION) liqd Take 1 Bottle by mouth three (3) times daily for 30 days.  90 Bottle 3    Blood-Glucose Meter monitoring kit Use daily to check blood sugar 1 Kit 0    vit B comp-C-FA-iron-vit E (VITAMINS B COMPLEX) 500 mg-400 mcg- 18 mg iron tab Take 1 Tab by mouth daily. 30 Tab 3    fluticasone (FLONASE) 50 mcg/actuation nasal spray Use one to two sprays in each nostril daily for congestion and allergy. Indications: Allergic Rhinitis 1 Bottle 3    loratadine (CLARITIN) 10 mg tablet Take 1 Tab by mouth daily. Indications: Allergic Rhinitis 30 Tab 3    bacitracin-neomycin-polymyxin b-hydrocortisone (CORTISPORIN) 1 % ointment Apply  to affected area two (2) times a day. 15 g 0    ondansetron (ZOFRAN ODT) 4 mg disintegrating tablet Take 1 Tab by mouth every eight (8) hours as needed for Nausea. 15 Tab 0    dicyclomine (BENTYL) 20 mg tablet Take 1 Tab by mouth every six (6) hours as needed (abdominal cramps) for up to 20 doses. 20 Tab 0    ondansetron (ZOFRAN ODT) 4 mg disintegrating tablet Take 1-2 tablets every 6-8 hours as needed for nausea and vomiting. 12 Tab 0    ibuprofen (MOTRIN) 600 mg tablet Take 1 Tab by mouth every six (6) hours as needed for Pain. 20 Tab 0    cefdinir (OMNICEF) 300 mg capsule Take 1 Cap by mouth two (2) times a day. 14 Cap 0    traZODone (DESYREL) 300 mg tablet Take 1 Tab by mouth nightly. 30 Tab 0    lisinopril-hydroCHLOROthiazide (PRINZIDE, ZESTORETIC) 20-12.5 mg per tablet Take 1 Tab by mouth daily. 30 Tab 1    polyethylene glycol (MIRALAX) 17 gram packet Take 1 Packet by mouth daily. 30 Each 1    ALBUTEROL IN Take  by inhalation.  LORazepam (ATIVAN) 1 mg tablet 1 mg.  EPINEPHrine (EPIPEN) 0.3 mg/0.3 mL (1:1,000) injection 0.3 mL by IntraMUSCular route once as needed for up to 1 dose. 1 Each 1     No current facility-administered medications on file prior to visit. These medications have been reviewed and reconciled with the patient during today's visit.       Allergies   Allergen Reactions    Latex Hives and Itching    Other Food Rash     Almonds    Amoxicillin Swelling     Other reaction(s): mild rash/itching    Aspirin Not Reported This Time and Swelling     Mouth swells    Fentanyl Anaphylaxis and Swelling     Patches cause mouth to swell  Swelling in mouth from fentanyl patch    Levaquin [Levofloxacin] Not Reported This Time and Swelling     Other reaction(s): mild rash/itching    Chlorhexidine Rash    Norco [Hydrocodone-Acetaminophen] Nausea and Vomiting     Other reaction(s): gi distress    Acetaminophen Other (comments)    Mamou Rash and Itching    Codeine Other (comments)    Glycopyrrolate Swelling     Pt  States  faciAL  SWELLING   POST  ROBINUL  IV   Pt  States  faciAL  SWELLING   POST  ROBINUL  IV     Morphine Nausea and Vomiting     Pt states she is not allergic    Pcn [Penicillins] Swelling    Percocet [Oxycodone-Acetaminophen] Nausea and Vomiting     Other reaction(s): gi distress  nausea  Other reaction(s): anaphylaxis/angioedema  Per pt. She is allergic    Tape [Adhesive] Rash    Tramadol Swelling       ROS:       Positive symptoms are BOLDED:    CONST:   Fatigue, weight change, appetite change  NEURO:   Headaches, vision changes, dizziness, loss of consciousness  CV:      Chest pain, palpitations, orthopnea, PND  PULM:             SOB, wheezing, cough, hemoptysis  GI:             Nausea, vomiting, abdominal pain, greasy stools, blood in stool,     diarrhea, constipation  :       Dysuria, hematuria, change in urine  MS:      Muscle/joint pain, joint swelling  SKIN:        Rashes, skin changes  ALLERGY: Seasonal allergies, itchy eyes  HEME: Easy bleeding/bruising      Objective:     VS:    Visit Vitals    /80 (BP 1 Location: Right arm, BP Patient Position: Sitting)    Pulse 81    Temp 97.7 °F (36.5 °C) (Oral)    Resp 17    Ht 5' 2\" (1.575 m)    Wt 155 lb (70.3 kg)    LMP 08/20/2013    SpO2 97%    BMI 28.35 kg/m2       General:   Well-nourished, well-groomed, pleasant, alert, in no acute distress.      Head:  Normocephalic, atraumatic, MMM  Neck:  Neck supple with normal ROM for age, no thyromegaly, No LAD  Cardiovasc:   Regular rate and rhythm, no murmurs, no rubs, no gallops,   Pulmonary:   Clear breath sounds bilaterally, good air movement, no wheezing, no rales, no rhonchi, normal respiratory effort  Abdomen:   Abdomen soft,+ generalized tenderness. nondistended, NABS  Extremities:   No edema, no tenderness with palpation of calves, warm and well-perfused  Neuro:   Alert, conversant, appropriate, following commands, no focal deficits. Pertinent diagnostic procedures include:  No results found for this or any previous visit (from the past 24 hour(s)). Assessment:       1. Chronic abdominal pain    2. Chronic low back pain, unspecified back pain laterality, with sciatica presence unspecified    3. Malignant neoplasm of upper-outer quadrant of right breast in female, estrogen receptor negative ([facility] [address]    4. Encounter for immunization        Plan:       Orders Placed This Encounter    REFERRAL TO HEMATOLOGY ONCOLOGY     Referral Priority:   Routine     Referral Type:   Consultation     Referral Reason:   Specialty Services Required     Requested Specialty:   Hematology and Oncology    REFERRAL TO GASTROENTEROLOGY     Referral Priority:   Routine     Referral Type:   Consultation     Referral Reason:   Specialty Services Required     Requested Specialty:   Gastroenterology    REFERRAL TO ORTHOPEDICS     Referral Priority:   Routine     Referral Type:   Consultation     Referral Reason:   Specialty Services Required     Requested Specialty:   Orthopedic Surgery    diph,Pertuss,Acell,,Tet Vac-PF (ADACEL) 2 Lf-(2.5-5-3-5 mcg)-5Lf/0.5 mL susp     Si.5 mL by IntraMUSCular route once for 1 dose. Dispense:  1 Syringe     Refill:  0    lidocaine (LIDODERM) 5 %     Si Patches by TransDERmal route every twenty-four (24) hours. Apply patch to the affected area for 12 hours a day and remove for 12 hours a day.      Dispense:  90 Each     Refill:  3     Follow up in three months for routine care or prn for acute care. Healthy lifestyle has been encouraged including avoidance of tobacco, limiting or avoiding alcohol intake, heart healthy diet which is low in cholesterol and saturated fat and contains fresh fruits, vegetables and whole grains and fiber, regular exercise with goals of 20-30 minutes 3-5 days weekly and maintaining an optimal BMI. I have discussed the diagnosis with the patient and the intended plan as seen in the above orders. The patient has received an after-visit summary along with patient information handout. I have discussed medication side effects and warnings with the patient as well. Pt verbalized understanding.     Livier Dow NPNailaC  [facility]  [address], [address]  Phone [phone]  Fax [phone]

## 2018-05-09 NOTE — PROGRESS NOTES
Tayla Castañeda is a 49 y.o.  female presents today for office visit for ed follow up. Pt would also like to discuss Fatigue/dehydration. Pt is not fasting. Pt is in Room # 9      1. Have you been to the ER, urgent care clinic since your last visit? Hospitalized since your last visit? [place] Last week fatigue/dehydration    2. Have you seen or consulted any other health care providers outside of the [facility] since your last visit? Include any pap smears or colon screening. No    Upcoming Appts  none    Health Maintenance reviewed PAP: patient will schedule with office. TDAP:signed prescription was given to patient today. Eye exam:patient has not scheduled Foot exam: patient has not scheduled. VORB: No orders of the defined types were placed in this encounter.   Estuardo Gerber LPN

## 2018-05-10 NOTE — TELEPHONE ENCOUNTER
Signed prescription for lidocaine  patches is in prescription box in the front office. Notified patient.

## 2018-05-19 ENCOUNTER — APPOINTMENT (OUTPATIENT)
Dept: CT IMAGING | Age: 48
End: 2018-05-19
Attending: EMERGENCY MEDICINE
Payer: MEDICAID

## 2018-05-19 ENCOUNTER — HOSPITAL ENCOUNTER (EMERGENCY)
Age: 48
Discharge: HOME | End: 2018-05-19
Attending: EMERGENCY MEDICINE
Payer: MEDICAID

## 2018-05-19 VITALS
TEMPERATURE: 98.4 F | HEIGHT: 62 IN | WEIGHT: 154 LBS | RESPIRATION RATE: 14 BRPM | SYSTOLIC BLOOD PRESSURE: 133 MMHG | DIASTOLIC BLOOD PRESSURE: 60 MMHG | HEART RATE: 79 BPM | OXYGEN SATURATION: 100 % | BODY MASS INDEX: 28.34 KG/M2

## 2018-05-19 DIAGNOSIS — R07.9 ACUTE CHEST PAIN: Primary | ICD-10-CM

## 2018-05-19 DIAGNOSIS — R10.13 ABDOMINAL PAIN, EPIGASTRIC: ICD-10-CM

## 2018-05-19 DIAGNOSIS — R94.31 ABNORMAL EKG: ICD-10-CM

## 2018-05-19 LAB
ALBUMIN SERPL-MCNC: 3.8 G/DL (ref 3.4–5)
ALBUMIN/GLOB SERPL: 0.9 {RATIO} (ref 0.8–1.7)
ALP SERPL-CCNC: 190 U/L (ref 45–117)
ALT SERPL-CCNC: 49 U/L (ref 13–56)
ANION GAP SERPL CALC-SCNC: 8 MMOL/L (ref 3–18)
AST SERPL-CCNC: 36 U/L (ref 15–37)
BASOPHILS # BLD: 0 K/UL (ref 0–0.06)
BASOPHILS NFR BLD: 0 % (ref 0–2)
BILIRUB DIRECT SERPL-MCNC: <0.1 MG/DL (ref 0–0.2)
BILIRUB SERPL-MCNC: 0.3 MG/DL (ref 0.2–1)
BUN SERPL-MCNC: 18 MG/DL (ref 7–18)
BUN/CREAT SERPL: 17 (ref 12–20)
CALCIUM SERPL-MCNC: 8.8 MG/DL (ref 8.5–10.1)
CHLORIDE SERPL-SCNC: 104 MMOL/L (ref 100–108)
CO2 SERPL-SCNC: 26 MMOL/L (ref 21–32)
CREAT SERPL-MCNC: 1.08 MG/DL (ref 0.6–1.3)
D DIMER PPP FEU-MCNC: 0.48 UG/ML(FEU)
DIFFERENTIAL METHOD BLD: ABNORMAL
EOSINOPHIL # BLD: 0.2 K/UL (ref 0–0.4)
EOSINOPHIL NFR BLD: 2 % (ref 0–5)
ERYTHROCYTE [DISTWIDTH] IN BLOOD BY AUTOMATED COUNT: 13.4 % (ref 11.6–14.5)
GLOBULIN SER CALC-MCNC: 4.1 G/DL (ref 2–4)
GLUCOSE SERPL-MCNC: 197 MG/DL (ref 74–99)
HCG SERPL QL: NEGATIVE
HCT VFR BLD AUTO: 35.3 % (ref 35–45)
HGB BLD-MCNC: 11.9 G/DL (ref 12–16)
LIPASE SERPL-CCNC: 257 U/L (ref 73–393)
LYMPHOCYTES # BLD: 2.8 K/UL (ref 0.9–3.6)
LYMPHOCYTES NFR BLD: 34 % (ref 21–52)
MCH RBC QN AUTO: 30.7 PG (ref 24–34)
MCHC RBC AUTO-ENTMCNC: 33.7 G/DL (ref 31–37)
MCV RBC AUTO: 91 FL (ref 74–97)
MONOCYTES # BLD: 0.5 K/UL (ref 0.05–1.2)
MONOCYTES NFR BLD: 6 % (ref 3–10)
NEUTS SEG # BLD: 5 K/UL (ref 1.8–8)
NEUTS SEG NFR BLD: 58 % (ref 40–73)
PLATELET # BLD AUTO: 260 K/UL (ref 135–420)
PMV BLD AUTO: 10.2 FL (ref 9.2–11.8)
POTASSIUM SERPL-SCNC: 4.1 MMOL/L (ref 3.5–5.5)
PROT SERPL-MCNC: 7.9 G/DL (ref 6.4–8.2)
RBC # BLD AUTO: 3.88 M/UL (ref 4.2–5.3)
SODIUM SERPL-SCNC: 138 MMOL/L (ref 136–145)
TROPONIN I SERPL-MCNC: <0.02 NG/ML (ref 0–0.04)
TROPONIN I SERPL-MCNC: <0.02 NG/ML (ref 0–0.04)
WBC # BLD AUTO: 8.5 K/UL (ref 4.6–13.2)

## 2018-05-19 PROCEDURE — 80076 HEPATIC FUNCTION PANEL: CPT | Performed by: EMERGENCY MEDICINE

## 2018-05-19 PROCEDURE — 85025 COMPLETE CBC W/AUTO DIFF WBC: CPT

## 2018-05-19 PROCEDURE — 99285 EMERGENCY DEPT VISIT HI MDM: CPT

## 2018-05-19 PROCEDURE — 84703 CHORIONIC GONADOTROPIN ASSAY: CPT | Performed by: EMERGENCY MEDICINE

## 2018-05-19 PROCEDURE — 74177 CT ABD & PELVIS W/CONTRAST: CPT

## 2018-05-19 PROCEDURE — 71275 CT ANGIOGRAPHY CHEST: CPT

## 2018-05-19 PROCEDURE — 83690 ASSAY OF LIPASE: CPT | Performed by: EMERGENCY MEDICINE

## 2018-05-19 PROCEDURE — 84484 ASSAY OF TROPONIN QUANT: CPT | Performed by: EMERGENCY MEDICINE

## 2018-05-19 PROCEDURE — 74011250636 HC RX REV CODE- 250/636: Performed by: EMERGENCY MEDICINE

## 2018-05-19 PROCEDURE — 96375 TX/PRO/DX INJ NEW DRUG ADDON: CPT

## 2018-05-19 PROCEDURE — 74011636320 HC RX REV CODE- 636/320: Performed by: EMERGENCY MEDICINE

## 2018-05-19 PROCEDURE — 93005 ELECTROCARDIOGRAM TRACING: CPT

## 2018-05-19 PROCEDURE — 96374 THER/PROPH/DIAG INJ IV PUSH: CPT

## 2018-05-19 PROCEDURE — 96361 HYDRATE IV INFUSION ADD-ON: CPT

## 2018-05-19 PROCEDURE — 85379 FIBRIN DEGRADATION QUANT: CPT | Performed by: EMERGENCY MEDICINE

## 2018-05-19 PROCEDURE — 74011250637 HC RX REV CODE- 250/637: Performed by: EMERGENCY MEDICINE

## 2018-05-19 PROCEDURE — 80048 BASIC METABOLIC PNL TOTAL CA: CPT

## 2018-05-19 RX ORDER — ONDANSETRON 2 MG/ML
4 INJECTION INTRAMUSCULAR; INTRAVENOUS
Status: COMPLETED | OUTPATIENT
Start: 2018-05-19 | End: 2018-05-19

## 2018-05-19 RX ORDER — HYDROMORPHONE HYDROCHLORIDE 2 MG/1
2 TABLET ORAL ONCE
Status: COMPLETED | OUTPATIENT
Start: 2018-05-19 | End: 2018-05-19

## 2018-05-19 RX ORDER — MORPHINE SULFATE 10 MG/ML
4 INJECTION, SOLUTION INTRAMUSCULAR; INTRAVENOUS
Status: COMPLETED | OUTPATIENT
Start: 2018-05-19 | End: 2018-05-19

## 2018-05-19 RX ADMIN — HYDROMORPHONE HYDROCHLORIDE 2 MG: 2 TABLET ORAL at 20:33

## 2018-05-19 RX ADMIN — SODIUM CHLORIDE 1000 ML: 900 INJECTION, SOLUTION INTRAVENOUS at 18:19

## 2018-05-19 RX ADMIN — ONDANSETRON 4 MG: 2 INJECTION INTRAMUSCULAR; INTRAVENOUS at 18:21

## 2018-05-19 RX ADMIN — IOPAMIDOL 100 ML: 612 INJECTION, SOLUTION INTRAVENOUS at 18:45

## 2018-05-19 RX ADMIN — MORPHINE SULFATE 4 MG: 10 INJECTION INTRAMUSCULAR; INTRAVENOUS; SUBCUTANEOUS at 18:21

## 2018-05-19 NOTE — ED PROVIDER NOTES
EMERGENCY DEPARTMENT HISTORY AND PHYSICAL EXAM    6:08 PM      Date: 5/19/2018  Patient Name: Marlee Bernstein    History of Presenting Illness     Chief Complaint   Patient presents with    Dizziness    Vomiting         History Provided By: Patient    Chief Complaint: pain   Duration:  1 week  Timing:  Intermittent  Location: midsternal chest and epigastric region   Quality: Stabbing  Severity: Severe   Associated Symptoms: dizziness, diaphoresis, lightheadedness, emesis, chills , loss of appetite, mild dyspnea, elevated bp      Additional History (Context): Marlee Bernstein, a 49 y.o. Female, nonsmoker, non-alcohol drinker, non-substance abuser with h/o HTN, DM and breast cancer, allergic to penicillin, presents to the ED c/o severe, intermittent, stabbing pain to her abd (epigastric region) and midsternal chest x 1 week that is associated with mild dyspnea, chills, emesis, loss of appetite, dizziness, lightheadedness, diaphoresis and elevated bp but not with blood in stool, hematemesis, hemoptysis, dysuria, frequency or fever. Pt with h/o breast cancer, cancer removal and breast reconstruction surgery, pending repeat breast reconstruction surgery, states that for similar sx, she was seen at pain management yesterday and received a prescription for pain medication but her sx persisted. Today, her pain management doctor advised her to come to the ED for evaluation of her pain. No other complaints are reported at this time. PCP: Doug Oconnor NP    Current Outpatient Prescriptions   Medication Sig Dispense Refill    lidocaine (LIDODERM) 5 % 2 Patches by TransDERmal route every twenty-four (24) hours. Apply patch to the affected area for 12 hours a day and remove for 12 hours a day. 90 Each 3    hydrOXYzine HCl (ATARAX) 25 mg tablet Take 1-2 tablets by mouth every 6 hours as needed. 30 Tab 0    cholecalciferol (VITAMIN D3) 50,000 unit capsule Take 1 Cap by mouth every seven (7) days for 12 doses. Indications: Vitamin D Deficiency 12 Cap 1    insulin nph-regular human rec (HUMULIN 70-30) 100 unit/mL (70-30) inpn Give 30 units in the QAM and 35 units at bedtime 5 Pen 3    mupirocin (BACTROBAN) 2 % ointment Use 1 Application to affected area 3 Times Daily.  valACYclovir (VALTREX) 1 gram tablet Take  by mouth.  ferrous sulfate 325 mg (65 mg iron) tablet Take 1 Tab by mouth Daily (before breakfast). 30 Tab 3    nut.tx.gluc.intol,lac-free,soy (GLUCERNA THERAPEUTIC NUTRITION) liqd Take 1 Bottle by mouth three (3) times daily for 30 days. 90 Bottle 3    Blood-Glucose Meter monitoring kit Use daily to check blood sugar 1 Kit 0    vit B comp-C-FA-iron-vit E (VITAMINS B COMPLEX) 500 mg-400 mcg- 18 mg iron tab Take 1 Tab by mouth daily. 30 Tab 3    fluticasone (FLONASE) 50 mcg/actuation nasal spray Use one to two sprays in each nostril daily for congestion and allergy. Indications: Allergic Rhinitis 1 Bottle 3    loratadine (CLARITIN) 10 mg tablet Take 1 Tab by mouth daily. Indications: Allergic Rhinitis 30 Tab 3    bacitracin-neomycin-polymyxin b-hydrocortisone (CORTISPORIN) 1 % ointment Apply  to affected area two (2) times a day. 15 g 0    ondansetron (ZOFRAN ODT) 4 mg disintegrating tablet Take 1 Tab by mouth every eight (8) hours as needed for Nausea. 15 Tab 0    dicyclomine (BENTYL) 20 mg tablet Take 1 Tab by mouth every six (6) hours as needed (abdominal cramps) for up to 20 doses. 20 Tab 0    ondansetron (ZOFRAN ODT) 4 mg disintegrating tablet Take 1-2 tablets every 6-8 hours as needed for nausea and vomiting. 12 Tab 0    ibuprofen (MOTRIN) 600 mg tablet Take 1 Tab by mouth every six (6) hours as needed for Pain. 20 Tab 0    cefdinir (OMNICEF) 300 mg capsule Take 1 Cap by mouth two (2) times a day. 14 Cap 0    traZODone (DESYREL) 300 mg tablet Take 1 Tab by mouth nightly. 30 Tab 0    lisinopril-hydroCHLOROthiazide (PRINZIDE, ZESTORETIC) 20-12.5 mg per tablet Take 1 Tab by mouth daily. 30 Tab 1    polyethylene glycol (MIRALAX) 17 gram packet Take 1 Packet by mouth daily. 30 Each 1    ALBUTEROL IN Take  by inhalation.  LORazepam (ATIVAN) 1 mg tablet 1 mg.  EPINEPHrine (EPIPEN) 0.3 mg/0.3 mL (1:1,000) injection 0.3 mL by IntraMUSCular route once as needed for up to 1 dose. 1 Each 1       Past History     Past Medical History:  Past Medical History:   Diagnosis Date    Alcohol abuse     Cancer ([facility] [address]     breast cancer, right    Cancer ([facility] [address]     Breast CA    Depression     Diabetes ([facility] [address]     Drug abuse     Gastrointestinal disorder     cholitis    Hyperlipidemia     Hypertension     Spine injury     Vitamin D deficiency 5/1/2018       Past Surgical History:  Past Surgical History:   Procedure Laterality Date    COLONOSCOPY N/A 7/18/2016    COLONOSCOPY performed by Guillermina Beltran [facility] at [facility] ENDOSCOPY    HX BREAST LUMPECTOMY  06/2013    right    HX HYSTERECTOMY      HX ORTHOPAEDIC      Back fusion surgery 4/18/14.  HX OTHER SURGICAL      mediport    HX TONSILLECTOMY      HX TUBAL LIGATION         Family History:  Family History   Problem Relation Age of Onset    Heart Disease Mother     Stroke Father     Heart Disease Father 48    Diabetes Other     Hypertension Other     Cancer Maternal Grandmother        Social History:  Social History   Substance Use Topics    Smoking status: Never Smoker    Smokeless tobacco: Never Used    Alcohol use No      Comment: \"Occasionally\"       Allergies:   Allergies   Allergen Reactions    Latex Hives and Itching    Other Food Rash     Almonds    Amoxicillin Swelling     Other reaction(s): mild rash/itching    Aspirin Not Reported This Time and Swelling     Mouth swells    Fentanyl Anaphylaxis and Swelling     Patches cause mouth to swell  Swelling in mouth from fentanyl patch    Levaquin [Levofloxacin] Not Reported This Time and Swelling     Other reaction(s): mild rash/itching    Chlorhexidine Rash    Norco [Hydrocodone-Acetaminophen] Nausea and Vomiting     Other reaction(s): gi distress    Acetaminophen Other (comments)    [place] Rash and Itching    Codeine Other (comments)    Glycopyrrolate Swelling     Pt  States  faciAL  SWELLING   POST  ROBINUL  IV   Pt  States  faciAL  SWELLING   POST  ROBINUL  IV     Morphine Nausea and Vomiting     Pt states she is not allergic    Pcn [Penicillins] Swelling    Percocet [Oxycodone-Acetaminophen] Nausea and Vomiting     Other reaction(s): gi distress  nausea  Other reaction(s): anaphylaxis/angioedema  Per pt. She is allergic    Tape [Adhesive] Rash    Tramadol Swelling         Review of Systems     Review of Systems   Constitutional: Positive for appetite change, chills and diaphoresis. Negative for fever. Elevated bp   HENT: Negative for ear pain and sore throat. Eyes: Negative for pain and visual disturbance. Respiratory: Negative for cough and shortness of breath. Mild dyspnea    Cardiovascular: Positive for chest pain (midsternal ). Negative for palpitations. Gastrointestinal: Positive for abdominal pain (epigastric) and vomiting. Negative for blood in stool, diarrhea and nausea. No black stool   No Hemoptysis  No Hematemesis    Genitourinary: Negative for dysuria, flank pain and frequency. Musculoskeletal: Negative for back pain and neck pain. Neurological: Positive for dizziness and light-headedness. Negative for syncope and headaches. Psychiatric/Behavioral: Negative for agitation. The patient is not nervous/anxious. All other systems reviewed and are negative. Physical Exam     Visit Vitals    /72    Pulse 87    Temp 98.4 °F (36.9 °C)    Resp 14    Ht 5' 2\" (1.575 m)    Wt 69.9 kg (154 lb)    LMP 08/20/2013    SpO2 99%    BMI 28.17 kg/m2       Physical Exam     General:  Well-developed, well-nourished, obese uncomfortable appearing mildly diaphoretic not warm to touch nontoxic.     Head:  Normocephalic atraumatic. Eyes:  Pupils midrange extraocular movements intact. No pallor or conjunctival injection. Nose:  No rhinorrhea, inspection grossly normal.    Ears:  Grossly normal to inspection, no discharge. Mouth:  Mucous membranes moist, no appreciable intraoral lesion. Neck/Back:  Trachea midline, no asymmetry. Chest:  Grossly normal inspection, symmetric chest rise. Pulmonary:  Clear to auscultation bilaterally no wheezes rhonchi or rales. Cardiovascular:  S1-S2 no murmurs rubs or gallops. Abdomen: Significant pain and epigastric with voluntary guarding of her entire abdomen, no focality  Rectal yellow stool no hemorrhoid guaiac negative. Extremities:  Grossly normal to inspection, peripheral pulses intact    Neurologic:  Alert and oriented no appreciable focal neurologic deficit. Skin:  Warm and dry  Psychiatric:  Grossly normal mood and affect. Nursing note reviewed, vital signs reviewed.     Diagnostic Study Results     Labs -  Recent Results (from the past 12 hour(s))   EKG, 12 LEAD, INITIAL    Collection Time: 05/19/18  5:25 PM   Result Value Ref Range    Ventricular Rate 91 BPM    Atrial Rate 91 BPM    P-R Interval 152 ms    QRS Duration 86 ms    Q-T Interval 354 ms    QTC Calculation (Bezet) 435 ms    Calculated P Axis 53 degrees    Calculated R Axis -20 degrees    Calculated T Axis 64 degrees    Diagnosis       Normal sinus rhythm  Normal ECG  When compared with ECG of 03-JAN-2018 18:42,  No significant change was found     CBC WITH AUTOMATED DIFF    Collection Time: 05/19/18  5:40 PM   Result Value Ref Range    WBC 8.5 4.6 - 13.2 K/uL    RBC 3.88 (L) 4.20 - 5.30 M/uL    HGB 11.9 (L) 12.0 - 16.0 g/dL    HCT 35.3 35.0 - 45.0 %    MCV 91.0 74.0 - 97.0 FL    MCH 30.7 24.0 - 34.0 PG    MCHC 33.7 31.0 - 37.0 g/dL    RDW 13.4 11.6 - 14.5 %    PLATELET 694 459 - 319 K/uL    MPV 10.2 9.2 - 11.8 FL    NEUTROPHILS 58 40 - 73 %    LYMPHOCYTES 34 21 - 52 %    MONOCYTES 6 3 - 10 % EOSINOPHILS 2 0 - 5 %    BASOPHILS 0 0 - 2 %    ABS. NEUTROPHILS 5.0 1.8 - 8.0 K/UL    ABS. LYMPHOCYTES 2.8 0.9 - 3.6 K/UL    ABS. MONOCYTES 0.5 0.05 - 1.2 K/UL    ABS. EOSINOPHILS 0.2 0.0 - 0.4 K/UL    ABS. BASOPHILS 0.0 0.0 - 0.06 K/UL    DF AUTOMATED     METABOLIC PANEL, BASIC    Collection Time: 05/19/18  5:40 PM   Result Value Ref Range    Sodium 138 136 - 145 mmol/L    Potassium 4.1 3.5 - 5.5 mmol/L    Chloride 104 100 - 108 mmol/L    CO2 26 21 - 32 mmol/L    Anion gap 8 3.0 - 18 mmol/L    Glucose 197 (H) 74 - 99 mg/dL    BUN 18 7.0 - 18 MG/DL    Creatinine 1.08 0.6 - 1.3 MG/DL    BUN/Creatinine ratio 17 12 - 20      GFR est AA >60 >60 ml/min/1.73m2    GFR est non-AA 54 (L) >60 ml/min/1.73m2    Calcium 8.8 8.5 - 10.1 MG/DL   TROPONIN I    Collection Time: 05/19/18  5:40 PM   Result Value Ref Range    Troponin-I, Qt. <0.02 0.0 - 0.045 NG/ML   D DIMER    Collection Time: 05/19/18  5:40 PM   Result Value Ref Range    D DIMER 0.48 (H) <0.46 ug/ml(FEU)   HCG QL SERUM    Collection Time: 05/19/18  5:40 PM   Result Value Ref Range    HCG, Ql. NEGATIVE  NEG     HEPATIC FUNCTION PANEL    Collection Time: 05/19/18  5:40 PM   Result Value Ref Range    Protein, total 7.9 6.4 - 8.2 g/dL    Albumin 3.8 3.4 - 5.0 g/dL    Globulin 4.1 (H) 2.0 - 4.0 g/dL    A-G Ratio 0.9 0.8 - 1.7      Bilirubin, total 0.3 0.2 - 1.0 MG/DL    Bilirubin, direct <0.1 0.0 - 0.2 MG/DL    Alk.  phosphatase 190 (H) 45 - 117 U/L    AST (SGOT) 36 15 - 37 U/L    ALT (SGPT) 49 13 - 56 U/L   LIPASE    Collection Time: 05/19/18  5:40 PM   Result Value Ref Range    Lipase 257 73 - 393 U/L   TROPONIN I    Collection Time: 05/19/18  8:00 PM   Result Value Ref Range    Troponin-I, Qt. <0.02 0.0 - 0.045 NG/ML   EKG, 12 LEAD, SUBSEQUENT    Collection Time: 05/19/18  8:00 PM   Result Value Ref Range    Ventricular Rate 72 BPM    Atrial Rate 72 BPM    P-R Interval 170 ms    QRS Duration 82 ms    Q-T Interval 394 ms    QTC Calculation (Bezet) 431 ms    Calculated P Axis 68 degrees    Calculated R Axis -20 degrees    Calculated T Axis 52 degrees    Diagnosis       Normal sinus rhythm  Normal ECG  When compared with ECG of 19-MAY-2018 17:25,  No significant change was found         Radiologic Studies -   CT ABD PELV W CONT    (Results Pending)   CTA CHEST W OR W WO CONT    (Results Pending)     CTD-ABD PELV W CONT  ------------------------------------------------------------  Preliminary Reading at 20:21:00    Findings:   1. No acute findings  2. Stool throughout th ascending and transverse colon. Diverticulosis. 3. Hepatomegaly. Benign right hepatic hypodensity, stable from 2016. 4. Unchanged mild nodular thickening of the left adrenal gland  5. L4-S1 fusion unchanged. Bones otherwise unremarkable. 6. Dense material in the lower bladder, likely excreted contrast from prior CTA chest.     CTD-CTA CHEST with and without CONTRAST  -----------------------------------------------------------------------  Preliminary Reading at 20:13:00    Findings:   1. No evidence of pulmonary embolism  2. No acute findings to suggest etiology for patients symptoms  3. Bilateral mastectomy. Left chest wall port. Medical Decision Making   I am the first provider for this patient. I reviewed the vital signs, available nursing notes, past medical history, past surgical history, family history and social history. Vital Signs-Reviewed the patient's vital signs. Pulse Oximetry Analysis -  100% on room air (Interpretation) Normal     Cardiac Monitor:  Rate: 87  Rhythm:  Normal Sinus Rhythm     EKG: Interpreted by the EP. Time Interpreted: 20:00   Rate: 72   Rhythm: Normal Sinus Rhythm    Interpretation: Resolved T-wave inversion     Records Reviewed: Nursing Notes and Old Medical Records (Time of Review: 6:08 PM)    ED Course: Progress Notes, Reevaluation, and Consults:    ED course:  Patient presents with chest pain and epigastric pain history.   Management and chronic pain of the same.  Reports a history of ulcers most likely related to her ulcer disease but she is tachycardic complaining of chest pain, EKG was done at triage has T-wave inversions in lateral leads we'll evaluate for atypical ACS as well as PE given her history of cancer, significant abdominal guarding is present, she will also require CT to apply for any perforated viscus    CTA chest CT abdomen and pelvis without acute abnormality, her guaiac was negative, her labs are reassuring, her troponins were negative ×2    Discussed my concern about her chest pain, it is most likely related to her chronic pain and gastric process however she did have a isolated T-wave inversion laterally, she should follow up with cardiology for further management    Patient's history, physical exam and laboratory evaluations were reviewed. Had discussion with the patient about the possible etiologies of chest pain. Patient was felt to be low risk for major adverse cardiac event, was  informed about the risks and benefits and alternatives of inpatient versus outpatient workup. At this time patient is stable for outpatient management including follow-up with primary care physician/cardiology for reevaluation within 72 hours. Patient is aware that no evaluation in the emergency department can ensure 0% risk, patient will return to the emergency department with any concerns. Portions of this chart were created with Dragon medical speech to text program.   Unrecognized errors may be present. Diagnosis     Clinical Impression:   1. Acute chest pain    2. Abdominal pain, epigastric    3.  Abnormal EKG        Disposition: Discharged    Follow-up Information     Follow up With Details Comments Contact Info    Hanh Delacruz MD Call in 2 days if unable to see your own physician [facility]  [place] [address]  [phone] [id number] [facility] EMERGENCY DEPT  As needed, If symptoms worsen [address] [facility] [address]  [phone]           Patient's Medications   Start Taking    No medications on file   Continue Taking    ALBUTEROL IN    Take  by inhalation. BACITRACIN-NEOMYCIN-POLYMYXIN B-HYDROCORTISONE (CORTISPORIN) 1 % OINTMENT    Apply  to affected area two (2) times a day. BLOOD-GLUCOSE METER MONITORING KIT    Use daily to check blood sugar    CEFDINIR (OMNICEF) 300 MG CAPSULE    Take 1 Cap by mouth two (2) times a day. CHOLECALCIFEROL (VITAMIN D3) 50,000 UNIT CAPSULE    Take 1 Cap by mouth every seven (7) days for 12 doses. Indications: Vitamin D Deficiency    DICYCLOMINE (BENTYL) 20 MG TABLET    Take 1 Tab by mouth every six (6) hours as needed (abdominal cramps) for up to 20 doses. EPINEPHRINE (EPIPEN) 0.3 MG/0.3 ML (1:1,000) INJECTION    0.3 mL by IntraMUSCular route once as needed for up to 1 dose. FERROUS SULFATE 325 MG (65 MG IRON) TABLET    Take 1 Tab by mouth Daily (before breakfast). FLUTICASONE (FLONASE) 50 MCG/ACTUATION NASAL SPRAY    Use one to two sprays in each nostril daily for congestion and allergy. Indications: Allergic Rhinitis    HYDROXYZINE HCL (ATARAX) 25 MG TABLET    Take 1-2 tablets by mouth every 6 hours as needed. IBUPROFEN (MOTRIN) 600 MG TABLET    Take 1 Tab by mouth every six (6) hours as needed for Pain. INSULIN NPH-REGULAR HUMAN REC (HUMULIN 70-30) 100 UNIT/ML (70-30) INPN    Give 30 units in the QAM and 35 units at bedtime    LIDOCAINE (LIDODERM) 5 %    2 Patches by TransDERmal route every twenty-four (24) hours. Apply patch to the affected area for 12 hours a day and remove for 12 hours a day. LISINOPRIL-HYDROCHLOROTHIAZIDE (PRINZIDE, ZESTORETIC) 20-12.5 MG PER TABLET    Take 1 Tab by mouth daily. LORATADINE (CLARITIN) 10 MG TABLET    Take 1 Tab by mouth daily. Indications: Allergic Rhinitis    LORAZEPAM (ATIVAN) 1 MG TABLET    1 mg. MUPIROCIN (BACTROBAN) 2 % OINTMENT    Use 1 Application to affected area 3 Times Daily. NUT.TX.GLUC. INTOL,LAC-FREE,SOY (GLUCERNA THERAPEUTIC NUTRITION) LIQD    Take 1 Bottle by mouth three (3) times daily for 30 days. ONDANSETRON (ZOFRAN ODT) 4 MG DISINTEGRATING TABLET    Take 1-2 tablets every 6-8 hours as needed for nausea and vomiting. ONDANSETRON (ZOFRAN ODT) 4 MG DISINTEGRATING TABLET    Take 1 Tab by mouth every eight (8) hours as needed for Nausea. POLYETHYLENE GLYCOL (MIRALAX) 17 GRAM PACKET    Take 1 Packet by mouth daily. TRAZODONE (DESYREL) 300 MG TABLET    Take 1 Tab by mouth nightly. VALACYCLOVIR (VALTREX) 1 GRAM TABLET    Take  by mouth. VIT B COMP-C-FA-IRON-VIT E (VITAMINS B COMPLEX) 500 MG-400 MCG- 18 MG IRON TAB    Take 1 Tab by mouth daily. These Medications have changed    No medications on file   Stop Taking    No medications on file     _______________________________    Attestations:  Ap Blake acting as a scribe for and in the presence of Brie Zimmer MD      May 19, 2018 at 6:08 PM       Provider Attestation:      I personally performed the services described in the documentation, reviewed the documentation, as recorded by the scribe in my presence, and it accurately and completely records my words and actions.  May 19, 2018 at 6:08 PM - Brie Zimmer MD    _______________________________

## 2018-05-20 LAB
ATRIAL RATE: 72 BPM
ATRIAL RATE: 91 BPM
CALCULATED P AXIS, ECG09: 53 DEGREES
CALCULATED P AXIS, ECG09: 68 DEGREES
CALCULATED R AXIS, ECG10: -20 DEGREES
CALCULATED R AXIS, ECG10: -20 DEGREES
CALCULATED T AXIS, ECG11: 52 DEGREES
CALCULATED T AXIS, ECG11: 64 DEGREES
DIAGNOSIS, 93000: NORMAL
DIAGNOSIS, 93000: NORMAL
P-R INTERVAL, ECG05: 152 MS
P-R INTERVAL, ECG05: 170 MS
Q-T INTERVAL, ECG07: 354 MS
Q-T INTERVAL, ECG07: 394 MS
QRS DURATION, ECG06: 82 MS
QRS DURATION, ECG06: 86 MS
QTC CALCULATION (BEZET), ECG08: 431 MS
QTC CALCULATION (BEZET), ECG08: 435 MS
VENTRICULAR RATE, ECG03: 72 BPM
VENTRICULAR RATE, ECG03: 91 BPM

## 2018-05-20 NOTE — DISCHARGE INSTRUCTIONS
Chest Pain: Care Instructions  Your Care Instructions    There are many things that can cause chest pain. Some are not serious and will get better on their own in a few days. But some kinds of chest pain need more testing and treatment. Your doctor may have recommended a follow-up visit in the next 8 to 12 hours. If you are not getting better, you may need more tests or treatment. Even though your doctor has released you, you still need to watch for any problems. The doctor carefully checked you, but sometimes problems can develop later. If you have new symptoms or if your symptoms do not get better, get medical care right away. If you have worse or different chest pain or pressure that lasts more than 5 minutes or you passed out (lost consciousness), call 911 or seek other emergency help right away. A medical visit is only one step in your treatment. Even if you feel better, you still need to do what your doctor recommends, such as going to all suggested follow-up appointments and taking medicines exactly as directed. This will help you recover and help prevent future problems. How can you care for yourself at home? · Rest until you feel better. · Take your medicine exactly as prescribed. Call your doctor if you think you are having a problem with your medicine. · Do not drive after taking a prescription pain medicine. When should you call for help? Call 911 if:  ? · You passed out (lost consciousness). ? · You have severe difficulty breathing. ? · You have symptoms of a heart attack. These may include:  ¨ Chest pain or pressure, or a strange feeling in your chest.  ¨ Sweating. ¨ Shortness of breath. ¨ Nausea or vomiting. ¨ Pain, pressure, or a strange feeling in your back, neck, jaw, or upper belly or in one or both shoulders or arms. ¨ Lightheadedness or sudden weakness. ¨ A fast or irregular heartbeat.   After you call 911, the  may tell you to chew 1 adult-strength or 2 to 4 low-dose aspirin. Wait for an ambulance. Do not try to drive yourself. ?Call your doctor today if:  ? · You have any trouble breathing. ? · Your chest pain gets worse. ? · You are dizzy or lightheaded, or you feel like you may faint. ? · You are not getting better as expected. ? · You are having new or different chest pain. Where can you learn more? Go to http://imani-jenny.info/. Enter A120 in the search box to learn more about \"Chest Pain: Care Instructions. \"  Current as of: March 20, 2017  Content Version: 11.4  © 6889-2453 [employer]. Care instructions adapted under license by [employer] (which disclaims liability or warranty for this information). If you have questions about a medical condition or this instruction, always ask your healthcare professional. [address] any warranty or liability for your use of this information. Abdominal Pain: Care Instructions  Your Care Instructions    Abdominal pain has many possible causes. Some aren't serious and get better on their own in a few days. Others need more testing and treatment. If your pain continues or gets worse, you need to be rechecked and may need more tests to find out what is wrong. You may need surgery to correct the problem. Don't ignore new symptoms, such as fever, nausea and vomiting, urination problems, pain that gets worse, and dizziness. These may be signs of a more serious problem. Your doctor may have recommended a follow-up visit in the next 8 to 12 hours. If you are not getting better, you may need more tests or treatment. The doctor has checked you carefully, but problems can develop later. If you notice any problems or new symptoms, get medical treatment right away. Follow-up care is a key part of your treatment and safety. Be sure to make and go to all appointments, and call your doctor if you are having problems.  It's also a good idea to know your test results and keep a list of the medicines you take. How can you care for yourself at home? · Rest until you feel better. · To prevent dehydration, drink plenty of fluids, enough so that your urine is light yellow or clear like water. Choose water and other caffeine-free clear liquids until you feel better. If you have kidney, heart, or liver disease and have to limit fluids, talk with your doctor before you increase the amount of fluids you drink. · If your stomach is upset, eat mild foods, such as rice, dry toast or crackers, bananas, and applesauce. Try eating several small meals instead of two or three large ones. · Wait until 48 hours after all symptoms have gone away before you have spicy foods, alcohol, and drinks that contain caffeine. · Do not eat foods that are high in fat. · Avoid anti-inflammatory medicines such as aspirin, ibuprofen (Advil, Motrin), and naproxen (Aleve). These can cause stomach upset. Talk to your doctor if you take daily aspirin for another health problem. When should you call for help? Call 911 anytime you think you may need emergency care. For example, call if:  ? · You passed out (lost consciousness). ? · You pass maroon or very bloody stools. ? · You vomit blood or what looks like coffee grounds. ? · You have new, severe belly pain. ?Call your doctor now or seek immediate medical care if:  ? · Your pain gets worse, especially if it becomes focused in one area of your belly. ? · You have a new or higher fever. ? · Your stools are black and look like tar, or they have streaks of blood. ? · You have unexpected vaginal bleeding. ? · You have symptoms of a urinary tract infection. These may include:  ¨ Pain when you urinate. ¨ Urinating more often than usual.  ¨ Blood in your urine. ? · You are dizzy or lightheaded, or you feel like you may faint. ? Watch closely for changes in your health, and be sure to contact your doctor if:  ? · You are not getting better after 1 day (24 hours). Where can you learn more? Go to http://imani-jenny.info/. Enter A660 in the search box to learn more about \"Abdominal Pain: Care Instructions. \"  Current as of: March 20, 2017  Content Version: 11.4  © 4260-1065 [employer]. Care instructions adapted under license by [employer] (which disclaims liability or warranty for this information). If you have questions about a medical condition or this instruction, always ask your healthcare professional. [address] any warranty or liability for your use of this information. I have reviewed discharge instructions with the patient. The patient verbalized understanding.

## 2018-05-22 ENCOUNTER — HOSPITAL ENCOUNTER (EMERGENCY)
Age: 48
Discharge: HOME | End: 2018-05-22
Attending: EMERGENCY MEDICINE
Payer: MEDICAID

## 2018-05-22 ENCOUNTER — OFFICE VISIT (OUTPATIENT)
Dept: FAMILY MEDICINE CLINIC | Facility: CLINIC | Age: 48
End: 2018-05-22

## 2018-05-22 VITALS
BODY MASS INDEX: 29.63 KG/M2 | OXYGEN SATURATION: 99 % | RESPIRATION RATE: 16 BRPM | TEMPERATURE: 98.1 F | DIASTOLIC BLOOD PRESSURE: 86 MMHG | HEART RATE: 84 BPM | HEIGHT: 62 IN | WEIGHT: 161 LBS | SYSTOLIC BLOOD PRESSURE: 132 MMHG

## 2018-05-22 VITALS
BODY MASS INDEX: 29.77 KG/M2 | TEMPERATURE: 97.5 F | RESPIRATION RATE: 16 BRPM | HEART RATE: 80 BPM | OXYGEN SATURATION: 98 % | SYSTOLIC BLOOD PRESSURE: 139 MMHG | DIASTOLIC BLOOD PRESSURE: 88 MMHG | HEIGHT: 62 IN | WEIGHT: 161.8 LBS

## 2018-05-22 DIAGNOSIS — K59.00 CONSTIPATION, UNSPECIFIED CONSTIPATION TYPE: ICD-10-CM

## 2018-05-22 DIAGNOSIS — G89.29 OTHER CHRONIC PAIN: ICD-10-CM

## 2018-05-22 DIAGNOSIS — L98.9 SKIN LESIONS: ICD-10-CM

## 2018-05-22 DIAGNOSIS — R94.31 ABNORMAL FINDING ON EKG: ICD-10-CM

## 2018-05-22 DIAGNOSIS — Z79.4 TYPE 2 DIABETES MELLITUS WITH COMPLICATION, WITH LONG-TERM CURRENT USE OF INSULIN (HCC): Primary | ICD-10-CM

## 2018-05-22 DIAGNOSIS — R10.13 RECURRENT EPIGASTRIC ABDOMINAL PAIN: Primary | ICD-10-CM

## 2018-05-22 DIAGNOSIS — E11.8 TYPE 2 DIABETES MELLITUS WITH COMPLICATION, WITH LONG-TERM CURRENT USE OF INSULIN (HCC): Primary | ICD-10-CM

## 2018-05-22 DIAGNOSIS — Z01.818 PRE-OPERATIVE CLEARANCE: ICD-10-CM

## 2018-05-22 PROBLEM — E11.21 TYPE 2 DIABETES WITH NEPHROPATHY (HCC): Status: ACTIVE | Noted: 2018-05-22

## 2018-05-22 LAB
ALBUMIN SERPL-MCNC: 3.5 G/DL (ref 3.4–5)
ALBUMIN/GLOB SERPL: 1.1 {RATIO} (ref 0.8–1.7)
ALP SERPL-CCNC: 154 U/L (ref 45–117)
ALT SERPL-CCNC: 60 U/L (ref 13–56)
ANION GAP SERPL CALC-SCNC: 9 MMOL/L (ref 3–18)
AST SERPL-CCNC: 60 U/L (ref 15–37)
BASOPHILS # BLD: 0 K/UL (ref 0–0.06)
BASOPHILS NFR BLD: 0 % (ref 0–2)
BILIRUB SERPL-MCNC: 0.2 MG/DL (ref 0.2–1)
BUN SERPL-MCNC: 17 MG/DL (ref 7–18)
BUN/CREAT SERPL: 16 (ref 12–20)
CALCIUM SERPL-MCNC: 8.5 MG/DL (ref 8.5–10.1)
CHLORIDE SERPL-SCNC: 102 MMOL/L (ref 100–108)
CO2 SERPL-SCNC: 27 MMOL/L (ref 21–32)
CREAT SERPL-MCNC: 1.06 MG/DL (ref 0.6–1.3)
DIFFERENTIAL METHOD BLD: ABNORMAL
EOSINOPHIL # BLD: 0.2 K/UL (ref 0–0.4)
EOSINOPHIL NFR BLD: 2 % (ref 0–5)
ERYTHROCYTE [DISTWIDTH] IN BLOOD BY AUTOMATED COUNT: 13.3 % (ref 11.6–14.5)
GLOBULIN SER CALC-MCNC: 3.3 G/DL (ref 2–4)
GLUCOSE SERPL-MCNC: 200 MG/DL (ref 74–99)
HBA1C MFR BLD HPLC: 7.5 %
HCT VFR BLD AUTO: 32.9 % (ref 35–45)
HGB BLD-MCNC: 11 G/DL (ref 12–16)
LIPASE SERPL-CCNC: 262 U/L (ref 73–393)
LYMPHOCYTES # BLD: 2 K/UL (ref 0.9–3.6)
LYMPHOCYTES NFR BLD: 28 % (ref 21–52)
MCH RBC QN AUTO: 30.5 PG (ref 24–34)
MCHC RBC AUTO-ENTMCNC: 33.4 G/DL (ref 31–37)
MCV RBC AUTO: 91.1 FL (ref 74–97)
MONOCYTES # BLD: 0.3 K/UL (ref 0.05–1.2)
MONOCYTES NFR BLD: 4 % (ref 3–10)
NEUTS SEG # BLD: 4.6 K/UL (ref 1.8–8)
NEUTS SEG NFR BLD: 66 % (ref 40–73)
PLATELET # BLD AUTO: 223 K/UL (ref 135–420)
PMV BLD AUTO: 10 FL (ref 9.2–11.8)
POTASSIUM SERPL-SCNC: 3.8 MMOL/L (ref 3.5–5.5)
PROT SERPL-MCNC: 6.8 G/DL (ref 6.4–8.2)
RBC # BLD AUTO: 3.61 M/UL (ref 4.2–5.3)
SODIUM SERPL-SCNC: 138 MMOL/L (ref 136–145)
WBC # BLD AUTO: 7 K/UL (ref 4.6–13.2)

## 2018-05-22 PROCEDURE — 85025 COMPLETE CBC W/AUTO DIFF WBC: CPT | Performed by: EMERGENCY MEDICINE

## 2018-05-22 PROCEDURE — 96361 HYDRATE IV INFUSION ADD-ON: CPT

## 2018-05-22 PROCEDURE — 99284 EMERGENCY DEPT VISIT MOD MDM: CPT

## 2018-05-22 PROCEDURE — 74011250636 HC RX REV CODE- 250/636: Performed by: EMERGENCY MEDICINE

## 2018-05-22 PROCEDURE — 74011250636 HC RX REV CODE- 250/636

## 2018-05-22 PROCEDURE — 96374 THER/PROPH/DIAG INJ IV PUSH: CPT

## 2018-05-22 PROCEDURE — 93005 ELECTROCARDIOGRAM TRACING: CPT

## 2018-05-22 PROCEDURE — 80053 COMPREHEN METABOLIC PANEL: CPT | Performed by: EMERGENCY MEDICINE

## 2018-05-22 PROCEDURE — 83690 ASSAY OF LIPASE: CPT | Performed by: EMERGENCY MEDICINE

## 2018-05-22 RX ORDER — PROCHLORPERAZINE EDISYLATE 5 MG/ML
10 INJECTION INTRAMUSCULAR; INTRAVENOUS
Status: COMPLETED | OUTPATIENT
Start: 2018-05-22 | End: 2018-05-22

## 2018-05-22 RX ORDER — HEPARIN 100 UNIT/ML
SYRINGE INTRAVENOUS
Status: COMPLETED
Start: 2018-05-22 | End: 2018-05-22

## 2018-05-22 RX ORDER — DIPHENHYDRAMINE HYDROCHLORIDE 50 MG/ML
50 INJECTION, SOLUTION INTRAMUSCULAR; INTRAVENOUS
Status: DISCONTINUED | OUTPATIENT
Start: 2018-05-22 | End: 2018-05-23 | Stop reason: HOSPADM

## 2018-05-22 RX ORDER — HEPARIN SODIUM (PORCINE) LOCK FLUSH IV SOLN 100 UNIT/ML 100 UNIT/ML
500 SOLUTION INTRAVENOUS AS NEEDED
Status: DISCONTINUED | OUTPATIENT
Start: 2018-05-22 | End: 2018-05-23 | Stop reason: HOSPADM

## 2018-05-22 RX ORDER — FACIAL-BODY WIPES
10 EACH TOPICAL
Qty: 28 SUPPOSITORY | Refills: 1 | Status: SHIPPED | OUTPATIENT
Start: 2018-05-22 | End: 2019-01-10

## 2018-05-22 RX ADMIN — PROCHLORPERAZINE EDISYLATE 10 MG: 5 INJECTION INTRAMUSCULAR; INTRAVENOUS at 20:02

## 2018-05-22 RX ADMIN — Medication 500 UNITS: at 21:36

## 2018-05-22 RX ADMIN — SODIUM CHLORIDE 1000 ML: 900 INJECTION, SOLUTION INTRAVENOUS at 20:02

## 2018-05-22 NOTE — ED PROVIDER NOTES
HPI Comments: Jordy Trinh is a [address] y.o. Female who is well known to this facility with chronic abd pain, with c/o recurrent upper abd pain tonight after eating with nv x 2. Also feels constipated as well. Seen this past weekend for chest pain, abd pain, had cta chest, abd done with no acute findings. Per pt she saw pain management today and was given rx but is waiting on preauthorization for her pain meds. Was told to come in here for evaluation. Pain is constant, sharp,stabbing worse with eating. Feels bloated. Has tried miralax without relief    The history is provided by the patient and medical records. Past Medical History:   Diagnosis Date    Alcohol abuse     Cancer [facility])     breast cancer, right    Cancer ([facility] [address]     Breast CA    Depression     Diabetes ([facility] [address]     Drug abuse     Gastrointestinal disorder     cholitis    Hyperlipidemia     Hypertension     Spine injury     Vitamin D deficiency 5/1/2018       Past Surgical History:   Procedure Laterality Date    COLONOSCOPY N/A 7/18/2016    [facility] ENDOSCOPY    HX BREAST LUMPECTOMY  06/2013    right    HX HYSTERECTOMY      HX ORTHOPAEDIC      Back fusion surgery 4/18/14.  HX OTHER SURGICAL      mediport    HX TONSILLECTOMY      HX TUBAL LIGATION           Family History:   Problem Relation Age of Onset    Heart Disease Mother     Stroke Father     Heart Disease Father 48    Diabetes Other     Hypertension Other     Cancer Maternal Grandmother        Social History     Social History    Marital status:      Spouse name: N/A    Number of children: N/A    Years of education: N/A     Occupational History    Not on file.      Social History Main Topics    Smoking status: Never Smoker    Smokeless tobacco: Never Used    Alcohol use No      Comment: \"Occasionally\"    Drug use: No    Sexual activity: No     Other Topics Concern    Not on file     Social History Narrative ** Merged History Encounter **              ALLERGIES: Latex; Other food; Amoxicillin; Aspirin; Fentanyl; Levaquin [levofloxacin]; Chlorhexidine; Norco [hydrocodone-acetaminophen]; Acetaminophen; [place]; Codeine; Glycopyrrolate; Morphine; Pcn [penicillins]; Percocet [oxycodone-acetaminophen]; Tape [adhesive]; and Tramadol    Review of Systems   Constitutional: Positive for appetite change. Negative for fever. HENT: Negative for sore throat and trouble swallowing. Eyes: Negative for visual disturbance. Respiratory: Negative for cough. Cardiovascular: Negative for leg swelling. Gastrointestinal: Positive for abdominal distention, abdominal pain and constipation. Negative for blood in stool. Endocrine: Negative for polyuria. Genitourinary: Negative for difficulty urinating. Musculoskeletal: Negative for gait problem. Skin: Negative for rash. Allergic/Immunologic: Negative for immunocompromised state. Neurological: Negative for syncope. Psychiatric/Behavioral: Positive for sleep disturbance. Vitals:    05/22/18 1928   BP: 132/86   Pulse: 84   Resp: 16   Temp: 98.1 °F (36.7 °C)   SpO2: 99%   Weight: 73 kg (161 lb)   Height: 5' 2\" (1.575 m)            Physical Exam   Constitutional: She is oriented to person, place, and time. She appears well-developed and well-nourished. She appears distressed. HENT:   Head: Normocephalic and atraumatic. Right Ear: External ear normal.   Left Ear: External ear normal.   Nose: Nose normal.   Mouth/Throat: Uvula is midline, oropharynx is clear and moist and mucous membranes are normal.   Eyes: Conjunctivae are normal. No scleral icterus. Neck: Neck supple. Cardiovascular: Normal rate, regular rhythm, normal heart sounds and intact distal pulses. Pulmonary/Chest: Effort normal and breath sounds normal.   Abdominal: Soft. She exhibits distension. There is no hepatosplenomegaly. There is tenderness in the epigastric area.  There is no rigidity, no rebound, no guarding and no CVA tenderness. Musculoskeletal: She exhibits no edema. Neurological: She is alert and oriented to person, place, and time. Gait normal.   Skin: Skin is warm and dry. She is not diaphoretic. Psychiatric: Her behavior is normal.   Nursing note and vitals reviewed. [facility]      ED Course       Procedures    Vitals:  Patient Vitals for the past 12 hrs:   Temp Pulse Resp BP SpO2   05/22/18 1928 98.1 °F (36.7 °C) 84 16 132/86 99 %         Medications ordered:   Medications   sodium chloride 0.9 % bolus infusion 1,000 mL (1,000 mL IntraVENous New Bag 5/22/18 2002)   diphenhydrAMINE (BENADRYL) injection 50 mg (50 mg IntraVENous Refused 5/22/18 2002)   heparin (porcine) 100 unit/mL injection 500 Units (not administered)   prochlorperazine (COMPAZINE) injection 10 mg (10 mg IntraVENous Given 5/22/18 2002)         Lab findings:  Recent Results (from the past 12 hour(s))   AMB POC HEMOGLOBIN A1C    Collection Time: 05/22/18 10:36 AM   Result Value Ref Range    Hemoglobin A1c (POC) 7.5 %   CBC WITH AUTOMATED DIFF    Collection Time: 05/22/18  7:50 PM   Result Value Ref Range    WBC 7.0 4.6 - 13.2 K/uL    RBC 3.61 (L) 4.20 - 5.30 M/uL    HGB 11.0 (L) 12.0 - 16.0 g/dL    HCT 32.9 (L) 35.0 - 45.0 %    MCV 91.1 74.0 - 97.0 FL    MCH 30.5 24.0 - 34.0 PG    MCHC 33.4 31.0 - 37.0 g/dL    RDW 13.3 11.6 - 14.5 %    PLATELET 361 882 - 734 K/uL    MPV 10.0 9.2 - 11.8 FL    NEUTROPHILS 66 40 - 73 %    LYMPHOCYTES 28 21 - 52 %    MONOCYTES 4 3 - 10 %    EOSINOPHILS 2 0 - 5 %    BASOPHILS 0 0 - 2 %    ABS. NEUTROPHILS 4.6 1.8 - 8.0 K/UL    ABS. LYMPHOCYTES 2.0 0.9 - 3.6 K/UL    ABS. MONOCYTES 0.3 0.05 - 1.2 K/UL    ABS. EOSINOPHILS 0.2 0.0 - 0.4 K/UL    ABS.  BASOPHILS 0.0 0.0 - 0.06 K/UL    DF AUTOMATED     METABOLIC PANEL, COMPREHENSIVE    Collection Time: 05/22/18  7:50 PM   Result Value Ref Range    Sodium 138 136 - 145 mmol/L    Potassium 3.8 3.5 - 5.5 mmol/L    Chloride 102 100 - 108 mmol/L    CO2 27 21 - 32 mmol/L    Anion gap 9 3.0 - 18 mmol/L    Glucose 200 (H) 74 - 99 mg/dL    BUN 17 7.0 - 18 MG/DL    Creatinine 1.06 0.6 - 1.3 MG/DL    BUN/Creatinine ratio 16 12 - 20      GFR est AA >60 >60 ml/min/1.73m2    GFR est non-AA 55 (L) >60 ml/min/1.73m2    Calcium 8.5 8.5 - 10.1 MG/DL    Bilirubin, total 0.2 0.2 - 1.0 MG/DL    ALT (SGPT) 60 (H) 13 - 56 U/L    AST (SGOT) 60 (H) 15 - 37 U/L    Alk. phosphatase 154 (H) 45 - 117 U/L    Protein, total 6.8 6.4 - 8.2 g/dL    Albumin 3.5 3.4 - 5.0 g/dL    Globulin 3.3 2.0 - 4.0 g/dL    A-G Ratio 1.1 0.8 - 1.7     LIPASE    Collection Time: 05/22/18  7:50 PM   Result Value Ref Range    Lipase 262 73 - 393 U/L   EKG, 12 LEAD, INITIAL    Collection Time: 05/22/18  7:55 PM   Result Value Ref Range    Ventricular Rate 78 BPM    Atrial Rate 78 BPM    P-R Interval 166 ms    QRS Duration 80 ms    Q-T Interval 376 ms    QTC Calculation (Bezet) 428 ms    Calculated P Axis 51 degrees    Calculated R Axis -4 degrees    Calculated T Axis 73 degrees    Diagnosis       Normal sinus rhythm  Nonspecific T wave abnormality  Abnormal ECG  When compared with ECG of 19-MAY-2018 20:00,  No significant change was found         EKG interpretation by ED Physician:  nsr with ns tw changes  Rate 78, pr 166, qtc 428  No sig change from previous    X-Ray, CT or other radiology findings or impressions:  No orders to display       Progress notes, Consult notes or additional Procedure notes:   Doubt need for other work up, imaging. No acute changes to warrant further treatment. Chronic pain issue. D/w pt needs to discuss her pain needs with her other doctors  I have discussed with patient and/or family/sig other the results, interpretation of any imaging if performed, suspected diagnosis and treatment plan to include instructions regarding the diagnoses listed to which understanding was expressed with all questions answered      Reevaluation of patient:   stable    Disposition:  Diagnosis:   1. Recurrent epigastric abdominal pain    2. Other chronic pain    3. Constipation, unspecified constipation type        Disposition: home    Follow-up Information     Follow up With Details Comments [address] [facility], NP Schedule an appointment as soon as possible for a visit  [address] [place] [id number] [phone]      contact your pain management doctor in the morning to see if you are able to get smaller prescription for pain medications until the others are filled               Patient's Medications   Start Taking    BISACODYL (DULCOLAX, BISACODYL,) 10 MG SUPPOSITORY    Insert 10 mg into rectum daily as needed. Continue Taking    ALBUTEROL IN    Take  by inhalation. BACITRACIN-NEOMYCIN-POLYMYXIN B-HYDROCORTISONE (CORTISPORIN) 1 % OINTMENT    Apply  to affected area two (2) times a day. BLOOD-GLUCOSE METER MONITORING KIT    Use daily to check blood sugar    CEFDINIR (OMNICEF) 300 MG CAPSULE    Take 1 Cap by mouth two (2) times a day. CHOLECALCIFEROL (VITAMIN D3) 50,000 UNIT CAPSULE    Take 1 Cap by mouth every seven (7) days for 12 doses. Indications: Vitamin D Deficiency    DICYCLOMINE (BENTYL) 20 MG TABLET    Take 1 Tab by mouth every six (6) hours as needed (abdominal cramps) for up to 20 doses. EPINEPHRINE (EPIPEN) 0.3 MG/0.3 ML (1:1,000) INJECTION    0.3 mL by IntraMUSCular route once as needed for up to 1 dose. FERROUS SULFATE 325 MG (65 MG IRON) TABLET    Take 1 Tab by mouth Daily (before breakfast). FLUTICASONE (FLONASE) 50 MCG/ACTUATION NASAL SPRAY    Use one to two sprays in each nostril daily for congestion and allergy. Indications: Allergic Rhinitis    HYDROXYZINE HCL (ATARAX) 25 MG TABLET    Take 1-2 tablets by mouth every 6 hours as needed. IBUPROFEN (MOTRIN) 600 MG TABLET    Take 1 Tab by mouth every six (6) hours as needed for Pain.     INSULIN NPH-REGULAR HUMAN REC (HUMULIN 70-30) 100 UNIT/ML (70-30) INPN    Give 30 units in the QAM and 35 units at bedtime    LIDOCAINE (LIDODERM) 5 %    2 Patches by TransDERmal route every twenty-four (24) hours. Apply patch to the affected area for 12 hours a day and remove for 12 hours a day. LISINOPRIL-HYDROCHLOROTHIAZIDE (PRINZIDE, ZESTORETIC) 20-12.5 MG PER TABLET    Take 1 Tab by mouth daily. LORATADINE (CLARITIN) 10 MG TABLET    Take 1 Tab by mouth daily. Indications: Allergic Rhinitis    LORAZEPAM (ATIVAN) 1 MG TABLET    1 mg. MUPIROCIN (BACTROBAN) 2 % OINTMENT    Use 1 Application to affected area 3 Times Daily.    NUT. TX.GLUC. INTOL,LAC-FREE,SOY (GLUCERNA THERAPEUTIC NUTRITION) LIQD    Take 1 Bottle by mouth three (3) times daily for 30 days. ONDANSETRON (ZOFRAN ODT) 4 MG DISINTEGRATING TABLET    Take 1-2 tablets every 6-8 hours as needed for nausea and vomiting. ONDANSETRON (ZOFRAN ODT) 4 MG DISINTEGRATING TABLET    Take 1 Tab by mouth every eight (8) hours as needed for Nausea. POLYETHYLENE GLYCOL (MIRALAX) 17 GRAM PACKET    Take 1 Packet by mouth daily. TRAZODONE (DESYREL) 300 MG TABLET    Take 1 Tab by mouth nightly. VALACYCLOVIR (VALTREX) 1 GRAM TABLET    Take  by mouth. VIT B COMP-C-FA-IRON-VIT E (VITAMINS B COMPLEX) 500 MG-400 MCG- 18 MG IRON TAB    Take 1 Tab by mouth daily.    These Medications have changed    No medications on file   Stop Taking    No medications on file

## 2018-05-22 NOTE — PATIENT INSTRUCTIONS
How to Prepare for Surgery  How do you prepare for surgery? Surgery can be stressful. This information will help you understand what you can expect. And it will help you safely prepare for surgery. Follow-up care is a key part of your treatment and safety. Be sure to make and go to all appointments, and call your doctor if you are having problems. It's also a good idea to know your test results and keep a list of the medicines you take. What happens before surgery? ?Preparing for surgery  ? · Understand exactly what surgery is planned, along with the risks, benefits, and other options. · Tell your doctors ALL the medicines, vitamins, supplements, and herbal remedies you take. Some of these can increase the risk of bleeding or interact with anesthesia. ? · If you take blood thinners, such as warfarin (Coumadin), clopidogrel (Plavix), or aspirin, be sure to talk to your doctor. He or she will tell you if you should stop taking these medicines before your surgery. Make sure that you understand exactly what your doctor wants you to do.   ? · Your doctor will tell you which medicines to take or stop before your surgery. You may need to stop taking certain medicines a week or more before surgery. So talk to your doctor as soon as you can.   ? · If you have an advance directive, let your doctor know. It may include a living will and a durable power of  for health care. Bring a copy to the hospital. If don't have one, you may want to prepare one. It lets your doctor and loved ones know your health care wishes. Doctors advise that everyone prepare these papers before any type of surgery or procedure. What happens on the day of surgery? ? · Follow the instructions about when to stop eating and drinking. If you don't, your surgery may be canceled. If your doctor told you to take your medicines on the day of surgery, take them with only a sip of water.    ? · Take a bath or shower before coming in for your surgery. Do not apply lotions, perfumes, deodorants, or nail polish. ? · Do not shave the surgical site yourself. ? · Take off all jewelry and piercings. And take out contact lenses, if you wear them. ? At the hospital or surgery center   · Bring a picture ID. ? · The area for surgery is often marked to make sure there are no errors. ? · You will be kept comfortable and safe by your anesthesia provider. The anesthesia may make you sleep. Or it may just numb the area being worked on. Going home   · Be sure you have someone to drive you home. Anesthesia and pain medicine make it unsafe for you to drive. ? · You will be given more specific instructions about recovering from your surgery. They will cover things like diet, wound care, follow-up care, driving, and getting back to your normal routine. When should you call your doctor? · You have questions or concerns. ? · You don't understand how to prepare for your surgery. ? · You become ill before the surgery (such as fever, flu, or a cold). ? · You need to reschedule or have changed your mind about having the surgery. Where can you learn more? Go to http://imani-jenny.info/. Enter Q270 in the search box to learn more about \"How to Prepare for Surgery. \"  Current as of: May 12, 2017  Content Version: 11.4  © 1844-0108 [employer]. Care instructions adapted under license by [employer] (which disclaims liability or warranty for this information). If you have questions about a medical condition or this instruction, always ask your healthcare professional. [place] [address] any warranty or liability for your use of this information.

## 2018-05-22 NOTE — PROGRESS NOTES
Justin Olivo is a 49 y.o.  female presents today for office visit for pre-op. Pt would also like to discuss breast reconstruction sx. Pt is not fasting. Pt is in Room # 8      1. Have you been to the ER, urgent care clinic since your last visit? Hospitalized since your last visit? [facility] for chest/stomach pain    2. Have you seen or consulted any other health care providers outside of the [address] [place] [address] since your last visit? Include any pap smears or colon screening. No    Upcoming Appts  none    Health Maintenance reviewed     VORB: No orders of the defined types were placed in this encounter.   Jeffery Wells LPN

## 2018-05-22 NOTE — PROGRESS NOTES
Pre-Op Clearance    Patient:  Marlee Bernstein  Patient's :  1970  Referring Physician: Dr. Milagros Walker Date: 2018    Subjective:     Patient is being evaluated for surgical clearance. She is scheduled for bilateral breast reconstructive surgery to be performed by Dr. Chester Cheadle of [address] Surgery on 2018 at [facility].   Evaluation of major predictors of cardiac complications: Moderate-risk surgery (vascular, open intraperitoneal, intrathoracic)  Patient has a history of diabetes requiring treatment with insulin  Patient has no history of ischemic heart disease, angina, anginal equivalents, heart failure, cerebrovascular disease. preoperative serum creatinine > 2.0. PMH, PSH, Social Hx, Family Hx, Meds and Allergies     Past Medical History:   Diagnosis Date    Alcohol abuse     Cancer ([address]     breast cancer, right    Cancer ([address]     Breast CA    Depression     Diabetes ([address]     Drug abuse     Gastrointestinal disorder     cholitis    Hyperlipidemia     Hypertension     Spine injury     Vitamin D deficiency 2018     Current Outpatient Prescriptions:     lidocaine (LIDODERM) 5 %, 2 Patches by TransDERmal route every twenty-four (24) hours. Apply patch to the affected area for 12 hours a day and remove for 12 hours a day., Disp: 90 Each, Rfl: 3    hydrOXYzine HCl (ATARAX) 25 mg tablet, Take 1-2 tablets by mouth every 6 hours as needed. , Disp: 30 Tab, Rfl: 0    cholecalciferol (VITAMIN D3) 50,000 unit capsule, Take 1 Cap by mouth every seven (7) days for 12 doses. Indications: Vitamin D Deficiency, Disp: 12 Cap, Rfl: 1    insulin nph-regular human rec (HUMULIN 70-30) 100 unit/mL (70-30) inpn, Give 30 units in the QAM and 35 units at bedtime, Disp: 5 Pen, Rfl: 3    mupirocin (BACTROBAN) 2 % ointment, Use 1 Application to affected area 3 Times Daily. , Disp: , Rfl:     valACYclovir (VALTREX) 1 gram tablet, Take  by mouth., Disp: , Rfl:     ferrous sulfate 325 mg (65 mg iron) tablet, Take 1 Tab by mouth Daily (before breakfast). , Disp: 30 Tab, Rfl: 3    nut.tx.gluc.intol,lac-free,soy (GLUCERNA THERAPEUTIC NUTRITION) liqd, Take 1 Bottle by mouth three (3) times daily for 30 days. , Disp: 90 Bottle, Rfl: 3    Blood-Glucose Meter monitoring kit, Use daily to check blood sugar, Disp: 1 Kit, Rfl: 0    vit B comp-C-FA-iron-vit E (VITAMINS B COMPLEX) 500 mg-400 mcg- 18 mg iron tab, Take 1 Tab by mouth daily. , Disp: 30 Tab, Rfl: 3    fluticasone (FLONASE) 50 mcg/actuation nasal spray, Use one to two sprays in each nostril daily for congestion and allergy. Indications: Allergic Rhinitis, Disp: 1 Bottle, Rfl: 3    loratadine (CLARITIN) 10 mg tablet, Take 1 Tab by mouth daily. Indications: Allergic Rhinitis, Disp: 30 Tab, Rfl: 3    bacitracin-neomycin-polymyxin b-hydrocortisone (CORTISPORIN) 1 % ointment, Apply  to affected area two (2) times a day., Disp: 15 g, Rfl: 0    ondansetron (ZOFRAN ODT) 4 mg disintegrating tablet, Take 1 Tab by mouth every eight (8) hours as needed for Nausea., Disp: 15 Tab, Rfl: 0    dicyclomine (BENTYL) 20 mg tablet, Take 1 Tab by mouth every six (6) hours as needed (abdominal cramps) for up to 20 doses. , Disp: 20 Tab, Rfl: 0    ondansetron (ZOFRAN ODT) 4 mg disintegrating tablet, Take 1-2 tablets every 6-8 hours as needed for nausea and vomiting., Disp: 12 Tab, Rfl: 0    ibuprofen (MOTRIN) 600 mg tablet, Take 1 Tab by mouth every six (6) hours as needed for Pain., Disp: 20 Tab, Rfl: 0    cefdinir (OMNICEF) 300 mg capsule, Take 1 Cap by mouth two (2) times a day., Disp: 14 Cap, Rfl: 0    traZODone (DESYREL) 300 mg tablet, Take 1 Tab by mouth nightly., Disp: 30 Tab, Rfl: 0    lisinopril-hydroCHLOROthiazide (PRINZIDE, ZESTORETIC) 20-12.5 mg per tablet, Take 1 Tab by mouth daily. , Disp: 30 Tab, Rfl: 1    polyethylene glycol (MIRALAX) 17 gram packet, Take 1 Packet by mouth daily. , Disp: 30 Each, Rfl: 1    ALBUTEROL IN, Take  by inhalation. , Disp: , Rfl:     LORazepam (ATIVAN) 1 mg tablet, 1 mg., Disp: , Rfl:     EPINEPHrine (EPIPEN) 0.3 mg/0.3 mL (1:1,000) injection, 0.3 mL by IntraMUSCular route once as needed for up to 1 dose., Disp: 1 Each, Rfl: 1    Past Surgical History:   Procedure Laterality Date    COLONOSCOPY N/A 7/18/2016    COLONOSCOPY performed [facility] Wolfgang Corrales [facility] ENDOSCOPY    HX BREAST LUMPECTOMY  06/2013    right    HX HYSTERECTOMY      HX ORTHOPAEDIC      Back fusion surgery 4/18/14.  HX OTHER SURGICAL      mediport    HX TONSILLECTOMY      HX TUBAL LIGATION        Social History    Marital status:      Spouse name: N/A    Number of children: N/A    Years of education: N/A     Occupational History    Not on file.      Social History Main Topics    Smoking status: Never Smoker    Smokeless tobacco: Never Used    Alcohol use No      Comment: \"Occasionally\"    Drug use: No    Sexual activity: No     Family History   Problem Relation Age of Onset    Heart Disease Mother     Stroke Father     Heart Disease Father 48    Diabetes Other     Hypertension Other     Cancer Maternal Grandmother      Allergies   Allergen Reactions    Latex Hives and Itching    Other Food Rash     Almonds    Amoxicillin Swelling     Other reaction(s): mild rash/itching    Aspirin Not Reported This Time and Swelling     Mouth swells    Fentanyl Anaphylaxis and Swelling     Patches cause mouth to swell  Swelling in mouth from fentanyl patch    Levaquin [Levofloxacin] Not Reported This Time and Swelling     Other reaction(s): mild rash/itching    Chlorhexidine Rash    Norco [Hydrocodone-Acetaminophen] Nausea and Vomiting     Other reaction(s): gi distress    Acetaminophen Other (comments)    [place] Rash and Itching    Codeine Other (comments)    Glycopyrrolate Swelling     Pt  States  faciAL  SWELLING   POST  ROBINUL  IV   Pt  States  faciAL  SWELLING   POST ROBINUL  IV     Morphine Nausea and Vomiting     Pt states she is not allergic    Pcn [Penicillins] Swelling    Percocet [Oxycodone-Acetaminophen] Nausea and Vomiting     Other reaction(s): gi distress  nausea  Other reaction(s): anaphylaxis/angioedema  Per pt. She is allergic    Tape [Adhesive] Rash    Tramadol Swelling     Review of Systems:       Constitutional: negative for chills, fever, malaise/fatigue   Skin: negative for itching, rash   HENT: negative for headaches, sore throat   Eyes: negative for blurred vision, double vision   Cardiovascular: negative for chest pain, chest pressure, palpitations, orthopnea, PND, and leg swelling   Respiratory: negative for cough, shortness of breath, and wheezing   Gastointestinal: negative for nausea, vomiting. + abdominal pain   Genitourinary: Negative for dysuria, urinary changes   Musculoskeletal: negative  joint swelling, +for joint pain,   Endo: negative for polydipsia and polyuria   Heme: negative for easy bleeding, easy bruising    Allergies: negative for rhinorrhea, nasal congestion   Neurological: negative for dizziness, focal weakness   Psychiatric:  negative for depression, anxiety     Objective:     VS:    Visit Vitals    LMP 08/20/2013     General:   well-nourished, well-groomed, pleasant, alert, in no acute distress. Head:  Normocephalic, atraumatic, MMM  Ears:  External ears WNL, TMs WNL  Eyes:  EOMI, PERRL  Nose:  External nares WNL  Neck:  Neck supple with normal ROM for age, no thyromegaly  Cardiovasc:   Regular rate and rhythm, no murmurs, no rubs, no gallops  Pulmonary:   Clear breath sounds bilaterally, good air movement, no wheezing, no rales, no rhonchi, normal    respiratory effort  Abdomen:   Abdomen soft, , nondistended, NABS.  + right mid quadrant tenderness to light palpation  Extremities:   No edema, warm and well-perfused  Neuro:   Alert, conversant, appropriate, following commands, no focal deficits  Skin:    No rash or skin changes  Psych:  Affect, mood and judgment appropriate    Labwork and Ancillary Studies     Recent Results (from the past 12 hour(s))   AMB POC HEMOGLOBIN A1C    Collection Time: 05/22/18 10:36 AM   Result Value Ref Range    Hemoglobin A1c (POC) 7.5 %     Lab Results   Component Value Date/Time    WBC 8.5 05/19/2018 05:40 PM    HGB 11.9 (L) 05/19/2018 05:40 PM    HCT 35.3 05/19/2018 05:40 PM    PLATELET 845 70/39/2340 05:40 PM    MCV 91.0 05/19/2018 05:40 PM     Lab Results  Component Value Date/Time   Glucose 197 (H) 05/19/2018 05:40 PM   Microalbumin/Creat ratio (mg/g creat) 11 04/25/2018 10:33 AM   Microalbumin,urine random 1.50 04/25/2018 10:33 AM   LDL, calculated 160 (H) 04/25/2018 10:33 AM   Creatinine 1.08 05/19/2018 05:40 PM      Lab Results  Component Value Date/Time   Cholesterol, total 244 (H) 04/25/2018 10:33 AM   HDL Cholesterol 46 04/25/2018 10:33 AM   LDL, calculated 160 (H) 04/25/2018 10:33 AM   Triglyceride 190 (H) 04/25/2018 10:33 AM   CHOL/HDL Ratio 5.3 (H) 04/25/2018 10:33 AM     Lab Results  Component Value Date/Time   ALT (SGPT) 49 05/19/2018 05:40 PM   AST (SGOT) 36 05/19/2018 05:40 PM   Alk.  phosphatase 190 (H) 05/19/2018 05:40 PM   Bilirubin, direct <0.1 05/19/2018 05:40 PM   Bilirubin, total 0.3 05/19/2018 05:40 PM   Albumin 3.8 05/19/2018 05:40 PM   Protein, total 7.9 05/19/2018 05:40 PM   INR 1.0 05/02/2018 09:45 PM   Prothrombin time 12.2 05/02/2018 09:45 PM   PLATELET 869 58/77/9083 05:40 PM     Lab Results   Component Value Date/Time    INR 1.0 05/02/2018 09:45 PM    INR 1.0 03/28/2018 10:00 PM    INR 1.1 11/06/2016 02:22 PM    Prothrombin time 12.2 05/02/2018 09:45 PM    Prothrombin time 12.9 03/28/2018 10:00 PM    Prothrombin time 13.4 11/06/2016 02:22 PM      Lab Results  Component Value Date/Time   GFR est non-AA 54 (L) 05/19/2018 05:40 PM   GFR est AA >60 05/19/2018 05:40 PM   Creatinine 1.08 05/19/2018 05:40 PM   BUN 18 05/19/2018 05:40 PM   Sodium 138 05/19/2018 05:40 PM Potassium 4.1 05/19/2018 05:40 PM   Chloride 104 05/19/2018 05:40 PM   CO2 26 05/19/2018 05:40 PM     Lab Results  Component Value Date/Time   TSH 1.80 04/25/2018 10:33 AM   T4, Free 1.2 04/25/2018 10:33 AM      Lab Results   Component Value Date/Time    Glucose 197 (H) 05/19/2018 05:40 PM      Lab Results   Component Value Date/Time    CK 19 (L) 01/03/2018 07:45 PM    CK - MB <1.0 01/03/2018 07:45 PM    CK-MB Index  01/03/2018 07:45 PM     CALCULATION NOT PERFORMED WHEN RESULT IS BELOW LINEAR LIMIT    Troponin-I, Qt. <0.02 05/19/2018 08:00 PM       Lab Results   Component Value Date/Time    Sodium 138 05/19/2018 05:40 PM    Potassium 4.1 05/19/2018 05:40 PM    Chloride 104 05/19/2018 05:40 PM    CO2 26 05/19/2018 05:40 PM    Anion gap 8 05/19/2018 05:40 PM    Glucose 197 (H) 05/19/2018 05:40 PM    BUN 18 05/19/2018 05:40 PM    Creatinine 1.08 05/19/2018 05:40 PM    BUN/Creatinine ratio 17 05/19/2018 05:40 PM    GFR est AA >60 05/19/2018 05:40 PM    GFR est non-AA 54 (L) 05/19/2018 05:40 PM    Calcium 8.8 05/19/2018 05:40 PM      Lab Results   Component Value Date/Time    Sodium 138 05/19/2018 05:40 PM    Potassium 4.1 05/19/2018 05:40 PM    Chloride 104 05/19/2018 05:40 PM    CO2 26 05/19/2018 05:40 PM    Anion gap 8 05/19/2018 05:40 PM    Glucose 197 (H) 05/19/2018 05:40 PM    BUN 18 05/19/2018 05:40 PM    Creatinine 1.08 05/19/2018 05:40 PM    BUN/Creatinine ratio 17 05/19/2018 05:40 PM    GFR est AA >60 05/19/2018 05:40 PM    GFR est non-AA 54 (L) 05/19/2018 05:40 PM    Calcium 8.8 05/19/2018 05:40 PM    Bilirubin, total 0.3 05/19/2018 05:40 PM    ALT (SGPT) 49 05/19/2018 05:40 PM    AST (SGOT) 36 05/19/2018 05:40 PM    Alk. phosphatase 190 (H) 05/19/2018 05:40 PM    Protein, total 7.9 05/19/2018 05:40 PM    Albumin 3.8 05/19/2018 05:40 PM    Globulin 4.1 (H) 05/19/2018 05:40 PM    A-G Ratio 0.9 05/19/2018 05:40 PM      Assessment/Plan & Orders:         ICD-10-CM ICD-9-CM    1.  Type 2 diabetes mellitus with complication, with long-term current use of insulin (HCC) E11.8 250.90 AMB POC HEMOGLOBIN A1C    Z79.4 V58.67    2. Pre-operative clearance Z01.818 V72.84    3. Abnormal finding on EKG R94.31 794.31 REFERRAL TO CARDIOLOGY     The patient is at moderate cardiovascular risk; recommend proceeding with plans for surgery after evaluation and clearance from cardiology. Follow-up Disposition:  Return for post op follow up.      Penny CASEY  [facility]  [address] [place], [address]  Phone [phone]  Fax [phone]

## 2018-05-22 NOTE — ED TRIAGE NOTES
Pt arrived via rescue from c/o chronic abdominal pain. Seen multiple times for same complaint. Seen today at [facility].

## 2018-05-22 NOTE — MR AVS SNAPSHOT
[address] 
 
 
 501 [facility] 83 [address] 
[phone] Patient: Veronica Collins MRN: [id number] :1970 Visit Information Date & Time Provider Department Dept. Phone Encounter #  
 2018 10:00 AM Daniela Pollock NP [facility] [phone] [id number] Your Appointments 2018  3:15 PM  
Office Visit with Sandra Montalvo MD  
[facility] ([address] [place] [address]) Appt Note: 2 WK RESULTS REVIEW; OV PT LAST SEEN  
 [address] [place] [address] [address] [address] [phone]  
  
   
 640 [facility] [address] [address]  
  
    
 2018  3:00 PM  
Follow Up with Daniela Pollock NP [facility] ([address]) Appt Note: 3 month f/u appointment [address] [place] [address]  
200 [facility] [address] Upcoming Health Maintenance Date Due  
 EYE EXAM RETINAL OR DILATED Q1 1979 DTaP/Tdap/Td series (1 - Tdap) 1990 PAP AKA CERVICAL CYTOLOGY 1990 FOOT EXAM Q1 2018 Influenza Age 5 to Adult 2018 HEMOGLOBIN A1C Q6M 10/25/2018 MICROALBUMIN Q1 2019 LIPID PANEL Q1 2019 Allergies as of 2018  Review Complete On: 2018 By: Shaheed Valentin LPN Severity Noted Reaction Type Reactions Latex High 2010    Hives, Itching Other Food  07/15/2016    Rash Almonds Amoxicillin High 2013    Swelling Other reaction(s): mild rash/itching Aspirin High 2013    Not Reported This Time, Swelling Mouth swells Fentanyl High 2013   Systemic Anaphylaxis, Swelling Patches cause mouth to swell Swelling in mouth from fentanyl patch Levaquin [Levofloxacin] High 2013    Not Reported This Time, Swelling Other reaction(s): mild rash/itching Chlorhexidine Medium 04/18/2014   Topical Rash Norco [Hydrocodone-acetaminophen] Medium 03/12/2015    Nausea and Vomiting Other reaction(s): gi distress Acetaminophen  07/28/2016    Other (comments) [place]  04/03/2016    Rash, Itching Codeine  11/26/2013    Other (comments) Glycopyrrolate  07/04/2014    Swelling Pt  States  faciAL  SWELLING   POST  ROBINUL  IV Pt  States  faciAL  SWELLING   POST  ROBINUL  IV Morphine  05/31/2016    Nausea and Vomiting Pt states she is not allergic Pcn [Penicillins]  11/26/2013    Swelling Percocet [Oxycodone-acetaminophen]  01/30/2015    Nausea and Vomiting Other reaction(s): gi distress 
nausea Other reaction(s): anaphylaxis/angioedema Per pt. She is allergic Tape [Adhesive]  05/31/2016    Rash Tramadol  12/05/2013    Swelling Current Immunizations  Reviewed on 4/24/2017 Name Date Influenza Vaccine 11/7/2016, 2/24/2016, 3/23/2015, 12/1/2014, 10/9/2013, 10/20/2012 Pneumococcal Polysaccharide (PPSV-23) 3/22/2015, 10/22/2012 Pneumococcal Vaccine (Unspecified Type) 3/4/2016, 1/2/2013 Not reviewed this visit You Were Diagnosed With   
  
 Codes Comments Type 2 diabetes mellitus with complication, with long-term current use of insulin ([facility])    -  Primary ICD-10-CM: E11.8, Z79.4 ICD-9-CM: 250.90, V58.67 Abnormal finding on EKG     ICD-10-CM: R94.31 
ICD-9-CM: 794.31 Vitals BP Pulse Temp Resp Height(growth percentile) Weight(growth percentile) 139/88 (BP 1 Location: Right arm, BP Patient Position: Sitting) 80 97.5 °F (36.4 °C) (Oral) 16 5' 2\" (1.575 m) 161 lb 12.8 oz (73.4 kg) LMP SpO2 BMI OB Status Smoking Status 08/20/2013 98% 29.59 kg/m2 Hysterectomy Never Smoker BMI and BSA Data Body Mass Index Body Surface Area  
 29.59 kg/m 2 1.79 m 2 Preferred Pharmacy Pharmacy Name Phone  [facility] [address] [phone] Your Updated Medication List  
  
   
This list is accurate as of 5/22/18 10:59 AM.  Always use your most recent med list.  
  
  
  
  
 ALBUTEROL IN Take  by inhalation. bacitracin-neomycin-polymyxin b-hydrocortisone 1 % ointment Commonly known as:  CORTISPORIN Apply  to affected area two (2) times a day. Blood-Glucose Meter monitoring kit Use daily to check blood sugar  
  
 cefdinir 300 mg capsule Commonly known as:  OMNICEF Take 1 Cap by mouth two (2) times a day. cholecalciferol 50,000 unit capsule Commonly known as:  VITAMIN D3 Take 1 Cap by mouth every seven (7) days for 12 doses. Indications: Vitamin D Deficiency  
  
 dicyclomine 20 mg tablet Commonly known as:  BENTYL Take 1 Tab by mouth every six (6) hours as needed (abdominal cramps) for up to 20 doses. EPINEPHrine 0.3 mg/0.3 mL injection Commonly known as:  EPIPEN  
0.3 mL by IntraMUSCular route once as needed for up to 1 dose. ferrous sulfate 325 mg (65 mg iron) tablet Take 1 Tab by mouth Daily (before breakfast). fluticasone 50 mcg/actuation nasal spray Commonly known as:  Curtistine Paci Use one to two sprays in each nostril daily for congestion and allergy. Indications: Allergic Rhinitis  
  
 hydrOXYzine HCl 25 mg tablet Commonly known as:  ATARAX Take 1-2 tablets by mouth every 6 hours as needed. ibuprofen 600 mg tablet Commonly known as:  MOTRIN Take 1 Tab by mouth every six (6) hours as needed for Pain. insulin nph-regular human rec 100 unit/mL (70-30) Inpn Commonly known as:  HUMULIN 70-30 Give 30 units in the QAM and 35 units at bedtime  
  
 lidocaine 5 % Commonly known as:  LIDODERM  
2 Patches by TransDERmal route every twenty-four (24) hours. Apply patch to the affected area for 12 hours a day and remove for 12 hours a day. lisinopril-hydroCHLOROthiazide 20-12.5 mg per tablet Commonly known as:  Jovan Sevilla  
 Take 1 Tab by mouth daily. loratadine 10 mg tablet Commonly known as:  Sharcandin Feil Take 1 Tab by mouth daily. Indications: Allergic Rhinitis LORazepam 1 mg tablet Commonly known as:  ATIVAN  
1 mg. mupirocin 2 % ointment Commonly known as:  [employer] Use 1 Application to affected area 3 Times Daily.  
  
 nut.tx.gluc.intol,lac-free,soy Liqd Commonly known as:  200 Exempla Hudgins Take 1 Bottle by mouth three (3) times daily for 30 days. * ondansetron 4 mg disintegrating tablet Commonly known as:  ZOFRAN ODT Take 1-2 tablets every 6-8 hours as needed for nausea and vomiting. * ondansetron 4 mg disintegrating tablet Commonly known as:  ZOFRAN ODT Take 1 Tab by mouth every eight (8) hours as needed for Nausea. polyethylene glycol 17 gram packet Commonly known as:  Dougie Harrison Take 1 Packet by mouth daily. traZODone 300 mg tablet Commonly known as:  Illene Dus Take 1 Tab by mouth nightly. VALTREX 1 gram tablet Generic drug:  valACYclovir Take  by mouth. vit B comp-C-FA-iron-vit E 500 mg-400 mcg- 18 mg iron Tab Commonly known as:  VITAMINS B COMPLEX Take 1 Tab by mouth daily. * Notice: This list has 2 medication(s) that are the same as other medications prescribed for you. Read the directions carefully, and ask your doctor or other care provider to review them with you. We Performed the Following AMB POC HEMOGLOBIN A1C [09882 CPT(R)] REFERRAL TO CARDIOLOGY [YGY44 Custom] Comments:  
 Please evaluate patient for abnormal ekg and acute chest pain. She was seen At the [facility] on 5/19/18. She is scheduled for breast reconstructive surgery. Would like clearance for surgery from Cardiology standpoint. To-Do List   
 06/06/2018 9:00 AM  
  Appointment with [facility] FAST TRACK NURSE at [facility] [address] [place] [address] ([phone]) Referral Information Referral ID Referred By Referred To [id number] Shana Hooker MD   
   [facility] [address] Phone: [phone] Fax: [phone] Visits Status Start Date End Date 1 Authorized 5/22/18 5/22/19 If your referral has a status of pending review or denied, additional information will be sent to support the outcome of this decision. Patient Instructions How to Prepare for Surgery How do you prepare for surgery? Surgery can be stressful. This information will help you understand what you can expect. And it will help you safely prepare for surgery. Follow-up care is a key part of your treatment and safety. Be sure to make and go to all appointments, and call your doctor if you are having problems. It's also a good idea to know your test results and keep a list of the medicines you take. What happens before surgery? ?Preparing for surgery ? · Understand exactly what surgery is planned, along with the risks, benefits, and other options. · Tell your doctors ALL the medicines, vitamins, supplements, and herbal remedies you take. Some of these can increase the risk of bleeding or interact with anesthesia. ? · If you take blood thinners, such as warfarin (Coumadin), clopidogrel (Plavix), or aspirin, be sure to talk to your doctor. He or she will tell you if you should stop taking these medicines before your surgery. Make sure that you understand exactly what your doctor wants you to do.  
? · Your doctor will tell you which medicines to take or stop before your surgery. You may need to stop taking certain medicines a week or more before surgery. So talk to your doctor as soon as you can.  
? · If you have an advance directive, let your doctor know. It may include a living will and a durable power of  for health care. Bring a copy to the hospital. If don't have one, you may want to prepare one.  It lets your doctor and loved ones know your health care wishes. Doctors advise that everyone prepare these papers before any type of surgery or procedure. What happens on the day of surgery? ? · Follow the instructions about when to stop eating and drinking. If you don't, your surgery may be canceled. If your doctor told you to take your medicines on the day of surgery, take them with only a sip of water. ? · Take a bath or shower before coming in for your surgery. Do not apply lotions, perfumes, deodorants, or nail polish. ? · Do not shave the surgical site yourself. ? · Take off all jewelry and piercings. And take out contact lenses, if you wear them. ? At the hospital or surgery center · Bring a picture ID. ? · The area for surgery is often marked to make sure there are no errors. ? · You will be kept comfortable and safe by your anesthesia provider. The anesthesia may make you sleep. Or it may just numb the area being worked on. Going home · Be sure you have someone to drive you home. Anesthesia and pain medicine make it unsafe for you to drive. ? · You will be given more specific instructions about recovering from your surgery. They will cover things like diet, wound care, follow-up care, driving, and getting back to your normal routine. When should you call your doctor? · You have questions or concerns. ? · You don't understand how to prepare for your surgery. ? · You become ill before the surgery (such as fever, flu, or a cold). ? · You need to reschedule or have changed your mind about having the surgery. Where can you learn more? Go to http://imani-jenny.info/. Enter Q270 in the search box to learn more about \"How to Prepare for Surgery. \" Current as of: May 12, 2017 Content Version: 11.4 © 6732-4391 Healthwise, [employer].  Care instructions adapted under license by [employer] (which disclaims liability or warranty for this information). If you have questions about a medical condition or this instruction, always ask your healthcare professional. [address] any warranty or liability for your use of this information. Introducing [facility]! Carlos Moreira introduces [employer] patient portal. Now you can access parts of your medical record, email your doctor's office, and request medication refills online. 1. In your internet browser, go to [email or web address] 2. Click on the First Time User? Click Here link in the Sign In box. You will see the New Member Sign Up page. 3. Enter your [employer] Access Code exactly as it appears below. You will not need to use this code after youve completed the sign-up process. If you do not sign up before the expiration date, you must request a new code. · [employer] Access Code: [email or web address] Expires: 6/5/2018 10:29 AM 
 
4. Enter the last four digits of your Social Security Number (xxxx) and Date of Birth (mm/dd/yyyy) as indicated and click Submit. You will be taken to the next sign-up page. 5. Create a [employer] ID. This will be your [employer] login ID and cannot be changed, so think of one that is secure and easy to remember. 6. Create a [employer] password. You can change your password at any time. 7. Enter your Password Reset Question and Answer. This can be used at a later time if you forget your password. 8. Enter your e-mail address. You will receive e-mail notification when new information is available in [address] 9. Click Sign Up. You can now view and download portions of your medical record. 10. Click the Download Summary menu link to download a portable copy of your medical information. If you have questions, please visit the Frequently Asked Questions section of the [employer] website. Remember, [employer] is NOT to be used for urgent needs. For medical emergencies, dial 911. Now available from your iPhone and Android! Please provide this summary of care documentation to your next provider. Your primary care clinician is listed as Silva Suárez. If you have any questions after today's visit, please call [phone].

## 2018-05-23 LAB
ATRIAL RATE: 78 BPM
CALCULATED P AXIS, ECG09: 51 DEGREES
CALCULATED R AXIS, ECG10: -4 DEGREES
CALCULATED T AXIS, ECG11: 73 DEGREES
DIAGNOSIS, 93000: NORMAL
P-R INTERVAL, ECG05: 166 MS
Q-T INTERVAL, ECG07: 376 MS
QRS DURATION, ECG06: 80 MS
QTC CALCULATION (BEZET), ECG08: 428 MS
VENTRICULAR RATE, ECG03: 78 BPM

## 2018-05-23 RX ORDER — LANCETS
EACH MISCELLANEOUS
Qty: 100 EACH | Refills: 11 | Status: SHIPPED | OUTPATIENT
Start: 2018-05-23 | End: 2018-05-24 | Stop reason: SDUPTHER

## 2018-05-23 NOTE — ED NOTES
I have reviewed discharge instructions with the patient. The patient verbalized understanding. Patient armband removed and shredded. Pt d/c'd to home awake, alert and in NAD. 1 prescription given. All questions answered.

## 2018-05-24 ENCOUNTER — OFFICE VISIT (OUTPATIENT)
Dept: CARDIOLOGY CLINIC | Age: 48
End: 2018-05-24

## 2018-05-24 VITALS
BODY MASS INDEX: 29.44 KG/M2 | DIASTOLIC BLOOD PRESSURE: 118 MMHG | HEART RATE: 114 BPM | HEIGHT: 62 IN | WEIGHT: 160 LBS | SYSTOLIC BLOOD PRESSURE: 176 MMHG | OXYGEN SATURATION: 97 %

## 2018-05-24 DIAGNOSIS — E11.8 TYPE 2 DIABETES MELLITUS WITH COMPLICATION, WITH LONG-TERM CURRENT USE OF INSULIN (HCC): ICD-10-CM

## 2018-05-24 DIAGNOSIS — I11.9 HYPERTENSIVE HEART DISEASE, UNSPECIFIED WHETHER HEART FAILURE PRESENT: ICD-10-CM

## 2018-05-24 DIAGNOSIS — R94.31 ABNORMAL EKG: Primary | ICD-10-CM

## 2018-05-24 DIAGNOSIS — L98.9 SKIN LESIONS: ICD-10-CM

## 2018-05-24 DIAGNOSIS — Z79.4 TYPE 2 DIABETES MELLITUS WITH COMPLICATION, WITH LONG-TERM CURRENT USE OF INSULIN (HCC): ICD-10-CM

## 2018-05-24 RX ORDER — CALCIUM CARBONATE 750 MG/1
TABLET, CHEWABLE ORAL
Qty: 1 KIT | Refills: 0 | Status: SHIPPED | OUTPATIENT
Start: 2018-05-24 | End: 2019-01-10

## 2018-05-24 RX ORDER — CARVEDILOL 3.12 MG/1
3.12 TABLET ORAL 2 TIMES DAILY WITH MEALS
Qty: 60 TAB | Refills: 5 | Status: SHIPPED | OUTPATIENT
Start: 2018-05-24 | End: 2018-09-05 | Stop reason: SDUPTHER

## 2018-05-24 RX ORDER — ATORVASTATIN CALCIUM 40 MG/1
40 TABLET, FILM COATED ORAL DAILY
Qty: 30 TAB | Refills: 6 | Status: SHIPPED | OUTPATIENT
Start: 2018-05-24 | End: 2021-12-02

## 2018-05-24 RX ORDER — ESCITALOPRAM OXALATE 20 MG/1
20 TABLET ORAL DAILY
COMMUNITY
End: 2018-09-05

## 2018-05-24 NOTE — MR AVS SNAPSHOT
Darshan Eduarda 
 
 
 [facility] [address] [facility] [address] 
[phone] Patient: Shelia Delgadillo MRN: [id number] :1970 Visit Information Date & Time Provider Department Dept. Phone Encounter #  
 2018 11:30 AM [facility] [phone] [id number] Follow-up Instructions Return in about 4 weeks (around 2018). Your Appointments 2018  3:00 PM  
Follow Up with Salvador Murphy NP [facility] ([address] [place] [address] Road) Appt Note: 3 month f/u appointment 501 [facility] 83 [address]  
[phone]  
  
   
 [place] [address] [address]  
  
    
 2018  9:30 AM  
Follow Up with Salvador Murphy NP [facility] ([address] [place] [address] Road) Appt Note: 3mos fu  
 501 [facility] 83 [address]  
[phone] Upcoming Health Maintenance Date Due  
 EYE EXAM RETINAL OR DILATED Q1 1979 DTaP/Tdap/Td series (1 - Tdap) 1990 PAP AKA CERVICAL CYTOLOGY 1990 FOOT EXAM Q1 2018 Influenza Age 5 to Adult 2018 HEMOGLOBIN A1C Q6M 2018 MICROALBUMIN Q1 2019 LIPID PANEL Q1 2019 Allergies as of 2018  Review Complete On: 2018 By: Madhavi Linares RN Severity Noted Reaction Type Reactions Latex High 2010    Hives, Itching Other Food  07/15/2016    Rash Almonds Amoxicillin High 2013    Swelling Other reaction(s): mild rash/itching Aspirin High 2013    Not Reported This Time, Swelling Mouth swells Fentanyl High 2013   Systemic Anaphylaxis, Swelling Patches cause mouth to swell Swelling in mouth from fentanyl patch Levaquin [Levofloxacin] High 2013    Not Reported This Time, Swelling Other reaction(s): mild rash/itching Chlorhexidine Medium 2014   Topical Rash Norco [Hydrocodone-acetaminophen] Medium 03/12/2015    Nausea and Vomiting Other reaction(s): gi distress Acetaminophen  07/28/2016    Other (comments) [place]  04/03/2016    Rash, Itching Codeine  11/26/2013    Other (comments) Glycopyrrolate  07/04/2014    Swelling Pt  States  faciAL  SWELLING   POST  ROBINUL  IV Pt  States  faciAL  SWELLING   POST  ROBINUL  IV Morphine  05/31/2016    Nausea and Vomiting Pt states she is not allergic Pcn [Penicillins]  11/26/2013    Swelling Percocet [Oxycodone-acetaminophen]  01/30/2015    Nausea and Vomiting Other reaction(s): gi distress 
nausea Other reaction(s): anaphylaxis/angioedema Per pt. She is allergic Tape [Adhesive]  05/31/2016    Rash Tramadol  12/05/2013    Swelling Current Immunizations  Reviewed on 4/24/2017 Name Date Influenza Vaccine 11/7/2016, 2/24/2016, 3/23/2015, 12/1/2014, 10/9/2013, 10/20/2012 Pneumococcal Polysaccharide (PPSV-23) 3/22/2015, 10/22/2012 Pneumococcal Vaccine (Unspecified Type) 3/4/2016, 1/2/2013 Not reviewed this visit You Were Diagnosed With   
  
 Codes Comments Abnormal EKG    -  Primary ICD-10-CM: R94.31 
ICD-9-CM: 794.31 Hypertensive heart disease, unspecified whether heart failure present     ICD-10-CM: I11.9 ICD-9-CM: 402.90 Vitals BP Pulse Height(growth percentile) Weight(growth percentile) LMP SpO2  
 (!) 176/118 (!) 114 5' 2\" (1.575 m) 160 lb (72.6 kg) 08/20/2013 97% BMI OB Status Smoking Status 29.26 kg/m2 Hysterectomy Never Smoker Vitals History BMI and BSA Data Body Mass Index Body Surface Area  
 29.26 kg/m 2 1.78 m 2 Preferred Pharmacy Pharmacy Name Phone [facility] [phone] Your Updated Medication List  
  
   
This list is accurate as of 5/24/18 11:45 AM.  Always use your most recent med list.  
  
  
  
  
 ALBUTEROL IN Take  by inhalation. bacitracin-neomycin-polymyxin b-hydrocortisone 1 % ointment Commonly known as:  CORTISPORIN Apply  to affected area two (2) times a day. bisacodyl 10 mg suppository Commonly known as:  DULCOLAX (BISACODYL) Insert 10 mg into rectum daily as needed. Blood-Glucose Meter monitoring kit Use daily to check blood sugar  
  
 cefdinir 300 mg capsule Commonly known as:  OMNICEF Take 1 Cap by mouth two (2) times a day. cholecalciferol 50,000 unit capsule Commonly known as:  VITAMIN D3 Take 1 Cap by mouth every seven (7) days for 12 doses. Indications: Vitamin D Deficiency  
  
 dicyclomine 20 mg tablet Commonly known as:  BENTYL Take 1 Tab by mouth every six (6) hours as needed (abdominal cramps) for up to 20 doses. EPINEPHrine 0.3 mg/0.3 mL injection Commonly known as:  EPIPEN  
0.3 mL by IntraMUSCular route once as needed for up to 1 dose. ferrous sulfate 325 mg (65 mg iron) tablet Take 1 Tab by mouth Daily (before breakfast). fluticasone 50 mcg/actuation nasal spray Commonly known as:  Shaista Hoot Use one to two sprays in each nostril daily for congestion and allergy. Indications: Allergic Rhinitis  
  
 glucose blood VI test strips strip Commonly known as:  ASCENSIA AUTODISC VI, ONE TOUCH ULTRA TEST VI  
Use daily to monitor blood glucose  
  
 hydrOXYzine HCl 25 mg tablet Commonly known as:  ATARAX Take 1-2 tablets by mouth every 6 hours as needed. ibuprofen 600 mg tablet Commonly known as:  MOTRIN Take 1 Tab by mouth every six (6) hours as needed for Pain. INDOCIN PO Take  by mouth. insulin nph-regular human rec 100 unit/mL (70-30) Inpn Commonly known as:  HUMULIN 70-30 Give 30 units in the QAM and 35 units at bedtime Lancets Misc Use daily to monitor blood glucose LEXAPRO 20 mg tablet Generic drug:  escitalopram oxalate Take 20 mg by mouth daily. lidocaine 5 % Commonly known as:  LIDODERM  
2 Patches by TransDERmal route every twenty-four (24) hours. Apply patch to the affected area for 12 hours a day and remove for 12 hours a day. lisinopril-hydroCHLOROthiazide 20-12.5 mg per tablet Commonly known as:  Stephanie Rattler Take 1 Tab by mouth daily. loratadine 10 mg tablet Commonly known as:  Delaeny Daub Take 1 Tab by mouth daily. Indications: Allergic Rhinitis LORazepam 1 mg tablet Commonly known as:  ATIVAN  
1 mg. mupirocin 2 % ointment Commonly known as:  [employer] Use 1 Application to affected area 3 Times Daily.  
  
 nut.tx.gluc.intol,lac-free,soy Liqd Commonly known as:  200 [employer] [place] Take 1 Bottle by mouth three (3) times daily for 30 days. * ondansetron 4 mg disintegrating tablet Commonly known as:  ZOFRAN ODT Take 1-2 tablets every 6-8 hours as needed for nausea and vomiting. * ondansetron 4 mg disintegrating tablet Commonly known as:  ZOFRAN ODT Take 1 Tab by mouth every eight (8) hours as needed for Nausea. polyethylene glycol 17 gram packet Commonly known as:  Angelique Cancel Take 1 Packet by mouth daily. traZODone 300 mg tablet Commonly known as:  Dominga Miya Take 1 Tab by mouth nightly. VALTREX 1 gram tablet Generic drug:  valACYclovir Take  by mouth. vit B comp-C-FA-iron-vit E 500 mg-400 mcg- 18 mg iron Tab Commonly known as:  VITAMINS B COMPLEX Take 1 Tab by mouth daily. * Notice: This list has 2 medication(s) that are the same as other medications prescribed for you. Read the directions carefully, and ask your doctor or other care provider to review them with you. Follow-up Instructions Return in about 4 weeks (around 6/21/2018). To-Do List   
 06/06/2018 9:00 AM  
  Appointment with [facility] FAST TRACK NURSE at [facility] [address] [place] [address] ([phone]) Patient Instructions Start Coreg 3.125mg Twice per day Start Lipitor 40mg Daily Wyradha Slater will call to schedule echo - Follow up for results in 3-4 weeks - if echo ok then you will be cleared for surgery [phone] Joe Hardwick - call for results Introducing [facility]! [facility] introduces [employer] patient portal. Now you can access parts of your medical record, email your doctor's office, and request medication refills online. 1. In your internet browser, go to [email or web address] 2. Click on the First Time User? Click Here link in the Sign In box. You will see the New Member Sign Up page. 3. Enter your [employer] Access Code exactly as it appears below. You will not need to use this code after youve completed the sign-up process. If you do not sign up before the expiration date, you must request a new code. · [employer] Access Code: [email or web address] Expires: 6/5/2018 10:29 AM 
 
4. Enter the last four digits of your Social Security Number (xxxx) and Date of Birth (mm/dd/yyyy) as indicated and click Submit. You will be taken to the next sign-up page. 5. Create a [employer] ID. This will be your [employer] login ID and cannot be changed, so think of one that is secure and easy to remember. 6. Create a [employer] password. You can change your password at any time. 7. Enter your Password Reset Question and Answer. This can be used at a later time if you forget your password. 8. Enter your e-mail address. You will receive e-mail notification when new information is available in [address] 9. Click Sign Up. You can now view and download portions of your medical record. 10. Click the Download Summary menu link to download a portable copy of your medical information. If you have questions, please visit the Frequently Asked Questions section of the [employer] website. Remember, [employer] is NOT to be used for urgent needs. For medical emergencies, dial 911. Now available from your iPhone and Android! Please provide this summary of care documentation to your next provider. Your primary care clinician is listed as Salvador Murphy. If you have any questions after today's visit, please call [phone].

## 2018-05-24 NOTE — PATIENT INSTRUCTIONS
Start Coreg 3.125mg Twice per day   Start Lipitor 40mg Daily     Conchita Wilkes will call to schedule echo -     Follow up for results in 3-4 weeks - if echo ok then you will be cleared for surgery [phone] Junior - call for results

## 2018-05-24 NOTE — PROGRESS NOTES
[facility]    Ms. Valentina Herbert is a 49year old female with history of breast cancer, status post mastectomy, chemotherapy and radiation therapy, diabetes, remote history of alcohol abuse and drug abuse, as well as hypertension and hyperlipidemia. Ms. Valentina Herbert was asked to come see me for abnormal EKG and cardiac evaluation. Ms. Valentina Herbert denies any prior history of myocardial infarction or congestive heart failure. She did have a history of breast cancer approximately five years ago, for which she underwent double mastectomy, along with chemo and radiation therapy. Since then she's been doing okay. She is supposed to undergo breast reconstruction surgery in a few months. She denies any chest pain or chest tightness. She recently went to the ED with some abdominal discomfort. EKG was performed. According to chart, it seems like she was told she had abnormal EKG so she was asked to come see me. Ms. Valentina Herbert denies any chest pain or chest tightness with exertion. She does have some mild dyspnea on moderate exertion, however she's had it for a long period of time. She denies PND or lower extremity swelling. She denies any palpitations, presyncope or syncope. She is able to walk around [facility] and can walk a few blocks without any symptoms. Denies any nausea, vomiting, abdominal pain, fever, chills, sputum production.  No hematuria or other bleeding complaints    Past Medical History:   Diagnosis Date    Breast cancer ([facility] [address]     breast cancer, right, S/P Mastectomy and chemo, radiation in 2013    Depression     Diabetes ([facility] [address]     Drug abuse     H/O cocaine use in past    Gastrointestinal disorder     cholitis    H/O ETOH abuse     Hyperlipidemia     Hypertension     Spine injury     Vitamin D deficiency 5/1/2018         Past Surgical History:   Procedure Laterality Date    COLONOSCOPY N/A 7/18/2016    COLONOSCOPY performed by Irving Brady MD at [facility] ENDOSCOPY    HX BREAST LUMPECTOMY  06/2013    right    HX HYSTERECTOMY      HX ORTHOPAEDIC      Back fusion surgery 4/18/14.  HX OTHER SURGICAL      mediport    HX TONSILLECTOMY      HX TUBAL LIGATION         Current Outpatient Prescriptions   Medication Sig    indomethacin (INDOCIN PO) Take  by mouth.  escitalopram oxalate (LEXAPRO) 20 mg tablet Take 20 mg by mouth daily.  bacitracin-neomycin-polymyxin b-hydrocortisone (CORTISPORIN) 1 % ointment Apply  to affected area two (2) times a day.  glucose blood VI test strips (ASCENSIA AUTODISC VI, ONE TOUCH ULTRA TEST VI) strip Use daily to monitor blood glucose    Lancets misc Use daily to monitor blood glucose    bisacodyl (DULCOLAX, BISACODYL,) 10 mg suppository Insert 10 mg into rectum daily as needed.  lidocaine (LIDODERM) 5 % 2 Patches by TransDERmal route every twenty-four (24) hours. Apply patch to the affected area for 12 hours a day and remove for 12 hours a day.  hydrOXYzine HCl (ATARAX) 25 mg tablet Take 1-2 tablets by mouth every 6 hours as needed.  cholecalciferol (VITAMIN D3) 50,000 unit capsule Take 1 Cap by mouth every seven (7) days for 12 doses. Indications: Vitamin D Deficiency    insulin nph-regular human rec (HUMULIN 70-30) 100 unit/mL (70-30) inpn Give 30 units in the QAM and 35 units at bedtime    mupirocin (BACTROBAN) 2 % ointment Use 1 Application to affected area 3 Times Daily.  valACYclovir (VALTREX) 1 gram tablet Take  by mouth.  ferrous sulfate 325 mg (65 mg iron) tablet Take 1 Tab by mouth Daily (before breakfast).  nut.tx.gluc.intol,lac-free,soy (GLUCERNA THERAPEUTIC NUTRITION) liqd Take 1 Bottle by mouth three (3) times daily for 30 days.  Blood-Glucose Meter monitoring kit Use daily to check blood sugar    vit B comp-C-FA-iron-vit E (VITAMINS B COMPLEX) 500 mg-400 mcg- 18 mg iron tab Take 1 Tab by mouth daily.     fluticasone (FLONASE) 50 mcg/actuation nasal spray Use one to two sprays in each nostril daily for congestion and allergy. Indications: Allergic Rhinitis    loratadine (CLARITIN) 10 mg tablet Take 1 Tab by mouth daily. Indications: Allergic Rhinitis    ondansetron (ZOFRAN ODT) 4 mg disintegrating tablet Take 1 Tab by mouth every eight (8) hours as needed for Nausea.  dicyclomine (BENTYL) 20 mg tablet Take 1 Tab by mouth every six (6) hours as needed (abdominal cramps) for up to 20 doses.  ondansetron (ZOFRAN ODT) 4 mg disintegrating tablet Take 1-2 tablets every 6-8 hours as needed for nausea and vomiting.  ibuprofen (MOTRIN) 600 mg tablet Take 1 Tab by mouth every six (6) hours as needed for Pain.  cefdinir (OMNICEF) 300 mg capsule Take 1 Cap by mouth two (2) times a day.  traZODone (DESYREL) 300 mg tablet Take 1 Tab by mouth nightly.  lisinopril-hydroCHLOROthiazide (PRINZIDE, ZESTORETIC) 20-12.5 mg per tablet Take 1 Tab by mouth daily.  polyethylene glycol (MIRALAX) 17 gram packet Take 1 Packet by mouth daily.  ALBUTEROL IN Take  by inhalation.  LORazepam (ATIVAN) 1 mg tablet 1 mg.  EPINEPHrine (EPIPEN) 0.3 mg/0.3 mL (1:1,000) injection 0.3 mL by IntraMUSCular route once as needed for up to 1 dose. No current facility-administered medications for this visit.         Allergies and Sensitivities:  Allergies   Allergen Reactions    Latex Hives and Itching    Other Food Rash     Almonds    Amoxicillin Swelling     Other reaction(s): mild rash/itching    Aspirin Not Reported This Time and Swelling     Mouth swells    Fentanyl Anaphylaxis and Swelling     Patches cause mouth to swell  Swelling in mouth from fentanyl patch    Levaquin [Levofloxacin] Not Reported This Time and Swelling     Other reaction(s): mild rash/itching    Chlorhexidine Rash    Norco [Hydrocodone-Acetaminophen] Nausea and Vomiting     Other reaction(s): gi distress    Acetaminophen Other (comments)    [place] Rash and Itching    Codeine Other (comments)    Glycopyrrolate Swelling     Pt  States  faciAL  SWELLING   POST  ROBINUL  IV   Pt  States  faciAL  SWELLING   POST  ROBINUL  IV     Morphine Nausea and Vomiting     Pt states she is not allergic    Pcn [Penicillins] Swelling    Percocet [Oxycodone-Acetaminophen] Nausea and Vomiting     Other reaction(s): gi distress  nausea  Other reaction(s): anaphylaxis/angioedema  Per pt. She is allergic    Tape [Adhesive] Rash    Tramadol Swelling       Family History:  Family History   Problem Relation Age of Onset    Heart Disease Mother     Stroke Father     Heart Disease Father 48    Diabetes Other     Hypertension Other     Cancer Maternal Grandmother        Social History:  Social History   Substance Use Topics    Smoking status: Never Smoker    Smokeless tobacco: Never Used    Alcohol use No      Comment: \"Occasionally\"     She  reports that she has never smoked. She has never used smokeless tobacco.  She  reports that she does not drink alcohol. Review of Systems:  Cardiac symptoms as noted above in HPI. All others negative. Denies fatigue, malaise, skin rash, joint pain, blurring vision, photophobia, neck pain, hemoptysis, chronic cough, nausea, vomiting, hematuria, burning micturition, BRBPR, chronic headaches. Physical Exam:  BP Readings from Last 3 Encounters:   05/24/18 (!) 176/118   05/22/18 132/86   05/22/18 139/88         Pulse Readings from Last 3 Encounters:   05/24/18 (!) 114   05/22/18 84   05/22/18 80          Wt Readings from Last 3 Encounters:   05/24/18 160 lb (72.6 kg)   05/22/18 161 lb (73 kg)   05/22/18 161 lb 12.8 oz (73.4 kg)       Constitutional: Oriented to person, place, and time. HENT: Head: Normocephalic and atraumatic. Neck: No JVD present. Carotid bruit is not appreciated. Cardiovascular: Regular rhythm. No murmur, gallop or rubs appreciated  Lung: Breath sounds normal. No respiratory distress.  No ronchi or rales appreciated  Abdominal: No tenderness. No rebound and no guarding. Musculoskeletal: There is no lower extremity edema. No cynosis  Lymphadenopathy:  No cervical or supraclavicular adenopathy appriciated. Neurological: No gross motor deficit noted. Skin: No visible skin rash noted. No Ear discharge noted  Psychiatric: Normal mood and affect. Good distal pulse    Review of Data  LABS:   Lab Results   Component Value Date/Time    Sodium 138 05/22/2018 07:50 PM    Potassium 3.8 05/22/2018 07:50 PM    Chloride 102 05/22/2018 07:50 PM    CO2 27 05/22/2018 07:50 PM    Glucose 200 (H) 05/22/2018 07:50 PM    BUN 17 05/22/2018 07:50 PM    Creatinine 1.06 05/22/2018 07:50 PM     Lipids Latest Ref Rng & Units 4/25/2018 1/23/2017   Chol, Total <200 MG/(H) 220(H)   HDL 40 - 60 MG/DL 46 48   LDL 0 - 100 MG/(H) 144(H)   Trig <150 MG/(H) 140   Chol/HDL Ratio 0 - 5.0   5.3(H) -   Some recent data might be hidden     Lab Results   Component Value Date/Time    ALT (SGPT) 60 (H) 05/22/2018 07:50 PM     Lab Results   Component Value Date/Time    Hemoglobin A1c 7.6 (H) 04/25/2018 10:33 AM    Hemoglobin A1c (POC) 7.5 05/22/2018 10:36 AM       EKG  (05/18) Sinus rhythm. NO ST-T changes of ischemia. ECHO    IMPRESSION & PLAN:  Ms. Elena Little is a 49year old female with multiple medical problems. Hypertension:  Ms. Elena Little' blood pressure today is 180/102 mmHg. Repeat blood pressure was 176/110 mmHg. Currently she is anxious because of this first time visit to the cardiologist, however she also says that blood pressure usually runs high. She's on Lisinopril and Hydrochlorothiazide. Because of her hypertension, I'm going to start her on Coreg 3.125 mg twice daily. Her blood pressure will be checked again in two weeks.   An echocardiogram will be ordered to rule out hypertensive cardiovascular heart disease, especially after receiving chemotherapy for breast cancer and also history of alcohol and drug abuse in the past to rule out any LV dysfunction. Hyperlipidemia:  Last lipid profile was in April, 2018 with LDL level of 160. Currently she is not on any lipid lowering agent. Because of her underlying diabetes and also elevated LDL, I'm going to start her on Atorvastatin 40 mg daily. Side effects of the medication were discussed with the patient. Breast cancer:  She had a double mastectomy, radiation and chemotherapy five years ago. She said she's cancer free. She has reconstruction surgery coming up. I'm going to defer this management to heme/onc team and PCP. Preoperative evaluation:  Ms. Guerrero Mac is supposed to undergo breast reconstruction surgery after her double mastectomy in the past.  Currently she does not have any chest pain or chest tightness to be concerned of angina. Her EKG does not show any ST changes of ischemia. She has no fluid overload on exam, however because of her history of alcohol abuse, drug abuse, as well as hypertensive cardiovascular heart disease and use of chemotherapy for breast cancer, I want to make sure she does not have any LV dysfunction. An echocardiogram has been ordered. We will make further recommendations based on echocardiogram findings. Coreg has been started. This plan was discussed with patient who is in agreement. Thank you for allowing me to participate in patient care. Please feel free to call me if you have any question or concern. Rosa Triplett MD  Please note: This document has been produced using voice recognition software. Unrecognized errors in transcription may be present.

## 2018-05-25 RX ORDER — LANCETS
EACH MISCELLANEOUS
Qty: 100 EACH | Refills: 11 | Status: SHIPPED | OUTPATIENT
Start: 2018-05-25 | End: 2019-01-10

## 2018-05-30 RX ORDER — PEN NEEDLE, DIABETIC 30 GX3/16"
NEEDLE, DISPOSABLE MISCELLANEOUS
Qty: 1 PACKAGE | Refills: 11 | Status: SHIPPED | OUTPATIENT
Start: 2018-05-30

## 2018-06-01 ENCOUNTER — HOSPITAL ENCOUNTER (OUTPATIENT)
Dept: NON INVASIVE DIAGNOSTICS | Age: 48
Discharge: HOME | End: 2018-06-01
Attending: INTERNAL MEDICINE
Payer: MEDICAID

## 2018-06-01 DIAGNOSIS — R94.31 ABNORMAL EKG: ICD-10-CM

## 2018-06-01 DIAGNOSIS — I11.9 HYPERTENSIVE HEART DISEASE, UNSPECIFIED WHETHER HEART FAILURE PRESENT: ICD-10-CM

## 2018-06-01 PROCEDURE — 93306 TTE W/DOPPLER COMPLETE: CPT

## 2018-06-04 ENCOUNTER — TELEPHONE (OUTPATIENT)
Dept: CARDIOLOGY CLINIC | Age: 48
End: 2018-06-04

## 2018-06-04 NOTE — TELEPHONE ENCOUNTER
Verbal order and read back per Vinny Addison MD    Echo is ok, patient may proceed with upcoming surgery. Patient aware, ok for surgery.

## 2018-06-12 ENCOUNTER — HOSPITAL ENCOUNTER (EMERGENCY)
Age: 48
Discharge: HOME | End: 2018-06-12
Attending: EMERGENCY MEDICINE
Payer: MEDICAID

## 2018-06-12 VITALS
DIASTOLIC BLOOD PRESSURE: 72 MMHG | OXYGEN SATURATION: 100 % | HEIGHT: 62 IN | WEIGHT: 161 LBS | RESPIRATION RATE: 18 BRPM | HEART RATE: 112 BPM | TEMPERATURE: 98.1 F | SYSTOLIC BLOOD PRESSURE: 123 MMHG | BODY MASS INDEX: 29.63 KG/M2

## 2018-06-12 DIAGNOSIS — G89.18 ACUTE POSTOPERATIVE PAIN: Primary | ICD-10-CM

## 2018-06-12 LAB
ALBUMIN SERPL-MCNC: 3.5 G/DL (ref 3.4–5)
ALBUMIN/GLOB SERPL: 0.9 {RATIO} (ref 0.8–1.7)
ALP SERPL-CCNC: 118 U/L (ref 45–117)
ALT SERPL-CCNC: 50 U/L (ref 13–56)
ANION GAP SERPL CALC-SCNC: 7 MMOL/L (ref 3–18)
AST SERPL-CCNC: 25 U/L (ref 15–37)
BASOPHILS # BLD: 0 K/UL (ref 0–0.06)
BASOPHILS NFR BLD: 0 % (ref 0–2)
BILIRUB SERPL-MCNC: 0.5 MG/DL (ref 0.2–1)
BUN SERPL-MCNC: 20 MG/DL (ref 7–18)
BUN/CREAT SERPL: 16 (ref 12–20)
CALCIUM SERPL-MCNC: 9.3 MG/DL (ref 8.5–10.1)
CHLORIDE SERPL-SCNC: 103 MMOL/L (ref 100–108)
CO2 SERPL-SCNC: 29 MMOL/L (ref 21–32)
CREAT SERPL-MCNC: 1.23 MG/DL (ref 0.6–1.3)
DIFFERENTIAL METHOD BLD: ABNORMAL
EOSINOPHIL # BLD: 0.2 K/UL (ref 0–0.4)
EOSINOPHIL NFR BLD: 2 % (ref 0–5)
ERYTHROCYTE [DISTWIDTH] IN BLOOD BY AUTOMATED COUNT: 13.3 % (ref 11.6–14.5)
GLOBULIN SER CALC-MCNC: 4.1 G/DL (ref 2–4)
GLUCOSE SERPL-MCNC: 234 MG/DL (ref 74–99)
HCT VFR BLD AUTO: 33.8 % (ref 35–45)
HGB BLD-MCNC: 11.2 G/DL (ref 12–16)
LYMPHOCYTES # BLD: 2.9 K/UL (ref 0.9–3.6)
LYMPHOCYTES NFR BLD: 29 % (ref 21–52)
MCH RBC QN AUTO: 30.7 PG (ref 24–34)
MCHC RBC AUTO-ENTMCNC: 33.1 G/DL (ref 31–37)
MCV RBC AUTO: 92.6 FL (ref 74–97)
MONOCYTES # BLD: 0.6 K/UL (ref 0.05–1.2)
MONOCYTES NFR BLD: 6 % (ref 3–10)
NEUTS SEG # BLD: 6.2 K/UL (ref 1.8–8)
NEUTS SEG NFR BLD: 63 % (ref 40–73)
PLATELET # BLD AUTO: 278 K/UL (ref 135–420)
PMV BLD AUTO: 10.4 FL (ref 9.2–11.8)
POTASSIUM SERPL-SCNC: 3.5 MMOL/L (ref 3.5–5.5)
PROT SERPL-MCNC: 7.6 G/DL (ref 6.4–8.2)
RBC # BLD AUTO: 3.65 M/UL (ref 4.2–5.3)
SODIUM SERPL-SCNC: 139 MMOL/L (ref 136–145)
WBC # BLD AUTO: 10 K/UL (ref 4.6–13.2)

## 2018-06-12 PROCEDURE — 99284 EMERGENCY DEPT VISIT MOD MDM: CPT

## 2018-06-12 PROCEDURE — 74011250636 HC RX REV CODE- 250/636: Performed by: EMERGENCY MEDICINE

## 2018-06-12 PROCEDURE — 96374 THER/PROPH/DIAG INJ IV PUSH: CPT

## 2018-06-12 PROCEDURE — 96375 TX/PRO/DX INJ NEW DRUG ADDON: CPT

## 2018-06-12 PROCEDURE — 80053 COMPREHEN METABOLIC PANEL: CPT | Performed by: EMERGENCY MEDICINE

## 2018-06-12 PROCEDURE — 85025 COMPLETE CBC W/AUTO DIFF WBC: CPT | Performed by: EMERGENCY MEDICINE

## 2018-06-12 PROCEDURE — 96361 HYDRATE IV INFUSION ADD-ON: CPT

## 2018-06-12 PROCEDURE — 96376 TX/PRO/DX INJ SAME DRUG ADON: CPT

## 2018-06-12 RX ORDER — NALBUPHINE HYDROCHLORIDE 10 MG/ML
5 INJECTION, SOLUTION INTRAMUSCULAR; INTRAVENOUS; SUBCUTANEOUS
Status: COMPLETED | OUTPATIENT
Start: 2018-06-12 | End: 2018-06-12

## 2018-06-12 RX ORDER — KETOROLAC TROMETHAMINE 15 MG/ML
15 INJECTION, SOLUTION INTRAMUSCULAR; INTRAVENOUS
Status: COMPLETED | OUTPATIENT
Start: 2018-06-12 | End: 2018-06-12

## 2018-06-12 RX ORDER — NALBUPHINE HYDROCHLORIDE 10 MG/ML
10 INJECTION, SOLUTION INTRAMUSCULAR; INTRAVENOUS; SUBCUTANEOUS
Status: DISCONTINUED | OUTPATIENT
Start: 2018-06-12 | End: 2018-06-12

## 2018-06-12 RX ORDER — ONDANSETRON 2 MG/ML
4 INJECTION INTRAMUSCULAR; INTRAVENOUS
Status: COMPLETED | OUTPATIENT
Start: 2018-06-12 | End: 2018-06-12

## 2018-06-12 RX ADMIN — NALBUPHINE HYDROCHLORIDE 5 MG: 10 INJECTION, SOLUTION INTRAMUSCULAR; INTRAVENOUS; SUBCUTANEOUS at 01:48

## 2018-06-12 RX ADMIN — SODIUM CHLORIDE 1000 ML: 900 INJECTION, SOLUTION INTRAVENOUS at 00:50

## 2018-06-12 RX ADMIN — ONDANSETRON 4 MG: 2 INJECTION INTRAMUSCULAR; INTRAVENOUS at 00:47

## 2018-06-12 RX ADMIN — NALBUPHINE HYDROCHLORIDE 5 MG: 10 INJECTION, SOLUTION INTRAMUSCULAR; INTRAVENOUS; SUBCUTANEOUS at 00:54

## 2018-06-12 RX ADMIN — KETOROLAC TROMETHAMINE 15 MG: 15 INJECTION, SOLUTION INTRAMUSCULAR; INTRAVENOUS at 00:50

## 2018-06-12 NOTE — ED PROVIDER NOTES
HPI Comments: Joseph Forbes is a 49 y.o. Female who had bilateral breast reconstruction done this past Thursday at [facility] and was not sent home with pain meds other than motrin with c/o severe pain on left breast with nv. No fever. Pt is supposed to be pain management but there is reportedly some issue with this. Last narcotic rx was filled for 21 dilaudid tabs on 5/28. Pain is constant, sharp worse with movement. Not relieved with motrin. No fever, hemoptysis  Pt saw her surgeon yesterday with drains checked with no issues  , dressing checked. Has f/u again this Friday  Pt claims she was given rx for dilaudid but this is not documented anywhere in her d/c intructions nor meds upon discharge; she states it was a hand written rx    The history is provided by the patient and medical records. Past Medical History:   Diagnosis Date    Breast cancer [facility])     breast cancer, right, S/P Mastectomy and chemo, radiation in 2013    Depression     Diabetes ([facility] [address]     Drug abuse     H/O cocaine use in past    Gastrointestinal disorder     cholitis    H/O ETOH abuse     Hyperlipidemia     Hypertension     Spine injury     Vitamin D deficiency 5/1/2018       Past Surgical History:   Procedure Laterality Date    COLONOSCOPY N/A 7/18/2016    COLONOSCOPY performed by Wolfgang Corrales MD at [facility] ENDOSCOPY    HX BREAST LUMPECTOMY  06/2013    right    HX HYSTERECTOMY      HX ORTHOPAEDIC      Back fusion surgery 4/18/14.  HX OTHER SURGICAL      mediport    HX TONSILLECTOMY      HX TUBAL LIGATION           Family History:   Problem Relation Age of Onset    Heart Disease Mother     Stroke Father     Heart Disease Father 48    Diabetes Other     Hypertension Other     Cancer Maternal Grandmother        Social History     Social History    Marital status:      Spouse name: N/A    Number of children: N/A    Years of education: N/A     Occupational History    Not on file.      Social History Main Topics    Smoking status: Never Smoker    Smokeless tobacco: Never Used    Alcohol use No      Comment: \"Occasionally\"    Drug use: No    Sexual activity: No     Other Topics Concern    Not on file     Social History Narrative    ** Merged History Encounter **              ALLERGIES: Latex; Other food; Amoxicillin; Aspirin; Fentanyl; Levaquin [levofloxacin]; Chlorhexidine; Norco [hydrocodone-acetaminophen]; Acetaminophen; [place]; Codeine; Glycopyrrolate; Morphine; Pcn [penicillins]; Percocet [oxycodone-acetaminophen]; Tape [adhesive]; and Tramadol    Review of Systems   Constitutional: Positive for activity change and appetite change. HENT: Negative for sore throat and trouble swallowing. Eyes: Negative for visual disturbance. Respiratory: Negative for cough. Cardiovascular: Positive for chest pain. Gastrointestinal: Positive for abdominal pain. Endocrine: Negative for polyuria. Genitourinary: Negative for difficulty urinating. Musculoskeletal: Positive for gait problem. Skin: Positive for wound. Allergic/Immunologic: Negative for immunocompromised state. Neurological: Negative for syncope. Psychiatric/Behavioral: Positive for sleep disturbance. Vitals:    06/12/18 0008   BP: 118/79   Pulse: (!) 112   Resp: 18   Temp: 98.1 °F (36.7 °C)   SpO2: 100%   Weight: 73 kg (161 lb)   Height: 5' 2\" (1.575 m)            Physical Exam   Constitutional: She is oriented to person, place, and time. She appears well-developed and well-nourished. She appears distressed (appears in pain. not ill). HENT:   Head: Normocephalic and atraumatic. Right Ear: External ear normal.   Left Ear: External ear normal.   Nose: Nose normal.   Mouth/Throat: Uvula is midline, oropharynx is clear and moist and mucous membranes are normal.   Eyes: Conjunctivae are normal. No scleral icterus. Neck: Neck supple. Cardiovascular: Regular rhythm, normal heart sounds and intact distal pulses. Tachycardia present. Pulmonary/Chest: Effort normal and breath sounds normal.   Abdominal: Soft. There is no tenderness. Musculoskeletal: She exhibits no edema. Neurological: She is alert and oriented to person, place, and time. Gait normal.   Skin: Skin is warm and dry. She is not diaphoretic. Psychiatric: Her behavior is normal.   Nursing note and vitals reviewed. [facility]      ED Course       Procedures  Vitals:  Patient Vitals for the past 12 hrs:   Temp Pulse Resp BP SpO2   06/12/18 0008 98.1 °F (36.7 °C) (!) 112 18 118/79 100 %         Medications ordered:   Medications   sodium chloride 0.9 % bolus infusion 1,000 mL (1,000 mL IntraVENous New Bag 6/12/18 0050)   ketorolac (TORADOL) injection 15 mg (15 mg IntraVENous Given 6/12/18 0050)   ondansetron (ZOFRAN) injection 4 mg (4 mg IntraVENous Given 6/12/18 0047)   nalbuphine (NUBAIN) injection 5 mg (5 mg IntraVENous Given 6/12/18 0054)         Lab findings:  Recent Results (from the past 12 hour(s))   CBC WITH AUTOMATED DIFF    Collection Time: 06/12/18 12:56 AM   Result Value Ref Range    WBC 10.0 4.6 - 13.2 K/uL    RBC 3.65 (L) 4.20 - 5.30 M/uL    HGB 11.2 (L) 12.0 - 16.0 g/dL    HCT 33.8 (L) 35.0 - 45.0 %    MCV 92.6 74.0 - 97.0 FL    MCH 30.7 24.0 - 34.0 PG    MCHC 33.1 31.0 - 37.0 g/dL    RDW 13.3 11.6 - 14.5 %    PLATELET 120 685 - 795 K/uL    MPV 10.4 9.2 - 11.8 FL    NEUTROPHILS 63 40 - 73 %    LYMPHOCYTES 29 21 - 52 %    MONOCYTES 6 3 - 10 %    EOSINOPHILS 2 0 - 5 %    BASOPHILS 0 0 - 2 %    ABS. NEUTROPHILS 6.2 1.8 - 8.0 K/UL    ABS. LYMPHOCYTES 2.9 0.9 - 3.6 K/UL    ABS. MONOCYTES 0.6 0.05 - 1.2 K/UL    ABS. EOSINOPHILS 0.2 0.0 - 0.4 K/UL    ABS.  BASOPHILS 0.0 0.0 - 0.06 K/UL    DF AUTOMATED     METABOLIC PANEL, COMPREHENSIVE    Collection Time: 06/12/18 12:56 AM   Result Value Ref Range    Sodium 139 136 - 145 mmol/L    Potassium 3.5 3.5 - 5.5 mmol/L    Chloride 103 100 - 108 mmol/L    CO2 29 21 - 32 mmol/L    Anion gap 7 3.0 - 18 mmol/L Glucose 234 (H) 74 - 99 mg/dL    BUN 20 (H) 7.0 - 18 MG/DL    Creatinine 1.23 0.6 - 1.3 MG/DL    BUN/Creatinine ratio 16 12 - 20      GFR est AA 56 (L) >60 ml/min/1.73m2    GFR est non-AA 47 (L) >60 ml/min/1.73m2    Calcium 9.3 8.5 - 10.1 MG/DL    Bilirubin, total 0.5 0.2 - 1.0 MG/DL    ALT (SGPT) 50 13 - 56 U/L    AST (SGOT) 25 15 - 37 U/L    Alk. phosphatase 118 (H) 45 - 117 U/L    Protein, total 7.6 6.4 - 8.2 g/dL    Albumin 3.5 3.4 - 5.0 g/dL    Globulin 4.1 (H) 2.0 - 4.0 g/dL    A-G Ratio 0.9 0.8 - 1.7         EKG interpretation by ED Physician:      X-Ray, CT or other radiology findings or impressions:  No orders to display       Progress notes, Consult notes or additional Procedure notes:   Doubt acute infection, need for imaging. Pain improved here. Would not rx additional meds for home  I have discussed with patient and/or family/sig other the results, interpretation of any imaging if performed, suspected diagnosis and treatment plan to include instructions regarding the diagnoses listed to which understanding was expressed with all questions answered      Reevaluation of patient:   stable    Disposition:  Diagnosis:   1. Acute postoperative pain        Disposition: home    Follow-up Information     Follow up With Details Comments [address] [facility], [place] [address]  [phone]      contact your surgeon in the morning and your pain management specialist regardinfg managing your pain at home               Patient's Medications   Start Taking    No medications on file   Continue Taking    ALBUTEROL IN    Take  by inhalation. ATORVASTATIN (LIPITOR) 40 MG TABLET    Take 1 Tab by mouth daily. BACITRACIN-NEOMYCIN-POLYMYXIN B-HYDROCORTISONE (CORTISPORIN) 1 % OINTMENT    Apply  to affected area two (2) times a day. BISACODYL (DULCOLAX, BISACODYL,) 10 MG SUPPOSITORY    Insert 10 mg into rectum daily as needed.     BLOOD PRESSURE TEST KIT-MEDIUM KIT Blood pressure as needed    BLOOD-GLUCOSE METER MONITORING KIT    Use daily to check blood sugar    CARVEDILOL (COREG) 3.125 MG TABLET    Take 1 Tab by mouth two (2) times daily (with meals). CEFDINIR (OMNICEF) 300 MG CAPSULE    Take 1 Cap by mouth two (2) times a day. CHOLECALCIFEROL (VITAMIN D3) 50,000 UNIT CAPSULE    Take 1 Cap by mouth every seven (7) days for 12 doses. Indications: Vitamin D Deficiency    DICYCLOMINE (BENTYL) 20 MG TABLET    Take 1 Tab by mouth every six (6) hours as needed (abdominal cramps) for up to 20 doses. EPINEPHRINE (EPIPEN) 0.3 MG/0.3 ML (1:1,000) INJECTION    0.3 mL by IntraMUSCular route once as needed for up to 1 dose. ESCITALOPRAM OXALATE (LEXAPRO) 20 MG TABLET    Take 20 mg by mouth daily. FERROUS SULFATE 325 MG (65 MG IRON) TABLET    Take 1 Tab by mouth Daily (before breakfast). FLUTICASONE (FLONASE) 50 MCG/ACTUATION NASAL SPRAY    Use one to two sprays in each nostril daily for congestion and allergy. Indications: Allergic Rhinitis    GLUCOSE BLOOD VI TEST STRIPS (ASCENSIA AUTODISC VI, ONE TOUCH ULTRA TEST VI) STRIP    Use daily to monitor blood glucose    HYDROXYZINE HCL (ATARAX) 25 MG TABLET    Take 1-2 tablets by mouth every 6 hours as needed. IBUPROFEN (MOTRIN) 600 MG TABLET    Take 1 Tab by mouth every six (6) hours as needed for Pain. INDOMETHACIN (INDOCIN PO)    Take  by mouth. INSULIN NEEDLES, DISPOSABLE, 31 GAUGE X 5/16\" NDLE    Use to administer insulin as directed    INSULIN NPH-REGULAR HUMAN REC (HUMULIN 70-30) 100 UNIT/ML (70-30) INPN    Give 30 units in the QAM and 35 units at bedtime    LANCETS MISC    Use daily to monitor blood glucose    LIDOCAINE (LIDODERM) 5 %    2 Patches by TransDERmal route every twenty-four (24) hours. Apply patch to the affected area for 12 hours a day and remove for 12 hours a day. LISINOPRIL-HYDROCHLOROTHIAZIDE (PRINZIDE, ZESTORETIC) 20-12.5 MG PER TABLET    Take 1 Tab by mouth daily.     LORATADINE (CLARITIN) 10 MG TABLET    Take 1 Tab by mouth daily. Indications: Allergic Rhinitis    LORAZEPAM (ATIVAN) 1 MG TABLET    1 mg. MUPIROCIN (BACTROBAN) 2 % OINTMENT    Use 1 Application to affected area 3 Times Daily. ONDANSETRON (ZOFRAN ODT) 4 MG DISINTEGRATING TABLET    Take 1-2 tablets every 6-8 hours as needed for nausea and vomiting. ONDANSETRON (ZOFRAN ODT) 4 MG DISINTEGRATING TABLET    Take 1 Tab by mouth every eight (8) hours as needed for Nausea. POLYETHYLENE GLYCOL (MIRALAX) 17 GRAM PACKET    Take 1 Packet by mouth daily. TRAZODONE (DESYREL) 300 MG TABLET    Take 1 Tab by mouth nightly. VALACYCLOVIR (VALTREX) 1 GRAM TABLET    Take  by mouth. VIT B COMP-C-FA-IRON-VIT E (VITAMINS B COMPLEX) 500 MG-400 MCG- 18 MG IRON TAB    Take 1 Tab by mouth daily.    These Medications have changed    No medications on file   Stop Taking    No medications on file

## 2018-06-12 NOTE — ED TRIAGE NOTES
Patient comes in complaining of left breast pain, states that she just had reconstructive surgery to her breast on Thursday and is in excruciating pain, states that there is an issue with pain management and she has not been able to take any pain medications so she has only bene taking Ibuprofen.

## 2018-06-13 ENCOUNTER — HOSPITAL ENCOUNTER (EMERGENCY)
Age: 48
Discharge: HOME | End: 2018-06-13
Attending: EMERGENCY MEDICINE | Admitting: EMERGENCY MEDICINE
Payer: MEDICAID

## 2018-06-13 VITALS
HEIGHT: 62 IN | HEART RATE: 88 BPM | DIASTOLIC BLOOD PRESSURE: 77 MMHG | TEMPERATURE: 98 F | BODY MASS INDEX: 29.63 KG/M2 | WEIGHT: 161 LBS | OXYGEN SATURATION: 96 % | RESPIRATION RATE: 18 BRPM | SYSTOLIC BLOOD PRESSURE: 119 MMHG

## 2018-06-13 DIAGNOSIS — E11.65 TYPE 2 DIABETES MELLITUS WITH HYPERGLYCEMIA, WITH LONG-TERM CURRENT USE OF INSULIN (HCC): ICD-10-CM

## 2018-06-13 DIAGNOSIS — R07.89 CHEST WALL PAIN: ICD-10-CM

## 2018-06-13 DIAGNOSIS — Z79.4 TYPE 2 DIABETES MELLITUS WITH HYPERGLYCEMIA, WITH LONG-TERM CURRENT USE OF INSULIN (HCC): ICD-10-CM

## 2018-06-13 DIAGNOSIS — G89.18 POST-OPERATIVE PAIN: Primary | ICD-10-CM

## 2018-06-13 LAB
ANION GAP SERPL CALC-SCNC: 5 MMOL/L (ref 3–18)
ATRIAL RATE: 92 BPM
BASOPHILS # BLD: 0 K/UL (ref 0–0.06)
BASOPHILS NFR BLD: 0 % (ref 0–2)
BUN SERPL-MCNC: 18 MG/DL (ref 7–18)
BUN/CREAT SERPL: 18 (ref 12–20)
CALCIUM SERPL-MCNC: 9.2 MG/DL (ref 8.5–10.1)
CALCULATED P AXIS, ECG09: 53 DEGREES
CALCULATED R AXIS, ECG10: -14 DEGREES
CALCULATED T AXIS, ECG11: 49 DEGREES
CHLORIDE SERPL-SCNC: 103 MMOL/L (ref 100–108)
CO2 SERPL-SCNC: 30 MMOL/L (ref 21–32)
CREAT SERPL-MCNC: 0.99 MG/DL (ref 0.6–1.3)
DIAGNOSIS, 93000: NORMAL
DIFFERENTIAL METHOD BLD: ABNORMAL
EOSINOPHIL # BLD: 0.2 K/UL (ref 0–0.4)
EOSINOPHIL NFR BLD: 2 % (ref 0–5)
ERYTHROCYTE [DISTWIDTH] IN BLOOD BY AUTOMATED COUNT: 13.1 % (ref 11.6–14.5)
GLUCOSE SERPL-MCNC: 285 MG/DL (ref 74–99)
HCT VFR BLD AUTO: 33 % (ref 35–45)
HGB BLD-MCNC: 10.6 G/DL (ref 12–16)
LYMPHOCYTES # BLD: 1.7 K/UL (ref 0.9–3.6)
LYMPHOCYTES NFR BLD: 17 % (ref 21–52)
MCH RBC QN AUTO: 30.6 PG (ref 24–34)
MCHC RBC AUTO-ENTMCNC: 32.1 G/DL (ref 31–37)
MCV RBC AUTO: 95.4 FL (ref 74–97)
MONOCYTES # BLD: 0.4 K/UL (ref 0.05–1.2)
MONOCYTES NFR BLD: 4 % (ref 3–10)
NEUTS SEG # BLD: 7.4 K/UL (ref 1.8–8)
NEUTS SEG NFR BLD: 77 % (ref 40–73)
P-R INTERVAL, ECG05: 148 MS
PLATELET # BLD AUTO: 281 K/UL (ref 135–420)
PMV BLD AUTO: 10.1 FL (ref 9.2–11.8)
POTASSIUM SERPL-SCNC: 3.8 MMOL/L (ref 3.5–5.5)
Q-T INTERVAL, ECG07: 354 MS
QRS DURATION, ECG06: 72 MS
QTC CALCULATION (BEZET), ECG08: 437 MS
RBC # BLD AUTO: 3.46 M/UL (ref 4.2–5.3)
SODIUM SERPL-SCNC: 138 MMOL/L (ref 136–145)
TROPONIN I SERPL-MCNC: <0.02 NG/ML (ref 0–0.06)
VENTRICULAR RATE, ECG03: 92 BPM
WBC # BLD AUTO: 9.6 K/UL (ref 4.6–13.2)

## 2018-06-13 PROCEDURE — 99284 EMERGENCY DEPT VISIT MOD MDM: CPT

## 2018-06-13 PROCEDURE — 84484 ASSAY OF TROPONIN QUANT: CPT | Performed by: EMERGENCY MEDICINE

## 2018-06-13 PROCEDURE — 85025 COMPLETE CBC W/AUTO DIFF WBC: CPT | Performed by: EMERGENCY MEDICINE

## 2018-06-13 PROCEDURE — 80048 BASIC METABOLIC PNL TOTAL CA: CPT | Performed by: EMERGENCY MEDICINE

## 2018-06-13 PROCEDURE — 74011250637 HC RX REV CODE- 250/637: Performed by: EMERGENCY MEDICINE

## 2018-06-13 PROCEDURE — 93005 ELECTROCARDIOGRAM TRACING: CPT

## 2018-06-13 RX ORDER — ONDANSETRON 4 MG/1
4 TABLET, ORALLY DISINTEGRATING ORAL
Status: COMPLETED | OUTPATIENT
Start: 2018-06-13 | End: 2018-06-13

## 2018-06-13 RX ORDER — HYDROCODONE BITARTRATE AND ACETAMINOPHEN 5; 325 MG/1; MG/1
2 TABLET ORAL
Status: COMPLETED | OUTPATIENT
Start: 2018-06-13 | End: 2018-06-13

## 2018-06-13 RX ORDER — HYDROCODONE BITARTRATE AND ACETAMINOPHEN 5; 325 MG/1; MG/1
TABLET ORAL
COMMUNITY
End: 2018-11-19

## 2018-06-13 RX ADMIN — ONDANSETRON 4 MG: 4 TABLET, ORALLY DISINTEGRATING ORAL at 17:54

## 2018-06-13 RX ADMIN — HYDROCODONE BITARTRATE AND ACETAMINOPHEN 2 TABLET: 5; 325 TABLET ORAL at 17:53

## 2018-06-13 NOTE — DISCHARGE INSTRUCTIONS
Acute Pain After Surgery: Care Instructions  Your Care Instructions    It's common to have some pain after surgery. Pain doesn't mean that something is wrong or that the surgery didn't go well. But when the pain is severe, it's important to work with your doctor to manage it. It's also important to be aware of a few facts about pain and pain medicine. · You are the only person who knows what your pain feels like. So be sure to tell your doctor when you are in pain or when the pain changes. Then he or she will know how to adjust your medicines. · Pain is often easier to control right after it starts. So it may be better to take regular doses of pain medicine and not wait until the pain gets bad. · Medicine can help control pain. But this doesn't mean you'll have no pain. Medicine works to keep the pain at a level you can live with. With time, you will feel better. Follow-up care is a key part of your treatment and safety. Be sure to make and go to all appointments, and call your doctor if you are having problems. It's also a good idea to know your test results and keep a list of the medicines you take. How can you care for yourself at home? · Be safe with medicines. Read and follow all instructions on the label. ¨ If the doctor gave you a prescription medicine for pain, take it as prescribed. ¨ If you are not taking a prescription pain medicine, ask your doctor if you can take an over-the-counter medicine. · If you take an over-the-counter pain medicine, such as acetaminophen (Tylenol), ibuprofen (Advil, Motrin), or naproxen (Aleve), read and follow all instructions on the label. · Do not take two or more pain medicines at the same time unless the doctor told you to. · Do not drink alcohol while you are taking pain medicines. · Try to walk each day if your doctor recommends it. Start by walking a little more than you did the day before. Bit by bit, increase the amount you walk.  Walking increases blood flow. It also helps prevent pneumonia and constipation. · To prevent constipation from opioid pain medicines:  ¨ Talk to your doctor about a laxative. ¨ Include fruits, vegetables, beans, and whole grains in your diet each day. These foods are high in fiber. ¨ Drink plenty of fluids, enough so that your urine is light yellow or clear like water. Drink water, fruit juice, or other drinks that do not contain caffeine or alcohol. If you have kidney, heart, or liver disease and have to limit fluids, talk with your doctor before you increase the amount of fluids you drink. ¨ Take a fiber supplement, such as Citrucel or Metamucil, every day if needed. Read and follow all instructions on the label. If you take pain medicine for more than a few days, talk to your doctor before you take fiber. When should you call for help? Call your doctor now or seek immediate medical care if:  ? · Your pain gets worse. ? · Your pain is not controlled by medicine. ? Watch closely for changes in your health, and be sure to contact your doctor if you have any problems. Where can you learn more? Go to http://imani-jenny.info/. Enter [phone] in the search box to learn more about \"Acute Pain After Surgery: Care Instructions. \"  Current as of: March 20, 2017  Content Version: 11.4  © 3075-0489 [employer]. Care instructions adapted under license by [employer] (which disclaims liability or warranty for this information). If you have questions about a medical condition or this instruction, always ask your healthcare professional. [place] [address] any warranty or liability for your use of this information.

## 2018-06-13 NOTE — ED NOTES
I performed a brief evaluation, including history and physical, of the patient here in triage and I have determined that pt will need further treatment and evaluation from the main side ER physician. I have placed initial orders to help in expediting patients care. June 13, 2018 at 2:55 PM - Krystal Murray NP        Patient reports surgical site pain, CP, SOB. Recent reconstructive surgery.

## 2018-06-13 NOTE — ED PROVIDER NOTES
EMERGENCY DEPARTMENT HISTORY AND PHYSICAL EXAM    3:45 PM      Date: 6/13/2018  Patient Name: Sarah Holloway    History of Presenting Illness     Chief Complaint   Patient presents with    Post-Op Problem     pain     50 yo F underwent recent breast reconstructive surgery presents for pain management. Just left her surgeon's office (not seen) where she picked up a prescription for pain medicine. Has bilateral drains in place, still draining small amounts of clear bloody fluid. Feels lightheaded. No fever. No abdominal pain or SOB. Kuldeep Valadez PCP: José Luis Arroyo NP    Current Facility-Administered Medications   Medication Dose Route Frequency Provider Last Rate Last Dose    HYDROcodone-acetaminophen (NORCO) 5-325 mg per tablet 2 Tab  2 Tab Oral NOW Jax Angeles MD        ondansetron (ZOFRAN ODT) tablet 4 mg  4 mg Oral NOW Jax Angeles MD         Current Outpatient Prescriptions   Medication Sig Dispense Refill    HYDROcodone-acetaminophen (NORCO) 5-325 mg per tablet Take  by mouth.  Insulin Needles, Disposable, 31 gauge x 5/16\" ndle Use to administer insulin as directed 1 Package 11    Lancets misc Use daily to monitor blood glucose 100 Each 11    glucose blood VI test strips (ASCENSIA AUTODISC VI, ONE TOUCH ULTRA TEST VI) strip Use daily to monitor blood glucose 100 Strip 11    bacitracin-neomycin-polymyxin b-hydrocortisone (CORTISPORIN) 1 % ointment Apply  to affected area two (2) times a day. 15 g 0    indomethacin (INDOCIN PO) Take  by mouth.  escitalopram oxalate (LEXAPRO) 20 mg tablet Take 20 mg by mouth daily.  carvedilol (COREG) 3.125 mg tablet Take 1 Tab by mouth two (2) times daily (with meals). 60 Tab 5    Blood Pressure Test Kit-Medium kit Blood pressure as needed 1 Kit 0    atorvastatin (LIPITOR) 40 mg tablet Take 1 Tab by mouth daily. 30 Tab 6    bisacodyl (DULCOLAX, BISACODYL,) 10 mg suppository Insert 10 mg into rectum daily as needed.  28 Suppository 1    lidocaine (LIDODERM) 5 % 2 Patches by TransDERmal route every twenty-four (24) hours. Apply patch to the affected area for 12 hours a day and remove for 12 hours a day. 90 Each 3    hydrOXYzine HCl (ATARAX) 25 mg tablet Take 1-2 tablets by mouth every 6 hours as needed. 30 Tab 0    cholecalciferol (VITAMIN D3) 50,000 unit capsule Take 1 Cap by mouth every seven (7) days for 12 doses. Indications: Vitamin D Deficiency 12 Cap 1    insulin nph-regular human rec (HUMULIN 70-30) 100 unit/mL (70-30) inpn Give 30 units in the QAM and 35 units at bedtime 5 Pen 3    mupirocin (BACTROBAN) 2 % ointment Use 1 Application to affected area 3 Times Daily.  valACYclovir (VALTREX) 1 gram tablet Take  by mouth.  ferrous sulfate 325 mg (65 mg iron) tablet Take 1 Tab by mouth Daily (before breakfast). 30 Tab 3    Blood-Glucose Meter monitoring kit Use daily to check blood sugar 1 Kit 0    vit B comp-C-FA-iron-vit E (VITAMINS B COMPLEX) 500 mg-400 mcg- 18 mg iron tab Take 1 Tab by mouth daily. 30 Tab 3    fluticasone (FLONASE) 50 mcg/actuation nasal spray Use one to two sprays in each nostril daily for congestion and allergy. Indications: Allergic Rhinitis 1 Bottle 3    loratadine (CLARITIN) 10 mg tablet Take 1 Tab by mouth daily. Indications: Allergic Rhinitis 30 Tab 3    ondansetron (ZOFRAN ODT) 4 mg disintegrating tablet Take 1 Tab by mouth every eight (8) hours as needed for Nausea. 15 Tab 0    dicyclomine (BENTYL) 20 mg tablet Take 1 Tab by mouth every six (6) hours as needed (abdominal cramps) for up to 20 doses. 20 Tab 0    ondansetron (ZOFRAN ODT) 4 mg disintegrating tablet Take 1-2 tablets every 6-8 hours as needed for nausea and vomiting. 12 Tab 0    ibuprofen (MOTRIN) 600 mg tablet Take 1 Tab by mouth every six (6) hours as needed for Pain. 20 Tab 0    cefdinir (OMNICEF) 300 mg capsule Take 1 Cap by mouth two (2) times a day. 14 Cap 0    traZODone (DESYREL) 300 mg tablet Take 1 Tab by mouth nightly.  30 Tab 0  lisinopril-hydroCHLOROthiazide (PRINZIDE, ZESTORETIC) 20-12.5 mg per tablet Take 1 Tab by mouth daily. 30 Tab 1    polyethylene glycol (MIRALAX) 17 gram packet Take 1 Packet by mouth daily. 30 Each 1    ALBUTEROL IN Take  by inhalation.  LORazepam (ATIVAN) 1 mg tablet 1 mg.  EPINEPHrine (EPIPEN) 0.3 mg/0.3 mL (1:1,000) injection 0.3 mL by IntraMUSCular route once as needed for up to 1 dose. 1 Each 1       Past History     Past Medical History:  Past Medical History:   Diagnosis Date    Breast cancer ([facility] [address]     breast cancer, right, S/P Mastectomy and chemo, radiation in 2013    Depression     Diabetes ([facility] [address]     Drug abuse     H/O cocaine use in past    Gastrointestinal disorder     cholitis    H/O ETOH abuse     Hyperlipidemia     Hypertension     Spine injury     Vitamin D deficiency 5/1/2018       Past Surgical History:  Past Surgical History:   Procedure Laterality Date    COLONOSCOPY N/A 7/18/2016    COLONOSCOPY performed by Eduin Gonz[facility]les MD at [facility] ENDOSCOPY    HX BREAST LUMPECTOMY  06/2013    right    HX HYSTERECTOMY      HX ORTHOPAEDIC      Back fusion surgery 4/18/14.  HX OTHER SURGICAL      mediport    HX TONSILLECTOMY      HX TUBAL LIGATION         Family History:  Family History   Problem Relation Age of Onset    Heart Disease Mother     Stroke Father     Heart Disease Father 48    Diabetes Other     Hypertension Other     Cancer Maternal Grandmother        Social History:  Social History   Substance Use Topics    Smoking status: Never Smoker    Smokeless tobacco: Never Used    Alcohol use No      Comment: \"Occasionally\"       Allergies:   Allergies   Allergen Reactions    Latex Hives and Itching    Other Food Rash     Almonds    Amoxicillin Swelling     Other reaction(s): mild rash/itching    Aspirin Not Reported This Time and Swelling     Mouth swells    Fentanyl Anaphylaxis and Swelling     Patches cause mouth to swell  Swelling in mouth from fentanyl patch    Levaquin [Levofloxacin] Not Reported This Time and Swelling     Other reaction(s): mild rash/itching    Chlorhexidine Rash    Norco [Hydrocodone-Acetaminophen] Nausea and Vomiting     Other reaction(s): gi distress    Acetaminophen Other (comments)    [place] Rash and Itching    Codeine Other (comments)    Glycopyrrolate Swelling     Pt  States  faciAL  SWELLING   POST  ROBINUL  IV   Pt  States  faciAL  SWELLING   POST  ROBINUL  IV     Morphine Nausea and Vomiting     Pt states she is not allergic    Pcn [Penicillins] Swelling    Percocet [Oxycodone-Acetaminophen] Nausea and Vomiting     Other reaction(s): gi distress  nausea  Other reaction(s): anaphylaxis/angioedema  Per pt. She is allergic    Tape [Adhesive] Rash    Tramadol Swelling         Review of Systems       Review of Systems   Constitutional: Positive for fatigue. Negative for chills and fever. Respiratory: Negative for shortness of breath. Gastrointestinal: Negative for abdominal pain. Neurological: Positive for light-headedness. Physical Exam     Visit Vitals    /77 (BP 1 Location: Left arm)    Pulse 88    Temp 98 °F (36.7 °C)    Resp 18    Ht 5' 2\" (1.575 m)    Wt 73 kg (161 lb)    LMP 08/20/2013    SpO2 96%    BMI 29.45 kg/m2         Physical Exam   Constitutional: She is oriented to person, place, and time. She appears well-developed and well-nourished. No distress. HENT:   Head: Normocephalic and atraumatic. Eyes: No scleral icterus. Cardiovascular: Normal rate, regular rhythm and normal heart sounds. Pulmonary/Chest: Effort normal. No respiratory distress. She has no wheezes. Large ace-wrap compressive dressing across upper chest. Skin w/o erythema. Drains in place. Approx 40 cc bloody fluid in reservoirs. Locally tender to palpation. Abdominal: There is no tenderness. There is no rebound. Musculoskeletal: She exhibits no edema.    Neurological: She is alert and oriented to person, place, and time. Skin: Skin is warm and dry. Psychiatric: She has a normal mood and affect. Nursing note and vitals reviewed. Diagnostic Study Results     Labs -  Recent Results (from the past 12 hour(s))   EKG, 12 LEAD, INITIAL    Collection Time: 06/13/18  3:07 PM   Result Value Ref Range    Ventricular Rate 92 BPM    Atrial Rate 92 BPM    P-R Interval 148 ms    QRS Duration 72 ms    Q-T Interval 354 ms    QTC Calculation (Bezet) 437 ms    Calculated P Axis 53 degrees    Calculated R Axis -14 degrees    Calculated T Axis 49 degrees    Diagnosis       Normal sinus rhythm  Normal ECG  When compared with ECG of 22-MAY-2018 19:55,  No significant change was found     CBC WITH AUTOMATED DIFF    Collection Time: 06/13/18  3:45 PM   Result Value Ref Range    WBC 9.6 4.6 - 13.2 K/uL    RBC 3.46 (L) 4.20 - 5.30 M/uL    HGB 10.6 (L) 12.0 - 16.0 g/dL    HCT 33.0 (L) 35.0 - 45.0 %    MCV 95.4 74.0 - 97.0 FL    MCH 30.6 24.0 - 34.0 PG    MCHC 32.1 31.0 - 37.0 g/dL    RDW 13.1 11.6 - 14.5 %    PLATELET 202 068 - 153 K/uL    MPV 10.1 9.2 - 11.8 FL    NEUTROPHILS 77 (H) 40 - 73 %    LYMPHOCYTES 17 (L) 21 - 52 %    MONOCYTES 4 3 - 10 %    EOSINOPHILS 2 0 - 5 %    BASOPHILS 0 0 - 2 %    ABS. NEUTROPHILS 7.4 1.8 - 8.0 K/UL    ABS. LYMPHOCYTES 1.7 0.9 - 3.6 K/UL    ABS. MONOCYTES 0.4 0.05 - 1.2 K/UL    ABS. EOSINOPHILS 0.2 0.0 - 0.4 K/UL    ABS.  BASOPHILS 0.0 0.0 - 0.06 K/UL    DF AUTOMATED     METABOLIC PANEL, BASIC    Collection Time: 06/13/18  3:45 PM   Result Value Ref Range    Sodium 138 136 - 145 mmol/L    Potassium 3.8 3.5 - 5.5 mmol/L    Chloride 103 100 - 108 mmol/L    CO2 30 21 - 32 mmol/L    Anion gap 5 3.0 - 18 mmol/L    Glucose 285 (H) 74 - 99 mg/dL    BUN 18 7.0 - 18 MG/DL    Creatinine 0.99 0.6 - 1.3 MG/DL    BUN/Creatinine ratio 18 12 - 20      GFR est AA >60 >60 ml/min/1.73m2    GFR est non-AA 60 (L) >60 ml/min/1.73m2    Calcium 9.2 8.5 - 10.1 MG/DL   TROPONIN I    Collection Time: 06/13/18  3:45 PM   Result Value Ref Range    Troponin-I, Qt. <0.02 0.00 - 0.06 NG/ML       Radiologic Studies -   No orders to display         Medical Decision Making   I am the first provider for this patient. I reviewed the vital signs, available nursing notes, past medical history, past surgical history, family history and social history. Vital Signs-Reviewed the patient's vital signs. Pulse Oximetry Analysis -  98% on room air, normal     EKG: Interpreted by the EP. NSR, 92, no st elevation, delayed transition   Time Interpreted: 7939      Records Reviewed: Nursing Notes and Old Medical Records (Time of Review: 3:45 PM)    ED Course: Progress Notes, Reevaluation, and Consults:  Imp: Post-op pain. Old records reviewed. Has long history of opioid use. Has prescription in hand. Screening l;abs reviewed and are reassuring. Has f/u wound check scheduled w/ surgeon. Stable to continue outpt management at this time. Diagnosis     Clinical Impression:   1. Post-operative pain    2. Chest wall pain    3. Type 2 diabetes mellitus with hyperglycemia, with long-term current use of insulin (HCC)      1) Norco as prescribed per Dr Suzanna Mackey with food  2) Follow up with him for wound check tomorrow as scheduled  3) Return for high fever, acutely worsening symptoms    Disposition: home    Follow-up Information     Follow up With Details Comments [address] Gill Tarango MD   [address]  [address]  [facility]  [phone]             Patient's Medications   Start Taking    No medications on file   Continue Taking    ALBUTEROL IN    Take  by inhalation. ATORVASTATIN (LIPITOR) 40 MG TABLET    Take 1 Tab by mouth daily. BACITRACIN-NEOMYCIN-POLYMYXIN B-HYDROCORTISONE (CORTISPORIN) 1 % OINTMENT    Apply  to affected area two (2) times a day. BISACODYL (DULCOLAX, BISACODYL,) 10 MG SUPPOSITORY    Insert 10 mg into rectum daily as needed.     BLOOD PRESSURE TEST KIT-MEDIUM KIT    Blood pressure as needed    BLOOD-GLUCOSE METER MONITORING KIT    Use daily to check blood sugar    CARVEDILOL (COREG) 3.125 MG TABLET    Take 1 Tab by mouth two (2) times daily (with meals). CEFDINIR (OMNICEF) 300 MG CAPSULE    Take 1 Cap by mouth two (2) times a day. CHOLECALCIFEROL (VITAMIN D3) 50,000 UNIT CAPSULE    Take 1 Cap by mouth every seven (7) days for 12 doses. Indications: Vitamin D Deficiency    DICYCLOMINE (BENTYL) 20 MG TABLET    Take 1 Tab by mouth every six (6) hours as needed (abdominal cramps) for up to 20 doses. EPINEPHRINE (EPIPEN) 0.3 MG/0.3 ML (1:1,000) INJECTION    0.3 mL by IntraMUSCular route once as needed for up to 1 dose. ESCITALOPRAM OXALATE (LEXAPRO) 20 MG TABLET    Take 20 mg by mouth daily. FERROUS SULFATE 325 MG (65 MG IRON) TABLET    Take 1 Tab by mouth Daily (before breakfast). FLUTICASONE (FLONASE) 50 MCG/ACTUATION NASAL SPRAY    Use one to two sprays in each nostril daily for congestion and allergy. Indications: Allergic Rhinitis    GLUCOSE BLOOD VI TEST STRIPS (ASCENSIA AUTODISC VI, ONE TOUCH ULTRA TEST VI) STRIP    Use daily to monitor blood glucose    HYDROCODONE-ACETAMINOPHEN (NORCO) 5-325 MG PER TABLET    Take  by mouth. HYDROXYZINE HCL (ATARAX) 25 MG TABLET    Take 1-2 tablets by mouth every 6 hours as needed. IBUPROFEN (MOTRIN) 600 MG TABLET    Take 1 Tab by mouth every six (6) hours as needed for Pain. INDOMETHACIN (INDOCIN PO)    Take  by mouth. INSULIN NEEDLES, DISPOSABLE, 31 GAUGE X 5/16\" NDLE    Use to administer insulin as directed    INSULIN NPH-REGULAR HUMAN REC (HUMULIN 70-30) 100 UNIT/ML (70-30) INPN    Give 30 units in the QAM and 35 units at bedtime    LANCETS MISC    Use daily to monitor blood glucose    LIDOCAINE (LIDODERM) 5 %    2 Patches by TransDERmal route every twenty-four (24) hours. Apply patch to the affected area for 12 hours a day and remove for 12 hours a day.     LISINOPRIL-HYDROCHLOROTHIAZIDE (PRINZIDE, ZESTORETIC) 20-12.5 MG PER TABLET    Take 1 Tab by mouth daily. LORATADINE (CLARITIN) 10 MG TABLET    Take 1 Tab by mouth daily. Indications: Allergic Rhinitis    LORAZEPAM (ATIVAN) 1 MG TABLET    1 mg. MUPIROCIN (BACTROBAN) 2 % OINTMENT    Use 1 Application to affected area 3 Times Daily. ONDANSETRON (ZOFRAN ODT) 4 MG DISINTEGRATING TABLET    Take 1-2 tablets every 6-8 hours as needed for nausea and vomiting. ONDANSETRON (ZOFRAN ODT) 4 MG DISINTEGRATING TABLET    Take 1 Tab by mouth every eight (8) hours as needed for Nausea. POLYETHYLENE GLYCOL (MIRALAX) 17 GRAM PACKET    Take 1 Packet by mouth daily. TRAZODONE (DESYREL) 300 MG TABLET    Take 1 Tab by mouth nightly. VALACYCLOVIR (VALTREX) 1 GRAM TABLET    Take  by mouth. VIT B COMP-C-FA-IRON-VIT E (VITAMINS B COMPLEX) 500 MG-400 MCG- 18 MG IRON TAB    Take 1 Tab by mouth daily. These Medications have changed    No medications on file   Stop Taking    No medications on file     _______________________________    Attestations:  [address] [place] [address] acting as a scribe for and in the presence of Ryley Gould MD      June 13, 2018 at 3:45 PM       Provider Attestation:      I personally performed the services described in the documentation, reviewed the documentation, as recorded by the scribe in my presence, and it accurately and completely records my words and actions.  June 13, 2018 at 3:45 PM - Ryley Gould MD    _______________________________

## 2018-06-25 ENCOUNTER — TELEPHONE (OUTPATIENT)
Dept: FAMILY MEDICINE CLINIC | Facility: CLINIC | Age: 48
End: 2018-06-25

## 2018-07-02 ENCOUNTER — HOSPITAL ENCOUNTER (EMERGENCY)
Age: 48
Discharge: HOME | End: 2018-07-03
Attending: EMERGENCY MEDICINE
Payer: MEDICAID

## 2018-07-02 DIAGNOSIS — R07.9 ACUTE CHEST PAIN: Primary | ICD-10-CM

## 2018-07-02 DIAGNOSIS — R10.84 ABDOMINAL PAIN, GENERALIZED: ICD-10-CM

## 2018-07-02 LAB
ALBUMIN SERPL-MCNC: 4.1 G/DL (ref 3.4–5)
ALBUMIN/GLOB SERPL: 1.1 {RATIO} (ref 0.8–1.7)
ALP SERPL-CCNC: 127 U/L (ref 45–117)
ALT SERPL-CCNC: 25 U/L (ref 13–56)
ANION GAP SERPL CALC-SCNC: 9 MMOL/L (ref 3–18)
APPEARANCE UR: CLEAR
AST SERPL-CCNC: 19 U/L (ref 15–37)
BACTERIA URNS QL MICRO: NEGATIVE /HPF
BASOPHILS # BLD: 0 K/UL (ref 0–0.06)
BASOPHILS NFR BLD: 0 % (ref 0–2)
BILIRUB SERPL-MCNC: 0.6 MG/DL (ref 0.2–1)
BILIRUB UR QL: NEGATIVE
BUN SERPL-MCNC: 15 MG/DL (ref 7–18)
BUN/CREAT SERPL: 18 (ref 12–20)
CALCIUM SERPL-MCNC: 9.7 MG/DL (ref 8.5–10.1)
CHLORIDE SERPL-SCNC: 105 MMOL/L (ref 100–108)
CK MB CFR SERPL CALC: 1.5 % (ref 0–4)
CK MB SERPL-MCNC: 1 NG/ML (ref 5–25)
CK SERPL-CCNC: 68 U/L (ref 26–192)
CO2 SERPL-SCNC: 26 MMOL/L (ref 21–32)
COLOR UR: YELLOW
CREAT SERPL-MCNC: 0.84 MG/DL (ref 0.6–1.3)
DIFFERENTIAL METHOD BLD: ABNORMAL
EOSINOPHIL # BLD: 0.3 K/UL (ref 0–0.4)
EOSINOPHIL NFR BLD: 3 % (ref 0–5)
EPITH CASTS URNS QL MICRO: NORMAL /LPF (ref 0–5)
ERYTHROCYTE [DISTWIDTH] IN BLOOD BY AUTOMATED COUNT: 12.9 % (ref 11.6–14.5)
GLOBULIN SER CALC-MCNC: 3.9 G/DL (ref 2–4)
GLUCOSE SERPL-MCNC: 167 MG/DL (ref 74–99)
GLUCOSE UR STRIP.AUTO-MCNC: NEGATIVE MG/DL
HCT VFR BLD AUTO: 37 % (ref 35–45)
HGB BLD-MCNC: 12.4 G/DL (ref 12–16)
HGB UR QL STRIP: NEGATIVE
KETONES UR QL STRIP.AUTO: NEGATIVE MG/DL
LEUKOCYTE ESTERASE UR QL STRIP.AUTO: ABNORMAL
LIPASE SERPL-CCNC: 126 U/L (ref 73–393)
LYMPHOCYTES # BLD: 2.4 K/UL (ref 0.9–3.6)
LYMPHOCYTES NFR BLD: 33 % (ref 21–52)
MCH RBC QN AUTO: 30.1 PG (ref 24–34)
MCHC RBC AUTO-ENTMCNC: 33.5 G/DL (ref 31–37)
MCV RBC AUTO: 89.8 FL (ref 74–97)
MONOCYTES # BLD: 0.5 K/UL (ref 0.05–1.2)
MONOCYTES NFR BLD: 6 % (ref 3–10)
NEUTS SEG # BLD: 4.1 K/UL (ref 1.8–8)
NEUTS SEG NFR BLD: 58 % (ref 40–73)
NITRITE UR QL STRIP.AUTO: NEGATIVE
PH UR STRIP: 7 [PH] (ref 5–8)
PLATELET # BLD AUTO: 362 K/UL (ref 135–420)
PMV BLD AUTO: 10.4 FL (ref 9.2–11.8)
POTASSIUM SERPL-SCNC: 3.8 MMOL/L (ref 3.5–5.5)
PROT SERPL-MCNC: 8 G/DL (ref 6.4–8.2)
PROT UR STRIP-MCNC: NEGATIVE MG/DL
RBC # BLD AUTO: 4.12 M/UL (ref 4.2–5.3)
RBC #/AREA URNS HPF: 0 /HPF (ref 0–5)
SODIUM SERPL-SCNC: 140 MMOL/L (ref 136–145)
SP GR UR REFRACTOMETRY: 1.02 (ref 1–1.03)
TROPONIN I SERPL-MCNC: <0.02 NG/ML (ref 0–0.04)
UROBILINOGEN UR QL STRIP.AUTO: 1 EU/DL (ref 0.2–1)
WBC # BLD AUTO: 7.3 K/UL (ref 4.6–13.2)
WBC URNS QL MICRO: NORMAL /HPF (ref 0–4)

## 2018-07-02 PROCEDURE — 81001 URINALYSIS AUTO W/SCOPE: CPT | Performed by: EMERGENCY MEDICINE

## 2018-07-02 PROCEDURE — 96374 THER/PROPH/DIAG INJ IV PUSH: CPT

## 2018-07-02 PROCEDURE — 93005 ELECTROCARDIOGRAM TRACING: CPT

## 2018-07-02 PROCEDURE — 99284 EMERGENCY DEPT VISIT MOD MDM: CPT

## 2018-07-02 PROCEDURE — 83690 ASSAY OF LIPASE: CPT | Performed by: EMERGENCY MEDICINE

## 2018-07-02 PROCEDURE — 80053 COMPREHEN METABOLIC PANEL: CPT | Performed by: EMERGENCY MEDICINE

## 2018-07-02 PROCEDURE — 96376 TX/PRO/DX INJ SAME DRUG ADON: CPT

## 2018-07-02 PROCEDURE — 85025 COMPLETE CBC W/AUTO DIFF WBC: CPT | Performed by: EMERGENCY MEDICINE

## 2018-07-02 PROCEDURE — 82553 CREATINE MB FRACTION: CPT | Performed by: EMERGENCY MEDICINE

## 2018-07-03 ENCOUNTER — APPOINTMENT (OUTPATIENT)
Dept: CT IMAGING | Age: 48
End: 2018-07-03
Attending: EMERGENCY MEDICINE
Payer: MEDICAID

## 2018-07-03 VITALS
HEIGHT: 62 IN | RESPIRATION RATE: 18 BRPM | BODY MASS INDEX: 29.63 KG/M2 | WEIGHT: 161 LBS | DIASTOLIC BLOOD PRESSURE: 81 MMHG | TEMPERATURE: 98.3 F | OXYGEN SATURATION: 100 % | HEART RATE: 99 BPM | SYSTOLIC BLOOD PRESSURE: 149 MMHG

## 2018-07-03 LAB
ATRIAL RATE: 88 BPM
CALCULATED P AXIS, ECG09: 40 DEGREES
CALCULATED R AXIS, ECG10: -7 DEGREES
CALCULATED T AXIS, ECG11: 62 DEGREES
D DIMER PPP FEU-MCNC: 7.67 UG/ML(FEU)
DIAGNOSIS, 93000: NORMAL
HCG SERPL QL: NEGATIVE
P-R INTERVAL, ECG05: 144 MS
Q-T INTERVAL, ECG07: 376 MS
QRS DURATION, ECG06: 84 MS
QTC CALCULATION (BEZET), ECG08: 454 MS
VENTRICULAR RATE, ECG03: 88 BPM

## 2018-07-03 PROCEDURE — 74177 CT ABD & PELVIS W/CONTRAST: CPT

## 2018-07-03 PROCEDURE — 96376 TX/PRO/DX INJ SAME DRUG ADON: CPT

## 2018-07-03 PROCEDURE — 74011636320 HC RX REV CODE- 636/320: Performed by: EMERGENCY MEDICINE

## 2018-07-03 PROCEDURE — 74011250636 HC RX REV CODE- 250/636

## 2018-07-03 PROCEDURE — 96374 THER/PROPH/DIAG INJ IV PUSH: CPT

## 2018-07-03 PROCEDURE — 85379 FIBRIN DEGRADATION QUANT: CPT | Performed by: EMERGENCY MEDICINE

## 2018-07-03 PROCEDURE — 84703 CHORIONIC GONADOTROPIN ASSAY: CPT | Performed by: EMERGENCY MEDICINE

## 2018-07-03 PROCEDURE — 74011000258 HC RX REV CODE- 258: Performed by: EMERGENCY MEDICINE

## 2018-07-03 PROCEDURE — 71275 CT ANGIOGRAPHY CHEST: CPT

## 2018-07-03 RX ORDER — MORPHINE SULFATE 4 MG/ML
INJECTION INTRAVENOUS
Status: DISCONTINUED
Start: 2018-07-03 | End: 2018-07-03 | Stop reason: HOSPADM

## 2018-07-03 RX ORDER — SODIUM CHLORIDE 9 MG/ML
100 INJECTION, SOLUTION INTRAVENOUS ONCE
Status: COMPLETED | OUTPATIENT
Start: 2018-07-03 | End: 2018-07-03

## 2018-07-03 RX ORDER — MORPHINE SULFATE 4 MG/ML
INJECTION INTRAVENOUS
Status: COMPLETED
Start: 2018-07-03 | End: 2018-07-03

## 2018-07-03 RX ORDER — MORPHINE SULFATE 4 MG/ML
4 INJECTION, SOLUTION INTRAMUSCULAR; INTRAVENOUS
Status: COMPLETED | OUTPATIENT
Start: 2018-07-03 | End: 2018-07-03

## 2018-07-03 RX ORDER — MORPHINE SULFATE 2 MG/ML
4 INJECTION, SOLUTION INTRAMUSCULAR; INTRAVENOUS ONCE
Status: COMPLETED | OUTPATIENT
Start: 2018-07-03 | End: 2018-07-03

## 2018-07-03 RX ADMIN — IOPAMIDOL 71 ML: 755 INJECTION, SOLUTION INTRAVENOUS at 02:00

## 2018-07-03 RX ADMIN — SODIUM CHLORIDE 95 ML: 900 INJECTION, SOLUTION INTRAVENOUS at 02:03

## 2018-07-03 RX ADMIN — MORPHINE SULFATE 4 MG: 2 INJECTION, SOLUTION INTRAMUSCULAR; INTRAVENOUS at 02:25

## 2018-07-03 RX ADMIN — MORPHINE SULFATE 4 MG: 4 INJECTION INTRAVENOUS at 00:44

## 2018-07-03 RX ADMIN — MORPHINE SULFATE 4 MG: 4 INJECTION, SOLUTION INTRAMUSCULAR; INTRAVENOUS at 00:44

## 2018-07-03 NOTE — ED NOTES
1:48 AM :Pt care assumed from Melonie Gaucher PA , ED provider. Pt complaint(s), current treatment plan, progression and available diagnostic results have been discussed thoroughly. Rounding occurred: yes  Intended Disposition: Discharge. Pending diagnostic reports and/or labs (please list): CT scan    CT: No acute process of intraabdominal process and no PE. Read by Kiara Bello. 4:09 AM Pt reevaluated at this time. Discussed results and findings, as well as, diagnosis and plan for discharge. Follow up with doctors/services listed. Return to the emergency department for alarming symptoms. Pt verbalizes understanding and agreement with plan. All questions addressed. Encounter Diagnoses     ICD-10-CM ICD-9-CM   1. Acute chest pain R07.9 786.50   2.  Abdominal pain, generalized R10.84 789.07

## 2018-07-03 NOTE — ED PROVIDER NOTES
EMERGENCY DEPARTMENT HISTORY AND PHYSICAL EXAM    Date: 7/2/2018  Patient Name: Reuben Hadley    History of Presenting Illness     Chief Complaint   Patient presents with    Abdominal Pain         History Provided By: Patient    Chief Complaint: chest pain, abd pain  Duration: 1 Days  Timing:  Acute  Location: chest, bilateral, upper abd  Quality: Aching, Sharp and Pressure  Severity: Moderate  Modifying Factors: breast reconstruction 6/7/18  Associated Symptoms: denies any other associated signs or symptoms      Additional History (Context): Reuben Hadley is a 49 y.o. female with diabetes, hypertension, hyperlipidemia, obesity, malignancy and drug abuse; ETOH abuse who presents with diffuse chest pain today after bending over; pain is constant, sharp, feels like someone's sitting on her chest.  Also has had upper abd pain. Had sutures removed last week. Denies vomiting, diarrhea, melena, hematochezia, dysuria, fever, hemtaturia. PCP: Guilherme Dunham NP    Current Facility-Administered Medications   Medication Dose Route Frequency Provider Last Rate Last Dose    iopamidol (ISOVUE-370) 76 % injection 75 mL  75 mL IntraVENous RAD ONCE Hannah Delacruz MD        0.9% sodium chloride infusion 100 mL  100 mL IntraVENous ONCE Hannah Delacruz MD         Current Outpatient Prescriptions   Medication Sig Dispense Refill    HYDROcodone-acetaminophen (NORCO) 5-325 mg per tablet Take  by mouth.  Insulin Needles, Disposable, 31 gauge x 5/16\" ndle Use to administer insulin as directed 1 Package 11    Lancets misc Use daily to monitor blood glucose 100 Each 11    glucose blood VI test strips (ASCENSIA AUTODISC VI, ONE TOUCH ULTRA TEST VI) strip Use daily to monitor blood glucose 100 Strip 11    bacitracin-neomycin-polymyxin b-hydrocortisone (CORTISPORIN) 1 % ointment Apply  to affected area two (2) times a day. 15 g 0    indomethacin (INDOCIN PO) Take  by mouth.       escitalopram oxalate (LEXAPRO) 20 mg tablet Take 20 mg by mouth daily.  carvedilol (COREG) 3.125 mg tablet Take 1 Tab by mouth two (2) times daily (with meals). 60 Tab 5    Blood Pressure Test Kit-Medium kit Blood pressure as needed 1 Kit 0    atorvastatin (LIPITOR) 40 mg tablet Take 1 Tab by mouth daily. 30 Tab 6    bisacodyl (DULCOLAX, BISACODYL,) 10 mg suppository Insert 10 mg into rectum daily as needed. 28 Suppository 1    lidocaine (LIDODERM) 5 % 2 Patches by TransDERmal route every twenty-four (24) hours. Apply patch to the affected area for 12 hours a day and remove for 12 hours a day. 90 Each 3    hydrOXYzine HCl (ATARAX) 25 mg tablet Take 1-2 tablets by mouth every 6 hours as needed. 30 Tab 0    cholecalciferol (VITAMIN D3) 50,000 unit capsule Take 1 Cap by mouth every seven (7) days for 12 doses. Indications: Vitamin D Deficiency 12 Cap 1    insulin nph-regular human rec (HUMULIN 70-30) 100 unit/mL (70-30) inpn Give 30 units in the QAM and 35 units at bedtime 5 Pen 3    mupirocin (BACTROBAN) 2 % ointment Use 1 Application to affected area 3 Times Daily.  valACYclovir (VALTREX) 1 gram tablet Take  by mouth.  ferrous sulfate 325 mg (65 mg iron) tablet Take 1 Tab by mouth Daily (before breakfast). 30 Tab 3    Blood-Glucose Meter monitoring kit Use daily to check blood sugar 1 Kit 0    vit B comp-C-FA-iron-vit E (VITAMINS B COMPLEX) 500 mg-400 mcg- 18 mg iron tab Take 1 Tab by mouth daily. 30 Tab 3    fluticasone (FLONASE) 50 mcg/actuation nasal spray Use one to two sprays in each nostril daily for congestion and allergy. Indications: Allergic Rhinitis 1 Bottle 3    loratadine (CLARITIN) 10 mg tablet Take 1 Tab by mouth daily. Indications: Allergic Rhinitis 30 Tab 3    ondansetron (ZOFRAN ODT) 4 mg disintegrating tablet Take 1 Tab by mouth every eight (8) hours as needed for Nausea.  15 Tab 0    dicyclomine (BENTYL) 20 mg tablet Take 1 Tab by mouth every six (6) hours as needed (abdominal cramps) for up to 20 doses. 20 Tab 0    ondansetron (ZOFRAN ODT) 4 mg disintegrating tablet Take 1-2 tablets every 6-8 hours as needed for nausea and vomiting. 12 Tab 0    ibuprofen (MOTRIN) 600 mg tablet Take 1 Tab by mouth every six (6) hours as needed for Pain. 20 Tab 0    cefdinir (OMNICEF) 300 mg capsule Take 1 Cap by mouth two (2) times a day. 14 Cap 0    traZODone (DESYREL) 300 mg tablet Take 1 Tab by mouth nightly. 30 Tab 0    lisinopril-hydroCHLOROthiazide (PRINZIDE, ZESTORETIC) 20-12.5 mg per tablet Take 1 Tab by mouth daily. 30 Tab 1    polyethylene glycol (MIRALAX) 17 gram packet Take 1 Packet by mouth daily. 30 Each 1    ALBUTEROL IN Take  by inhalation.  LORazepam (ATIVAN) 1 mg tablet 1 mg.  EPINEPHrine (EPIPEN) 0.3 mg/0.3 mL (1:1,000) injection 0.3 mL by IntraMUSCular route once as needed for up to 1 dose. 1 Each 1       Past History     Past Medical History:  Past Medical History:   Diagnosis Date    Breast cancer ([facility] [address]     breast cancer, right, S/P Mastectomy and chemo, radiation in 2013    Depression     Diabetes ([facility] [address]     Drug abuse     H/O cocaine use in past    Gastrointestinal disorder     cholitis    H/O ETOH abuse     Hyperlipidemia     Hypertension     Spine injury     Vitamin D deficiency 5/1/2018       Past Surgical History:  Past Surgical History:   Procedure Laterality Date    COLONOSCOPY N/A 7/18/2016    COLONOSCOPY performed by Kaleigh Ramirez [facility] at [facility] ENDOSCOPY    HX BREAST LUMPECTOMY  06/2013    right    HX HYSTERECTOMY      HX ORTHOPAEDIC      Back fusion surgery 4/18/14.      HX OTHER SURGICAL      mediport    HX TONSILLECTOMY      HX TUBAL LIGATION         Family History:  Family History   Problem Relation Age of Onset    Heart Disease Mother     Stroke Father     Heart Disease Father 48    Diabetes Other     Hypertension Other     Cancer Maternal Grandmother        Social History:  Social History   Substance Use Topics    Smoking status: Never Smoker    Smokeless tobacco: Never Used    Alcohol use No      Comment: \"Occasionally\"       Allergies: Allergies   Allergen Reactions    Latex Hives and Itching    Other Food Rash     Almonds    Amoxicillin Swelling     Other reaction(s): mild rash/itching    Aspirin Not Reported This Time and Swelling     Mouth swells    Fentanyl Anaphylaxis and Swelling     Patches cause mouth to swell  Swelling in mouth from fentanyl patch    Levaquin [Levofloxacin] Not Reported This Time and Swelling     Other reaction(s): mild rash/itching    Chlorhexidine Rash    Norco [Hydrocodone-Acetaminophen] Nausea and Vomiting     Other reaction(s): gi distress    Acetaminophen Other (comments)    [place]ow Rash and Itching    Codeine Other (comments)    Glycopyrrolate Swelling     Pt  States  faciAL  SWELLING   POST  ROBINUL  IV   Pt  States  faciAL  SWELLING   POST  ROBINUL  IV     Morphine Nausea and Vomiting     Pt states she is not allergic    Pcn [Penicillins] Swelling    Percocet [Oxycodone-Acetaminophen] Nausea and Vomiting     Other reaction(s): gi distress  nausea  Other reaction(s): anaphylaxis/angioedema  Per pt. She is allergic    Tape [Adhesive] Rash    Tramadol Swelling         Review of Systems   Review of Systems   Constitutional: Negative. HENT: Negative. Eyes: Negative. Respiratory: Negative. Negative for shortness of breath. Cardiovascular: Positive for chest pain. Gastrointestinal: Positive for abdominal pain. Negative for blood in stool, diarrhea, nausea and vomiting. Endocrine: Negative. Genitourinary: Negative. Musculoskeletal: Negative. Skin: Positive for wound. Incisional scars healing well   Allergic/Immunologic: Negative. Neurological: Negative. Hematological: Negative. Psychiatric/Behavioral: Negative. All other systems reviewed and are negative.     All Other Systems Negative  Physical Exam     Vitals:    07/02/18 2211   BP: (!) 135/98   Pulse: 99 Resp: 18   Temp: 98.3 °F (36.8 °C)   SpO2: 99%   Weight: 73 kg (161 lb)   Height: 5' 2\" (1.575 m)     Physical Exam   Constitutional: She is oriented to person, place, and time. She appears well-developed. HENT:   Head: Normocephalic and atraumatic. Eyes: Pupils are equal, round, and reactive to light. Neck: No JVD present. No tracheal deviation present. No thyromegaly present. Cardiovascular: Normal rate, regular rhythm, normal heart sounds and intact distal pulses. Exam reveals no gallop and no friction rub. No murmur heard. Equal radial pulses     Pulmonary/Chest: Effort normal and breath sounds normal. No stridor. No respiratory distress. She has no wheezes. She has no rales. She exhibits tenderness. Surgical incision sites to each breast appear to be healing well; no weeping, erythema, fluctuance. Abdominal: Soft. She exhibits no distension and no mass. There is tenderness. There is no rebound and no guarding. Diffuse upper abd TTP   Musculoskeletal: She exhibits no edema or tenderness. Lymphadenopathy:     She has no cervical adenopathy. Neurological: She is alert and oriented to person, place, and time. Skin: Skin is warm and dry. No rash noted. No erythema. No pallor. Psychiatric: She has a normal mood and affect. Her behavior is normal. Thought content normal.   Nursing note and vitals reviewed.              Diagnostic Study Results     Labs -     Recent Results (from the past 12 hour(s))   URINALYSIS W/ RFLX MICROSCOPIC    Collection Time: 07/02/18 10:13 PM   Result Value Ref Range    Color YELLOW      Appearance CLEAR      Specific gravity 1.020 1.005 - 1.030      pH (UA) 7.0 5.0 - 8.0      Protein NEGATIVE  NEG mg/dL    Glucose NEGATIVE  NEG mg/dL    Ketone NEGATIVE  NEG mg/dL    Bilirubin NEGATIVE  NEG      Blood NEGATIVE  NEG      Urobilinogen 1.0 0.2 - 1.0 EU/dL    Nitrites NEGATIVE  NEG      Leukocyte Esterase TRACE (A) NEG     URINE MICROSCOPIC ONLY    Collection Time: 07/02/18 10:13 PM   Result Value Ref Range    WBC 0 to 2 0 - 4 /hpf    RBC 0 0 - 5 /hpf    Epithelial cells FEW 0 - 5 /lpf    Bacteria NEGATIVE  NEG /hpf   CBC WITH AUTOMATED DIFF    Collection Time: 07/02/18 11:20 PM   Result Value Ref Range    WBC 7.3 4.6 - 13.2 K/uL    RBC 4.12 (L) 4.20 - 5.30 M/uL    HGB 12.4 12.0 - 16.0 g/dL    HCT 37.0 35.0 - 45.0 %    MCV 89.8 74.0 - 97.0 FL    MCH 30.1 24.0 - 34.0 PG    MCHC 33.5 31.0 - 37.0 g/dL    RDW 12.9 11.6 - 14.5 %    PLATELET 088 764 - 525 K/uL    MPV 10.4 9.2 - 11.8 FL    NEUTROPHILS 58 40 - 73 %    LYMPHOCYTES 33 21 - 52 %    MONOCYTES 6 3 - 10 %    EOSINOPHILS 3 0 - 5 %    BASOPHILS 0 0 - 2 %    ABS. NEUTROPHILS 4.1 1.8 - 8.0 K/UL    ABS. LYMPHOCYTES 2.4 0.9 - 3.6 K/UL    ABS. MONOCYTES 0.5 0.05 - 1.2 K/UL    ABS. EOSINOPHILS 0.3 0.0 - 0.4 K/UL    ABS. BASOPHILS 0.0 0.0 - 0.06 K/UL    DF AUTOMATED     METABOLIC PANEL, COMPREHENSIVE    Collection Time: 07/02/18 11:20 PM   Result Value Ref Range    Sodium 140 136 - 145 mmol/L    Potassium 3.8 3.5 - 5.5 mmol/L    Chloride 105 100 - 108 mmol/L    CO2 26 21 - 32 mmol/L    Anion gap 9 3.0 - 18 mmol/L    Glucose 167 (H) 74 - 99 mg/dL    BUN 15 7.0 - 18 MG/DL    Creatinine 0.84 0.6 - 1.3 MG/DL    BUN/Creatinine ratio 18 12 - 20      GFR est AA >60 >60 ml/min/1.73m2    GFR est non-AA >60 >60 ml/min/1.73m2    Calcium 9.7 8.5 - 10.1 MG/DL    Bilirubin, total 0.6 0.2 - 1.0 MG/DL    ALT (SGPT) 25 13 - 56 U/L    AST (SGOT) 19 15 - 37 U/L    Alk.  phosphatase 127 (H) 45 - 117 U/L    Protein, total 8.0 6.4 - 8.2 g/dL    Albumin 4.1 3.4 - 5.0 g/dL    Globulin 3.9 2.0 - 4.0 g/dL    A-G Ratio 1.1 0.8 - 1.7     LIPASE    Collection Time: 07/02/18 11:20 PM   Result Value Ref Range    Lipase 126 73 - 393 U/L   CARDIAC PANEL,(CK, CKMB & TROPONIN)    Collection Time: 07/02/18 11:20 PM   Result Value Ref Range    CK 68 26 - 192 U/L    CK - MB 1.0 <3.6 ng/ml    CK-MB Index 1.5 0.0 - 4.0 %    Troponin-I, Qt. <0.02 0.0 - 0.045 NG/ML   EKG, 12 LEAD, INITIAL    Collection Time: 07/02/18 11:52 PM   Result Value Ref Range    Ventricular Rate 88 BPM    Atrial Rate 88 BPM    P-R Interval 144 ms    QRS Duration 84 ms    Q-T Interval 376 ms    QTC Calculation (Bezet) 454 ms    Calculated P Axis 40 degrees    Calculated R Axis -7 degrees    Calculated T Axis 62 degrees    Diagnosis       Normal sinus rhythm  Normal ECG  When compared with ECG of 13-JUN-2018 15:07,  No significant change was found     D DIMER    Collection Time: 07/03/18 12:20 AM   Result Value Ref Range    D DIMER 7.67 (H) <0.46 ug/ml(FEU)   HCG QL SERUM    Collection Time: 07/03/18 12:20 AM   Result Value Ref Range    HCG, Ql. NEGATIVE  NEG         Radiologic Studies -   CTA CHEST W OR W WO CONT    (Results Pending)   CT ABD PELV WO CONT    (Results Pending)     CT Results  (Last 48 hours)    None        CXR Results  (Last 48 hours)    None            Medical Decision Making   I am the first provider for this patient. I reviewed the vital signs, available nursing notes, past medical history, past surgical history, family history and social history. Vital Signs-Reviewed the patient's vital signs. P  Records Reviewed: Nursing Notes    Procedures:  Right AC US guided peripheral IV  Date/Time: 7/3/2018 1:37 AM  Performed by: Tiffanie Rosario  Authorized by: Tiffanie Rosario     Consent:     Consent obtained:  Verbal    Consent given by:  Patient    Alternatives discussed:  Delayed treatment  Indications:     Indications:  Needed peripheral IV 18g for CTA  Pre-procedure details:     Skin preparation:  ChloraPrep  Anesthesia (see MAR for exact dosages): Anesthesia method:  None  Post-procedure details:     Patient tolerance of procedure: Tolerated well, no immediate complications        Provider Notes (Medical Decision Making): PE; PNA; pneumothorax; abscess; AAA; cardiomyopathy    Will send for PE.    1:44 AM : Pt care transferred to Dr. Lesvia Tam  ,ED provider.  History of patient complaint(s), available diagnostic reports and current treatment plan has been discussed thoroughly. Bedside rounding on patient occured : no . Intended disposition of patient : TBD  Pending diagnostics reports and/or labs (please list): CTA, CT scan      MED RECONCILIATION:  Current Facility-Administered Medications   Medication Dose Route Frequency    iopamidol (ISOVUE-370) 76 % injection 75 mL  75 mL IntraVENous RAD ONCE    0.9% sodium chloride infusion 100 mL  100 mL IntraVENous ONCE     Current Outpatient Prescriptions   Medication Sig    HYDROcodone-acetaminophen (NORCO) 5-325 mg per tablet Take  by mouth.  Insulin Needles, Disposable, 31 gauge x 5/16\" ndle Use to administer insulin as directed    Lancets misc Use daily to monitor blood glucose    glucose blood VI test strips (ASCENSIA AUTODISC VI, ONE TOUCH ULTRA TEST VI) strip Use daily to monitor blood glucose    bacitracin-neomycin-polymyxin b-hydrocortisone (CORTISPORIN) 1 % ointment Apply  to affected area two (2) times a day.  indomethacin (INDOCIN PO) Take  by mouth.  escitalopram oxalate (LEXAPRO) 20 mg tablet Take 20 mg by mouth daily.  carvedilol (COREG) 3.125 mg tablet Take 1 Tab by mouth two (2) times daily (with meals).  Blood Pressure Test Kit-Medium kit Blood pressure as needed    atorvastatin (LIPITOR) 40 mg tablet Take 1 Tab by mouth daily.  bisacodyl (DULCOLAX, BISACODYL,) 10 mg suppository Insert 10 mg into rectum daily as needed.  lidocaine (LIDODERM) 5 % 2 Patches by TransDERmal route every twenty-four (24) hours. Apply patch to the affected area for 12 hours a day and remove for 12 hours a day.  hydrOXYzine HCl (ATARAX) 25 mg tablet Take 1-2 tablets by mouth every 6 hours as needed.  cholecalciferol (VITAMIN D3) 50,000 unit capsule Take 1 Cap by mouth every seven (7) days for 12 doses.  Indications: Vitamin D Deficiency    insulin nph-regular human rec (HUMULIN 70-30) 100 unit/mL (70-30) inpn Give 30 units in the QAM and 35 units at bedtime    mupirocin (BACTROBAN) 2 % ointment Use 1 Application to affected area 3 Times Daily.  valACYclovir (VALTREX) 1 gram tablet Take  by mouth.  ferrous sulfate 325 mg (65 mg iron) tablet Take 1 Tab by mouth Daily (before breakfast).  Blood-Glucose Meter monitoring kit Use daily to check blood sugar    vit B comp-C-FA-iron-vit E (VITAMINS B COMPLEX) 500 mg-400 mcg- 18 mg iron tab Take 1 Tab by mouth daily.  fluticasone (FLONASE) 50 mcg/actuation nasal spray Use one to two sprays in each nostril daily for congestion and allergy. Indications: Allergic Rhinitis    loratadine (CLARITIN) 10 mg tablet Take 1 Tab by mouth daily. Indications: Allergic Rhinitis    ondansetron (ZOFRAN ODT) 4 mg disintegrating tablet Take 1 Tab by mouth every eight (8) hours as needed for Nausea.  dicyclomine (BENTYL) 20 mg tablet Take 1 Tab by mouth every six (6) hours as needed (abdominal cramps) for up to 20 doses.  ondansetron (ZOFRAN ODT) 4 mg disintegrating tablet Take 1-2 tablets every 6-8 hours as needed for nausea and vomiting.  ibuprofen (MOTRIN) 600 mg tablet Take 1 Tab by mouth every six (6) hours as needed for Pain.  cefdinir (OMNICEF) 300 mg capsule Take 1 Cap by mouth two (2) times a day.  traZODone (DESYREL) 300 mg tablet Take 1 Tab by mouth nightly.  lisinopril-hydroCHLOROthiazide (PRINZIDE, ZESTORETIC) 20-12.5 mg per tablet Take 1 Tab by mouth daily.  polyethylene glycol (MIRALAX) 17 gram packet Take 1 Packet by mouth daily.  ALBUTEROL IN Take  by inhalation.  LORazepam (ATIVAN) 1 mg tablet 1 mg.  EPINEPHrine (EPIPEN) 0.3 mg/0.3 mL (1:1,000) injection 0.3 mL by IntraMUSCular route once as needed for up to 1 dose.        Disposition:  TBD    Follow-up Information     Follow up With Details Comments [address], NP Schedule an appointment as soon as possible for a visit in 1 day  [address] [place] [address],Ground Floor      [facility] EMERGENCY DEPT  If symptoms worsen return immediately [address]  [phone]          Current Discharge Medication List            Diagnosis     Clinical Impression:   1. Acute chest pain    2.  Abdominal pain, generalized

## 2018-07-03 NOTE — ED TRIAGE NOTES
Patient comes in complaining of abdominal pain, states that she has been having pain since her surgery last month, also states that she has been vomiting and doesn't know what she is vomiting because she hasn't been eating

## 2018-07-05 ENCOUNTER — APPOINTMENT (OUTPATIENT)
Dept: INFUSION THERAPY | Age: 48
End: 2018-07-05

## 2018-07-10 NOTE — TELEPHONE ENCOUNTER
Receive a call from Alessio Bowles requesting a signature for plan of care for s/p breast surgery. Notified PCP and advised to follow up with the surgeon regarding this matter.

## 2018-07-22 ENCOUNTER — APPOINTMENT (OUTPATIENT)
Dept: GENERAL RADIOLOGY | Age: 48
End: 2018-07-22
Attending: PHYSICIAN ASSISTANT
Payer: MEDICAID

## 2018-07-22 ENCOUNTER — HOSPITAL ENCOUNTER (EMERGENCY)
Age: 48
Discharge: HOME | End: 2018-07-22
Attending: EMERGENCY MEDICINE
Payer: MEDICAID

## 2018-07-22 ENCOUNTER — APPOINTMENT (OUTPATIENT)
Dept: GENERAL RADIOLOGY | Age: 48
End: 2018-07-22
Attending: EMERGENCY MEDICINE
Payer: MEDICAID

## 2018-07-22 VITALS
DIASTOLIC BLOOD PRESSURE: 104 MMHG | HEIGHT: 62 IN | WEIGHT: 158 LBS | HEART RATE: 96 BPM | TEMPERATURE: 98.2 F | OXYGEN SATURATION: 98 % | RESPIRATION RATE: 16 BRPM | BODY MASS INDEX: 29.08 KG/M2 | SYSTOLIC BLOOD PRESSURE: 135 MMHG

## 2018-07-22 DIAGNOSIS — S29.012A STRAIN OF MID-BACK, INITIAL ENCOUNTER: ICD-10-CM

## 2018-07-22 DIAGNOSIS — S39.012A STRAIN OF LUMBAR REGION, INITIAL ENCOUNTER: ICD-10-CM

## 2018-07-22 DIAGNOSIS — S30.0XXA CONTUSION OF BUTTOCK, INITIAL ENCOUNTER: Primary | ICD-10-CM

## 2018-07-22 LAB
ALBUMIN SERPL-MCNC: 4 G/DL (ref 3.4–5)
ALBUMIN/GLOB SERPL: 1.1 {RATIO} (ref 0.8–1.7)
ALP SERPL-CCNC: 116 U/L (ref 45–117)
ALT SERPL-CCNC: 29 U/L (ref 13–56)
ANION GAP SERPL CALC-SCNC: 12 MMOL/L (ref 3–18)
APPEARANCE UR: ABNORMAL
AST SERPL-CCNC: 21 U/L (ref 15–37)
BACTERIA URNS QL MICRO: ABNORMAL /HPF
BASOPHILS # BLD: 0 K/UL (ref 0–0.1)
BASOPHILS NFR BLD: 0 % (ref 0–2)
BILIRUB SERPL-MCNC: 0.3 MG/DL (ref 0.2–1)
BILIRUB UR QL: NEGATIVE
BUN SERPL-MCNC: 23 MG/DL (ref 7–18)
BUN/CREAT SERPL: 15 (ref 12–20)
CALCIUM SERPL-MCNC: 9.4 MG/DL (ref 8.5–10.1)
CHLORIDE SERPL-SCNC: 102 MMOL/L (ref 100–108)
CK MB CFR SERPL CALC: NORMAL % (ref 0–4)
CK MB SERPL-MCNC: <1 NG/ML (ref 5–25)
CK SERPL-CCNC: 37 U/L (ref 26–192)
CO2 SERPL-SCNC: 26 MMOL/L (ref 21–32)
COLOR UR: ABNORMAL
CREAT SERPL-MCNC: 1.57 MG/DL (ref 0.6–1.3)
DIFFERENTIAL METHOD BLD: NORMAL
EOSINOPHIL # BLD: 0.2 K/UL (ref 0–0.4)
EOSINOPHIL NFR BLD: 2 % (ref 0–5)
EPITH CASTS URNS QL MICRO: ABNORMAL /LPF (ref 0–5)
ERYTHROCYTE [DISTWIDTH] IN BLOOD BY AUTOMATED COUNT: 13.2 % (ref 11.6–14.5)
GLOBULIN SER CALC-MCNC: 3.5 G/DL (ref 2–4)
GLUCOSE BLD STRIP.AUTO-MCNC: 258 MG/DL (ref 70–110)
GLUCOSE SERPL-MCNC: 276 MG/DL (ref 74–99)
GLUCOSE UR STRIP.AUTO-MCNC: 100 MG/DL
HCT VFR BLD AUTO: 37.7 % (ref 35–45)
HGB BLD-MCNC: 12.4 G/DL (ref 12–16)
HGB UR QL STRIP: NEGATIVE
KETONES UR QL STRIP.AUTO: ABNORMAL MG/DL
LEUKOCYTE ESTERASE UR QL STRIP.AUTO: ABNORMAL
LYMPHOCYTES # BLD: 2 K/UL (ref 0.9–3.6)
LYMPHOCYTES NFR BLD: 21 % (ref 21–52)
MCH RBC QN AUTO: 29.2 PG (ref 24–34)
MCHC RBC AUTO-ENTMCNC: 32.9 G/DL (ref 31–37)
MCV RBC AUTO: 88.9 FL (ref 74–97)
MONOCYTES # BLD: 0.5 K/UL (ref 0.05–1.2)
MONOCYTES NFR BLD: 6 % (ref 3–10)
NEUTS SEG # BLD: 6.8 K/UL (ref 1.8–8)
NEUTS SEG NFR BLD: 71 % (ref 40–73)
NITRITE UR QL STRIP.AUTO: NEGATIVE
PH UR STRIP: 5 [PH] (ref 5–8)
PLATELET # BLD AUTO: 272 K/UL (ref 135–420)
PMV BLD AUTO: 10.9 FL (ref 9.2–11.8)
POTASSIUM SERPL-SCNC: 3.6 MMOL/L (ref 3.5–5.5)
PROT SERPL-MCNC: 7.5 G/DL (ref 6.4–8.2)
PROT UR STRIP-MCNC: 30 MG/DL
RBC # BLD AUTO: 4.24 M/UL (ref 4.2–5.3)
RBC #/AREA URNS HPF: ABNORMAL /HPF (ref 0–5)
SODIUM SERPL-SCNC: 140 MMOL/L (ref 136–145)
SP GR UR REFRACTOMETRY: 1.03 (ref 1–1.03)
TROPONIN I SERPL-MCNC: <0.02 NG/ML (ref 0–0.04)
UROBILINOGEN UR QL STRIP.AUTO: 1 EU/DL (ref 0.2–1)
WBC # BLD AUTO: 9.5 K/UL (ref 4.6–13.2)
WBC URNS QL MICRO: ABNORMAL /HPF (ref 0–4)

## 2018-07-22 PROCEDURE — 93005 ELECTROCARDIOGRAM TRACING: CPT

## 2018-07-22 PROCEDURE — 81001 URINALYSIS AUTO W/SCOPE: CPT | Performed by: PHYSICIAN ASSISTANT

## 2018-07-22 PROCEDURE — 85025 COMPLETE CBC W/AUTO DIFF WBC: CPT | Performed by: PHYSICIAN ASSISTANT

## 2018-07-22 PROCEDURE — 74011250636 HC RX REV CODE- 250/636: Performed by: EMERGENCY MEDICINE

## 2018-07-22 PROCEDURE — 71045 X-RAY EXAM CHEST 1 VIEW: CPT

## 2018-07-22 PROCEDURE — 72110 X-RAY EXAM L-2 SPINE 4/>VWS: CPT

## 2018-07-22 PROCEDURE — 99284 EMERGENCY DEPT VISIT MOD MDM: CPT

## 2018-07-22 PROCEDURE — 96361 HYDRATE IV INFUSION ADD-ON: CPT

## 2018-07-22 PROCEDURE — 80053 COMPREHEN METABOLIC PANEL: CPT | Performed by: PHYSICIAN ASSISTANT

## 2018-07-22 PROCEDURE — 74011250636 HC RX REV CODE- 250/636: Performed by: PHYSICIAN ASSISTANT

## 2018-07-22 PROCEDURE — 96374 THER/PROPH/DIAG INJ IV PUSH: CPT

## 2018-07-22 PROCEDURE — 71101 X-RAY EXAM UNILAT RIBS/CHEST: CPT

## 2018-07-22 PROCEDURE — 82962 GLUCOSE BLOOD TEST: CPT

## 2018-07-22 PROCEDURE — 82550 ASSAY OF CK (CPK): CPT | Performed by: PHYSICIAN ASSISTANT

## 2018-07-22 PROCEDURE — 73502 X-RAY EXAM HIP UNI 2-3 VIEWS: CPT

## 2018-07-22 PROCEDURE — 96375 TX/PRO/DX INJ NEW DRUG ADDON: CPT

## 2018-07-22 RX ORDER — MORPHINE SULFATE 4 MG/ML
4 INJECTION, SOLUTION INTRAMUSCULAR; INTRAVENOUS
Status: COMPLETED | OUTPATIENT
Start: 2018-07-22 | End: 2018-07-22

## 2018-07-22 RX ORDER — ONDANSETRON 2 MG/ML
4 INJECTION INTRAMUSCULAR; INTRAVENOUS
Status: COMPLETED | OUTPATIENT
Start: 2018-07-22 | End: 2018-07-22

## 2018-07-22 RX ORDER — IBUPROFEN 800 MG/1
800 TABLET ORAL EVERY 8 HOURS
Qty: 15 TAB | Refills: 0 | Status: SHIPPED | OUTPATIENT
Start: 2018-07-22 | End: 2018-07-27

## 2018-07-22 RX ORDER — CYCLOBENZAPRINE HCL 5 MG
10 TABLET ORAL 3 TIMES DAILY
Qty: 18 TAB | Refills: 0 | Status: SHIPPED | OUTPATIENT
Start: 2018-07-22 | End: 2018-07-25

## 2018-07-22 RX ADMIN — SODIUM CHLORIDE 1000 ML: 900 INJECTION, SOLUTION INTRAVENOUS at 18:49

## 2018-07-22 RX ADMIN — ONDANSETRON 4 MG: 2 INJECTION, SOLUTION INTRAMUSCULAR; INTRAVENOUS at 18:49

## 2018-07-22 RX ADMIN — MORPHINE SULFATE 4 MG: 4 INJECTION, SOLUTION INTRAMUSCULAR; INTRAVENOUS at 19:55

## 2018-07-22 NOTE — ED TRIAGE NOTES
Vomiting for over a week. Yesterday tripped and fell backwards onto lower back. Concerned due to hardware in back from previous surgery. Light headed when gets up.  Multiple complaints

## 2018-07-22 NOTE — ED NOTES
I performed a brief evaluation, including history and physical, of the patient here in triage and I have determined that pt will need further treatment and evaluation from the main side ER physician. I have placed initial orders to help in expediting patients care.      July 22, 2018 at 5:04 PM - Nader Crespo

## 2018-07-22 NOTE — ED PROVIDER NOTES
[facility] EMERGENCY DEPT      7:00 PM    Date: 7/22/2018  Patient Name: Nevin Andrade    History of Presenting Illness     Chief Complaint   Patient presents with   Heath Farhan Fall    Vomiting       History Provided By: Patient    Chief Complaint: pain s/p mechanical fall  Duration:  since 1 day ago  Timing:  Worsening  Location: left shoulder, left side of back, left buttock  Quality: stabbing  Severity: Severe  Modifying Factors: s/p mechanical fall 1 day ago  Associated Symptoms: abd pain, n/v/d    49 y.o. female, nonsmoker and non-alcohol drinker with h/o stage 2 breast cancer for which she had a breast recontruction on 6/7/2018, presents to the ED with c/o severe, worsening, stabbing pain to her left buttock, left side of back and left shoulder that started 1 day ago after a mechanical fall. Pt reports she was getting ready to go to a party with the 80s theme when her foot got caught in what she had placed on the floor and fell backwards from a standing position. She landed on a carpeted floor with her buttocks and back but did not sustain any major impact to her head or lose consciousness. Pt's pain worsens with walking. Pt also presents to the ED with c/o nausea/vomitting and associated diffuse abd pain x 1.5 week. This is another complaint for which she has not been seen PA. She states has not been able to keep down food or liquid and when she has eaten over the past weak, she has either vomited or had diarrhea. Pt reports 1 episode of diarrhea from the past 1.5 weeks. She is allergic to percocet and norco.     No other complaints. Nursing notes regarding the HPI and triage nursing notes were reviewed. Prior medical records were reviewed. Current Outpatient Prescriptions   Medication Sig Dispense Refill    ibuprofen (MOTRIN) 800 mg tablet Take 1 Tab by mouth every eight (8) hours for 5 days.  15 Tab 0    cyclobenzaprine (FLEXERIL) 5 mg tablet Take 2 Tabs by mouth three (3) times daily for 3 days. 18 Tab 0    HYDROcodone-acetaminophen (NORCO) 5-325 mg per tablet Take  by mouth.  Insulin Needles, Disposable, 31 gauge x 5/16\" ndle Use to administer insulin as directed 1 Package 11    Lancets misc Use daily to monitor blood glucose 100 Each 11    glucose blood VI test strips (ASCENSIA AUTODISC VI, ONE TOUCH ULTRA TEST VI) strip Use daily to monitor blood glucose 100 Strip 11    bacitracin-neomycin-polymyxin b-hydrocortisone (CORTISPORIN) 1 % ointment Apply  to affected area two (2) times a day. 15 g 0    indomethacin (INDOCIN PO) Take  by mouth.  escitalopram oxalate (LEXAPRO) 20 mg tablet Take 20 mg by mouth daily.  carvedilol (COREG) 3.125 mg tablet Take 1 Tab by mouth two (2) times daily (with meals). 60 Tab 5    Blood Pressure Test Kit-Medium kit Blood pressure as needed 1 Kit 0    atorvastatin (LIPITOR) 40 mg tablet Take 1 Tab by mouth daily. 30 Tab 6    bisacodyl (DULCOLAX, BISACODYL,) 10 mg suppository Insert 10 mg into rectum daily as needed. 28 Suppository 1    lidocaine (LIDODERM) 5 % 2 Patches by TransDERmal route every twenty-four (24) hours. Apply patch to the affected area for 12 hours a day and remove for 12 hours a day. 90 Each 3    hydrOXYzine HCl (ATARAX) 25 mg tablet Take 1-2 tablets by mouth every 6 hours as needed. 30 Tab 0    insulin nph-regular human rec (HUMULIN 70-30) 100 unit/mL (70-30) inpn Give 30 units in the QAM and 35 units at bedtime 5 Pen 3    mupirocin (BACTROBAN) 2 % ointment Use 1 Application to affected area 3 Times Daily.  valACYclovir (VALTREX) 1 gram tablet Take  by mouth.  ferrous sulfate 325 mg (65 mg iron) tablet Take 1 Tab by mouth Daily (before breakfast). 30 Tab 3    Blood-Glucose Meter monitoring kit Use daily to check blood sugar 1 Kit 0    vit B comp-C-FA-iron-vit E (VITAMINS B COMPLEX) 500 mg-400 mcg- 18 mg iron tab Take 1 Tab by mouth daily.  30 Tab 3    fluticasone (FLONASE) 50 mcg/actuation nasal spray Use one to two sprays in each nostril daily for congestion and allergy. Indications: Allergic Rhinitis 1 Bottle 3    loratadine (CLARITIN) 10 mg tablet Take 1 Tab by mouth daily. Indications: Allergic Rhinitis 30 Tab 3    ondansetron (ZOFRAN ODT) 4 mg disintegrating tablet Take 1 Tab by mouth every eight (8) hours as needed for Nausea. 15 Tab 0    dicyclomine (BENTYL) 20 mg tablet Take 1 Tab by mouth every six (6) hours as needed (abdominal cramps) for up to 20 doses. 20 Tab 0    ondansetron (ZOFRAN ODT) 4 mg disintegrating tablet Take 1-2 tablets every 6-8 hours as needed for nausea and vomiting. 12 Tab 0    cefdinir (OMNICEF) 300 mg capsule Take 1 Cap by mouth two (2) times a day. 14 Cap 0    traZODone (DESYREL) 300 mg tablet Take 1 Tab by mouth nightly. 30 Tab 0    lisinopril-hydroCHLOROthiazide (PRINZIDE, ZESTORETIC) 20-12.5 mg per tablet Take 1 Tab by mouth daily. 30 Tab 1    polyethylene glycol (MIRALAX) 17 gram packet Take 1 Packet by mouth daily. 30 Each 1    ALBUTEROL IN Take  by inhalation.  LORazepam (ATIVAN) 1 mg tablet 1 mg.  EPINEPHrine (EPIPEN) 0.3 mg/0.3 mL (1:1,000) injection 0.3 mL by IntraMUSCular route once as needed for up to 1 dose. 1 Each 1       Past History     Past Medical History:  Past Medical History:   Diagnosis Date    Breast cancer ([facility] [address]     breast cancer, right, S/P Mastectomy and chemo, radiation in 2013    Depression     Diabetes ([facility] [address]     Drug abuse     H/O cocaine use in past    Gastrointestinal disorder     cholitis    H/O ETOH abuse     Hyperlipidemia     Hypertension     Spine injury     Vitamin D deficiency 5/1/2018       Past Surgical History:  Past Surgical History:   Procedure Laterality Date    COLONOSCOPY N/A 7/18/2016    COLONOSCOPY performed by Kings Bower MD at [facility]COPY    HX BREAST LUMPECTOMY  06/2013    right    HX HYSTERECTOMY      HX ORTHOPAEDIC      Back fusion surgery 4/18/14.      HX OTHER SURGICAL      mediport    HX TONSILLECTOMY      HX TUBAL LIGATION         Family History:  Family History   Problem Relation Age of Onset    Heart Disease Mother     Stroke Father     Heart Disease Father 48    Diabetes Other     Hypertension Other     Cancer Maternal Grandmother        Social History:  Social History   Substance Use Topics    Smoking status: Never Smoker    Smokeless tobacco: Never Used    Alcohol use No      Comment: \"Occasionally\"       Allergies: Allergies   Allergen Reactions    Latex Hives and Itching    Other Food Rash     Almonds    Amoxicillin Swelling     Other reaction(s): mild rash/itching    Aspirin Not Reported This Time and Swelling     Mouth swells    Fentanyl Anaphylaxis and Swelling     Patches cause mouth to swell  Swelling in mouth from fentanyl patch    Levaquin [Levofloxacin] Not Reported This Time and Swelling     Other reaction(s): mild rash/itching    Chlorhexidine Rash    Norco [Hydrocodone-Acetaminophen] Nausea and Vomiting     Other reaction(s): gi distress    Acetaminophen Other (comments)    Elizabeth Rash and Itching    Codeine Other (comments)    Glycopyrrolate Swelling     Pt  States  faciAL  SWELLING   POST  ROBINUL  IV   Pt  States  faciAL  SWELLING   POST  ROBINUL  IV     Morphine Nausea and Vomiting     Pt states she is not allergic    Pcn [Penicillins] Swelling    Percocet [Oxycodone-Acetaminophen] Nausea and Vomiting     Other reaction(s): gi distress  nausea  Other reaction(s): anaphylaxis/angioedema  Per pt. She is allergic    Tape [Adhesive] Rash    Tramadol Swelling       Patient's primary care provider (as noted in EPIC):  Sean Arreaga NP    Review of Systems   Gastrointestinal: Positive for abdominal pain, diarrhea, nausea and vomiting. Musculoskeletal: Positive for back pain (left side of back). Left shoulder pain  Left buttock pain    Neurological: Negative for syncope. All other systems reviewed and are negative.       Visit Vitals    BP 134/86 (BP 1 Location: Right arm, BP Patient Position: At rest)    Pulse 96    Temp 98.2 °F (36.8 °C)    Resp 16    Ht 5' 2\" (1.575 m)    Wt 71.7 kg (158 lb)    SpO2 100%    BMI 28.9 kg/m2       PHYSICAL EXAM:    CONSTITUTIONAL: Alert, in no apparent distress; well-developed; well-nourished. HEAD:  Normocephalic, atraumatic. No Battles sign. No Raccoons eyes. EYES:  EOM's intact. Normal conjunctiva. Anicteric sclera. ENTM: Nose: no rhinorrhea; Oropharynx:  mucous membranes moist    Neck:  No JVD. No cervical vertebral bony point tenderness or step-off. RESP: Chest clear, equal breath sounds. CARDIOVASCULAR:  Regular rate and rhythm. No murmurs, rubs, or gallops. GI: Normal bowel sounds, abdomen soft and non-tender. No masses or organomegaly. : No costo-vertebral angle tenderness. BACK:  No TLS vertebral bony point tenderness or step-off. Affected area:  Left parathoracic and mid back both have mild focal reproducible tenderness to palpation. No rash or lesions. No bruising. UPPER EXT:  Normal inspection. LOWER EXT: No edema, no calf tenderness. Distal pulses intact. Affected area:  Left inferior buttock has mild focal reproducible tenderness to palpation. No rash or lesions. No bruising. NEURO: Grossly normal motor and sensation. SKIN: No rashes; Normal for age and stage. PSYCH:  Alert and oriented, normal affect. DIFFERENTIAL DIAGNOSES/ MEDICAL DECISION MAKING:  Contusion, sprain, dislocation, fracture, ligamentous tear/ disruption or a combination of the above.     Diagnostic Study Results     Abnormal lab results from this emergency department encounter:  Labs Reviewed   METABOLIC PANEL, COMPREHENSIVE - Abnormal; Notable for the following:        Result Value    Glucose 276 (*)     BUN 23 (*)     Creatinine 1.57 (*)     GFR est AA 43 (*)     GFR est non-AA 35 (*)     All other components within normal limits   URINALYSIS W/ RFLX MICROSCOPIC - Abnormal; Notable for the following:     Protein 30 (*)     Glucose 100 (*)     Ketone TRACE (*)     Leukocyte Esterase MODERATE (*)     All other components within normal limits   URINE MICROSCOPIC ONLY - Abnormal; Notable for the following:     Bacteria 3+ (*)     All other components within normal limits   GLUCOSE, POC - Abnormal; Notable for the following:     Glucose (POC) 258 (*)     All other components within normal limits   CBC WITH AUTOMATED DIFF   CARDIAC PANEL,(CK, CKMB & TROPONIN)       Lab values for this patient within approximately the last 12 hours:  Recent Results (from the past 12 hour(s))   EKG, 12 LEAD, INITIAL    Collection Time: 07/22/18  5:49 PM   Result Value Ref Range    Ventricular Rate 95 BPM    Atrial Rate 95 BPM    P-R Interval 152 ms    QRS Duration 82 ms    Q-T Interval 348 ms    QTC Calculation (Bezet) 437 ms    Calculated P Axis 64 degrees    Calculated R Axis -12 degrees    Calculated T Axis 66 degrees    Diagnosis       Normal sinus rhythm  Normal ECG  When compared with ECG of 02-JUL-2018 23:52,  No significant change was found     CBC WITH AUTOMATED DIFF    Collection Time: 07/22/18  6:16 PM   Result Value Ref Range    WBC 9.5 4.6 - 13.2 K/uL    RBC 4.24 4.20 - 5.30 M/uL    HGB 12.4 12.0 - 16.0 g/dL    HCT 37.7 35.0 - 45.0 %    MCV 88.9 74.0 - 97.0 FL    MCH 29.2 24.0 - 34.0 PG    MCHC 32.9 31.0 - 37.0 g/dL    RDW 13.2 11.6 - 14.5 %    PLATELET 326 123 - 421 K/uL    MPV 10.9 9.2 - 11.8 FL    NEUTROPHILS 71 40 - 73 %    LYMPHOCYTES 21 21 - 52 %    MONOCYTES 6 3 - 10 %    EOSINOPHILS 2 0 - 5 %    BASOPHILS 0 0 - 2 %    ABS. NEUTROPHILS 6.8 1.8 - 8.0 K/UL    ABS. LYMPHOCYTES 2.0 0.9 - 3.6 K/UL    ABS. MONOCYTES 0.5 0.05 - 1.2 K/UL    ABS. EOSINOPHILS 0.2 0.0 - 0.4 K/UL    ABS.  BASOPHILS 0.0 0.0 - 0.1 K/UL    DF AUTOMATED     METABOLIC PANEL, COMPREHENSIVE    Collection Time: 07/22/18  6:16 PM   Result Value Ref Range    Sodium 140 136 - 145 mmol/L    Potassium 3.6 3.5 - 5.5 mmol/L    Chloride 102 100 - 108 mmol/L    CO2 26 21 - 32 mmol/L    Anion gap 12 3.0 - 18 mmol/L    Glucose 276 (H) 74 - 99 mg/dL    BUN 23 (H) 7.0 - 18 MG/DL    Creatinine 1.57 (H) 0.6 - 1.3 MG/DL    BUN/Creatinine ratio 15 12 - 20      GFR est AA 43 (L) >60 ml/min/1.73m2    GFR est non-AA 35 (L) >60 ml/min/1.73m2    Calcium 9.4 8.5 - 10.1 MG/DL    Bilirubin, total 0.3 0.2 - 1.0 MG/DL    ALT (SGPT) 29 13 - 56 U/L    AST (SGOT) 21 15 - 37 U/L    Alk. phosphatase 116 45 - 117 U/L    Protein, total 7.5 6.4 - 8.2 g/dL    Albumin 4.0 3.4 - 5.0 g/dL    Globulin 3.5 2.0 - 4.0 g/dL    A-G Ratio 1.1 0.8 - 1.7     CARDIAC PANEL,(CK, CKMB & TROPONIN)    Collection Time: 07/22/18  6:16 PM   Result Value Ref Range    CK 37 26 - 192 U/L    CK - MB <1.0 <3.6 ng/ml    CK-MB Index  0.0 - 4.0 %     CALCULATION NOT PERFORMED WHEN RESULT IS BELOW LINEAR LIMIT    Troponin-I, Qt. <0.02 0.0 - 0.045 NG/ML   GLUCOSE, POC    Collection Time: 07/22/18  6:24 PM   Result Value Ref Range    Glucose (POC) 258 (H) 70 - 110 mg/dL   URINALYSIS W/ RFLX MICROSCOPIC    Collection Time: 07/22/18  6:30 PM   Result Value Ref Range    Color DARK YELLOW      Appearance CLOUDY      Specific gravity 1.027 1.005 - 1.030      pH (UA) 5.0 5.0 - 8.0      Protein 30 (A) NEG mg/dL    Glucose 100 (A) NEG mg/dL    Ketone TRACE (A) NEG mg/dL    Bilirubin NEGATIVE  NEG      Blood NEGATIVE  NEG      Urobilinogen 1.0 0.2 - 1.0 EU/dL    Nitrites NEGATIVE  NEG      Leukocyte Esterase MODERATE (A) NEG     URINE MICROSCOPIC ONLY    Collection Time: 07/22/18  6:30 PM   Result Value Ref Range    WBC 11 to 20 0 - 4 /hpf    RBC NONE 0 - 5 /hpf    Epithelial cells 4+ 0 - 5 /lpf    Bacteria 3+ (A) NEG /hpf       Radiologist and cardiologist interpretations if available at time of this note:  No results found.      XR RIBS LT W PA CXR MIN 3 V  --------------------------------------------------  Impression by Dr. Sykes Plate  No fractures, old lumbar orthopedic hardware    XR HIP LT W OR WO PELV 2-3 VWS  ------------------------------------------------------  Impression by Dr. Home Muñoz  No dislocation or fracture    XR SPINE LUMB MIN 4 V   -------------------------------------------------------  Impression by Dr. Home Muñoz  No acute process  Old orthopedic hardware     XR CHEST SNGL V   ---------------------------------------------------------  Impression by Dr. Home Muñoz  No acute process    Medication(s) ordered for patient during this emergency visit encounter:  Medications   ondansetron [facility] injection 4 mg (4 mg IntraVENous Given 7/22/18 1849)   sodium chloride 0.9 % bolus infusion 1,000 mL (1,000 mL IntraVENous New Bag 7/22/18 1849)   morphine injection 4 mg (4 mg IntraVENous Given 7/22/18 1955)       Medical Decision Making     I am the first provider for this patient. I reviewed the vital signs, available nursing notes, past medical history, past surgical history, family history and social history. Vital Signs:  Reviewed the patient's vital signs. Noted UA but no UTI signs nor symptoms. Suboptimal specimen given number of epithelial cells. Rhythm: NSR  Rate: 95 bpm  Interpretation: No STEMI   EKG interpret by Ally Landis MD 17:49 PM    ED COURSE:        SPECIFIC PATIENT INSTRUCTIONS FROM THE PHYSICIAN WHO TREATED YOU IN THE ER TODAY:  1. Ibuprofen as prescribed until finished. IF flexeril was prescribed, take that as prescribed until finished as well. 2. Return if any concerns or worsening condition(s). 3. FOLLOW UP APPOINTMENT:  Your primary doctor in 1-2 days. Patient is improved, resting quietly and comfortably. The patient will be discharged home. The patient was reassured that these symptoms do not appear to represent a serious or life threatening condition at this time. Warning signs of worsening condition were discussed and understood by the patient.      Based on patient's age, coexisting illness, exam, and the results of this ED evaluation, the decision to treat as an outpatient was made. Based on the information available at time of discharge, acute pathology requiring immediate intervention was deemed relative unlikely. While it is impossible to completely exclude the possibility of underlying serious disease or worsening of condition, I feel the relative likelihood is extremely low. I discussed this uncertainty with the patient, who understood ED evaluation and treatment and felt comfortable with the outpatient treatment plan. All questions regarding care, test results, and follow up were answered. The patient is stable and appropriate to discharge. They understand that they should return to the emergency department for any new or worsening symptoms. I stressed the importance of follow up for repeat assessment and possibly further evaluation/treatment. Coding Diagnoses     Clinical Impression:   1. Contusion of buttock, initial encounter    2. Strain of lumbar region, initial encounter    3. Strain of mid-back, initial encounter        Disposition     Disposition:  Home. MARYLOU Morataya Board Certified Emergency Physician    Provider Attestation:  If a scribe was utilized in generation of this patient record, I personally performed the services described in the documentation, reviewed the documentation, as recorded by the scribe in my presence, and it accurately records the patient's history of presenting illness, review of systems, patient physical examination, and procedures performed by me as the attending physician. MARYLOU Morataya Board Certified Emergency Physician  7/22/2018.  8:10 PM      Scribe Attestation     Dominique Hernandez acting as a scribe for and in the presence of Tremaine Mcbride MD      July 22, 2018 at 7:20 PM       Provider Attestation:      I personally performed the services described in the documentation, reviewed the documentation, as recorded by the scribe in my presence, and it accurately and completely records my words and actions.  July 22, 2018 at 7:20 PM - Marshal Lundborg, MD

## 2018-07-23 LAB
ATRIAL RATE: 95 BPM
CALCULATED P AXIS, ECG09: 64 DEGREES
CALCULATED R AXIS, ECG10: -12 DEGREES
CALCULATED T AXIS, ECG11: 66 DEGREES
DIAGNOSIS, 93000: NORMAL
P-R INTERVAL, ECG05: 152 MS
Q-T INTERVAL, ECG07: 348 MS
QRS DURATION, ECG06: 82 MS
QTC CALCULATION (BEZET), ECG08: 437 MS
VENTRICULAR RATE, ECG03: 95 BPM

## 2018-07-23 NOTE — DISCHARGE INSTRUCTIONS
SPECIFIC PATIENT INSTRUCTIONS FROM THE PHYSICIAN WHO TREATED YOU IN THE ER TODAY:  1. Ibuprofen as prescribed until finished. IF flexeril was prescribed, take that as prescribed until finished as well. 2. Return if any concerns or worsening condition(s). 3. FOLLOW UP APPOINTMENT:  Your primary doctor in 1-2 days. 4. Do not take the flexeril if you are also taking narcotic medications at the same time. Back Strain: Care Instructions  Your Care Instructions    Back strain happens when you overstretch, or pull, a muscle in your back. You may hurt your back in an accident or when you exercise or lift something. Most back pain will get better with rest and time. You can take care of yourself at home to help your back heal.  Follow-up care is a key part of your treatment and safety. Be sure to make and go to all appointments, and call your doctor if you are having problems. It's also a good idea to know your test results and keep a list of the medicines you take. How can you care for yourself at home? · Try to stay as active as you can, but stop or reduce any activity that causes pain. · Put ice or a cold pack on the sore muscle for 10 to 20 minutes at a time to stop swelling. Try this every 1 to 2 hours for 3 days (when you are awake) or until the swelling goes down. Put a thin cloth between the ice pack and your skin. · After 2 or 3 days, apply a heating pad on low or a warm cloth to your back. Some doctors suggest that you go back and forth between hot and cold treatments. · Take pain medicines exactly as directed. ¨ If the doctor gave you a prescription medicine for pain, take it as prescribed. ¨ If you are not taking a prescription pain medicine, ask your doctor if you can take an over-the-counter medicine. · Try sleeping on your side with a pillow between your legs. Or put a pillow under your knees when you lie on your back. These measures can ease pain in your lower back.   · Return to your usual level of activity slowly. When should you call for help? Call 911 anytime you think you may need emergency care. For example, call if:    · You are unable to move a leg at all.   [facility] your doctor now or seek immediate medical care if:    · You have new or worse symptoms in your legs, belly, or buttocks. Symptoms may include:  ¨ Numbness or tingling. ¨ Weakness. ¨ Pain.     · You lose bladder or bowel control.    Watch closely for changes in your health, and be sure to contact your doctor if:    · You have a fever, lose weight, or don't feel well.     · You are not getting better as expected. Where can you learn more? Go to http://imani-jenny.info/. Enter Q336 in the search box to learn more about \"Back Strain: Care Instructions. \"  Current as of: November 29, 2017  Content Version: 11.7  © 8068-0734 [employer]. Care instructions adapted under license by [employer] (which disclaims liability or warranty for this information). If you have questions about a medical condition or this instruction, always ask your healthcare professional. [place] [address] any warranty or liability for your use of this information. Low Back Contusion: Care Instructions  Your Care Instructions    Contusion is the medical term for a bruise. When you have a low back bruise, it's often caused by a direct blow or an impact, such as falling against a counter or table. Bruises are common sports injuries. Most people think of a bruise as a black-and-blue spot. This happens when small blood vessels get torn and leak blood under the skin. But bones, muscles, and organs can also get bruised. If these deep tissues are damaged, you may not always see a bruise. The doctor will examine your bruise. You may also have tests to make sure you do not have a more serious injury, such as a broken bone or nerve damage.  Tests may include X-rays or other imaging tests like a CT scan or MRI. Low back bruises may cause pain and swelling. But if there is no serious damage, they will often get better with home treatment in several days to a few weeks. The doctor has checked you carefully, but problems can develop later. If you notice any problems or new symptoms, get medical treatment right away. Follow-up care is a key part of your treatment and safety. Be sure to make and go to all appointments, and call your doctor if you are having problems. It's also a good idea to know your test results and keep a list of the medicines you take. How can you care for yourself at home? · Put ice or a cold pack on the sore area for 10 to 20 minutes at a time to stop swelling. Put a thin cloth between the ice pack and your skin. · Be safe with medicines. Read and follow all instructions on the label. ¨ If the doctor gave you a prescription medicine for pain, take it as prescribed. ¨ If you are not taking a prescription pain medicine, ask your doctor if you can take an over-the-counter medicine. · For the first day or two of pain, take it easy. But as soon as possible, get back to your normal daily life and activities. · Get gentle exercise, such as walking. Movement keeps your spine flexible and helps your muscles stay strong. When should you call for help? Call 911 anytime you think you may need emergency care. For example, call if:    · You are unable to move a leg at all.   [facility] your doctor now or seek immediate medical care if:    · You have new or worse symptoms in your legs or buttocks. Symptoms may include:  ¨ Numbness or tingling. ¨ Weakness. ¨ Pain.     · You lose bladder or bowel control.     · You have blood in your urine.    Watch closely for changes in your health, and be sure to contact your doctor if:    · You do not get better as expected. Where can you learn more? Go to http://imani-jenny.info/.   Enter V337 in the search box to learn more about \"Low Back Contusion: Care Instructions. \"  Current as of: 2017  Content Version: 11.7  © 9465-7225 [employer]. Care instructions adapted under license by [employer] (which disclaims liability or warranty for this information). If you have questions about a medical condition or this instruction, always ask your healthcare professional. [address] any warranty or liability for your use of this information. [employer] Activation    Thank you for requesting access to [employer]. Please follow the instructions below to securely access and download your online medical record. [employer] allows you to send messages to your doctor, view your test results, renew your prescriptions, schedule appointments, and more. How Do I Sign Up? 1. In your internet browser, go to [email or web address]. 2. Click on the First Time User? Click Here link in the Sign In box. You will see the New Member Sign Up page. 3. Enter your [employer] Access Code exactly as it appears below. You will not need to use this code after youve completed the sign-up process. If you do not sign up before the expiration date, you must request a new code. [employer] Access Code: [email or web address]  Expires: 9/10/2018 12:09 AM (This is the date your [employer] access code will )    4. Enter the last four digits of your Social Security Number (xxxx) and Date of Birth (mm/dd/yyyy) as indicated and click Submit. You will be taken to the next sign-up page. 5. Create a [employer] ID. This will be your [employer] login ID and cannot be changed, so think of one that is secure and easy to remember. 6. Create a [employer] password. You can change your password at any time. 7. Enter your Password Reset Question and Answer. This can be used at a later time if you forget your password. 8. Enter your e-mail address. You will receive e-mail notification when new information is available in [address]   9. Click Sign Up. You can now view and download portions of your medical record. 10. Click the Download Summary menu link to download a portable copy of your medical information. Additional Information    If you have questions, please visit the Frequently Asked Questions section of the [employer] website at [email or web address] Remember, [facility] is NOT to be used for urgent needs. For medical emergencies, dial 911.

## 2018-08-02 ENCOUNTER — APPOINTMENT (OUTPATIENT)
Dept: INFUSION THERAPY | Age: 48
End: 2018-08-02

## 2018-08-20 ENCOUNTER — HOSPITAL ENCOUNTER (EMERGENCY)
Age: 48
Discharge: HOME | End: 2018-08-20
Attending: EMERGENCY MEDICINE
Payer: MEDICAID

## 2018-08-20 VITALS
BODY MASS INDEX: 29.87 KG/M2 | RESPIRATION RATE: 16 BRPM | OXYGEN SATURATION: 99 % | WEIGHT: 162.3 LBS | TEMPERATURE: 98.2 F | HEART RATE: 89 BPM | DIASTOLIC BLOOD PRESSURE: 93 MMHG | HEIGHT: 62 IN | SYSTOLIC BLOOD PRESSURE: 152 MMHG

## 2018-08-20 DIAGNOSIS — R03.0 ELEVATED BLOOD PRESSURE READING: ICD-10-CM

## 2018-08-20 DIAGNOSIS — G89.18 POSTOPERATIVE PAIN: Primary | ICD-10-CM

## 2018-08-20 PROCEDURE — 99283 EMERGENCY DEPT VISIT LOW MDM: CPT

## 2018-08-20 PROCEDURE — 74011250637 HC RX REV CODE- 250/637: Performed by: EMERGENCY MEDICINE

## 2018-08-20 RX ORDER — HYDROCODONE BITARTRATE AND ACETAMINOPHEN 5; 325 MG/1; MG/1
1 TABLET ORAL
Status: COMPLETED | OUTPATIENT
Start: 2018-08-20 | End: 2018-08-20

## 2018-08-20 RX ADMIN — HYDROCODONE BITARTRATE AND ACETAMINOPHEN 1 TABLET: 5; 325 TABLET ORAL at 02:01

## 2018-08-20 NOTE — DISCHARGE INSTRUCTIONS
Acute Pain After Surgery: Care Instructions  Your Care Instructions    It's common to have some pain after surgery. Pain doesn't mean that something is wrong or that the surgery didn't go well. But when the pain is severe, it's important to work with your doctor to manage it. It's also important to be aware of a few facts about pain and pain medicine. · You are the only person who knows what your pain feels like. So be sure to tell your doctor when you are in pain or when the pain changes. Then he or she will know how to adjust your medicines. · Pain is often easier to control right after it starts. So it may be better to take regular doses of pain medicine and not wait until the pain gets bad. · Medicine can help control pain. But this doesn't mean you'll have no pain. Medicine works to keep the pain at a level you can live with. With time, you will feel better. Follow-up care is a key part of your treatment and safety. Be sure to make and go to all appointments, and call your doctor if you are having problems. It's also a good idea to know your test results and keep a list of the medicines you take. How can you care for yourself at home? · Be safe with medicines. Read and follow all instructions on the label. ¨ If the doctor gave you a prescription medicine for pain, take it as prescribed. ¨ If you are not taking a prescription pain medicine, ask your doctor if you can take an over-the-counter medicine. · If you take an over-the-counter pain medicine, such as acetaminophen (Tylenol), ibuprofen (Advil, Motrin), or naproxen (Aleve), read and follow all instructions on the label. · Do not take two or more pain medicines at the same time unless the doctor told you to. · Do not drink alcohol while you are taking pain medicines. · Try to walk each day if your doctor recommends it. Start by walking a little more than you did the day before. Bit by bit, increase the amount you walk.  Walking increases blood flow. It also helps prevent pneumonia and constipation. · To prevent constipation from opioid pain medicines:  ¨ Talk to your doctor about a laxative. ¨ Include fruits, vegetables, beans, and whole grains in your diet each day. These foods are high in fiber. ¨ Drink plenty of fluids, enough so that your urine is light yellow or clear like water. Drink water, fruit juice, or other drinks that do not contain caffeine or alcohol. If you have kidney, heart, or liver disease and have to limit fluids, talk with your doctor before you increase the amount of fluids you drink. ¨ Take a fiber supplement, such as Citrucel or Metamucil, every day if needed. Read and follow all instructions on the label. If you take pain medicine for more than a few days, talk to your doctor before you take fiber. When should you call for help? Call your doctor now or seek immediate medical care if:    · Your pain gets worse.     · Your pain is not controlled by medicine.    Watch closely for changes in your health, and be sure to contact your doctor if you have any problems. Where can you learn more? Go to http://imani-jenny.info/. Enter [phone] [phone] in the search box to learn more about \"Acute Pain After Surgery: Care Instructions. \"  Current as of: November 20, 2017  Content Version: 11.7  © 4222-0198 [employer]. Care instructions adapted under license by [employer] (which disclaims liability or warranty for this information). If you have questions about a medical condition or this instruction, always ask your healthcare professional. [place] [address] any warranty or liability for your use of this information. Elevated Blood Pressure: Care Instructions  Your Care Instructions    Blood pressure is a measure of how hard the blood pushes against the walls of your arteries. It's normal for blood pressure to go up and down throughout the day.  But if it stays up over time, you have high blood pressure. Two numbers tell you your blood pressure. The first number is the systolic pressure. It shows how hard the blood pushes when your heart is pumping. The second number is the diastolic pressure. It shows how hard the blood pushes between heartbeats, when your heart is relaxed and filling with blood. An ideal blood pressure in adults is less than 120/80 (say \"120 over 80\"). High blood pressure is 140/90 or higher. You have high blood pressure if your top number is 140 or higher or your bottom number is 90 or higher, or both. The main test for high blood pressure is simple, fast, and painless. To diagnose high blood pressure, your doctor will test your blood pressure at different times. After testing your blood pressure, your doctor may ask you to test it again when you are home. If you are diagnosed with high blood pressure, you can work with your doctor to make a long-term plan to manage it. Follow-up care is a key part of your treatment and safety. Be sure to make and go to all appointments, and call your doctor if you are having problems. It's also a good idea to know your test results and keep a list of the medicines you take. How can you care for yourself at home? · Do not smoke. Smoking increases your risk for heart attack and stroke. If you need help quitting, talk to your doctor about stop-smoking programs and medicines. These can increase your chances of quitting for good. · Stay at a healthy weight. · Try to limit how much sodium you eat to less than 2,300 milligrams (mg) a day. Your doctor may ask you to try to eat less than 1,500 mg a day. · Be physically active. Get at least 30 minutes of exercise on most days of the week. Walking is a good choice. You also may want to do other activities, such as running, swimming, cycling, or playing tennis or team sports. · Avoid or limit alcohol. Talk to your doctor about whether you can drink any alcohol.   · Eat plenty of fruits, vegetables, and low-fat dairy products. Eat less saturated and total fats. · Learn how to check your blood pressure at home. When should you call for help? Call your doctor now or seek immediate medical care if:  ? · Your blood pressure is much higher than normal (such as 180/110 or higher). ? · You think high blood pressure is causing symptoms such as:  ¨ Severe headache. ¨ Blurry vision. ? Watch closely for changes in your health, and be sure to contact your doctor if:  ? · You do not get better as expected. Where can you learn more? Go to http://imani-jenny.info/. Enter E452 in the search box to learn more about \"Elevated Blood Pressure: Care Instructions. \"  Current as of: September 21, 2016  Content Version: 11.4  © 2887-2705 [employer]. Care instructions adapted under license by [employer] (which disclaims liability or warranty for this information). If you have questions about a medical condition or this instruction, always ask your healthcare professional. [place] [address] any warranty or liability for your use of this information.

## 2018-08-20 NOTE — ED PROVIDER NOTES
EMERGENCY DEPARTMENT HISTORY AND PHYSICAL EXAM    1:47 AM      Date: 8/20/2018  Patient Name: Virgie Gates    History of Presenting Illness     Chief Complaint   Patient presents with    Breast pain    Nausea         History Provided By: Patient    Chief Complaint: Bilateral breast pain  Duration:  Days  Timing:  Acute  Location: Bilateral breasts  Quality: Burning  Severity: Moderate  Modifying Factors: None   Associated Symptoms: denies any other associated signs or symptoms      Additional History (Context): Virgie Gates is a 49 y.o. female with a hx of HTN, diabetes, HLD, hysterectomy, breast cancer, and bilateral mastectomy who presents with complaints of bilateral breast pain that started 10 days ago. She reports that she had breast fillers placed 10 days ago through her plast surgeon in preparation for breast reconstruction. She was told that she would experience pain and was told to take Ibuprofen which she has taken with no relief. She has also tried hot baths, heat pads, and cooling pads with no relief. She has another appointment with her plastic surgeon in two days and she is starting to see pain management in a month. She has had a mild cough. She denies SOB, fever, chest pain, and any further complaints. PCP: Ariel Tirado NP    Current Outpatient Prescriptions   Medication Sig Dispense Refill    HYDROcodone-acetaminophen (Lynnetta Phan) 5-325 mg per tablet Take  by mouth.  Insulin Needles, Disposable, 31 gauge x 5/16\" ndle Use to administer insulin as directed 1 Package 11    Lancets misc Use daily to monitor blood glucose 100 Each 11    glucose blood VI test strips (ASCENSIA AUTODISC VI, ONE TOUCH ULTRA TEST VI) strip Use daily to monitor blood glucose 100 Strip 11    bacitracin-neomycin-polymyxin b-hydrocortisone (CORTISPORIN) 1 % ointment Apply  to affected area two (2) times a day. 15 g 0    indomethacin (INDOCIN PO) Take  by mouth.       escitalopram oxalate (LEXAPRO) 20 mg tablet Take 20 mg by mouth daily.  carvedilol (COREG) 3.125 mg tablet Take 1 Tab by mouth two (2) times daily (with meals). 60 Tab 5    Blood Pressure Test Kit-Medium kit Blood pressure as needed 1 Kit 0    atorvastatin (LIPITOR) 40 mg tablet Take 1 Tab by mouth daily. 30 Tab 6    bisacodyl (DULCOLAX, BISACODYL,) 10 mg suppository Insert 10 mg into rectum daily as needed. 28 Suppository 1    lidocaine (LIDODERM) 5 % 2 Patches by TransDERmal route every twenty-four (24) hours. Apply patch to the affected area for 12 hours a day and remove for 12 hours a day. 90 Each 3    hydrOXYzine HCl (ATARAX) 25 mg tablet Take 1-2 tablets by mouth every 6 hours as needed. 30 Tab 0    insulin nph-regular human rec (HUMULIN 70-30) 100 unit/mL (70-30) inpn Give 30 units in the QAM and 35 units at bedtime 5 Pen 3    mupirocin (BACTROBAN) 2 % ointment Use 1 Application to affected area 3 Times Daily.  valACYclovir (VALTREX) 1 gram tablet Take  by mouth.  ferrous sulfate 325 mg (65 mg iron) tablet Take 1 Tab by mouth Daily (before breakfast). 30 Tab 3    Blood-Glucose Meter monitoring kit Use daily to check blood sugar 1 Kit 0    vit B comp-C-FA-iron-vit E (VITAMINS B COMPLEX) 500 mg-400 mcg- 18 mg iron tab Take 1 Tab by mouth daily. 30 Tab 3    fluticasone (FLONASE) 50 mcg/actuation nasal spray Use one to two sprays in each nostril daily for congestion and allergy. Indications: Allergic Rhinitis 1 Bottle 3    loratadine (CLARITIN) 10 mg tablet Take 1 Tab by mouth daily. Indications: Allergic Rhinitis 30 Tab 3    ondansetron (ZOFRAN ODT) 4 mg disintegrating tablet Take 1 Tab by mouth every eight (8) hours as needed for Nausea. 15 Tab 0    dicyclomine (BENTYL) 20 mg tablet Take 1 Tab by mouth every six (6) hours as needed (abdominal cramps) for up to 20 doses. 20 Tab 0    ondansetron (ZOFRAN ODT) 4 mg disintegrating tablet Take 1-2 tablets every 6-8 hours as needed for nausea and vomiting.  12 Tab 0    cefdinir (OMNICEF) 300 mg capsule Take 1 Cap by mouth two (2) times a day. 14 Cap 0    traZODone (DESYREL) 300 mg tablet Take 1 Tab by mouth nightly. 30 Tab 0    lisinopril-hydroCHLOROthiazide (PRINZIDE, ZESTORETIC) 20-12.5 mg per tablet Take 1 Tab by mouth daily. 30 Tab 1    polyethylene glycol (MIRALAX) 17 gram packet Take 1 Packet by mouth daily. 30 Each 1    ALBUTEROL IN Take  by inhalation.  LORazepam (ATIVAN) 1 mg tablet 1 mg.  EPINEPHrine (EPIPEN) 0.3 mg/0.3 mL (1:1,000) injection 0.3 mL by IntraMUSCular route once as needed for up to 1 dose. 1 Each 1       Past History     Past Medical History:  Past Medical History:   Diagnosis Date    Breast cancer ([facility] [address]     breast cancer, right, S/P Mastectomy and chemo, radiation in 2013    Depression     Diabetes ([facility] [address]     Drug abuse     H/O cocaine use in past    Gastrointestinal disorder     cholitis    H/O ETOH abuse     Hyperlipidemia     Hypertension     Spine injury     Vitamin D deficiency 5/1/2018       Past Surgical History:  Past Surgical History:   Procedure Laterality Date    COLONOSCOPY N/A 7/18/2016    COLONOSCOPY performed by [facility] B[facility] at [facility] ENDOSCOPY    HX BREAST LUMPECTOMY  06/2013    right    HX HYSTERECTOMY      HX ORTHOPAEDIC      Back fusion surgery 4/18/14.  HX OTHER SURGICAL      mediport    HX TONSILLECTOMY      HX TUBAL LIGATION         Family History:  Family History   Problem Relation Age of Onset    Heart Disease Mother     Stroke Father     Heart Disease Father 48    Diabetes Other     Hypertension Other     Cancer Maternal Grandmother        Social History:  Social History   Substance Use Topics    Smoking status: Never Smoker    Smokeless tobacco: Never Used    Alcohol use No      Comment: \"Occasionally\"       Allergies:   Allergies   Allergen Reactions    Latex Hives and Itching    Other Food Rash     Almonds    Amoxicillin Swelling     Other reaction(s): mild rash/itching    Aspirin Not Reported This Time and Swelling     Mouth swells    Fentanyl Anaphylaxis and Swelling     Patches cause mouth to swell  Swelling in mouth from fentanyl patch    Levaquin [Levofloxacin] Not Reported This Time and Swelling     Other reaction(s): mild rash/itching    Chlorhexidine Rash    Norco [Hydrocodone-Acetaminophen] Nausea and Vomiting     Other reaction(s): gi distress    Acetaminophen Other (comments)    Algona Rash and Itching    Codeine Other (comments)    Glycopyrrolate Swelling     Pt  States  faciAL  SWELLING   POST  ROBINUL  IV   Pt  States  faciAL  SWELLING   POST  ROBINUL  IV     Morphine Nausea and Vomiting     Pt states she is not allergic    Pcn [Penicillins] Swelling    Percocet [Oxycodone-Acetaminophen] Nausea and Vomiting     Other reaction(s): gi distress  nausea  Other reaction(s): anaphylaxis/angioedema  Per pt. She is allergic    Tape [Adhesive] Rash    Tramadol Swelling         Review of Systems     Review of Systems   Respiratory: Positive for cough. Negative for shortness of breath. Musculoskeletal:        Positive for bilateral breast pain   All other systems reviewed and are negative. Visit Vitals    BP (!) 152/93    Pulse 89    Temp 98.2 °F (36.8 °C)    Resp 16    Ht 5' 2\" (1.575 m)    Wt 73.6 kg (162 lb 4.8 oz)    LMP 08/20/2013    SpO2 99%    BMI 29.69 kg/m2           Physical Exam / Medical Decision Making   I am the first provider for this patient. I reviewed the vital signs, available nursing notes, past medical history, past surgical history, family history and social history. Vital Signs-Reviewed the patient's vital signs. Physical exam:  General:  Well-developed, well-nourished, no apparent distress    Head:  Normocephalic atraumatic    Eyes:  Pupils midrange extraocular movements grossly intact.     Nose:  No rhinorrhea, inspection grossly normal    Ears:  Grossly normal to inspection    Mouth:  Mucous membranes moist Neck/Back:  Trachea midline, no asymmetry  Chest:  Grossly normal inspection, symmetric chest rise, respirations nonlabored  Skin: Cursory examination without any rash or erythema  Extremities:  Grossly normal to inspection    Neurologic:  Alert and oriented no appreciable focal neurologic deficit  Psychiatric:  Grossly normal mood and affect  Nursing note reviewed, vital signs reviewed. ED course:  Patient with a history of bilateral mastectomy status post tissue filler by plastic surgery, no overlying skin changes no concern for infection, likely burning type pain secondary to be tissue filler itself. We'll give single dose pain medication but once again urged to follow up with primary care or her surgeon for pain management she does have an appointment for pain management next month by her report    Patient's presentation, history, physical exam and laboratory evaluations were reviewed. At this time patient was felt to be stable for outpatient management and follow with primary care/specialist.  Patient was instructed to return to the emergency department with any concerns. Disposition:    Discharged home      Portions of this chart were created with Dragon medical speech to text program.   Unrecognized errors may be present. Diagnostic Study Results     Labs -  No results found for this or any previous visit (from the past 12 hour(s)). Radiologic Studies -   No orders to display       Diagnosis     Clinical Impression:   1. Postoperative pain    2.  Elevated blood pressure reading            Follow-up Information     Follow up With Details Comments [address], NP Call in 2 days if unable to see your own surgeon [address] [facility] EMERGENCY DEPT  As needed, If symptoms worsen [address] [facility] [address].  [phone]           Discharge Medication List as of 8/20/2018  2:03 AM      CONTINUE these medications which have NOT CHANGED    Details   HYDROcodone-acetaminophen (NORCO) 5-325 mg per tablet Take  by mouth., Historical Med      Insulin Needles, Disposable, 31 gauge x 5/16\" ndle Use to administer insulin as directed, Normal, Disp-1 Package, R-11      Lancets misc Use daily to monitor blood glucose, Normal, Disp-100 Each, R-11      glucose blood VI test strips (ASCENSIA AUTODISC VI, ONE TOUCH ULTRA TEST VI) strip Use daily to monitor blood glucose, Normal, Disp-100 Strip, R-11      bacitracin-neomycin-polymyxin b-hydrocortisone (CORTISPORIN) 1 % ointment Apply  to affected area two (2) times a day., Normal, Disp-15 g, R-0      indomethacin (INDOCIN PO) Take  by mouth., Historical Med      escitalopram oxalate (LEXAPRO) 20 mg tablet Take 20 mg by mouth daily. , Historical Med      carvedilol (COREG) 3.125 mg tablet Take 1 Tab by mouth two (2) times daily (with meals). , Normal, Disp-60 Tab, R-5      Blood Pressure Test Kit-Medium kit Blood pressure as needed, Normal, Disp-1 Kit, R-0      atorvastatin (LIPITOR) 40 mg tablet Take 1 Tab by mouth daily. , Normal, Disp-30 Tab, R-6      bisacodyl (DULCOLAX, BISACODYL,) 10 mg suppository Insert 10 mg into rectum daily as needed. , Print, Disp-28 Suppository, R-1      lidocaine (LIDODERM) 5 % 2 Patches by TransDERmal route every twenty-four (24) hours. Apply patch to the affected area for 12 hours a day and remove for 12 hours a day., Print, Disp-90 Each, R-3      hydrOXYzine HCl (ATARAX) 25 mg tablet Take 1-2 tablets by mouth every 6 hours as needed. , Print, Disp-30 Tab, R-0      insulin nph-regular human rec (HUMULIN 70-30) 100 unit/mL (70-30) inpn Give 30 units in the QAM and 35 units at bedtime, Normal, Disp-5 Pen, R-3      mupirocin (BACTROBAN) 2 % ointment Use 1 Application to affected area 3 Times Daily., Historical Med      valACYclovir (VALTREX) 1 gram tablet Take  by mouth., Historical Med      ferrous sulfate 325 mg (65 mg iron) tablet Take 1 Tab by mouth Daily (before breakfast). , Normal, Disp-30 Tab, R-3      Blood-Glucose Meter monitoring kit Use daily to check blood sugar, Normal, Disp-1 Kit, R-0      vit B comp-C-FA-iron-vit E (VITAMINS B COMPLEX) 500 mg-400 mcg- 18 mg iron tab Take 1 Tab by mouth daily. , Normal, Disp-30 Tab, R-3      fluticasone (FLONASE) 50 mcg/actuation nasal spray Use one to two sprays in each nostril daily for congestion and allergy. Indications: Allergic Rhinitis, Normal, Disp-1 Bottle, R-3      loratadine (CLARITIN) 10 mg tablet Take 1 Tab by mouth daily. Indications: Allergic Rhinitis, Normal, Disp-30 Tab, R-3      !! ondansetron (ZOFRAN ODT) 4 mg disintegrating tablet Take 1 Tab by mouth every eight (8) hours as needed for Nausea. , Print, Disp-15 Tab, R-0      dicyclomine (BENTYL) 20 mg tablet Take 1 Tab by mouth every six (6) hours as needed (abdominal cramps) for up to 20 doses. , Print, Disp-20 Tab, R-0      !! ondansetron (ZOFRAN ODT) 4 mg disintegrating tablet Take 1-2 tablets every 6-8 hours as needed for nausea and vomiting., Print, Disp-12 Tab, R-0      cefdinir (OMNICEF) 300 mg capsule Take 1 Cap by mouth two (2) times a day., Print, Disp-14 Cap, R-0      traZODone (DESYREL) 300 mg tablet Take 1 Tab by mouth nightly., Normal, Disp-30 Tab, R-0      lisinopril-hydroCHLOROthiazide (PRINZIDE, ZESTORETIC) 20-12.5 mg per tablet Take 1 Tab by mouth daily. , Normal, Disp-30 Tab, R-1      polyethylene glycol (MIRALAX) 17 gram packet Take 1 Packet by mouth daily. , Normal, Disp-30 Each, R-1      ALBUTEROL IN Take  by inhalation. , Historical Med      LORazepam (ATIVAN) 1 mg tablet 1 mg., Historical Med      EPINEPHrine (EPIPEN) 0.3 mg/0.3 mL (1:1,000) injection 0.3 mL by IntraMUSCular route once as needed for up to 1 dose., Normal, Disp-1 Each, R-1       !! - Potential duplicate medications found.  Please discuss with provider.        _______________________________    Attestations:  [employer] [address] [place] [address] acting as a scribe for and in the presence of Usama Leon MD      August 20, 2018 at 4:03 AM       Provider Attestation:      I personally performed the services described in the documentation, reviewed the documentation, as recorded by the scribe in my presence, and it accurately and completely records my words and actions.  August 20, 2018 at 4:03 AM - Usama Leon MD    _______________________________

## 2018-09-05 ENCOUNTER — OFFICE VISIT (OUTPATIENT)
Dept: FAMILY MEDICINE CLINIC | Facility: CLINIC | Age: 48
End: 2018-09-05

## 2018-09-05 VITALS
WEIGHT: 159.8 LBS | HEART RATE: 89 BPM | DIASTOLIC BLOOD PRESSURE: 86 MMHG | OXYGEN SATURATION: 98 % | TEMPERATURE: 98.6 F | BODY MASS INDEX: 29.4 KG/M2 | HEIGHT: 62 IN | SYSTOLIC BLOOD PRESSURE: 152 MMHG | RESPIRATION RATE: 20 BRPM

## 2018-09-05 DIAGNOSIS — Z23 ENCOUNTER FOR IMMUNIZATION: ICD-10-CM

## 2018-09-05 DIAGNOSIS — T45.1X5A ANEMIA DUE TO ANTINEOPLASTIC CHEMOTHERAPY: ICD-10-CM

## 2018-09-05 DIAGNOSIS — K59.03 THERAPEUTIC OPIOID INDUCED CONSTIPATION: ICD-10-CM

## 2018-09-05 DIAGNOSIS — N95.1 MENOPAUSAL SYNDROME (HOT FLASHES): ICD-10-CM

## 2018-09-05 DIAGNOSIS — J30.2 SEASONAL ALLERGIC RHINITIS, UNSPECIFIED TRIGGER: ICD-10-CM

## 2018-09-05 DIAGNOSIS — Z79.4 TYPE 2 DIABETES MELLITUS WITH DIABETIC AUTONOMIC NEUROPATHY, WITH LONG-TERM CURRENT USE OF INSULIN (HCC): ICD-10-CM

## 2018-09-05 DIAGNOSIS — T40.2X5A THERAPEUTIC OPIOID INDUCED CONSTIPATION: ICD-10-CM

## 2018-09-05 DIAGNOSIS — E11.8 TYPE 2 DIABETES MELLITUS WITH COMPLICATION, WITH LONG-TERM CURRENT USE OF INSULIN (HCC): ICD-10-CM

## 2018-09-05 DIAGNOSIS — Z91.030 ALLERGY TO BEE STING: ICD-10-CM

## 2018-09-05 DIAGNOSIS — E11.43 TYPE 2 DIABETES MELLITUS WITH DIABETIC AUTONOMIC NEUROPATHY, WITH LONG-TERM CURRENT USE OF INSULIN (HCC): ICD-10-CM

## 2018-09-05 DIAGNOSIS — R10.9 CHRONIC ABDOMINAL PAIN: ICD-10-CM

## 2018-09-05 DIAGNOSIS — G89.29 CHRONIC ABDOMINAL PAIN: ICD-10-CM

## 2018-09-05 DIAGNOSIS — D64.81 ANEMIA DUE TO ANTINEOPLASTIC CHEMOTHERAPY: ICD-10-CM

## 2018-09-05 DIAGNOSIS — R53.82 CHRONIC FATIGUE: Primary | ICD-10-CM

## 2018-09-05 DIAGNOSIS — I10 ESSENTIAL HYPERTENSION: ICD-10-CM

## 2018-09-05 DIAGNOSIS — Z79.4 TYPE 2 DIABETES MELLITUS WITH COMPLICATION, WITH LONG-TERM CURRENT USE OF INSULIN (HCC): ICD-10-CM

## 2018-09-05 LAB — HBA1C MFR BLD HPLC: 10.9 %

## 2018-09-05 RX ORDER — EPINEPHRINE 0.3 MG/.3ML
0.3 INJECTION SUBCUTANEOUS
Qty: 1 SYRINGE | Refills: 2 | Status: SHIPPED | OUTPATIENT
Start: 2018-09-05 | End: 2018-09-05

## 2018-09-05 RX ORDER — LANOLIN ALCOHOL/MO/W.PET/CERES
1000 CREAM (GRAM) TOPICAL DAILY
Qty: 90 TAB | Refills: 0 | Status: SHIPPED | OUTPATIENT
Start: 2018-09-05 | End: 2020-03-31

## 2018-09-05 RX ORDER — CARVEDILOL 3.12 MG/1
6.25 TABLET ORAL 2 TIMES DAILY WITH MEALS
Qty: 90 TAB | Refills: 3 | Status: SHIPPED | OUTPATIENT
Start: 2018-09-05 | End: 2020-03-31

## 2018-09-05 RX ORDER — LISINOPRIL AND HYDROCHLOROTHIAZIDE 12.5; 2 MG/1; MG/1
1 TABLET ORAL DAILY
Qty: 90 TAB | Refills: 3 | Status: SHIPPED | OUTPATIENT
Start: 2018-09-05 | End: 2018-09-05 | Stop reason: SDUPTHER

## 2018-09-05 RX ORDER — LISINOPRIL AND HYDROCHLOROTHIAZIDE 12.5; 2 MG/1; MG/1
1 TABLET ORAL 2 TIMES DAILY
Qty: 180 TAB | Refills: 3 | Status: SHIPPED | OUTPATIENT
Start: 2018-09-05 | End: 2020-03-31

## 2018-09-05 RX ORDER — LANOLIN ALCOHOL/MO/W.PET/CERES
325 CREAM (GRAM) TOPICAL
Qty: 30 TAB | Refills: 3 | Status: SHIPPED | OUTPATIENT
Start: 2018-09-05 | End: 2020-03-31

## 2018-09-05 RX ORDER — HYDROXYZINE 25 MG/1
TABLET, FILM COATED ORAL
Qty: 30 TAB | Refills: 0 | Status: SHIPPED | OUTPATIENT
Start: 2018-09-05 | End: 2019-01-10

## 2018-09-05 RX ORDER — POLYETHYLENE GLYCOL 3350 17 G/17G
17 POWDER, FOR SOLUTION ORAL DAILY
Qty: 30 EACH | Refills: 1 | Status: SHIPPED | OUTPATIENT
Start: 2018-09-05

## 2018-09-05 RX ORDER — BENZOCAINE .13; .15; .5; 2 G/100G; G/100G; G/100G; G/100G
2 GEL ORAL DAILY
Qty: 1 BOTTLE | Refills: 3 | Status: SHIPPED | OUTPATIENT
Start: 2018-09-05 | End: 2021-07-02

## 2018-09-05 NOTE — PROGRESS NOTES
Progress Note  Today's Date:  2018   Patient:  Lauren Swann  Patient :  1970    Subjective:   Lauren Swann is a 49 y.o. female who presents for follow up for DMT2, HTN,  chronic abdominal pain,etc  HTN  She takes Lisinopril-HCTZ 20/12.5 mg daily, and coreg 3.125 mg twice daily  Her blood pressure is elevated today. She states she is compliant with her medication. She does not monitor her blood pressure at home. She states she have constant pain due to tissue expanders in her left mastectomy site  She states she receives tissue expander weekly. DMT2  She monitors her blood sugars daily in AM her blood sugar was 154 today. She takes insulin Humulin 70-30, 30 units every morning, and 35 units every evening. She is not monitoring her diet, she is not active in lifestyle modifications. Her poc hemoglobin A1c is  10.6 today. Chronic abdominal pain  She takes Bentyl - states it provides minimum relief. Chronic fatigue   Cbc normal.  She is taking B12 complex     Chronic pain  She sees pain management. She has an appointment today at 3 pm.      Current Outpatient Meds and Allergies     Recent Results (from the past 12 hour(s))   AMB POC HEMOGLOBIN A1C    Collection Time: 18  2:00 PM   Result Value Ref Range    Hemoglobin A1c (POC) 10.9 %       Current Outpatient Prescriptions on File Prior to Visit   Medication Sig Dispense Refill    HYDROcodone-acetaminophen (NORCO) 5-325 mg per tablet Take  by mouth.  Insulin Needles, Disposable, 31 gauge x 5/16\" ndle Use to administer insulin as directed 1 Package 11    Lancets misc Use daily to monitor blood glucose 100 Each 11    glucose blood VI test strips (ASCENSIA AUTODISC VI, ONE TOUCH ULTRA TEST VI) strip Use daily to monitor blood glucose 100 Strip 11    bacitracin-neomycin-polymyxin b-hydrocortisone (CORTISPORIN) 1 % ointment Apply  to affected area two (2) times a day.  15 g 0    carvedilol (COREG) 3.125 mg tablet Take 1 Tab by mouth two (2) times daily (with meals). 60 Tab 5    Blood Pressure Test Kit-Medium kit Blood pressure as needed 1 Kit 0    atorvastatin (LIPITOR) 40 mg tablet Take 1 Tab by mouth daily. 30 Tab 6    lidocaine (LIDODERM) 5 % 2 Patches by TransDERmal route every twenty-four (24) hours. Apply patch to the affected area for 12 hours a day and remove for 12 hours a day. 90 Each 3    hydrOXYzine HCl (ATARAX) 25 mg tablet Take 1-2 tablets by mouth every 6 hours as needed. 30 Tab 0    insulin nph-regular human rec (HUMULIN 70-30) 100 unit/mL (70-30) inpn Give 30 units in the QAM and 35 units at bedtime 5 Pen 3    Blood-Glucose Meter monitoring kit Use daily to check blood sugar 1 Kit 0    vit B comp-C-FA-iron-vit E (VITAMINS B COMPLEX) 500 mg-400 mcg- 18 mg iron tab Take 1 Tab by mouth daily. 30 Tab 3    loratadine (CLARITIN) 10 mg tablet Take 1 Tab by mouth daily. Indications: Allergic Rhinitis 30 Tab 3    ondansetron (ZOFRAN ODT) 4 mg disintegrating tablet Take 1 Tab by mouth every eight (8) hours as needed for Nausea. 15 Tab 0    ondansetron (ZOFRAN ODT) 4 mg disintegrating tablet Take 1-2 tablets every 6-8 hours as needed for nausea and vomiting. 12 Tab 0    traZODone (DESYREL) 300 mg tablet Take 1 Tab by mouth nightly. 30 Tab 0    lisinopril-hydroCHLOROthiazide (PRINZIDE, ZESTORETIC) 20-12.5 mg per tablet Take 1 Tab by mouth daily. 30 Tab 1    polyethylene glycol (MIRALAX) 17 gram packet Take 1 Packet by mouth daily. 30 Each 1    ALBUTEROL IN Take  by inhalation.  LORazepam (ATIVAN) 1 mg tablet 1 mg.  EPINEPHrine (EPIPEN) 0.3 mg/0.3 mL (1:1,000) injection 0.3 mL by IntraMUSCular route once as needed for up to 1 dose. 1 Each 1    indomethacin (INDOCIN PO) Take  by mouth.  escitalopram oxalate (LEXAPRO) 20 mg tablet Take 20 mg by mouth daily.  bisacodyl (DULCOLAX, BISACODYL,) 10 mg suppository Insert 10 mg into rectum daily as needed.  28 Suppository 1    mupirocin (BACTROBAN) 2 % ointment Use 1 Application to affected area 3 Times Daily.  valACYclovir (VALTREX) 1 gram tablet Take  by mouth.  ferrous sulfate 325 mg (65 mg iron) tablet Take 1 Tab by mouth Daily (before breakfast). 30 Tab 3    fluticasone (FLONASE) 50 mcg/actuation nasal spray Use one to two sprays in each nostril daily for congestion and allergy. Indications: Allergic Rhinitis 1 Bottle 3    dicyclomine (BENTYL) 20 mg tablet Take 1 Tab by mouth every six (6) hours as needed (abdominal cramps) for up to 20 doses. 20 Tab 0    cefdinir (OMNICEF) 300 mg capsule Take 1 Cap by mouth two (2) times a day. 14 Cap 0     No current facility-administered medications on file prior to visit. These medications have been reviewed and reconciled with the patient during today's visit. Allergies   Allergen Reactions    Latex Hives and Itching    Other Food Rash     Almonds    Amoxicillin Swelling     Other reaction(s): mild rash/itching    Aspirin Not Reported This Time and Swelling     Mouth swells    Fentanyl Anaphylaxis and Swelling     Patches cause mouth to swell  Swelling in mouth from fentanyl patch    Levaquin [Levofloxacin] Not Reported This Time and Swelling     Other reaction(s): mild rash/itching    Chlorhexidine Rash    Norco [Hydrocodone-Acetaminophen] Nausea and Vomiting     Other reaction(s): gi distress    Acetaminophen Other (comments)    Casselberry Rash and Itching    Codeine Other (comments)    Glycopyrrolate Swelling     Pt  States  faciAL  SWELLING   POST  ROBINUL  IV   Pt  States  faciAL  SWELLING   POST  ROBINUL  IV     Morphine Nausea and Vomiting     Pt states she is not allergic    Pcn [Penicillins] Swelling    Percocet [Oxycodone-Acetaminophen] Nausea and Vomiting     Other reaction(s): gi distress  nausea  Other reaction(s): anaphylaxis/angioedema  Per pt.  She is allergic    Tape [Adhesive] Rash    Tramadol Swelling       ROS:       Positive symptoms are BOLDED:    CONST: Fatigue, weight change, appetite change  NEURO:   Headaches, vision changes, dizziness, loss of consciousness  CV:      Chest pain, palpitations, orthopnea, PND  PULM:             SOB, wheezing, cough, hemoptysis  GI:             Nausea, vomiting, abdominal pain, greasy stools, blood in stool,     diarrhea, constipation  :       Dysuria, hematuria, change in urine  MS:      Muscle/joint pain, joint swelling  SKIN:        Rashes, skin changes  ALLERGY: Seasonal allergies, itchy eyes  HEME: Easy bleeding/bruising      Objective:     VS:    Visit Vitals    Resp 20    Ht 5' 2\" (1.575 m)    Wt 159 lb 12.8 oz (72.5 kg)    LMP 08/20/2013    BMI 29.23 kg/m2       General:   Well-nourished, well-groomed, pleasant, alert, in no acute distress. Head:  Normocephalic, atraumatic, MMM  Neck:  Neck supple with normal ROM for age, no thyromegaly, No LAD  Cardiovasc:   Regular rate and rhythm, no murmurs, no rubs, no gallops,   Pulmonary:   Clear breath sounds bilaterally, good air movement, no wheezing, no rales, no rhonchi, normal respiratory effort  Abdomen:   Abdomen soft,+ generalized tenderness. nondistended, NABS  Extremities:   No edema, no tenderness with palpation of calves, warm and well-perfused  Neuro:   Alert, conversant, appropriate, following commands, no focal deficits. Pertinent diagnostic procedures include:    No results found for this or any previous visit (from the past 24 hour(s)).   Admission on 07/22/2018, Discharged on 07/22/2018   Component Date Value Ref Range Status    WBC 07/22/2018 9.5  4.6 - 13.2 K/uL Final    RBC 07/22/2018 4.24  4.20 - 5.30 M/uL Final    HGB 07/22/2018 12.4  12.0 - 16.0 g/dL Final    HCT 07/22/2018 37.7  35.0 - 45.0 % Final    MCV 07/22/2018 88.9  74.0 - 97.0 FL Final    MCH 07/22/2018 29.2  24.0 - 34.0 PG Final    MCHC 07/22/2018 32.9  31.0 - 37.0 g/dL Final    RDW 07/22/2018 13.2  11.6 - 14.5 % Final    PLATELET 98/07/0201 264  135 - 420 K/uL Final    MPV 07/22/2018 10.9  9.2 - 11.8 FL Final    NEUTROPHILS 07/22/2018 71  40 - 73 % Final    LYMPHOCYTES 07/22/2018 21  21 - 52 % Final    MONOCYTES 07/22/2018 6  3 - 10 % Final    EOSINOPHILS 07/22/2018 2  0 - 5 % Final    BASOPHILS 07/22/2018 0  0 - 2 % Final    ABS. NEUTROPHILS 07/22/2018 6.8  1.8 - 8.0 K/UL Final    ABS. LYMPHOCYTES 07/22/2018 2.0  0.9 - 3.6 K/UL Final    ABS. MONOCYTES 07/22/2018 0.5  0.05 - 1.2 K/UL Final    ABS. EOSINOPHILS 07/22/2018 0.2  0.0 - 0.4 K/UL Final    ABS. BASOPHILS 07/22/2018 0.0  0.0 - 0.1 K/UL Final    DF 07/22/2018 AUTOMATED    Final    Sodium 07/22/2018 140  136 - 145 mmol/L Final    Potassium 07/22/2018 3.6  3.5 - 5.5 mmol/L Final    Chloride 07/22/2018 102  100 - 108 mmol/L Final    CO2 07/22/2018 26  21 - 32 mmol/L Final    Anion gap 07/22/2018 12  3.0 - 18 mmol/L Final    Glucose 07/22/2018 276* 74 - 99 mg/dL Final    BUN 07/22/2018 23* 7.0 - 18 MG/DL Final    Creatinine 07/22/2018 1.57* 0.6 - 1.3 MG/DL Final    BUN/Creatinine ratio 07/22/2018 15  12 - 20   Final    GFR est AA 07/22/2018 43* >60 ml/min/1.73m2 Final    GFR est non-AA 07/22/2018 35* >60 ml/min/1.73m2 Final    Comment: (NOTE)  Estimated GFR is calculated using the Modification of Diet in Renal   Disease (MDRD) Study equation, reported for both  Americans   (GFRAA) and non- Americans (GFRNA), and normalized to 1.73m2   body surface area. The physician must decide which value applies to   the patient. The MDRD study equation should only be used in   individuals age 25 or older. It has not been validated for the   following: pregnant women, patients with serious comorbid conditions,   or on certain medications, or persons with extremes of body size,   muscle mass, or nutritional status.       Calcium 07/22/2018 9.4  8.5 - 10.1 MG/DL Final    Bilirubin, total 07/22/2018 0.3  0.2 - 1.0 MG/DL Final    ALT (SGPT) 07/22/2018 29  13 - 56 U/L Final    AST (SGOT) 07/22/2018 21  15 - 37 U/L Final    Alk.  phosphatase 07/22/2018 116  45 - 117 U/L Final    Protein, total 07/22/2018 7.5  6.4 - 8.2 g/dL Final    Albumin 07/22/2018 4.0  3.4 - 5.0 g/dL Final    Globulin 07/22/2018 3.5  2.0 - 4.0 g/dL Final    A-G Ratio 07/22/2018 1.1  0.8 - 1.7   Final    Color 07/22/2018 DARK YELLOW    Final    Appearance 07/22/2018 CLOUDY    Final    Specific gravity 07/22/2018 1.027  1.005 - 1.030   Final    pH (UA) 07/22/2018 5.0  5.0 - 8.0   Final    Protein 07/22/2018 30* NEG mg/dL Final    Glucose 07/22/2018 100* NEG mg/dL Final    Ketone 07/22/2018 TRACE* NEG mg/dL Final    Bilirubin 07/22/2018 NEGATIVE   NEG   Final    Blood 07/22/2018 NEGATIVE   NEG   Final    Urobilinogen 07/22/2018 1.0  0.2 - 1.0 EU/dL Final    Nitrites 07/22/2018 NEGATIVE   NEG   Final    Leukocyte Esterase 07/22/2018 MODERATE* NEG   Final    Ventricular Rate 07/22/2018 95  BPM Final    Atrial Rate 07/22/2018 95  BPM Final    P-R Interval 07/22/2018 152  ms Final    QRS Duration 07/22/2018 82  ms Final    Q-T Interval 07/22/2018 348  ms Final    QTC Calculation (Bezet) 07/22/2018 437  ms Final    Calculated P Axis 07/22/2018 64  degrees Final    Calculated R Axis 07/22/2018 -12  degrees Final    Calculated T Axis 07/22/2018 66  degrees Final    Diagnosis 07/22/2018    Final                    Value:Normal sinus rhythm  Normal ECG  When compared with ECG of 02-JUL-2018 23:52,  No significant change was found  Confirmed by Gilberto Griffin ([id number] [id number]) on 7/23/2018 9:18:02 AM      CK 07/22/2018 37  26 - 192 U/L Final    CK - MB 07/22/2018 <1.0  <3.6 ng/ml Final    CK-MB Index 07/22/2018 CALCULATION NOT PERFORMED WHEN RESULT IS BELOW LINEAR LIMIT  0.0 - 4.0 % Final    Troponin-I, Qt. 07/22/2018 <0.02  0.0 - 0.045 NG/ML Final    Comment: The presence of detectable troponin above the reference range indicates myocardial injury which may be due to ischemia, myocarditis, trauma, etc.  Clinical correlation is necessary to establish the significance of this finding. Sequential testing is recommended to determine if the typical rise and fall of cTnI is demonstrated. Note:  Cardiac troponin I has a relatively long half life and may be present well after the CK MB has returned to baseline. The reference range is based on the 99th percentile of the referent population.  Glucose (POC) 07/22/2018 258* 70 - 110 mg/dL Final    Comment: Notified RN or MD immediately by   (NOTE)  The FDA has indicated that no capillary point of care blood glucose   monitoring systems are approved for use in \"critically ill\" patients,   however they have not defined this population. The College of   American [employer]ologists has recommended that these devices should not   be used in cases such as severe hypotension, dehydration, shock, and   hyperglycemic-hyperosmolar state, amongst others. Venous or arterial   collection is the recommended specimen for testing these patients.       WBC 07/22/2018 11 to 20  0 - 4 /hpf Final    RBC 07/22/2018 NONE  0 - 5 /hpf Final    Epithelial cells 07/22/2018 4+  0 - 5 /lpf Final    Bacteria 07/22/2018 3+* NEG /hpf Final   Admission on 07/02/2018, Discharged on 07/03/2018   Component Date Value Ref Range Status    Color 07/02/2018 YELLOW    Final    Appearance 07/02/2018 CLEAR    Final    Specific gravity 07/02/2018 1.020  1.005 - 1.030   Final    pH (UA) 07/02/2018 7.0  5.0 - 8.0   Final    Protein 07/02/2018 NEGATIVE   NEG mg/dL Final    Glucose 07/02/2018 NEGATIVE   NEG mg/dL Final    Ketone 07/02/2018 NEGATIVE   NEG mg/dL Final    Bilirubin 07/02/2018 NEGATIVE   NEG   Final    Blood 07/02/2018 NEGATIVE   NEG   Final    Urobilinogen 07/02/2018 1.0  0.2 - 1.0 EU/dL Final    Nitrites 07/02/2018 NEGATIVE   NEG   Final    Leukocyte Esterase 07/02/2018 TRACE* NEG   Final    WBC 07/02/2018 7.3  4.6 - 13.2 K/uL Final    RBC 07/02/2018 4.12* 4.20 - 5.30 M/uL Final    HGB 07/02/2018 12.4  12.0 - 16.0 g/dL Final    HCT 07/02/2018 37.0  35.0 - 45.0 % Final    MCV 07/02/2018 89.8  74.0 - 97.0 FL Final    MCH 07/02/2018 30.1  24.0 - 34.0 PG Final    MCHC 07/02/2018 33.5  31.0 - 37.0 g/dL Final    RDW 07/02/2018 12.9  11.6 - 14.5 % Final    PLATELET 56/42/1571 472  135 - 420 K/uL Final    MPV 07/02/2018 10.4  9.2 - 11.8 FL Final    NEUTROPHILS 07/02/2018 58  40 - 73 % Final    LYMPHOCYTES 07/02/2018 33  21 - 52 % Final    MONOCYTES 07/02/2018 6  3 - 10 % Final    EOSINOPHILS 07/02/2018 3  0 - 5 % Final    BASOPHILS 07/02/2018 0  0 - 2 % Final    ABS. NEUTROPHILS 07/02/2018 4.1  1.8 - 8.0 K/UL Final    ABS. LYMPHOCYTES 07/02/2018 2.4  0.9 - 3.6 K/UL Final    ABS. MONOCYTES 07/02/2018 0.5  0.05 - 1.2 K/UL Final    ABS. EOSINOPHILS 07/02/2018 0.3  0.0 - 0.4 K/UL Final    ABS. BASOPHILS 07/02/2018 0.0  0.0 - 0.06 K/UL Final    DF 07/02/2018 AUTOMATED    Final    Sodium 07/02/2018 140  136 - 145 mmol/L Final    Potassium 07/02/2018 3.8  3.5 - 5.5 mmol/L Final    Chloride 07/02/2018 105  100 - 108 mmol/L Final    CO2 07/02/2018 26  21 - 32 mmol/L Final    Anion gap 07/02/2018 9  3.0 - 18 mmol/L Final    Glucose 07/02/2018 167* 74 - 99 mg/dL Final    BUN 07/02/2018 15  7.0 - 18 MG/DL Final    Creatinine 07/02/2018 0.84  0.6 - 1.3 MG/DL Final    BUN/Creatinine ratio 07/02/2018 18  12 - 20   Final    GFR est AA 07/02/2018 >60  >60 ml/min/1.73m2 Final    GFR est non-AA 07/02/2018 >60  >60 ml/min/1.73m2 Final    Comment: (NOTE)  Estimated GFR is calculated using the Modification of Diet in Renal   Disease (MDRD) Study equation, reported for both  Americans   (GFRAA) and non- Americans (GFRNA), and normalized to 1.73m2   body surface area. The physician must decide which value applies to   the patient. The MDRD study equation should only be used in   individuals age 25 or older.  It has not been validated for the   following: pregnant women, patients with serious comorbid conditions, or on certain medications, or persons with extremes of body size,   muscle mass, or nutritional status.  Calcium 07/02/2018 9.7  8.5 - 10.1 MG/DL Final    Bilirubin, total 07/02/2018 0.6  0.2 - 1.0 MG/DL Final    ALT (SGPT) 07/02/2018 25  13 - 56 U/L Final    AST (SGOT) 07/02/2018 19  15 - 37 U/L Final    Alk. phosphatase 07/02/2018 127* 45 - 117 U/L Final    Protein, total 07/02/2018 8.0  6.4 - 8.2 g/dL Final    Albumin 07/02/2018 4.1  3.4 - 5.0 g/dL Final    Globulin 07/02/2018 3.9  2.0 - 4.0 g/dL Final    A-G Ratio 07/02/2018 1.1  0.8 - 1.7   Final    WBC 07/02/2018 0 to 2  0 - 4 /hpf Final    RBC 07/02/2018 0  0 - 5 /hpf Final    Epithelial cells 07/02/2018 FEW  0 - 5 /lpf Final    Bacteria 07/02/2018 NEGATIVE   NEG /hpf Final    Lipase 07/02/2018 126  73 - 393 U/L Final    CK 07/02/2018 68  26 - 192 U/L Final    CK - MB 07/02/2018 1.0  <3.6 ng/ml Final    CK-MB Index 07/02/2018 1.5  0.0 - 4.0 % Final    Troponin-I, Qt. 07/02/2018 <0.02  0.0 - 0.045 NG/ML Final    Comment: The presence of detectable troponin above the reference range indicates myocardial injury which may be due to ischemia, myocarditis, trauma, etc.  Clinical correlation is necessary to establish the significance of this finding. Sequential testing is recommended to determine if the typical rise and fall of cTnI is demonstrated. Note:  Cardiac troponin I has a relatively long half life and may be present well after the CK MB has returned to baseline. The reference range is based on the 99th percentile of the referent population.       Ventricular Rate 07/02/2018 88  BPM Final    Atrial Rate 07/02/2018 88  BPM Final    P-R Interval 07/02/2018 144  ms Final    QRS Duration 07/02/2018 84  ms Final    Q-T Interval 07/02/2018 376  ms Final    QTC Calculation (Bezet) 07/02/2018 454  ms Final    Calculated P Axis 07/02/2018 40  degrees Final    Calculated R Axis 07/02/2018 -7  degrees Final    Calculated T Axis 07/02/2018 62  degrees Final    Diagnosis 07/02/2018    Final                    Value:Normal sinus rhythm  Normal ECG  When compared with ECG of 13-JUN-2018 15:07,  No significant change was found  Confirmed by Carrol Andrews ([phone] [id number]) on 7/3/2018 10:28:40 AM      D DIMER 07/03/2018 7.67* <0.46 ug/ml(FEU) Final    Comment: (NOTE)  A D-Dimer result less than 0.5 ug/mL FEU combined with a low clinical   pretest probability of DVT and/or PE has a negative predictive value   of %. The positive predictive value is 50% or less.  HCG, Ql. 07/03/2018 NEGATIVE   NEG   Final    Test results should be confirmed using serum quantitative hCG when detection of pregnancy is critical and before performing any critical medical procedure.    Admission on 06/13/2018, Discharged on 06/13/2018   Component Date Value Ref Range Status    Ventricular Rate 06/13/2018 92  BPM Final    Atrial Rate 06/13/2018 92  BPM Final    P-R Interval 06/13/2018 148  ms Final    QRS Duration 06/13/2018 72  ms Final    Q-T Interval 06/13/2018 354  ms Final    QTC Calculation (Bezet) 06/13/2018 437  ms Final    Calculated P Axis 06/13/2018 53  degrees Final    Calculated R Axis 06/13/2018 -14  degrees Final    Calculated T Axis 06/13/2018 49  degrees Final    Diagnosis 06/13/2018    Final                    Value:Normal sinus rhythm  Normal ECG  When compared with ECG of 22-MAY-2018 19:55,  No significant change was found  Confirmed by Holly Pires ([id number]) on 6/13/2018 6:38:29 PM      WBC 06/13/2018 9.6  4.6 - 13.2 K/uL Final    RBC 06/13/2018 3.46* 4.20 - 5.30 M/uL Final    HGB 06/13/2018 10.6* 12.0 - 16.0 g/dL Final    HCT 06/13/2018 33.0* 35.0 - 45.0 % Final    MCV 06/13/2018 95.4  74.0 - 97.0 FL Final    MCH 06/13/2018 30.6  24.0 - 34.0 PG Final    MCHC 06/13/2018 32.1  31.0 - 37.0 g/dL Final    RDW 06/13/2018 13.1  11.6 - 14.5 % Final    PLATELET 92/65/7017 451  135 - 420 K/uL Final    MPV 06/13/2018 10.1  9.2 - 11.8 FL Final    NEUTROPHILS 06/13/2018 77* 40 - 73 % Final    LYMPHOCYTES 06/13/2018 17* 21 - 52 % Final    MONOCYTES 06/13/2018 4  3 - 10 % Final    EOSINOPHILS 06/13/2018 2  0 - 5 % Final    BASOPHILS 06/13/2018 0  0 - 2 % Final    ABS. NEUTROPHILS 06/13/2018 7.4  1.8 - 8.0 K/UL Final    ABS. LYMPHOCYTES 06/13/2018 1.7  0.9 - 3.6 K/UL Final    ABS. MONOCYTES 06/13/2018 0.4  0.05 - 1.2 K/UL Final    ABS. EOSINOPHILS 06/13/2018 0.2  0.0 - 0.4 K/UL Final    ABS. BASOPHILS 06/13/2018 0.0  0.0 - 0.06 K/UL Final    DF 06/13/2018 AUTOMATED    Final    Sodium 06/13/2018 138  136 - 145 mmol/L Final    Potassium 06/13/2018 3.8  3.5 - 5.5 mmol/L Final    Chloride 06/13/2018 103  100 - 108 mmol/L Final    CO2 06/13/2018 30  21 - 32 mmol/L Final    Anion gap 06/13/2018 5  3.0 - 18 mmol/L Final    Glucose 06/13/2018 285* 74 - 99 mg/dL Final    BUN 06/13/2018 18  7.0 - 18 MG/DL Final    Creatinine 06/13/2018 0.99  0.6 - 1.3 MG/DL Final    BUN/Creatinine ratio 06/13/2018 18  12 - 20   Final    GFR est AA 06/13/2018 >60  >60 ml/min/1.73m2 Final    GFR est non-AA 06/13/2018 60* >60 ml/min/1.73m2 Final    Comment: (NOTE)  Estimated GFR is calculated using the Modification of Diet in Renal   Disease (MDRD) Study equation, reported for both  Americans   (GFRAA) and non- Americans (GFRNA), and normalized to 1.73m2   body surface area. The physician must decide which value applies to   the patient. The MDRD study equation should only be used in   individuals age 25 or older. It has not been validated for the   following: pregnant women, patients with serious comorbid conditions,   or on certain medications, or persons with extremes of body size,   muscle mass, or nutritional status.       Calcium 06/13/2018 9.2  8.5 - 10.1 MG/DL Final    Troponin-I, Qt. 06/13/2018 <0.02  0.00 - 0.06 NG/ML Final    Comment: The presence of detectable troponin above the reference range indicates myocardial injury which may be due to ischemia, myocarditis, trauma, etc.  Clinical correlation is necessary to establish the significance of this finding. Sequential testing is recommended to determine if the typical rise and fall of cTnI is demonstrated. Note:  Cardiac troponin I has a relatively long half life and may be present well after the CK MB has returned to baseline. The reference range is based on the 99th percentile of the referent population. Admission on 06/12/2018, Discharged on 06/12/2018   Component Date Value Ref Range Status    WBC 06/12/2018 10.0  4.6 - 13.2 K/uL Final    RBC 06/12/2018 3.65* 4.20 - 5.30 M/uL Final    HGB 06/12/2018 11.2* 12.0 - 16.0 g/dL Final    HCT 06/12/2018 33.8* 35.0 - 45.0 % Final    MCV 06/12/2018 92.6  74.0 - 97.0 FL Final    MCH 06/12/2018 30.7  24.0 - 34.0 PG Final    MCHC 06/12/2018 33.1  31.0 - 37.0 g/dL Final    RDW 06/12/2018 13.3  11.6 - 14.5 % Final    PLATELET 89/58/6290 793  135 - 420 K/uL Final    MPV 06/12/2018 10.4  9.2 - 11.8 FL Final    NEUTROPHILS 06/12/2018 63  40 - 73 % Final    LYMPHOCYTES 06/12/2018 29  21 - 52 % Final    MONOCYTES 06/12/2018 6  3 - 10 % Final    EOSINOPHILS 06/12/2018 2  0 - 5 % Final    BASOPHILS 06/12/2018 0  0 - 2 % Final    ABS. NEUTROPHILS 06/12/2018 6.2  1.8 - 8.0 K/UL Final    ABS. LYMPHOCYTES 06/12/2018 2.9  0.9 - 3.6 K/UL Final    ABS. MONOCYTES 06/12/2018 0.6  0.05 - 1.2 K/UL Final    ABS. EOSINOPHILS 06/12/2018 0.2  0.0 - 0.4 K/UL Final    ABS.  BASOPHILS 06/12/2018 0.0  0.0 - 0.06 K/UL Final    DF 06/12/2018 AUTOMATED    Final    Sodium 06/12/2018 139  136 - 145 mmol/L Final    Potassium 06/12/2018 3.5  3.5 - 5.5 mmol/L Final    Chloride 06/12/2018 103  100 - 108 mmol/L Final    CO2 06/12/2018 29  21 - 32 mmol/L Final    Anion gap 06/12/2018 7  3.0 - 18 mmol/L Final    Glucose 06/12/2018 234* 74 - 99 mg/dL Final    BUN 06/12/2018 20* 7.0 - 18 MG/DL Final    Creatinine 06/12/2018 1.23  0.6 - 1.3 MG/DL Final    BUN/Creatinine ratio 06/12/2018 16  12 - 20   Final    GFR est AA 06/12/2018 56* >60 ml/min/1.73m2 Final    GFR est non-AA 06/12/2018 47* >60 ml/min/1.73m2 Final    Comment: (NOTE)  Estimated GFR is calculated using the Modification of Diet in Renal   Disease (MDRD) Study equation, reported for both  Americans   (GFRAA) and non- Americans (GFRNA), and normalized to 1.73m2   body surface area. The physician must decide which value applies to   the patient. The MDRD study equation should only be used in   individuals age 25 or older. It has not been validated for the   following: pregnant women, patients with serious comorbid conditions,   or on certain medications, or persons with extremes of body size,   muscle mass, or nutritional status.  Calcium 06/12/2018 9.3  8.5 - 10.1 MG/DL Final    Bilirubin, total 06/12/2018 0.5  0.2 - 1.0 MG/DL Final    ALT (SGPT) 06/12/2018 50  13 - 56 U/L Final    AST (SGOT) 06/12/2018 25  15 - 37 U/L Final    Alk.  phosphatase 06/12/2018 118* 45 - 117 U/L Final    Protein, total 06/12/2018 7.6  6.4 - 8.2 g/dL Final    Albumin 06/12/2018 3.5  3.4 - 5.0 g/dL Final    Globulin 06/12/2018 4.1* 2.0 - 4.0 g/dL Final    A-G Ratio 06/12/2018 0.9  0.8 - 1.7   Final   Admission on 05/22/2018, Discharged on 05/22/2018   Component Date Value Ref Range Status    Ventricular Rate 05/22/2018 78  BPM Final    Atrial Rate 05/22/2018 78  BPM Final    P-R Interval 05/22/2018 166  ms Final    QRS Duration 05/22/2018 80  ms Final    Q-T Interval 05/22/2018 376  ms Final    QTC Calculation (Bezet) 05/22/2018 428  ms Final    Calculated P Axis 05/22/2018 51  degrees Final    Calculated R Axis 05/22/2018 -4  degrees Final    Calculated T Axis 05/22/2018 73  degrees Final    Diagnosis 05/22/2018    Final                    Value:Normal sinus rhythm  Nonspecific T wave abnormality  Abnormal ECG  When compared with ECG of 19-MAY-2018 20:00,  No significant change was found  Confirmed by Tree Alvarez ([id number]) on 5/23/2018 9:59:06 AM      WBC 05/22/2018 7.0  4.6 - 13.2 K/uL Final    RBC 05/22/2018 3.61* 4.20 - 5.30 M/uL Final    HGB 05/22/2018 11.0* 12.0 - 16.0 g/dL Final    HCT 05/22/2018 32.9* 35.0 - 45.0 % Final    MCV 05/22/2018 91.1  74.0 - 97.0 FL Final    MCH 05/22/2018 30.5  24.0 - 34.0 PG Final    MCHC 05/22/2018 33.4  31.0 - 37.0 g/dL Final    RDW 05/22/2018 13.3  11.6 - 14.5 % Final    PLATELET 18/33/4899 973  135 - 420 K/uL Final    MPV 05/22/2018 10.0  9.2 - 11.8 FL Final    NEUTROPHILS 05/22/2018 66  40 - 73 % Final    LYMPHOCYTES 05/22/2018 28  21 - 52 % Final    MONOCYTES 05/22/2018 4  3 - 10 % Final    EOSINOPHILS 05/22/2018 2  0 - 5 % Final    BASOPHILS 05/22/2018 0  0 - 2 % Final    ABS. NEUTROPHILS 05/22/2018 4.6  1.8 - 8.0 K/UL Final    ABS. LYMPHOCYTES 05/22/2018 2.0  0.9 - 3.6 K/UL Final    ABS. MONOCYTES 05/22/2018 0.3  0.05 - 1.2 K/UL Final    ABS. EOSINOPHILS 05/22/2018 0.2  0.0 - 0.4 K/UL Final    ABS. BASOPHILS 05/22/2018 0.0  0.0 - 0.06 K/UL Final    DF 05/22/2018 AUTOMATED    Final    Sodium 05/22/2018 138  136 - 145 mmol/L Final    Potassium 05/22/2018 3.8  3.5 - 5.5 mmol/L Final    Chloride 05/22/2018 102  100 - 108 mmol/L Final    CO2 05/22/2018 27  21 - 32 mmol/L Final    Anion gap 05/22/2018 9  3.0 - 18 mmol/L Final    Glucose 05/22/2018 200* 74 - 99 mg/dL Final    BUN 05/22/2018 17  7.0 - 18 MG/DL Final    Creatinine 05/22/2018 1.06  0.6 - 1.3 MG/DL Final    BUN/Creatinine ratio 05/22/2018 16  12 - 20   Final    GFR est AA 05/22/2018 >60  >60 ml/min/1.73m2 Final    GFR est non-AA 05/22/2018 55* >60 ml/min/1.73m2 Final    Comment: (NOTE)  Estimated GFR is calculated using the Modification of Diet in Renal   Disease (MDRD) Study equation, reported for both  Americans   (GFRAA) and non- Americans (GFRNA), and normalized to 1.73m2   body surface area.  The physician must decide which value applies to the patient. The MDRD study equation should only be used in   individuals age 25 or older. It has not been validated for the   following: pregnant women, patients with serious comorbid conditions,   or on certain medications, or persons with extremes of body size,   muscle mass, or nutritional status.  Calcium 05/22/2018 8.5  8.5 - 10.1 MG/DL Final    Bilirubin, total 05/22/2018 0.2  0.2 - 1.0 MG/DL Final    ALT (SGPT) 05/22/2018 60* 13 - 56 U/L Final    AST (SGOT) 05/22/2018 60* 15 - 37 U/L Final    Alk.  phosphatase 05/22/2018 154* 45 - 117 U/L Final    Protein, total 05/22/2018 6.8  6.4 - 8.2 g/dL Final    Albumin 05/22/2018 3.5  3.4 - 5.0 g/dL Final    Globulin 05/22/2018 3.3  2.0 - 4.0 g/dL Final    A-G Ratio 05/22/2018 1.1  0.8 - 1.7   Final    Lipase 05/22/2018 262  73 - 393 U/L Final   Office Visit on 05/22/2018   Component Date Value Ref Range Status    Hemoglobin A1c (POC) 05/22/2018 7.5  % Final   Admission on 05/19/2018, Discharged on 05/19/2018   Component Date Value Ref Range Status    Ventricular Rate 05/19/2018 91  BPM Final    Atrial Rate 05/19/2018 91  BPM Final    P-R Interval 05/19/2018 152  ms Final    QRS Duration 05/19/2018 86  ms Final    Q-T Interval 05/19/2018 354  ms Final    QTC Calculation (Bezet) 05/19/2018 435  ms Final    Calculated P Axis 05/19/2018 53  degrees Final    Calculated R Axis 05/19/2018 -20  degrees Final    Calculated T Axis 05/19/2018 64  degrees Final    Diagnosis 05/19/2018    Final                    Value:Normal sinus rhythm  Normal ECG  When compared with ECG of 03-JAN-2018 18:42,  No significant change was found  Confirmed by Alexis Proctor ([id number]) on 5/20/2018 10:28:05 AM      WBC 05/19/2018 8.5  4.6 - 13.2 K/uL Final    RBC 05/19/2018 3.88* 4.20 - 5.30 M/uL Final    HGB 05/19/2018 11.9* 12.0 - 16.0 g/dL Final    HCT 05/19/2018 35.3  35.0 - 45.0 % Final    MCV 05/19/2018 91.0  74.0 - 97.0 FL Final    MCH 05/19/2018 30.7  24.0 - 34.0 PG Final    MCHC 05/19/2018 33.7  31.0 - 37.0 g/dL Final    RDW 05/19/2018 13.4  11.6 - 14.5 % Final    PLATELET 37/15/7167 441  135 - 420 K/uL Final    MPV 05/19/2018 10.2  9.2 - 11.8 FL Final    NEUTROPHILS 05/19/2018 58  40 - 73 % Final    LYMPHOCYTES 05/19/2018 34  21 - 52 % Final    MONOCYTES 05/19/2018 6  3 - 10 % Final    EOSINOPHILS 05/19/2018 2  0 - 5 % Final    BASOPHILS 05/19/2018 0  0 - 2 % Final    ABS. NEUTROPHILS 05/19/2018 5.0  1.8 - 8.0 K/UL Final    ABS. LYMPHOCYTES 05/19/2018 2.8  0.9 - 3.6 K/UL Final    ABS. MONOCYTES 05/19/2018 0.5  0.05 - 1.2 K/UL Final    ABS. EOSINOPHILS 05/19/2018 0.2  0.0 - 0.4 K/UL Final    ABS. BASOPHILS 05/19/2018 0.0  0.0 - 0.06 K/UL Final    DF 05/19/2018 AUTOMATED    Final    Sodium 05/19/2018 138  136 - 145 mmol/L Final    Potassium 05/19/2018 4.1  3.5 - 5.5 mmol/L Final    Chloride 05/19/2018 104  100 - 108 mmol/L Final    CO2 05/19/2018 26  21 - 32 mmol/L Final    Anion gap 05/19/2018 8  3.0 - 18 mmol/L Final    Glucose 05/19/2018 197* 74 - 99 mg/dL Final    BUN 05/19/2018 18  7.0 - 18 MG/DL Final    Creatinine 05/19/2018 1.08  0.6 - 1.3 MG/DL Final    BUN/Creatinine ratio 05/19/2018 17  12 - 20   Final    GFR est AA 05/19/2018 >60  >60 ml/min/1.73m2 Final    GFR est non-AA 05/19/2018 54* >60 ml/min/1.73m2 Final    Comment: (NOTE)  Estimated GFR is calculated using the Modification of Diet in Renal   Disease (MDRD) Study equation, reported for both  Americans   (GFRAA) and non- Americans (GFRNA), and normalized to 1.73m2   body surface area. The physician must decide which value applies to   the patient. The MDRD study equation should only be used in   individuals age 25 or older. It has not been validated for the   following: pregnant women, patients with serious comorbid conditions,   or on certain medications, or persons with extremes of body size,   muscle mass, or nutritional status.       Calcium 05/19/2018 8.8  8.5 - 10.1 MG/DL Final    Troponin-I, Qt. 05/19/2018 <0.02  0.0 - 0.045 NG/ML Final    Comment: The presence of detectable troponin above the reference range indicates myocardial injury which may be due to ischemia, myocarditis, trauma, etc.  Clinical correlation is necessary to establish the significance of this finding. Sequential testing is recommended to determine if the typical rise and fall of cTnI is demonstrated. Note:  Cardiac troponin I has a relatively long half life and may be present well after the CK MB has returned to baseline. The reference range is based on the 99th percentile of the referent population.  D DIMER 05/19/2018 0.48* <0.46 ug/ml(FEU) Final    Comment: (NOTE)  A D-Dimer result less than 0.5 ug/mL FEU combined with a low clinical   pretest probability of DVT and/or PE has a negative predictive value   of %. The positive predictive value is 50% or less.  HCG, Ql. 05/19/2018 NEGATIVE   NEG   Final    Test results should be confirmed using serum quantitative hCG when detection of pregnancy is critical and before performing any critical medical procedure.  Protein, total 05/19/2018 7.9  6.4 - 8.2 g/dL Final    Albumin 05/19/2018 3.8  3.4 - 5.0 g/dL Final    Globulin 05/19/2018 4.1* 2.0 - 4.0 g/dL Final    A-G Ratio 05/19/2018 0.9  0.8 - 1.7   Final    Bilirubin, total 05/19/2018 0.3  0.2 - 1.0 MG/DL Final    Bilirubin, direct 05/19/2018 <0.1  0.0 - 0.2 MG/DL Final    Alk. phosphatase 05/19/2018 190* 45 - 117 U/L Final    AST (SGOT) 05/19/2018 36  15 - 37 U/L Final    ALT (SGPT) 05/19/2018 49  13 - 56 U/L Final    Lipase 05/19/2018 257  73 - 393 U/L Final    Troponin-I, Qt. 05/19/2018 <0.02  0.0 - 0.045 NG/ML Final    Comment: The presence of detectable troponin above the reference range indicates myocardial injury which may be due to ischemia, myocarditis, trauma, etc.  Clinical correlation is necessary to establish the significance of this finding.   Sequential testing is recommended to determine if the typical rise and fall of cTnI is demonstrated. Note:  Cardiac troponin I has a relatively long half life and may be present well after the CK MB has returned to baseline. The reference range is based on the 99th percentile of the referent population.  Ventricular Rate 05/19/2018 72  BPM Final    Atrial Rate 05/19/2018 72  BPM Final    P-R Interval 05/19/2018 170  ms Final    QRS Duration 05/19/2018 82  ms Final    Q-T Interval 05/19/2018 394  ms Final    QTC Calculation (Bezet) 05/19/2018 431  ms Final    Calculated P Axis 05/19/2018 68  degrees Final    Calculated R Axis 05/19/2018 -20  degrees Final    Calculated T Axis 05/19/2018 52  degrees Final    Diagnosis 05/19/2018    Final                    Value:Normal sinus rhythm  Normal ECG  When compared with ECG of 19-MAY-2018 17:25,  No significant change was found  Confirmed by Lupe Andrews ([id number]) on 5/20/2018 10:28:38 AM     Admission on 05/02/2018, Discharged on 05/03/2018   Component Date Value Ref Range Status    WBC 05/02/2018 8.1  4.6 - 13.2 K/uL Final    RBC 05/02/2018 3.94* 4.20 - 5.30 M/uL Final    HGB 05/02/2018 12.3  12.0 - 16.0 g/dL Final    HCT 05/02/2018 35.9  35.0 - 45.0 % Final    MCV 05/02/2018 91.1  74.0 - 97.0 FL Final    MCH 05/02/2018 31.2  24.0 - 34.0 PG Final    MCHC 05/02/2018 34.3  31.0 - 37.0 g/dL Final    RDW 05/02/2018 13.4  11.6 - 14.5 % Final    PLATELET 75/73/4116 672  135 - 420 K/uL Final    MPV 05/02/2018 9.7  9.2 - 11.8 FL Final    NEUTROPHILS 05/02/2018 68  40 - 73 % Final    LYMPHOCYTES 05/02/2018 26  21 - 52 % Final    MONOCYTES 05/02/2018 4  3 - 10 % Final    EOSINOPHILS 05/02/2018 2  0 - 5 % Final    BASOPHILS 05/02/2018 0  0 - 2 % Final    ABS. NEUTROPHILS 05/02/2018 5.5  1.8 - 8.0 K/UL Final    ABS. LYMPHOCYTES 05/02/2018 2.1  0.9 - 3.6 K/UL Final    ABS. MONOCYTES 05/02/2018 0.3  0.05 - 1.2 K/UL Final    ABS.  EOSINOPHILS 05/02/2018 0.1  0.0 - 0.4 K/UL Final    ABS. BASOPHILS 05/02/2018 0.0  0.0 - 0.06 K/UL Final    DF 05/02/2018 AUTOMATED    Final    Sodium 05/02/2018 138  136 - 145 mmol/L Final    Potassium 05/02/2018 3.9  3.5 - 5.5 mmol/L Final    Chloride 05/02/2018 107  100 - 108 mmol/L Final    CO2 05/02/2018 22  21 - 32 mmol/L Final    Anion gap 05/02/2018 9  3.0 - 18 mmol/L Final    Glucose 05/02/2018 202* 74 - 99 mg/dL Final    BUN 05/02/2018 26* 7.0 - 18 MG/DL Final    Creatinine 05/02/2018 0.93  0.6 - 1.3 MG/DL Final    BUN/Creatinine ratio 05/02/2018 28* 12 - 20   Final    GFR est AA 05/02/2018 >60  >60 ml/min/1.73m2 Final    GFR est non-AA 05/02/2018 >60  >60 ml/min/1.73m2 Final    Comment: (NOTE)  Estimated GFR is calculated using the Modification of Diet in Renal   Disease (MDRD) Study equation, reported for both  Americans   (GFRAA) and non- Americans (GFRNA), and normalized to 1.73m2   body surface area. The physician must decide which value applies to   the patient. The MDRD study equation should only be used in   individuals age 25 or older. It has not been validated for the   following: pregnant women, patients with serious comorbid conditions,   or on certain medications, or persons with extremes of body size,   muscle mass, or nutritional status.  Calcium 05/02/2018 9.2  8.5 - 10.1 MG/DL Final    Bilirubin, total 05/02/2018 0.2  0.2 - 1.0 MG/DL Final    ALT (SGPT) 05/02/2018 31  13 - 56 U/L Final    AST (SGOT) 05/02/2018 22  15 - 37 U/L Final    Alk.  phosphatase 05/02/2018 144* 45 - 117 U/L Final    Protein, total 05/02/2018 7.6  6.4 - 8.2 g/dL Final    Albumin 05/02/2018 4.0  3.4 - 5.0 g/dL Final    Globulin 05/02/2018 3.6  2.0 - 4.0 g/dL Final    A-G Ratio 05/02/2018 1.1  0.8 - 1.7   Final    Lipase 05/02/2018 226  73 - 393 U/L Final    Color 05/02/2018 YELLOW    Final    Appearance 05/02/2018 CLEAR    Final    Specific gravity 05/02/2018 1.027  1.005 - 1.030   Final    pH (UA) 05/02/2018 5.0  5.0 - 8.0   Final    Protein 05/02/2018 NEGATIVE   NEG mg/dL Final    Glucose 05/02/2018 NEGATIVE   NEG mg/dL Final    Ketone 05/02/2018 TRACE* NEG mg/dL Final    Bilirubin 05/02/2018 NEGATIVE   NEG   Final    Blood 05/02/2018 NEGATIVE   NEG   Final    Urobilinogen 05/02/2018 1.0  0.2 - 1.0 EU/dL Final    Nitrites 05/02/2018 NEGATIVE   NEG   Final    Leukocyte Esterase 05/02/2018 NEGATIVE   NEG   Final    Crossmatch Expiration 05/02/2018 05/05/2018   Final    ABO/Rh(D) 05/02/2018 O POSITIVE   Final    Antibody screen 05/02/2018 NEG   Final    Prothrombin time 05/02/2018 12.2  11.5 - 15.2 sec Final    INR 05/02/2018 1.0  0.8 - 1.2   Final    Comment:            INR Therapeutic Ranges         (on stable oral anticoagulant):     INDICATION                INR  DVT/PE/Atrial Fib          2.0-3.0  MI/Mechanical Heart Valve  2.5-3.5     Hospital Outpatient Visit on 04/25/2018   Component Date Value Ref Range Status    WBC 04/25/2018 6.0  4.6 - 13.2 K/uL Final    RBC 04/25/2018 4.04* 4.20 - 5.30 M/uL Final    HGB 04/25/2018 12.3  12.0 - 16.0 g/dL Final    HCT 04/25/2018 37.3  35.0 - 45.0 % Final    MCV 04/25/2018 92.3  74.0 - 97.0 FL Final    MCH 04/25/2018 30.4  24.0 - 34.0 PG Final    MCHC 04/25/2018 33.0  31.0 - 37.0 g/dL Final    RDW 04/25/2018 13.4  11.6 - 14.5 % Final    PLATELET 27/23/3121 894  135 - 420 K/uL Final    MPV 04/25/2018 10.6  9.2 - 11.8 FL Final    NEUTROPHILS 04/25/2018 63  40 - 73 % Final    LYMPHOCYTES 04/25/2018 29  21 - 52 % Final    MONOCYTES 04/25/2018 6  3 - 10 % Final    EOSINOPHILS 04/25/2018 2  0 - 5 % Final    BASOPHILS 04/25/2018 0  0 - 2 % Final    ABS. NEUTROPHILS 04/25/2018 3.8  1.8 - 8.0 K/UL Final    ABS. LYMPHOCYTES 04/25/2018 1.8  0.9 - 3.6 K/UL Final    ABS. MONOCYTES 04/25/2018 0.3  0.05 - 1.2 K/UL Final    ABS. EOSINOPHILS 04/25/2018 0.1  0.0 - 0.4 K/UL Final    ABS.  BASOPHILS 04/25/2018 0.0  0.0 - 0.06 K/UL Final    DF 04/25/2018 AUTOMATED    Final    Sodium 04/25/2018 136  136 - 145 mmol/L Final    Potassium 04/25/2018 4.0  3.5 - 5.5 mmol/L Final    Chloride 04/25/2018 104  100 - 108 mmol/L Final    CO2 04/25/2018 24  21 - 32 mmol/L Final    Anion gap 04/25/2018 8  3.0 - 18 mmol/L Final    Glucose 04/25/2018 221* 74 - 99 mg/dL Final    BUN 04/25/2018 18  7.0 - 18 MG/DL Final    Creatinine 04/25/2018 0.90  0.6 - 1.3 MG/DL Final    BUN/Creatinine ratio 04/25/2018 20  12 - 20   Final    GFR est AA 04/25/2018 >60  >60 ml/min/1.73m2 Final    GFR est non-AA 04/25/2018 >60  >60 ml/min/1.73m2 Final    Comment: (NOTE)  Estimated GFR is calculated using the Modification of Diet in Renal   Disease (MDRD) Study equation, reported for both  Americans   (GFRAA) and non- Americans (GFRNA), and normalized to 1.73m2   body surface area. The physician must decide which value applies to   the patient. The MDRD study equation should only be used in   individuals age 25 or older. It has not been validated for the   following: pregnant women, patients with serious comorbid conditions,   or on certain medications, or persons with extremes of body size,   muscle mass, or nutritional status.  Calcium 04/25/2018 9.5  8.5 - 10.1 MG/DL Final    Bilirubin, total 04/25/2018 0.2  0.2 - 1.0 MG/DL Final    ALT (SGPT) 04/25/2018 29  13 - 56 U/L Final    AST (SGOT) 04/25/2018 14* 15 - 37 U/L Final    Alk. phosphatase 04/25/2018 146* 45 - 117 U/L Final    Protein, total 04/25/2018 7.8  6.4 - 8.2 g/dL Final    Albumin 04/25/2018 3.9  3.4 - 5.0 g/dL Final    Globulin 04/25/2018 3.9  2.0 - 4.0 g/dL Final    A-G Ratio 04/25/2018 1.0  0.8 - 1.7   Final    LIPID PROFILE 04/25/2018        Final    Cholesterol, total 04/25/2018 244* <200 MG/DL Final    Triglyceride 04/25/2018 190* <150 MG/DL Final    Comment: The drugs N-acetylcysteine (NAC) and  Metamiszole have been found to cause falsely  low results in this chemical assay.  Please  be sure to submit blood samples obtained  BEFORE administration of either of these  drugs to assure correct results.  HDL Cholesterol 04/25/2018 46  40 - 60 MG/DL Final    LDL, calculated 04/25/2018 160* 0 - 100 MG/DL Final    VLDL, calculated 04/25/2018 38  MG/DL Final    CHOL/HDL Ratio 04/25/2018 5.3* 0 - 5.0   Final    T4, Free 04/25/2018 1.2  0.7 - 1.5 NG/DL Final    TSH 04/25/2018 1.80  0.36 - 3.74 uIU/mL Final    Hemoglobin A1c 04/25/2018 7.6* 4.2 - 5.6 % Final    Comment: (NOTE)  HbA1C Interpretive Ranges  <5.7              Normal  5.7 - 6.4         Consider Prediabetes  >6.5              Consider Diabetes      Est. average glucose 04/25/2018 171  mg/dL Final    Comment: (NOTE)  The eAG should be interpreted with patient characteristics in mind   since ethnicity, interindividual differences, red cell lifespan,   variation in rates of glycation, etc. may affect the validity of the   calculation.  Vitamin D 25-Hydroxy 04/25/2018 9.5* 30 - 100 ng/mL Final    Comment: (NOTE)  Deficiency               <20 ng/mL  Insufficiency          20-30 ng/mL  Sufficient             ng/mL  Possible toxicity       >100 ng/mL    The Method used is Siemens Advia Centaur currently standardized to a   Center of Disease Control and Prevention (CDC) certified reference   [address] [place] [facility]. Samples containing fluorescein dye can produce falsely   elevated values when tested with the ADVIA Centaur Vitamin D Assay. It is recommended that results in the toxic range, >100 ng/mL, be   retested 72 hours post fluorescein exposure.       Microalbumin,urine random 04/25/2018 1.50  0 - 3.0 MG/DL Final    Creatinine, urine 04/25/2018 132.99* 30 - 125 mg/dL Final    Microalbumin/Creat ratio (mg/g cre* 04/25/2018 11  0 - 30 mg/g Final    Color 04/25/2018 YELLOW    Final    Appearance 04/25/2018 CLEAR    Final    Specific gravity 04/25/2018 1.026  1.005 - 1.030   Final    pH (UA) 04/25/2018 5.0  5.0 - 8.0   Final    Protein 04/25/2018 NEGATIVE   NEG mg/dL Final    Glucose 04/25/2018 500* NEG mg/dL Final    Ketone 04/25/2018 NEGATIVE   NEG mg/dL Final    Bilirubin 04/25/2018 NEGATIVE   NEG   Final    Blood 04/25/2018 NEGATIVE   NEG   Final    Urobilinogen 04/25/2018 0.2  0.2 - 1.0 EU/dL Final    Nitrites 04/25/2018 NEGATIVE   NEG   Final    Leukocyte Esterase 04/25/2018 SMALL* NEG   Final    WBC 04/25/2018 0 to 3  0 - 4 /hpf Final    RBC 04/25/2018 NEGATIVE   0 - 5 /hpf Final    Epithelial cells 04/25/2018 1+  0 - 5 /lpf Final    Bacteria 04/25/2018 NEGATIVE   NEG /hpf Final   Admission on 04/11/2018, Discharged on 04/11/2018   Component Date Value Ref Range Status    WBC 04/11/2018 8.6  4.6 - 13.2 K/uL Final    RBC 04/11/2018 3.93* 4.20 - 5.30 M/uL Final    HGB 04/11/2018 12.0  12.0 - 16.0 g/dL Final    HCT 04/11/2018 36.2  35.0 - 45.0 % Final    MCV 04/11/2018 92.1  74.0 - 97.0 FL Final    MCH 04/11/2018 30.5  24.0 - 34.0 PG Final    MCHC 04/11/2018 33.1  31.0 - 37.0 g/dL Final    RDW 04/11/2018 13.4  11.6 - 14.5 % Final    PLATELET 57/03/0460 536  135 - 420 K/uL Final    MPV 04/11/2018 9.8  9.2 - 11.8 FL Final    NEUTROPHILS 04/11/2018 65  40 - 73 % Final    LYMPHOCYTES 04/11/2018 27  21 - 52 % Final    MONOCYTES 04/11/2018 6  3 - 10 % Final    EOSINOPHILS 04/11/2018 2  0 - 5 % Final    BASOPHILS 04/11/2018 0  0 - 2 % Final    ABS. NEUTROPHILS 04/11/2018 5.6  1.8 - 8.0 K/UL Final    ABS. LYMPHOCYTES 04/11/2018 2.3  0.9 - 3.6 K/UL Final    ABS. MONOCYTES 04/11/2018 0.6  0.05 - 1.2 K/UL Final    ABS. EOSINOPHILS 04/11/2018 0.2  0.0 - 0.4 K/UL Final    ABS.  BASOPHILS 04/11/2018 0.0  0.0 - 0.06 K/UL Final    DF 04/11/2018 AUTOMATED    Final    Protein, total 04/11/2018 7.4  6.4 - 8.2 g/dL Final    Albumin 04/11/2018 3.8  3.4 - 5.0 g/dL Final    Globulin 04/11/2018 3.6  2.0 - 4.0 g/dL Final    A-G Ratio 04/11/2018 1.1  0.8 - 1.7   Final    Bilirubin, total 04/11/2018 0.2  0.2 - 1.0 MG/DL Final    Bilirubin, direct 04/11/2018 <0.1  0.0 - 0.2 MG/DL Final    Alk. phosphatase 04/11/2018 124* 45 - 117 U/L Final    AST (SGOT) 04/11/2018 29  15 - 37 U/L Final    ALT (SGPT) 04/11/2018 51  13 - 56 U/L Final    Lipase 04/11/2018 291  73 - 393 U/L Final    Sodium 04/11/2018 139  136 - 145 mmol/L Final    Potassium 04/11/2018 3.8  3.5 - 5.5 mmol/L Final    Chloride 04/11/2018 105  100 - 108 mmol/L Final    CO2 04/11/2018 25  21 - 32 mmol/L Final    Anion gap 04/11/2018 9  3.0 - 18 mmol/L Final    Glucose 04/11/2018 214* 74 - 99 mg/dL Final    BUN 04/11/2018 22* 7.0 - 18 MG/DL Final    Creatinine 04/11/2018 0.81  0.6 - 1.3 MG/DL Final    BUN/Creatinine ratio 04/11/2018 27* 12 - 20   Final    GFR est AA 04/11/2018 >60  >60 ml/min/1.73m2 Final    GFR est non-AA 04/11/2018 >60  >60 ml/min/1.73m2 Final    Comment: (NOTE)  Estimated GFR is calculated using the Modification of Diet in Renal   Disease (MDRD) Study equation, reported for both  Americans   (GFRAA) and non- Americans (GFRNA), and normalized to 1.73m2   body surface area. The physician must decide which value applies to   the patient. The MDRD study equation should only be used in   individuals age 25 or older. It has not been validated for the   following: pregnant women, patients with serious comorbid conditions,   or on certain medications, or persons with extremes of body size,   muscle mass, or nutritional status.  Calcium 04/11/2018 9.0  8.5 - 10.1 MG/DL Final   There may be more visits with results that are not included.  ]  Assessment:       1. Type 2 diabetes mellitus with diabetic autonomic neuropathy, with long-term current use of insulin ([address]    2. Chronic abdominal pain    3.  Essential hypertension        Plan:       Orders Placed This Encounter    Influenza virus vaccine (QUADRIVALENT PRES FREE SYRINGE) IM ([id number])    REFERRAL TO GYNECOLOGY     Referral Priority:   Routine     Referral Type:   Consultation     Referral Reason: Specialty Services Required    AMB POC HEMOGLOBIN A1C    Administration fee () for Medicare insured patients    DISCONTD: lisinopril-hydroCHLOROthiazide (PRINZIDE, ZESTORETIC) 20-12.5 mg per tablet     Sig: Take 1 Tab by mouth daily. Dispense:  90 Tab     Refill:  3    cyanocobalamin (VITAMIN B-12) 1,000 mcg tablet     Sig: Take 1 Tab by mouth daily. Dispense:  90 Tab     Refill:  0    EPINEPHrine (EPIPEN) 0.3 mg/0.3 mL injection     Si.3 mL by IntraMUSCular route once as needed for up to 1 dose. Dispense:  1 Syringe     Refill:  2    lisinopril-hydroCHLOROthiazide (PRINZIDE, ZESTORETIC) 20-12.5 mg per tablet     Sig: Take 1 Tab by mouth two (2) times a day. Dispense:  180 Tab     Refill:  3    carvedilol (COREG) 3.125 mg tablet     Sig: Take 2 Tabs by mouth two (2) times daily (with meals). Dispense:  90 Tab     Refill:  3    budesonide (RHINOCORT ALLERGY) 32 mcg/actuation nasal spray     Si Sprays by Both Nostrils route daily. Indications: Allergic Rhinitis     Dispense:  1 Bottle     Refill:  3    DISCONTD: insulin nph-regular human rec (HUMULIN 70-30) 100 unit/mL (70-30) inpn     Sig: Give 30 units in the QAM and 35 units at bedtime     Dispense:  5 Pen     Refill:  3    insulin nph-regular human rec (HUMULIN 70-30) 100 unit/mL (70-30) inpn     Sig: Give 40 units in the QAM and 45 units at bedtime     Dispense:  5 Pen     Refill:  3    ferrous sulfate 325 mg (65 mg iron) tablet     Sig: Take 1 Tab by mouth Daily (before breakfast). Dispense:  30 Tab     Refill:  3    polyethylene glycol (MIRALAX) 17 gram packet     Sig: Take 1 Packet by mouth daily. Dispense:  30 Each     Refill:  1    hydrOXYzine HCl (ATARAX) 25 mg tablet     Sig: Take 1-2 tablets by mouth every 6 hours as needed.      Dispense:  30 Tab     Refill:  0            MDM  HTN  Increase lisinopril- Hctz to twice a day  DMT2- increase AM insulin to 40 units in Am, and 45 units in pm check blood sugar prior to administering insulin hold insulin if blood sugar is less than 150. C  Chronic Abdominal pain- referral to GI.    follow up in one month for blood pressure check. Monitor blood sugar twice daily and bring log to all visits for review. Obtain a blood pressure cuff to monitor and document blood pressures twice daily- bring blood pressure log to all visits for review  Follow up in three months for routine care or prn for acute care. Healthy lifestyle has been encouraged including avoidance of tobacco, limiting or avoiding alcohol intake, heart healthy diet which is low in cholesterol and saturated fat and contains fresh fruits, vegetables and whole grains and fiber, regular exercise with goals of 20-30 minutes 3-5 days weekly and maintaining an optimal BMI. I have discussed the diagnosis with the patient and the intended plan as seen in the above orders. The patient has received an after-visit summary along with patient information handout. I have discussed medication side effects and warnings with the patient as well. Pt verbalized understanding.     Thanh CASEY  [facility]  [address] [place], [address]  Phone [phone]  Fax [phone]

## 2018-09-05 NOTE — PROGRESS NOTES
Anali Diaz is 49 y.o. female presents today for office visit for follow up for diabetes. Pt stated that her blood sugar was 154 this morning at home. Pt c/o lethargy. Pt is not fasting. Pt is in Room# 9    1. Have you been to the ER, urgent care clinic since your last visit? Hospitalized since your last visit? Yes. [facility] in 8/2018 for pain. 2. Have you seen or consulted any other health care providers outside of the [address] [place] [address] since your last visit? Include any pap smears or colon screening. NO    Health Maintenance reviewed. Upcoming Appts  Pain management- 9/5/18  Surgeon- 9/12/18    Requested Prescriptions     Pending Prescriptions Disp Refills    lisinopril-hydroCHLOROthiazide (PRINZIDE, ZESTORETIC) 20-12.5 mg per tablet 90 Tab 3     Sig: Take 1 Tab by mouth daily. Visit Vitals    /86 (BP 1 Location: Left arm, BP Patient Position: Sitting)  Comment (BP Patient Position): manual    Pulse 89    Temp 98.6 °F (37 °C) (Oral)    Resp 20    Ht 5' 2\" (1.575 m)    Wt 159 lb 12.8 oz (72.5 kg)    LMP 08/20/2013    SpO2 98%    BMI 29.23 kg/m2             Anali Diaz is a 49 y.o. female who presents for routine immunizations. She denies any symptoms , reactions or allergies that would exclude them from being immunized today. Risks and adverse reactions were discussed and the VIS was given to them. All questions were addressed. She was observed for 10 min post injection. There were no reactions observed.     Mona Martin LPN

## 2018-09-05 NOTE — MR AVS SNAPSHOT
[address] [id number] 
[phone] Patient: Raphael Bonner MRN: [id number] :[id number] Visit Information Date & Time Provider Department Dept. Phone Encounter #  
 2018  1:00 PM Milagro Cruz NP [facility] [phone] [id number] Follow-up Instructions Return if symptoms worsen or fail to improve. Upcoming Health Maintenance Date Due  
 EYE EXAM RETINAL OR DILATED Q1 1979 DTaP/Tdap/Td series (1 - Tdap) 1990 PAP AKA CERVICAL CYTOLOGY 1990 FOOT EXAM Q1 2018 Influenza Age 5 to Adult 2018 HEMOGLOBIN A1C Q6M 2018 MICROALBUMIN Q1 2019 LIPID PANEL Q1 2019 Allergies as of 2018  Review Complete On: 2018 By: Milagro Cruz NP Severity Noted Reaction Type Reactions Latex High 2010    Hives, Itching Other Food  07/15/2016    Rash Almonds Amoxicillin High 2013    Swelling Other reaction(s): mild rash/itching Aspirin High 2013    Not Reported This Time, Swelling Mouth swells Fentanyl High 2013   Systemic Anaphylaxis, Swelling Patches cause mouth to swell Swelling in mouth from fentanyl patch Levaquin [Levofloxacin] High 2013    Not Reported This Time, Swelling Other reaction(s): mild rash/itching Chlorhexidine Medium 2014   Topical Rash Norco [Hydrocodone-acetaminophen] Medium 2015    Nausea and Vomiting Other reaction(s): gi distress Acetaminophen  2016    Other (comments) [place]  2016    Rash, Itching Codeine  2013    Other (comments) Glycopyrrolate  2014    Swelling Pt  States  faciAL  SWELLING   POST  ROBINUL  IV Pt  States  faciAL  SWELLING   POST  ROBINUL  IV Morphine  2016    Nausea and Vomiting Pt states she is not allergic Pcn [Penicillins]  11/26/2013    Swelling Percocet [Oxycodone-acetaminophen]  01/30/2015    Nausea and Vomiting Other reaction(s): gi distress 
nausea Other reaction(s): anaphylaxis/angioedema Per pt. She is allergic Tape [Adhesive]  05/31/2016    Rash Tramadol  12/05/2013    Swelling Current Immunizations  Reviewed on 4/24/2017 Name Date Influenza Vaccine 11/7/2016, 2/24/2016, 3/23/2015, 12/1/2014, 10/9/2013, 10/20/2012 Influenza Vaccine (Quad) PF 9/5/2018 Pneumococcal Polysaccharide (PPSV-23) 3/22/2015, 10/22/2012 Pneumococcal Vaccine (Unspecified Type) 3/4/2016, 1/2/2013 Not reviewed this visit You Were Diagnosed With   
  
 Codes Comments Chronic fatigue    -  Primary ICD-10-CM: R53.82 
ICD-9-CM: 780.79 Type 2 diabetes mellitus with diabetic autonomic neuropathy, with long-term current use of insulin (HCC)     ICD-10-CM: E11.43, Z79.4 ICD-9-CM: 250.60, 337.1, V58.67 Chronic abdominal pain     ICD-10-CM: R10.9, G89.29 ICD-9-CM: 789.00, 338.29 Essential hypertension     ICD-10-CM: I10 
ICD-9-CM: 401.9 Encounter for immunization     ICD-10-CM: N45 ICD-9-CM: V03.89 Allergy to bee sting     ICD-10-CM: Z91.038 
ICD-9-CM: V15.06 Seasonal allergic rhinitis, unspecified trigger     ICD-10-CM: J30.2 ICD-9-CM: 477.9 Menopausal syndrome (hot flashes)     ICD-10-CM: N95.1 ICD-9-CM: 627.2 Type 2 diabetes mellitus with complication, with long-term current use of insulin (HCC)     ICD-10-CM: E11.8, Z79.4 ICD-9-CM: 250.90, V58.67 Vitals BP Pulse Temp Resp Height(growth percentile) Weight(growth percentile) 152/86 (BP 1 Location: Left arm, BP Patient Position: Sitting) 89 98.6 °F (37 °C) (Oral) 20 5' 2\" (1.575 m) 159 lb 12.8 oz (72.5 kg) LMP SpO2 BMI OB Status Smoking Status 08/20/2013 98% 29.23 kg/m2 Hysterectomy Never Smoker Vitals History BMI and BSA Data Body Mass Index Body Surface Area 29.23 kg/m 2 1.78 m 2 Preferred Pharmacy Pharmacy Name Phone [facility] [phone] Your Updated Medication List  
  
   
This list is accurate as of 9/5/18  2:26 PM.  Always use your most recent med list.  
  
  
  
  
 ALBUTEROL IN Take  by inhalation. atorvastatin 40 mg tablet Commonly known as:  LIPITOR Take 1 Tab by mouth daily. bacitracin-neomycin-polymyxin b-hydrocortisone 1 % ointment Commonly known as:  CORTISPORIN Apply  to affected area two (2) times a day. bisacodyl 10 mg suppository Commonly known as:  DULCOLAX (BISACODYL) Insert 10 mg into rectum daily as needed. Blood Pressure Test Kit-Medium Kit Blood pressure as needed Blood-Glucose Meter monitoring kit Use daily to check blood sugar  
  
 budesonide 32 mcg/actuation nasal spray Commonly known as:  RHINOCORT ALLERGY 2 Sprays by Both Nostrils route daily. Indications: Allergic Rhinitis  
  
 carvedilol 3.125 mg tablet Commonly known as:  Wileen Davis Take 2 Tabs by mouth two (2) times daily (with meals). cefdinir 300 mg capsule Commonly known as:  OMNICEF Take 1 Cap by mouth two (2) times a day. cyanocobalamin 1,000 mcg tablet Commonly known as:  VITAMIN B-12 Take 1 Tab by mouth daily. dicyclomine 20 mg tablet Commonly known as:  BENTYL Take 1 Tab by mouth every six (6) hours as needed (abdominal cramps) for up to 20 doses. EPINEPHrine 0.3 mg/0.3 mL injection Commonly known as:  EPIPEN  
0.3 mL by IntraMUSCular route once as needed for up to 1 dose. ferrous sulfate 325 mg (65 mg iron) tablet Take 1 Tab by mouth Daily (before breakfast). glucose blood VI test strips strip Commonly known as:  ASCENSIA AUTODISC VI, ONE TOUCH ULTRA TEST VI  
Use daily to monitor blood glucose  
  
 hydrOXYzine HCl 25 mg tablet Commonly known as:  ATARAX Take 1-2 tablets by mouth every 6 hours as needed. INDOCIN PO Take  by mouth. Insulin Needles (Disposable) 31 gauge x 5/16\" Ndle Use to administer insulin as directed  
  
 insulin nph-regular human rec 100 unit/mL (70-30) Inpn Commonly known as:  HUMULIN 70-30 Give 40 units in the QAM and 45 units at bedtime Lancets Misc Use daily to monitor blood glucose LEXAPRO 20 mg tablet Generic drug:  escitalopram oxalate Take 20 mg by mouth daily. lidocaine 5 % Commonly known as:  LIDODERM  
2 Patches by TransDERmal route every twenty-four (24) hours. Apply patch to the affected area for 12 hours a day and remove for 12 hours a day. lisinopril-hydroCHLOROthiazide 20-12.5 mg per tablet Commonly known as:  Bin Stuart Take 1 Tab by mouth two (2) times a day. loratadine 10 mg tablet Commonly known as:  Gisela Martinet Take 1 Tab by mouth daily. Indications: Allergic Rhinitis LORazepam 1 mg tablet Commonly known as:  ATIVAN  
1 mg. mupirocin 2 % ointment Commonly known as:  [facility] Use 1 Application to affected area 3 Times Daily. NORCO 5-325 mg per tablet Generic drug:  HYDROcodone-acetaminophen Take  by mouth. * ondansetron 4 mg disintegrating tablet Commonly known as:  ZOFRAN ODT Take 1-2 tablets every 6-8 hours as needed for nausea and vomiting. * ondansetron 4 mg disintegrating tablet Commonly known as:  ZOFRAN ODT Take 1 Tab by mouth every eight (8) hours as needed for Nausea. polyethylene glycol 17 gram packet Commonly known as:  Cece Rodrigo Take 1 Packet by mouth daily. traZODone 300 mg tablet Commonly known as:  Emaline Sober Take 1 Tab by mouth nightly. VALTREX 1 gram tablet Generic drug:  valACYclovir Take  by mouth. vit B comp-C-FA-iron-vit E 500 mg-400 mcg- 18 mg iron Tab Commonly known as:  VITAMINS B COMPLEX Take 1 Tab by mouth daily. * Notice: This list has 2 medication(s) that are the same as other medications prescribed for you. Read the directions carefully, and ask your doctor or other care provider to review them with you. Prescriptions Sent to Pharmacy Refills  
 cyanocobalamin (VITAMIN B-12) 1,000 mcg tablet 0 Sig: Take 1 Tab by mouth daily. Class: Normal  
 Pharmacy: [address] Ph #: [phone] Route: Oral  
 EPINEPHrine (EPIPEN) 0.3 mg/0.3 mL injection 2 Si.3 mL by IntraMUSCular route once as needed for up to 1 dose. Class: Normal  
 Pharmacy: [address] Ph #: [phone] Route: IntraMUSCular  
 lisinopril-hydroCHLOROthiazide (PRINZIDE, ZESTORETIC) 20-12.5 mg per tablet 3 Sig: Take 1 Tab by mouth two (2) times a day. Class: Normal  
 Pharmacy: [address] Ph #: [phone] Route: Oral  
 carvedilol (COREG) 3.125 mg tablet 3 Sig: Take 2 Tabs by mouth two (2) times daily (with meals). Class: Normal  
 Pharmacy: [address] Ph #: [phone] Route: Oral  
 budesonide (RHINOCORT ALLERGY) 32 mcg/actuation nasal spray 3 Si Sprays by Both Nostrils route daily. Indications: Allergic Rhinitis Class: Normal  
 Pharmacy: [address] Ph #: [phone] Route: Both Nostrils  
 insulin nph-regular human rec (HUMULIN 70-30) 100 unit/mL (70-30) inpn 3 Sig: Give 40 units in the QAM and 45 units at bedtime Class: Normal  
 Pharmacy: [address] Ph #: [phone] We Performed the Following ADMIN INFLUENZA VIRUS VAC [ HCP] AMB POC HEMOGLOBIN A1C [71865 CPT(R)] INFLUENZA VIRUS VAC QUAD,SPLIT,PRESV FREE SYRINGE IM [id number] CPT(R)] REFERRAL TO GYNECOLOGY [REF30 Custom] Comments:  
 Please evaluate patient for hot flashes. Follow-up Instructions Return if symptoms worsen or fail to improve. Referral Information Referral ID Referred By Referred To  
  
 [id number] Bianca Smyth Not Available Visits Status Start Date End Date 1 New Request 9/5/18 9/5/19 If your referral has a status of pending review or denied, additional information will be sent to support the outcome of this decision. Patient Instructions Abdominal Pain: Care Instructions Your Care Instructions Abdominal pain has many possible causes. Some aren't serious and get better on their own in a few days. Others need more testing and treatment. If your pain continues or gets worse, you need to be rechecked and may need more tests to find out what is wrong. You may need surgery to correct the problem. Don't ignore new symptoms, such as fever, nausea and vomiting, urination problems, pain that gets worse, and dizziness. These may be signs of a more serious problem. Your doctor may have recommended a follow-up visit in the next 8 to 12 hours. If you are not getting better, you may need more tests or treatment. The doctor has checked you carefully, but problems can develop later. If you notice any problems or new symptoms, get medical treatment right away. Follow-up care is a key part of your treatment and safety. Be sure to make and go to all appointments, and call your doctor if you are having problems. It's also a good idea to know your test results and keep a list of the medicines you take. How can you care for yourself at home? · Rest until you feel better. · To prevent dehydration, drink plenty of fluids, enough so that your urine is light yellow or clear like water.  Choose water and other caffeine-free clear liquids until you feel better. If you have kidney, heart, or liver disease and have to limit fluids, talk with your doctor before you increase the amount of fluids you drink. · If your stomach is upset, eat mild foods, such as rice, dry toast or crackers, bananas, and applesauce. Try eating several small meals instead of two or three large ones. · Wait until 48 hours after all symptoms have gone away before you have spicy foods, alcohol, and drinks that contain caffeine. · Do not eat foods that are high in fat. · Avoid anti-inflammatory medicines such as aspirin, ibuprofen (Advil, Motrin), and naproxen (Aleve). These can cause stomach upset. Talk to your doctor if you take daily aspirin for another health problem. When should you call for help? Call 911 anytime you think you may need emergency care. For example, call if: 
  · You passed out (lost consciousness).  
  · You pass maroon or very bloody stools.  
  · You vomit blood or what looks like coffee grounds.  
  · You have new, severe belly pain.  
 Call your doctor now or seek immediate medical care if: 
  · Your pain gets worse, especially if it becomes focused in one area of your belly.  
  · You have a new or higher fever.  
  · Your stools are black and look like tar, or they have streaks of blood.  
  · You have unexpected vaginal bleeding.  
  · You have symptoms of a urinary tract infection. These may include: 
¨ Pain when you urinate. ¨ Urinating more often than usual. 
¨ Blood in your urine.  
  · You are dizzy or lightheaded, or you feel like you may faint.  
 Watch closely for changes in your health, and be sure to contact your doctor if: 
  · You are not getting better after 1 day (24 hours). Where can you learn more? Go to http://imani-jenny.info/. Enter D818 in the search box to learn more about \"Abdominal Pain: Care Instructions. \" Current as of: November 20, 2017 Content Version: 11.7 © 2367-2311 [employer]. Care instructions adapted under license by [employer] (which disclaims liability or warranty for this information). If you have questions about a medical condition or this instruction, always ask your healthcare professional. [address] any warranty or liability for your use of this information. Epinephrine (By injection) Epinephrine (rn-p-ZZA-rin) Treats severe allergic reactions (including anaphylaxis) in an emergency situation. Brand Name(s): Adrenaclick, Adrenalin, Adyphren, Adyphren Amp II Kit, Adyphren Amp Kit, Adyphren II, Auvi-Q, EPINEPHrinesnap, EpiPen, EpiPen Auto-Injector, EpiPen Jr Auto-Injector, Epinephrine Novaplus, Epipen 2-Mandeep Auto-Injector, Epipen Jr 2-Mandeep Auto-Injector There may be other brand names for this medicine. When This Medicine Should Not Be Used: A severe allergic reaction can be life-threatening, so there is no reason this medicine should not be used. How to Use This Medicine:  
Injectable · Your doctor should teach you how and when to inject this medicine. Each injection kit contains a single-use dose of medicine prescribed for you. · Give yourself a shot right away if you start to have a severe allergic reaction. · Inject this medicine into the muscle on the outside of your thigh only. Never inject this medicine into a vein, into the muscles of your buttocks, or into your fingers, toes, hands, or feet. · Read and follow the patient instructions that come with this medicine. Talk to your doctor or pharmacist if you have any questions. · This medicine might come with an autoinjector  so you can practice giving the medicine before you have an actual allergic reaction. The autoinjector  is gray (for EpiPen® or EpiPen Jr®) or beige (for [place]) and does not contain any medicine or needle.  
· Do not remove the blue safety release (EpiPen® or EpiPen Jr®) or the gray end caps (Adrenaclick®) on the autoinjector until you are ready to use it. Do not put your thumb, fingers, or hand over the orange (EpiPen® or EpiPen Jr®) or red (Adrenaclick®) tip of the autoinjector or over the needle of the Symjepi® prefilled syringe. · If you use the Symjepi® prefilled syringe, do not remove the needle cap until you are ready to use it. · You may need to use more than one injection if your allergic reaction does not get better after the first shot. Your doctor will give you additional doses if you need more than 2 injections. · You may inject the medicine through your clothing, if you need to. · Some liquid will remain in the autoinjector after the medicine has been injected. This medicine cannot be reused. Give your used autoinjector to your healthcare provider when you seek medical care. · Carry this medicine with you at all times for emergency use in case you have a severe allergic reaction. · Make sure family members or other people you are with know how to inject the medicine in case you are not able to do it yourself. · Check your injection kits regularly to make sure the liquid has not changed color. It should be clear and colorless. Do not use the autoinjector or prefilled syringe if the liquid is discolored or cloudy, or if there are particles in it. You should not use the autoinjector if the expiration date has passed. · If you are using the epinephrine injection in a child, make sure to hold his leg firmly in place and limit movement before and during an injection. · Store the injection kit at room temperature, away from heat, moisture, and direct light. Do not store the medicine in the refrigerator or freezer, or inside a car. · Keep the autoinjector and prefilled syringe in its carrier tube or case to protect it from damage. This tube or case is not waterproof. If you accidentally drop it, check for damage or leaks. Drugs and Foods to Avoid: Ask your doctor or pharmacist before using any other medicine, including over-the-counter medicines, vitamins, and herbal products. · Some foods and medicines can affect how epinephrine works. Tell your doctor if you are using any of the following: ¨ Digoxin, levothyroxine, phentolamine ¨ Blood pressure medicine ¨ Certain allergy medicines (including chlorpheniramine, diphenhydramine, tripelennamine) ¨ Diuretic (water pill) ¨ Ergot medicines ¨ Medicine for depression (including MAO inhibitor) ¨ Medicine for heart rhythm problem Warnings While Using This Medicine: · Tell your doctor if you are pregnant or breastfeeding, or if you have heart disease, asthma, diabetes, heart rhythm problems, high blood pressure, an overactive thyroid, or Parkinson disease. · A severe allergic reaction is a medical emergency. Go to an emergency room as soon as possible, even if you feel better after you use this medicine. · Do not inject this medicine into your buttocks, hands, or feet. Go to the emergency room right away if you accidently inject epinephrine into any part of your body other than your thigh. Epinephrine reduces blood flow, and this could damage areas that have small blood vessels, including the hands and feet. · Keep all medicine out of the reach of children. Never share your medicine with anyone. Possible Side Effects While Using This Medicine:  
Call your doctor right away if you notice any of these side effects: 
· Chest pain · Fast, pounding, or uneven heartbeat · Heavy sweating, nausea, vomiting · Pain, redness, or warmth at the injection site · Tremors, shakiness · Trouble breathing If you notice these less serious side effects, talk with your doctor: · Feeling anxious, nervous, scared, or weak · Headache or dizziness · Pale skin If you notice other side effects that you think are caused by this medicine, tell your doctor. Call your doctor for medical advice about side effects. You may report side effects to FDA at 5-533-FDA-6904 © 2017 [address] Information is for End User's use only and may not be sold, redistributed or otherwise used for commercial purposes. The above information is an  only. It is not intended as medical advice for individual conditions or treatments. Talk to your doctor, nurse or pharmacist before following any medical regimen to see if it is safe and effective for you. Influenza (Flu) Vaccine (Inactivated or Recombinant): What You Need to Know Why get vaccinated? Influenza (\"flu\") is a contagious disease that spreads around the [place] every winter, usually between October and May. Flu is caused by influenza viruses and is spread mainly by coughing, sneezing, and close contact. Anyone can get flu. Flu strikes suddenly and can last several days. Symptoms vary by age, but can include: · Fever/chills. · Sore throat. · Muscle aches. · Fatigue. · Cough. · Headache. · Runny or stuffy nose. Flu can also lead to pneumonia and blood infections, and cause diarrhea and seizures in children. If you have a medical condition, such as heart or lung disease, flu can make it worse. Flu is more dangerous for some people. Infants and young children, people 72years of age and older, pregnant women, and people with certain health conditions or a weakened immune system are at greatest risk. Each year thousands of people in the [facility] die from flu, and many more are hospitalized. Flu vaccine can: · Keep you from getting flu. · Make flu less severe if you do get it. · Keep you from spreading flu to your family and other people. Inactivated and recombinant flu vaccines A dose of flu vaccine is recommended every flu season. Children 6 months through 6years of age may need two doses during the same flu season. Everyone else needs only one dose each flu season. Some inactivated flu vaccines contain a very small amount of a mercury-based preservative called thimerosal. Studies have not shown thimerosal in vaccines to be harmful, but flu vaccines that do not contain thimerosal are available. There is no live flu virus in flu shots. They cannot cause the flu. There are many flu viruses, and they are always changing. Each year a new flu vaccine is made to protect against three or four viruses that are likely to cause disease in the upcoming flu season. But even when the vaccine doesn't exactly match these viruses, it may still provide some protection. Flu vaccine cannot prevent: · Flu that is caused by a virus not covered by the vaccine. · Illnesses that look like flu but are not. Some people should not get this vaccine Tell the person who is giving you the vaccine: · If you have any severe (life-threatening) allergies. If you ever had a life-threatening allergic reaction after a dose of flu vaccine, or have a severe allergy to any part of this vaccine, you may be advised not to get vaccinated. Most, but not all, types of flu vaccine contain a small amount of egg protein. · If you ever had Guillain-Barré syndrome (also called GBS) Some people with a history of GBS should not get this vaccine. This should be discussed with your doctor. · If you are not feeling well. It is usually okay to get flu vaccine when you have a mild illness, but you might be asked to come back when you feel better. Risks of a vaccine reaction With any medicine, including vaccines, there is a chance of reactions. These are usually mild and go away on their own, but serious reactions are also possible. Most people who get a flu shot do not have any problems with it. Minor problems following a flu shot include: · Soreness, redness, or swelling where the shot was given · Hoarseness · Sore, red or itchy eyes · Cough · Fever · Aches · Headache · Itching · Fatigue If these problems occur, they usually begin soon after the shot and last 1 or 2 days. More serious problems following a flu shot can include the following: · There may be a small increased risk of Guillain-Barré Syndrome (GBS) after inactivated flu vaccine. This risk has been estimated at 1 or 2 additional cases per million people vaccinated. This is much lower than the risk of severe complications from flu, which can be prevented by flu vaccine. · [facility] children who get the flu shot along with pneumococcal vaccine (PCV13) and/or DTaP vaccine at the same time might be slightly more likely to have a seizure caused by fever. Ask your doctor for more information. Tell your doctor if a child who is getting flu vaccine has ever had a seizure Problems that could happen after any injected vaccine: · People sometimes faint after a medical procedure, including vaccination. Sitting or lying down for about 15 minutes can help prevent fainting, and injuries caused by a fall. Tell your doctor if you feel dizzy, or have vision changes or ringing in the ears. · Some people get severe pain in the shoulder and have difficulty moving the arm where a shot was given. This happens very rarely. · Any medication can cause a severe allergic reaction. Such reactions from a vaccine are very rare, estimated at about 1 in a million doses, and would happen within a few minutes to a few hours after the vaccination. As with any medicine, there is a very remote chance of a vaccine causing a serious injury or death. The safety of vaccines is always being monitored. For more information, visit: www.cdc.gov/vaccinesafety/. What if there is a serious reaction? What should I look for? · Look for anything that concerns you, such as signs of a severe allergic reaction, very high fever, or unusual behavior.  
Signs of a severe allergic reaction can include hives, swelling of the face and throat, difficulty breathing, a fast heartbeat, dizziness, and weakness - usually within a few minutes to a few hours after the vaccination. What should I do? · If you think it is a severe allergic reaction or other emergency that can't wait, call 9-1-1 and get the person to the nearest hospital. Otherwise, call your doctor. · Reactions should be reported to the \"Vaccine Adverse Event Reporting System\" (VAERS). Your doctor should file this report, or you can do it yourself through the VAERS website at www.vaers. [place] [facility].gov, or by calling [phone]. VA[employer] does not give medical advice. The National Vaccine Injury Compensation Program 
The National Vaccine Injury Compensation Program (VICP) is a federal program that was created to compensate people who may have been injured by certain vaccines. Persons who believe they may have been injured by a vaccine can learn about the program and about filing a claim by calling 8-917.368.3771 or visiting the [employer] website at www.[facility].gov/vaccinecompensation. There is a time limit to file a claim for compensation. How can I learn more? · Ask your healthcare provider. He or she can give you the vaccine package insert or suggest other sources of information. · Call your local or state health department. · Contact the Centers for Disease Control and Prevention (CDC): 
¨ Call 1-856.222.2247 (1-800-CDC-INFO) or ¨ Visit CDC's website at www.cdc.gov/flu Vaccine Information Statement Inactivated Influenza Vaccine 8/7/2015) 42 JESUS Marie 229ZH-91 [facility] and [employer] Centers for Disease Control and Prevention Many Vaccine Information Statements are available in [place] and other languages. See www.immunize.org/vis. Muchas hojas de información sobre vacunas están disponibles en español y en otros idiomas. Visite www.immunize.org/vis.  
Care instructions adapted under license by [employer] (which disclaims liability or warranty for this information). If you have questions about a medical condition or this instruction, always ask your healthcare professional. [address] any warranty or liability for your use of this information. Hemoglobin A1c: About This Test 
What is it? Hemoglobin A1c is a blood test that checks your average blood sugar level over the past 2 to 3 months. This test also is called a glycohemoglobin test or an A1c test. 
Why is this test done? The A1c test is done to check how well your diabetes has been controlled over the past 2 to 3 months. Your doctor can use this information to adjust your medicine and diabetes treatment, if needed. This test is also one of the tests used to diagnose diabetes in adults. How can you prepare for the test? 
You don't need to stop eating before you have an A1c test. This test can be done at any time during the day, even after a meal. 
What happens during the test? 
The health professional taking a sample of your blood will: · Wrap an elastic band around your upper arm. This makes the veins below the band larger so it is easier to put a needle into the vein. · Clean the needle site with alcohol. · Put the needle into the vein. · Attach a tube to the needle to fill it with blood. · Remove the band from your arm when enough blood is collected. · Put a gauze pad or cotton ball over the needle site as the needle is removed. · Put pressure on the site and then put on a bandage. What else should you know about the test? 
The test result is usually given as a percentage. The normal A1c is less than 5.7%. You have a higher risk for diabetes if your A1c is 5.7% to 6.4%. If your level is 6.5% or higher, you have diabetes. The A1c test result also can be used to find your estimated average glucose, or eAG.  Your eAG and A1c show the same thing in two different ways. They both help you learn more about your average blood sugar range over the past 2 to 3 months. A1c is shown as a percentage, while eAG uses the same units (mg/dl) as your glucose meter. Examples: · 6% A1c = 126 mg/dL · 7% A1c = 154 mg/dL · 8% A1c = 183 mg/dL · 9% A1c = 212 mg/dL · 10% A1c = 240 mg/dL · 11% A1c = 269 mg/dL · 12% A1c = 298 mg/dL Where can you learn more? Go to http://imani-jenny.info/. Enter U216 in the search box to learn more about \"Hemoglobin A1c: About This Test.\" Current as of: December 7, 2017 Content Version: 11.7 © 6861-7932 [employer]. Care instructions adapted under license by [employer] (which disclaims liability or warranty for this information). If you have questions about a medical condition or this instruction, always ask your healthcare professional. [place] [address] any warranty or liability for your use of this information. Learning About Cervical Cancer Screening What is a cervical cancer screening test? 
 
Cervical cancer screening tests check for cancer of the cervix. The cervix is the lower part of the uterus that opens into the vagina. The test can help your doctor find early changes in the cells that could lead to cancer. Two tests can be used to screen for cervical cancer. They may be used alone or together. A Pap test.  
This test looks for changes in the cells of the cervix. Abnormal cells may be a sign of cancer. A human papillomavirus (HPV) test.  
This test looks for the HPV virus. Some types of HPV can cause cancer. During either test, the doctor or nurse will insert a tool called a speculum into your vagina. The speculum gently spreads apart the vaginal walls. It allows your doctor to see inside the vagina and the cervix. He or she uses a small swab or brush to collect cell samples from your cervix. Try to schedule the test when you're not having your period.  To get ready for the test, avoid douches, tampons, vaginal medicines, sprays, and powders for at least a day before you have the test. 
When should you have a screening test? 
These guidelines apply to women who are at average risk of cervical cancer. If you don't know your risk, talk with your doctor. Women 21 to 34 
· You can start having a Pap test at age 24. It is done every 3 years. · You can start having the primary HPV test at age 22. It is done every 3 years. Women 30 to 59 · If you had both a Pap test and an HPV test last time and both were normal, you can have a Pap plus an HPV test every 5 years. · If you had only a Pap test last time, as long as your results are normal, you can have a Pap test every 3 years. · If you had only an HPV test last time, as long as your results are normal, you can have an HPV test every 3 years. Women 72 and older · If you are age 72 or older, talk with your doctor about what's right for you. Women ages 72 and older may no longer need to be screened for cervical cancer. When to stop having screening tests depends on your medical history, your overall health, and your risk of cervical cell changes or cervical cancer. What happens after the test? 
The sample of cells taken during your test will be sent to a lab so that an expert can look at the cells. It usually takes a week or two to get the results back. Pap tests · A normal result means that the test didn't find any abnormal cells in the sample. · An abnormal result can mean many things. Most of these aren't cancer. The results of your test may be abnormal because: 
¨ You have an infection of the vagina or cervix, such as a yeast infection. ¨ You have an IUD (intrauterine device for birth control). ¨ You have low estrogen levels after menopause that are causing the cells to change. ¨ You have cell changes that may be a sign of precancer or cancer. The results are ranked based on how serious the changes might be. HPV tests · A normal result means that the test didn't find any high-risk HPV in the sample. · An abnormal HPV test doesn't mean that you have cervical cancer. It may mean that you are infected with one or more high-risk types of HPV. This increases your chance of having cell changes that may be a sign of precancer or cancer. Follow-up care is a key part of your treatment and safety. Be sure to make and go to all appointments, and call your doctor if you are having problems. It's also a good idea to know your test results and keep a list of the medicines you take. Where can you learn more? Go to http://imani-jenny.info/. Enter P919 in the search box to learn more about \"Learning About Cervical Cancer Screening. \" Current as of: January 31, 2018 Content Version: 11.7 © 2786-2914 [employer]. Care instructions adapted under license by [employer] (which disclaims liability or warranty for this information). If you have questions about a medical condition or this instruction, always ask your healthcare professional. [address] any warranty or liability for your use of this information. Hot Flashes During Menopause: Care Instructions Your Care Instructions A hot flash is a sudden feeling of intense body heat. Your head, neck, and chest may get red. Your heartbeat may speed up, and you may feel anxious or irritable. You may find that hot flashes occur more often in warm rooms or during stressful times. Hot flashes and other symptoms are a normal response to the hormone changes that occur before your menstrual cycle goes away completely (menopause). Hot flashes often get better and go away with time. Making a few changes, such as exercising more, practicing meditation, quitting smoking, and drinking less alcohol, can help. Some women take hormone pills or other medicine to treat bothersome symptoms. Follow-up care is a key part of your treatment and safety. Be sure to make and go to all appointments, and call your doctor if you are having problems. It's also a good idea to know your test results and keep a list of the medicines you take. How can you care for yourself at home? · If you decide to take medicine to treat hot flashes, take it exactly as prescribed. Call your doctor if you think you are having a problem with your medicine. You will get more details on the specific medicine your doctor prescribes. · Learn to meditate. Sit quietly and focus on your breathing. Try to practice each day. Books, classes, and tapes can help you start a program. 
· Wear natural fabrics, such as cotton and silk. Dress in layers so you can take off clothes as needed. · Keep the room temperature cool, or use a fan. You are more likely to have a hot flash when you are too warm than when you are cool. · Use fewer blankets when you sleep at night. · Drink cold fluids rather than hot ones. Limit your intake of caffeine and alcohol. · Eat smaller meals more often during the day so your body makes less heat than when digesting large amounts of food. Eat low-fat and high-fiber foods. · Do not smoke. Smoking can make hot flashes worse. If you need help quitting, talk to your doctor about stop-smoking programs and medicines. These can increase your chances of quitting for good. · Get at least 30 minutes of exercise on most days of the week. Walking is a good choice. You also may want to do other activities, such as running, swimming, cycling, or playing tennis or team sports. Where can you learn more? Go to http://imani-jenny.info/. Enter F700 in the search box to learn more about \"Hot Flashes During Menopause: Care Instructions. \" Current as of: October 6, 2017 Content Version: 11.7 © 8378-8149 [employer].  Care instructions adapted under license by [address] (which disclaims liability or warranty for this information). If you have questions about a medical condition or this instruction, always ask your healthcare professional. [address] any warranty or liability for your use of this information. Introducing [facility]! [place] introduces [employer] patient portal. Now you can access parts of your medical record, email your doctor's office, and request medication refills online. 1. In your internet browser, go to [email or web address] 2. Click on the First Time User? Click Here link in the Sign In box. You will see the New Member Sign Up page. 3. Enter your Enfora Access Code exactly as it appears below. You will not need to use this code after youve completed the sign-up process. If you do not sign up before the expiration date, you must request a new code. · En[employer]a Access Code: [email or web address] Expires: 9/10/2018 12:09 AM 
 
4. Enter the last four digits of your Social Security Number (xxxx) and Date of Birth (mm/dd/yyyy) as indicated and click Submit. You will be taken to the next sign-up page. 5. Create a Enfora ID. This will be your Enfora login ID and cannot be changed, so think of one that is secure and easy to remember. 6. Create a Enfora password. You can change your password at any time. 7. Enter your Password Reset Question and Answer. This can be used at a later time if you forget your password. 8. Enter your e-mail address. You will receive e-mail notification when new information is available in [address] 9. Click Sign Up. You can now view and download portions of your medical record. 10. Click the Download Summary menu link to download a portable copy of your medical information. If you have questions, please visit the Frequently Asked Questions section of the En[employer]a website.  Remember, Enfora is NOT to be used for urgent needs. For medical emergencies, dial 911. Now available from your iPhone and Android! Please provide this summary of care documentation to your next provider. Your primary care clinician is listed as Man Albarran. If you have any questions after today's visit, please call [phone].

## 2018-09-05 NOTE — PATIENT INSTRUCTIONS
Abdominal Pain: Care Instructions  Your Care Instructions    Abdominal pain has many possible causes. Some aren't serious and get better on their own in a few days. Others need more testing and treatment. If your pain continues or gets worse, you need to be rechecked and may need more tests to find out what is wrong. You may need surgery to correct the problem. Don't ignore new symptoms, such as fever, nausea and vomiting, urination problems, pain that gets worse, and dizziness. These may be signs of a more serious problem. Your doctor may have recommended a follow-up visit in the next 8 to 12 hours. If you are not getting better, you may need more tests or treatment. The doctor has checked you carefully, but problems can develop later. If you notice any problems or new symptoms, get medical treatment right away. Follow-up care is a key part of your treatment and safety. Be sure to make and go to all appointments, and call your doctor if you are having problems. It's also a good idea to know your test results and keep a list of the medicines you take. How can you care for yourself at home? · Rest until you feel better. · To prevent dehydration, drink plenty of fluids, enough so that your urine is light yellow or clear like water. Choose water and other caffeine-free clear liquids until you feel better. If you have kidney, heart, or liver disease and have to limit fluids, talk with your doctor before you increase the amount of fluids you drink. · If your stomach is upset, eat mild foods, such as rice, dry toast or crackers, bananas, and applesauce. Try eating several small meals instead of two or three large ones. · Wait until 48 hours after all symptoms have gone away before you have spicy foods, alcohol, and drinks that contain caffeine. · Do not eat foods that are high in fat. · Avoid anti-inflammatory medicines such as aspirin, ibuprofen (Advil, Motrin), and naproxen (Aleve).  These can cause stomach upset. Talk to your doctor if you take daily aspirin for another health problem. When should you call for help? Call 911 anytime you think you may need emergency care. For example, call if:    · You passed out (lost consciousness).     · You pass maroon or very bloody stools.     · You vomit blood or what looks like coffee grounds.     · You have new, severe belly pain.    Call your doctor now or seek immediate medical care if:    · Your pain gets worse, especially if it becomes focused in one area of your belly.     · You have a new or higher fever.     · Your stools are black and look like tar, or they have streaks of blood.     · You have unexpected vaginal bleeding.     · You have symptoms of a urinary tract infection. These may include:  ¨ Pain when you urinate. ¨ Urinating more often than usual.  ¨ Blood in your urine.     · You are dizzy or lightheaded, or you feel like you may faint.    Watch closely for changes in your health, and be sure to contact your doctor if:    · You are not getting better after 1 day (24 hours). Where can you learn more? Go to http://imaniInfiniojenny.info/. Enter T408 in the search box to learn more about \"Abdominal Pain: Care Instructions. \"  Current as of: November 20, 2017  Content Version: 11.7  © 1633-4613 [email or web address]. Care instructions adapted under license by [employer] (which disclaims liability or warranty for this information). If you have questions about a medical condition or this instruction, always ask your healthcare professional. [place] [address] any warranty or liability for your use of this information. Epinephrine (By injection)   Epinephrine ([id number]-rin)  Treats severe allergic reactions (including anaphylaxis) in an emergency situation.    Brand Name(s): Adrenaclick, Adrenalin, Adyphren, Adyphren Amp II Kit, Adyphren Amp Kit, Adyphren II, Auvi-Q, EPINEPHrinesnap, EpiPen, EpiPen Auto-Injector, EpiPen Jr Auto-Injector, Epinephrine Novaplus, Epipen 2-Mandeep Auto-Injector, Epipen Jr 2-Mandeep Auto-Injector   There may be other brand names for this medicine. When This Medicine Should Not Be Used:   A severe allergic reaction can be life-threatening, so there is no reason this medicine should not be used. How to Use This Medicine:   Injectable  · Your doctor should teach you how and when to inject this medicine. Each injection kit contains a single-use dose of medicine prescribed for you. · Give yourself a shot right away if you start to have a severe allergic reaction. · Inject this medicine into the muscle on the outside of your thigh only. Never inject this medicine into a vein, into the muscles of your buttocks, or into your fingers, toes, hands, or feet. · Read and follow the patient instructions that come with this medicine. Talk to your doctor or pharmacist if you have any questions. · This medicine might come with an autoinjector  so you can practice giving the medicine before you have an actual allergic reaction. The autoinjector  is gray (for EpiPen® or EpiPen Jr®) or beige (for [place]) and does not contain any medicine or needle. · Do not remove the blue safety release (EpiPen® or EpiPen Jr®) or the gray end caps (Adrenaclick®) on the autoinjector until you are ready to use it. Do not put your thumb, fingers, or hand over the orange (EpiPen® or EpiPen Jr®) or red (Adrenaclick®) tip of the autoinjector or over the needle of the Symjepi® prefilled syringe. · If you use the Symjepi® prefilled syringe, do not remove the needle cap until you are ready to use it. · You may need to use more than one injection if your allergic reaction does not get better after the first shot. Your doctor will give you additional doses if you need more than 2 injections. · You may inject the medicine through your clothing, if you need to.   · Some liquid will remain in the autoinjector after the medicine has been injected. This medicine cannot be reused. Give your used autoinjector to your healthcare provider when you seek medical care. · Carry this medicine with you at all times for emergency use in case you have a severe allergic reaction. · Make sure family members or other people you are with know how to inject the medicine in case you are not able to do it yourself. · Check your injection kits regularly to make sure the liquid has not changed color. It should be clear and colorless. Do not use the autoinjector or prefilled syringe if the liquid is discolored or cloudy, or if there are particles in it. You should not use the autoinjector if the expiration date has passed. · If you are using the epinephrine injection in a child, make sure to hold his leg firmly in place and limit movement before and during an injection. · Store the injection kit at room temperature, away from heat, moisture, and direct light. Do not store the medicine in the refrigerator or freezer, or inside a car. · Keep the autoinjector and prefilled syringe in its carrier tube or case to protect it from damage. This tube or case is not waterproof. If you accidentally drop it, check for damage or leaks. Drugs and Foods to Avoid:   Ask your doctor or pharmacist before using any other medicine, including over-the-counter medicines, vitamins, and herbal products. · Some foods and medicines can affect how epinephrine works.  Tell your doctor if you are using any of the following:  ¨ Digoxin, levothyroxine, phentolamine  ¨ Blood pressure medicine  ¨ Certain allergy medicines (including chlorpheniramine, diphenhydramine, tripelennamine)  ¨ Diuretic (water pill)  ¨ Ergot medicines  ¨ Medicine for depression (including MAO inhibitor)  ¨ Medicine for heart rhythm problem  Warnings While Using This Medicine:   · Tell your doctor if you are pregnant or breastfeeding, or if you have heart disease, asthma, diabetes, heart rhythm problems, high blood pressure, an overactive thyroid, or Parkinson disease. · A severe allergic reaction is a medical emergency. Go to an emergency room as soon as possible, even if you feel better after you use this medicine. · Do not inject this medicine into your buttocks, hands, or feet. Go to the emergency room right away if you accidently inject epinephrine into any part of your body other than your thigh. Epinephrine reduces blood flow, and this could damage areas that have small blood vessels, including the hands and feet. · Keep all medicine out of the reach of children. Never share your medicine with anyone. Possible Side Effects While Using This Medicine:   Call your doctor right away if you notice any of these side effects:  · Chest pain  · Fast, pounding, or uneven heartbeat  · Heavy sweating, nausea, vomiting  · Pain, redness, or warmth at the injection site  · Tremors, shakiness  · Trouble breathing  If you notice these less serious side effects, talk with your doctor:   · Feeling anxious, nervous, scared, or weak  · Headache or dizziness  · Pale skin  If you notice other side effects that you think are caused by this medicine, tell your doctor. Call your doctor for medical advice about side effects. You may report side effects to FDA at 9-954-FDA-6463  © 2017 [address] Information is for End User's use only and may not be sold, redistributed or otherwise used for commercial purposes. The above information is an  only. It is not intended as medical advice for individual conditions or treatments. Talk to your doctor, nurse or pharmacist before following any medical regimen to see if it is safe and effective for you. Influenza (Flu) Vaccine (Inactivated or Recombinant): What You Need to Know  Why get vaccinated? Influenza (\"flu\") is a contagious disease that spreads around the [place] every winter, usually between October and May.   Flu is caused by influenza viruses and is spread mainly by coughing, sneezing, and close contact. Anyone can get flu. Flu strikes suddenly and can last several days. Symptoms vary by age, but can include:  · Fever/chills. · Sore throat. · Muscle aches. · Fatigue. · Cough. · Headache. · Runny or stuffy nose. Flu can also lead to pneumonia and blood infections, and cause diarrhea and seizures in children. If you have a medical condition, such as heart or lung disease, flu can make it worse. Flu is more dangerous for some people. Infants and young children, people 72years of age and older, pregnant women, and people with certain health conditions or a weakened immune system are at greatest risk. Each year thousands of people in the [employer] die from flu, and many more are hospitalized. Flu vaccine can:  · Keep you from getting flu. · Make flu less severe if you do get it. · Keep you from spreading flu to your family and other people. Inactivated and recombinant flu vaccines  A dose of flu vaccine is recommended every flu season. Children 6 months through 6years of age may need two doses during the same flu season. Everyone else needs only one dose each flu season. Some inactivated flu vaccines contain a very small amount of a mercury-based preservative called thimerosal. Studies have not shown thimerosal in vaccines to be harmful, but flu vaccines that do not contain thimerosal are available. There is no live flu virus in flu shots. They cannot cause the flu. There are many flu viruses, and they are always changing. Each year a new flu vaccine is made to protect against three or four viruses that are likely to cause disease in the upcoming flu season. But even when the vaccine doesn't exactly match these viruses, it may still provide some protection. Flu vaccine cannot prevent:  · Flu that is caused by a virus not covered by the vaccine. · Illnesses that look like flu but are not.   Some people should not get this vaccine  Tell the person who is giving you the vaccine:  · If you have any severe (life-threatening) allergies. If you ever had a life-threatening allergic reaction after a dose of flu vaccine, or have a severe allergy to any part of this vaccine, you may be advised not to get vaccinated. Most, but not all, types of flu vaccine contain a small amount of egg protein. · If you ever had Guillain-Barré syndrome (also called GBS) Some people with a history of GBS should not get this vaccine. This should be discussed with your doctor. · If you are not feeling well. It is usually okay to get flu vaccine when you have a mild illness, but you might be asked to come back when you feel better. Risks of a vaccine reaction  With any medicine, including vaccines, there is a chance of reactions. These are usually mild and go away on their own, but serious reactions are also possible. Most people who get a flu shot do not have any problems with it. Minor problems following a flu shot include:  · Soreness, redness, or swelling where the shot was given  · Hoarseness  · Sore, red or itchy eyes  · Cough  · Fever  · Aches  · Headache  · Itching  · Fatigue  If these problems occur, they usually begin soon after the shot and last 1 or 2 days. More serious problems following a flu shot can include the following:  · There may be a small increased risk of Guillain-Barré Syndrome (GBS) after inactivated flu vaccine. This risk has been estimated at 1 or 2 additional cases per million people vaccinated. This is much lower than the risk of severe complications from flu, which can be prevented by flu vaccine. · Carol Bur children who get the flu shot along with pneumococcal vaccine (PCV13) and/or DTaP vaccine at the same time might be slightly more likely to have a seizure caused by fever. Ask your doctor for more information.  Tell your doctor if a child who is getting flu vaccine has ever had a seizure  Problems that could happen after any injected vaccine:  · People sometimes faint after a medical procedure, including vaccination. Sitting or lying down for about 15 minutes can help prevent fainting, and injuries caused by a fall. Tell your doctor if you feel dizzy, or have vision changes or ringing in the ears. · Some people get severe pain in the shoulder and have difficulty moving the arm where a shot was given. This happens very rarely. · Any medication can cause a severe allergic reaction. Such reactions from a vaccine are very rare, estimated at about 1 in a million doses, and would happen within a few minutes to a few hours after the vaccination. As with any medicine, there is a very remote chance of a vaccine causing a serious injury or death. The safety of vaccines is always being monitored. For more information, visit: www.cdc.gov/vaccinesafety/. What if there is a serious reaction? What should I look for? · Look for anything that concerns you, such as signs of a severe allergic reaction, very high fever, or unusual behavior. Signs of a severe allergic reaction can include hives, swelling of the face and throat, difficulty breathing, a fast heartbeat, dizziness, and weakness - usually within a few minutes to a few hours after the vaccination. What should I do? · If you think it is a severe allergic reaction or other emergency that can't wait, call 9-1-1 and get the person to the nearest hospital. Otherwise, call your doctor. · Reactions should be reported to the \"Vaccine Adverse Event Reporting System\" (VAERS). Your doctor should file this report, or you can do it yourself through the VAERS website at www.vaers. hhs.gov, or by calling 3-466.629.9754. VAERS does not give medical advice. The National Vaccine Injury Compensation Program  The National Vaccine Injury Compensation Program (VICP) is a federal program that was created to compensate people who may have been injured by certain vaccines.   Persons who believe they may have been injured by a vaccine can learn about the program and about filing a claim by calling 7-840.619.7177 or visiting the [employer] website at www.[facility].gov/vaccinecompensation. There is a time limit to file a claim for compensation. How can I learn more? · Ask your healthcare provider. He or she can give you the vaccine package insert or suggest other sources of information. · Call your local or state health department. · Contact the Centers for Disease Control and Prevention (CDC):  ¨ Call 2-658.188.7953 (1-800-CDC-INFO) or  ¨ Visit CDC's website at www.cdc.gov/flu  Vaccine Information Statement  Inactivated Influenza Vaccine  8/7/2015)  42 JESUS Pace [id number]  Department of Health and Human Services  Centers for Disease Control and Prevention  Many Vaccine Information Statements are available in Martiniquais and other languages. See www.immunize.org/vis. Muchas hojas de información sobre vacunas están disponibles en español y en otros idiomas. Visite www.immunize.org/vis. Care instructions adapted under license by "[employer] (which disclaims liability or warranty for this information). If you have questions about a medical condition or this instruction, always ask your healthcare professional. [address] any warranty or liability for your use of this information. Hemoglobin A1c: About This Test  What is it? Hemoglobin A1c is a blood test that checks your average blood sugar level over the past 2 to 3 months. This test also is called a glycohemoglobin test or an A1c test.  Why is this test done? The A1c test is done to check how well your diabetes has been controlled over the past 2 to 3 months. Your doctor can use this information to adjust your medicine and diabetes treatment, if needed. This test is also one of the tests used to diagnose diabetes in adults.   How can you prepare for the test?  You don't need to stop eating before you have an A1c test. This test can be done at any time during the day, even after a meal.  What happens during the test?  The health professional taking a sample of your blood will:  · Wrap an elastic band around your upper arm. This makes the veins below the band larger so it is easier to put a needle into the vein. · Clean the needle site with alcohol. · Put the needle into the vein. · Attach a tube to the needle to fill it with blood. · Remove the band from your arm when enough blood is collected. · Put a gauze pad or cotton ball over the needle site as the needle is removed. · Put pressure on the site and then put on a bandage. What else should you know about the test?  The test result is usually given as a percentage. The normal A1c is less than 5.7%. You have a higher risk for diabetes if your A1c is 5.7% to 6.4%. If your level is 6.5% or higher, you have diabetes. The A1c test result also can be used to find your estimated average glucose, or eAG. Your eAG and A1c show the same thing in two different ways. They both help you learn more about your average blood sugar range over the past 2 to 3 months. A1c is shown as a percentage, while eAG uses the same units (mg/dl) as your glucose meter. Examples:  · 6% A1c = 126 mg/dL  · 7% A1c = 154 mg/dL  · 8% A1c = 183 mg/dL  · 9% A1c = 212 mg/dL  · 10% A1c = 240 mg/dL  · 11% A1c = 269 mg/dL  · 12% A1c = 298 mg/dL  Where can you learn more? Go to http://imani-jenny.info/. Enter U216 in the search box to learn more about \"Hemoglobin A1c: About This Test.\"  Current as of: December 7, 2017  Content Version: 11.7  © 7419-5389 [employer]. Care instructions adapted under license by [employer] (which disclaims liability or warranty for this information).  If you have questions about a medical condition or this instruction, always ask your healthcare professional. [address]  any warranty or liability for your use of this information. Learning About Cervical Cancer Screening  What is a cervical cancer screening test?    Cervical cancer screening tests check for cancer of the cervix. The cervix is the lower part of the uterus that opens into the vagina. The test can help your doctor find early changes in the cells that could lead to cancer. Two tests can be used to screen for cervical cancer. They may be used alone or together. A Pap test.   This test looks for changes in the cells of the cervix. Abnormal cells may be a sign of cancer. A human papillomavirus (HPV) test.   This test looks for the HPV virus. Some types of HPV can cause cancer. During either test, the doctor or nurse will insert a tool called a speculum into your vagina. The speculum gently spreads apart the vaginal walls. It allows your doctor to see inside the vagina and the cervix. He or she uses a small swab or brush to collect cell samples from your cervix. Try to schedule the test when you're not having your period. To get ready for the test, avoid douches, tampons, vaginal medicines, sprays, and powders for at least a day before you have the test.  When should you have a screening test?  These guidelines apply to women who are at average risk of cervical cancer. If you don't know your risk, talk with your doctor. Women 21 to 34  · You can start having a Pap test at age 24. It is done every 3 years. · You can start having the primary HPV test at age 22. It is done every 3 years. Women 30 to 59  · If you had both a Pap test and an HPV test last time and both were normal, you can have a Pap plus an HPV test every 5 years. · If you had only a Pap test last time, as long as your results are normal, you can have a Pap test every 3 years. · If you had only an HPV test last time, as long as your results are normal, you can have an HPV test every 3 years. Women 72 and older  · If you are age 72 or older, talk with your doctor about what's right for you. Women ages 72 and older may no longer need to be screened for cervical cancer. When to stop having screening tests depends on your medical history, your overall health, and your risk of cervical cell changes or cervical cancer. What happens after the test?  The sample of cells taken during your test will be sent to a lab so that an expert can look at the cells. It usually takes a week or two to get the results back. Pap tests  · A normal result means that the test didn't find any abnormal cells in the sample. · An abnormal result can mean many things. Most of these aren't cancer. The results of your test may be abnormal because:  ¨ You have an infection of the vagina or cervix, such as a yeast infection. ¨ You have an IUD (intrauterine device for birth control). ¨ You have low estrogen levels after menopause that are causing the cells to change. ¨ You have cell changes that may be a sign of precancer or cancer. The results are ranked based on how serious the changes might be. HPV tests  · A normal result means that the test didn't find any high-risk HPV in the sample. · An abnormal HPV test doesn't mean that you have cervical cancer. It may mean that you are infected with one or more high-risk types of HPV. This increases your chance of having cell changes that may be a sign of precancer or cancer. Follow-up care is a key part of your treatment and safety. Be sure to make and go to all appointments, and call your doctor if you are having problems. It's also a good idea to know your test results and keep a list of the medicines you take. Where can you learn more? Go to http://imani-jenny.info/. Enter P919 in the search box to learn more about \"Learning About Cervical Cancer Screening. \"  Current as of: January 31, 2018  Content Version: 11.7  © 9268-1455 [employer].  Care instructions adapted under license by "[employer]" (which disclaims liability or warranty for this information). If you have questions about a medical condition or this instruction, always ask your healthcare professional. [address] any warranty or liability for your use of this information. Hot Flashes During Menopause: Care Instructions  Your Care Instructions    A hot flash is a sudden feeling of intense body heat. Your head, neck, and chest may get red. Your heartbeat may speed up, and you may feel anxious or irritable. You may find that hot flashes occur more often in warm rooms or during stressful times. Hot flashes and other symptoms are a normal response to the hormone changes that occur before your menstrual cycle goes away completely (menopause). Hot flashes often get better and go away with time. Making a few changes, such as exercising more, practicing meditation, quitting smoking, and drinking less alcohol, can help. Some women take hormone pills or other medicine to treat bothersome symptoms. Follow-up care is a key part of your treatment and safety. Be sure to make and go to all appointments, and call your doctor if you are having problems. It's also a good idea to know your test results and keep a list of the medicines you take. How can you care for yourself at home? · If you decide to take medicine to treat hot flashes, take it exactly as prescribed. Call your doctor if you think you are having a problem with your medicine. You will get more details on the specific medicine your doctor prescribes. · Learn to meditate. Sit quietly and focus on your breathing. Try to practice each day. Books, classes, and tapes can help you start a program.  · Wear natural fabrics, such as cotton and silk. Dress in layers so you can take off clothes as needed. · Keep the room temperature cool, or use a fan. You are more likely to have a hot flash when you are too warm than when you are cool. · Use fewer blankets when you sleep at night.   · Drink cold fluids rather than hot ones. Limit your intake of caffeine and alcohol. · Eat smaller meals more often during the day so your body makes less heat than when digesting large amounts of food. Eat low-fat and high-fiber foods. · Do not smoke. Smoking can make hot flashes worse. If you need help quitting, talk to your doctor about stop-smoking programs and medicines. These can increase your chances of quitting for good. · Get at least 30 minutes of exercise on most days of the week. Walking is a good choice. You also may want to do other activities, such as running, swimming, cycling, or playing tennis or team sports. Where can you learn more? Go to http://[employer].info/. Enter F700 in the search box to learn more about \"Hot Flashes During Menopause: Care Instructions. \"  Current as of: October 6, 2017  Content Version: 11.7  © 6776-8118 [employer]. Care instructions adapted under license by [employer] (which disclaims liability or warranty for this information). If you have questions about a medical condition or this instruction, always ask your healthcare professional. [address] any warranty or liability for your use of this information. Vaccine Information Statement    Influenza (Flu) Vaccine (Inactivated or Recombinant): What you need to know    Many Vaccine Information Statements are available in Malay and other languages. See www.immunize.org/vis  Hojas de Información Sobre Vacunas están disponibles en Español y en muchos otros idiomas. Visite www.immunize.org/vis    1. Why get vaccinated? Influenza (flu) is a contagious disease that spreads around the [place] every year, usually between October and May. Flu is caused by influenza viruses, and is spread mainly by coughing, sneezing, and close contact. Anyone can get flu. Flu strikes suddenly and can last several days.  Symptoms vary by age, but can include:   fever/chills   sore throat   muscle aches   fatigue   cough   headache    runny or stuffy nose    Flu can also lead to pneumonia and blood infections, and cause diarrhea and seizures in children. If you have a medical condition, such as heart or lung disease, flu can make it worse. Flu is more dangerous for some people. Infants and young children, people 72years of age and older, pregnant women, and people with certain health conditions or a weakened immune system are at greatest risk. Each year thousands of people in the [facility] die from flu, and many more are hospitalized. Flu vaccine can:   keep you from getting flu,   make flu less severe if you do get it, and   keep you from spreading flu to your family and other people. 2. Inactivated and recombinant flu vaccines    A dose of flu vaccine is recommended every flu season. Children 6 months through 6years of age may need two doses during the same flu season. Everyone else needs only one dose each flu season. Some inactivated flu vaccines contain a very small amount of a mercury-based preservative called thimerosal. Studies have not shown thimerosal in vaccines to be harmful, but flu vaccines that do not contain thimerosal are available. There is no live flu virus in flu shots. They cannot cause the flu. There are many flu viruses, and they are always changing. Each year a new flu vaccine is made to protect against three or four viruses that are likely to cause disease in the upcoming flu season. But even when the vaccine doesnt exactly match these viruses, it may still provide some protection    Flu vaccine cannot prevent:   flu that is caused by a virus not covered by the vaccine, or   illnesses that look like flu but are not. It takes about 2 weeks for protection to develop after vaccination, and protection lasts through the flu season.      3. Some people should not get this vaccine    Tell the person who is giving you the vaccine:     If you have any severe, life-threatening allergies. If you ever had a life-threatening allergic reaction after a dose of flu vaccine, or have a severe allergy to any part of this vaccine, you may be advised not to get vaccinated. Most, but not all, types of flu vaccine contain a small amount of egg protein.  If you ever had Guillain-Barré Syndrome (also called GBS). Some people with a history of GBS should not get this vaccine. This should be discussed with your doctor.  If you are not feeling well. It is usually okay to get flu vaccine when you have a mild illness, but you might be asked to come back when you feel better. 4. Risks of a vaccine reaction    With any medicine, including vaccines, there is a chance of reactions. These are usually mild and go away on their own, but serious reactions are also possible. Most people who get a flu shot do not have any problems with it. Minor problems following a flu shot include:    soreness, redness, or swelling where the shot was given     hoarseness   sore, red or itchy eyes   cough   fever   aches   headache   itching   fatigue  If these problems occur, they usually begin soon after the shot and last 1 or 2 days. More serious problems following a flu shot can include the following:     There may be a small increased risk of Guillain-Barré Syndrome (GBS) after inactivated flu vaccine. This risk has been estimated at 1 or 2 additional cases per million people vaccinated. This is much lower than the risk of severe complications from flu, which can be prevented by flu vaccine.  Young children who get the flu shot along with pneumococcal vaccine (PCV13) and/or DTaP vaccine at the same time might be slightly more likely to have a seizure caused by fever. Ask your doctor for more information. Tell your doctor if a child who is getting flu vaccine has ever had a seizure.      Problems that could happen after any injected vaccine:      People sometimes faint after a medical procedure, including vaccination. Sitting or lying down for about 15 minutes can help prevent fainting, and injuries caused by a fall. Tell your doctor if you feel dizzy, or have vision changes or ringing in the ears.  Some people get severe pain in the shoulder and have difficulty moving the arm where a shot was given. This happens very rarely.  Any medication can cause a severe allergic reaction. Such reactions from a vaccine are very rare, estimated at about 1 in a million doses, and would happen within a few minutes to a few hours after the vaccination. As with any medicine, there is a very remote chance of a vaccine causing a serious injury or death. The safety of vaccines is always being monitored. For more information, visit: www.cdc.gov/vaccinesafety/    5. What if there is a serious reaction? What should I look for?  Look for anything that concerns you, such as signs of a severe allergic reaction, very high fever, or unusual behavior. Signs of a severe allergic reaction can include hives, swelling of the face and throat, difficulty breathing, a fast heartbeat, dizziness, and weakness - usually within a few minutes to a few hours after the vaccination. What should I do?  If you think it is a severe allergic reaction or other emergency that cant wait, call 9-1-1 and get the person to the nearest hospital. Otherwise, call your doctor.  Reactions should be reported to the Vaccine Adverse Event Reporting System (VAERS). Your doctor should file this report, or you can do it yourself through  the VAERS web site at www.vaers. hhs.gov, or by calling 1-600.160.2693. VAERS does not give medical advice.     6. The National Vaccine Injury Compensation Program    The National Vaccine Injury Compensation Program (VICP) is a federal program that was created to compensate people who may have been injured by certain vaccines. Persons who believe they may have been injured by a vaccine can learn about the program and about filing a claim by calling 9-276.521.2017 or visiting the [facility] website at www.[facility].gov/vaccinecompensation. There is a time limit to file a claim for compensation. 7. How can I learn more?  Ask your healthcare provider. He or she can give you the vaccine package insert or suggest other sources of information.  Call your local or state health department.  Contact the Centers for Disease Control and Prevention (CDC):  - Call 4-607.937.3764 (1-800-CDC-INFO) or  - Visit CDCs website at www.cdc.gov/flu    Vaccine Information Statement   Inactivated Influenza Vaccine   8/7/2015  42 JESUS Last [id number]    Department of Health and Human Services  Centers for Disease Control and Prevention    Office Use Only

## 2018-09-05 NOTE — PROGRESS NOTES
Eloise Lo is a 49 y.o. female who presents for routine immunizations. She denies any symptoms , reactions or allergies that would exclude them from being immunized today. Risks and adverse reactions were discussed and the VIS was given to them. All questions were addressed. She was observed for 10 min post injection. There were no reactions observed.     Raymond Fairbanks LPN

## 2018-09-27 ENCOUNTER — TELEPHONE (OUTPATIENT)
Dept: FAMILY MEDICINE CLINIC | Facility: CLINIC | Age: 48
End: 2018-09-27

## 2018-09-27 NOTE — TELEPHONE ENCOUNTER
Patient called to have new referral placed for pain management. LPN informed patient that she would have to be seen for a new referral patient said she will call back later to schedule.

## 2018-10-01 ENCOUNTER — APPOINTMENT (OUTPATIENT)
Dept: CT IMAGING | Age: 48
End: 2018-10-01
Attending: EMERGENCY MEDICINE
Payer: MEDICAID

## 2018-10-01 ENCOUNTER — HOSPITAL ENCOUNTER (EMERGENCY)
Age: 48
Discharge: HOME | End: 2018-10-01
Attending: EMERGENCY MEDICINE
Payer: MEDICAID

## 2018-10-01 VITALS
WEIGHT: 150 LBS | BODY MASS INDEX: 27.6 KG/M2 | TEMPERATURE: 98.7 F | HEIGHT: 62 IN | RESPIRATION RATE: 18 BRPM | HEART RATE: 87 BPM | DIASTOLIC BLOOD PRESSURE: 94 MMHG | OXYGEN SATURATION: 100 % | SYSTOLIC BLOOD PRESSURE: 146 MMHG

## 2018-10-01 DIAGNOSIS — R19.7 DIARRHEA, UNSPECIFIED TYPE: ICD-10-CM

## 2018-10-01 DIAGNOSIS — R21 RASH: ICD-10-CM

## 2018-10-01 DIAGNOSIS — R51.9 ACUTE NONINTRACTABLE HEADACHE, UNSPECIFIED HEADACHE TYPE: Primary | ICD-10-CM

## 2018-10-01 DIAGNOSIS — R11.2 NON-INTRACTABLE VOMITING WITH NAUSEA, UNSPECIFIED VOMITING TYPE: ICD-10-CM

## 2018-10-01 DIAGNOSIS — R10.13 ABDOMINAL PAIN, EPIGASTRIC: ICD-10-CM

## 2018-10-01 LAB
ALBUMIN SERPL-MCNC: 3.9 G/DL (ref 3.4–5)
ALBUMIN/GLOB SERPL: 0.9 {RATIO} (ref 0.8–1.7)
ALP SERPL-CCNC: 178 U/L (ref 45–117)
ALT SERPL-CCNC: 34 U/L (ref 13–56)
ANION GAP SERPL CALC-SCNC: 10 MMOL/L (ref 3–18)
APPEARANCE UR: CLEAR
AST SERPL-CCNC: 21 U/L (ref 15–37)
BASOPHILS # BLD: 0 K/UL (ref 0–0.1)
BASOPHILS NFR BLD: 0 % (ref 0–2)
BILIRUB SERPL-MCNC: 0.3 MG/DL (ref 0.2–1)
BILIRUB UR QL: NEGATIVE
BUN SERPL-MCNC: 15 MG/DL (ref 7–18)
BUN/CREAT SERPL: 14 (ref 12–20)
CALCIUM SERPL-MCNC: 9.1 MG/DL (ref 8.5–10.1)
CHLORIDE SERPL-SCNC: 97 MMOL/L (ref 100–108)
CO2 SERPL-SCNC: 28 MMOL/L (ref 21–32)
COLOR UR: YELLOW
CREAT SERPL-MCNC: 1.07 MG/DL (ref 0.6–1.3)
DIFFERENTIAL METHOD BLD: NORMAL
EOSINOPHIL # BLD: 0.1 K/UL (ref 0–0.4)
EOSINOPHIL NFR BLD: 2 % (ref 0–5)
ERYTHROCYTE [DISTWIDTH] IN BLOOD BY AUTOMATED COUNT: 13.7 % (ref 11.6–14.5)
GLOBULIN SER CALC-MCNC: 4.3 G/DL (ref 2–4)
GLUCOSE BLD STRIP.AUTO-MCNC: 316 MG/DL (ref 70–110)
GLUCOSE SERPL-MCNC: 435 MG/DL (ref 74–99)
GLUCOSE UR STRIP.AUTO-MCNC: >1000 MG/DL
HCT VFR BLD AUTO: 42 % (ref 35–45)
HGB BLD-MCNC: 14 G/DL (ref 12–16)
HGB UR QL STRIP: NEGATIVE
KETONES UR QL STRIP.AUTO: NEGATIVE MG/DL
LEUKOCYTE ESTERASE UR QL STRIP.AUTO: NEGATIVE
LIPASE SERPL-CCNC: 264 U/L (ref 73–393)
LYMPHOCYTES # BLD: 2.1 K/UL (ref 0.9–3.6)
LYMPHOCYTES NFR BLD: 32 % (ref 21–52)
MCH RBC QN AUTO: 29 PG (ref 24–34)
MCHC RBC AUTO-ENTMCNC: 33.3 G/DL (ref 31–37)
MCV RBC AUTO: 87.1 FL (ref 74–97)
MONOCYTES # BLD: 0.4 K/UL (ref 0.05–1.2)
MONOCYTES NFR BLD: 6 % (ref 3–10)
NEUTS SEG # BLD: 4 K/UL (ref 1.8–8)
NEUTS SEG NFR BLD: 60 % (ref 40–73)
NITRITE UR QL STRIP.AUTO: NEGATIVE
PH UR STRIP: 6.5 [PH] (ref 5–8)
PLATELET # BLD AUTO: 274 K/UL (ref 135–420)
PMV BLD AUTO: 10.9 FL (ref 9.2–11.8)
POTASSIUM SERPL-SCNC: 3.7 MMOL/L (ref 3.5–5.5)
PROT SERPL-MCNC: 8.2 G/DL (ref 6.4–8.2)
PROT UR STRIP-MCNC: NEGATIVE MG/DL
RBC # BLD AUTO: 4.82 M/UL (ref 4.2–5.3)
SODIUM SERPL-SCNC: 135 MMOL/L (ref 136–145)
SP GR UR REFRACTOMETRY: >1.03 (ref 1–1.03)
UROBILINOGEN UR QL STRIP.AUTO: 0.2 EU/DL (ref 0.2–1)
WBC # BLD AUTO: 6.6 K/UL (ref 4.6–13.2)

## 2018-10-01 PROCEDURE — 82962 GLUCOSE BLOOD TEST: CPT

## 2018-10-01 PROCEDURE — 80053 COMPREHEN METABOLIC PANEL: CPT | Performed by: EMERGENCY MEDICINE

## 2018-10-01 PROCEDURE — 74011250636 HC RX REV CODE- 250/636: Performed by: EMERGENCY MEDICINE

## 2018-10-01 PROCEDURE — 96375 TX/PRO/DX INJ NEW DRUG ADDON: CPT

## 2018-10-01 PROCEDURE — 74011250637 HC RX REV CODE- 250/637: Performed by: EMERGENCY MEDICINE

## 2018-10-01 PROCEDURE — 99284 EMERGENCY DEPT VISIT MOD MDM: CPT

## 2018-10-01 PROCEDURE — 96374 THER/PROPH/DIAG INJ IV PUSH: CPT

## 2018-10-01 PROCEDURE — 81003 URINALYSIS AUTO W/O SCOPE: CPT | Performed by: EMERGENCY MEDICINE

## 2018-10-01 PROCEDURE — 96361 HYDRATE IV INFUSION ADD-ON: CPT

## 2018-10-01 PROCEDURE — 74011636637 HC RX REV CODE- 636/637: Performed by: EMERGENCY MEDICINE

## 2018-10-01 PROCEDURE — 85025 COMPLETE CBC W/AUTO DIFF WBC: CPT | Performed by: EMERGENCY MEDICINE

## 2018-10-01 PROCEDURE — 83690 ASSAY OF LIPASE: CPT | Performed by: EMERGENCY MEDICINE

## 2018-10-01 PROCEDURE — 70450 CT HEAD/BRAIN W/O DYE: CPT

## 2018-10-01 RX ORDER — FAMOTIDINE 10 MG/ML
20 INJECTION INTRAVENOUS
Status: COMPLETED | OUTPATIENT
Start: 2018-10-01 | End: 2018-10-01

## 2018-10-01 RX ORDER — MORPHINE SULFATE 4 MG/ML
4 INJECTION INTRAVENOUS
Status: COMPLETED | OUTPATIENT
Start: 2018-10-01 | End: 2018-10-01

## 2018-10-01 RX ORDER — CHLORPHENIRAMINE MALEATE 4 MG
TABLET ORAL 2 TIMES DAILY
Qty: 1 TUBE | Refills: 0 | Status: SHIPPED | OUTPATIENT
Start: 2018-10-01 | End: 2018-10-21

## 2018-10-01 RX ORDER — PROCHLORPERAZINE EDISYLATE 5 MG/ML
10 INJECTION INTRAMUSCULAR; INTRAVENOUS
Status: COMPLETED | OUTPATIENT
Start: 2018-10-01 | End: 2018-10-01

## 2018-10-01 RX ORDER — PROCHLORPERAZINE MALEATE 5 MG
5 TABLET ORAL
Qty: 12 TAB | Refills: 0 | Status: SHIPPED | OUTPATIENT
Start: 2018-10-01 | End: 2018-10-08

## 2018-10-01 RX ORDER — MAG HYDROX/ALUMINUM HYD/SIMETH 200-200-20
30 SUSPENSION, ORAL (FINAL DOSE FORM) ORAL
Status: COMPLETED | OUTPATIENT
Start: 2018-10-01 | End: 2018-10-01

## 2018-10-01 RX ORDER — FAMOTIDINE 20 MG/1
20 TABLET, FILM COATED ORAL 2 TIMES DAILY
Qty: 20 TAB | Refills: 0 | Status: SHIPPED | OUTPATIENT
Start: 2018-10-01 | End: 2018-10-11

## 2018-10-01 RX ORDER — KETOROLAC TROMETHAMINE 30 MG/ML
15 INJECTION, SOLUTION INTRAMUSCULAR; INTRAVENOUS
Status: COMPLETED | OUTPATIENT
Start: 2018-10-01 | End: 2018-10-01

## 2018-10-01 RX ORDER — DIPHENHYDRAMINE HYDROCHLORIDE 50 MG/ML
25 INJECTION, SOLUTION INTRAMUSCULAR; INTRAVENOUS
Status: DISCONTINUED | OUTPATIENT
Start: 2018-10-01 | End: 2018-10-01 | Stop reason: HOSPADM

## 2018-10-01 RX ORDER — MAG HYDROX/ALUMINUM HYD/SIMETH 200-200-20
30 SUSPENSION, ORAL (FINAL DOSE FORM) ORAL
Qty: 200 ML | Refills: 0 | Status: SHIPPED | OUTPATIENT
Start: 2018-10-01 | End: 2019-01-10

## 2018-10-01 RX ADMIN — PROCHLORPERAZINE EDISYLATE 10 MG: 5 INJECTION INTRAMUSCULAR; INTRAVENOUS at 08:08

## 2018-10-01 RX ADMIN — ALUMINUM HYDROXIDE, MAGNESIUM HYDROXIDE, AND SIMETHICONE 30 ML: 200; 200; 20 SUSPENSION ORAL at 08:06

## 2018-10-01 RX ADMIN — FAMOTIDINE 20 MG: 10 INJECTION, SOLUTION INTRAVENOUS at 08:08

## 2018-10-01 RX ADMIN — SODIUM CHLORIDE 1000 ML: 900 INJECTION, SOLUTION INTRAVENOUS at 09:54

## 2018-10-01 RX ADMIN — KETOROLAC TROMETHAMINE 15 MG: 30 INJECTION, SOLUTION INTRAMUSCULAR at 09:54

## 2018-10-01 RX ADMIN — INSULIN HUMAN 40 UNITS: 100 INJECTION, SUSPENSION SUBCUTANEOUS at 09:50

## 2018-10-01 RX ADMIN — SODIUM CHLORIDE 1000 ML: 900 INJECTION, SOLUTION INTRAVENOUS at 08:14

## 2018-10-01 RX ADMIN — MORPHINE SULFATE 4 MG: 4 INJECTION INTRAVENOUS at 11:29

## 2018-10-01 NOTE — ED NOTES
Bedside report received from nurse, patient resting in the bed, patient on pulse ox and BP, patient states her pain is 8/10, states \"I have diarrhea and constipation at the same time\", headache for one week and upper abdominal pain. Patient has a red \"ring\" rash on the inside of both thighs, states she has had ringworm before. Patient also states she has a \"sore\" on her R face above her lip, some redness and swelling noted to the site.

## 2018-10-01 NOTE — ED NOTES
Bedside shift change report given to Parish Smart RN (oncoming nurse) by Sarthak Bunch RN (offgoing nurse). Report included the following information SBAR and MAR.

## 2018-10-01 NOTE — ED NOTES
I have reviewed discharge instructions with the patient. The patient verbalized understanding. P. Patient was ambulatory upon discharge. Patient received four prescriptions. Patient was taken home by . Patient armband removed and shredded

## 2018-10-01 NOTE — DISCHARGE INSTRUCTIONS
Abdominal Pain: Care Instructions  Your Care Instructions    Abdominal pain has many possible causes. Some aren't serious and get better on their own in a few days. Others need more testing and treatment. If your pain continues or gets worse, you need to be rechecked and may need more tests to find out what is wrong. You may need surgery to correct the problem. Don't ignore new symptoms, such as fever, nausea and vomiting, urination problems, pain that gets worse, and dizziness. These may be signs of a more serious problem. Your doctor may have recommended a follow-up visit in the next 8 to 12 hours. If you are not getting better, you may need more tests or treatment. The doctor has checked you carefully, but problems can develop later. If you notice any problems or new symptoms, get medical treatment right away. Follow-up care is a key part of your treatment and safety. Be sure to make and go to all appointments, and call your doctor if you are having problems. It's also a good idea to know your test results and keep a list of the medicines you take. How can you care for yourself at home? · Rest until you feel better. · To prevent dehydration, drink plenty of fluids, enough so that your urine is light yellow or clear like water. Choose water and other caffeine-free clear liquids until you feel better. If you have kidney, heart, or liver disease and have to limit fluids, talk with your doctor before you increase the amount of fluids you drink. · If your stomach is upset, eat mild foods, such as rice, dry toast or crackers, bananas, and applesauce. Try eating several small meals instead of two or three large ones. · Wait until 48 hours after all symptoms have gone away before you have spicy foods, alcohol, and drinks that contain caffeine. · Do not eat foods that are high in fat. · Avoid anti-inflammatory medicines such as aspirin, ibuprofen (Advil, Motrin), and naproxen (Aleve).  These can cause stomach upset. Talk to your doctor if you take daily aspirin for another health problem. When should you call for help? Call 911 anytime you think you may need emergency care. For example, call if:    · You passed out (lost consciousness).     · You pass maroon or very bloody stools.     · You vomit blood or what looks like coffee grounds.     · You have new, severe belly pain.    Call your doctor now or seek immediate medical care if:    · Your pain gets worse, especially if it becomes focused in one area of your belly.     · You have a new or higher fever.     · Your stools are black and look like tar, or they have streaks of blood.     · You have unexpected vaginal bleeding.     · You have symptoms of a urinary tract infection. These may include:  ¨ Pain when you urinate. ¨ Urinating more often than usual.  ¨ Blood in your urine.     · You are dizzy or lightheaded, or you feel like you may faint.    Watch closely for changes in your health, and be sure to contact your doctor if:    · You are not getting better after 1 day (24 hours). Where can you learn more? Go to http://imaniQunar.comjenny.info/. Enter M316 in the search box to learn more about \"Abdominal Pain: Care Instructions. \"  Current as of: November 20, 2017  Content Version: 11.7  © 4057-3864 [employer]. Care instructions adapted under license by [employer] (which disclaims liability or warranty for this information). If you have questions about a medical condition or this instruction, always ask your healthcare professional. [place] [address] any warranty or liability for your use of this information. Indigestion (Dyspepsia or Heartburn): Care Instructions  Your Care Instructions  Sometimes it can be hard to pinpoint the cause of indigestion.  (It is also called dyspepsia or heartburn.) Most cases of an upset stomach with bloating, burning, burping, and nausea are minor and go away within several hours. Home treatment and over-the-counter medicine often are able to control symptoms. But if you take medicine to relieve your indigestion without making diet and lifestyle changes, your symptoms are likely to return again and again. If you get indigestion often, it may be a sign of a more serious medical problem. Be sure to follow up with your doctor, who may want to do tests to be sure of the cause of your indigestion. Follow-up care is a key part of your treatment and safety. Be sure to make and go to all appointments, and call your doctor if you are having problems. It's also a good idea to know your test results and keep a list of the medicines you take. How can you care for yourself at home? · Your doctor may recommend over-the-counter medicine. For mild or occasional indigestion, antacids such as Gaviscon, Mylanta, Maalox, or Tums, may help. Be safe with medicines. Be careful when you take over-the-counter antacid medicines. Many of these medicines have aspirin in them. Read the label to make sure that you are not taking more than the recommended dose. Too much aspirin can be harmful. · Your doctor also may recommend over-the-counter acid reducers, such as Pepcid AC, Tagamet HB, Zantac 75, or Prilosec. Read and follow all instructions on the label. If you use these medicines often, talk with your doctor. · Change your eating habits. ¨ It's best to eat several small meals instead of two or three large meals. ¨ After you eat, wait 2 to 3 hours before you lie down. ¨ Chocolate, mint, and alcohol can make GERD worse. ¨ Spicy foods, foods that have a lot of acid (like tomatoes and oranges), and coffee can make GERD symptoms worse in some people. If your symptoms are worse after you eat a certain food, you may want to stop eating that food to see if your symptoms get better. · Do not smoke or chew tobacco. Smoking can make GERD worse.  If you need help quitting, talk to your doctor about stop-smoking programs and medicines. These can increase your chances of quitting for good. · If you have GERD symptoms at night, raise the head of your bed 6 to 8 inches. You can do this by putting the frame on blocks or placing a foam wedge under the head of your mattress. (Adding extra pillows does not work.)  · Do not wear tight clothing around your middle. · Lose weight if you need to. Losing just 5 to 10 pounds can help. · Do not take anti-inflammatory medicines, such as aspirin, ibuprofen (Advil, Motrin), or naproxen (Aleve). These can irritate the stomach. If you need a pain medicine, try acetaminophen (Tylenol), which does not cause stomach upset. When should you call for help? Call your doctor now or seek immediate medical care if:    · You have new or worse belly pain.     · You are vomiting.    Watch closely for changes in your health, and be sure to contact your doctor if:    · You have new or worse symptoms of indigestion.     · You have trouble or pain swallowing.     · You are losing weight.     · You do not get better as expected. Where can you learn more? Go to http://imani-jenny.info/. Enter T810 in the search box to learn more about \"Indigestion (Dyspepsia or Heartburn): Care Instructions. \"  Current as of: May 12, 2017  Content Version: 11.7  © 6323-0173 [employer]. Care instructions adapted under license by [employer] (which disclaims liability or warranty for this information). If you have questions about a medical condition or this instruction, always ask your healthcare professional. [address] any warranty or liability for your use of this information. Headache: Care Instructions  Your Care Instructions    Headaches have many possible causes. Most headaches aren't a sign of a more serious problem, and they will get better on their own. Home treatment may help you feel better faster.   The doctor has checked you carefully, but problems can develop later. If you notice any problems or new symptoms, get medical treatment right away. Follow-up care is a key part of your treatment and safety. Be sure to make and go to all appointments, and call your doctor if you are having problems. It's also a good idea to know your test results and keep a list of the medicines you take. How can you care for yourself at home? · Do not drive if you have taken a prescription pain medicine. · Rest in a quiet, dark room until your headache is gone. Close your eyes and try to relax or go to sleep. Don't watch TV or read. · Put a cold, moist cloth or cold pack on the painful area for 10 to 20 minutes at a time. Put a thin cloth between the cold pack and your skin. · Use a warm, moist towel or a heating pad set on low to relax tight shoulder and neck muscles. · Have someone gently massage your neck and shoulders. · Take pain medicines exactly as directed. ¨ If the doctor gave you a prescription medicine for pain, take it as prescribed. ¨ If you are not taking a prescription pain medicine, ask your doctor if you can take an over-the-counter medicine. · Be careful not to take pain medicine more often than the instructions allow, because you may get worse or more frequent headaches when the medicine wears off. · Do not ignore new symptoms that occur with a headache, such as a fever, weakness or numbness, vision changes, or confusion. These may be signs of a more serious problem. To prevent headaches  · Keep a headache diary so you can figure out what triggers your headaches. Avoiding triggers may help you prevent headaches. Record when each headache began, how long it lasted, and what the pain was like (throbbing, aching, stabbing, or dull). Write down any other symptoms you had with the headache, such as nausea, flashing lights or dark spots, or sensitivity to bright light or loud noise. Note if the headache occurred near your period. List anything that might have triggered the headache, such as certain foods (chocolate, cheese, wine) or odors, smoke, bright light, stress, or lack of sleep. · Find healthy ways to deal with stress. Headaches are most common during or right after stressful times. Take time to relax before and after you do something that has caused a headache in the past.  · Try to keep your muscles relaxed by keeping good posture. Check your jaw, face, neck, and shoulder muscles for tension, and try relaxing them. When sitting at a desk, change positions often, and stretch for 30 seconds each hour. · Get plenty of sleep and exercise. · Eat regularly and well. Long periods without food can trigger a headache. · Treat yourself to a massage. Some people find that regular massages are very helpful in relieving tension. · Limit caffeine by not drinking too much coffee, tea, or soda. But don't quit caffeine suddenly, because that can also give you headaches. · Reduce eyestrain from computers by blinking frequently and looking away from the computer screen every so often. Make sure you have proper eyewear and that your monitor is set up properly, about an arm's length away. · Seek help if you have depression or anxiety. Your headaches may be linked to these conditions. Treatment can both prevent headaches and help with symptoms of anxiety or depression. When should you call for help? Call 911 anytime you think you may need emergency care. For example, call if:    · You have signs of a stroke. These may include:  ¨ Sudden numbness, paralysis, or weakness in your face, arm, or leg, especially on only one side of your body. ¨ Sudden vision changes. ¨ Sudden trouble speaking. ¨ Sudden confusion or trouble understanding simple statements. ¨ Sudden problems with walking or balance.   ¨ A sudden, severe headache that is different from past headaches.    Call your doctor now or seek immediate medical care if:    · You have a new or worse headache.     · Your headache gets much worse. Where can you learn more? Go to http://imani-jenny.info/. Enter M271 in the search box to learn more about \"Headache: Care Instructions. \"  Current as of: October 9, 2017  Content Version: 11.7  © 9268-6220 [employer] Care instructions adapted under license by [employer] (which disclaims liability or warranty for this information). If you have questions about a medical condition or this instruction, always ask your healthcare professional. [place] [address] any warranty or liability for your use of this information. Nausea and Vomiting: Care Instructions  Your Care Instructions    When you are nauseated, you may feel weak and sweaty and notice a lot of saliva in your mouth. Nausea often leads to vomiting. Most of the time you do not need to worry about nausea and vomiting, but they can be signs of other illnesses. Two common causes of nausea and vomiting are stomach flu and food poisoning. Nausea and vomiting from viral stomach flu will usually start to improve within 24 hours. Nausea and vomiting from food poisoning may last from 12 to 48 hours. The doctor has checked you carefully, but problems can develop later. If you notice any problems or new symptoms, get medical treatment right away. Follow-up care is a key part of your treatment and safety. Be sure to make and go to all appointments, and call your doctor if you are having problems. It's also a good idea to know your test results and keep a list of the medicines you take. How can you care for yourself at home? · To prevent dehydration, drink plenty of fluids, enough so that your urine is light yellow or clear like water. Choose water and other caffeine-free clear liquids until you feel better.  If you have kidney, heart, or liver disease and have to limit fluids, talk with your doctor before you increase the amount of fluids you drink.  · Rest in bed until you feel better. · When you are able to eat, try clear soups, mild foods, and liquids until all symptoms are gone for 12 to 48 hours. Other good choices include dry toast, crackers, cooked cereal, and gelatin dessert, such as Jell-O. When should you call for help? Call 911 anytime you think you may need emergency care. For example, call if:    · You passed out (lost consciousness).    Call your doctor now or seek immediate medical care if:    · You have symptoms of dehydration, such as:  ¨ Dry eyes and a dry mouth. ¨ Passing only a little dark urine. ¨ Feeling thirstier than usual.     · You have new or worsening belly pain.     · You have a new or higher fever.     · You vomit blood or what looks like coffee grounds.    Watch closely for changes in your health, and be sure to contact your doctor if:    · You have ongoing nausea and vomiting.     · Your vomiting is getting worse.     · Your vomiting lasts longer than 2 days.     · You are not getting better as expected. Where can you learn more? Go to http://imaniWhateverjenny.info/. Enter 25 169016 in the search box to learn more about \"Nausea and Vomiting: Care Instructions. \"  Current as of: November 20, 2017  Content Version: 11.7  © 9624-5287 [employer]. Care instructions adapted under license by [employer] (which disclaims liability or warranty for this information). If you have questions about a medical condition or this instruction, always ask your healthcare professional. [place] [address] any warranty or liability for your use of this information. Rash: Care Instructions  Your Care Instructions  A rash is any irritation or inflammation of the skin. Rashes have many possible causes, including allergy, infection, illness, heat, and emotional stress. Follow-up care is a key part of your treatment and safety.  Be sure to make and go to all appointments, and call your doctor if you are having problems. It's also a good idea to know your test results and keep a list of the medicines you take. How can you care for yourself at home? · Wash the area with water only. Soap can make dryness and itching worse. Pat dry. · Put cold, wet cloths on the rash to reduce itching. · Keep cool, and stay out of the sun. · Leave the rash open to the air as much of the time as possible. · Sometimes petroleum jelly (Vaseline) can help relieve the discomfort caused by a rash. A moisturizing lotion, such as Cetaphil, also may help. Calamine lotion may help for rashes caused by contact with something (such as a plant or soap) that irritated the skin. Use it 3 or 4 times a day. · If your doctor prescribed a cream, use it as directed. If your doctor prescribed medicine, take it exactly as directed. · If your rash itches so badly that it interferes with your normal activities, take an over-the-counter antihistamine, such as diphenhydramine (Benadryl) or loratadine (Claritin). Read and follow all instructions on the label. When should you call for help? Call your doctor now or seek immediate medical care if:    · You have signs of infection, such as:  ¨ Increased pain, swelling, warmth, or redness. ¨ Red streaks leading from the area. ¨ Pus draining from the area. ¨ A fever.     · You have joint pain along with the rash.    Watch closely for changes in your health, and be sure to contact your doctor if:    · Your rash is changing or getting worse. For example, call if you have pain along with the rash, the rash is spreading, or you have new blisters.     · You do not get better after 1 week. Where can you learn more? Go to http://imani-jenny.info/. Enter Q200 in the search box to learn more about \"Rash: Care Instructions. \"  Current as of: October 5, 2017  Content Version: 11.7  © 8382-8969 [employer].  Care instructions adapted under license by [employer] (which disclaims liability or warranty for this information). If you have questions about a medical condition or this instruction, always ask your healthcare professional. [address] any warranty or liability for your use of this information.

## 2018-10-01 NOTE — ED NOTES
Patient refused benedryl due to skin crawling, provider made aware. Patient asked for a \"one time morphine dose\".

## 2018-10-21 ENCOUNTER — HOSPITAL ENCOUNTER (EMERGENCY)
Age: 48
Discharge: HOME | End: 2018-10-21
Attending: EMERGENCY MEDICINE
Payer: MEDICAID

## 2018-10-21 VITALS
DIASTOLIC BLOOD PRESSURE: 124 MMHG | TEMPERATURE: 98.4 F | BODY MASS INDEX: 27.44 KG/M2 | HEIGHT: 62 IN | HEART RATE: 78 BPM | SYSTOLIC BLOOD PRESSURE: 186 MMHG | OXYGEN SATURATION: 100 % | RESPIRATION RATE: 18 BRPM

## 2018-10-21 DIAGNOSIS — R03.0 ELEVATED BLOOD PRESSURE READING: ICD-10-CM

## 2018-10-21 DIAGNOSIS — B35.4 TINEA CORPORIS: ICD-10-CM

## 2018-10-21 DIAGNOSIS — R73.9 HYPERGLYCEMIA: Primary | ICD-10-CM

## 2018-10-21 DIAGNOSIS — N39.0 ACUTE UTI (URINARY TRACT INFECTION): ICD-10-CM

## 2018-10-21 LAB
ALBUMIN SERPL-MCNC: 4.5 G/DL (ref 3.4–5)
ALBUMIN/GLOB SERPL: 1 {RATIO} (ref 0.8–1.7)
ALP SERPL-CCNC: 166 U/L (ref 45–117)
ALT SERPL-CCNC: 37 U/L (ref 13–56)
ANION GAP SERPL CALC-SCNC: 8 MMOL/L (ref 3–18)
APPEARANCE UR: CLEAR
AST SERPL-CCNC: 24 U/L (ref 15–37)
BACTERIA URNS QL MICRO: ABNORMAL /HPF
BASOPHILS # BLD: 0 K/UL (ref 0–0.1)
BASOPHILS NFR BLD: 0 % (ref 0–2)
BILIRUB DIRECT SERPL-MCNC: 0.2 MG/DL (ref 0–0.2)
BILIRUB SERPL-MCNC: 0.7 MG/DL (ref 0.2–1)
BILIRUB UR QL: NEGATIVE
BUN SERPL-MCNC: 17 MG/DL (ref 7–18)
BUN/CREAT SERPL: 14 (ref 12–20)
CALCIUM SERPL-MCNC: 9.6 MG/DL (ref 8.5–10.1)
CHLORIDE SERPL-SCNC: 100 MMOL/L (ref 100–108)
CO2 SERPL-SCNC: 27 MMOL/L (ref 21–32)
COLOR UR: YELLOW
CREAT SERPL-MCNC: 1.19 MG/DL (ref 0.6–1.3)
DIFFERENTIAL METHOD BLD: NORMAL
EOSINOPHIL # BLD: 0.1 K/UL (ref 0–0.4)
EOSINOPHIL NFR BLD: 1 % (ref 0–5)
EPITH CASTS URNS QL MICRO: ABNORMAL /LPF (ref 0–5)
ERYTHROCYTE [DISTWIDTH] IN BLOOD BY AUTOMATED COUNT: 13.7 % (ref 11.6–14.5)
GLOBULIN SER CALC-MCNC: 4.5 G/DL (ref 2–4)
GLUCOSE BLD STRIP.AUTO-MCNC: 263 MG/DL (ref 70–110)
GLUCOSE SERPL-MCNC: 408 MG/DL (ref 74–99)
GLUCOSE UR STRIP.AUTO-MCNC: >1000 MG/DL
GRAN CASTS URNS QL MICRO: ABNORMAL /LPF
HCG UR QL: NEGATIVE
HCT VFR BLD AUTO: 43 % (ref 35–45)
HGB BLD-MCNC: 14.5 G/DL (ref 12–16)
HGB UR QL STRIP: NEGATIVE
HYALINE CASTS URNS QL MICRO: ABNORMAL /LPF (ref 0–2)
KETONES UR QL STRIP.AUTO: ABNORMAL MG/DL
LEUKOCYTE ESTERASE UR QL STRIP.AUTO: NEGATIVE
LYMPHOCYTES # BLD: 2 K/UL (ref 0.9–3.6)
LYMPHOCYTES NFR BLD: 32 % (ref 21–52)
MCH RBC QN AUTO: 29.4 PG (ref 24–34)
MCHC RBC AUTO-ENTMCNC: 33.7 G/DL (ref 31–37)
MCV RBC AUTO: 87 FL (ref 74–97)
MONOCYTES # BLD: 0.4 K/UL (ref 0.05–1.2)
MONOCYTES NFR BLD: 6 % (ref 3–10)
NEUTS SEG # BLD: 3.9 K/UL (ref 1.8–8)
NEUTS SEG NFR BLD: 61 % (ref 40–73)
NITRITE UR QL STRIP.AUTO: NEGATIVE
PH UR STRIP: 5 [PH] (ref 5–8)
PLATELET # BLD AUTO: 248 K/UL (ref 135–420)
PMV BLD AUTO: 11.3 FL (ref 9.2–11.8)
POTASSIUM SERPL-SCNC: 4.2 MMOL/L (ref 3.5–5.5)
PROT SERPL-MCNC: 9 G/DL (ref 6.4–8.2)
PROT UR STRIP-MCNC: ABNORMAL MG/DL
RBC # BLD AUTO: 4.94 M/UL (ref 4.2–5.3)
RBC #/AREA URNS HPF: 0 /HPF (ref 0–5)
SODIUM SERPL-SCNC: 135 MMOL/L (ref 136–145)
SP GR UR REFRACTOMETRY: >1.03 (ref 1–1.03)
UROBILINOGEN UR QL STRIP.AUTO: 0.2 EU/DL (ref 0.2–1)
WBC # BLD AUTO: 6.4 K/UL (ref 4.6–13.2)
WBC URNS QL MICRO: ABNORMAL /HPF (ref 0–4)

## 2018-10-21 PROCEDURE — 80048 BASIC METABOLIC PNL TOTAL CA: CPT | Performed by: NURSE PRACTITIONER

## 2018-10-21 PROCEDURE — 99282 EMERGENCY DEPT VISIT SF MDM: CPT

## 2018-10-21 PROCEDURE — 85025 COMPLETE CBC W/AUTO DIFF WBC: CPT | Performed by: NURSE PRACTITIONER

## 2018-10-21 PROCEDURE — 96361 HYDRATE IV INFUSION ADD-ON: CPT

## 2018-10-21 PROCEDURE — 74011250636 HC RX REV CODE- 250/636: Performed by: NURSE PRACTITIONER

## 2018-10-21 PROCEDURE — 81001 URINALYSIS AUTO W/SCOPE: CPT | Performed by: NURSE PRACTITIONER

## 2018-10-21 PROCEDURE — 81025 URINE PREGNANCY TEST: CPT | Performed by: NURSE PRACTITIONER

## 2018-10-21 PROCEDURE — 82962 GLUCOSE BLOOD TEST: CPT

## 2018-10-21 PROCEDURE — 96374 THER/PROPH/DIAG INJ IV PUSH: CPT

## 2018-10-21 PROCEDURE — 87086 URINE CULTURE/COLONY COUNT: CPT | Performed by: NURSE PRACTITIONER

## 2018-10-21 PROCEDURE — 80076 HEPATIC FUNCTION PANEL: CPT | Performed by: NURSE PRACTITIONER

## 2018-10-21 PROCEDURE — 74011636637 HC RX REV CODE- 636/637: Performed by: NURSE PRACTITIONER

## 2018-10-21 RX ORDER — SULFAMETHOXAZOLE AND TRIMETHOPRIM 800; 160 MG/1; MG/1
1 TABLET ORAL 2 TIMES DAILY
Qty: 14 TAB | Refills: 0 | Status: SHIPPED | OUTPATIENT
Start: 2018-10-21 | End: 2018-10-28

## 2018-10-21 RX ORDER — SODIUM CHLORIDE 9 MG/ML
1000 INJECTION, SOLUTION INTRAVENOUS ONCE
Status: COMPLETED | OUTPATIENT
Start: 2018-10-21 | End: 2018-10-21

## 2018-10-21 RX ORDER — CHLORPHENIRAMINE MALEATE 4 MG
TABLET ORAL 2 TIMES DAILY
Qty: 1 TUBE | Refills: 0 | Status: SHIPPED | OUTPATIENT
Start: 2018-10-21 | End: 2018-11-20

## 2018-10-21 RX ORDER — MORPHINE SULFATE 4 MG/ML
4 INJECTION INTRAVENOUS
Status: COMPLETED | OUTPATIENT
Start: 2018-10-21 | End: 2018-10-21

## 2018-10-21 RX ADMIN — SODIUM CHLORIDE 1000 ML: 900 INJECTION, SOLUTION INTRAVENOUS at 11:14

## 2018-10-21 RX ADMIN — SODIUM CHLORIDE 1000 ML: 900 INJECTION, SOLUTION INTRAVENOUS at 12:39

## 2018-10-21 RX ADMIN — MORPHINE SULFATE 4 MG: 4 INJECTION INTRAVENOUS at 12:42

## 2018-10-21 RX ADMIN — INSULIN HUMAN 5 UNITS: 100 INJECTION, SOLUTION PARENTERAL at 11:21

## 2018-10-21 NOTE — ED PROVIDER NOTES
10:11 AM Jaki Bone is a 49 y.o. female with h/o  
HTN, DM, HLD, breast cancer who presents to ED complaining of worsening mild rash on her left inner thigh that has been going on for the past 2 weeks. Associated sx are HA, abd pain, nausea, vomiting, constipation, dysuria, and dizziness. She has not taken anything to alleviate her sx. Pt does not smoke or drink. PCP: Nunu Swanson NP The history is provided by the patient. Past Medical History:  
Diagnosis Date  Breast cancer ([facility] [address] breast cancer, right, S/P Mastectomy and chemo, radiation in 2013  Depression  Diabetes ([facility] [address]  Drug abuse ([facility] [address]   
 H/O cocaine use in past  
 Gastrointestinal disorder   
 cholitis  H/O ETOH abuse  Hyperlipidemia  Hypertension  Spine injury  Vitamin D deficiency 5/1/2018 Past Surgical History:  
Procedure Laterality Date  HX BREAST LUMPECTOMY  06/2013  
 right  HX HYSTERECTOMY  HX ORTHOPAEDIC Back fusion surgery 4/18/14.  HX OTHER SURGICAL    
 mediport  HX TONSILLECTOMY  HX TUBAL LIGATION Family History:  
Problem Relation Age of Onset  Heart Disease Mother  Stroke Father  Heart Disease Father 48  Diabetes Other  Hypertension Other  Cancer Maternal Grandmother Social History Socioeconomic History  Marital status:  Spouse name: Not on file  Number of children: Not on file  Years of education: Not on file  Highest education level: Not on file Social Needs  Financial resource strain: Not on file  Food insecurity - worry: Not on file  Food insecurity - inability: Not on file  Transportation needs - medical: Not on file  Transportation needs - non-medical: Not on file Occupational History  Not on file Tobacco Use  Smoking status: Never Smoker  Smokeless tobacco: Never Used Substance and Sexual Activity  Alcohol use:  No  
 Comment: \"Occasionally\"  Drug use: No  
 Sexual activity: No  
Other Topics Concern  Not on file Social History Narrative ** Merged History Encounter ** ALLERGIES: Latex; Other food; Amoxicillin; Aspirin; Fentanyl; Levaquin [levofloxacin]; Chlorhexidine; Norco [hydrocodone-acetaminophen]; Acetaminophen; Brook[place]; Codeine; Glycopyrrolate; Morphine; Pcn [penicillins]; Percocet [oxycodone-acetaminophen]; Tape [adhesive]; and Tramadol Review of Systems Gastrointestinal: Positive for abdominal pain, constipation, nausea and vomiting. Genitourinary: Positive for dysuria. Neurological: Positive for dizziness and headaches. All other systems reviewed and are negative. There were no vitals filed for this visit. Physical Exam  
Constitutional: She is oriented to person, place, and time. She appears well-developed and well-nourished. Blood sugar noted significantly elevated. HENT:  
Head: Normocephalic and atraumatic. Eyes: Conjunctivae and EOM are normal. Pupils are equal, round, and reactive to light. Neck: Normal range of motion. Neck supple. Cardiovascular: Normal rate and intact distal pulses. Pulmonary/Chest: Effort normal and breath sounds normal.  
Abdominal: Soft. Bowel sounds are normal. There is tenderness. Genitourinary:  
Genitourinary Comments: Ne 
  
Musculoskeletal: Normal range of motion. Neurological: She is alert and oriented to person, place, and time. Skin: Skin is warm. Multiple expressions of tinea corporis in circular fash on the inner thighs. Nursing note and vitals reviewed. MDM Number of Diagnoses or Management Options Diagnosis management comments: MDM:  The Pt's blood sugar has responded to challenge. Will DC to home. Lan Charles NP  1:14 PM 
 
 
 
  
Amount and/or Complexity of Data Reviewed Clinical lab tests: ordered and reviewed Risk of Complications, Morbidity, and/or Mortality Presenting problems: moderate Diagnostic procedures: moderate Management options: moderate Patient Progress Patient progress: stable Procedures Scribe Attestation Jarrod Phillips acting as a scribe for and in the presence of Maria Isabel Schmid NP October 21, 2018 at 10:11 AM 
    
Provider Attestation:     
I personally performed the services described in the documentation, reviewed the documentation, as recorded by the scribe in my presence, and it accurately and completely records my words and actions. October 21, 2018 at 10:11 AM - Maria Isabel Schmid NP Diagnosis: 1. Hyperglycemia 2. Acute UTI (urinary tract infection) Disposition:   Discharged to Home. Follow-up Information Follow up With Specialties Details Why Contact Info Norberto Murcia NP Nurse Practitioner Call to arrange follow up    [address] [facility] [phone] [id number] 
[phone] Medication List  
  
START taking these medications   
trimethoprim-sulfamethoxazole 160-800 mg per tablet Commonly known as:  BACTRIM DS Take 1 Tab by mouth two (2) times a day for 7 days. ASK your doctor about these medications ALBUTEROL IN 
  
alum-mag hydroxide-simeth 200-200-20 mg/5 mL Susp Commonly known as:  MYLANTA Take 30 mL by mouth four (4) times daily as needed. atorvastatin 40 mg tablet Commonly known as:  LIPITOR Take 1 Tab by mouth daily. bisacodyl 10 mg suppository Commonly known as:  DULCOLAX (BISACODYL) Insert 10 mg into rectum daily as needed. Blood Pressure Test Kit-Medium Kit Blood pressure as needed Blood-Glucose Meter monitoring kit Use daily to check blood sugar 
  
budesonide 32 mcg/actuation nasal spray Commonly known as:  RHINOCORT ALLERGY 2 Sprays by Both Nostrils route daily. Indications: Allergic Rhinitis 
  
carvedilol 3.125 mg tablet Commonly known as:  Florin Casa Take 2 Tabs by mouth two (2) times daily (with meals). clotrimazole 1 % topical cream 
Commonly known as:  Loreto Dubose Apply  to affected area two (2) times a day for 30 days. Apply twice a day for 2-4 weeks 
  
cyanocobalamin 1,000 mcg tablet Commonly known as:  VITAMIN B-12 Take 1 Tab by mouth daily. dicyclomine 20 mg tablet Commonly known as:  BENTYL Take 1 Tab by mouth every six (6) hours as needed (abdominal cramps) for up to 20 doses. ferrous sulfate 325 mg (65 mg iron) tablet Take 1 Tab by mouth Daily (before breakfast). glucose blood VI test strips strip Commonly known as:  ASCENSIA AUTODISC VI, ONE TOUCH ULTRA TEST VI 
Use daily to monitor blood glucose 
  
hydrOXYzine HCl 25 mg tablet Commonly known as:  ATARAX Take 1-2 tablets by mouth every 6 hours as needed. Insulin Needles (Disposable) 31 gauge x 5/16\" Ndle Use to administer insulin as directed 
  
insulin nph-regular human rec 100 unit/mL (70-30) Inpn Commonly known as:  HUMULIN 70-30 Give 40 units in the QAM and 45 units at bedtime Lancets Misc Use daily to monitor blood glucose 
  
lidocaine 5 % Commonly known as:  LIDODERM 
2 Patches by TransDERmal route every twenty-four (24) hours. Apply patch to the affected area for 12 hours a day and remove for 12 hours a day. lisinopril-hydroCHLOROthiazide 20-12.5 mg per tablet Commonly known as:  Pennye Belts Take 1 Tab by mouth two (2) times a day. LORazepam 1 mg tablet Commonly known as:  ATIVAN 
  
mupirocin 2 % ointment Commonly known as:  BACTROBAN 
  
NORCO 5-325 mg per tablet Generic drug:  HYDROcodone-acetaminophen 
  
ondansetron 4 mg disintegrating tablet Commonly known as:  ZOFRAN ODT Take 1 Tab by mouth every eight (8) hours as needed for Nausea. polyethylene glycol 17 gram packet Commonly known as:  Belen Dominion Take 1 Packet by mouth daily. VALTREX 1 gram tablet Generic drug:  valACYclovir Where to Get Your Medications Information about where to get these medications is not yet available Ask your nurse or doctor about these medications · trimethoprim-sulfamethoxazole 160-800 mg per tablet

## 2018-10-21 NOTE — ED NOTES
Discharge information reviewed with patient, patient verbalized understanding. Paperwork signed, armband removed and shredded.

## 2018-10-21 NOTE — DISCHARGE INSTRUCTIONS
[employer] Activation    Thank you for requesting access to [employer]. Please follow the instructions below to securely access and download your online medical record. [employer] allows you to send messages to your doctor, view your test results, renew your prescriptions, schedule appointments, and more. How Do I Sign Up? 1. In your internet browser, go to [email or web address]  2. Click on the First Time User? Click Here link in the Sign In box. You will be redirect to the New Member Sign Up page. 3. Enter your [employer] Access Code exactly as it appears below. You will not need to use this code after youve completed the sign-up process. If you do not sign up before the expiration date, you must request a new code. [employer] Access Code: [email or web address]  Expires: 2018  6:47 AM (This is the date your [employer] access code will )    4. Enter the last four digits of your Social Security Number (xxxx) and Date of Birth (mm/dd/yyyy) as indicated and click Submit. You will be taken to the next sign-up page. 5. Create a [employer] ID. This will be your [employer] login ID and cannot be changed, so think of one that is secure and easy to remember. 6. Create a [employer] password. You can change your password at any time. 7. Enter your Password Reset Question and Answer. This can be used at a later time if you forget your password. 8. Enter your e-mail address. You will receive e-mail notification when new information is available in [address]. 9. Click Sign Up. You can now view and download portions of your medical record. 10. Click the Download Summary menu link to download a portable copy of your medical information. Additional Information    If you have questions, please visit the Frequently Asked Questions section of the [employer] website at [email or web address] Remember, [employer] is NOT to be used for urgent needs. For medical emergencies, dial 911.

## 2018-10-23 LAB
BACTERIA SPEC CULT: NORMAL
SERVICE CMNT-IMP: NORMAL

## 2018-10-30 NOTE — PROGRESS NOTES
Subjective:      Ms. Bianka Hernandez returns to the [place] [address] today for management of . The active problem list, all pertinent past medical history, medications, liver histology, endoscopic studies, radiologic findings and laboratory findings related to the liver disorder were reviewed with the patient. Ms. Wayne Cameron  required to be considered a candidate for liver transplantation. ADL's: .  Current symptoms - .   She denies any of the following symptoms:     Liver Transplant Status: She        Review of Systems         Objective:     Visit Vitals  /86 (BP 1 Location: Left arm, BP Patient Position: Sitting) Comment (BP Patient Position): manual   Pulse 89   Temp 98.6 °F (37 °C) (Oral)   Resp 20   Ht 5' 2\" (1.575 m)   Wt 159 lb 12.8 oz (72.5 kg)   LMP 08/20/2013   SpO2 98%   BMI 29.23 kg/m²          Assessment/Plan:

## 2018-11-18 ENCOUNTER — HOSPITAL ENCOUNTER (EMERGENCY)
Age: 48
Discharge: HOME | End: 2018-11-19
Attending: EMERGENCY MEDICINE
Payer: MEDICAID

## 2018-11-18 DIAGNOSIS — R51.9 LEFT-SIDED FACE PAIN: ICD-10-CM

## 2018-11-18 DIAGNOSIS — L03.211 FACIAL CELLULITIS: Primary | ICD-10-CM

## 2018-11-18 PROCEDURE — 93005 ELECTROCARDIOGRAM TRACING: CPT

## 2018-11-18 PROCEDURE — 99284 EMERGENCY DEPT VISIT MOD MDM: CPT

## 2018-11-18 RX ORDER — MORPHINE SULFATE 2 MG/ML
2 INJECTION, SOLUTION INTRAMUSCULAR; INTRAVENOUS ONCE
Status: COMPLETED | OUTPATIENT
Start: 2018-11-18 | End: 2018-11-19

## 2018-11-18 RX ORDER — ONDANSETRON 2 MG/ML
4 INJECTION INTRAMUSCULAR; INTRAVENOUS
Status: COMPLETED | OUTPATIENT
Start: 2018-11-18 | End: 2018-11-19

## 2018-11-18 RX ORDER — CLINDAMYCIN HYDROCHLORIDE 150 MG/1
300 CAPSULE ORAL
Status: COMPLETED | OUTPATIENT
Start: 2018-11-18 | End: 2018-11-19

## 2018-11-18 NOTE — LETTER
[facility] EMERGENCY DEPT 
[address] [place] [address] [id number] 
[phone] Work/School Note Date: 11/18/2018 To Whom It May concern: 
 
Jaki Bone was seen and treated today in the emergency room by the following provider(s): 
Attending Provider: Neela Villaseñor MD 
Physician Assistant: SAMANTHA Meng. Jaki Bone has a skin infection that should not be touched by others. Precautions to ensure this should be taken. Sincerely, Nader Shrestha

## 2018-11-19 ENCOUNTER — APPOINTMENT (OUTPATIENT)
Dept: CT IMAGING | Age: 48
End: 2018-11-19
Attending: PHYSICIAN ASSISTANT
Payer: MEDICAID

## 2018-11-19 VITALS
TEMPERATURE: 98.4 F | HEART RATE: 88 BPM | RESPIRATION RATE: 18 BRPM | SYSTOLIC BLOOD PRESSURE: 155 MMHG | DIASTOLIC BLOOD PRESSURE: 94 MMHG | OXYGEN SATURATION: 97 %

## 2018-11-19 LAB
ANION GAP SERPL CALC-SCNC: 9 MMOL/L (ref 3–18)
BASOPHILS # BLD: 0 K/UL (ref 0–0.1)
BASOPHILS NFR BLD: 0 % (ref 0–2)
BUN SERPL-MCNC: 11 MG/DL (ref 7–18)
BUN/CREAT SERPL: 14 (ref 12–20)
CALCIUM SERPL-MCNC: 9.4 MG/DL (ref 8.5–10.1)
CHLORIDE SERPL-SCNC: 102 MMOL/L (ref 100–108)
CO2 SERPL-SCNC: 26 MMOL/L (ref 21–32)
CREAT SERPL-MCNC: 0.81 MG/DL (ref 0.6–1.3)
DIFFERENTIAL METHOD BLD: NORMAL
EOSINOPHIL # BLD: 0.1 K/UL (ref 0–0.4)
EOSINOPHIL NFR BLD: 1 % (ref 0–5)
ERYTHROCYTE [DISTWIDTH] IN BLOOD BY AUTOMATED COUNT: 13.4 % (ref 11.6–14.5)
GLUCOSE SERPL-MCNC: 313 MG/DL (ref 74–99)
HCT VFR BLD AUTO: 41.9 % (ref 35–45)
HGB BLD-MCNC: 14.2 G/DL (ref 12–16)
LYMPHOCYTES # BLD: 2.2 K/UL (ref 0.9–3.6)
LYMPHOCYTES NFR BLD: 28 % (ref 21–52)
MCH RBC QN AUTO: 29.6 PG (ref 24–34)
MCHC RBC AUTO-ENTMCNC: 33.9 G/DL (ref 31–37)
MCV RBC AUTO: 87.5 FL (ref 74–97)
MONOCYTES # BLD: 0.4 K/UL (ref 0.05–1.2)
MONOCYTES NFR BLD: 5 % (ref 3–10)
NEUTS SEG # BLD: 4.9 K/UL (ref 1.8–8)
NEUTS SEG NFR BLD: 66 % (ref 40–73)
PLATELET # BLD AUTO: 248 K/UL (ref 135–420)
PMV BLD AUTO: 10.7 FL (ref 9.2–11.8)
POTASSIUM SERPL-SCNC: 3.8 MMOL/L (ref 3.5–5.5)
RBC # BLD AUTO: 4.79 M/UL (ref 4.2–5.3)
SODIUM SERPL-SCNC: 137 MMOL/L (ref 136–145)
WBC # BLD AUTO: 7.6 K/UL (ref 4.6–13.2)

## 2018-11-19 PROCEDURE — 70491 CT SOFT TISSUE NECK W/DYE: CPT

## 2018-11-19 PROCEDURE — 74011250636 HC RX REV CODE- 250/636: Performed by: PHYSICIAN ASSISTANT

## 2018-11-19 PROCEDURE — 85025 COMPLETE CBC W/AUTO DIFF WBC: CPT

## 2018-11-19 PROCEDURE — 96374 THER/PROPH/DIAG INJ IV PUSH: CPT

## 2018-11-19 PROCEDURE — 96375 TX/PRO/DX INJ NEW DRUG ADDON: CPT

## 2018-11-19 PROCEDURE — 80048 BASIC METABOLIC PNL TOTAL CA: CPT

## 2018-11-19 PROCEDURE — 74011250637 HC RX REV CODE- 250/637: Performed by: PHYSICIAN ASSISTANT

## 2018-11-19 PROCEDURE — 74011636320 HC RX REV CODE- 636/320: Performed by: EMERGENCY MEDICINE

## 2018-11-19 PROCEDURE — 74011250636 HC RX REV CODE- 250/636

## 2018-11-19 PROCEDURE — 96376 TX/PRO/DX INJ SAME DRUG ADON: CPT

## 2018-11-19 RX ORDER — MORPHINE SULFATE 2 MG/ML
2 INJECTION, SOLUTION INTRAMUSCULAR; INTRAVENOUS ONCE
Status: COMPLETED | OUTPATIENT
Start: 2018-11-19 | End: 2018-11-19

## 2018-11-19 RX ORDER — DIPHENHYDRAMINE HYDROCHLORIDE 50 MG/ML
25 INJECTION, SOLUTION INTRAMUSCULAR; INTRAVENOUS
Status: COMPLETED | OUTPATIENT
Start: 2018-11-19 | End: 2018-11-19

## 2018-11-19 RX ORDER — ONDANSETRON 4 MG/1
4 TABLET, ORALLY DISINTEGRATING ORAL
Qty: 15 TAB | Refills: 0 | Status: SHIPPED | OUTPATIENT
Start: 2018-11-19 | End: 2019-09-23

## 2018-11-19 RX ORDER — HYDROCODONE BITARTRATE AND ACETAMINOPHEN 5; 325 MG/1; MG/1
1 TABLET ORAL
Qty: 6 TAB | Refills: 0 | Status: SHIPPED | OUTPATIENT
Start: 2018-11-19 | End: 2019-01-10

## 2018-11-19 RX ORDER — MORPHINE SULFATE 2 MG/ML
INJECTION, SOLUTION INTRAMUSCULAR; INTRAVENOUS
Status: DISCONTINUED
Start: 2018-11-19 | End: 2018-11-19 | Stop reason: HOSPADM

## 2018-11-19 RX ORDER — CLINDAMYCIN HYDROCHLORIDE 300 MG/1
300 CAPSULE ORAL 4 TIMES DAILY
Qty: 40 CAP | Refills: 0 | Status: SHIPPED | OUTPATIENT
Start: 2018-11-19 | End: 2018-11-29

## 2018-11-19 RX ADMIN — CLINDAMYCIN HYDROCHLORIDE 300 MG: 150 CAPSULE ORAL at 00:21

## 2018-11-19 RX ADMIN — DIPHENHYDRAMINE HYDROCHLORIDE 25 MG: 50 INJECTION, SOLUTION INTRAMUSCULAR; INTRAVENOUS at 01:49

## 2018-11-19 RX ADMIN — MORPHINE SULFATE 2 MG: 2 INJECTION, SOLUTION INTRAMUSCULAR; INTRAVENOUS at 00:22

## 2018-11-19 RX ADMIN — ONDANSETRON 4 MG: 2 INJECTION INTRAMUSCULAR; INTRAVENOUS at 00:24

## 2018-11-19 RX ADMIN — MORPHINE SULFATE 2 MG: 2 INJECTION, SOLUTION INTRAMUSCULAR; INTRAVENOUS at 01:35

## 2018-11-19 RX ADMIN — IOPAMIDOL 100 ML: 612 INJECTION, SOLUTION INTRAVENOUS at 00:58

## 2018-11-19 NOTE — ED NOTES
I have reviewed discharge instructions with the patient. The patient verbalized understanding. Patient armband removed and shredded. 4 prescriptions given. Pt ambulated out of the ED accompanied by .

## 2018-11-19 NOTE — ED NOTES
Prior to discharge, pt received IV morphine 2mg. When return to discharge pt, pt c/o itchiness and redeness on IV site. [employer] [place] notified. Bendadryl order received, administered. Will monitor pt prior to discharge.

## 2018-11-19 NOTE — DISCHARGE INSTRUCTIONS

## 2018-11-19 NOTE — ED TRIAGE NOTES
Patient states that she has had swelling to the left side of her jaw for about a week now, complains of ear pain and throat pain

## 2018-11-19 NOTE — ED PROVIDER NOTES
EMERGENCY DEPARTMENT HISTORY AND PHYSICAL EXAM 
 
Date: 11/18/2018 Patient Name: Lu Miller History of Presenting Illness Chief Complaint Patient presents with  Jaw Swelling  Ear Pain  Sore Throat History Provided By: Patient Chief Complaint: left sided facial swelling and pain Duration: 3 days Timing:  Acute, Progressive and Worsening Location: left face Additional History (Context): Lu Miller is a 49 y.o. female with diabetes and breast cancer in remission, depression, drug abuse, ETOH abuse, HLD, spine injury, vitamin D deficiency who presents with C/O left sided facial swelling, erythema, and pain that has gotten progressively worse over the past three days. Has been applying OTC vapor rub mix with triple antibiotic with it. States it has just gotten worse. States it is draining. States now the patient radiates to her ear. Has been trying OTC pain meds with no relief. States her blood sugar has been labile. Denies fevers, chills, neck pain, headache. PCP: Rachell Tolentino NP Current Facility-Administered Medications Medication Dose Route Frequency Provider Last Rate Last Dose  morphine 2 mg/mL injection Current Outpatient Medications Medication Sig Dispense Refill  clindamycin (CLEOCIN) 300 mg capsule Take 1 Cap by mouth four (4) times daily for 10 days. 40 Cap 0  
 ondansetron (ZOFRAN ODT) 4 mg disintegrating tablet Take 1 Tab by mouth every eight (8) hours as needed for Nausea. 15 Tab 0  
 HYDROcodone-acetaminophen (NORCO) 5-325 mg per tablet Take 1 Tab by mouth every six (6) hours as needed for Pain. Max Daily Amount: 4 Tabs. 6 Tab 0  
 naloxone (NARCAN) 2 mg/actuation spry Use 1 spray intranasally, then discard. Repeat with new spray every 2 min as needed for opioid overdose symptoms, alternating nostrils.  1 Package 0  
 clotrimazole (LOTRIMIN) 1 % topical cream Apply  to affected area two (2) times a day for 30 days. Apply twice a day for 2-4 weeks 1 Tube 0  
 alum-mag hydroxide-simeth (MYLANTA) 200-200-20 mg/5 mL susp Take 30 mL by mouth four (4) times daily as needed. 200 mL 0  
 cyanocobalamin (VITAMIN B-12) 1,000 mcg tablet Take 1 Tab by mouth daily. 90 Tab 0  
 lisinopril-hydroCHLOROthiazide (PRINZIDE, ZESTORETIC) 20-12.5 mg per tablet Take 1 Tab by mouth two (2) times a day. 180 Tab 3  carvedilol (COREG) 3.125 mg tablet Take 2 Tabs by mouth two (2) times daily (with meals). 90 Tab 3  
 budesonide (RHINOCORT ALLERGY) 32 mcg/actuation nasal spray 2 Sprays by Both Nostrils route daily. Indications: Allergic Rhinitis 1 Bottle 3  
 insulin nph-regular human rec (HUMULIN 70-30) 100 unit/mL (70-30) inpn Give 40 units in the QAM and 45 units at bedtime 5 Pen 3  
 ferrous sulfate 325 mg (65 mg iron) tablet Take 1 Tab by mouth Daily (before breakfast). 30 Tab 3  polyethylene glycol (MIRALAX) 17 gram packet Take 1 Packet by mouth daily. 30 Each 1  
 hydrOXYzine HCl (ATARAX) 25 mg tablet Take 1-2 tablets by mouth every 6 hours as needed. 30 Tab 0  
 Insulin Needles, Disposable, 31 gauge x 5/16\" ndle Use to administer insulin as directed 1 Package 11  Lancets misc Use daily to monitor blood glucose 100 Each 11  
 glucose blood VI test strips (ASCENSIA AUTODISC VI, ONE TOUCH ULTRA TEST VI) strip Use daily to monitor blood glucose 100 Strip 11  Blood Pressure Test Kit-Medium kit Blood pressure as needed 1 Kit 0  
 atorvastatin (LIPITOR) 40 mg tablet Take 1 Tab by mouth daily. 30 Tab 6  
 bisacodyl (DULCOLAX, BISACODYL,) 10 mg suppository Insert 10 mg into rectum daily as needed. 28 Suppository 1  
 lidocaine (LIDODERM) 5 % 2 Patches by TransDERmal route every twenty-four (24) hours. Apply patch to the affected area for 12 hours a day and remove for 12 hours a day. 90 Each 3  
 mupirocin (BACTROBAN) 2 % ointment Use 1 Application to affected area 3 Times Daily.  valACYclovir (VALTREX) 1 gram tablet Take  by mouth.  Blood-Glucose Meter monitoring kit Use daily to check blood sugar 1 Kit 0  
 ondansetron (ZOFRAN ODT) 4 mg disintegrating tablet Take 1 Tab by mouth every eight (8) hours as needed for Nausea. 15 Tab 0  
 dicyclomine (BENTYL) 20 mg tablet Take 1 Tab by mouth every six (6) hours as needed (abdominal cramps) for up to 20 doses. 20 Tab 0  ALBUTEROL IN Take  by inhalation.  LORazepam (ATIVAN) 1 mg tablet 1 mg. Past History Past Medical History: 
Past Medical History:  
Diagnosis Date  Breast cancer ([address] breast cancer, right, S/P Mastectomy and chemo, radiation in 2013  Depression  Diabetes ([address]  Drug abuse ([address]   
 H/O cocaine use in past  
 Gastrointestinal disorder   
 cholitis  H/O ETOH abuse  Hyperlipidemia  Hypertension  Spine injury  Vitamin D deficiency 5/1/2018 Past Surgical History: 
Past Surgical History:  
Procedure Laterality Date  HX BREAST LUMPECTOMY  06/2013  
 right  HX HYSTERECTOMY  HX ORTHOPAEDIC Back fusion surgery 4/18/14.  HX OTHER SURGICAL    
 mediport  HX TONSILLECTOMY  HX TUBAL LIGATION Family History: 
Family History Problem Relation Age of Onset  Heart Disease Mother  Stroke Father  Heart Disease Father 48  Diabetes Other  Hypertension Other  Cancer Maternal Grandmother Social History: 
Social History Tobacco Use  Smoking status: Never Smoker  Smokeless tobacco: Never Used Substance Use Topics  Alcohol use: No  
  Comment: \"Occasionally\"  Drug use: No  
 
 
Allergies: Allergies Allergen Reactions  Latex Hives and Itching  Other Food Rash Almonds  Amoxicillin Swelling Other reaction(s): mild rash/itching  Aspirin Not Reported This Time and Swelling Mouth swells  Fentanyl Anaphylaxis and Swelling Patches cause mouth to swell Swelling in mouth from fentanyl patch  Levaquin [Levofloxacin] Not Reported This Time and Swelling Other reaction(s): mild rash/itching  Chlorhexidine Rash  Norco [Hydrocodone-Acetaminophen] Nausea and Vomiting Other reaction(s): gi distress  Acetaminophen Other (comments)  [place] Rash and Itching  Codeine Other (comments)  Glycopyrrolate Swelling Pt  States  faciAL  SWELLING   POST  ROBINUL  IV Pt  States  faciAL  SWELLING   POST  ROBINUL  IV   
 Morphine Nausea and Vomiting Pt states she is not allergic  Pcn [Penicillins] Swelling  Percocet [Oxycodone-Acetaminophen] Nausea and Vomiting Other reaction(s): gi distress 
nausea Other reaction(s): anaphylaxis/angioedema Per pt. She is allergic  Tape [Adhesive] Rash  Tramadol Swelling Review of Systems Review of Systems Constitutional: Negative for chills and fever. HENT: Positive for ear pain and facial swelling. Respiratory: Negative for chest tightness, shortness of breath and wheezing. Cardiovascular: Negative for chest pain and palpitations. Gastrointestinal: Negative for abdominal pain, diarrhea, nausea and vomiting. Genitourinary: Negative for dysuria, flank pain and frequency. Skin: Positive for wound. Lesion/erythema to the left face All other systems reviewed and are negative. Physical Exam  
 
Vitals:  
 11/18/18 2220 11/19/18 0000 BP: (!) 144/102 (!) 153/112 Pulse: (!) 104 Resp: 18 Temp: 98.4 °F (36.9 °C) SpO2: 100% 99% Physical Exam  
Constitutional: She is oriented to person, place, and time. She appears well-developed and well-nourished. No distress. HENT:  
Head: Normocephalic and atraumatic. Eyes: EOM are normal. Pupils are equal, round, and reactive to light. Neck: Neck supple. Cardiovascular: Normal rate and regular rhythm. Exam reveals no gallop and no friction rub. No murmur heard. Pulmonary/Chest: Effort normal and breath sounds normal. No respiratory distress. She has no wheezes. She has no rales. Abdominal: Soft. Bowel sounds are normal. She exhibits no distension and no mass. There is no tenderness. There is no rebound and no guarding. Musculoskeletal: Normal range of motion. She exhibits no tenderness or deformity. Lymphadenopathy:  
  She has no cervical adenopathy. Neurological: She is alert and oriented to person, place, and time. No cranial nerve deficit. Skin: Skin is warm and dry. No rash noted. She is not diaphoretic. There is erythema. To the left side of the face at the level of the jaw, there is edema, circular area of erythema with central ulceration with scant amount of purulence. Palpation of this area is very tender, but there is no induration. SEE HENT. Psychiatric: She has a normal mood and affect. Her behavior is normal.  
Nursing note and vitals reviewed. Diagnostic Study Results Labs - Recent Results (from the past 12 hour(s)) EKG, 12 LEAD, INITIAL Collection Time: 11/18/18 10:45 PM  
Result Value Ref Range Ventricular Rate 97 BPM  
 Atrial Rate 97 BPM  
 P-R Interval 154 ms QRS Duration 78 ms Q-T Interval 368 ms QTC Calculation (Bezet) 467 ms Calculated P Axis 65 degrees Calculated R Axis -41 degrees Calculated T Axis 100 degrees Diagnosis Normal sinus rhythm Left axis deviation Possible Inferior infarct , age undetermined Possible Anterior infarct , age undetermined Abnormal ECG When compared with ECG of 22-JUL-2018 17:49, 
Borderline criteria for Inferior infarct are now present CBC WITH AUTOMATED DIFF Collection Time: 11/19/18 12:15 AM  
Result Value Ref Range WBC 7.6 4.6 - 13.2 K/uL  
 RBC 4.79 4.20 - 5.30 M/uL  
 HGB 14.2 12.0 - 16.0 g/dL HCT 41.9 35.0 - 45.0 % MCV 87.5 74.0 - 97.0 FL  
 MCH 29.6 24.0 - 34.0 PG  
 MCHC 33.9 31.0 - 37.0 g/dL  
 RDW 13.4 11.6 - 14.5 % PLATELET 599 393 - 007 K/uL MPV 10.7 9.2 - 11.8 FL  
 NEUTROPHILS 66 40 - 73 % LYMPHOCYTES 28 21 - 52 % MONOCYTES 5 3 - 10 % EOSINOPHILS 1 0 - 5 % BASOPHILS 0 0 - 2 %  
 ABS. NEUTROPHILS 4.9 1.8 - 8.0 K/UL  
 ABS. LYMPHOCYTES 2.2 0.9 - 3.6 K/UL  
 ABS. MONOCYTES 0.4 0.05 - 1.2 K/UL  
 ABS. EOSINOPHILS 0.1 0.0 - 0.4 K/UL  
 ABS. BASOPHILS 0.0 0.0 - 0.1 K/UL  
 DF AUTOMATED METABOLIC PANEL, BASIC Collection Time: 11/19/18 12:15 AM  
Result Value Ref Range Sodium 137 136 - 145 mmol/L Potassium 3.8 3.5 - 5.5 mmol/L Chloride 102 100 - 108 mmol/L  
 CO2 26 21 - 32 mmol/L Anion gap 9 3.0 - 18 mmol/L Glucose 313 (H) 74 - 99 mg/dL BUN 11 7.0 - 18 MG/DL Creatinine 0.81 0.6 - 1.3 MG/DL  
 BUN/Creatinine ratio 14 12 - 20 GFR est AA >60 >60 ml/min/1.73m2 GFR est non-AA >60 >60 ml/min/1.73m2 Calcium 9.4 8.5 - 10.1 MG/DL Radiologic Studies -  
CT NECK SOFT TISSUE W CONT    (Results Pending) CT Results  (Last 48 hours) None CXR Results  (Last 48 hours) None Medical Decision Making I am the first provider for this patient. I reviewed the vital signs, available nursing notes, past medical history, past surgical history, family history and social history. Vital Signs-Reviewed the patient's vital signs. Records Reviewed: Nursing Notes and Old Medical Records Procedures: 
Procedures Provider Notes (Medical Decision Making): CT prelim reading by resident: mild subcutaneous stranding in the soft tissue of the lower left face overlying the mandible in the region indicated by the radiopaque marker. No focal soft tissue mass or fluid collection. No definite sialolithiasis, evaluation partially limited by artifact from dental amalgam.  No significant odontogenic disease. Impression:  Facial cellulitis. Plan to send home on clinda, zofran, small amount of norco (which she states she is not allergic to).   Will have her do a wound recheck either here or with PCP in 24 hours. Patient agrees with the plan. Reviewed CT findings with her. SAMANTHA Foss 
 
MED RECONCILIATION: 
Current Facility-Administered Medications Medication Dose Route Frequency  morphine 2 mg/mL injection Current Outpatient Medications Medication Sig  
 clindamycin (CLEOCIN) 300 mg capsule Take 1 Cap by mouth four (4) times daily for 10 days.  ondansetron (ZOFRAN ODT) 4 mg disintegrating tablet Take 1 Tab by mouth every eight (8) hours as needed for Nausea.  HYDROcodone-acetaminophen (NORCO) 5-325 mg per tablet Take 1 Tab by mouth every six (6) hours as needed for Pain. Max Daily Amount: 4 Tabs.  naloxone (NARCAN) 2 mg/actuation spry Use 1 spray intranasally, then discard. Repeat with new spray every 2 min as needed for opioid overdose symptoms, alternating nostrils.  clotrimazole (LOTRIMIN) 1 % topical cream Apply  to affected area two (2) times a day for 30 days. Apply twice a day for 2-4 weeks  alum-mag hydroxide-simeth (MYLANTA) 200-200-20 mg/5 mL susp Take 30 mL by mouth four (4) times daily as needed.  cyanocobalamin (VITAMIN B-12) 1,000 mcg tablet Take 1 Tab by mouth daily.  lisinopril-hydroCHLOROthiazide (PRINZIDE, ZESTORETIC) 20-12.5 mg per tablet Take 1 Tab by mouth two (2) times a day.  carvedilol (COREG) 3.125 mg tablet Take 2 Tabs by mouth two (2) times daily (with meals).  budesonide (RHINOCORT ALLERGY) 32 mcg/actuation nasal spray 2 Sprays by Both Nostrils route daily. Indications: Allergic Rhinitis  insulin nph-regular human rec (HUMULIN 70-30) 100 unit/mL (70-30) inpn Give 40 units in the QAM and 45 units at bedtime  ferrous sulfate 325 mg (65 mg iron) tablet Take 1 Tab by mouth Daily (before breakfast).  polyethylene glycol (MIRALAX) 17 gram packet Take 1 Packet by mouth daily.  hydrOXYzine HCl (ATARAX) 25 mg tablet Take 1-2 tablets by mouth every 6 hours as needed.  Insulin Needles, Disposable, 31 gauge x 5/16\" ndle Use to administer insulin as directed  Lancets misc Use daily to monitor blood glucose  glucose blood VI test strips (ASCENSIA AUTODISC VI, ONE TOUCH ULTRA TEST VI) strip Use daily to monitor blood glucose  Blood Pressure Test Kit-Medium kit Blood pressure as needed  atorvastatin (LIPITOR) 40 mg tablet Take 1 Tab by mouth daily.  bisacodyl (DULCOLAX, BISACODYL,) 10 mg suppository Insert 10 mg into rectum daily as needed.  lidocaine (LIDODERM) 5 % 2 Patches by TransDERmal route every twenty-four (24) hours. Apply patch to the affected area for 12 hours a day and remove for 12 hours a day.  mupirocin (BACTROBAN) 2 % ointment Use 1 Application to affected area 3 Times Daily.  valACYclovir (VALTREX) 1 gram tablet Take  by mouth.  Blood-Glucose Meter monitoring kit Use daily to check blood sugar  ondansetron (ZOFRAN ODT) 4 mg disintegrating tablet Take 1 Tab by mouth every eight (8) hours as needed for Nausea.  dicyclomine (BENTYL) 20 mg tablet Take 1 Tab by mouth every six (6) hours as needed (abdominal cramps) for up to 20 doses.  ALBUTEROL IN Take  by inhalation.  LORazepam (ATIVAN) 1 mg tablet 1 mg. Disposition: 
discharge DISCHARGE NOTE:  
 
Pt has been reexamined. Patient has no new complaints, changes, or physical findings. Care plan outlined and precautions discussed. Results were reviewed with the patient. All medications were reviewed with the patient; will d/c home with clinda, norco, zofran. All of pt's questions and concerns were addressed. Patient was instructed and agrees to follow up with PCP, as well as to return to the ED upon further deterioration. Patient is ready to go home. Follow-up Information Follow up With Specialties Details Why Contact Info [facility] EMERGENCY DEPT Emergency Medicine  If symptoms worsen [address] 
[phone] Eagle Frost, NP Nurse Practitioner In 1 day For wound re-check [address] [facility] [id number] 
[phone] Current Discharge Medication List  
  
START taking these medications Details  
clindamycin (CLEOCIN) 300 mg capsule Take 1 Cap by mouth four (4) times daily for 10 days. Qty: 40 Cap, Refills: 0  
  
!! ondansetron (ZOFRAN ODT) 4 mg disintegrating tablet Take 1 Tab by mouth every eight (8) hours as needed for Nausea. Qty: 15 Tab, Refills: 0  
  
naloxone (NARCAN) 2 mg/actuation spry Use 1 spray intranasally, then discard. Repeat with new spray every 2 min as needed for opioid overdose symptoms, alternating nostrils. Qty: 1 Package, Refills: 0  
  
 !! - Potential duplicate medications found. Please discuss with provider. CONTINUE these medications which have CHANGED Details HYDROcodone-acetaminophen (NORCO) 5-325 mg per tablet Take 1 Tab by mouth every six (6) hours as needed for Pain. Max Daily Amount: 4 Tabs. Qty: 6 Tab, Refills: 0 Associated Diagnoses: Facial cellulitis; Left-sided face pain CONTINUE these medications which have NOT CHANGED Details  
clotrimazole (LOTRIMIN) 1 % topical cream Apply  to affected area two (2) times a day for 30 days. Apply twice a day for 2-4 weeks Qty: 1 Tube, Refills: 0  
  
alum-mag hydroxide-simeth (MYLANTA) 200-200-20 mg/5 mL susp Take 30 mL by mouth four (4) times daily as needed. Qty: 200 mL, Refills: 0  
  
cyanocobalamin (VITAMIN B-12) 1,000 mcg tablet Take 1 Tab by mouth daily. Qty: 90 Tab, Refills: 0 Associated Diagnoses: Chronic fatigue  
  
lisinopril-hydroCHLOROthiazide (PRINZIDE, ZESTORETIC) 20-12.5 mg per tablet Take 1 Tab by mouth two (2) times a day. Qty: 180 Tab, Refills: 3 Associated Diagnoses: Essential hypertension  
  
carvedilol (COREG) 3.125 mg tablet Take 2 Tabs by mouth two (2) times daily (with meals). Qty: 90 Tab, Refills: 3 budesonide (RHINOCORT ALLERGY) 32 mcg/actuation nasal spray 2 Sprays by Both Nostrils route daily. Indications: Allergic Rhinitis Qty: 1 Bottle, Refills: 3  
  
insulin nph-regular human rec (HUMULIN 70-30) 100 unit/mL (70-30) inpn Give 40 units in the QAM and 45 units at bedtime 
Qty: 5 Pen, Refills: 3 Associated Diagnoses: Type 2 diabetes mellitus with complication, with long-term current use of insulin ([address] ferrous sulfate 325 mg (65 mg iron) tablet Take 1 Tab by mouth Daily (before breakfast). Qty: 30 Tab, Refills: 3 Associated Diagnoses: Anemia due to antineoplastic chemotherapy  
  
polyethylene glycol (MIRALAX) 17 gram packet Take 1 Packet by mouth daily. Qty: 30 Each, Refills: 1 Associated Diagnoses: Therapeutic opioid induced constipation  
  
hydrOXYzine HCl (ATARAX) 25 mg tablet Take 1-2 tablets by mouth every 6 hours as needed. Qty: 30 Tab, Refills: 0 Insulin Needles, Disposable, 31 gauge x 5/16\" ndle Use to administer insulin as directed Qty: 1 Package, Refills: 11 Lancets misc Use daily to monitor blood glucose Qty: 100 Each, Refills: 11  
 Associated Diagnoses: Type 2 diabetes mellitus with complication, with long-term current use of insulin ([address] glucose blood VI test strips (ASCENSIA AUTODISC VI, ONE TOUCH ULTRA TEST VI) strip Use daily to monitor blood glucose Qty: 100 Strip, Refills: 11  
 Associated Diagnoses: Type 2 diabetes mellitus with complication, with long-term current use of insulin ([facility]) Blood Pressure Test Kit-Medium kit Blood pressure as needed 
Qty: 1 Kit, Refills: 0  
  
atorvastatin (LIPITOR) 40 mg tablet Take 1 Tab by mouth daily. Qty: 30 Tab, Refills: 6  
  
bisacodyl (DULCOLAX, BISACODYL,) 10 mg suppository Insert 10 mg into rectum daily as needed. Qty: 28 Suppository, Refills: 1  
  
lidocaine (LIDODERM) 5 % 2 Patches by TransDERmal route every twenty-four (24) hours.  Apply patch to the affected area for 12 hours a day and remove for 12 hours a day. Qty: 90 Each, Refills: 3 Associated Diagnoses: Chronic low back pain, unspecified back pain laterality, with sciatica presence unspecified  
  
mupirocin (BACTROBAN) 2 % ointment Use 1 Application to affected area 3 Times Daily. valACYclovir (VALTREX) 1 gram tablet Take  by mouth. Blood-Glucose Meter monitoring kit Use daily to check blood sugar 
Qty: 1 Kit, Refills: 0 Associated Diagnoses: Type 2 diabetes mellitus with complication, with long-term current use of insulin ([address] ! ! ondansetron (ZOFRAN ODT) 4 mg disintegrating tablet Take 1 Tab by mouth every eight (8) hours as needed for Nausea. Qty: 15 Tab, Refills: 0  
  
dicyclomine (BENTYL) 20 mg tablet Take 1 Tab by mouth every six (6) hours as needed (abdominal cramps) for up to 20 doses. Qty: 20 Tab, Refills: 0 ALBUTEROL IN Take  by inhalation. LORazepam (ATIVAN) 1 mg tablet 1 mg. !! - Potential duplicate medications found. Please discuss with provider. Diagnosis Clinical Impression: 1. Facial cellulitis 2. Left-sided face pain

## 2018-11-20 ENCOUNTER — HOSPITAL ENCOUNTER (EMERGENCY)
Age: 48
Discharge: HOME | End: 2018-11-20
Attending: EMERGENCY MEDICINE | Admitting: EMERGENCY MEDICINE
Payer: MEDICAID

## 2018-11-20 VITALS
RESPIRATION RATE: 16 BRPM | SYSTOLIC BLOOD PRESSURE: 144 MMHG | WEIGHT: 155 LBS | BODY MASS INDEX: 28.52 KG/M2 | HEIGHT: 62 IN | OXYGEN SATURATION: 99 % | DIASTOLIC BLOOD PRESSURE: 71 MMHG | HEART RATE: 95 BPM | TEMPERATURE: 98.5 F

## 2018-11-20 DIAGNOSIS — R03.0 ELEVATED BLOOD PRESSURE READING: ICD-10-CM

## 2018-11-20 DIAGNOSIS — R74.8 ELEVATED ALKALINE PHOSPHATASE LEVEL: ICD-10-CM

## 2018-11-20 DIAGNOSIS — R73.9 HYPERGLYCEMIA: ICD-10-CM

## 2018-11-20 DIAGNOSIS — L03.211 FACIAL CELLULITIS: Primary | ICD-10-CM

## 2018-11-20 LAB
ALBUMIN SERPL-MCNC: 4.3 G/DL (ref 3.4–5)
ALBUMIN/GLOB SERPL: 1.2 {RATIO} (ref 0.8–1.7)
ALP SERPL-CCNC: 154 U/L (ref 45–117)
ALT SERPL-CCNC: 43 U/L (ref 13–56)
ANION GAP SERPL CALC-SCNC: 8 MMOL/L (ref 3–18)
AST SERPL-CCNC: 24 U/L (ref 15–37)
ATRIAL RATE: 97 BPM
BASOPHILS # BLD: 0 K/UL (ref 0–0.1)
BASOPHILS NFR BLD: 0 % (ref 0–2)
BILIRUB SERPL-MCNC: 0.5 MG/DL (ref 0.2–1)
BUN SERPL-MCNC: 14 MG/DL (ref 7–18)
BUN/CREAT SERPL: 16 (ref 12–20)
CALCIUM SERPL-MCNC: 9.7 MG/DL (ref 8.5–10.1)
CALCULATED P AXIS, ECG09: 65 DEGREES
CALCULATED R AXIS, ECG10: -41 DEGREES
CALCULATED T AXIS, ECG11: 100 DEGREES
CHLORIDE SERPL-SCNC: 103 MMOL/L (ref 100–108)
CO2 SERPL-SCNC: 27 MMOL/L (ref 21–32)
CREAT SERPL-MCNC: 0.88 MG/DL (ref 0.6–1.3)
DIAGNOSIS, 93000: NORMAL
DIFFERENTIAL METHOD BLD: NORMAL
EOSINOPHIL # BLD: 0.1 K/UL (ref 0–0.4)
EOSINOPHIL NFR BLD: 2 % (ref 0–5)
ERYTHROCYTE [DISTWIDTH] IN BLOOD BY AUTOMATED COUNT: 13.3 % (ref 11.6–14.5)
GLOBULIN SER CALC-MCNC: 3.6 G/DL (ref 2–4)
GLUCOSE BLD STRIP.AUTO-MCNC: 347 MG/DL (ref 70–110)
GLUCOSE SERPL-MCNC: 335 MG/DL (ref 74–99)
HCT VFR BLD AUTO: 41.8 % (ref 35–45)
HGB BLD-MCNC: 14.3 G/DL (ref 12–16)
LYMPHOCYTES # BLD: 2 K/UL (ref 0.9–3.6)
LYMPHOCYTES NFR BLD: 28 % (ref 21–52)
MCH RBC QN AUTO: 29.9 PG (ref 24–34)
MCHC RBC AUTO-ENTMCNC: 34.2 G/DL (ref 31–37)
MCV RBC AUTO: 87.3 FL (ref 74–97)
MONOCYTES # BLD: 0.3 K/UL (ref 0.05–1.2)
MONOCYTES NFR BLD: 4 % (ref 3–10)
NEUTS SEG # BLD: 4.8 K/UL (ref 1.8–8)
NEUTS SEG NFR BLD: 66 % (ref 40–73)
P-R INTERVAL, ECG05: 154 MS
PLATELET # BLD AUTO: 244 K/UL (ref 135–420)
PMV BLD AUTO: 11 FL (ref 9.2–11.8)
POTASSIUM SERPL-SCNC: 3.8 MMOL/L (ref 3.5–5.5)
PROT SERPL-MCNC: 7.9 G/DL (ref 6.4–8.2)
Q-T INTERVAL, ECG07: 368 MS
QRS DURATION, ECG06: 78 MS
QTC CALCULATION (BEZET), ECG08: 467 MS
RBC # BLD AUTO: 4.79 M/UL (ref 4.2–5.3)
SODIUM SERPL-SCNC: 138 MMOL/L (ref 136–145)
VENTRICULAR RATE, ECG03: 97 BPM
WBC # BLD AUTO: 7.2 K/UL (ref 4.6–13.2)

## 2018-11-20 PROCEDURE — 96361 HYDRATE IV INFUSION ADD-ON: CPT

## 2018-11-20 PROCEDURE — 82962 GLUCOSE BLOOD TEST: CPT

## 2018-11-20 PROCEDURE — 74011250636 HC RX REV CODE- 250/636: Performed by: PHYSICIAN ASSISTANT

## 2018-11-20 PROCEDURE — 99284 EMERGENCY DEPT VISIT MOD MDM: CPT

## 2018-11-20 PROCEDURE — 74011250636 HC RX REV CODE- 250/636: Performed by: EMERGENCY MEDICINE

## 2018-11-20 PROCEDURE — 80053 COMPREHEN METABOLIC PANEL: CPT

## 2018-11-20 PROCEDURE — 96374 THER/PROPH/DIAG INJ IV PUSH: CPT

## 2018-11-20 PROCEDURE — 85025 COMPLETE CBC W/AUTO DIFF WBC: CPT

## 2018-11-20 PROCEDURE — 96375 TX/PRO/DX INJ NEW DRUG ADDON: CPT

## 2018-11-20 RX ORDER — MORPHINE SULFATE 2 MG/ML
2 INJECTION, SOLUTION INTRAMUSCULAR; INTRAVENOUS
Status: COMPLETED | OUTPATIENT
Start: 2018-11-20 | End: 2018-11-20

## 2018-11-20 RX ORDER — CLINDAMYCIN PHOSPHATE 600 MG/50ML
600 INJECTION INTRAVENOUS
Status: DISCONTINUED | OUTPATIENT
Start: 2018-11-20 | End: 2018-11-20

## 2018-11-20 RX ORDER — CLINDAMYCIN HYDROCHLORIDE 150 MG/1
300 CAPSULE ORAL
Status: DISCONTINUED | OUTPATIENT
Start: 2018-11-20 | End: 2018-11-20

## 2018-11-20 RX ORDER — ONDANSETRON 2 MG/ML
4 INJECTION INTRAMUSCULAR; INTRAVENOUS
Status: COMPLETED | OUTPATIENT
Start: 2018-11-20 | End: 2018-11-20

## 2018-11-20 RX ADMIN — MORPHINE SULFATE 2 MG: 2 INJECTION, SOLUTION INTRAMUSCULAR; INTRAVENOUS at 20:19

## 2018-11-20 RX ADMIN — SODIUM CHLORIDE 1000 ML: 900 INJECTION, SOLUTION INTRAVENOUS at 20:22

## 2018-11-20 RX ADMIN — ONDANSETRON 4 MG: 2 INJECTION INTRAMUSCULAR; INTRAVENOUS at 20:19

## 2018-11-20 NOTE — ED TRIAGE NOTES
Seen here 2 days ago for cellulitis of left side of face. Swelling getting worse and mor e painful into neck. vomiting

## 2018-11-21 NOTE — ED NOTES
Assumed care of patient for discharge. I have reviewed discharge instructions with the patient. The patient verbalized understanding. Patient armband removed and shredded. VSS.

## 2018-11-21 NOTE — ED PROVIDER NOTES
EMERGENCY DEPARTMENT HISTORY AND PHYSICAL EXAM 
 
7:13 PM 
 
 
Date: 11/20/2018 Patient Name: Jorge A Castellano History of Presenting Illness Chief Complaint Patient presents with  
 Skin Problem  Facial Swelling History Provided By: Patient Chief Complaint: facial swelling Duration:  Days Timing:  Progressive Location: L lower jaw Quality: Aching Severity: Moderate Modifying Factors: improves slightly with Norco, worsens with ice Associated Symptoms: purulent drainage from site, L ear pain Additional History (Context): Jorge A Castellano is a 49 y.o. female with hx of DM, HTN, HLD, drug abuse, depression, breast cancer who presents with c/o left sided facial swelling x 6 days. Pt notes she was evaluated 2 days ago and diagnosed with cellulitis. Notes she has been taking the Clindamycin as prescribed. \"I noticed a swollen lump at my neck today\". Notes purulent drainage from face as well. Notes she has been unable to eat due to the pain. Denies fever/chills, throat swelling, drooling. Notes she has follow-up with her PMD tomorrow for wound check at 10:30AM. 
 
PCP: Serafin Patient, NP Current Facility-Administered Medications Medication Dose Route Frequency Provider Last Rate Last Dose  sodium chloride 0.9 % bolus infusion 1,000 mL  1,000 mL IntraVENous ONCE Blanca Pina MD 1,000 mL/hr at 11/20/18 2022 1,000 mL at 11/20/18 2022 Current Outpatient Medications Medication Sig Dispense Refill  clindamycin (CLEOCIN) 300 mg capsule Take 1 Cap by mouth four (4) times daily for 10 days. 40 Cap 0  
 ondansetron (ZOFRAN ODT) 4 mg disintegrating tablet Take 1 Tab by mouth every eight (8) hours as needed for Nausea. 15 Tab 0  
 HYDROcodone-acetaminophen (NORCO) 5-325 mg per tablet Take 1 Tab by mouth every six (6) hours as needed for Pain. Max Daily Amount: 4 Tabs.  6 Tab 0  
 naloxone (NARCAN) 2 mg/actuation spry Use 1 spray intranasally, then discard. Repeat with new spray every 2 min as needed for opioid overdose symptoms, alternating nostrils. 1 Package 0  
 clotrimazole (LOTRIMIN) 1 % topical cream Apply  to affected area two (2) times a day for 30 days. Apply twice a day for 2-4 weeks 1 Tube 0  
 alum-mag hydroxide-simeth (MYLANTA) 200-200-20 mg/5 mL susp Take 30 mL by mouth four (4) times daily as needed. 200 mL 0  
 cyanocobalamin (VITAMIN B-12) 1,000 mcg tablet Take 1 Tab by mouth daily. 90 Tab 0  
 lisinopril-hydroCHLOROthiazide (PRINZIDE, ZESTORETIC) 20-12.5 mg per tablet Take 1 Tab by mouth two (2) times a day. 180 Tab 3  carvedilol (COREG) 3.125 mg tablet Take 2 Tabs by mouth two (2) times daily (with meals). 90 Tab 3  
 budesonide (RHINOCORT ALLERGY) 32 mcg/actuation nasal spray 2 Sprays by Both Nostrils route daily. Indications: Allergic Rhinitis 1 Bottle 3  
 insulin nph-regular human rec (HUMULIN 70-30) 100 unit/mL (70-30) inpn Give 40 units in the QAM and 45 units at bedtime 5 Pen 3  
 ferrous sulfate 325 mg (65 mg iron) tablet Take 1 Tab by mouth Daily (before breakfast). 30 Tab 3  polyethylene glycol (MIRALAX) 17 gram packet Take 1 Packet by mouth daily. 30 Each 1  
 hydrOXYzine HCl (ATARAX) 25 mg tablet Take 1-2 tablets by mouth every 6 hours as needed. 30 Tab 0  
 Insulin Needles, Disposable, 31 gauge x 5/16\" ndle Use to administer insulin as directed 1 Package 11  Lancets misc Use daily to monitor blood glucose 100 Each 11  
 glucose blood VI test strips (ASCENSIA AUTODISC VI, ONE TOUCH ULTRA TEST VI) strip Use daily to monitor blood glucose 100 Strip 11  Blood Pressure Test Kit-Medium kit Blood pressure as needed 1 Kit 0  
 atorvastatin (LIPITOR) 40 mg tablet Take 1 Tab by mouth daily. 30 Tab 6  
 bisacodyl (DULCOLAX, BISACODYL,) 10 mg suppository Insert 10 mg into rectum daily as needed.  28 Suppository 1  
 lidocaine (LIDODERM) 5 % 2 Patches by TransDERmal route every twenty-four (24) hours. Apply patch to the affected area for 12 hours a day and remove for 12 hours a day. 90 Each 3  
 mupirocin (BACTROBAN) 2 % ointment Use 1 Application to affected area 3 Times Daily.  valACYclovir (VALTREX) 1 gram tablet Take  by mouth.  Blood-Glucose Meter monitoring kit Use daily to check blood sugar 1 Kit 0  
 ondansetron (ZOFRAN ODT) 4 mg disintegrating tablet Take 1 Tab by mouth every eight (8) hours as needed for Nausea. 15 Tab 0  
 dicyclomine (BENTYL) 20 mg tablet Take 1 Tab by mouth every six (6) hours as needed (abdominal cramps) for up to 20 doses. 20 Tab 0  ALBUTEROL IN Take  by inhalation.  LORazepam (ATIVAN) 1 mg tablet 1 mg. Past History Past Medical History: 
Past Medical History:  
Diagnosis Date  Breast cancer ([facility] [address] breast cancer, right, S/P Mastectomy and chemo, radiation in 2013  Depression  Diabetes ([facility] [address]  Drug abuse ([facility] [address]   
 H/O cocaine use in past  
 Gastrointestinal disorder   
 cholitis  H/O ETOH abuse  Hyperlipidemia  Hypertension  Spine injury  Vitamin D deficiency 5/1/2018 Past Surgical History: 
Past Surgical History:  
Procedure Laterality Date  HX BREAST LUMPECTOMY  06/2013  
 right  HX HYSTERECTOMY  HX ORTHOPAEDIC Back fusion surgery 4/18/14.  HX OTHER SURGICAL    
 mediport  HX TONSILLECTOMY  HX TUBAL LIGATION Family History: 
Family History Problem Relation Age of Onset  Heart Disease Mother  Stroke Father  Heart Disease Father 48  Diabetes Other  Hypertension Other  Cancer Maternal Grandmother Social History: 
Social History Tobacco Use  Smoking status: Never Smoker  Smokeless tobacco: Never Used Substance Use Topics  Alcohol use: No  
  Comment: \"Occasionally\"  Drug use: No  
 
 
Allergies: Allergies Allergen Reactions  Latex Hives and Itching  Other Food Rash Almonds  Amoxicillin Swelling Other reaction(s): mild rash/itching  Aspirin Not Reported This Time and Swelling Mouth swells  Fentanyl Anaphylaxis and Swelling Patches cause mouth to swell Swelling in mouth from fentanyl patch  Levaquin [Levofloxacin] Not Reported This Time and Swelling Other reaction(s): mild rash/itching  Chlorhexidine Rash  Norco [Hydrocodone-Acetaminophen] Nausea and Vomiting Other reaction(s): gi distress  Acetaminophen Other (comments)  [place] Rash and Itching  Codeine Other (comments)  Glycopyrrolate Swelling Pt  States  faciAL  SWELLING   POST  ROBINUL  IV Pt  States  faciAL  SWELLING   POST  ROBINUL  IV   
 Morphine Nausea and Vomiting Pt states she is not allergic  Pcn [Penicillins] Swelling  Percocet [Oxycodone-Acetaminophen] Nausea and Vomiting Other reaction(s): gi distress 
nausea Other reaction(s): anaphylaxis/angioedema Per pt. She is allergic  Tape [Adhesive] Rash  Tramadol Swelling Review of Systems Review of Systems Constitutional: Negative for chills and fever. HENT: Positive for ear pain and facial swelling. Negative for drooling, ear discharge, hearing loss, mouth sores, tinnitus, trouble swallowing and voice change. Respiratory: Negative for shortness of breath. Cardiovascular: Negative for chest pain. Gastrointestinal: Negative for abdominal pain, nausea and vomiting. Skin: Positive for color change and rash. Negative for wound. Neurological: Negative for weakness. All other systems reviewed and are negative. Physical Exam  
 
Visit Vitals /65 Pulse 95 Temp 98.5 °F (36.9 °C) Resp 16 Ht 5' 2\" (1.575 m) Wt 70.3 kg (155 lb) LMP 08/20/2013 SpO2 100% BMI 28.35 kg/m² Physical Exam  
Constitutional: She appears well-developed and well-nourished. No distress. HENT:  
Head: Normocephalic and atraumatic. Right Ear: Tympanic membrane and ear canal normal. No mastoid tenderness. Left Ear: Tympanic membrane and ear canal normal. No mastoid tenderness. Mouth/Throat: Uvula is midline, oropharynx is clear and moist and mucous membranes are normal. No trismus in the jaw. No dental abscesses or uvula swelling. No oropharyngeal exudate, posterior oropharyngeal edema, posterior oropharyngeal erythema or tonsillar abscesses. No drooling or stridor, no palpable or visualized abscess, tolerating secretions, no distress Neck: Normal range of motion. Neck supple. Cardiovascular: Normal rate, regular rhythm, normal heart sounds and intact distal pulses. Exam reveals no gallop and no friction rub. No murmur heard. Pulmonary/Chest: Effort normal and breath sounds normal. No stridor. No respiratory distress. She has no wheezes. She has no rales. Musculoskeletal: Normal range of motion. Lymphadenopathy:  
  She has cervical adenopathy (tender, L anterior cervical JONATHAN ). Neurological: She is alert. Skin: Skin is warm. No rash noted. She is not diaphoretic. Nursing note and vitals reviewed. Diagnostic Study Results Labs - Recent Results (from the past 12 hour(s)) GLUCOSE, POC Collection Time: 11/20/18  7:32 PM  
Result Value Ref Range Glucose (POC) 347 (H) 70 - 110 mg/dL CBC WITH AUTOMATED DIFF Collection Time: 11/20/18  8:20 PM  
Result Value Ref Range WBC 7.2 4.6 - 13.2 K/uL  
 RBC 4.79 4.20 - 5.30 M/uL  
 HGB 14.3 12.0 - 16.0 g/dL HCT 41.8 35.0 - 45.0 % MCV 87.3 74.0 - 97.0 FL  
 MCH 29.9 24.0 - 34.0 PG  
 MCHC 34.2 31.0 - 37.0 g/dL  
 RDW 13.3 11.6 - 14.5 % PLATELET 787 070 - 804 K/uL MPV 11.0 9.2 - 11.8 FL  
 NEUTROPHILS 66 40 - 73 % LYMPHOCYTES 28 21 - 52 % MONOCYTES 4 3 - 10 % EOSINOPHILS 2 0 - 5 % BASOPHILS 0 0 - 2 %  
 ABS. NEUTROPHILS 4.8 1.8 - 8.0 K/UL  
 ABS. LYMPHOCYTES 2.0 0.9 - 3.6 K/UL  
 ABS. MONOCYTES 0.3 0.05 - 1.2 K/UL ABS. EOSINOPHILS 0.1 0.0 - 0.4 K/UL  
 ABS. BASOPHILS 0.0 0.0 - 0.1 K/UL  
 DF AUTOMATED METABOLIC PANEL, COMPREHENSIVE Collection Time: 11/20/18  8:20 PM  
Result Value Ref Range Sodium 138 136 - 145 mmol/L Potassium 3.8 3.5 - 5.5 mmol/L Chloride 103 100 - 108 mmol/L  
 CO2 27 21 - 32 mmol/L Anion gap 8 3.0 - 18 mmol/L Glucose 335 (H) 74 - 99 mg/dL BUN 14 7.0 - 18 MG/DL Creatinine 0.88 0.6 - 1.3 MG/DL  
 BUN/Creatinine ratio 16 12 - 20 GFR est AA >60 >60 ml/min/1.73m2 GFR est non-AA >60 >60 ml/min/1.73m2 Calcium 9.7 8.5 - 10.1 MG/DL Bilirubin, total 0.5 0.2 - 1.0 MG/DL  
 ALT (SGPT) 43 13 - 56 U/L  
 AST (SGOT) 24 15 - 37 U/L Alk. phosphatase 154 (H) 45 - 117 U/L Protein, total 7.9 6.4 - 8.2 g/dL Albumin 4.3 3.4 - 5.0 g/dL Globulin 3.6 2.0 - 4.0 g/dL A-G Ratio 1.2 0.8 - 1.7 Radiologic Studies - No orders to display Medical Decision Making I am the first provider for this patient. I reviewed the vital signs, available nursing notes, past medical history, past surgical history, family history and social history. Vital Signs-Reviewed the patient's vital signs. Pulse Oximetry Analysis -  100 on room air Records Reviewed: Nursing Notes and Old Medical Records (Time of Review: 7:13 PM) Reviewed visit 11/18- D/c'd with Clindamycin, Zofran, and Norco.  
CT: IMPRESSION: Mild left facial subcutaneous stranding and perhaps minimal cutaneous 
thickening, nonspecific perhaps cellulitis or contusion. No evidence of soft 
tissue abscess or subperiosteal abscess. Clinical correlation recommended. 
  
 
ED Course: Progress Notes, Reevaluation, and Consults: 
9:00 PM Reviewed results with patient and family. Discussed need for close outpatient follow-up. Discussed strict return precautions, including fever, increased facial swelling, or any other medical concerns. Pt resting comfortably, no distress. No emesis throughout stay.  Took all 4 abx doses today. One or more blood pressure readings were noted elevated during the patient's presentation in the emergency department today. This abnormal reading has been cited in the patients' diagnosis, and they have been encouraged to follow up with their primary care physician. Provider Notes (Medical Decision Making): 50 yo F who presents for follow-up for facial cellulitis. Afebrile, non-toxic appearing. No evidence of facial abscess, dental abscess. No drooling or stridor. Soft floor of mouth. Labs essentially unremarkable except for hyperglycemia. No evidence of DKA. No leukocytosis. Pt has follow-up tomorrow with PMD. Instructed to continue to take abx as prescribed. Stable for d/c with outpatient follow-up. Diagnosis Clinical Impression: 1. Facial cellulitis 2. Elevated blood pressure reading 3. Hyperglycemia 4. Elevated alkaline phosphatase level Disposition: home Follow-up Information Follow up With Specialties Details Why Contact Info [facility] EMERGENCY DEPT Emergency Medicine  If symptoms worsen [id number] 
[phone] Sushil Mckinley NP Nurse Practitioner In 1 day as scheduled  [address] [place] [address] [phone] Medication List  
  
ASK your doctor about these medications ALBUTEROL IN 
  
alum-mag hydroxide-simeth 200-200-20 mg/5 mL Susp Commonly known as:  MYLANTA Take 30 mL by mouth four (4) times daily as needed. atorvastatin 40 mg tablet Commonly known as:  LIPITOR Take 1 Tab by mouth daily. bisacodyl 10 mg suppository Commonly known as:  DULCOLAX (BISACODYL) Insert 10 mg into rectum daily as needed. Blood Pressure Test Kit-Medium Kit Blood pressure as needed Blood-Glucose Meter monitoring kit Use daily to check blood sugar 
  
budesonide 32 mcg/actuation nasal spray Commonly known as:  RHINOCORT ALLERGY 2 Sprays by Both Nostrils route daily. Indications: Allergic Rhinitis 
  
carvedilol 3.125 mg tablet Commonly known as:  Theoplis Tommy Take 2 Tabs by mouth two (2) times daily (with meals). clindamycin 300 mg capsule Commonly known as:  CLEOCIN Take 1 Cap by mouth four (4) times daily for 10 days. clotrimazole 1 % topical cream 
Commonly known as:  [facility] Apply  to affected area two (2) times a day for 30 days. Apply twice a day for 2-4 weeks 
  
cyanocobalamin 1,000 mcg tablet Commonly known as:  VITAMIN B-12 Take 1 Tab by mouth daily. dicyclomine 20 mg tablet Commonly known as:  BENTYL Take 1 Tab by mouth every six (6) hours as needed (abdominal cramps) for up to 20 doses. ferrous sulfate 325 mg (65 mg iron) tablet Take 1 Tab by mouth Daily (before breakfast). glucose blood VI test strips strip Commonly known as:  ASCENSIA AUTODISC VI, ONE TOUCH ULTRA TEST VI 
Use daily to monitor blood glucose HYDROcodone-acetaminophen 5-325 mg per tablet Commonly known as:  Darshan Dage Take 1 Tab by mouth every six (6) hours as needed for Pain. Max Daily Amount: 4 Tabs. hydrOXYzine HCl 25 mg tablet Commonly known as:  ATARAX Take 1-2 tablets by mouth every 6 hours as needed. Insulin Needles (Disposable) 31 gauge x 5/16\" Ndle Use to administer insulin as directed 
  
insulin nph-regular human rec 100 unit/mL (70-30) Inpn Commonly known as:  HUMULIN 70-30 Give 40 units in the QAM and 45 units at bedtime Lancets Misc Use daily to monitor blood glucose 
  
lidocaine 5 % Commonly known as:  LIDODERM 
2 Patches by TransDERmal route every twenty-four (24) hours. Apply patch to the affected area for 12 hours a day and remove for 12 hours a day. lisinopril-hydroCHLOROthiazide 20-12.5 mg per tablet Commonly known as:  Almita Plenty Take 1 Tab by mouth two (2) times a day. LORazepam 1 mg tablet Commonly known as:  ATIVAN 
  
mupirocin 2 % ointment Commonly known as:  [facility] 
 naloxone 2 mg/actuation Spry Commonly known as:  [employer] Use 1 spray intranasally, then discard. Repeat with new spray every 2 min as needed for opioid overdose symptoms, alternating nostrils. * ondansetron 4 mg disintegrating tablet Commonly known as:  ZOFRAN ODT Take 1 Tab by mouth every eight (8) hours as needed for Nausea. * ondansetron 4 mg disintegrating tablet Commonly known as:  ZOFRAN ODT Take 1 Tab by mouth every eight (8) hours as needed for Nausea. polyethylene glycol 17 gram packet Commonly known as:  Randal Nones Take 1 Packet by mouth daily. VALTREX 1 gram tablet Generic drug:  valACYclovir * This list has 2 medication(s) that are the same as other medications prescribed for you. Read the directions carefully, and ask your doctor or other care provider to review them with you.

## 2018-11-21 NOTE — DISCHARGE INSTRUCTIONS
Elevated Blood Pressure: Care Instructions  Your Care Instructions    Blood pressure is a measure of how hard the blood pushes against the walls of your arteries. It's normal for blood pressure to go up and down throughout the day. But if it stays up over time, you have high blood pressure. Two numbers tell you your blood pressure. The first number is the systolic pressure. It shows how hard the blood pushes when your heart is pumping. The second number is the diastolic pressure. It shows how hard the blood pushes between heartbeats, when your heart is relaxed and filling with blood. An ideal blood pressure in adults is less than 120/80 (say \"120 over 80\"). High blood pressure is 140/90 or higher. You have high blood pressure if your top number is 140 or higher or your bottom number is 90 or higher, or both. The main test for high blood pressure is simple, fast, and painless. To diagnose high blood pressure, your doctor will test your blood pressure at different times. After testing your blood pressure, your doctor may ask you to test it again when you are home. If you are diagnosed with high blood pressure, you can work with your doctor to make a long-term plan to manage it. Follow-up care is a key part of your treatment and safety. Be sure to make and go to all appointments, and call your doctor if you are having problems. It's also a good idea to know your test results and keep a list of the medicines you take. How can you care for yourself at home? · Do not smoke. Smoking increases your risk for heart attack and stroke. If you need help quitting, talk to your doctor about stop-smoking programs and medicines. These can increase your chances of quitting for good. · Stay at a healthy weight. · Try to limit how much sodium you eat to less than 2,300 milligrams (mg) a day. Your doctor may ask you to try to eat less than 1,500 mg a day. · Be physically active.  Get at least 30 minutes of exercise on most days of the week. Walking is a good choice. You also may want to do other activities, such as running, swimming, cycling, or playing tennis or team sports. · Avoid or limit alcohol. Talk to your doctor about whether you can drink any alcohol. · Eat plenty of fruits, vegetables, and low-fat dairy products. Eat less saturated and total fats. · Learn how to check your blood pressure at home. When should you call for help? Call your doctor now or seek immediate medical care if:  ? · Your blood pressure is much higher than normal (such as 180/110 or higher). ? · You think high blood pressure is causing symptoms such as:  ¨ Severe headache. ¨ Blurry vision. ? Watch closely for changes in your health, and be sure to contact your doctor if:  ? · You do not get better as expected. Where can you learn more? Go to http://imaniIbetorjenny.info/. Enter V240 in the search box to learn more about \"Elevated Blood Pressure: Care Instructions. \"  Current as of: September 21, 2016  Content Version: 11.4  © 7086-8655 [employer]. Care instructions adapted under license by [employer] (which disclaims liability or warranty for this information). If you have questions about a medical condition or this instruction, always ask your healthcare professional. [address] any warranty or liability for your use of this information. Cellulitis: Care Instructions  Your Care Instructions    Cellulitis is a skin infection caused by bacteria, most often strep or staph. It often occurs after a break in the skin from a scrape, cut, bite, or puncture, or after a rash. Cellulitis may be treated without doing tests to find out what caused it. But your doctor may do tests, if needed, to look for a specific bacteria, like methicillin-resistant Staphylococcus aureus (MRSA). The doctor has checked you carefully, but problems can develop later.  If you notice any problems or new symptoms, get medical treatment right away. Follow-up care is a key part of your treatment and safety. Be sure to make and go to all appointments, and call your doctor if you are having problems. It's also a good idea to know your test results and keep a list of the medicines you take. How can you care for yourself at home? · Take your antibiotics as directed. Do not stop taking them just because you feel better. You need to take the full course of antibiotics. · Prop up the infected area on pillows to reduce pain and swelling. Try to keep the area above the level of your heart as often as you can. · If your doctor told you how to care for your wound, follow your doctor's instructions. If you did not get instructions, follow this general advice:  ? Wash the wound with clean water 2 times a day. Don't use hydrogen peroxide or alcohol, which can slow healing. ? You may cover the wound with a thin layer of petroleum jelly, such as Vaseline, and a nonstick bandage. ? Apply more petroleum jelly and replace the bandage as needed. · Be safe with medicines. Take pain medicines exactly as directed. ? If the doctor gave you a prescription medicine for pain, take it as prescribed. ? If you are not taking a prescription pain medicine, ask your doctor if you can take an over-the-counter medicine. To prevent cellulitis in the future  · Try to prevent cuts, scrapes, or other injuries to your skin. Cellulitis most often occurs where there is a break in the skin. · If you get a scrape, cut, mild burn, or bite, wash the wound with clean water as soon as you can to help avoid infection. Don't use hydrogen peroxide or alcohol, which can slow healing. · If you have swelling in your legs (edema), support stockings and good skin care may help prevent leg sores and cellulitis. · Take care of your feet, especially if you have diabetes or other conditions that increase the risk of infection. Wear shoes and socks.  Do not go barefoot. If you have athlete's foot or other skin problems on your feet, talk to your doctor about how to treat them. When should you call for help? Call your doctor now or seek immediate medical care if:    · You have signs that your infection is getting worse, such as:  ? Increased pain, swelling, warmth, or redness. ? Red streaks leading from the area. ? Pus draining from the area. ? A fever.     · You get a rash.    Watch closely for changes in your health, and be sure to contact your doctor if:    · You do not get better as expected. Where can you learn more? Go to http://imani-jenny.info/. Harvey Lay in the search box to learn more about \"Cellulitis: Care Instructions. \"  Current as of: April 18, 2018  Content Version: 11.8  © 3782-3208 [employer]. Care instructions adapted under license by [employer] (which disclaims liability or warranty for this information). If you have questions about a medical condition or this instruction, always ask your healthcare professional. [address] any warranty or liability for your use of this information. Learning About High Blood Sugar  What is high blood sugar? Your body turns the food you eat into glucose (sugar), which it uses for energy. But if your body isn't able to use the sugar right away, it can build up in your blood and lead to high blood sugar. When the amount of sugar in your blood stays too high for too much of the time, you may have diabetes. Diabetes is a disease that can cause serious health problems. The good news is that lifestyle changes may help you get your blood sugar back to normal and avoid or delay diabetes. What causes high blood sugar? Sugar (glucose) can build up in your blood if you:  · Are overweight. · Have a family history of diabetes. · Take certain medicines, such as steroids. What are the symptoms?   Having high blood sugar may not cause any symptoms at all. Or it may make you feel very thirsty or very hungry. You may also urinate more often than usual, have blurry vision, or lose weight without trying. How is high blood sugar treated? You can take steps to lower your blood sugar level if you understand what makes it get higher. Your doctor may want you to learn how to test your blood sugar level at home. Then you can see how illness, stress, or different kinds of food or medicine raise or lower your blood sugar level. Other tests may be needed to see if you have diabetes. How can you prevent high blood sugar? · Watch your weight. If you're overweight, losing just a small amount of weight may help. Reducing fat around your waist is most important. · Limit the amount of calories, sweets, and unhealthy fat you eat. Ask your doctor if a dietitian can help you. A registered dietitian can help you create meal plans that fit your lifestyle. · Get at least 30 minutes of exercise on most days of the week. Exercise helps control your blood sugar. It also helps you maintain a healthy weight. Walking is a good choice. You also may want to do other activities, such as running, swimming, cycling, or playing tennis or team sports. · If your doctor prescribed medicines, take them exactly as prescribed. Call your doctor if you think you are having a problem with your medicine. You will get more details on the specific medicines your doctor prescribes. Follow-up care is a key part of your treatment and safety. Be sure to make and go to all appointments, and call your doctor if you are having problems. It's also a good idea to know your test results and keep a list of the medicines you take. Where can you learn more? Go to http://imani-jenny.info/. Enter O108 in the search box to learn more about \"Learning About High Blood Sugar. \"  Current as of: December 7, 2017  Content Version: 11.8  © 2363-1410 [employer], [facility].  Care instructions adapted under license by [employer] (which disclaims liability or warranty for this information). If you have questions about a medical condition or this instruction, always ask your healthcare professional. [address] any warranty or liability for your use of this information.

## 2018-12-06 ENCOUNTER — HOSPITAL ENCOUNTER (EMERGENCY)
Age: 48
Discharge: HOME | End: 2018-12-06
Attending: EMERGENCY MEDICINE
Payer: MEDICAID

## 2018-12-06 VITALS
WEIGHT: 154 LBS | OXYGEN SATURATION: 99 % | SYSTOLIC BLOOD PRESSURE: 152 MMHG | DIASTOLIC BLOOD PRESSURE: 84 MMHG | HEIGHT: 62 IN | HEART RATE: 68 BPM | RESPIRATION RATE: 17 BRPM | BODY MASS INDEX: 28.34 KG/M2 | TEMPERATURE: 98.2 F

## 2018-12-06 DIAGNOSIS — E11.65 TYPE 2 DIABETES MELLITUS WITH HYPERGLYCEMIA, UNSPECIFIED WHETHER LONG TERM INSULIN USE (HCC): ICD-10-CM

## 2018-12-06 DIAGNOSIS — N30.00 ACUTE CYSTITIS WITHOUT HEMATURIA: ICD-10-CM

## 2018-12-06 DIAGNOSIS — B37.9 YEAST INFECTION: ICD-10-CM

## 2018-12-06 DIAGNOSIS — B96.89 BV (BACTERIAL VAGINOSIS): ICD-10-CM

## 2018-12-06 DIAGNOSIS — N81.4 UTERINE PROLAPSE: Primary | ICD-10-CM

## 2018-12-06 DIAGNOSIS — N76.0 BV (BACTERIAL VAGINOSIS): ICD-10-CM

## 2018-12-06 LAB
ALBUMIN SERPL-MCNC: 3.6 G/DL (ref 3.4–5)
ALBUMIN/GLOB SERPL: 0.9 {RATIO} (ref 0.8–1.7)
ALP SERPL-CCNC: 233 U/L (ref 45–117)
ALT SERPL-CCNC: 32 U/L (ref 13–56)
ANION GAP SERPL CALC-SCNC: 8 MMOL/L (ref 3–18)
APPEARANCE UR: CLEAR
AST SERPL-CCNC: 20 U/L (ref 15–37)
BACTERIA URNS QL MICRO: ABNORMAL /HPF
BASOPHILS # BLD: 0 K/UL (ref 0–0.1)
BASOPHILS NFR BLD: 0 % (ref 0–2)
BILIRUB SERPL-MCNC: 0.3 MG/DL (ref 0.2–1)
BILIRUB UR QL: NEGATIVE
BUN SERPL-MCNC: 11 MG/DL (ref 7–18)
BUN/CREAT SERPL: 10 (ref 12–20)
CALCIUM SERPL-MCNC: 9.4 MG/DL (ref 8.5–10.1)
CHLORIDE SERPL-SCNC: 96 MMOL/L (ref 100–108)
CO2 SERPL-SCNC: 27 MMOL/L (ref 21–32)
COLOR UR: YELLOW
CREAT SERPL-MCNC: 1.05 MG/DL (ref 0.6–1.3)
DIFFERENTIAL METHOD BLD: NORMAL
EOSINOPHIL # BLD: 0.1 K/UL (ref 0–0.4)
EOSINOPHIL NFR BLD: 1 % (ref 0–5)
EPITH CASTS URNS QL MICRO: ABNORMAL /LPF (ref 0–5)
ERYTHROCYTE [DISTWIDTH] IN BLOOD BY AUTOMATED COUNT: 13.2 % (ref 11.6–14.5)
GLOBULIN SER CALC-MCNC: 4.2 G/DL (ref 2–4)
GLUCOSE SERPL-MCNC: 615 MG/DL (ref 74–99)
GLUCOSE UR STRIP.AUTO-MCNC: >1000 MG/DL
HCT VFR BLD AUTO: 39.8 % (ref 35–45)
HGB BLD-MCNC: 13.3 G/DL (ref 12–16)
HGB UR QL STRIP: ABNORMAL
KETONES UR QL STRIP.AUTO: NEGATIVE MG/DL
LEUKOCYTE ESTERASE UR QL STRIP.AUTO: ABNORMAL
LYMPHOCYTES # BLD: 1.9 K/UL (ref 0.9–3.6)
LYMPHOCYTES NFR BLD: 24 % (ref 21–52)
MCH RBC QN AUTO: 29.4 PG (ref 24–34)
MCHC RBC AUTO-ENTMCNC: 33.4 G/DL (ref 31–37)
MCV RBC AUTO: 87.9 FL (ref 74–97)
MONOCYTES # BLD: 0.4 K/UL (ref 0.05–1.2)
MONOCYTES NFR BLD: 6 % (ref 3–10)
NEUTS SEG # BLD: 5.4 K/UL (ref 1.8–8)
NEUTS SEG NFR BLD: 69 % (ref 40–73)
NITRITE UR QL STRIP.AUTO: NEGATIVE
PH UR STRIP: 5.5 [PH] (ref 5–8)
PLATELET # BLD AUTO: 247 K/UL (ref 135–420)
PMV BLD AUTO: 11.1 FL (ref 9.2–11.8)
POTASSIUM SERPL-SCNC: 4.3 MMOL/L (ref 3.5–5.5)
PROT SERPL-MCNC: 7.8 G/DL (ref 6.4–8.2)
PROT UR STRIP-MCNC: NEGATIVE MG/DL
RBC # BLD AUTO: 4.53 M/UL (ref 4.2–5.3)
RBC #/AREA URNS HPF: 0 /HPF (ref 0–5)
SERVICE CMNT-IMP: NORMAL
SODIUM SERPL-SCNC: 131 MMOL/L (ref 136–145)
SP GR UR REFRACTOMETRY: >1.03 (ref 1–1.03)
UROBILINOGEN UR QL STRIP.AUTO: 0.2 EU/DL (ref 0.2–1)
WBC # BLD AUTO: 7.8 K/UL (ref 4.6–13.2)
WBC URNS QL MICRO: ABNORMAL /HPF (ref 0–4)
WET PREP GENITAL: NORMAL
YEAST URNS QL MICRO: ABNORMAL

## 2018-12-06 PROCEDURE — 99285 EMERGENCY DEPT VISIT HI MDM: CPT

## 2018-12-06 PROCEDURE — 96365 THER/PROPH/DIAG IV INF INIT: CPT

## 2018-12-06 PROCEDURE — 74011000258 HC RX REV CODE- 258: Performed by: EMERGENCY MEDICINE

## 2018-12-06 PROCEDURE — 82962 GLUCOSE BLOOD TEST: CPT

## 2018-12-06 PROCEDURE — 81001 URINALYSIS AUTO W/SCOPE: CPT

## 2018-12-06 PROCEDURE — 80053 COMPREHEN METABOLIC PANEL: CPT

## 2018-12-06 PROCEDURE — 74011250636 HC RX REV CODE- 250/636: Performed by: EMERGENCY MEDICINE

## 2018-12-06 PROCEDURE — 87210 SMEAR WET MOUNT SALINE/INK: CPT

## 2018-12-06 PROCEDURE — 85025 COMPLETE CBC W/AUTO DIFF WBC: CPT

## 2018-12-06 PROCEDURE — 87491 CHLMYD TRACH DNA AMP PROBE: CPT

## 2018-12-06 PROCEDURE — 96361 HYDRATE IV INFUSION ADD-ON: CPT

## 2018-12-06 PROCEDURE — 96375 TX/PRO/DX INJ NEW DRUG ADDON: CPT

## 2018-12-06 PROCEDURE — 74011250637 HC RX REV CODE- 250/637: Performed by: EMERGENCY MEDICINE

## 2018-12-06 RX ORDER — CEPHALEXIN 500 MG/1
500 CAPSULE ORAL 4 TIMES DAILY
Qty: 28 CAP | Refills: 0 | Status: SHIPPED | OUTPATIENT
Start: 2018-12-06 | End: 2018-12-13

## 2018-12-06 RX ORDER — METRONIDAZOLE 500 MG/1
500 TABLET ORAL 2 TIMES DAILY
Qty: 14 TAB | Refills: 0 | Status: SHIPPED | OUTPATIENT
Start: 2018-12-06 | End: 2018-12-13

## 2018-12-06 RX ORDER — FLUCONAZOLE 150 MG/1
150 TABLET ORAL ONCE
Qty: 1 TAB | Refills: 0 | Status: SHIPPED | OUTPATIENT
Start: 2018-12-06 | End: 2018-12-06

## 2018-12-06 RX ORDER — FLUCONAZOLE 150 MG/1
150 TABLET ORAL
Status: COMPLETED | OUTPATIENT
Start: 2018-12-06 | End: 2018-12-06

## 2018-12-06 RX ORDER — KETOROLAC TROMETHAMINE 30 MG/ML
30 INJECTION, SOLUTION INTRAMUSCULAR; INTRAVENOUS
Status: COMPLETED | OUTPATIENT
Start: 2018-12-06 | End: 2018-12-06

## 2018-12-06 RX ADMIN — FLUCONAZOLE 150 MG: 150 TABLET ORAL at 22:02

## 2018-12-06 RX ADMIN — KETOROLAC TROMETHAMINE 30 MG: 30 INJECTION, SOLUTION INTRAMUSCULAR at 21:31

## 2018-12-06 RX ADMIN — SODIUM CHLORIDE 1000 ML: 900 INJECTION, SOLUTION INTRAVENOUS at 20:29

## 2018-12-06 RX ADMIN — CEFTRIAXONE 1 G: 1 INJECTION, POWDER, FOR SOLUTION INTRAMUSCULAR; INTRAVENOUS at 22:02

## 2018-12-06 NOTE — ED PROVIDER NOTES
7:15 PM Leonardo Vogel is a 51 y.o. female with h/o HTN, DM, Hyperlipidemia, Gastrointestinal disorder, Drug abuse, Depression, Breast cancer, and other PMHx  who presents to ED complaining of a constant, moderate, pruritic yeast infection starting one week ago with associated vaginal pain and vaginal discharge. The patient denies fever, vomiting and diarrhea. The patient was seen two weeks ago for a similar infection and was given Abx. She has tried Vagisil and vaginal wipes, but they did not relieve the Sx. The patient also complains of coughing, wheezing, and neck pain. No other concerns or symptoms at this time. PCP: Cindy Mustafa NP Yeast Infection Pertinent negatives include no fever, no abdominal pain, no diarrhea and no vomiting. Wheezing Associated symptoms include neck pain and cough. Pertinent negatives include no chest pain, no fever, no abdominal pain, no vomiting and no diarrhea. Past Medical History:  
Diagnosis Date  Breast cancer ([facility] [address] breast cancer, right, S/P Mastectomy and chemo, radiation in 2013  Depression  Diabetes ([facility] [address]  Drug abuse ([facility] [address]   
 H/O cocaine use in past  
 Gastrointestinal disorder   
 cholitis  H/O ETOH abuse  Hyperlipidemia  Hypertension  Spine injury  Vitamin D deficiency 5/1/2018 Past Surgical History:  
Procedure Laterality Date  COLONOSCOPY N/A 7/18/2016 COLONOSCOPY performed by Antonio Hull MD at [facility] ENDOSCOPY  
 HX BREAST LUMPECTOMY  06/2013  
 right  HX HYSTERECTOMY  HX ORTHOPAEDIC Back fusion surgery 4/18/14.  HX OTHER SURGICAL    
 mediport  HX TONSILLECTOMY  HX TUBAL LIGATION Family History:  
Problem Relation Age of Onset  Heart Disease Mother  Stroke Father  Heart Disease Father 48  Diabetes Other  Hypertension Other  Cancer Maternal Grandmother Social History Socioeconomic History  Marital status:  Spouse name: Not on file  Number of children: Not on file  Years of education: Not on file  Highest education level: Not on file Social Needs  Financial resource strain: Not on file  Food insecurity - worry: Not on file  Food insecurity - inability: Not on file  Transportation needs - medical: Not on file  Transportation needs - non-medical: Not on file Occupational History  Not on file Tobacco Use  Smoking status: Never Smoker  Smokeless tobacco: Never Used Substance and Sexual Activity  Alcohol use: No  
  Comment: \"Occasionally\"  Drug use: No  
 Sexual activity: No  
Other Topics Concern  Not on file Social History Narrative ** Merged History Encounter ** ALLERGIES: Latex; Other food; Amoxicillin; Aspirin; Fentanyl; Levaquin [levofloxacin]; Chlorhexidine; Norco [hydrocodone-acetaminophen]; Acetaminophen; Ferdinand; Codeine; Glycopyrrolate; Morphine; Pcn [penicillins]; Percocet [oxycodone-acetaminophen]; Tape [adhesive]; and Tramadol Review of Systems Constitutional: Negative for fever. HENT: Negative for trouble swallowing. Respiratory: Positive for cough and wheezing. Cardiovascular: Negative for chest pain. Gastrointestinal: Negative for abdominal pain, diarrhea and vomiting. Genitourinary: Positive for vaginal discharge and vaginal pain. Negative for difficulty urinating. Musculoskeletal: Positive for neck pain. Negative for back pain. Skin: Negative for wound. Neurological: Negative for syncope. Psychiatric/Behavioral: Negative for behavioral problems. All other systems reviewed and are negative. Vitals:  
 12/06/18 2045 12/06/18 2100 12/06/18 2115 12/06/18 2130 BP: 156/73 147/81 153/82 152/84 Pulse: 76 78 73 68 Resp:      
Temp:      
SpO2: 99% 99% 100% 99% Weight:      
Height:      
      
 
Physical Exam  
 Constitutional: She is oriented to person, place, and time. She appears well-developed. No distress. Generally well-appearing, nad HENT:  
Head: Normocephalic and atraumatic. Eyes: EOM are normal.  
Neck: Normal range of motion. Cardiovascular: Normal rate and intact distal pulses. Pulmonary/Chest: Effort normal and breath sounds normal. No respiratory distress. Abdominal: Soft. There is tenderness (mild, generalized). Musculoskeletal: Normal range of motion. She exhibits no edema. Mechanically stable Neurological: She is alert and oriented to person, place, and time. No focal deficits noted Skin: Skin is warm. Psychiatric: Her behavior is normal.  
Nursing note and vitals reviewed. MDM Number of Diagnoses or Management Options Acute cystitis without hematuria:  
BV (bacterial vaginosis):  
Type 2 diabetes mellitus with hyperglycemia, unspecified whether long term insulin use ([facility] [address]: Uterine prolapse:  
Yeast infection:  
Diagnosis management comments: 52 yo F with PMHx HTN, DM presents with about 1 week vaginal itching, pain, and discharge, which seems to be pt's primary complaint. Pt also with some uri symptoms - cough, wheezing. Examination with mild, generalized abdominal tenderness but ctab and no wheezing, no increased wob noted. Will evaluate for acute process. 10:54 PM 
Pelvic exam with apparent incomplete uterine prolapse. I attempted reduction with speculum and via manual reduction but prolapse recurred. I discussed with Dr. Rock Alexis, OB/Gyn, who recommended clinic follow-up. Pt also with +yeast, +BV, +uti, will treat empirically. Glucose high but no DKA, and did improve with IVF. Discussed results and poc for dc home, abx, symptom management, follow-up, return precautions. Amount and/or Complexity of Data Reviewed Clinical lab tests: ordered and reviewed Review and summarize past medical records: yes Patient Progress Patient progress: stable 7:50 PM, 12/6/2018 Consult:  Discussed care with Dr. Morgan Paez, Specialty: OBGYN  Standard discussion; including history of patients chief complaint, available diagnostic results, and treatment course. Dr. Morgan Paez will follow up with the patient in the clinic in the next weeks. Pelvic Exam 
Date/Time: 12/6/2018 7:42 PM 
Performed by: attending Exam assisted by:  Ester Ignacio. Type of exam performed: bimanual and speculum. External genitalia appearance: mass (incomplete uterine prolapse). Vaginal exam:  discharge. The discharge was white and thick. Specimen(s) collected:  chlamydia, GC and vaginal culture. Bimanual exam: limited by generalized discomfort. PROGRESS NOTES 
 
8:35 PM:  
Eladio Oliveira MD arrives to the bedside to evaluate the patient. Answered the patient's questions regarding the treatment plan. CONSULTATIONS None MEDICATIONS ORDERED Medications  
sodium chloride 0.9 % bolus infusion 1,000 mL (1,000 mL IntraVENous New Bag 12/6/18 2029) Followed by  
sodium chloride 0.9 % bolus infusion 1,000 mL (0 mL IntraVENous IV Completed 12/6/18 2145)  
ketorolac (TORADOL) injection 30 mg (30 mg IntraVENous Given 12/6/18 2131) fluconazole (DIFLUCAN) tablet 150 mg (150 mg Oral Given 12/6/18 2202) cefTRIAXone (ROCEPHIN) 1 g in 0.9% sodium chloride (MBP/ADV) 50 mL MBP (1 g IntraVENous New Bag 12/6/18 2202) RADIOLOGY INTERPRETATIONS No orders to display EKG READINGS/LABORATORY RESULTS Recent Results (from the past 12 hour(s)) URINALYSIS W/ RFLX MICROSCOPIC Collection Time: 12/06/18  6:20 PM  
Result Value Ref Range Color YELLOW Appearance CLEAR Specific gravity >1.030 (H) 1.005 - 1.030  
 pH (UA) 5.5 5.0 - 8.0 Protein NEGATIVE  NEG mg/dL Glucose >1,000 (A) NEG mg/dL Ketone NEGATIVE  NEG mg/dL Bilirubin NEGATIVE  NEG Blood TRACE (A) NEG Urobilinogen 0.2 0.2 - 1.0 EU/dL  Nitrites NEGATIVE  NEG    
 Leukocyte Esterase MODERATE (A) NEG URINE MICROSCOPIC ONLY Collection Time: 12/06/18  6:20 PM  
Result Value Ref Range WBC 11 to 20 0 - 4 /hpf  
 RBC 0 0 - 5 /hpf Epithelial cells FEW 0 - 5 /lpf Bacteria 2+ (A) NEG /hpf Yeast FEW (A) NEG    
CBC WITH AUTOMATED DIFF Collection Time: 12/06/18  7:25 PM  
Result Value Ref Range WBC 7.8 4.6 - 13.2 K/uL  
 RBC 4.53 4.20 - 5.30 M/uL  
 HGB 13.3 12.0 - 16.0 g/dL HCT 39.8 35.0 - 45.0 % MCV 87.9 74.0 - 97.0 FL  
 MCH 29.4 24.0 - 34.0 PG  
 MCHC 33.4 31.0 - 37.0 g/dL  
 RDW 13.2 11.6 - 14.5 % PLATELET 402 291 - 871 K/uL MPV 11.1 9.2 - 11.8 FL  
 NEUTROPHILS 69 40 - 73 % LYMPHOCYTES 24 21 - 52 % MONOCYTES 6 3 - 10 % EOSINOPHILS 1 0 - 5 % BASOPHILS 0 0 - 2 %  
 ABS. NEUTROPHILS 5.4 1.8 - 8.0 K/UL  
 ABS. LYMPHOCYTES 1.9 0.9 - 3.6 K/UL  
 ABS. MONOCYTES 0.4 0.05 - 1.2 K/UL  
 ABS. EOSINOPHILS 0.1 0.0 - 0.4 K/UL  
 ABS. BASOPHILS 0.0 0.0 - 0.1 K/UL  
 DF AUTOMATED METABOLIC PANEL, COMPREHENSIVE Collection Time: 12/06/18  7:25 PM  
Result Value Ref Range Sodium 131 (L) 136 - 145 mmol/L Potassium 4.3 3.5 - 5.5 mmol/L Chloride 96 (L) 100 - 108 mmol/L  
 CO2 27 21 - 32 mmol/L Anion gap 8 3.0 - 18 mmol/L Glucose 615 (HH) 74 - 99 mg/dL BUN 11 7.0 - 18 MG/DL Creatinine 1.05 0.6 - 1.3 MG/DL  
 BUN/Creatinine ratio 10 (L) 12 - 20 GFR est AA >60 >60 ml/min/1.73m2 GFR est non-AA 56 (L) >60 ml/min/1.73m2 Calcium 9.4 8.5 - 10.1 MG/DL Bilirubin, total 0.3 0.2 - 1.0 MG/DL  
 ALT (SGPT) 32 13 - 56 U/L  
 AST (SGOT) 20 15 - 37 U/L Alk. phosphatase 233 (H) 45 - 117 U/L Protein, total 7.8 6.4 - 8.2 g/dL Albumin 3.6 3.4 - 5.0 g/dL Globulin 4.2 (H) 2.0 - 4.0 g/dL A-G Ratio 0.9 0.8 - 1.7 WET PREP Collection Time: 12/06/18  7:41 PM  
Result Value Ref Range Special Requests: NO SPECIAL REQUESTS Wet prep NO TRICHOMONAS SEEN  Wet prep MANY 
CLUE CELLS PRESENT 
    
 Wet prep NO FUNGAL ELEMENTS SEEN    
 
 
ED DIAGNOSIS & DISPOSITION INFORMATION Diagnosis: 1. Uterine prolapse 2. Type 2 diabetes mellitus with hyperglycemia, unspecified whether long term insulin use ([address] 3. BV (bacterial vaginosis) 4. Acute cystitis without hematuria 5. Yeast infection Disposition: Discharge Follow-up Information Follow up With Specialties Details Why Contact Info Edith Roth NP Nurse Practitioner In 2 days As needed [address] [phone] 
[phone] Medication List  
  
START taking these medications   
cephALEXin 500 mg capsule Commonly known as:  Stefani Cowboy Take 1 Cap by mouth four (4) times daily for 7 days. fluconazole 150 mg tablet Commonly known as:  DIFLUCAN Take 1 Tab by mouth once for 1 dose. FDA advises cautious prescribing of oral fluconazole in pregnancy. metroNIDAZOLE 500 mg tablet Commonly known as:  FLAGYL Take 1 Tab by mouth two (2) times a day for 7 days. ASK your doctor about these medications ALBUTEROL IN 
  
alum-mag hydroxide-simeth 200-200-20 mg/5 mL Susp Commonly known as:  MYLANTA Take 30 mL by mouth four (4) times daily as needed. atorvastatin 40 mg tablet Commonly known as:  LIPITOR Take 1 Tab by mouth daily. bisacodyl 10 mg suppository Commonly known as:  DULCOLAX (BISACODYL) Insert 10 mg into rectum daily as needed. Blood Pressure Test Kit-Medium Kit Blood pressure as needed Blood-Glucose Meter monitoring kit Use daily to check blood sugar 
  
budesonide 32 mcg/actuation nasal spray Commonly known as:  RHINOCORT ALLERGY 2 Sprays by Both Nostrils route daily. Indications: Allergic Rhinitis 
  
carvedilol 3.125 mg tablet Commonly known as:  Lonn Blamer Take 2 Tabs by mouth two (2) times daily (with meals). cyanocobalamin 1,000 mcg tablet Commonly known as:  VITAMIN B-12 Take 1 Tab by mouth daily. dicyclomine 20 mg tablet Commonly known as:  BENTYL Take 1 Tab by mouth every six (6) hours as needed (abdominal cramps) for up to 20 doses. ferrous sulfate 325 mg (65 mg iron) tablet Take 1 Tab by mouth Daily (before breakfast). glucose blood VI test strips strip Commonly known as:  ASCENSIA AUTODISC VI, ONE TOUCH ULTRA TEST VI 
Use daily to monitor blood glucose HYDROcodone-acetaminophen 5-325 mg per tablet Commonly known as:  Gisel Ritter Take 1 Tab by mouth every six (6) hours as needed for Pain. Max Daily Amount: 4 Tabs. hydrOXYzine HCl 25 mg tablet Commonly known as:  ATARAX Take 1-2 tablets by mouth every 6 hours as needed. Insulin Needles (Disposable) 31 gauge x 5/16\" Ndle Use to administer insulin as directed 
  
insulin nph-regular human rec 100 unit/mL (70-30) Inpn Commonly known as:  HUMULIN 70-30 Give 40 units in the QAM and 45 units at bedtime 
  
lancets Misc Use daily to monitor blood glucose 
  
lidocaine 5 % Commonly known as:  LIDODERM 
2 Patches by TransDERmal route every twenty-four (24) hours. Apply patch to the affected area for 12 hours a day and remove for 12 hours a day. lisinopril-hydroCHLOROthiazide 20-12.5 mg per tablet Commonly known as:  Laureano Jacqueline Take 1 Tab by mouth two (2) times a day. LORazepam 1 mg tablet Commonly known as:  ATIVAN 
  
mupirocin 2 % ointment Commonly known as:  BACTROBAN 
  
naloxone 2 mg/actuation Spry Commonly known as:  [employer] Use 1 spray intranasally, then discard. Repeat with new spray every 2 min as needed for opioid overdose symptoms, alternating nostrils. * ondansetron 4 mg disintegrating tablet Commonly known as:  ZOFRAN ODT Take 1 Tab by mouth every eight (8) hours as needed for Nausea. * ondansetron 4 mg disintegrating tablet Commonly known as:  ZOFRAN ODT Take 1 Tab by mouth every eight (8) hours as needed for Nausea. polyethylene glycol 17 gram packet Commonly known as:  Tegan Bustamante Take 1 Packet by mouth daily. VALTREX 1 gram tablet Generic drug:  valACYclovir * This list has 2 medication(s) that are the same as other medications prescribed for you. Read the directions carefully, and ask your doctor or other care provider to review them with you. Where to Get Your Medications Information about where to get these medications is not yet available Ask your nurse or doctor about these medications · cephALEXin 500 mg capsule · fluconazole 150 mg tablet · metroNIDAZOLE 500 mg tablet Milton Jarrell MD. Scribe Attestation Guadalupe Pierce acting as a scribe for and in the presence of Milton Jarrell MD     
December 06, 2018 at 7:14 PM 
    
Provider Attestation:     
I personally performed the services described in the documentation, reviewed the documentation, as recorded by the scribe in my presence, and it accurately and completely records my words and actions.  December 06, 2018 at 7:14 PM - Milton Jarrell MD

## 2018-12-06 NOTE — ED TRIAGE NOTES
Was on antibiotics. Now has yeast infection. Also has URI symptoms and wheezing at night. Has not taken meds today

## 2018-12-07 LAB
C TRACH RRNA SPEC QL NAA+PROBE: NEGATIVE
GLUCOSE BLD STRIP.AUTO-MCNC: 440 MG/DL (ref 70–110)
N GONORRHOEA RRNA SPEC QL NAA+PROBE: NEGATIVE
SPECIMEN SOURCE: NORMAL

## 2018-12-27 ENCOUNTER — HOSPITAL ENCOUNTER (OUTPATIENT)
Dept: LAB | Age: 48
Discharge: HOME | End: 2018-12-27
Payer: MEDICAID

## 2018-12-27 ENCOUNTER — OFFICE VISIT (OUTPATIENT)
Dept: FAMILY MEDICINE CLINIC | Facility: CLINIC | Age: 48
End: 2018-12-27

## 2018-12-27 VITALS
HEART RATE: 91 BPM | HEIGHT: 62 IN | OXYGEN SATURATION: 100 % | TEMPERATURE: 97.8 F | WEIGHT: 154 LBS | DIASTOLIC BLOOD PRESSURE: 102 MMHG | RESPIRATION RATE: 17 BRPM | BODY MASS INDEX: 28.34 KG/M2 | SYSTOLIC BLOOD PRESSURE: 126 MMHG

## 2018-12-27 DIAGNOSIS — E11.21 TYPE 2 DIABETES WITH NEPHROPATHY (HCC): ICD-10-CM

## 2018-12-27 DIAGNOSIS — Z00.00 LABORATORY EXAM ORDERED AS PART OF ROUTINE GENERAL MEDICAL EXAMINATION: ICD-10-CM

## 2018-12-27 DIAGNOSIS — R10.9 CHRONIC ABDOMINAL PAIN: ICD-10-CM

## 2018-12-27 DIAGNOSIS — G89.29 CHRONIC ABDOMINAL PAIN: ICD-10-CM

## 2018-12-27 DIAGNOSIS — R39.9 UTI SYMPTOMS: ICD-10-CM

## 2018-12-27 DIAGNOSIS — Z23 ENCOUNTER FOR IMMUNIZATION: ICD-10-CM

## 2018-12-27 DIAGNOSIS — Z13.31 NEGATIVE DEPRESSION SCREENING: ICD-10-CM

## 2018-12-27 DIAGNOSIS — M79.18 MUSCULOSKELETAL PAIN: ICD-10-CM

## 2018-12-27 DIAGNOSIS — Z00.00 ENCOUNTER FOR ANNUAL PHYSICAL EXAM: Primary | ICD-10-CM

## 2018-12-27 LAB
BILIRUB UR QL STRIP: NEGATIVE
GLUCOSE UR-MCNC: NORMAL MG/DL
KETONES P FAST UR STRIP-MCNC: NEGATIVE MG/DL
PH UR STRIP: 5.5 [PH] (ref 4.6–8)
PROT UR QL STRIP: NORMAL
SP GR UR STRIP: 1.02 (ref 1–1.03)
UA UROBILINOGEN AMB POC: NORMAL (ref 0.2–1)
URINALYSIS CLARITY POC: CLEAR
URINALYSIS COLOR POC: NORMAL
URINE BLOOD POC: NEGATIVE
URINE LEUKOCYTES POC: NEGATIVE
URINE NITRITES POC: NEGATIVE

## 2018-12-27 PROCEDURE — 36415 COLL VENOUS BLD VENIPUNCTURE: CPT

## 2018-12-27 PROCEDURE — 87086 URINE CULTURE/COLONY COUNT: CPT

## 2018-12-27 NOTE — PROGRESS NOTES
Subjective:   51 y.o. female for annual physical exam       Patient Active Problem List   Diagnosis Code    Hypercholesterolemia E78.00    Essential hypertension I10    Depression F32.9    Bipolar affective disorder ([address] F31.9    Chronic low back pain M54.5, G89.29    Type 2 diabetes mellitus with autonomic neuropathy (HCC) E11.43    Peptic ulcer disease K27.9    Malignant neoplasm of upper-outer quadrant of right female breast ([address] C50.411    Vitamin D deficiency E55.9    Type 2 diabetes with nephropathy ([facility] E11.21     Patient Active Problem List    Diagnosis Date Noted    Type 2 diabetes with nephropathy ([facility] 05/22/2018    Vitamin D deficiency 05/01/2018    Malignant neoplasm of upper-outer quadrant of right female breast ([facility] 01/25/2017    Bipolar affective disorder ([facility] 07/28/2016    Chronic low back pain 07/28/2016    Peptic ulcer disease 07/28/2016    Depression 04/28/2015    Type 2 diabetes mellitus with autonomic neuropathy ([facility] 03/25/2015    Hypercholesterolemia 12/01/2014    Essential hypertension 12/01/2014     Current Outpatient Medications   Medication Sig Dispense Refill    ondansetron (ZOFRAN ODT) 4 mg disintegrating tablet Take 1 Tab by mouth every eight (8) hours as needed for Nausea. 15 Tab 0    dicyclomine (BENTYL) 20 mg tablet Take 1 Tab by mouth every six (6) hours. 20 Tab 0    Blood-Glucose Meter (ONETOUCH ULTRA2) monitoring kit Use to obtain two times a day. 1 Kit 0    lancets misc Use daily to monitor blood glucose 200 Each 0    ergocalciferol (ERGOCALCIFEROL) 50,000 unit capsule Take 1 Cap by mouth every seven (7) days for 12 doses. 12 Cap 0    glucose blood VI test strips (ASCENSIA AUTODISC VI, ONE TOUCH ULTRA TEST VI) strip 50 Each by Does Not Apply route two (2) times daily as needed.  200 Strip 3    benzonatate (TESSALON) 100 mg capsule       EPINEPHrine (EPIPEN) 0.3 mg/0.3 mL injection 0.3 mg.      ondansetron (ZOFRAN ODT) 4 mg disintegrating tablet Take 1 Tab by mouth every eight (8) hours as needed for Nausea. 15 Tab 0    cyanocobalamin (VITAMIN B-12) 1,000 mcg tablet Take 1 Tab by mouth daily. 90 Tab 0    lisinopril-hydroCHLOROthiazide (PRINZIDE, ZESTORETIC) 20-12.5 mg per tablet Take 1 Tab by mouth two (2) times a day. 180 Tab 3    carvedilol (COREG) 3.125 mg tablet Take 2 Tabs by mouth two (2) times daily (with meals). 90 Tab 3    budesonide (RHINOCORT ALLERGY) 32 mcg/actuation nasal spray 2 Sprays by Both Nostrils route daily. Indications: Allergic Rhinitis 1 Bottle 3    insulin nph-regular human rec (HUMULIN 70-30) 100 unit/mL (70-30) inpn Give 40 units in the QAM and 45 units at bedtime 5 Pen 3    ferrous sulfate 325 mg (65 mg iron) tablet Take 1 Tab by mouth Daily (before breakfast). 30 Tab 3    polyethylene glycol (MIRALAX) 17 gram packet Take 1 Packet by mouth daily. 30 Each 1    Insulin Needles, Disposable, 31 gauge x 5/16\" ndle Use to administer insulin as directed 1 Package 11    atorvastatin (LIPITOR) 40 mg tablet Take 1 Tab by mouth daily. 30 Tab 6    LORazepam (ATIVAN) 1 mg tablet 1 mg.        Allergies   Allergen Reactions    Latex Hives and Itching    Other Food Rash     Almonds    Amoxicillin Swelling     Other reaction(s): mild rash/itching    Aspirin Not Reported This Time and Swelling     Mouth swells    Beeswax Anaphylaxis    Fentanyl Anaphylaxis and Swelling     Patches cause mouth to swell  Swelling in mouth from fentanyl patch    Levaquin [Levofloxacin] Not Reported This Time and Swelling     Other reaction(s): mild rash/itching    Chlorhexidine Rash    Norco [Hydrocodone-Acetaminophen] Nausea and Vomiting     Other reaction(s): gi distress    Acetaminophen Other (comments)    Woodruff Rash and Itching    Codeine Other (comments)    Glycopyrrolate Swelling     Pt  States  faciAL  SWELLING   POST  ROBINUL  IV   Pt  States  faciAL  SWELLING   POST  ROBINUL  IV     Morphine Nausea and Vomiting     Pt states she is not allergic    Pcn [Penicillins] Swelling    Percocet [Oxycodone-Acetaminophen] Nausea and Vomiting     Other reaction(s): gi distress  nausea  Other reaction(s): anaphylaxis/angioedema  Per pt. She is allergic    Tape [Adhesive] Rash    Tramadol Swelling     Past Medical History:   Diagnosis Date    Anxiety     Bipolar disorder ([address]     Breast cancer ([address]     breast cancer, right, S/P Mastectomy and chemo, radiation in 2013    Depression     Diabetes ([address]     Drug abuse ([address]     H/O cocaine use in past    Drug-seeking behavior     Family history of early CAD     Gastrointestinal disorder     cholitis    H/O ETOH abuse     Hyperlipidemia     Hypertension     Malignant neoplasm without specification of site ([address]     Methamphetamine abuse ([address]      self reported on 1/3/16    Spine injury     Vitamin D deficiency 5/1/2018     Past Surgical History:   Procedure Laterality Date    COLONOSCOPY N/A 7/18/2016    COLON[facility]COPY performed [facility]OSCOPY    HX BREAST LUMPECTOMY  06/2013    right    HX HYSTERECTOMY      HX MASTECTOMY      had expanders put in 6/2018    HX ORTHOPAEDIC      Back fusion surgery 4/18/14.      HX OTHER SURGICAL      mediport    HX TONSILLECTOMY      HX TUBAL LIGATION       Family History   Problem Relation Age of Onset    Heart Disease Mother     Stroke Father     Heart Disease Father 48    Diabetes Other     Hypertension Other     Cancer Maternal Grandmother      Social History     Tobacco Use    Smoking status: Never Smoker    Smokeless tobacco: Never Used   Substance Use Topics    Alcohol use: Yes     Comment: \"Occasionally\"        Lab Results   Component Value Date/Time    WBC 9.0 01/15/2019 10:30 PM    HGB 13.3 01/15/2019 10:30 PM    Hemoglobin, POC 12.9 05/03/2010 06:42 PM    HCT 40.6 01/15/2019 10:30 PM    Hematocrit, POC 38 05/03/2010 06:42 PM    PLATELET 311 37/48/8764 10:30 PM    MCV 91.2 01/15/2019 10:30 PM     Lab Results   Component Value Date/Time    Hemoglobin A1c 7.6 (H) 04/25/2018 10:33 AM    Hemoglobin A1c 7.7 (H) 01/23/2017 04:40 AM    Hemoglobin A1c 8.3 (H) 06/22/2016 06:10 AM    Glucose 143 (H) 01/15/2019 10:30 PM    Glucose (POC) 440 (HH) 12/06/2018 10:52 PM    Glucose,  (HH) 05/03/2010 06:42 PM    Microalbumin/Creat ratio (mg/g creat) 11 04/25/2018 10:33 AM    Microalbumin,urine random 1.50 04/25/2018 10:33 AM    LDL, calculated 160 (H) 04/25/2018 10:33 AM    Creatinine, POC 0.5 (L) 05/03/2010 06:42 PM    Creatinine 0.91 01/15/2019 10:30 PM      Lab Results   Component Value Date/Time    Cholesterol, total 244 (H) 04/25/2018 10:33 AM    HDL Cholesterol 46 04/25/2018 10:33 AM    LDL, calculated 160 (H) 04/25/2018 10:33 AM    Triglyceride 190 (H) 04/25/2018 10:33 AM    CHOL/HDL Ratio 5.3 (H) 04/25/2018 10:33 AM     Lab Results   Component Value Date/Time    ALT (SGPT) 44 01/15/2019 10:30 PM    AST (SGOT) 42 (H) 01/15/2019 10:30 PM    Alk.  phosphatase 155 (H) 01/15/2019 10:30 PM    Bilirubin, direct 0.2 10/21/2018 10:20 AM    Bilirubin, total 0.2 01/15/2019 10:30 PM    Albumin 3.7 01/15/2019 10:30 PM    Protein, total 7.3 01/15/2019 10:30 PM    INR 1.0 05/02/2018 09:45 PM    Prothrombin time 12.2 05/02/2018 09:45 PM    PLATELET 431 20/59/4634 10:30 PM     Lab Results   Component Value Date/Time    INR 1.0 05/02/2018 09:45 PM    INR 1.0 03/28/2018 10:00 PM    INR 1.1 11/06/2016 02:22 PM    Prothrombin time 12.2 05/02/2018 09:45 PM    Prothrombin time 12.9 03/28/2018 10:00 PM    Prothrombin time 13.4 11/06/2016 02:22 PM      Lab Results   Component Value Date/Time    GFR est non-AA >60 01/15/2019 10:30 PM    GFRNA, POC >60 05/03/2010 06:42 PM    GFR est AA >60 01/15/2019 10:30 PM    GFRAA, POC >60 05/03/2010 06:42 PM    Creatinine 0.91 01/15/2019 10:30 PM    Creatinine, POC 0.5 (L) 05/03/2010 06:42 PM    BUN 28 (H) 01/15/2019 10:30 PM    BUN, POC <3 (L) 05/03/2010 06:42 PM    Sodium 141 01/15/2019 10:30 PM Sodium,  05/03/2010 06:42 PM    Potassium 3.2 (L) 01/15/2019 10:30 PM    Potassium, POC 4.0 05/03/2010 06:42 PM    Chloride 105 01/15/2019 10:30 PM    Chloride,  05/03/2010 06:42 PM    CO2 31 01/15/2019 10:30 PM    Magnesium 1.8 11/06/2016 02:22 PM       Lab Results   Component Value Date/Time    TSH 2.70 01/10/2019 11:31 AM    T4, Free 1.2 01/10/2019 11:31 AM      Lab Results   Component Value Date/Time    Glucose 143 (H) 01/15/2019 10:30 PM    Glucose (POC) 440 (HH) 12/06/2018 10:52 PM    Glucose,  (HH) 05/03/2010 06:42 PM      Lab Results   Component Value Date/Time    CK 28 01/11/2019 12:52 AM    CK - MB <1.0 01/11/2019 12:52 AM    CK-MB Index  01/11/2019 12:52 AM     CALCULATION NOT PERFORMED WHEN RESULT IS BELOW LINEAR LIMIT    Troponin-I, QT <0.02 01/11/2019 12:52 AM      Lab Results   Component Value Date/Time    NT pro-BNP 94 01/11/2019 12:52 AM    NT pro-BNP 17 10/19/2015 05:30 PM    NT pro- 05/03/2015 03:25 PM    NT pro-BNP 33 01/18/2015 06:32 PM      Lab Results   Component Value Date/Time    Sodium 141 01/15/2019 10:30 PM    Potassium 3.2 (L) 01/15/2019 10:30 PM    Chloride 105 01/15/2019 10:30 PM    CO2 31 01/15/2019 10:30 PM    Anion gap 5 01/15/2019 10:30 PM    Glucose 143 (H) 01/15/2019 10:30 PM    BUN 28 (H) 01/15/2019 10:30 PM    Creatinine 0.91 01/15/2019 10:30 PM    BUN/Creatinine ratio 31 (H) 01/15/2019 10:30 PM    GFR est AA >60 01/15/2019 10:30 PM    GFR est non-AA >60 01/15/2019 10:30 PM    Calcium 8.9 01/15/2019 10:30 PM      Lab Results   Component Value Date/Time    Sodium 141 01/15/2019 10:30 PM    Potassium 3.2 (L) 01/15/2019 10:30 PM    Chloride 105 01/15/2019 10:30 PM    CO2 31 01/15/2019 10:30 PM    Anion gap 5 01/15/2019 10:30 PM    Glucose 143 (H) 01/15/2019 10:30 PM    BUN 28 (H) 01/15/2019 10:30 PM    Creatinine 0.91 01/15/2019 10:30 PM    BUN/Creatinine ratio 31 (H) 01/15/2019 10:30 PM    GFR est AA >60 01/15/2019 10:30 PM    GFR est non-AA >60 01/15/2019 10:30 PM    Calcium 8.9 01/15/2019 10:30 PM    Bilirubin, total 0.2 01/15/2019 10:30 PM    ALT (SGPT) 44 01/15/2019 10:30 PM    AST (SGOT) 42 (H) 01/15/2019 10:30 PM    Alk. phosphatase 155 (H) 01/15/2019 10:30 PM    Protein, total 7.3 01/15/2019 10:30 PM    Albumin 3.7 01/15/2019 10:30 PM    Globulin 3.6 01/15/2019 10:30 PM    A-G Ratio 1.0 01/15/2019 10:30 PM      Lab Results   Component Value Date/Time    Hemoglobin A1c 7.6 (H) 04/25/2018 10:33 AM    Hemoglobin A1c (POC) 13.9 01/10/2019 11:30 AM       Specific concerns today:  Urinary frequency, feeling of incomplete emptying, suprapubic pressure. Review of Systems   Positive symptoms are BOLDED:     CONST:          Fatigue, weight change, appetite change  NEURO:          Headaches, vision changes, dizziness, loss of consciousness  CV:                  Chest pain, palpitations, orthopnea, PND  PULM:             SOB, wheezing, cough, hemoptysis  GI:                   Nausea, vomiting, abdominal pain, greasy stools, blood in stool,                           diarrhea, constipation  :                  Dysuria, hematuria, change in urine  MS:                  Muscle/joint pain, joint swelling  SKIN:              Rashes, skin changes  ALLERGY:      Seasonal allergies, itchy eyes  HEME:  Easy bleeding/bruising    Objective:     Visit Vitals  BP (!) 126/102 (BP 1 Location: Right arm, BP Patient Position: Sitting)   Pulse 91   Temp 97.8 °F (36.6 °C) (Oral)   Resp 17   Ht 5' 2\" (1.575 m)   Wt 154 lb (69.9 kg)   SpO2 100%   BMI 28.17 kg/m²     Physical Examination:     Constitutional: She is oriented to person, place, and time and well-developed, well-nourished, and in no distress. HENT:   Head: Normocephalic and atraumatic. Right Ear: External ear normal.   Left Ear: External ear normal.   Nose: Nose normal.   Mouth/Throat: Oropharynx is clear and moist. No oropharyngeal exudate.    Eyes: Conjunctivae and EOM are normal. Pupils are equal, round, and reactive to light. Neck: Normal range of motion. Neck supple. Cardiovascular: Normal rate, regular rhythm, normal heart sounds and intact distal pulses. Pulmonary/Chest: Effort normal and breath sounds normal. No respiratory distress. no wheezes,no rales. She exhibits no tenderness. Abdominal: Soft. Bowel sounds are normal. She exhibits no distension and no mass. + suprapubic enderness. No rebound, no guarding. Musculoskeletal: Normal range of motion. She has no cervical adenopathy. Neurological: She is alert and oriented to person, place, and time. Skin: Skin is warm and dry. Psychiatric: Affect normal.     Assessment/Plan:       ICD-10-CM ICD-9-CM    1. Encounter for annual physical exam Z00.00 V70.0    2. Negative depression screening Z13.31 V79.0    3. Type 2 diabetes with nephropathy (HCC) E11.21 250.40 HM DIABETES FOOT EXAM     583.81 FUNDUS PHOTOGRAPHY   4. Musculoskeletal pain M79.18 729.1    5. Chronic abdominal pain R10.9 789.00     G89.29 338.29    6. UTI symptoms R39.9 788.99 AMB POC URINALYSIS DIP STICK AUTO W/ MICRO      CULTURE, URINE   7. Encounter for immunization Z23 V03.89 diph,Pertuss,Acell,,Tet Vac-PF (ADACEL) 2 Lf-(2.5-5-3-5 mcg)-5Lf/0.5 mL susp   8.  Laboratory exam ordered as part of routine general medical examination Z00.00 V72.62 T4, FREE      TSH 3RD GENERATION      VITAMIN D, 25 HYDROXY      Follow up in one week for blood pressure recheck    lose weight, increase physical activity, bring BP log to office visit, follow low fat diet, follow low salt diet, routine labs ordered, call if any problems    Healthy lifestyle has been encouraged including avoidance of tobacco, limiting or avoiding alcohol intake, heart healthy diet which is low in cholesterol and saturated fat and contains fresh fruits, vegetables and whole grains and fiber, regular exercise with goals of 20-30 minutes 3-5 days weekly and maintaining an optimal BMI.     I have discussed the diagnosis with the patient and the intended plan as seen in the above orders. The patient has received an after-visit summary along with patient information handout. I have discussed medication side effects and warnings with the patient as well.    Pt verbalized understanding.     Radha CASEY  [facility]  [address] [place], [address]  Phone [phone]  Fax [phone]

## 2018-12-27 NOTE — PATIENT INSTRUCTIONS
Abdominal Pain: Care Instructions  Your Care Instructions    Abdominal pain has many possible causes. Some aren't serious and get better on their own in a few days. Others need more testing and treatment. If your pain continues or gets worse, you need to be rechecked and may need more tests to find out what is wrong. You may need surgery to correct the problem. Don't ignore new symptoms, such as fever, nausea and vomiting, urination problems, pain that gets worse, and dizziness. These may be signs of a more serious problem. Your doctor may have recommended a follow-up visit in the next 8 to 12 hours. If you are not getting better, you may need more tests or treatment. The doctor has checked you carefully, but problems can develop later. If you notice any problems or new symptoms, get medical treatment right away. Follow-up care is a key part of your treatment and safety. Be sure to make and go to all appointments, and call your doctor if you are having problems. It's also a good idea to know your test results and keep a list of the medicines you take. How can you care for yourself at home? · Rest until you feel better. · To prevent dehydration, drink plenty of fluids, enough so that your urine is light yellow or clear like water. Choose water and other caffeine-free clear liquids until you feel better. If you have kidney, heart, or liver disease and have to limit fluids, talk with your doctor before you increase the amount of fluids you drink. · If your stomach is upset, eat mild foods, such as rice, dry toast or crackers, bananas, and applesauce. Try eating several small meals instead of two or three large ones. · Wait until 48 hours after all symptoms have gone away before you have spicy foods, alcohol, and drinks that contain caffeine. · Do not eat foods that are high in fat. · Avoid anti-inflammatory medicines such as aspirin, ibuprofen (Advil, Motrin), and naproxen (Aleve).  These can cause stomach upset. Talk to your doctor if you take daily aspirin for another health problem. When should you call for help? Call 911 anytime you think you may need emergency care. For example, call if:    · You passed out (lost consciousness).     · You pass maroon or very bloody stools.     · You vomit blood or what looks like coffee grounds.     · You have new, severe belly pain.    Call your doctor now or seek immediate medical care if:    · Your pain gets worse, especially if it becomes focused in one area of your belly.     · You have a new or higher fever.     · Your stools are black and look like tar, or they have streaks of blood.     · You have unexpected vaginal bleeding.     · You have symptoms of a urinary tract infection. These may include:  ? Pain when you urinate. ? Urinating more often than usual.  ? Blood in your urine.     · You are dizzy or lightheaded, or you feel like you may faint.    Watch closely for changes in your health, and be sure to contact your doctor if:    · You are not getting better after 1 day (24 hours). Where can you learn more? Go to http://imani-jenny.info/. Enter T430 in the search box to learn more about \"Abdominal Pain: Care Instructions. \"  Current as of: November 20, 2017  Content Version: 11.8  © 4873-1933 [employer]. Care instructions adapted under license by [employer] (which disclaims liability or warranty for this information). If you have questions about a medical condition or this instruction, always ask your healthcare professional. [place] [address] any warranty or liability for your use of this information. Chronic Pain: Care Instructions  Your Care Instructions    Chronic pain is pain that lasts a long time (months or even years) and may or may not have a clear cause.  It is different from acute pain, which usually does have a clear cause--like an injury or illness--and gets better over time. Chronic pain:  · Lasts over time but may vary from day to day. · Does not go away despite efforts to end it. · May disrupt your sleep and lead to fatigue. · May cause depression or anxiety. · May make your muscles tense, causing more pain. · Can disrupt your work, hobbies, home life, and relationships with friends and family. Chronic pain is a very real condition. It is not just in your head. Treatment can help and usually includes several methods used together, such as medicines, physical therapy, exercise, and other treatments. Learning how to relax and changing negative thought patterns can also help you cope. Chronic pain is complex. Taking an active role in your treatment will help you better manage your pain. Tell your doctor if you have trouble dealing with your pain. You may have to try several things before you find what works best for you. Follow-up care is a key part of your treatment and safety. Be sure to make and go to all appointments, and call your doctor if you are having problems. It's also a good idea to know your test results and keep a list of the medicines you take. How can you care for yourself at home? · Pace yourself. Break up large jobs into smaller tasks. Save harder tasks for days when you have less pain, or go back and forth between hard tasks and easier ones. Take rest breaks. · Relax, and reduce stress. Relaxation techniques such as deep breathing or meditation can help. · Keep moving. Gentle, daily exercise can help reduce pain over the long run. Try low- or no-impact exercises such as walking, swimming, and stationary biking. Do stretches to stay flexible. · Try heat, cold packs, and massage. · Get enough sleep. Chronic pain can make you tired and drain your energy. Talk with your doctor if you have trouble sleeping because of pain. · Think positive. Your thoughts can affect your pain level.  Do things that you enjoy to distract yourself when you have pain instead of focusing on the pain. See a movie, read a book, listen to music, or spend time with a friend. · If you think you are depressed, talk to your doctor about treatment. · Keep a daily pain diary. Record how your moods, thoughts, sleep patterns, activities, and medicine affect your pain. You may find that your pain is worse during or after certain activities or when you are feeling a certain emotion. Having a record of your pain can help you and your doctor find the best ways to treat your pain. · Take pain medicines exactly as directed. ? If the doctor gave you a prescription medicine for pain, take it as prescribed. ? If you are not taking a prescription pain medicine, ask your doctor if you can take an over-the-counter medicine. Reducing constipation caused by pain medicine  · Include fruits, vegetables, beans, and whole grains in your diet each day. These foods are high in fiber. · Drink plenty of fluids, enough so that your urine is light yellow or clear like water. If you have kidney, heart, or liver disease and have to limit fluids, talk with your doctor before you increase the amount of fluids you drink. · If your doctor recommends it, get more exercise. Walking is a good choice. Bit by bit, increase the amount you walk every day. Try for at least 30 minutes on most days of the week. · Schedule time each day for a bowel movement. A daily routine may help. Take your time and do not strain when having a bowel movement. When should you call for help? Call your doctor now or seek immediate medical care if:    · Your pain gets worse or is out of control.     · You feel down or blue, or you do not enjoy things like you once did. You may be depressed, which is common in people with chronic pain.  Depression can be treated.     · You have vomiting or cramps for more than 2 hours.    Watch closely for changes in your health, and be sure to contact your doctor if:    · You cannot sleep because of pain.     · You are very worried or anxious about your pain.     · You have trouble taking your pain medicine.     · You have any concerns about your pain medicine.     · You have trouble with bowel movements, such as:  ? No bowel movement in 3 days. ? Blood in the anal area, in your stool, or on the toilet paper. ? Diarrhea for more than 24 hours. Where can you learn more? Go to http://imani-jenny.info/. Enter N004 in the search box to learn more about \"Chronic Pain: Care Instructions. \"  Current as of: June 4, 2018  Content Version: 11.8  © 7805-9844 [employer]. Care instructions adapted under license by [employer] (which disclaims liability or warranty for this information). If you have questions about a medical condition or this instruction, always ask your healthcare professional. [address] any warranty or liability for your use of this information. Learning About Meal Planning for Diabetes  Why plan your meals? Meal planning can be a key part of managing diabetes. Planning meals and snacks with the right balance of carbohydrate, protein, and fat can help you keep your blood sugar at the target level you set with your doctor. You don't have to eat special foods. You can eat what your family eats, including sweets once in a while. But you do have to pay attention to how often you eat and how much you eat of certain foods. You may want to work with a dietitian or a certified diabetes educator. He or she can give you tips and meal ideas and can answer your questions about meal planning. This health professional can also help you reach a healthy weight if that is one of your goals. What plan is right for you? Your dietitian or diabetes educator may suggest that you start with the plate format or carbohydrate counting. The plate format  The plate format is a simple way to help you manage how you eat.  You plan meals by learning how much space each food should take on a plate. Using the plate format helps you spread carbohydrate throughout the day. It can make it easier to keep your blood sugar level within your target range. It also helps you see if you're eating healthy portion sizes. To use the plate format, you put non-starchy vegetables on half your plate. Add meat or meat substitutes on one-quarter of the plate. Put a grain or starchy vegetable (such as brown rice or a potato) on the final quarter of the plate. You can add a small piece of fruit and some low-fat or fat-free milk or yogurt, depending on your carbohydrate goal for each meal.  Here are some tips for using the plate format:  · Make sure that you are not using an oversized plate. A 9-inch plate is best. Many restaurants use larger plates. · Get used to using the plate format at home. Then you can use it when you eat out. · Write down your questions about using the plate format. Talk to your doctor, a dietitian, or a diabetes educator about your concerns. Carbohydrate counting  With carbohydrate counting, you plan meals based on the amount of carbohydrate in each food. Carbohydrate raises blood sugar higher and more quickly than any other nutrient. It is found in desserts, breads and cereals, and fruit. It's also found in starchy vegetables such as potatoes and corn, grains such as rice and pasta, and milk and yogurt. Spreading carbohydrate throughout the day helps keep your blood sugar levels within your target range. Your daily amount depends on several things, including your weight, how active you are, which diabetes medicines you take, and what your goals are for your blood sugar levels. A registered dietitian or diabetes educator can help you plan how much carbohydrate to include in each meal and snack. A guideline for your daily amount of carbohydrate is:  · 45 to 60 grams at each meal. That's about the same as 3 to 4 carbohydrate servings.   · 15 to 20 grams at each snack. That's about the same as 1 carbohydrate serving. The Nutrition Facts label on packaged foods tells you how much carbohydrate is in a serving of the food. First, look at the serving size on the food label. Is that the amount you eat in a serving? All of the nutrition information on a food label is based on that serving size. So if you eat more or less than that, you'll need to adjust the other numbers. Total carbohydrate is the next thing you need to look for on the label. If you count carbohydrate servings, one serving of carbohydrate is 15 grams. For foods that don't come with labels, such as fresh fruits and vegetables, you'll need a guide that lists carbohydrate in these foods. Ask your doctor, dietitian, or diabetes educator about books or other nutrition guides you can use. If you take insulin, you need to know how many grams of carbohydrate are in a meal. This lets you know how much rapid-acting insulin to take before you eat. If you use an insulin pump, you get a constant rate of insulin during the day. So the pump must be programmed at meals to give you extra insulin to cover the rise in blood sugar after meals. When you know how much carbohydrate you will eat, you can take the right amount of insulin. Or, if you always use the same amount of insulin, you need to make sure that you eat the same amount of carbohydrate at meals. If you need more help to understand carbohydrate counting and food labels, ask your doctor, dietitian, or diabetes educator. How do you get started with meal planning? Here are some tips to get started:  · Plan your meals a week at a time. Don't forget to include snacks too. · Use cookbooks or online recipes to plan several main meals. Plan some quick meals for busy nights. You also can double some recipes that freeze well. Then you can save half for other busy nights when you don't have time to cook. · Make sure you have the ingredients you need for your recipes.  If you're running low on basic items, put these items on your shopping list too. · List foods that you use to make breakfasts, lunches, and snacks. List plenty of fruits and vegetables. · Post this list on the refrigerator. Add to it as you think of more things you need. · Take the list to the store to do your weekly shopping. Follow-up care is a key part of your treatment and safety. Be sure to make and go to all appointments, and call your doctor if you are having problems. It's also a good idea to know your test results and keep a list of the medicines you take. Where can you learn more? Go to http://imaniConnecticut Childrenâ€™s Medical Centerjenny.info/. Argenis Candelaria in the search box to learn more about \"Learning About Meal Planning for Diabetes. \"  Current as of: 2017  Content Version: 11.8  © 2399-3849 [employer]. Care instructions adapted under license by [employer] (which disclaims liability or warranty for this information). If you have questions about a medical condition or this instruction, always ask your healthcare professional. [place] [address] any warranty or liability for your use of this information. Tdap (Tetanus, Diphtheria, Pertussis) Vaccine: What You Need to Know  Why get vaccinated? Tetanus, diphtheria, and pertussis are very serious diseases. Tdap vaccine can protect us from these diseases. And Tdap vaccine given to pregnant women can protect  babies against pertussis. Tetanus (lockjaw) is rare in the [facility] today. It causes painful muscle tightening and stiffness, usually all over the body. · It can lead to tightening of muscles in the head and neck so you can't open your mouth, swallow, or sometimes even breathe. Tetanus kills about 1 out of 10 people who are infected even after receiving the best medical care. Diphtheria is also rare in the [place] today.  It can cause a thick coating to form in the back of the throat. · It can lead to breathing problems, heart failure, paralysis, and death. Pertussis (whooping cough) causes severe coughing spells, which can cause difficulty breathing, vomiting, and disturbed sleep. · It can also lead to weight loss, incontinence, and rib fractures. Up to 2 in 100 adolescents and 5 in 100 adults with pertussis are hospitalized or have complications, which could include pneumonia or death. These diseases are caused by bacteria. Diphtheria and pertussis are spread from person to person through secretions from coughing or sneezing. Tetanus enters the body through cuts, scratches, or wounds. Before vaccines, as many as 200,000 cases of diphtheria, 200,000 cases of pertussis, and hundreds of cases of tetanus were reported in the [place] each year. Since vaccination began, reports of cases for tetanus and diphtheria have dropped by about 99% and for pertussis by about 80%. Tdap vaccine  The Tdap vaccine can protect adolescents and adults from tetanus, diphtheria, and pertussis. One dose of Tdap is routinely given at age 6 or 15. People who did not get Tdap at that age should get it as soon as possible. Tdap is especially important for health care professionals and anyone having close contact with a baby younger than 12 months. Pregnant women should get a dose of Tdap during every pregnancy, to protect the  from pertussis. Infants are most at risk for severe, life-threatening complications from pertussis. Another vaccine, called Td, protects against tetanus and diphtheria, but not pertussis. A Td booster should be given every 10 years. Tdap may be given as one of these boosters if you have never gotten Tdap before. Tdap may also be given after a severe cut or burn to prevent tetanus infection. Your doctor or the person giving you the vaccine can give you more information. Tdap may safely be given at the same time as other vaccines.   Some people should not get this vaccine  · A person who has ever had a life-threatening allergic reaction after a previous dose of any diphtheria-, tetanus-, or pertussis-containing vaccine, OR has a severe allergy to any part of this vaccine, should not get Tdap vaccine. Tell the person giving the vaccine about any severe allergies. · Anyone who had coma or long repeated seizures within 7 days after a childhood dose of DTP or DTaP, or a previous dose of Tdap, should not get Tdap, unless a cause other than the vaccine was found. They can still get Td. · Talk to your doctor if you:  ? Have seizures or another nervous system problem. ? Had severe pain or swelling after any vaccine containing diphtheria, tetanus, or pertussis. ? Ever had a condition called Guillain-Barré Syndrome (GBS). ? Aren't feeling well on the day the shot is scheduled. Risks  With any medicine, including vaccines, there is a chance of side effects. These are usually mild and go away on their own. Serious reactions are also possible but are rare. Most people who get Tdap vaccine do not have any problems with it.   Mild problems following Tdap  (Did not interfere with activities)  · Pain where the shot was given (about 3 in 4 adolescents or 2 in 3 adults)  · Redness or swelling where the shot was given (about 1 person in 5)  · Mild fever of at least 100.4°F (up to about 1 in 25 adolescents or 1 in 100 adults)  · Headache (about 3 or 4 people in 10)  · Tiredness (about 1 person in 3 or 4)  · Nausea, vomiting, diarrhea, stomachache (up to 1 in 4 adolescents or 1 in 10 adults)  · Chills, sore joints (about 1 person in 10)  · Body aches (about 1 person in 3 or 4)  · Rash, swollen glands (uncommon)  Moderate problems following Tdap  (Interfered with activities, but did not require medical attention)  · Pain where the shot was given (up to 1 in 5 or 6)  · Redness or swelling where the shot was given (up to about 1 in 16 adolescents or 1 in 12 adults)  · Fever over 102°F (about 1 in 100 adolescents or 1 in 250 adults)  · Headache (about 1 in 7 adolescents or 1 in 10 adults)  · Nausea, vomiting, diarrhea, stomachache (up to 1 to 3 people in 100)  · Swelling of the entire arm where the shot was given (up to about 1 in 500)  Severe problems following Tdap  (Unable to perform usual activities; required medical attention)  · Swelling, severe pain, bleeding and redness in the arm where the shot was given (rare)  Problems that could happen after any vaccine:  · People sometimes faint after a medical procedure, including vaccination. Sitting or lying down for about 15 minutes can help prevent fainting, and injuries caused by a fall. Tell your doctor if you feel dizzy or have vision changes or ringing in the ears. · Some people get severe pain in the shoulder and have difficulty moving the arm where a shot was given. This happens very rarely. · Any medication can cause a severe allergic reaction. Such reactions from a vaccine are very rare, estimated at fewer than 1 in a million doses, and would happen within a few minutes to a few hours after the vaccination. As with any medicine, there is a very remote chance of a vaccine causing a serious injury or death. The safety of vaccines is always being monitored. For more information, visit: www.cdc.gov/vaccinesafety. What if there is a serious problem? What should I look for? · Look for anything that concerns you, such as signs of a severe allergic reaction, very high fever, or unusual behavior. Signs of a severe allergic reaction can include hives, swelling of the face and throat, difficulty breathing, a fast heartbeat, dizziness, and weakness. These would usually start a few minutes to a few hours after the vaccination. What should I do? · If you think it is a severe allergic reaction or other emergency that can't wait, call 9-1-1 or get the person to the nearest hospital. Otherwise, call your doctor.   · Afterward, the reaction should be reported to the Vaccine Adverse Event Reporting System (VAERS). Your doctor might file this report, or you can do it yourself through the VAERS web site at www.vaers. hhs.gov, or by calling 9-561.724.9295. VAERS does not give medical advice. The National Vaccine Injury Compensation Program  The National Vaccine Injury Compensation Program (VICP) is a federal program that was created to compensate people who may have been injured by certain vaccines. Persons who believe they may have been injured by a vaccine can learn about the program and about filing a claim by calling 1-907.274.8084 or visiting the [employer] website at www.[facility].gov/vaccinecompensation. There is a time limit to file a claim for compensation. How can I learn more? · Ask your doctor. He or she can give you the vaccine package insert or suggest other sources of information. · Call your local or state health department. · Contact the Centers for Disease Control and Prevention (CDC):  ? Call 3-171.527.3429 (0-336-IVY-INFO) or  ? Visit CDC's website at www.cdc.gov/vaccines  Vaccine Information Statement (Interim)  Tdap Vaccine  (2/24/15)  42 JESUS Hatch 145XP-63  Department of Health and Human Services  Centers for Disease Control and Prevention  Many Vaccine Information Statements are available in Fijian and other languages. See www.immunize.org/vis. Muchas hojas de información sobre vacunas están disponibles en español y en otros idiomas. Visite www.immunize.org/vis. Care instructions adapted under license by [employer] (which disclaims liability or warranty for this information). If you have questions about a medical condition or this instruction, always ask your healthcare professional. [place] [address] any warranty or liability for your use of this information. Diabetes Foot Health: Care Instructions  Your Care Instructions    When you have diabetes, your feet need extra care and attention.  Diabetes can damage the nerve endings and blood vessels in your feet, making you less likely to notice when your feet are injured. Diabetes also limits your body's ability to fight infection and get blood to areas that need it. If you get a minor foot injury, it could become an ulcer or a serious infection. With good foot care, you can prevent most of these problems. Caring for your feet can be quick and easy. Most of the care can be done when you are bathing or getting ready for bed. Follow-up care is a key part of your treatment and safety. Be sure to make and go to all appointments, and call your doctor if you are having problems. It's also a good idea to know your test results and keep a list of the medicines you take. How can you care for yourself at home? · Keep your blood sugar close to normal by watching what and how much you eat, monitoring blood sugar, taking medicines if prescribed, and getting regular exercise. · Do not smoke. Smoking affects blood flow and can make foot problems worse. If you need help quitting, talk to your doctor about stop-smoking programs and medicines. These can increase your chances of quitting for good. · Eat a diet that is low in fats. High fat intake can cause fat to build up in your blood vessels and decrease blood flow. · Inspect your feet daily for blisters, cuts, cracks, or sores. If you cannot see well, use a mirror or have someone help you. · Take care of your feet:  ? Wash your feet every day. Use warm (not hot) water. Check the water temperature with your wrists or other part of your body, not your feet. ? Dry your feet well. Pat them dry. Do not rub the skin on your feet too hard. Dry well between your toes. If the skin on your feet stays moist, bacteria or a fungus can grow, which can lead to infection. ? Keep your skin soft. Use moisturizing skin cream to keep the skin on your feet soft and prevent calluses and cracks.  But do not put the cream between your toes, and stop using any cream that causes a rash. ? Clean underneath your toenails carefully. Do not use a sharp object to clean underneath your toenails. Use the blunt end of a nail file or other rounded tool. ? Trim and file your toenails straight across to prevent ingrown toenails. Use a nail clipper, not scissors. Use an emery board to smooth the edges. · Change socks daily. Socks without seams are best, because seams often rub the feet. You can find socks for people with diabetes from specialty catalogs. · Look inside your shoes every day for things like gravel or torn linings, which could cause blisters or sores. · Buy shoes that fit well:  ? Look for shoes that have plenty of space around the toes. This helps prevent bunions and blisters. ? Try on shoes while wearing the kind of socks you will usually wear with the shoes. ? Avoid plastic shoes. They may rub your feet and cause blisters. Good shoes should be made of materials that are flexible and breathable, such as leather or cloth. ? Break in new shoes slowly by wearing them for no more than an hour a day for several days. Take extra time to check your feet for red areas, blisters, or other problems after you wear new shoes. · Do not go barefoot. Do not wear sandals, and do not wear shoes with very thin soles. Thin soles are easy to puncture. They also do not protect your feet from hot pavement or cold weather. · Have your doctor check your feet during each visit. If you have a foot problem, see your doctor. Do not try to treat an early foot problem at home. Home remedies or treatments that you can buy without a prescription (such as corn removers) can be harmful. · Always get early treatment for foot problems. A minor irritation can lead to a major problem if not properly cared for early. When should you call for help?   Call your doctor now or seek immediate medical care if:    · You have a foot sore, an ulcer or break in the skin that is not healing after 4 days, bleeding corns or calluses, or an ingrown toenail.     · You have blue or black areas, which can mean bruising or blood flow problems.     · You have peeling skin or tiny blisters between your toes or cracking or oozing of the skin.     · You have a fever for more than 24 hours and a foot sore.     · You have new numbness or tingling in your feet that does not go away after you move your feet or change positions.     · You have unexplained or unusual swelling of the foot or ankle.    Watch closely for changes in your health, and be sure to contact your doctor if:    · You cannot do proper foot care. Where can you learn more? Go to http://imani-jenny.info/. Enter A739 in the search box to learn more about \"Diabetes Foot Health: Care Instructions. \"  Current as of: December 7, 2017  Content Version: 11.8  © 9202-2364 [employer]. Care instructions adapted under license by [employer] (which disclaims liability or warranty for this information). If you have questions about a medical condition or this instruction, always ask your healthcare professional. [place] [address] any warranty or liability for your use of this information. Dilated Retinal Exam: About This Test  What is it? A dilated retinal exam lets your doctor see the inside of the back of your eye. To do the test, the doctor uses a light and a magnifying tool called an ophthalmoscope. Why is this test done? This test is done to look for eye problems and eye diseases. It also can be used to find other problems, such as head injuries or brain tumors. It is usually part of a regular eye exam. You may also have a vision test and a test for glaucoma. What happens during the test?  Tell your doctor if you or anyone in your family has glaucoma. And tell your doctor if you are allergic to any type of eyedrops. Your doctor will use eyedrops to widen (dilate) your pupils.  This makes it easier to see the back of the eye. Your doctor may also use eyedrops to numb the surface of your eyes. It takes about 15 to 20 minutes to fully dilate the pupils. The dilating eyedrops may make your eyes sting. They may also cause a medicine taste in your mouth. When your pupils are dilated, your doctor will shine a bright light into your eyes and examine them. What happens after the test?  · Your vision will be blurry for several hours. · You will probably be able to go home or back to your usual activities right away. But your eyes will be sensitive. Wear sunglasses to protect them from the sun. · Do not drive for several hours after your eyes were dilated, unless your doctor says it's okay. When should you call for help? Watch closely for changes in your health, and be sure to contact your doctor if you have questions about the test.  Follow-up care is a key part of your treatment and safety. Be sure to make and go to all appointments, and call your doctor if you are having problems. It's also a good idea to keep a list of the medicines you take. Ask your doctor when you can expect to have your test results. Where can you learn more? Go to http://imani-jenny.info/. Enter C069 in the search box to learn more about \"Dilated Retinal Exam: About This Test.\"  Current as of: December 3, 2017  Content Version: 11.8  © 6274-9235 [employer]. Care instructions adapted under license by [employer] (which disclaims liability or warranty for this information). If you have questions about a medical condition or this instruction, always ask your healthcare professional. [place] [address] any warranty or liability for your use of this information. Well Visit, Women 48 to 72: Care Instructions  Your Care Instructions    Physical exams can help you stay healthy.  Your doctor has checked your overall health and may have suggested ways to take good care of yourself. He or she also may have recommended tests. At home, you can help prevent illness with healthy eating, regular exercise, and other steps. Follow-up care is a key part of your treatment and safety. Be sure to make and go to all appointments, and call your doctor if you are having problems. It's also a good idea to know your test results and keep a list of the medicines you take. How can you care for yourself at home? · Reach and stay at a healthy weight. This will lower your risk for many problems, such as obesity, diabetes, heart disease, and high blood pressure. · Get at least 30 minutes of exercise on most days of the week. Walking is a good choice. You also may want to do other activities, such as running, swimming, cycling, or playing tennis or team sports. · Do not smoke. Smoking can make health problems worse. If you need help quitting, talk to your doctor about stop-smoking programs and medicines. These can increase your chances of quitting for good. · Protect your skin from too much sun. When you're outdoors from 10 a.m. to 4 p.m., stay in the shade or cover up with clothing and a hat with a wide brim. Wear sunglasses that block UV rays. Even when it's cloudy, put broad-spectrum sunscreen (SPF 30 or higher) on any exposed skin. · See a dentist one or two times a year for checkups and to have your teeth cleaned. · Wear a seat belt in the car. · Limit alcohol to 1 drink a day. Too much alcohol can cause health problems. Follow your doctor's advice about when to have certain tests. These tests can spot problems early. · Cholesterol. Your doctor will tell you how often to have this done based on your age, family history, or other things that can increase your risk for heart attack and stroke. · Blood pressure. Have your blood pressure checked during a routine doctor visit.  Your doctor will tell you how often to check your blood pressure based on your age, your blood pressure results, and other factors. · Mammogram. Ask your doctor how often you should have a mammogram, which is an X-ray of your breasts. A mammogram can spot breast cancer before it can be felt and when it is easiest to treat. · Pap test and pelvic exam. Ask your doctor how often you should have a Pap test. You may not need to have a Pap test as often as you used to. · Vision. Have your eyes checked every year or two or as often as your doctor suggests. Some experts recommend that you have yearly exams for glaucoma and other age-related eye problems starting at age 48. · Hearing. Tell your doctor if you notice any change in your hearing. You can have tests to find out how well you hear. · Diabetes. Ask your doctor whether you should have tests for diabetes. · Colon cancer. You should begin tests for colon cancer at age 48. You may have one of several tests. Your doctor will tell you how often to have tests based on your age and risk. Risks include whether you already had a precancerous polyp removed from your colon or whether your parents, sisters and brothers, or children have had colon cancer. · Thyroid disease. Talk to your doctor about whether to have your thyroid checked as part of a regular physical exam. Women have an increased chance of a thyroid problem. · Osteoporosis. You should begin tests for bone density at age 72. If you are younger than 72, ask your doctor whether you have factors that may increase your risk for this disease. You may want to have this test before age 72. · Heart attack and stroke risk. At least every 4 to 6 years, you should have your risk for heart attack and stroke assessed. Your doctor uses factors such as your age, blood pressure, cholesterol, and whether you smoke or have diabetes to show what your risk for a heart attack or stroke is over the next 10 years. When should you call for help?   Watch closely for changes in your health, and be sure to contact your doctor if you have any problems or symptoms that concern you. Where can you learn more? Go to http://imani-jenny.info/. Enter Y652 in the search box to learn more about \"Well Visit, Women 50 to 72: Care Instructions. \"  Current as of: March 29, 2018  Content Version: 11.8  © 7376-4374 [employer]wise, [employer]. Care instructions adapted under license by [employer] (which disclaims liability or warranty for this information). If you have questions about a medical condition or this instruction, always ask your healthcare professional. [address] any warranty or liability for your use of this information.

## 2018-12-27 NOTE — PROGRESS NOTES
Brenda Smith is a 51 y.o.  female presents today for office visit for follow up. Pt would also like to discuss HTN,bladder problems,etc. Pt is not fasting. Pt is in Room # 6      1. Have you been to the ER, urgent care clinic since your last visit? Hospitalized since your last visit? November depaul yeast infection    2. Have you seen or consulted any other health care providers outside of the [address] [place] [address] since your last visit? Include any pap smears or colon screening. GYN    Upcoming Appts  none    Health Maintenance reviewed Foot exam:ordered Fundus:ordered Tdap:ordered    VORB: No orders of the defined types were placed in this encounter.   Mario Barone LPN

## 2018-12-29 ENCOUNTER — HOSPITAL ENCOUNTER (EMERGENCY)
Age: 48
Discharge: HOME | End: 2018-12-29
Attending: EMERGENCY MEDICINE
Payer: MEDICAID

## 2018-12-29 VITALS
DIASTOLIC BLOOD PRESSURE: 119 MMHG | HEART RATE: 94 BPM | TEMPERATURE: 98.4 F | HEIGHT: 62 IN | RESPIRATION RATE: 16 BRPM | SYSTOLIC BLOOD PRESSURE: 176 MMHG | OXYGEN SATURATION: 100 % | BODY MASS INDEX: 29.26 KG/M2 | WEIGHT: 159 LBS

## 2018-12-29 DIAGNOSIS — G89.29 CHRONIC LOW BACK PAIN WITHOUT SCIATICA, UNSPECIFIED BACK PAIN LATERALITY: ICD-10-CM

## 2018-12-29 DIAGNOSIS — R03.0 ELEVATED BLOOD PRESSURE READING: ICD-10-CM

## 2018-12-29 DIAGNOSIS — R10.84 ABDOMINAL PAIN, GENERALIZED: ICD-10-CM

## 2018-12-29 DIAGNOSIS — M54.50 CHRONIC LOW BACK PAIN WITHOUT SCIATICA, UNSPECIFIED BACK PAIN LATERALITY: ICD-10-CM

## 2018-12-29 DIAGNOSIS — N81.10 FEMALE BLADDER PROLAPSE: Primary | ICD-10-CM

## 2018-12-29 LAB
ALBUMIN SERPL-MCNC: 2.6 G/DL (ref 3.4–5)
ALBUMIN/GLOB SERPL: 0.9 {RATIO} (ref 0.8–1.7)
ALP SERPL-CCNC: 137 U/L (ref 45–117)
ALT SERPL-CCNC: 26 U/L (ref 13–56)
ANION GAP SERPL CALC-SCNC: 8 MMOL/L (ref 3–18)
APPEARANCE UR: CLEAR
AST SERPL-CCNC: 18 U/L (ref 15–37)
BACTERIA URNS QL MICRO: NEGATIVE /HPF
BASOPHILS # BLD: 0 K/UL (ref 0–0.1)
BASOPHILS NFR BLD: 0 % (ref 0–2)
BILIRUB SERPL-MCNC: 0.3 MG/DL (ref 0.2–1)
BILIRUB UR QL: NEGATIVE
BUN SERPL-MCNC: 12 MG/DL (ref 7–18)
BUN/CREAT SERPL: 26 (ref 12–20)
CALCIUM SERPL-MCNC: 6.2 MG/DL (ref 8.5–10.1)
CHLORIDE SERPL-SCNC: 117 MMOL/L (ref 100–108)
CK MB CFR SERPL CALC: NORMAL % (ref 0–4)
CK MB SERPL-MCNC: <1 NG/ML (ref 5–25)
CK SERPL-CCNC: 33 U/L (ref 26–192)
CO2 SERPL-SCNC: 18 MMOL/L (ref 21–32)
COLOR UR: YELLOW
CREAT SERPL-MCNC: 0.46 MG/DL (ref 0.6–1.3)
DIFFERENTIAL METHOD BLD: ABNORMAL
EOSINOPHIL # BLD: 0.2 K/UL (ref 0–0.4)
EOSINOPHIL NFR BLD: 2 % (ref 0–5)
EPITH CASTS URNS QL MICRO: NORMAL /LPF (ref 0–5)
ERYTHROCYTE [DISTWIDTH] IN BLOOD BY AUTOMATED COUNT: 13.2 % (ref 11.6–14.5)
GLOBULIN SER CALC-MCNC: 2.8 G/DL (ref 2–4)
GLUCOSE SERPL-MCNC: 233 MG/DL (ref 74–99)
GLUCOSE UR STRIP.AUTO-MCNC: >1000 MG/DL
HCT VFR BLD AUTO: 42.5 % (ref 35–45)
HGB BLD-MCNC: 14.4 G/DL (ref 12–16)
HGB UR QL STRIP: NEGATIVE
KETONES UR QL STRIP.AUTO: NEGATIVE MG/DL
LEUKOCYTE ESTERASE UR QL STRIP.AUTO: NEGATIVE
LIPASE SERPL-CCNC: 159 U/L (ref 73–393)
LYMPHOCYTES # BLD: 2.5 K/UL (ref 0.9–3.6)
LYMPHOCYTES NFR BLD: 28 % (ref 21–52)
MCH RBC QN AUTO: 29.9 PG (ref 24–34)
MCHC RBC AUTO-ENTMCNC: 33.9 G/DL (ref 31–37)
MCV RBC AUTO: 88.4 FL (ref 74–97)
MONOCYTES # BLD: 0.4 K/UL (ref 0.05–1.2)
MONOCYTES NFR BLD: 5 % (ref 3–10)
NEUTS SEG # BLD: 5.8 K/UL (ref 1.8–8)
NEUTS SEG NFR BLD: 65 % (ref 40–73)
NITRITE UR QL STRIP.AUTO: NEGATIVE
PH UR STRIP: 6.5 [PH] (ref 5–8)
PLATELET # BLD AUTO: 281 K/UL (ref 135–420)
PMV BLD AUTO: 11.9 FL (ref 9.2–11.8)
POTASSIUM SERPL-SCNC: 2.9 MMOL/L (ref 3.5–5.5)
PROT SERPL-MCNC: 5.4 G/DL (ref 6.4–8.2)
PROT UR STRIP-MCNC: 30 MG/DL
RBC # BLD AUTO: 4.81 M/UL (ref 4.2–5.3)
RBC #/AREA URNS HPF: 0 /HPF (ref 0–5)
SODIUM SERPL-SCNC: 143 MMOL/L (ref 136–145)
SP GR UR REFRACTOMETRY: >1.03 (ref 1–1.03)
TROPONIN I SERPL-MCNC: <0.02 NG/ML (ref 0–0.04)
UROBILINOGEN UR QL STRIP.AUTO: 0.2 EU/DL (ref 0.2–1)
WBC # BLD AUTO: 8.9 K/UL (ref 4.6–13.2)
WBC URNS QL MICRO: NORMAL /HPF (ref 0–4)

## 2018-12-29 PROCEDURE — 96376 TX/PRO/DX INJ SAME DRUG ADON: CPT

## 2018-12-29 PROCEDURE — 85025 COMPLETE CBC W/AUTO DIFF WBC: CPT

## 2018-12-29 PROCEDURE — 83690 ASSAY OF LIPASE: CPT

## 2018-12-29 PROCEDURE — 93005 ELECTROCARDIOGRAM TRACING: CPT

## 2018-12-29 PROCEDURE — 81001 URINALYSIS AUTO W/SCOPE: CPT

## 2018-12-29 PROCEDURE — 74011250636 HC RX REV CODE- 250/636: Performed by: PHYSICIAN ASSISTANT

## 2018-12-29 PROCEDURE — 74011250637 HC RX REV CODE- 250/637: Performed by: PHYSICIAN ASSISTANT

## 2018-12-29 PROCEDURE — 99285 EMERGENCY DEPT VISIT HI MDM: CPT

## 2018-12-29 PROCEDURE — 82553 CREATINE MB FRACTION: CPT

## 2018-12-29 PROCEDURE — 96374 THER/PROPH/DIAG INJ IV PUSH: CPT

## 2018-12-29 PROCEDURE — 80053 COMPREHEN METABOLIC PANEL: CPT

## 2018-12-29 RX ORDER — MORPHINE SULFATE 4 MG/ML
4 INJECTION INTRAVENOUS
Status: COMPLETED | OUTPATIENT
Start: 2018-12-29 | End: 2018-12-29

## 2018-12-29 RX ORDER — POTASSIUM CHLORIDE 750 MG/1
10 TABLET, FILM COATED, EXTENDED RELEASE ORAL DAILY
Qty: 10 TAB | Refills: 0 | Status: SHIPPED | OUTPATIENT
Start: 2018-12-29 | End: 2019-01-08

## 2018-12-29 RX ORDER — CALCIUM CARBONATE 600 MG
600 TABLET ORAL
Status: DISCONTINUED | OUTPATIENT
Start: 2018-12-30 | End: 2018-12-29

## 2018-12-29 RX ORDER — POTASSIUM CHLORIDE 20 MEQ/1
40 TABLET, EXTENDED RELEASE ORAL
Status: COMPLETED | OUTPATIENT
Start: 2018-12-29 | End: 2018-12-29

## 2018-12-29 RX ORDER — HYDROCODONE BITARTRATE AND ACETAMINOPHEN 5; 325 MG/1; MG/1
1 TABLET ORAL
Qty: 8 TAB | Refills: 0 | Status: SHIPPED | OUTPATIENT
Start: 2018-12-29 | End: 2019-01-10

## 2018-12-29 RX ORDER — CALCIUM CARBONATE/VITAMIN D3 500 MG-10
1 TABLET ORAL DAILY
Qty: 10 TAB | Refills: 0 | Status: SHIPPED | OUTPATIENT
Start: 2018-12-29 | End: 2019-01-11

## 2018-12-29 RX ORDER — CALCIUM CARBONATE 600 MG
600 TABLET ORAL
Status: COMPLETED | OUTPATIENT
Start: 2018-12-29 | End: 2018-12-29

## 2018-12-29 RX ADMIN — Medication 600 MG: at 19:59

## 2018-12-29 RX ADMIN — MORPHINE SULFATE 4 MG: 4 INJECTION INTRAVENOUS at 20:49

## 2018-12-29 RX ADMIN — POTASSIUM CHLORIDE 40 MEQ: 20 TABLET, EXTENDED RELEASE ORAL at 19:47

## 2018-12-29 RX ADMIN — MORPHINE SULFATE 4 MG: 4 INJECTION INTRAVENOUS at 18:32

## 2018-12-29 NOTE — ED PROVIDER NOTES
EMERGENCY DEPARTMENT HISTORY AND PHYSICAL EXAM 
 
Date: 12/29/2018 Patient Name: Richard Arreaga History of Presenting Illness Chief Complaint Patient presents with  Abdominal Pain  Back Pain History Provided By: Patient Chief Complaint: abdominal pain Duration: 1 Weeks Timing:  Gradual and Intermittent Location: lower abdomen and lower back Quality: Aching and Pressure Severity: Severe Modifying Factors: previous back surgery with hardware placement Associated Symptoms: pain with urination HPI: Richard Arreaga is a 51 y.o. female with a PMH of HTN, DM, chronic back pain, GERD, depression, Vit d deficiency, ETOH abuse who presents to the ER c/o lower abdominal pain and back pain. Patient states her pain has been intermittent for 1 week. She also reports pain with urination. This has been ongoing x 1 year. Patient states she was seen here previously and diagnosed with a uterine prolapse. She reports a past total hysterectomy. Was seen by her PCP/OBGYN and states it was a bladder prolapse, not uterine. She states there is no new pain with these symptoms. She denied any recent fevers, chills, SOB, chest pain, N/V, and has no other symptoms or complaints. PCP: Mayra Barroso NP Current Outpatient Medications Medication Sig Dispense Refill  
 HYDROcodone-acetaminophen (NORCO) 5-325 mg per tablet Take 1 Tab by mouth every six (6) hours as needed for Pain. Max Daily Amount: 4 Tabs. 8 Tab 0  
 potassium chloride SR (KLOR-CON 10) 10 mEq tablet Take 1 Tab by mouth daily for 10 days. 10 Tab 0  
 Calcium-Cholecalciferol, D3, (CALCIUM 500 WITH D) 500 mg(1,250mg) -400 unit tab Take 1 Tab by mouth daily. 10 Tab 0  
 ondansetron (ZOFRAN ODT) 4 mg disintegrating tablet Take 1 Tab by mouth every eight (8) hours as needed for Nausea.  15 Tab 0  
 HYDROcodone-acetaminophen (NORCO) 5-325 mg per tablet Take 1 Tab by mouth every six (6) hours as needed for Pain. Max Daily Amount: 4 Tabs. 6 Tab 0  
 naloxone (NARCAN) 2 mg/actuation spry Use 1 spray intranasally, then discard. Repeat with new spray every 2 min as needed for opioid overdose symptoms, alternating nostrils. 1 Package 0  
 alum-mag hydroxide-simeth (MYLANTA) 200-200-20 mg/5 mL susp Take 30 mL by mouth four (4) times daily as needed. 200 mL 0  
 cyanocobalamin (VITAMIN B-12) 1,000 mcg tablet Take 1 Tab by mouth daily. 90 Tab 0  
 lisinopril-hydroCHLOROthiazide (PRINZIDE, ZESTORETIC) 20-12.5 mg per tablet Take 1 Tab by mouth two (2) times a day. 180 Tab 3  carvedilol (COREG) 3.125 mg tablet Take 2 Tabs by mouth two (2) times daily (with meals). 90 Tab 3  
 budesonide (RHINOCORT ALLERGY) 32 mcg/actuation nasal spray 2 Sprays by Both Nostrils route daily. Indications: Allergic Rhinitis 1 Bottle 3  
 insulin nph-regular human rec (HUMULIN 70-30) 100 unit/mL (70-30) inpn Give 40 units in the QAM and 45 units at bedtime 5 Pen 3  
 ferrous sulfate 325 mg (65 mg iron) tablet Take 1 Tab by mouth Daily (before breakfast). 30 Tab 3  polyethylene glycol (MIRALAX) 17 gram packet Take 1 Packet by mouth daily. 30 Each 1  
 hydrOXYzine HCl (ATARAX) 25 mg tablet Take 1-2 tablets by mouth every 6 hours as needed. 30 Tab 0  
 Insulin Needles, Disposable, 31 gauge x 5/16\" ndle Use to administer insulin as directed 1 Package 11  Lancets misc Use daily to monitor blood glucose 100 Each 11  
 glucose blood VI test strips (ASCENSIA AUTODISC VI, ONE TOUCH ULTRA TEST VI) strip Use daily to monitor blood glucose 100 Strip 11  Blood Pressure Test Kit-Medium kit Blood pressure as needed 1 Kit 0  
 atorvastatin (LIPITOR) 40 mg tablet Take 1 Tab by mouth daily. 30 Tab 6  
 bisacodyl (DULCOLAX, BISACODYL,) 10 mg suppository Insert 10 mg into rectum daily as needed.  28 Suppository 1  
 lidocaine (LIDODERM) 5 % 2 Patches by TransDERmal route every twenty-four (24) hours. Apply patch to the affected area for 12 hours a day and remove for 12 hours a day. 90 Each 3  
 mupirocin (BACTROBAN) 2 % ointment Use 1 Application to affected area 3 Times Daily.  valACYclovir (VALTREX) 1 gram tablet Take  by mouth.  Blood-Glucose Meter monitoring kit Use daily to check blood sugar 1 Kit 0  
 ondansetron (ZOFRAN ODT) 4 mg disintegrating tablet Take 1 Tab by mouth every eight (8) hours as needed for Nausea. 15 Tab 0  
 dicyclomine (BENTYL) 20 mg tablet Take 1 Tab by mouth every six (6) hours as needed (abdominal cramps) for up to 20 doses. 20 Tab 0  ALBUTEROL IN Take  by inhalation.  LORazepam (ATIVAN) 1 mg tablet 1 mg. Past History Past Medical History: 
Past Medical History:  
Diagnosis Date  Breast cancer ([facility] [address] breast cancer, right, S/P Mastectomy and chemo, radiation in 2013  Depression  Diabetes ([facility] [address]  Drug abuse ([facility] [address]   
 H/O cocaine use in past  
 Gastrointestinal disorder   
 cholitis  H/O ETOH abuse  Hyperlipidemia  Hypertension  Spine injury  Vitamin D deficiency 5/1/2018 Past Surgical History: 
Past Surgical History:  
Procedure Laterality Date  COLONOSCOPY N/A 7/18/2016 COLONOSCOPY performed by Alejandra Ruby MD at [facility] ENDOSCOPY  
 HX BREAST LUMPECTOMY  06/2013  
 right  HX HYSTERECTOMY  HX ORTHOPAEDIC Back fusion surgery 4/18/14.  HX OTHER SURGICAL    
 mediport  HX TONSILLECTOMY  HX TUBAL LIGATION Family History: 
Family History Problem Relation Age of Onset  Heart Disease Mother  Stroke Father  Heart Disease Father 48  Diabetes Other  Hypertension Other  Cancer Maternal Grandmother Social History: 
Social History Tobacco Use  Smoking status: Never Smoker  Smokeless tobacco: Never Used Substance Use Topics  Alcohol use: No  
  Comment: \"Occasionally\"  Drug use: No  
 
 
Allergies: Allergies Allergen Reactions  Latex Hives and Itching  Other Food Rash Almonds  Amoxicillin Swelling Other reaction(s): mild rash/itching  Aspirin Not Reported This Time and Swelling Mouth swells  Fentanyl Anaphylaxis and Swelling Patches cause mouth to swell Swelling in mouth from fentanyl patch  Levaquin [Levofloxacin] Not Reported This Time and Swelling Other reaction(s): mild rash/itching  Chlorhexidine Rash  Norco [Hydrocodone-Acetaminophen] Nausea and Vomiting Other reaction(s): gi distress  Acetaminophen Other (comments)  [place] Rash and Itching  Codeine Other (comments)  Glycopyrrolate Swelling Pt  States  faciAL  SWELLING   POST  ROBINUL  IV Pt  States  faciAL  SWELLING   POST  ROBINUL  IV   
 Morphine Nausea and Vomiting Pt states she is not allergic  Pcn [Penicillins] Swelling  Percocet [Oxycodone-Acetaminophen] Nausea and Vomiting Other reaction(s): gi distress 
nausea Other reaction(s): anaphylaxis/angioedema Per pt. She is allergic  Tape [Adhesive] Rash  Tramadol Swelling Review of Systems Review of Systems Constitutional: Negative for chills, fatigue and fever. HENT: Negative. Negative for sore throat. Eyes: Negative. Respiratory: Negative for cough and shortness of breath. Cardiovascular: Negative for chest pain and palpitations. Gastrointestinal: Positive for abdominal pain. Negative for nausea and vomiting. Genitourinary: Negative for dysuria. Musculoskeletal: Positive for back pain. Skin: Negative. Neurological: Negative for dizziness, weakness, light-headedness and headaches. Psychiatric/Behavioral: Negative. All other systems reviewed and are negative. Physical Exam  
 
Vitals:  
 12/29/18 1628 BP: (!) 176/119 Pulse: 94 Resp: 16 Temp: 98.4 °F (36.9 °C) SpO2: 100% Weight: 72.1 kg (159 lb) Height: 5' 2\" (1.575 m) Physical Exam  
 Constitutional: She is oriented to person, place, and time. She appears well-developed and well-nourished. No distress. HENT:  
Head: Normocephalic and atraumatic. Mouth/Throat: Oropharynx is clear and moist.  
Eyes: Conjunctivae are normal. No scleral icterus. Neck: Neck supple. No JVD present. No tracheal deviation present. Cardiovascular: Normal rate, regular rhythm and normal heart sounds. No murmur heard. Pulmonary/Chest: Effort normal and breath sounds normal. No respiratory distress. She has no wheezes. She has no rales. Abdominal: Soft. Normal appearance and bowel sounds are normal. She exhibits no distension. There is no tenderness. There is no rigidity, no rebound, no guarding, no CVA tenderness, no tenderness at McBurney's point and negative Mayo's sign. No peritoneal signs; unable to reproduce pain on exam  
Musculoskeletal: Normal range of motion. Neurological: She is alert and oriented to person, place, and time. She has normal strength. Gait normal. GCS eye subscore is 4. GCS verbal subscore is 5. GCS motor subscore is 6. Skin: Skin is warm and dry. She is not diaphoretic. Psychiatric: She has a normal mood and affect. Nursing note and vitals reviewed. Diagnostic Study Results Labs - Recent Results (from the past 12 hour(s)) URINALYSIS W/ RFLX MICROSCOPIC Collection Time: 12/29/18  4:29 PM  
Result Value Ref Range Color YELLOW Appearance CLEAR Specific gravity >1.030 (H) 1.005 - 1.030  
 pH (UA) 6.5 5.0 - 8.0 Protein 30 (A) NEG mg/dL Glucose >1,000 (A) NEG mg/dL Ketone NEGATIVE  NEG mg/dL Bilirubin NEGATIVE  NEG Blood NEGATIVE  NEG Urobilinogen 0.2 0.2 - 1.0 EU/dL Nitrites NEGATIVE  NEG Leukocyte Esterase NEGATIVE  NEG    
URINE MICROSCOPIC ONLY Collection Time: 12/29/18  4:29 PM  
Result Value Ref Range WBC 0 to 2 0 - 4 /hpf  
 RBC 0 0 - 5 /hpf Epithelial cells FEW 0 - 5 /lpf Bacteria NEGATIVE  NEG /hpf  
CBC WITH AUTOMATED DIFF Collection Time: 12/29/18  4:45 PM  
Result Value Ref Range WBC 8.9 4.6 - 13.2 K/uL  
 RBC 4.81 4.20 - 5.30 M/uL  
 HGB 14.4 12.0 - 16.0 g/dL HCT 42.5 35.0 - 45.0 % MCV 88.4 74.0 - 97.0 FL  
 MCH 29.9 24.0 - 34.0 PG  
 MCHC 33.9 31.0 - 37.0 g/dL  
 RDW 13.2 11.6 - 14.5 % PLATELET 455 560 - 141 K/uL MPV 11.9 (H) 9.2 - 11.8 FL  
 NEUTROPHILS 65 40 - 73 % LYMPHOCYTES 28 21 - 52 % MONOCYTES 5 3 - 10 % EOSINOPHILS 2 0 - 5 % BASOPHILS 0 0 - 2 %  
 ABS. NEUTROPHILS 5.8 1.8 - 8.0 K/UL  
 ABS. LYMPHOCYTES 2.5 0.9 - 3.6 K/UL  
 ABS. MONOCYTES 0.4 0.05 - 1.2 K/UL  
 ABS. EOSINOPHILS 0.2 0.0 - 0.4 K/UL  
 ABS. BASOPHILS 0.0 0.0 - 0.1 K/UL  
 DF AUTOMATED    
LIPASE Collection Time: 12/29/18  6:50 PM  
Result Value Ref Range Lipase 159 73 - 689 U/L  
METABOLIC PANEL, COMPREHENSIVE Collection Time: 12/29/18  6:50 PM  
Result Value Ref Range Sodium 143 136 - 145 mmol/L Potassium 2.9 (LL) 3.5 - 5.5 mmol/L Chloride 117 (H) 100 - 108 mmol/L  
 CO2 18 (L) 21 - 32 mmol/L Anion gap 8 3.0 - 18 mmol/L Glucose 233 (H) 74 - 99 mg/dL BUN 12 7.0 - 18 MG/DL Creatinine 0.46 (L) 0.6 - 1.3 MG/DL  
 BUN/Creatinine ratio 26 (H) 12 - 20 GFR est AA >60 >60 ml/min/1.73m2 GFR est non-AA >60 >60 ml/min/1.73m2 Calcium 6.2 (L) 8.5 - 10.1 MG/DL Bilirubin, total 0.3 0.2 - 1.0 MG/DL  
 ALT (SGPT) 26 13 - 56 U/L  
 AST (SGOT) 18 15 - 37 U/L Alk. phosphatase 137 (H) 45 - 117 U/L Protein, total 5.4 (L) 6.4 - 8.2 g/dL Albumin 2.6 (L) 3.4 - 5.0 g/dL Globulin 2.8 2.0 - 4.0 g/dL A-G Ratio 0.9 0.8 - 1.7 CARDIAC PANEL,(CK, CKMB & TROPONIN) Collection Time: 12/29/18  6:50 PM  
Result Value Ref Range CK 33 26 - 192 U/L  
 CK - MB <1.0 <3.6 ng/ml CK-MB Index  0.0 - 4.0 % CALCULATION NOT PERFORMED WHEN RESULT IS BELOW LINEAR LIMIT Troponin-I, QT <0.02 0.0 - 0.045 NG/ML  
EKG, 12 LEAD, INITIAL Collection Time: 12/29/18  7:18 PM  
Result Value Ref Range Ventricular Rate 82 BPM  
 Atrial Rate 82 BPM  
 P-R Interval 156 ms QRS Duration 82 ms Q-T Interval 390 ms QTC Calculation (Bezet) 455 ms Calculated P Axis 51 degrees Calculated R Axis -23 degrees Calculated T Axis 66 degrees Diagnosis Normal sinus rhythm Normal ECG When compared with ECG of 18-NOV-2018 22:45, Nonspecific T wave abnormality, improved in Lateral leads Radiologic Studies - No orders to display CT Results  (Last 48 hours) None CXR Results  (Last 48 hours) None Medical Decision Making I am the first provider for this patient. I reviewed the vital signs, available nursing notes, past medical history, past surgical history, family history and social history. Vital Signs-Reviewed the patient's vital signs. Records Reviewed: Nursing Notes and Old Medical Records 6:19 PM 
50 y/o female c/o lower abdominal pain and back pain. States recent diagnosis of prolapsed bladder. States her pain is worse. Also having lower back pain. Reports previous back surgery, and when it gets cold outside, her hardware hurts. Well appearing on exam.  No signs of sepsis. Will plan on labs, ua, pelvic exam at this time. Pt asking multiple times for pain medication, concerning for drug seeking behavior. Baylee Mckeon PA-C 
 
 7:16 PM 
Received phone call from lab reporting labs again hemolyzed. Also concerned for possible potassium of 7.3. Discussed with RN taking care of pt. Pt moved to monitored bed. Will plan on EKG, repeat blood draw at this time. Discussed pt with Dr. Dequan Fleming as well. Baylee Mckeon PA-C 
 
7:36 PM 
Lab recollect reports potassium of 2.9. EKG NSR rate 82 w/ no acute ST/T wave abnormalities. Will plan on oral K repletion at this time. Baylee Mckeon PA-C 
 
8:30 PM 
Discussed all results with pt. Reports having a vaginal bulge x 1 year. Noted to have suspected cystocele on exam.  Not emergent at this time. Will give additional prescription for pain meds at this time and have follow up with urology. Also discussed her hypokalemia and hypocalcemia. Discussed return precautions. All questions answered and patient in agreement with plan of care. Will plan for discharge. Garcia Hutchins PA-C Disposition: 
Discharged DISCHARGE NOTE:  
 
  Care plan outlined and precautions discussed. Patient has no new complaints, changes, or physical findings. Results of labs, ekg were reviewed with the patient. All medications were reviewed with the patient; will d/c home with calcium and potassium, norco. All of pt's questions and concerns were addressed. Patient was instructed and agrees to follow up with pcp and urology, as well as to return to the ED upon further deterioration. Patient is ready to go home. Follow-up Information Follow up With Specialties Details Why Contact [facility] EMERGENCY DEPT Emergency Medicine  If symptoms worsen [address] 
[phone] Kailey Tsang NP Nurse Practitioner Call in 2 days As needed for ER follow up and pain  [address] [facility] [id number] [phone] Ame Hooker MD Urology Call in 2 days Urology follow up for prolapsed bladder [address] [facility] [address] [facility] 
[phone] Current Discharge Medication List  
  
START taking these medications Details  
!! HYDROcodone-acetaminophen (NORCO) 5-325 mg per tablet Take 1 Tab by mouth every six (6) hours as needed for Pain. Max Daily Amount: 4 Tabs. Qty: 8 Tab, Refills: 0 Associated Diagnoses: Abdominal pain, generalized  
  
potassium chloride SR (KLOR-CON 10) 10 mEq tablet Take 1 Tab by mouth daily for 10 days. Qty: 10 Tab, Refills: 0 Calcium-Cholecalciferol, D3, (CALCIUM 500 WITH D) 500 mg(1,250mg) -400 unit tab Take 1 Tab by mouth daily. Qty: 10 Tab, Refills: 0  
  
 !! - Potential duplicate medications found. Please discuss with provider. CONTINUE these medications which have NOT CHANGED Details  
!! ondansetron (ZOFRAN ODT) 4 mg disintegrating tablet Take 1 Tab by mouth every eight (8) hours as needed for Nausea. Qty: 15 Tab, Refills: 0  
  
!! HYDROcodone-acetaminophen (NORCO) 5-325 mg per tablet Take 1 Tab by mouth every six (6) hours as needed for Pain. Max Daily Amount: 4 Tabs. Qty: 6 Tab, Refills: 0 Associated Diagnoses: Facial cellulitis; Left-sided face pain  
  
naloxone (NARCAN) 2 mg/actuation spry Use 1 spray intranasally, then discard. Repeat with new spray every 2 min as needed for opioid overdose symptoms, alternating nostrils. Qty: 1 Package, Refills: 0  
  
alum-mag hydroxide-simeth (MYLANTA) 200-200-20 mg/5 mL susp Take 30 mL by mouth four (4) times daily as needed. Qty: 200 mL, Refills: 0  
  
cyanocobalamin (VITAMIN B-12) 1,000 mcg tablet Take 1 Tab by mouth daily. Qty: 90 Tab, Refills: 0 Associated Diagnoses: Chronic fatigue  
  
lisinopril-hydroCHLOROthiazide (PRINZIDE, ZESTORETIC) 20-12.5 mg per tablet Take 1 Tab by mouth two (2) times a day. Qty: 180 Tab, Refills: 3 Associated Diagnoses: Essential hypertension  
  
carvedilol (COREG) 3.125 mg tablet Take 2 Tabs by mouth two (2) times daily (with meals). Qty: 90 Tab, Refills: 3  
  
budesonide (RHINOCORT ALLERGY) 32 mcg/actuation nasal spray 2 Sprays by Both Nostrils route daily. Indications: Allergic Rhinitis Qty: 1 Bottle, Refills: 3  
  
insulin nph-regular human rec (HUMULIN 70-30) 100 unit/mL (70-30) inpn Give 40 units in the QAM and 45 units at bedtime 
Qty: 5 Pen, Refills: 3 Associated Diagnoses: Type 2 diabetes mellitus with complication, with long-term current use of insulin ([address] ferrous sulfate 325 mg (65 mg iron) tablet Take 1 Tab by mouth Daily (before breakfast). Qty: 30 Tab, Refills: 3 Associated Diagnoses: Anemia due to antineoplastic chemotherapy  
  
polyethylene glycol (MIRALAX) 17 gram packet Take 1 Packet by mouth daily. Qty: 30 Each, Refills: 1 Associated Diagnoses: Therapeutic opioid induced constipation  
  
hydrOXYzine HCl (ATARAX) 25 mg tablet Take 1-2 tablets by mouth every 6 hours as needed. Qty: 30 Tab, Refills: 0 Insulin Needles, Disposable, 31 gauge x 5/16\" ndle Use to administer insulin as directed Qty: 1 Package, Refills: 11 Lancets misc Use daily to monitor blood glucose Qty: 100 Each, Refills: 11  
 Associated Diagnoses: Type 2 diabetes mellitus with complication, with long-term current use of insulin ([address] glucose blood VI test strips (ASCENSIA AUTODISC VI, ONE TOUCH ULTRA TEST VI) strip Use daily to monitor blood glucose Qty: 100 Strip, Refills: 11  
 Associated Diagnoses: Type 2 diabetes mellitus with complication, with long-term current use of insulin ([facility]) Blood Pressure Test Kit-Medium kit Blood pressure as needed 
Qty: 1 Kit, Refills: 0  
  
atorvastatin (LIPITOR) 40 mg tablet Take 1 Tab by mouth daily. Qty: 30 Tab, Refills: 6  
  
bisacodyl (DULCOLAX, BISACODYL,) 10 mg suppository Insert 10 mg into rectum daily as needed. Qty: 28 Suppository, Refills: 1  
  
lidocaine (LIDODERM) 5 % 2 Patches by TransDERmal route every twenty-four (24) hours. Apply patch to the affected area for 12 hours a day and remove for 12 hours a day. Qty: 90 Each, Refills: 3 Associated Diagnoses: Chronic low back pain, unspecified back pain laterality, with sciatica presence unspecified  
  
mupirocin (BACTROBAN) 2 % ointment Use 1 Application to affected area 3 Times Daily. valACYclovir (VALTREX) 1 gram tablet Take  by mouth. Blood-Glucose Meter monitoring kit Use daily to check blood sugar 
Qty: 1 Kit, Refills: 0 Associated Diagnoses: Type 2 diabetes mellitus with complication, with long-term current use of insulin ([address] !! ondansetron (ZOFRAN ODT) 4 mg disintegrating tablet Take 1 Tab by mouth every eight (8) hours as needed for Nausea. Qty: 15 Tab, Refills: 0  
  
dicyclomine (BENTYL) 20 mg tablet Take 1 Tab by mouth every six (6) hours as needed (abdominal cramps) for up to 20 doses. Qty: 20 Tab, Refills: 0 ALBUTEROL IN Take  by inhalation. LORazepam (ATIVAN) 1 mg tablet 1 mg. !! - Potential duplicate medications found. Please discuss with provider. Provider Notes (Medical Decision Making):  
 
Procedures: 
Pelvic Exam 
Date/Time: 12/29/2018 8:29 PM 
Performed by: PA 
Procedure duration:  2 minutes. Documented by:  ofelia reynoso. Exam assisted by:  Daniel Castillo RN. Type of exam performed: bimanual.   
External genitalia appearance: normal.   
Vagina findings: cystocele noted on visualization of introitus. Cervical exam:  inadequately visualized. Specimen(s) collected:  none. Patient tolerance: Patient tolerated the procedure well with no immediate complications Diagnosis Clinical Impression: 1. Female bladder prolapse 2. Abdominal pain, generalized 3. Chronic low back pain without sciatica, unspecified back pain laterality 4. Elevated blood pressure reading

## 2018-12-29 NOTE — ED NOTES
I performed a brief history of the patient here in triage and I have determined that pt will need further treatment and evaluation from the main side ER physician. I have placed initial orders based on the history to help in expediting patients care. Visit Vitals BP (!) 176/119 (BP 1 Location: Right arm, BP Patient Position: At rest) Pulse 94 Temp 98.4 °F (36.9 °C) Resp 16 Ht 5' 2\" (1.575 m) Wt 72.1 kg (159 lb) SpO2 100% BMI 29.08 kg/m² SAMANTHA Chavez 4:34 PM

## 2018-12-30 LAB
ATRIAL RATE: 82 BPM
BACTERIA SPEC CULT: NORMAL
CALCULATED P AXIS, ECG09: 51 DEGREES
CALCULATED R AXIS, ECG10: -23 DEGREES
CALCULATED T AXIS, ECG11: 66 DEGREES
DIAGNOSIS, 93000: NORMAL
P-R INTERVAL, ECG05: 156 MS
Q-T INTERVAL, ECG07: 390 MS
QRS DURATION, ECG06: 82 MS
QTC CALCULATION (BEZET), ECG08: 455 MS
SERVICE CMNT-IMP: NORMAL
VENTRICULAR RATE, ECG03: 82 BPM

## 2019-01-07 ENCOUNTER — HOSPITAL ENCOUNTER (EMERGENCY)
Age: 49
Discharge: HOME | End: 2019-01-07
Attending: EMERGENCY MEDICINE
Payer: MEDICAID

## 2019-01-07 VITALS
BODY MASS INDEX: 29.44 KG/M2 | HEIGHT: 62 IN | DIASTOLIC BLOOD PRESSURE: 107 MMHG | WEIGHT: 160 LBS | RESPIRATION RATE: 18 BRPM | OXYGEN SATURATION: 100 % | TEMPERATURE: 98.3 F | HEART RATE: 90 BPM | SYSTOLIC BLOOD PRESSURE: 171 MMHG

## 2019-01-07 DIAGNOSIS — E11.9 TYPE 2 DIABETES MELLITUS WITHOUT COMPLICATION, WITH LONG-TERM CURRENT USE OF INSULIN (HCC): ICD-10-CM

## 2019-01-07 DIAGNOSIS — Z79.4 TYPE 2 DIABETES MELLITUS WITHOUT COMPLICATION, WITH LONG-TERM CURRENT USE OF INSULIN (HCC): ICD-10-CM

## 2019-01-07 DIAGNOSIS — I10 ESSENTIAL HYPERTENSION: ICD-10-CM

## 2019-01-07 DIAGNOSIS — B37.31 CANDIDA VAGINITIS: Primary | ICD-10-CM

## 2019-01-07 LAB
APPEARANCE UR: CLEAR
BACTERIA URNS QL MICRO: ABNORMAL /HPF
BILIRUB UR QL: NEGATIVE
COLOR UR: YELLOW
EPITH CASTS URNS QL MICRO: ABNORMAL /LPF (ref 0–5)
GLUCOSE UR STRIP.AUTO-MCNC: >1000 MG/DL
HGB UR QL STRIP: NEGATIVE
KETONES UR QL STRIP.AUTO: NEGATIVE MG/DL
LEUKOCYTE ESTERASE UR QL STRIP.AUTO: ABNORMAL
MUCOUS THREADS URNS QL MICRO: ABNORMAL /LPF
NITRITE UR QL STRIP.AUTO: NEGATIVE
PH UR STRIP: 5 [PH] (ref 5–8)
PROT UR STRIP-MCNC: ABNORMAL MG/DL
RBC #/AREA URNS HPF: ABNORMAL /HPF (ref 0–5)
SERVICE CMNT-IMP: NORMAL
SP GR UR REFRACTOMETRY: >1.03 (ref 1–1.03)
UROBILINOGEN UR QL STRIP.AUTO: 0.2 EU/DL (ref 0.2–1)
WBC URNS QL MICRO: ABNORMAL /HPF (ref 0–4)
WET PREP GENITAL: NORMAL
YEAST BUDDING URNS QL: POSITIVE
YEAST URNS QL MICRO: ABNORMAL

## 2019-01-07 PROCEDURE — 99284 EMERGENCY DEPT VISIT MOD MDM: CPT

## 2019-01-07 PROCEDURE — 81001 URINALYSIS AUTO W/SCOPE: CPT

## 2019-01-07 PROCEDURE — 87210 SMEAR WET MOUNT SALINE/INK: CPT

## 2019-01-07 PROCEDURE — 87491 CHLMYD TRACH DNA AMP PROBE: CPT

## 2019-01-07 RX ORDER — FLUCONAZOLE 150 MG/1
150 TABLET ORAL
Qty: 1 TAB | Refills: 0 | Status: SHIPPED | OUTPATIENT
Start: 2019-01-07 | End: 2019-01-07

## 2019-01-07 NOTE — ED PROVIDER NOTES
EMERGENCY DEPARTMENT HISTORY AND PHYSICAL EXAM 
 
Date: 1/7/2019 Patient Name: Leah Nelson History of Presenting Illness Chief Complaint Patient presents with  Fall  Head Injury  Vaginal Discharge  Abdominal Pain  Burn History Provided By: Patient Chief Complaint:vaginal discomfort Duration: 1 Weeks Timing:  Acute Location: vagina Quality: Burning Severity: 10 out of 10 Modifying Factors: na 
Associated Symptoms: denies any other associated signs or symptoms Additional History (Context): Leah Nelson is a 51 y.o. female with history of  diabetes and hypertension who presents to the ED with a complaint of vaginal discharge and dysuria x1 week. PT states she ahs a thick white discharge and burning on urination. She also admits to a fall last week which she has continued facial pain from. PT states fall was simple mechanical fall with no LOC. She also requests new referral for follow up for uterine prolapse because the one we gave her last visit cant see her for 2 weeks. PCP: Lucila Keller NP Current Outpatient Medications Medication Sig Dispense Refill  fluconazole (DIFLUCAN) 150 mg tablet Take 1 Tab by mouth now for 1 dose. FDA advises cautious prescribing of oral fluconazole in pregnancy. 1 Tab 0  
 HYDROcodone-acetaminophen (NORCO) 5-325 mg per tablet Take 1 Tab by mouth every six (6) hours as needed for Pain. Max Daily Amount: 4 Tabs. 8 Tab 0  
 potassium chloride SR (KLOR-CON 10) 10 mEq tablet Take 1 Tab by mouth daily for 10 days. 10 Tab 0  
 Calcium-Cholecalciferol, D3, (CALCIUM 500 WITH D) 500 mg(1,250mg) -400 unit tab Take 1 Tab by mouth daily. 10 Tab 0  
 ondansetron (ZOFRAN ODT) 4 mg disintegrating tablet Take 1 Tab by mouth every eight (8) hours as needed for Nausea. 15 Tab 0  
 HYDROcodone-acetaminophen (NORCO) 5-325 mg per tablet Take 1 Tab by mouth every six (6) hours as needed for Pain. Max Daily Amount: 4 Tabs.  6 Tab 0  
  naloxone (NARCAN) 2 mg/actuation spry Use 1 spray intranasally, then discard. Repeat with new spray every 2 min as needed for opioid overdose symptoms, alternating nostrils. 1 Package 0  
 alum-mag hydroxide-simeth (MYLANTA) 200-200-20 mg/5 mL susp Take 30 mL by mouth four (4) times daily as needed. 200 mL 0  
 cyanocobalamin (VITAMIN B-12) 1,000 mcg tablet Take 1 Tab by mouth daily. 90 Tab 0  
 lisinopril-hydroCHLOROthiazide (PRINZIDE, ZESTORETIC) 20-12.5 mg per tablet Take 1 Tab by mouth two (2) times a day. 180 Tab 3  carvedilol (COREG) 3.125 mg tablet Take 2 Tabs by mouth two (2) times daily (with meals). 90 Tab 3  
 budesonide (RHINOCORT ALLERGY) 32 mcg/actuation nasal spray 2 Sprays by Both Nostrils route daily. Indications: Allergic Rhinitis 1 Bottle 3  
 insulin nph-regular human rec (HUMULIN 70-30) 100 unit/mL (70-30) inpn Give 40 units in the QAM and 45 units at bedtime 5 Pen 3  
 ferrous sulfate 325 mg (65 mg iron) tablet Take 1 Tab by mouth Daily (before breakfast). 30 Tab 3  polyethylene glycol (MIRALAX) 17 gram packet Take 1 Packet by mouth daily. 30 Each 1  
 hydrOXYzine HCl (ATARAX) 25 mg tablet Take 1-2 tablets by mouth every 6 hours as needed. 30 Tab 0  
 Insulin Needles, Disposable, 31 gauge x 5/16\" ndle Use to administer insulin as directed 1 Package 11  Lancets misc Use daily to monitor blood glucose 100 Each 11  
 glucose blood VI test strips (ASCENSIA AUTODISC VI, ONE TOUCH ULTRA TEST VI) strip Use daily to monitor blood glucose 100 Strip 11  Blood Pressure Test Kit-Medium kit Blood pressure as needed 1 Kit 0  
 atorvastatin (LIPITOR) 40 mg tablet Take 1 Tab by mouth daily. 30 Tab 6  
 bisacodyl (DULCOLAX, BISACODYL,) 10 mg suppository Insert 10 mg into rectum daily as needed. 28 Suppository 1  
 lidocaine (LIDODERM) 5 % 2 Patches by TransDERmal route every twenty-four (24) hours.  Apply patch to the affected area for 12 hours a day and remove for 12 hours a day. 90 Each 3  
 mupirocin (BACTROBAN) 2 % ointment Use 1 Application to affected area 3 Times Daily.  valACYclovir (VALTREX) 1 gram tablet Take  by mouth.  Blood-Glucose Meter monitoring kit Use daily to check blood sugar 1 Kit 0  
 ondansetron (ZOFRAN ODT) 4 mg disintegrating tablet Take 1 Tab by mouth every eight (8) hours as needed for Nausea. 15 Tab 0  
 dicyclomine (BENTYL) 20 mg tablet Take 1 Tab by mouth every six (6) hours as needed (abdominal cramps) for up to 20 doses. 20 Tab 0  ALBUTEROL IN Take  by inhalation.  LORazepam (ATIVAN) 1 mg tablet 1 mg. Past History Past Medical History: 
Past Medical History:  
Diagnosis Date  Breast cancer ([facility] [address] breast cancer, right, S/P Mastectomy and chemo, radiation in 2013  Depression  Diabetes ([facility] [address]  Drug abuse ([facility] [address]   
 H/O cocaine use in past  
 Gastrointestinal disorder   
 cholitis  H/O ETOH abuse  Hyperlipidemia  Hypertension  Spine injury  Vitamin D deficiency 5/1/2018 Past Surgical History: 
Past Surgical History:  
Procedure Laterality Date  COLONOSCOPY N/A 7/18/2016 COLONOSCOPY performed by Rebeca Bonds MD at [facility] ENDOSCOPY  
 HX BREAST LUMPECTOMY  06/2013  
 right  HX HYSTERECTOMY  HX ORTHOPAEDIC Back fusion surgery 4/18/14.  HX OTHER SURGICAL    
 mediport  HX TONSILLECTOMY  HX TUBAL LIGATION Family History: 
Family History Problem Relation Age of Onset  Heart Disease Mother  Stroke Father  Heart Disease Father 48  Diabetes Other  Hypertension Other  Cancer Maternal Grandmother Social History: 
Social History Tobacco Use  Smoking status: Never Smoker  Smokeless tobacco: Never Used Substance Use Topics  Alcohol use: No  
  Comment: \"Occasionally\"  Drug use: No  
 
 
Allergies: Allergies Allergen Reactions  Latex Hives and Itching  Other Food Rash Almonds  Amoxicillin Swelling Other reaction(s): mild rash/itching  Aspirin Not Reported This Time and Swelling Mouth swells  Fentanyl Anaphylaxis and Swelling Patches cause mouth to swell Swelling in mouth from fentanyl patch  Levaquin [Levofloxacin] Not Reported This Time and Swelling Other reaction(s): mild rash/itching  Chlorhexidine Rash  Norco [Hydrocodone-Acetaminophen] Nausea and Vomiting Other reaction(s): gi distress  Acetaminophen Other (comments)  Louin Rash and Itching  Codeine Other (comments)  Glycopyrrolate Swelling Pt  States  faciAL  SWELLING   POST  ROBINUL  IV Pt  States  faciAL  SWELLING   POST  ROBINUL  IV   
 Morphine Nausea and Vomiting Pt states she is not allergic  Pcn [Penicillins] Swelling  Percocet [Oxycodone-Acetaminophen] Nausea and Vomiting Other reaction(s): gi distress 
nausea Other reaction(s): anaphylaxis/angioedema Per pt. She is allergic  Tape [Adhesive] Rash  Tramadol Swelling Review of Systems Review of Systems Constitutional: Negative for fatigue and fever. HENT: Negative for congestion. Respiratory: Negative for cough. Cardiovascular: Negative for chest pain. Gastrointestinal: Negative for abdominal pain. Genitourinary: Positive for dysuria and vaginal discharge. Musculoskeletal: Negative for back pain. Skin: Negative for wound. Neurological: Negative for dizziness and headaches. All Other Systems Negative Physical Exam  
 
Vitals:  
 01/07/19 1338 BP: (!) 171/107 Pulse: 90 Resp: 18 Temp: 98.3 °F (36.8 °C) SpO2: 100% Weight: 72.6 kg (160 lb) Height: 5' 2\" (1.575 m) Physical Exam  
Constitutional: She is oriented to person, place, and time. She appears well-developed and well-nourished. No distress. HENT:  
Head: Normocephalic and atraumatic.   
Nose: Nose normal.  
Eyes: Conjunctivae are normal. Pupils are equal, round, and reactive to light.  
Neck: Normal range of motion. Cardiovascular: Normal rate. Pulmonary/Chest: Effort normal.  
Musculoskeletal: Normal range of motion. Neurological: She is alert and oriented to person, place, and time. Skin: Skin is warm. Psychiatric: She has a normal mood and affect. Her behavior is normal.  
Vitals reviewed. Diagnostic Study Results Labs - Recent Results (from the past 12 hour(s)) URINALYSIS W/ RFLX MICROSCOPIC Collection Time: 01/07/19  1:51 PM  
Result Value Ref Range Color YELLOW Appearance CLEAR Specific gravity >1.030 (H) 1.005 - 1.030  
 pH (UA) 5.0 5.0 - 8.0 Protein TRACE (A) NEG mg/dL Glucose >1,000 (A) NEG mg/dL Ketone NEGATIVE  NEG mg/dL Bilirubin NEGATIVE  NEG Blood NEGATIVE  NEG Urobilinogen 0.2 0.2 - 1.0 EU/dL Nitrites NEGATIVE  NEG Leukocyte Esterase TRACE (A) NEG URINE MICROSCOPIC ONLY Collection Time: 01/07/19  1:51 PM  
Result Value Ref Range WBC 2 to 5 0 - 4 /hpf  
 RBC 0 to 1 0 - 5 /hpf Epithelial cells 3+ 0 - 5 /lpf Bacteria 3+ (A) NEG /hpf Mucus 1+ (A) NEG /lpf Yeast 1+ (A) NEG Budding yeast POSITIVE (A) NEG    
WET PREP Collection Time: 01/07/19  2:00 PM  
Result Value Ref Range Special Requests: NO SPECIAL REQUESTS Wet prep NO TRICHOMONAS SEEN Wet prep CLUE CELLS ABSENT Wet prep FEW 
YEAST Radiologic Studies - No orders to display CT Results  (Last 48 hours) None CXR Results  (Last 48 hours) None Medical Decision Making I am the first provider for this patient. I reviewed the vital signs, available nursing notes, past medical history, past surgical history, family history and social history. Vital Signs-Reviewed the patient's vital signs. Pulse Oximetry Analysis - 100% on RA Records Reviewed: Old Medical Records Procedures: 
Pelvic Exam 
Date/Time: 1/7/2019 3:00 PM 
Performed by: PA 
 Procedure duration:  3 minutes. Exam assisted by:  ED RN [facility]. Type of exam performed: bimanual and speculum. External genitalia appearance: normal.   
Vaginal exam:  discharge. The amount of discharge was:  mild. The discharge was thick and white. Cervical exam:  normal.   
Specimen(s) collected:  chlamydia, GC and vaginal culture. Bimanual exam:  normal.   
Patient tolerance: Patient tolerated the procedure well with no immediate complications Provider Notes (Medical Decision Making): PT states fll and burn on arm are improving since initial injury. Low Concern for bleed as injury is over 3week old and no LOC, AMS, or vomiting. WIll focus on urinary sx this visit Pt has concern for Yeast will test with wet prep. Labs Reviewed URINALYSIS W/ RFLX MICROSCOPIC - Abnormal; Notable for the following components:  
    Result Value Specific gravity >1.030 (*) Protein TRACE (*) Glucose >1,000 (*) Leukocyte Esterase TRACE (*) All other components within normal limits URINE MICROSCOPIC ONLY - Abnormal; Notable for the following components:  
 Bacteria 3+ (*) Mucus 1+ (*) Yeast 1+ (*) Budding yeast POSITIVE (*) All other components within normal limits WET PREP  
CHLAMYDIA/NEISSERIA AMPLIFICATION Will treat with oral diflucan MED RECONCILIATION: 
No current facility-administered medications for this encounter. Current Outpatient Medications Medication Sig  
 fluconazole (DIFLUCAN) 150 mg tablet Take 1 Tab by mouth now for 1 dose. FDA advises cautious prescribing of oral fluconazole in pregnancy.  HYDROcodone-acetaminophen (NORCO) 5-325 mg per tablet Take 1 Tab by mouth every six (6) hours as needed for Pain. Max Daily Amount: 4 Tabs.  potassium chloride SR (KLOR-CON 10) 10 mEq tablet Take 1 Tab by mouth daily for 10 days.   
 Calcium-Cholecalciferol, D3, (CALCIUM 500 WITH D) 500 mg(1,250mg) -400 unit tab Take 1 Tab by mouth daily.  ondansetron (ZOFRAN ODT) 4 mg disintegrating tablet Take 1 Tab by mouth every eight (8) hours as needed for Nausea.  HYDROcodone-acetaminophen (NORCO) 5-325 mg per tablet Take 1 Tab by mouth every six (6) hours as needed for Pain. Max Daily Amount: 4 Tabs.  naloxone (NARCAN) 2 mg/actuation spry Use 1 spray intranasally, then discard. Repeat with new spray every 2 min as needed for opioid overdose symptoms, alternating nostrils.  alum-mag hydroxide-simeth (MYLANTA) 200-200-20 mg/5 mL susp Take 30 mL by mouth four (4) times daily as needed.  cyanocobalamin (VITAMIN B-12) 1,000 mcg tablet Take 1 Tab by mouth daily.  lisinopril-hydroCHLOROthiazide (PRINZIDE, ZESTORETIC) 20-12.5 mg per tablet Take 1 Tab by mouth two (2) times a day.  carvedilol (COREG) 3.125 mg tablet Take 2 Tabs by mouth two (2) times daily (with meals).  budesonide (RHINOCORT ALLERGY) 32 mcg/actuation nasal spray 2 Sprays by Both Nostrils route daily. Indications: Allergic Rhinitis  insulin nph-regular human rec (HUMULIN 70-30) 100 unit/mL (70-30) inpn Give 40 units in the QAM and 45 units at bedtime  ferrous sulfate 325 mg (65 mg iron) tablet Take 1 Tab by mouth Daily (before breakfast).  polyethylene glycol (MIRALAX) 17 gram packet Take 1 Packet by mouth daily.  hydrOXYzine HCl (ATARAX) 25 mg tablet Take 1-2 tablets by mouth every 6 hours as needed.  Insulin Needles, Disposable, 31 gauge x 5/16\" ndle Use to administer insulin as directed  Lancets misc Use daily to monitor blood glucose  glucose blood VI test strips (ASCENSIA AUTODISC VI, ONE TOUCH ULTRA TEST VI) strip Use daily to monitor blood glucose  Blood Pressure Test Kit-Medium kit Blood pressure as needed  atorvastatin (LIPITOR) 40 mg tablet Take 1 Tab by mouth daily.  bisacodyl (DULCOLAX, BISACODYL,) 10 mg suppository Insert 10 mg into rectum daily as needed.  lidocaine (LIDODERM) 5 % 2 Patches by TransDERmal route every twenty-four (24) hours. Apply patch to the affected area for 12 hours a day and remove for 12 hours a day.  mupirocin (BACTROBAN) 2 % ointment Use 1 Application to affected area 3 Times Daily.  valACYclovir (VALTREX) 1 gram tablet Take  by mouth.  Blood-Glucose Meter monitoring kit Use daily to check blood sugar  ondansetron (ZOFRAN ODT) 4 mg disintegrating tablet Take 1 Tab by mouth every eight (8) hours as needed for Nausea.  dicyclomine (BENTYL) 20 mg tablet Take 1 Tab by mouth every six (6) hours as needed (abdominal cramps) for up to 20 doses.  ALBUTEROL IN Take  by inhalation.  LORazepam (ATIVAN) 1 mg tablet 1 mg. Disposition: 
discharge DISCHARGE NOTE:  
 
Pt has been reexamined. Patient has no new complaints, changes, or physical findings. Care plan outlined and precautions discussed. Results of visit were reviewed with the patient. All medications were reviewed with the patient; will d/c home with diflucan. All of pt's questions and concerns were addressed. Patient was instructed and agrees to follow up with PCP, as well as to return to the ED upon further deterioration. Patient is ready to go home. Follow-up Information Follow up With Specialties Details Why Contact Info Lorenzo Kumar NP Nurse Practitioner Call in 1 day follow up [address] [facility] [address] [phone] 
[phone] [facility] EMERGENCY DEPT Emergency Medicine  If symptoms worsen [address] 
[phone] Current Discharge Medication List  
  
START taking these medications Details  
fluconazole (DIFLUCAN) 150 mg tablet Take 1 Tab by mouth now for 1 dose. FDA advises cautious prescribing of oral fluconazole in pregnancy. Qty: 1 Tab, Refills: 0 Diagnosis Clinical Impression: 1. Candida vaginitis 2. Type 2 diabetes mellitus without complication, with long-term current use of insulin ([facility] [address] 3. Essential hypertension

## 2019-01-07 NOTE — DISCHARGE INSTRUCTIONS
Patient Education        Candidiasis: Care Instructions  Your Care Instructions  Candidiasis (say \"fzk-rwz-JM-uh-pierre\") is a yeast infection. Yeast normally lives in your body. But it can cause problems if your body's defenses don't work as they should. Some medicines can increase your chance of getting a yeast infection. These include antibiotics, steroids, and cancer drugs. And some diseases like AIDS and diabetes can make you more likely to get yeast infections. There are different types of yeast infections. Margaret Deshpande is a yeast infection in the mouth. It usually occurs in people with weak immune systems. It causes white patches inside the mouth and throat. Yeast infections of the skin usually occur in skin folds where the skin stays moist. They cause red, oozing patches on your skin. Babies can get these infections under the diaper. People who often wear gloves can get them on their hands. Many women get vaginal yeast infections. They are most common when women take antibiotics. These infections can cause the vagina to itch and burn. They also cause white discharge that looks like cottage cheese. In rare cases, yeast infects the blood. This can cause serious disease. This kind of infection is treated with medicine given through a needle into a vein (IV). After you start treatment, a yeast infection usually goes away quickly. But if your immune system is weak, the infection may come back. Tell your doctor if you get yeast infections often. Follow-up care is a key part of your treatment and safety. Be sure to make and go to all appointments, and call your doctor if you are having problems. It's also a good idea to know your test results and keep a list of the medicines you take. How can you care for yourself at home? · Take your medicines exactly as prescribed. Call your doctor if you think you are having a problem with your medicine. · Use antibiotics only as directed by your doctor.   · Eat yogurt with live cultures. It has bacteria called lactobacillus. It may help prevent some types of yeast infections. · Keep your skin clean and dry. Put powder on moist places. · If you are using a cream or suppository to treat a vaginal yeast infection, don't use condoms or a diaphragm. Use a different type of birth control. · Eat a healthy diet and get regular exercise. This will help keep your immune system strong. When should you call for help? Watch closely for changes in your health, and be sure to contact your doctor if:    · You do not get better as expected. Where can you learn more? Go to http://imani-jenny.info/. Enter W259 in the search box to learn more about \"Candidiasis: Care Instructions. \"  Current as of: May 15, 2018  Content Version: 11.8  © 9465-6446 [employer]. Care instructions adapted under license by [employer] (which disclaims liability or warranty for this information). If you have questions about a medical condition or this instruction, always ask your healthcare professional. [place] [address] any warranty or liability for your use of this information. Patient Education        Vaginal Yeast Infection: Care Instructions  Your Care Instructions    A vaginal yeast infection is caused by too many yeast cells in the vagina. This is common in women of all ages. Itching, vaginal discharge and irritation, and other symptoms can bother you. But yeast infections don't often cause other health problems. Some medicines can increase your risk of getting a yeast infection. These include antibiotics, birth control pills, hormones, and steroids. You may also be more likely to get a yeast infection if you are pregnant, have diabetes, douche, or wear tight clothes. With treatment, most yeast infections get better in 2 to 3 days. Follow-up care is a key part of your treatment and safety.  Be sure to make and go to all appointments, and call your doctor if you are having problems. It's also a good idea to know your test results and keep a list of the medicines you take. How can you care for yourself at home? · Take your medicines exactly as prescribed. Call your doctor if you think you are having a problem with your medicine. · Ask your doctor about over-the-counter (OTC) medicines for yeast infections. They may cost less than prescription medicines. If you use an OTC treatment, read and follow all instructions on the label. · Do not use tampons while using a vaginal cream or suppository. The tampons can absorb the medicine. Use pads instead. · Wear loose cotton clothing. Do not wear nylon or other fabric that holds body heat and moisture close to the skin. · Try sleeping without underwear. · Do not scratch. Relieve itching with a cold pack or a cool bath. · Do not wash your vaginal area more than once a day. Use plain water or a mild, unscented soap. Air-dry the vaginal area. · Change out of wet swimsuits after swimming. · Do not have sex until you have finished your treatment. · Do not douche. When should you call for help? Call your doctor now or seek immediate medical care if:    · You have unexpected vaginal bleeding.     · You have new or increased pain in your vagina or pelvis.    Watch closely for changes in your health, and be sure to contact your doctor if:    · You have a fever.     · You are not getting better after 2 days.     · Your symptoms come back after you finish your medicines. Where can you learn more? Go to http://imani-jenny.info/. Enter R267 in the search box to learn more about \"Vaginal Yeast Infection: Care Instructions. \"  Current as of: May 15, 2018  Content Version: 11.8  © 4358-2987 [employer]. Care instructions adapted under license by [employer] (which disclaims liability or warranty for this information).  If you have questions about a medical condition or this instruction, always ask your healthcare professional. [place] [address] any warranty or liability for your use of this information.

## 2019-01-07 NOTE — ED TRIAGE NOTES
Patient states she fell on Friday (slipped) and landed on her R face, states she is sill having some pain Onset of vaginal discharge x one week with some abdominal cramping and pain with urination

## 2019-01-08 LAB
C TRACH RRNA SPEC QL NAA+PROBE: NEGATIVE
N GONORRHOEA RRNA SPEC QL NAA+PROBE: NEGATIVE
SPECIMEN SOURCE: NORMAL

## 2019-01-09 NOTE — PROGRESS NOTES
Call placed re: recent Ed visits. Two pt identifier's verified, pt states she is doing [place] little better\". Pt is scheduled for a follow up on 1/23/19 w/ PCP.

## 2019-01-10 ENCOUNTER — OFFICE VISIT (OUTPATIENT)
Dept: FAMILY MEDICINE CLINIC | Facility: CLINIC | Age: 49
End: 2019-01-10

## 2019-01-10 ENCOUNTER — HOSPITAL ENCOUNTER (OUTPATIENT)
Dept: LAB | Age: 49
Discharge: HOME | End: 2019-01-10
Payer: MEDICAID

## 2019-01-10 VITALS
BODY MASS INDEX: 29.52 KG/M2 | TEMPERATURE: 98.5 F | DIASTOLIC BLOOD PRESSURE: 104 MMHG | WEIGHT: 160.4 LBS | RESPIRATION RATE: 17 BRPM | HEART RATE: 88 BPM | OXYGEN SATURATION: 98 % | SYSTOLIC BLOOD PRESSURE: 162 MMHG | HEIGHT: 62 IN

## 2019-01-10 DIAGNOSIS — I10 ESSENTIAL HYPERTENSION: Primary | ICD-10-CM

## 2019-01-10 DIAGNOSIS — E11.8 TYPE 2 DIABETES MELLITUS WITH COMPLICATION, WITH LONG-TERM CURRENT USE OF INSULIN (HCC): ICD-10-CM

## 2019-01-10 DIAGNOSIS — Z79.4 TYPE 2 DIABETES MELLITUS WITH COMPLICATION, WITH LONG-TERM CURRENT USE OF INSULIN (HCC): ICD-10-CM

## 2019-01-10 DIAGNOSIS — E55.9 VITAMIN D DEFICIENCY: ICD-10-CM

## 2019-01-10 DIAGNOSIS — Z00.00 LABORATORY EXAM ORDERED AS PART OF ROUTINE GENERAL MEDICAL EXAMINATION: ICD-10-CM

## 2019-01-10 LAB
25(OH)D3 SERPL-MCNC: 12.2 NG/ML (ref 30–100)
HBA1C MFR BLD HPLC: 13.9 %
T4 FREE SERPL-MCNC: 1.2 NG/DL (ref 0.7–1.5)
TSH SERPL DL<=0.05 MIU/L-ACNC: 2.7 UIU/ML (ref 0.36–3.74)

## 2019-01-10 PROCEDURE — 82306 VITAMIN D 25 HYDROXY: CPT

## 2019-01-10 PROCEDURE — 84443 ASSAY THYROID STIM HORMONE: CPT

## 2019-01-10 PROCEDURE — 84439 ASSAY OF FREE THYROXINE: CPT

## 2019-01-10 RX ORDER — AZITHROMYCIN 250 MG/1
TABLET, FILM COATED ORAL
COMMUNITY
Start: 2019-01-10 | End: 2019-01-11

## 2019-01-10 RX ORDER — NALOXONE HYDROCHLORIDE 4 MG/.1ML
SPRAY NASAL
COMMUNITY
Start: 2018-11-19 | End: 2019-01-11

## 2019-01-10 RX ORDER — FLUCONAZOLE 150 MG/1
TABLET ORAL
COMMUNITY
Start: 2019-01-08 | End: 2019-01-11

## 2019-01-10 RX ORDER — BENZONATATE 100 MG/1
CAPSULE ORAL
COMMUNITY
Start: 2019-01-10 | End: 2020-03-31

## 2019-01-10 RX ORDER — EPINEPHRINE 0.3 MG/.3ML
0.3 INJECTION SUBCUTANEOUS
COMMUNITY
Start: 2015-04-28 | End: 2021-07-02

## 2019-01-10 NOTE — PROGRESS NOTES
Shelia Delgadillo is a 51 y.o.@ presents today for office visit for cough. Pt is in Room # 3. 
 
 1. Have you been to the ER, urgent care clinic since your last visit? Hospitalized since your last visit? Yes When: 1/8 tracie hobson 2. Have you seen or consulted any other health care providers outside of the [address] [place] [address] since your last visit? Include any pap smears or colon screening. No 
 
  
 
Health Maintenance reviewed - will follow up with NP. Tho Lopez Requested Prescriptions No prescriptions requested or ordered in this encounter Visit Vitals BP (!) 165/102 (BP 1 Location: Right arm, BP Patient Position: Sitting) Comment: manual  
Pulse 88 Temp 98.5 °F (36.9 °C) Resp 17 Ht 5' 2\" (1.575 m) Wt 160 lb 6.4 oz (72.8 kg) LMP 08/20/2013 SpO2 98% BMI 29.34 kg/m²  
 
 
  
Upcoming Appts NO. 
  
VORB: Kendall Holter MD,/ ari villa Lpn

## 2019-01-10 NOTE — PROGRESS NOTES
Nirali Bowen is a 51 y.o.  female presents today for same day sick visit for Chief Complaint Patient presents with  Yeast Infection  
  pt had over a week  Hypertension  Diabetes Pt is *** fasting. Pt is in Room # 3. 
 
 
1. Have you been to the ER, urgent care clinic since your last visit? Hospitalized since your last visit? Yes {when where and reason for visit [facility] AMB:[id number]} 2. Have you seen or consulted any other health care providers outside of the [address] [place] [address] since your last visit? Include any pap smears or colon screening. {Yes when where and reason for visit:[id number]} Health Maintenance reviewed - { Requested Prescriptions No prescriptions requested or ordered in this encounter Visit Vitals BP (!) 162/104 (BP 1 Location: Left arm, BP Patient Position: Sitting) Comment: manual  
Pulse 88 Temp 98.5 °F (36.9 °C) Resp 17 Ht 5' 2\" (1.575 m) Wt 160 lb 6.4 oz (72.8 kg) LMP 08/20/2013 SpO2 98% BMI 29.34 kg/m² Upcoming Appts Yes Pain Management and Psych 1/2019

## 2019-01-11 ENCOUNTER — APPOINTMENT (OUTPATIENT)
Dept: GENERAL RADIOLOGY | Age: 49
End: 2019-01-11
Attending: EMERGENCY MEDICINE
Payer: MEDICAID

## 2019-01-11 ENCOUNTER — HOSPITAL ENCOUNTER (EMERGENCY)
Age: 49
Discharge: HOME | End: 2019-01-11
Attending: EMERGENCY MEDICINE
Payer: MEDICAID

## 2019-01-11 VITALS
TEMPERATURE: 98.4 F | OXYGEN SATURATION: 97 % | RESPIRATION RATE: 8 BRPM | WEIGHT: 160 LBS | DIASTOLIC BLOOD PRESSURE: 76 MMHG | BODY MASS INDEX: 29.44 KG/M2 | SYSTOLIC BLOOD PRESSURE: 125 MMHG | HEART RATE: 71 BPM | HEIGHT: 62 IN

## 2019-01-11 DIAGNOSIS — E55.9 VITAMIN D DEFICIENCY: ICD-10-CM

## 2019-01-11 DIAGNOSIS — Z79.4 TYPE 2 DIABETES MELLITUS WITH COMPLICATION, WITH LONG-TERM CURRENT USE OF INSULIN (HCC): Primary | ICD-10-CM

## 2019-01-11 DIAGNOSIS — R07.89 ATYPICAL CHEST PAIN: Primary | ICD-10-CM

## 2019-01-11 DIAGNOSIS — E11.8 TYPE 2 DIABETES MELLITUS WITH COMPLICATION, WITH LONG-TERM CURRENT USE OF INSULIN (HCC): Primary | ICD-10-CM

## 2019-01-11 DIAGNOSIS — R73.9 HYPERGLYCEMIA: ICD-10-CM

## 2019-01-11 PROBLEM — D72.819 CHRONIC LEUKOPENIA: Status: RESOLVED | Noted: 2018-03-15 | Resolved: 2019-01-11

## 2019-01-11 PROBLEM — D70.1 CHEMOTHERAPY INDUCED NEUTROPENIA (HCC): Status: RESOLVED | Noted: 2017-01-25 | Resolved: 2019-01-11

## 2019-01-11 PROBLEM — M79.18 MUSCULOSKELETAL PAIN: Status: RESOLVED | Noted: 2018-03-15 | Resolved: 2019-01-11

## 2019-01-11 PROBLEM — T45.1X5A CHEMOTHERAPY INDUCED NEUTROPENIA (HCC): Status: RESOLVED | Noted: 2017-01-25 | Resolved: 2019-01-11

## 2019-01-11 LAB
ALBUMIN SERPL-MCNC: 3.8 G/DL (ref 3.4–5)
ALBUMIN/GLOB SERPL: 1.1 {RATIO} (ref 0.8–1.7)
ALP SERPL-CCNC: 159 U/L (ref 45–117)
ALT SERPL-CCNC: 32 U/L (ref 13–56)
ANION GAP SERPL CALC-SCNC: 8 MMOL/L (ref 3–18)
AST SERPL-CCNC: 14 U/L (ref 15–37)
ATRIAL RATE: 79 BPM
BASOPHILS # BLD: 0 K/UL (ref 0–0.1)
BASOPHILS NFR BLD: 0 % (ref 0–2)
BILIRUB SERPL-MCNC: 0.2 MG/DL (ref 0.2–1)
BNP SERPL-MCNC: 94 PG/ML (ref 0–450)
BUN SERPL-MCNC: 14 MG/DL (ref 7–18)
BUN/CREAT SERPL: 12 (ref 12–20)
CALCIUM SERPL-MCNC: 9 MG/DL (ref 8.5–10.1)
CALCULATED P AXIS, ECG09: 57 DEGREES
CALCULATED R AXIS, ECG10: -20 DEGREES
CALCULATED T AXIS, ECG11: 76 DEGREES
CHLORIDE SERPL-SCNC: 99 MMOL/L (ref 100–108)
CK MB CFR SERPL CALC: NORMAL % (ref 0–4)
CK MB SERPL-MCNC: <1 NG/ML (ref 5–25)
CK SERPL-CCNC: 28 U/L (ref 26–192)
CO2 SERPL-SCNC: 28 MMOL/L (ref 21–32)
CREAT SERPL-MCNC: 1.17 MG/DL (ref 0.6–1.3)
DIAGNOSIS, 93000: NORMAL
DIFFERENTIAL METHOD BLD: NORMAL
EOSINOPHIL # BLD: 0.1 K/UL (ref 0–0.4)
EOSINOPHIL NFR BLD: 2 % (ref 0–5)
ERYTHROCYTE [DISTWIDTH] IN BLOOD BY AUTOMATED COUNT: 13.4 % (ref 11.6–14.5)
ETHANOL SERPL-MCNC: <3 MG/DL (ref 0–3)
GLOBULIN SER CALC-MCNC: 3.6 G/DL (ref 2–4)
GLUCOSE SERPL-MCNC: 481 MG/DL (ref 74–99)
HCT VFR BLD AUTO: 40.3 % (ref 35–45)
HGB BLD-MCNC: 13.5 G/DL (ref 12–16)
LIPASE SERPL-CCNC: 290 U/L (ref 73–393)
LYMPHOCYTES # BLD: 2.4 K/UL (ref 0.9–3.6)
LYMPHOCYTES NFR BLD: 35 % (ref 21–52)
MCH RBC QN AUTO: 29.7 PG (ref 24–34)
MCHC RBC AUTO-ENTMCNC: 33.5 G/DL (ref 31–37)
MCV RBC AUTO: 88.6 FL (ref 74–97)
MONOCYTES # BLD: 0.4 K/UL (ref 0.05–1.2)
MONOCYTES NFR BLD: 6 % (ref 3–10)
NEUTS SEG # BLD: 3.9 K/UL (ref 1.8–8)
NEUTS SEG NFR BLD: 57 % (ref 40–73)
P-R INTERVAL, ECG05: 156 MS
PLATELET # BLD AUTO: 270 K/UL (ref 135–420)
PMV BLD AUTO: 11.4 FL (ref 9.2–11.8)
POTASSIUM SERPL-SCNC: 3.8 MMOL/L (ref 3.5–5.5)
PROT SERPL-MCNC: 7.4 G/DL (ref 6.4–8.2)
Q-T INTERVAL, ECG07: 382 MS
QRS DURATION, ECG06: 80 MS
QTC CALCULATION (BEZET), ECG08: 438 MS
RBC # BLD AUTO: 4.55 M/UL (ref 4.2–5.3)
SODIUM SERPL-SCNC: 135 MMOL/L (ref 136–145)
TROPONIN I SERPL-MCNC: <0.02 NG/ML (ref 0–0.04)
VENTRICULAR RATE, ECG03: 79 BPM
WBC # BLD AUTO: 6.8 K/UL (ref 4.6–13.2)

## 2019-01-11 PROCEDURE — 99285 EMERGENCY DEPT VISIT HI MDM: CPT

## 2019-01-11 PROCEDURE — 83690 ASSAY OF LIPASE: CPT

## 2019-01-11 PROCEDURE — 74011250637 HC RX REV CODE- 250/637: Performed by: EMERGENCY MEDICINE

## 2019-01-11 PROCEDURE — 82550 ASSAY OF CK (CPK): CPT

## 2019-01-11 PROCEDURE — 71045 X-RAY EXAM CHEST 1 VIEW: CPT

## 2019-01-11 PROCEDURE — 80053 COMPREHEN METABOLIC PANEL: CPT

## 2019-01-11 PROCEDURE — 83880 ASSAY OF NATRIURETIC PEPTIDE: CPT

## 2019-01-11 PROCEDURE — 96374 THER/PROPH/DIAG INJ IV PUSH: CPT

## 2019-01-11 PROCEDURE — 74011250636 HC RX REV CODE- 250/636: Performed by: EMERGENCY MEDICINE

## 2019-01-11 PROCEDURE — 85025 COMPLETE CBC W/AUTO DIFF WBC: CPT

## 2019-01-11 PROCEDURE — 93005 ELECTROCARDIOGRAM TRACING: CPT

## 2019-01-11 PROCEDURE — 80307 DRUG TEST PRSMV CHEM ANLYZR: CPT

## 2019-01-11 RX ORDER — LANCETS
EACH MISCELLANEOUS
Qty: 200 EACH | Refills: 0 | Status: SHIPPED | OUTPATIENT
Start: 2019-01-11

## 2019-01-11 RX ORDER — METOCLOPRAMIDE HYDROCHLORIDE 5 MG/ML
10 INJECTION INTRAMUSCULAR; INTRAVENOUS
Status: DISCONTINUED | OUTPATIENT
Start: 2019-01-11 | End: 2019-01-11 | Stop reason: HOSPADM

## 2019-01-11 RX ORDER — LANCETS
EACH MISCELLANEOUS
Qty: 100 EACH | Refills: 1 | OUTPATIENT
Start: 2019-01-11

## 2019-01-11 RX ORDER — KETOROLAC TROMETHAMINE 30 MG/ML
INJECTION, SOLUTION INTRAMUSCULAR; INTRAVENOUS
Status: DISCONTINUED
Start: 2019-01-11 | End: 2019-01-11 | Stop reason: WASHOUT

## 2019-01-11 RX ORDER — ONDANSETRON 2 MG/ML
8 INJECTION INTRAMUSCULAR; INTRAVENOUS ONCE
Status: COMPLETED | OUTPATIENT
Start: 2019-01-11 | End: 2019-01-11

## 2019-01-11 RX ORDER — ERGOCALCIFEROL 1.25 MG/1
50000 CAPSULE ORAL
Qty: 12 CAP | Refills: 0 | Status: SHIPPED | OUTPATIENT
Start: 2019-01-11 | End: 2019-03-30

## 2019-01-11 RX ORDER — NITROGLYCERIN 0.4 MG/1
TABLET SUBLINGUAL
Status: DISCONTINUED
Start: 2019-01-11 | End: 2019-01-11 | Stop reason: WASHOUT

## 2019-01-11 RX ORDER — NITROGLYCERIN 0.4 MG/1
0.4 TABLET SUBLINGUAL
Status: COMPLETED | OUTPATIENT
Start: 2019-01-11 | End: 2019-01-11

## 2019-01-11 RX ORDER — INSULIN PUMP SYRINGE, 3 ML
EACH MISCELLANEOUS
Qty: 1 KIT | Refills: 0 | Status: SHIPPED | OUTPATIENT
Start: 2019-01-11

## 2019-01-11 RX ORDER — KETOROLAC TROMETHAMINE 30 MG/ML
15 INJECTION, SOLUTION INTRAMUSCULAR; INTRAVENOUS
Status: DISCONTINUED | OUTPATIENT
Start: 2019-01-11 | End: 2019-01-11 | Stop reason: HOSPADM

## 2019-01-11 RX ORDER — ONDANSETRON 2 MG/ML
INJECTION INTRAMUSCULAR; INTRAVENOUS
Status: DISCONTINUED
Start: 2019-01-11 | End: 2019-01-11 | Stop reason: HOSPADM

## 2019-01-11 RX ORDER — INSULIN PUMP SYRINGE, 3 ML
EACH MISCELLANEOUS
Qty: 1 KIT | Refills: 0 | OUTPATIENT
Start: 2019-01-11

## 2019-01-11 RX ADMIN — ONDANSETRON 8 MG: 2 INJECTION INTRAMUSCULAR; INTRAVENOUS at 01:28

## 2019-01-11 RX ADMIN — NITROGLYCERIN 0.4 MG: 0.4 TABLET, ORALLY DISINTEGRATING SUBLINGUAL at 01:20

## 2019-01-11 NOTE — PROGRESS NOTES
Internal Medicine Progress Note Today's Date:  2019 Patient:  Conchis Washington Patient :  1970 Subjective: Chief Complaint Patient presents with  Yeast Infection  
  pt had over a week  Hypertension  Diabetes  Vitamin D Deficiency Hypertension This is a chronic problem, new to me. BP is not at goal. Pt takes lisinopril/HCTZ. Pt reports compliance with this medication. Pt went to urgent care for cough and was prescribed an antibiotic. Pt has not picked up this medication. Diabetes mellitus This is a chronic problem, new to me. . BS is not at goal.  Pt is on insulin 70/30. Pt does not know why she is not on metformin. Pt went to the emergency room recently and was prescribed fluconazole. This was not effective. Hypovitaminosis D This is a chronic problem, new to me. This is not at goal. Vit D level was checked on 2018. Pt is not on vitamin D supplements. Past Medical History:  
Diagnosis Date  Anxiety  Bipolar disorder ([address]  Breast cancer ([address] breast cancer, right, S/P Mastectomy and chemo, radiation in   Depression  Diabetes ([address]  Drug abuse ([address]   
 H/O cocaine use in past  
 Drug-seeking behavior  Family history of early CAD  Gastrointestinal disorder   
 cholitis  H/O ETOH abuse  Hyperlipidemia  Hypertension  Malignant neoplasm without specification of site [facility])  Methamphetamine abuse ([facility] [address] self reported on 1/3/16  Spine injury  Vitamin D deficiency 2018 Past Surgical History:  
Procedure Laterality Date  COLONOSCOPY N/A 2016 COLONOSCOPY performed by Rebeca Bonds MD at [facility] ENDOSCOPY  
 HX BREAST LUMPECTOMY  2013  
 right  HX HYSTERECTOMY  HX ORTHOPAEDIC Back fusion surgery 14.  HX OTHER SURGICAL    
 mediport  HX TONSILLECTOMY  HX TUBAL LIGATION    
 
 reports that  has never smoked. she has never used smokeless tobacco. She reports that she does not drink alcohol or use drugs. Family History Problem Relation Age of Onset  Heart Disease Mother  Stroke Father  Heart Disease Father 48  Diabetes Other  Hypertension Other  Cancer Maternal Grandmother Allergies Allergen Reactions  Latex Hives and Itching  Other Food Rash Almonds  Amoxicillin Swelling Other reaction(s): mild rash/itching  Aspirin Not Reported This Time and Swelling Mouth swells  Fentanyl Anaphylaxis and Swelling Patches cause mouth to swell Swelling in mouth from fentanyl patch  Levaquin [Levofloxacin] Not Reported This Time and Swelling Other reaction(s): mild rash/itching  Chlorhexidine Rash  Norco [Hydrocodone-Acetaminophen] Nausea and Vomiting Other reaction(s): gi distress  Acetaminophen Other (comments)  [place] Rash and Itching  Codeine Other (comments)  Glycopyrrolate Swelling Pt  States  faciAL  SWELLING   POST  ROBINUL  IV Pt  States  faciAL  SWELLING   POST  ROBINUL  IV   
 Morphine Nausea and Vomiting Pt states she is not allergic  Pcn [Penicillins] Swelling  Percocet [Oxycodone-Acetaminophen] Nausea and Vomiting Other reaction(s): gi distress 
nausea Other reaction(s): anaphylaxis/angioedema Per pt. She is allergic  Tape [Adhesive] Rash  Tramadol Swelling Review of Systems Positives in bold CV:      chest pain, palpitations PULM:  SOB, wheezing, cough, sputum production Current Outpatient Meds and Allergies Current Outpatient Medications on File Prior to Visit Medication Sig Dispense Refill  benzonatate (TESSALON) 100 mg capsule  glucose blood VI test strips (ASCENSIA AUTODISC VI, ONE TOUCH ULTRA TEST VI) strip 50 Each.     
 EPINEPHrine (EPIPEN) 0.3 mg/0.3 mL injection 0.3 mg.    
  Calcium-Cholecalciferol, D3, (CALCIUM 500 WITH D) 500 mg(1,250mg) -400 unit tab Take 1 Tab by mouth daily. 10 Tab 0  
 ondansetron (ZOFRAN ODT) 4 mg disintegrating tablet Take 1 Tab by mouth every eight (8) hours as needed for Nausea. 15 Tab 0  
 naloxone (NARCAN) 2 mg/actuation spry Use 1 spray intranasally, then discard. Repeat with new spray every 2 min as needed for opioid overdose symptoms, alternating nostrils. 1 Package 0  
 cyanocobalamin (VITAMIN B-12) 1,000 mcg tablet Take 1 Tab by mouth daily. 90 Tab 0  
 lisinopril-hydroCHLOROthiazide (PRINZIDE, ZESTORETIC) 20-12.5 mg per tablet Take 1 Tab by mouth two (2) times a day. 180 Tab 3  carvedilol (COREG) 3.125 mg tablet Take 2 Tabs by mouth two (2) times daily (with meals). 90 Tab 3  
 budesonide (RHINOCORT ALLERGY) 32 mcg/actuation nasal spray 2 Sprays by Both Nostrils route daily. Indications: Allergic Rhinitis 1 Bottle 3  
 insulin nph-regular human rec (HUMULIN 70-30) 100 unit/mL (70-30) inpn Give 40 units in the QAM and 45 units at bedtime 5 Pen 3  
 ferrous sulfate 325 mg (65 mg iron) tablet Take 1 Tab by mouth Daily (before breakfast). 30 Tab 3  polyethylene glycol (MIRALAX) 17 gram packet Take 1 Packet by mouth daily. 30 Each 1  
 Insulin Needles, Disposable, 31 gauge x 5/16\" ndle Use to administer insulin as directed 1 Package 11  
 LORazepam (ATIVAN) 1 mg tablet 1 mg.  atorvastatin (LIPITOR) 40 mg tablet Take 1 Tab by mouth daily. 30 Tab 6 No current facility-administered medications on file prior to visit. Allergies Allergen Reactions  Latex Hives and Itching  Other Food Rash Almonds  Amoxicillin Swelling Other reaction(s): mild rash/itching  Aspirin Not Reported This Time and Swelling Mouth swells  Fentanyl Anaphylaxis and Swelling Patches cause mouth to swell Swelling in mouth from fentanyl patch  Levaquin [Levofloxacin] Not Reported This Time and Swelling Other reaction(s): mild rash/itching  Chlorhexidine Rash  Norco [Hydrocodone-Acetaminophen] Nausea and Vomiting Other reaction(s): gi distress  Acetaminophen Other (comments)  [place] Rash and Itching  Codeine Other (comments)  Glycopyrrolate Swelling Pt  States  faciAL  SWELLING   POST  ROBINUL  IV Pt  States  faciAL  SWELLING   POST  ROBINUL  IV   
 Morphine Nausea and Vomiting Pt states she is not allergic  Pcn [Penicillins] Swelling  Percocet [Oxycodone-Acetaminophen] Nausea and Vomiting Other reaction(s): gi distress 
nausea Other reaction(s): anaphylaxis/angioedema Per pt. She is allergic  Tape [Adhesive] Rash  Tramadol Swelling Objective:    
VS:   
Visit Vitals BP (!) 162/104 (BP 1 Location: Left arm, BP Patient Position: Sitting) Comment: manual  
Pulse 88 Temp 98.5 °F (36.9 °C) Resp 17 Ht 5' 2\" (1.575 m) Wt 160 lb 6.4 oz (72.8 kg) LMP 08/20/2013 SpO2 98% BMI 29.34 kg/m² General:   Well-nourished, well-groomed, pleasant, alert, in no acute distress Head:  Normocephalic, atraumatic Ears:  External ears WNL Nose:  External nares WNL Psych:  No pressured speech, no abnormal thought content PHQ over the last two weeks 12/27/2018 PHQ Not Done - Little interest or pleasure in doing things Not at all Feeling down, depressed, irritable, or hopeless Not at all Total Score PHQ 2 0 Lab Results Component Value Date/Time  Hemoglobin A1c 7.6 (H) 04/25/2018 10:33 AM  
 Hemoglobin A1c 7.7 (H) 01/23/2017 04:40 AM  
 Hemoglobin A1c 8.3 (H) 06/22/2016 06:10 AM  
 Glucose 481 (HH) 01/11/2019 12:52 AM  
 Glucose (POC) 440 (HH) 12/06/2018 10:52 PM  
 Glucose,  (HH) 05/03/2010 06:42 PM  
 Microalbumin/Creat ratio (mg/g creat) 11 04/25/2018 10:33 AM  
 Microalbumin,urine random 1.50 04/25/2018 10:33 AM  
 LDL, calculated 160 (H) 04/25/2018 10:33 AM  
 Creatinine, POC 0.5 (L) 05/03/2010 06:42 PM  
 Creatinine 1.17 01/11/2019 12:52 AM  
  
 
Assessment/Plan & Orders: ICD-10-CM ICD-9-CM 1. Essential hypertension I10 401.9 2. Type 2 diabetes mellitus with complication, with long-term current use of insulin (HCC) F15.0 485.10 METABOLIC PANEL, COMPREHENSIVE  
 Z79.4 V58.67 AMB POC HEMOGLOBIN A1C  
   lancets misc 3. Vitamin D deficiency E55.9 268.9 VITAMIN D, 25 HYDROXY Healthy lifestyle has been encouraged including avoidance of tobacco, limiting or avoiding alcohol intake, heart healthy diet which is low in cholesterol and saturated fat and contains fresh fruits, vegetables and whole grains and fiber, regular exercise with goals of 20-30 minutes 3-5 days weekly and maintaining an optimal BMI. Advised pt that medications will not be prescribed until she can get labs done to confirm that hypokalemia is no longer present Will adjust medications upon review of labs Will check Vitamin D level to determine if supplementation is still needed Pt asked to check BP and BS at home and bring BP and BS log to her next appt Pt asked to  lipitor Follow-up Disposition: 
Return in about 1 week (around 1/17/2019) for Diabetes mellitus, Hypertension, Go over lab/imaging results. *Patient verbalized understanding and agreement with the plan. Patient was given an after-visit summary. Author Hubbard. Padmini Tarango MD - Internal Medicine 1/11/2019, 2:23 PM 
[facility] [id number] [place] [address], [address] Phone [phone] Fax [phone]

## 2019-01-11 NOTE — DISCHARGE INSTRUCTIONS
Patient Education        Chest Pain: Care Instructions  Your Care Instructions    There are many things that can cause chest pain. Some are not serious and will get better on their own in a few days. But some kinds of chest pain need more testing and treatment. Your doctor may have recommended a follow-up visit in the next 8 to 12 hours. If you are not getting better, you may need more tests or treatment. Even though your doctor has released you, you still need to watch for any problems. The doctor carefully checked you, but sometimes problems can develop later. If you have new symptoms or if your symptoms do not get better, get medical care right away. If you have worse or different chest pain or pressure that lasts more than 5 minutes or you passed out (lost consciousness), call 911 or seek other emergency help right away. A medical visit is only one step in your treatment. Even if you feel better, you still need to do what your doctor recommends, such as going to all suggested follow-up appointments and taking medicines exactly as directed. This will help you recover and help prevent future problems. How can you care for yourself at home? · Rest until you feel better. · Take your medicine exactly as prescribed. Call your doctor if you think you are having a problem with your medicine. · Do not drive after taking a prescription pain medicine. When should you call for help? Call 911 if:    · You passed out (lost consciousness).     · You have severe difficulty breathing.     · You have symptoms of a heart attack. These may include:  ? Chest pain or pressure, or a strange feeling in your chest.  ? Sweating. ? Shortness of breath. ? Nausea or vomiting. ? Pain, pressure, or a strange feeling in your back, neck, jaw, or upper belly or in one or both shoulders or arms. ? Lightheadedness or sudden weakness. ? A fast or irregular heartbeat.   After you call 911, the  may tell you to chew 1 adult-strength or 2 to 4 low-dose aspirin. Wait for an ambulance. Do not try to drive yourself.    Call your doctor today if:    · You have any trouble breathing.     · Your chest pain gets worse.     · You are dizzy or lightheaded, or you feel like you may faint.     · You are not getting better as expected.     · You are having new or different chest pain. Where can you learn more? Go to http://imani-jenny.info/. Enter A120 in the search box to learn more about \"Chest Pain: Care Instructions. \"  Current as of: November 20, 2017  Content Version: 11.8  © 3791-7252 [employer]. Care instructions adapted under license by [employer] (which disclaims liability or warranty for this information). If you have questions about a medical condition or this instruction, always ask your healthcare professional. [place] [address] any warranty or liability for your use of this information. Patient Education        Learning About High Blood Sugar  What is high blood sugar? Your body turns the food you eat into glucose (sugar), which it uses for energy. But if your body isn't able to use the sugar right away, it can build up in your blood and lead to high blood sugar. When the amount of sugar in your blood stays too high for too much of the time, you may have diabetes. Diabetes is a disease that can cause serious health problems. The good news is that lifestyle changes may help you get your blood sugar back to normal and avoid or delay diabetes. What causes high blood sugar? Sugar (glucose) can build up in your blood if you:  · Are overweight. · Have a family history of diabetes. · Take certain medicines, such as steroids. What are the symptoms? Having high blood sugar may not cause any symptoms at all. Or it may make you feel very thirsty or very hungry.  You may also urinate more often than usual, have blurry vision, or lose weight without trying. How is high blood sugar treated? You can take steps to lower your blood sugar level if you understand what makes it get higher. Your doctor may want you to learn how to test your blood sugar level at home. Then you can see how illness, stress, or different kinds of food or medicine raise or lower your blood sugar level. Other tests may be needed to see if you have diabetes. How can you prevent high blood sugar? · Watch your weight. If you're overweight, losing just a small amount of weight may help. Reducing fat around your waist is most important. · Limit the amount of calories, sweets, and unhealthy fat you eat. Ask your doctor if a dietitian can help you. A registered dietitian can help you create meal plans that fit your lifestyle. · Get at least 30 minutes of exercise on most days of the week. Exercise helps control your blood sugar. It also helps you maintain a healthy weight. Walking is a good choice. You also may want to do other activities, such as running, swimming, cycling, or playing tennis or team sports. · If your doctor prescribed medicines, take them exactly as prescribed. Call your doctor if you think you are having a problem with your medicine. You will get more details on the specific medicines your doctor prescribes. Follow-up care is a key part of your treatment and safety. Be sure to make and go to all appointments, and call your doctor if you are having problems. It's also a good idea to know your test results and keep a list of the medicines you take. Where can you learn more? Go to http://imani-jenny.info/. Enter O108 in the search box to learn more about \"Learning About High Blood Sugar. \"  Current as of: December 7, 2017  Content Version: 11.8  © 6027-7960 [employer]. Care instructions adapted under license by [employer] (which disclaims liability or warranty for this information).  If you have questions about a medical condition or this instruction, always ask your healthcare professional. [place] [address] any warranty or liability for your use of this information.

## 2019-01-11 NOTE — ED NOTES
I have reviewed discharge instructions with the patient. The patient verbalized understanding. Patient armband removed and shredded. No prescriptions given. Pt ambulated out of the ED accompanied by .

## 2019-01-11 NOTE — TELEPHONE ENCOUNTER
----- Message from Batsheva Bergman MD sent at 1/11/2019 12:43 PM EST -----  Result reviewed. LPN to call pt with results and put in ergocalciferol 50,000 units qwk x 12 wks with 0 refills.

## 2019-01-11 NOTE — ED PROVIDER NOTES
Emergency Department Treatment Report Patient: Roma Henson Age: 51 y.o. Sex: female YOB: 1970 Admit Date: 1/11/2019 PCP: Govind Adrian NP  
MRN: [id number]  CSN: [id number] Room: [place] [address] Time Dictated: 1:13 AM   
 
Chief Complaint Chief Complaint Patient presents with  Chest Pain  Shortness of Breath History of Present Illness 51 y.o. female, PMH anxiety, bipolar, diabetes, breast CA, HTN, bladder prolapse, presents to the ED for the evaluation of constant, chest pain x 1 day. Patient explains that it feels like a tightness and as if she is choking and needing to gasp for air. She notes that the pain is just there and is not exacerbated or relieved by anything. Does report some associated coughing, chills, and subjective fever. She has not self-medicated for the chest pain. Notes that she was seen at Urgent Care yesterday for the bladder prolapse pain and has an appointment for further evaluation of it today. She was also diagnosed with bronchitis at that time. Denies any other complaints or concerns at this time. Review of Systems Constitutional: Chills. -fever Eyes: No visual symptoms. ENT: No sore throat, runny nose or ear pain. Respiratory: Cough. Cardiovascular: Chest pain, tightness. Gastrointestinal: No diarrhea or abdominal pain. Genitourinary: No dysuria, frequency, or urgency. Musculoskeletal: No joint pain or swelling. Integumentary: No rashes. Neurological: No headaches, sensory or motor symptoms. Denies complaints in all other systems. Past Medical/Surgical History Past Medical History:  
Diagnosis Date  Anxiety  Bipolar disorder ([facility] [address]  Breast cancer ([facility] [address] breast cancer, right, S/P Mastectomy and chemo, radiation in 2013  Depression  Diabetes ([facility] [address]  Drug abuse ([facility] [address]   
 H/O cocaine use in past  
 Drug-seeking behavior  Family history of early CAD  Gastrointestinal disorder   
 cholitis  H/O ETOH abuse  Hyperlipidemia  Hypertension  Malignant neoplasm without specification of site [facility])  Methamphetamine abuse ([facility] [address] self reported on 1/3/16  Spine injury  Vitamin D deficiency 2018 Past Surgical History:  
Procedure Laterality Date  COLONOSCOPY N/A 2016 COLONOSCOPY performed by Veronica Shelton MD at [facility] ENDOSCOPY  
 HX BREAST LUMPECTOMY  2013  
 right  HX HYSTERECTOMY  HX ORTHOPAEDIC Back fusion surgery 14.  HX OTHER SURGICAL    
 mediport  HX TONSILLECTOMY  HX TUBAL LIGATION Social History Social History Socioeconomic History  Marital status:  Spouse name: Not on file  Number of children: Not on file  Years of education: Not on file  Highest education level: Not on file Tobacco Use  Smoking status: Never Smoker  Smokeless tobacco: Never Used Substance and Sexual Activity  Alcohol use: No  
  Comment: \"Occasionally\"  Drug use: No  
 Sexual activity: No  
Social History Narrative ** Merged History Encounter ** Family History Family History Problem Relation Age of Onset  Heart Disease Mother  Stroke Father  Heart Disease Father 48  Diabetes Other  Hypertension Other  Cancer Maternal Grandmother Home Medications Prior to Admission Medications Prescriptions Last Dose Informant Patient Reported? Taking? Calcium-Cholecalciferol, D3, (CALCIUM 500 WITH D) 500 mg(1,250mg) -400 unit tab   No No  
Sig: Take 1 Tab by mouth daily. EPINEPHrine (EPIPEN) 0.3 mg/0.3 mL injection   Yes No  
Si.3 mg. Insulin Needles, Disposable, 31 gauge x \" ndle   No No  
Sig: Use to administer insulin as directed LORazepam (ATIVAN) 1 mg tablet   Yes No  
Si mg. NARCAN 4 mg/actuation nasal spray   Yes No  
atorvastatin (LIPITOR) 40 mg tablet   No No  
Sig: Take 1 Tab by mouth daily. azithromycin (ZITHROMAX) 250 mg tablet   Yes No  
benzonatate (TESSALON) 100 mg capsule   Yes No  
budesonide (RHINOCORT ALLERGY) 32 mcg/actuation nasal spray   No No  
Si Sprays by Both Nostrils route daily. Indications: Allergic Rhinitis  
carvedilol (COREG) 3.125 mg tablet   No No  
Sig: Take 2 Tabs by mouth two (2) times daily (with meals). cyanocobalamin (VITAMIN B-12) 1,000 mcg tablet   No No  
Sig: Take 1 Tab by mouth daily. ferrous sulfate 325 mg (65 mg iron) tablet   No No  
Sig: Take 1 Tab by mouth Daily (before breakfast). fluconazole (DIFLUCAN) 150 mg tablet   Yes No  
glucose blood VI test strips (ASCENSIA AUTODISC VI, ONE TOUCH ULTRA TEST VI) strip   Yes No  
Si Each. insulin nph-regular human rec (HUMULIN 70-30) 100 unit/mL (70-30) inpn   No No  
Sig: Give 40 units in the QAM and 45 units at bedtime  
lisinopril-hydroCHLOROthiazide (PRINZIDE, ZESTORETIC) 20-12.5 mg per tablet   No No  
Sig: Take 1 Tab by mouth two (2) times a day. mupirocin (BACTROBAN) 2 % ointment   Yes No  
Sig: Use 1 Application to affected area 3 Times Daily. naloxone [facility]) 2 mg/actuation spry   No No  
Sig: Use 1 spray intranasally, then discard. Repeat with new spray every 2 min as needed for opioid overdose symptoms, alternating nostrils. ondansetron (ZOFRAN ODT) 4 mg disintegrating tablet   No No  
Sig: Take 1 Tab by mouth every eight (8) hours as needed for Nausea. polyethylene glycol (MIRALAX) 17 gram packet   No No  
Sig: Take 1 Packet by mouth daily. valACYclovir (VALTREX) 1 gram tablet   Yes No  
Sig: Take  by mouth. Facility-Administered Medications: None Allergies Allergies Allergen Reactions  Latex Hives and Itching  Other Food Rash Almonds  Amoxicillin Swelling Other reaction(s): mild rash/itching  Aspirin Not Reported This Time and Swelling Mouth swells  Fentanyl Anaphylaxis and Swelling Patches cause mouth to swell Swelling in mouth from fentanyl patch  Levaquin [Levofloxacin] Not Reported This Time and Swelling Other reaction(s): mild rash/itching  Chlorhexidine Rash  Norco [Hydrocodone-Acetaminophen] Nausea and Vomiting Other reaction(s): gi distress  Acetaminophen Other (comments)  [place] Rash and Itching  Codeine Other (comments)  Glycopyrrolate Swelling Pt  States  faciAL  SWELLING   POST  ROBINUL  IV Pt  States  faciAL  SWELLING   POST  ROBINUL  IV   
 Morphine Nausea and Vomiting Pt states she is not allergic  Pcn [Penicillins] Swelling  Percocet [Oxycodone-Acetaminophen] Nausea and Vomiting Other reaction(s): gi distress 
nausea Other reaction(s): anaphylaxis/angioedema Per pt. She is allergic  Tape [Adhesive] Rash  Tramadol Swelling Physical Exam  
 
ED Triage Vitals [01/11/19 0029] ED Encounter Vitals Group BP (!) 159/99 Pulse (Heart Rate) 85 Resp Rate 16 Temp 98.4 °F (36.9 °C) Temp src O2 Sat (%) 99 % Weight 160 lb Height 5' 2\" Constitutional: Patient appears well developed and well nourished. Appearance and behavior are age and situation appropriate. HEENT: Conjunctiva clear. PERRLA. Mucous membranes moist, non-erythematous. Surface of the pharynx, palate, and tongue are pink, moist and without lesions. Neck: supple, non tender, symmetrical, no masses or JVD. Respiratory: lungs clear to auscultation, nonlabored respirations. No tachypnea or accessory muscle use. Cardiovascular: heart regular rate and rhythm without murmur rubs or gallops. Calves soft and non-tender. Distal pulses 2+ and equal bilaterally. No peripheral edema. Gastrointestinal:  Abdomen soft, nontender without complaint of pain to palpation Musculoskeletal: Nail beds pink with prompt capillary refill Integumentary: warm and dry without rashes or lesions Neurologic: alert and oriented, Sensation intact, motor strength equal and symmetric. No facial asymmetry or dysarthria. Diagnostic Studies Lab:  
Recent Results (from the past 12 hour(s)) EKG, 12 LEAD, INITIAL Collection Time: 01/11/19 12:26 AM  
Result Value Ref Range Ventricular Rate 79 BPM  
 Atrial Rate 79 BPM  
 P-R Interval 156 ms QRS Duration 80 ms  
 Q-T Interval 382 ms QTC Calculation (Bezet) 438 ms Calculated P Axis 57 degrees Calculated R Axis -20 degrees Calculated T Axis 76 degrees Diagnosis Normal sinus rhythm Normal ECG When compared with ECG of 29-DEC-2018 19:18, No significant change was found CBC WITH AUTOMATED DIFF Collection Time: 01/11/19 12:52 AM  
Result Value Ref Range WBC 6.8 4.6 - 13.2 K/uL  
 RBC 4.55 4.20 - 5.30 M/uL  
 HGB 13.5 12.0 - 16.0 g/dL HCT 40.3 35.0 - 45.0 % MCV 88.6 74.0 - 97.0 FL  
 MCH 29.7 24.0 - 34.0 PG  
 MCHC 33.5 31.0 - 37.0 g/dL  
 RDW 13.4 11.6 - 14.5 % PLATELET 269 628 - 236 K/uL MPV 11.4 9.2 - 11.8 FL  
 NEUTROPHILS 57 40 - 73 % LYMPHOCYTES 35 21 - 52 % MONOCYTES 6 3 - 10 % EOSINOPHILS 2 0 - 5 % BASOPHILS 0 0 - 2 %  
 ABS. NEUTROPHILS 3.9 1.8 - 8.0 K/UL  
 ABS. LYMPHOCYTES 2.4 0.9 - 3.6 K/UL  
 ABS. MONOCYTES 0.4 0.05 - 1.2 K/UL  
 ABS. EOSINOPHILS 0.1 0.0 - 0.4 K/UL  
 ABS. BASOPHILS 0.0 0.0 - 0.1 K/UL  
 DF AUTOMATED METABOLIC PANEL, COMPREHENSIVE Collection Time: 01/11/19 12:52 AM  
Result Value Ref Range Sodium 135 (L) 136 - 145 mmol/L Potassium 3.8 3.5 - 5.5 mmol/L Chloride 99 (L) 100 - 108 mmol/L  
 CO2 28 21 - 32 mmol/L Anion gap 8 3.0 - 18 mmol/L Glucose 481 (HH) 74 - 99 mg/dL BUN 14 7.0 - 18 MG/DL Creatinine 1.17 0.6 - 1.3 MG/DL  
 BUN/Creatinine ratio 12 12 - 20 GFR est AA 60 (L) >60 ml/min/1.73m2 GFR est non-AA 49 (L) >60 ml/min/1.73m2 Calcium 9.0 8.5 - 10.1 MG/DL Bilirubin, total 0.2 0.2 - 1.0 MG/DL  
 ALT (SGPT) 32 13 - 56 U/L  
 AST (SGOT) 14 (L) 15 - 37 U/L Alk.  phosphatase 159 (H) 45 - 117 U/L  
 Protein, total 7.4 6.4 - 8.2 g/dL Albumin 3.8 3.4 - 5.0 g/dL Globulin 3.6 2.0 - 4.0 g/dL A-G Ratio 1.1 0.8 - 1.7 NT-PRO BNP Collection Time: 01/11/19 12:52 AM  
Result Value Ref Range NT pro-BNP 94 0 - 450 PG/ML  
ETHYL ALCOHOL Collection Time: 01/11/19 12:52 AM  
Result Value Ref Range ALCOHOL(ETHYL),SERUM <3 0 - 3 MG/DL  
LIPASE Collection Time: 01/11/19 12:52 AM  
Result Value Ref Range Lipase 290 73 - 393 U/L  
CARDIAC PANEL,(CK, CKMB & TROPONIN) Collection Time: 01/11/19 12:52 AM  
Result Value Ref Range CK 28 26 - 192 U/L  
 CK - MB <1.0 <3.6 ng/ml CK-MB Index  0.0 - 4.0 % CALCULATION NOT PERFORMED WHEN RESULT IS BELOW LINEAR LIMIT Troponin-I, QT <0.02 0.0 - 0.045 NG/ML Imaging: No results found. Ana Luisa 
 
ED Course/Medical Decision Making Patient appears well and is in no acute distress. Her EKG and troponin do not reveal any evidence of ACS. Symptoms atypical for PE or aortic dissection at this time. Her HEART score is 2. Labs unremarkable, other than hyperglycemia without an anion gap. Her last A1C was 13, so suspect this is chronic hyperglycemia. Offered IVF, but she is refusing at this time. She is asking for morphine and is refusing Toradol. Explained than narcotics are not indicated at this time. She has an appointment with Willa Aschoff today to discuss treatment options of her bladder prolapse. Will D/C patient home with f/u at her PMD. Given strict return precautions and she understands. Medications  
ketorolac (TORADOL) injection 15 mg (15 mg IntraVENous Refused 1/11/19 0120)  
nitroglycerin (NITROSTAT) 0.4 mg tablet (not administered)  
ondansetron (ZOFRAN) 4 mg/2 mL injection (not administered)  
sodium chloride 0.9 % bolus infusion 1,000 mL (not administered) metoclopramide HCl (REGLAN) injection 10 mg (not administered)  
nitroglycerin (NITROSTAT) tablet 0.4 mg (0.4 mg SubLINGual Given 1/11/19 0120) ondansetron [facility]) injection 8 mg (8 mg IntraVENous Given 1/11/19 0128) Final Diagnosis ICD-10-CM ICD-9-CM 1. Atypical chest pain R07.89 786.59  
2. Hyperglycemia R73.9 790.29 Disposition Patient is discharged home in stable condition. Advised to follow with their primary care physician. Patient advised to return to the ER for any new or worsening symptoms. My Medications ASK your doctor about these medications Instructions Each Dose to Equal Morning Noon Evening Bedtime  
atorvastatin 40 mg tablet Commonly known as:  LIPITOR Your last dose was: Your next dose is: Take 1 Tab by mouth daily. 40 mg 
  
  
  
  
  
azithromycin 250 mg tablet Commonly known as:  Kami Marques Your last dose was: Your next dose is:   
 
  
    
  
  
  
  
benzonatate 100 mg capsule Commonly known as:  TESSALON Your last dose was: Your next dose is:   
 
  
    
  
  
  
  
budesonide 32 mcg/actuation nasal spray Commonly known as:  RHINOCORT ALLERGY Your last dose was: Your next dose is: 2 Sprays by Both Nostrils route daily. Indications: Allergic Rhinitis 2 Spray Calcium-Cholecalciferol (D3) 500 mg(1,250mg) -400 unit Tab Commonly known as:  CALCIUM 500 WITH D Your last dose was: Your next dose is: Take 1 Tab by mouth daily. 1 Tab 
  
  
  
  
  
carvedilol 3.125 mg tablet Commonly known as:  Tressa Solid Your last dose was: Your next dose is: Take 2 Tabs by mouth two (2) times daily (with meals). 6.25 mg 
  
  
  
  
  
cyanocobalamin 1,000 mcg tablet Commonly known as:  VITAMIN B-12 Your last dose was: Your next dose is: Take 1 Tab by mouth daily. 1000 mcg EPINEPHrine 0.3 mg/0.3 mL injection Commonly known as:  [place] Var[place] Your last dose was: Your next dose is: 0.3 mg. 
 0.3 mg 
  
  
  
  
  
ferrous sulfate 325 mg (65 mg iron) tablet Your last dose was: Your next dose is: Take 1 Tab by mouth Daily (before breakfast). 325 mg 
  
  
  
  
  
fluconazole 150 mg tablet Commonly known as:  DIFLUCAN Your last dose was: Your next dose is:   
 
  
    
  
  
  
  
glucose blood VI test strips strip Commonly known as:  ASCENSIA AUTODISC VI, ONE TOUCH ULTRA TEST VI Your last dose was: Your next dose is:   
 
  
 50 Each. 50 Each Insulin Needles (Disposable) 31 gauge x 5/16\" Ndle Your last dose was: Your next dose is:   
 
  
 Use to administer insulin as directed 
   
  
  
  
  
insulin nph-regular human rec 100 unit/mL (70-30) Inpn Commonly known as:  HUMULIN 70-30 Your last dose was: Your next dose is:   
 
  
 Give 40 units in the QAM and 45 units at bedtime 
   
  
  
  
  
lisinopril-hydroCHLOROthiazide 20-12.5 mg per tablet Commonly known as:  Lala Stewart Your last dose was: Your next dose is: Take 1 Tab by mouth two (2) times a day. 1 Tab LORazepam 1 mg tablet Commonly known as:  ATIVAN Your last dose was: Your next dose is:   
 
  
 1 mg. 
 1 mg 
  
  
  
  
  
mupirocin 2 % ointment Commonly known as:  [employer] Your last dose was: Your next dose is:   
 
  
 Use 1 Application to affected area 3 Times Daily. * naloxone 2 mg/actuation Spry Commonly known as:  [employer] Your last dose was: Your next dose is:   
 
  
 Use 1 spray intranasally, then discard. Repeat with new spray every 2 min as needed for opioid overdose symptoms, alternating nostrils. * NARCAN 4 mg/actuation nasal spray Generic drug:  naloxone Your last dose was: Your next dose is:   
 
  
    
  
  
  
  
ondansetron 4 mg disintegrating tablet Commonly known as:  ZOFRAN ODT Your last dose was: Your next dose is: Take 1 Tab by mouth every eight (8) hours as needed for Nausea. 4 mg polyethylene glycol 17 gram packet Commonly known as:  Chelsea Mule Your last dose was: Your next dose is: Take 1 Packet by mouth daily. 17 g VALTREX 1 gram tablet Generic drug:  valACYclovir Your last dose was: Your next dose is: Take  by mouth. * This list has 2 medication(s) that are the same as other medications prescribed for you. Read the directions carefully, and ask your doctor or other care provider to review them with you. Bettye Dorsey MD 
January 11, 2019 My signature above authenticates this document and my orders, the final   
diagnosis (es), discharge prescription (s), and instructions in the Epic   
record. If you have any questions please contact [phone]. 
  
Nursing notes have been reviewed by the physician/ advanced practice   
Clinician. Scribe Attestation Tammy Joshi acting as a scribe for and in the presence of Ashleigh Richardson MD     
January 11, 2019 at 1:13 AM 
    
Provider Attestation:     
I personally performed the services described in the documentation, reviewed the documentation, as recorded by the scribe in my presence, and it accurately and completely records my words and actions.  January 11, 2019 at 1:13 AM - Ashleigh Richardson MD

## 2019-01-11 NOTE — PROGRESS NOTES
Result reviewed. LPN to call pt with results and put in ergocalciferol 50,000 units qwk x 12 wks with 0 refills.

## 2019-01-11 NOTE — ED TRIAGE NOTES
Pt c/o chest pain in center of chest and SOB x1 week. Went to [facility] urgent care yesterday was dx with bronchitis and hypertension. States her pain has gotten worse since her urgent care visit.

## 2019-01-15 ENCOUNTER — HOSPITAL ENCOUNTER (EMERGENCY)
Age: 49
Discharge: HOME | End: 2019-01-15
Attending: EMERGENCY MEDICINE
Payer: MEDICAID

## 2019-01-15 VITALS
TEMPERATURE: 98.1 F | WEIGHT: 150 LBS | HEIGHT: 62 IN | HEART RATE: 72 BPM | SYSTOLIC BLOOD PRESSURE: 108 MMHG | BODY MASS INDEX: 27.6 KG/M2 | DIASTOLIC BLOOD PRESSURE: 65 MMHG | RESPIRATION RATE: 18 BRPM | OXYGEN SATURATION: 98 %

## 2019-01-15 DIAGNOSIS — R11.0 NAUSEA WITHOUT VOMITING: ICD-10-CM

## 2019-01-15 DIAGNOSIS — R10.9 ABDOMINAL CRAMPING: Primary | ICD-10-CM

## 2019-01-15 LAB
ALBUMIN SERPL-MCNC: 3.7 G/DL (ref 3.4–5)
ALBUMIN/GLOB SERPL: 1 {RATIO} (ref 0.8–1.7)
ALP SERPL-CCNC: 155 U/L (ref 45–117)
ALT SERPL-CCNC: 44 U/L (ref 13–56)
ANION GAP SERPL CALC-SCNC: 5 MMOL/L (ref 3–18)
APPEARANCE UR: CLEAR
AST SERPL-CCNC: 42 U/L (ref 15–37)
BASOPHILS # BLD: 0 K/UL (ref 0–0.1)
BASOPHILS NFR BLD: 0 % (ref 0–2)
BILIRUB SERPL-MCNC: 0.2 MG/DL (ref 0.2–1)
BILIRUB UR QL: NEGATIVE
BUN SERPL-MCNC: 28 MG/DL (ref 7–18)
BUN/CREAT SERPL: 31 (ref 12–20)
CALCIUM SERPL-MCNC: 8.9 MG/DL (ref 8.5–10.1)
CHLORIDE SERPL-SCNC: 105 MMOL/L (ref 100–108)
CO2 SERPL-SCNC: 31 MMOL/L (ref 21–32)
COLOR UR: YELLOW
CREAT SERPL-MCNC: 0.91 MG/DL (ref 0.6–1.3)
DIFFERENTIAL METHOD BLD: NORMAL
EOSINOPHIL # BLD: 0.1 K/UL (ref 0–0.4)
EOSINOPHIL NFR BLD: 1 % (ref 0–5)
ERYTHROCYTE [DISTWIDTH] IN BLOOD BY AUTOMATED COUNT: 13.5 % (ref 11.6–14.5)
GLOBULIN SER CALC-MCNC: 3.6 G/DL (ref 2–4)
GLUCOSE SERPL-MCNC: 143 MG/DL (ref 74–99)
GLUCOSE UR STRIP.AUTO-MCNC: 100 MG/DL
HCT VFR BLD AUTO: 40.6 % (ref 35–45)
HGB BLD-MCNC: 13.3 G/DL (ref 12–16)
HGB UR QL STRIP: NEGATIVE
KETONES UR QL STRIP.AUTO: NEGATIVE MG/DL
LEUKOCYTE ESTERASE UR QL STRIP.AUTO: NEGATIVE
LIPASE SERPL-CCNC: 307 U/L (ref 73–393)
LYMPHOCYTES # BLD: 2.7 K/UL (ref 0.9–3.6)
LYMPHOCYTES NFR BLD: 30 % (ref 21–52)
MCH RBC QN AUTO: 29.9 PG (ref 24–34)
MCHC RBC AUTO-ENTMCNC: 32.8 G/DL (ref 31–37)
MCV RBC AUTO: 91.2 FL (ref 74–97)
MONOCYTES # BLD: 0.5 K/UL (ref 0.05–1.2)
MONOCYTES NFR BLD: 5 % (ref 3–10)
NEUTS SEG # BLD: 5.7 K/UL (ref 1.8–8)
NEUTS SEG NFR BLD: 64 % (ref 40–73)
NITRITE UR QL STRIP.AUTO: NEGATIVE
PH UR STRIP: 5 [PH] (ref 5–8)
PLATELET # BLD AUTO: 291 K/UL (ref 135–420)
PMV BLD AUTO: 11.1 FL (ref 9.2–11.8)
POTASSIUM SERPL-SCNC: 3.2 MMOL/L (ref 3.5–5.5)
PROT SERPL-MCNC: 7.3 G/DL (ref 6.4–8.2)
PROT UR STRIP-MCNC: NEGATIVE MG/DL
RBC # BLD AUTO: 4.45 M/UL (ref 4.2–5.3)
SODIUM SERPL-SCNC: 141 MMOL/L (ref 136–145)
SP GR UR REFRACTOMETRY: 1.02 (ref 1–1.03)
UROBILINOGEN UR QL STRIP.AUTO: 0.2 EU/DL (ref 0.2–1)
WBC # BLD AUTO: 9 K/UL (ref 4.6–13.2)

## 2019-01-15 PROCEDURE — 96374 THER/PROPH/DIAG INJ IV PUSH: CPT

## 2019-01-15 PROCEDURE — 85025 COMPLETE CBC W/AUTO DIFF WBC: CPT

## 2019-01-15 PROCEDURE — 74011250636 HC RX REV CODE- 250/636: Performed by: PHYSICIAN ASSISTANT

## 2019-01-15 PROCEDURE — 81003 URINALYSIS AUTO W/O SCOPE: CPT

## 2019-01-15 PROCEDURE — 74011250636 HC RX REV CODE- 250/636: Performed by: EMERGENCY MEDICINE

## 2019-01-15 PROCEDURE — 80053 COMPREHEN METABOLIC PANEL: CPT

## 2019-01-15 PROCEDURE — 83690 ASSAY OF LIPASE: CPT

## 2019-01-15 PROCEDURE — 99283 EMERGENCY DEPT VISIT LOW MDM: CPT

## 2019-01-15 PROCEDURE — 96375 TX/PRO/DX INJ NEW DRUG ADDON: CPT

## 2019-01-15 RX ORDER — ONDANSETRON 2 MG/ML
4 INJECTION INTRAMUSCULAR; INTRAVENOUS
Status: COMPLETED | OUTPATIENT
Start: 2019-01-15 | End: 2019-01-15

## 2019-01-15 RX ORDER — ONDANSETRON 4 MG/1
4 TABLET, ORALLY DISINTEGRATING ORAL
Qty: 15 TAB | Refills: 0 | Status: SHIPPED | OUTPATIENT
Start: 2019-01-15 | End: 2019-09-23

## 2019-01-15 RX ORDER — KETOROLAC TROMETHAMINE 30 MG/ML
30 INJECTION, SOLUTION INTRAMUSCULAR; INTRAVENOUS
Status: COMPLETED | OUTPATIENT
Start: 2019-01-15 | End: 2019-01-15

## 2019-01-15 RX ORDER — DICYCLOMINE HYDROCHLORIDE 20 MG/1
20 TABLET ORAL EVERY 6 HOURS
Qty: 20 TAB | Refills: 0 | Status: SHIPPED | OUTPATIENT
Start: 2019-01-15 | End: 2019-09-23

## 2019-01-15 RX ADMIN — KETOROLAC TROMETHAMINE 30 MG: 30 INJECTION, SOLUTION INTRAMUSCULAR at 23:41

## 2019-01-15 RX ADMIN — ONDANSETRON 4 MG: 2 INJECTION INTRAMUSCULAR; INTRAVENOUS at 22:28

## 2019-01-16 NOTE — DISCHARGE INSTRUCTIONS

## 2019-01-16 NOTE — ED PROVIDER NOTES
EMERGENCY DEPARTMENT HISTORY AND PHYSICAL EXAM 
 
Date: 1/15/2019 Patient Name: Otis Bacon History of Presenting Illness Chief Complaint Patient presents with  Abdominal Pain History Provided By: Patient Chief Complaint: abd pain Duration: 1 Days Timing:  Gradual and Intermittent Location: entire abdomen Quality: Cramping Severity: Moderate Modifying Factors: none Associated Symptoms: nausea, vomiting HPI: Otis Bacon is a 51 y.o. female with a PMH of DM, HTN, HLD, Depression, breast cancer, bipolar, anxiety, drug seeking behavior, spine injury who presents to the ER c/o abdominal pain. Patient states her symptoms began this morning and continue to persist.  She also reports feeling bloated. She has had some associated nausea, vomiting and dysuria. Patient reports she has an appt with her PCP tomorrow, but wanted to get checked out tonight. She denied any recent fevers, chills, sob, chest pain, back pain and has no other symptoms or complaints. PCP: Ash Young NP Current Outpatient Medications Medication Sig Dispense Refill  ondansetron (ZOFRAN ODT) 4 mg disintegrating tablet Take 1 Tab by mouth every eight (8) hours as needed for Nausea. 15 Tab 0  
 dicyclomine (BENTYL) 20 mg tablet Take 1 Tab by mouth every six (6) hours. 20 Tab 0  Blood-Glucose Meter (ONETOUCH ULTRA2) monitoring kit Use to obtain two times a day. 1 Kit 0  
 lancets misc Use daily to monitor blood glucose 200 Each 0  
 ergocalciferol (ERGOCALCIFEROL) 50,000 unit capsule Take 1 Cap by mouth every seven (7) days for 12 doses. 12 Cap 0  
 glucose blood VI test strips (ASCENSIA AUTODISC VI, ONE TOUCH ULTRA TEST VI) strip 50 Each by Does Not Apply route two (2) times daily as needed. 200 Strip 3  
 benzonatate (TESSALON) 100 mg capsule  EPINEPHrine (EPIPEN) 0.3 mg/0.3 mL injection 0.3 mg.    
 ondansetron (ZOFRAN ODT) 4 mg disintegrating tablet Take 1 Tab by mouth every eight (8) hours as needed for Nausea. 15 Tab 0  
 cyanocobalamin (VITAMIN B-12) 1,000 mcg tablet Take 1 Tab by mouth daily. 90 Tab 0  
 lisinopril-hydroCHLOROthiazide (PRINZIDE, ZESTORETIC) 20-12.5 mg per tablet Take 1 Tab by mouth two (2) times a day. 180 Tab 3  carvedilol (COREG) 3.125 mg tablet Take 2 Tabs by mouth two (2) times daily (with meals). 90 Tab 3  
 budesonide (RHINOCORT ALLERGY) 32 mcg/actuation nasal spray 2 Sprays by Both Nostrils route daily. Indications: Allergic Rhinitis 1 Bottle 3  
 insulin nph-regular human rec (HUMULIN 70-30) 100 unit/mL (70-30) inpn Give 40 units in the QAM and 45 units at bedtime 5 Pen 3  
 ferrous sulfate 325 mg (65 mg iron) tablet Take 1 Tab by mouth Daily (before breakfast). 30 Tab 3  polyethylene glycol (MIRALAX) 17 gram packet Take 1 Packet by mouth daily. 30 Each 1  
 Insulin Needles, Disposable, 31 gauge x 5/16\" ndle Use to administer insulin as directed 1 Package 11  
 atorvastatin (LIPITOR) 40 mg tablet Take 1 Tab by mouth daily. 30 Tab 6  
 LORazepam (ATIVAN) 1 mg tablet 1 mg. Past History Past Medical History: 
Past Medical History:  
Diagnosis Date  Anxiety  Bipolar disorder ([address]  Breast cancer ([address] breast cancer, right, S/P Mastectomy and chemo, radiation in 2013  Depression  Diabetes ([address]  Drug abuse ([address]   
 H/O cocaine use in past  
 Drug-seeking behavior  Family history of early CAD  Gastrointestinal disorder   
 cholitis  H/O ETOH abuse  Hyperlipidemia  Hypertension  Malignant neoplasm without specification of site [facility])  Methamphetamine abuse ([address] self reported on 1/3/16  Spine injury  Vitamin D deficiency 5/1/2018 Past Surgical History: 
Past Surgical History:  
Procedure Laterality Date  COLONOSCOPY N/A 7/18/2016 COLONOSCOPY performed by Rosa Humphreys MD at [facility] ENDOSCOPY  
 HX BREAST LUMPECTOMY  06/2013  
 right  HX HYSTERECTOMY  HX MASTECTOMY    
 had expanders put in 6/2018  HX ORTHOPAEDIC Back fusion surgery 4/18/14.  HX OTHER SURGICAL    
 mediport  HX TONSILLECTOMY  HX TUBAL LIGATION Family History: 
Family History Problem Relation Age of Onset  Heart Disease Mother  Stroke Father  Heart Disease Father 48  Diabetes Other  Hypertension Other  Cancer Maternal Grandmother Social History: 
Social History Tobacco Use  Smoking status: Never Smoker  Smokeless tobacco: Never Used Substance Use Topics  Alcohol use: Yes Comment: \"Occasionally\"  Drug use: No  
 
 
Allergies: Allergies Allergen Reactions  Latex Hives and Itching  Other Food Rash Almonds  Amoxicillin Swelling Other reaction(s): mild rash/itching  Aspirin Not Reported This Time and Swelling Mouth swells  Beeswax Anaphylaxis  Fentanyl Anaphylaxis and Swelling Patches cause mouth to swell Swelling in mouth from fentanyl patch  Levaquin [Levofloxacin] Not Reported This Time and Swelling Other reaction(s): mild rash/itching  Chlorhexidine Rash  Norco [Hydrocodone-Acetaminophen] Nausea and Vomiting Other reaction(s): gi distress  Acetaminophen Other (comments)  Eckert Rash and Itching  Codeine Other (comments)  Glycopyrrolate Swelling Pt  States  faciAL  SWELLING   POST  ROBINUL  IV Pt  States  faciAL  SWELLING   POST  ROBINUL  IV   
 Morphine Nausea and Vomiting Pt states she is not allergic  Pcn [Penicillins] Swelling  Percocet [Oxycodone-Acetaminophen] Nausea and Vomiting Other reaction(s): gi distress 
nausea Other reaction(s): anaphylaxis/angioedema Per pt. She is allergic  Tape [Adhesive] Rash  Tramadol Swelling Review of Systems Review of Systems Constitutional: Negative for chills, fatigue and fever. HENT: Negative. Negative for sore throat. Eyes: Negative. Respiratory: Negative for cough and shortness of breath. Cardiovascular: Negative for chest pain and palpitations. Gastrointestinal: Positive for abdominal pain, nausea and vomiting. Genitourinary: Positive for dysuria. Musculoskeletal: Negative. Skin: Negative. Neurological: Negative for dizziness, weakness, light-headedness and headaches. Psychiatric/Behavioral: Negative. All other systems reviewed and are negative. Physical Exam  
 
Vitals:  
 01/15/19 2200 01/15/19 2330 BP: 135/88 108/65 Pulse: 72 Resp: 18 Temp: 98.1 °F (36.7 °C) SpO2: 99% 98% Weight: 68 kg (150 lb) Height: 5' 2\" (1.575 m) Physical Exam  
Constitutional: She is oriented to person, place, and time. She appears well-developed and well-nourished. No distress. HENT:  
Head: Normocephalic and atraumatic. Mouth/Throat: Oropharynx is clear and moist.  
Eyes: Conjunctivae are normal. No scleral icterus. Neck: Neck supple. No JVD present. No tracheal deviation present. Cardiovascular: Normal rate, regular rhythm and normal heart sounds. No murmur heard. Pulmonary/Chest: Effort normal and breath sounds normal. No respiratory distress. She has no wheezes. She has no rales. Abdominal: Soft. Bowel sounds are normal. She exhibits no distension and no mass. There is tenderness. There is no rebound, no guarding and no CVA tenderness. No peritoneal signs Musculoskeletal: Normal range of motion. Neurological: She is alert and oriented to person, place, and time. She has normal strength. Gait normal. GCS eye subscore is 4. GCS verbal subscore is 5. GCS motor subscore is 6. Skin: Skin is warm and dry. She is not diaphoretic. Psychiatric: She has a normal mood and affect. Nursing note and vitals reviewed. Diagnostic Study Results Labs - Recent Results (from the past 12 hour(s)) URINALYSIS W/ RFLX MICROSCOPIC Collection Time: 01/15/19 10:16 PM  
Result Value Ref Range Color YELLOW Appearance CLEAR Specific gravity 1.021 1.005 - 1.030    
 pH (UA) 5.0 5.0 - 8.0 Protein NEGATIVE  NEG mg/dL Glucose 100 (A) NEG mg/dL Ketone NEGATIVE  NEG mg/dL Bilirubin NEGATIVE  NEG Blood NEGATIVE  NEG Urobilinogen 0.2 0.2 - 1.0 EU/dL Nitrites NEGATIVE  NEG Leukocyte Esterase NEGATIVE  NEG    
CBC WITH AUTOMATED DIFF Collection Time: 01/15/19 10:30 PM  
Result Value Ref Range WBC 9.0 4.6 - 13.2 K/uL  
 RBC 4.45 4.20 - 5.30 M/uL  
 HGB 13.3 12.0 - 16.0 g/dL HCT 40.6 35.0 - 45.0 % MCV 91.2 74.0 - 97.0 FL  
 MCH 29.9 24.0 - 34.0 PG  
 MCHC 32.8 31.0 - 37.0 g/dL  
 RDW 13.5 11.6 - 14.5 % PLATELET 542 447 - 489 K/uL MPV 11.1 9.2 - 11.8 FL  
 NEUTROPHILS 64 40 - 73 % LYMPHOCYTES 30 21 - 52 % MONOCYTES 5 3 - 10 % EOSINOPHILS 1 0 - 5 % BASOPHILS 0 0 - 2 %  
 ABS. NEUTROPHILS 5.7 1.8 - 8.0 K/UL  
 ABS. LYMPHOCYTES 2.7 0.9 - 3.6 K/UL  
 ABS. MONOCYTES 0.5 0.05 - 1.2 K/UL  
 ABS. EOSINOPHILS 0.1 0.0 - 0.4 K/UL  
 ABS. BASOPHILS 0.0 0.0 - 0.1 K/UL  
 DF AUTOMATED METABOLIC PANEL, COMPREHENSIVE Collection Time: 01/15/19 10:30 PM  
Result Value Ref Range Sodium 141 136 - 145 mmol/L Potassium 3.2 (L) 3.5 - 5.5 mmol/L Chloride 105 100 - 108 mmol/L  
 CO2 31 21 - 32 mmol/L Anion gap 5 3.0 - 18 mmol/L Glucose 143 (H) 74 - 99 mg/dL BUN 28 (H) 7.0 - 18 MG/DL Creatinine 0.91 0.6 - 1.3 MG/DL  
 BUN/Creatinine ratio 31 (H) 12 - 20 GFR est AA >60 >60 ml/min/1.73m2 GFR est non-AA >60 >60 ml/min/1.73m2 Calcium 8.9 8.5 - 10.1 MG/DL Bilirubin, total 0.2 0.2 - 1.0 MG/DL  
 ALT (SGPT) 44 13 - 56 U/L  
 AST (SGOT) 42 (H) 15 - 37 U/L Alk. phosphatase 155 (H) 45 - 117 U/L Protein, total 7.3 6.4 - 8.2 g/dL Albumin 3.7 3.4 - 5.0 g/dL Globulin 3.6 2.0 - 4.0 g/dL A-G Ratio 1.0 0.8 - 1.7 LIPASE Collection Time: 01/15/19 10:30 PM  
Result Value Ref Range  Lipase 307 73 - 393 U/L  
 
 
 Radiologic Studies - No orders to display CT Results  (Last 48 hours) None CXR Results  (Last 48 hours) None Medical Decision Making I am the first provider for this patient. I reviewed the vital signs, available nursing notes, past medical history, past surgical history, family history and social history. Vital Signs-Reviewed the patient's vital signs. Records Reviewed: Nursing Notes, Old Medical Records, Previous Radiology Studies and Previous Laboratory Studies 11:27 PM 
52 y/o female w/ hx of chronic abd pain presents to ER c/o abd pain/bloating, nausea and vomiting intermittently x 2 weeks. Reports dysuria as well. Denied any fevers, chills on exam.  No peritoneal signs. Glucose controlled much better than usual for patient. Pt has log of glucose and BP values she has been monitoring at home. Has appt with PCP tomorrow already. UA with no acute findings. All labs reviewed and noted to be stable. No active vomiting while in the ER. Vitals stable. No clinical suspicion for any further imaging/labs at this time. Discussed all results with pt. Reports still having some pain, not reproducible on exam asking for pain meds. Offered toradol and or bentyl, and pt agrees for toradol at this time. Will have f/u with pcp tomorrow. Discussed return precautions. All questions answered and patient in agreement with plan of care. Will plan for discharge. Zach Galeas PA-C Disposition: 
Discharged DISCHARGE NOTE:  
 
  Care plan outlined and precautions discussed. Patient has no new complaints, changes, or physical findings. Results of labs, ua were reviewed with the patient. All medications were reviewed with the patient; will d/c home with zofran and bentyl. All of pt's questions and concerns were addressed. Patient was instructed and agrees to follow up with pcp, as well as to return to the ED upon further deterioration.  Patient is ready to go home. Follow-up Information Follow up With Specialties Details Why Contact Info [facility] EMERGENCY DEPT Emergency Medicine  If symptoms worsen [address] 
[phone] Romario Martin NP Nurse Practitioner Go in 1 day primary care appt as discussed [id number] [facility] [id number] [phone] Discharge Medication List as of 1/15/2019 11:27 PM  
  
START taking these medications Details  
!! ondansetron (ZOFRAN ODT) 4 mg disintegrating tablet Take 1 Tab by mouth every eight (8) hours as needed for Nausea. , Print, Disp-15 Tab, R-0  
  
dicyclomine (BENTYL) 20 mg tablet Take 1 Tab by mouth every six (6) hours. , Print, Disp-20 Tab, R-0  
  
 !! - Potential duplicate medications found. Please discuss with provider. CONTINUE these medications which have NOT CHANGED Details Blood-Glucose Meter (ONETOUCH ULTRA2) monitoring kit Use to obtain two times a day., Normal, Disp-1 Kit, R-0  
  
lancets misc Use daily to monitor blood glucose, Normal, Disp-200 Each, R-0  
  
ergocalciferol (ERGOCALCIFEROL) 50,000 unit capsule Take 1 Cap by mouth every seven (7) days for 12 doses. , Normal, Disp-12 Cap, R-0  
  
glucose blood VI test strips (ASCENSIA AUTODISC VI, ONE TOUCH ULTRA TEST VI) strip 50 Each by Does Not Apply route two (2) times daily as needed., Normal, Disp-200 Strip, R-3  
  
benzonatate (TESSALON) 100 mg capsule Historical Med EPINEPHrine (EPIPEN) 0.3 mg/0.3 mL injection 0.3 mg., Historical Med  
  
!! ondansetron (ZOFRAN ODT) 4 mg disintegrating tablet Take 1 Tab by mouth every eight (8) hours as needed for Nausea. , Print, Disp-15 Tab, R-0  
  
cyanocobalamin (VITAMIN B-12) 1,000 mcg tablet Take 1 Tab by mouth daily. , Normal, Disp-90 Tab, R-0  
  
lisinopril-hydroCHLOROthiazide (PRINZIDE, ZESTORETIC) 20-12.5 mg per tablet Take 1 Tab by mouth two (2) times a day., Normal, Disp-180 Tab, R-3  
  
 carvedilol (COREG) 3.125 mg tablet Take 2 Tabs by mouth two (2) times daily (with meals). , Normal, Disp-90 Tab, R-3  
  
budesonide (RHINOCORT ALLERGY) 32 mcg/actuation nasal spray 2 Sprays by Both Nostrils route daily. Indications: Allergic Rhinitis, Normal, Disp-1 Bottle, R-3  
  
insulin nph-regular human rec (HUMULIN 70-30) 100 unit/mL (70-30) inpn Give 40 units in the QAM and 45 units at bedtime, Normal, Disp-5 Pen, R-3  
  
ferrous sulfate 325 mg (65 mg iron) tablet Take 1 Tab by mouth Daily (before breakfast). , Normal, Disp-30 Tab, R-3  
  
polyethylene glycol (MIRALAX) 17 gram packet Take 1 Packet by mouth daily. , Normal, Disp-30 Each, R-1 Insulin Needles, Disposable, 31 gauge x 5/16\" ndle Use to administer insulin as directed, Normal, Disp-1 Package, R-11  
  
atorvastatin (LIPITOR) 40 mg tablet Take 1 Tab by mouth daily. , Normal, Disp-30 Tab, R-6  
  
LORazepam (ATIVAN) 1 mg tablet 1 mg., Historical Med  
  
 !! - Potential duplicate medications found. Please discuss with provider. Provider Notes (Medical Decision Making):  
 
Procedures: 
Procedures Diagnosis Clinical Impression: 1. Abdominal cramping 2. Nausea without vomiting

## 2019-01-31 ENCOUNTER — HOSPITAL ENCOUNTER (EMERGENCY)
Age: 49
Discharge: HOME | End: 2019-01-31
Attending: EMERGENCY MEDICINE
Payer: MEDICAID

## 2019-01-31 VITALS
WEIGHT: 130 LBS | RESPIRATION RATE: 18 BRPM | SYSTOLIC BLOOD PRESSURE: 134 MMHG | DIASTOLIC BLOOD PRESSURE: 94 MMHG | OXYGEN SATURATION: 100 % | BODY MASS INDEX: 23.92 KG/M2 | HEIGHT: 62 IN | HEART RATE: 80 BPM | TEMPERATURE: 98 F

## 2019-01-31 DIAGNOSIS — G89.29 CHRONIC ABDOMINAL PAIN: Primary | ICD-10-CM

## 2019-01-31 DIAGNOSIS — R10.9 CHRONIC ABDOMINAL PAIN: Primary | ICD-10-CM

## 2019-01-31 LAB
ALBUMIN SERPL-MCNC: 3.8 G/DL (ref 3.4–5)
ALBUMIN/GLOB SERPL: 1 {RATIO} (ref 0.8–1.7)
ALP SERPL-CCNC: 127 U/L (ref 45–117)
ALT SERPL-CCNC: 35 U/L (ref 13–56)
ANION GAP SERPL CALC-SCNC: 6 MMOL/L (ref 3–18)
APPEARANCE UR: CLEAR
AST SERPL-CCNC: 22 U/L (ref 15–37)
BASOPHILS # BLD: 0 K/UL (ref 0–0.1)
BASOPHILS NFR BLD: 0 % (ref 0–2)
BILIRUB SERPL-MCNC: 0.3 MG/DL (ref 0.2–1)
BILIRUB UR QL: NEGATIVE
BUN SERPL-MCNC: 17 MG/DL (ref 7–18)
BUN/CREAT SERPL: 19 (ref 12–20)
CALCIUM SERPL-MCNC: 9.2 MG/DL (ref 8.5–10.1)
CHLORIDE SERPL-SCNC: 104 MMOL/L (ref 100–108)
CO2 SERPL-SCNC: 30 MMOL/L (ref 21–32)
COLOR UR: YELLOW
CREAT SERPL-MCNC: 0.9 MG/DL (ref 0.6–1.3)
DIFFERENTIAL METHOD BLD: NORMAL
EOSINOPHIL # BLD: 0.1 K/UL (ref 0–0.4)
EOSINOPHIL NFR BLD: 1 % (ref 0–5)
ERYTHROCYTE [DISTWIDTH] IN BLOOD BY AUTOMATED COUNT: 13.7 % (ref 11.6–14.5)
GLOBULIN SER CALC-MCNC: 3.7 G/DL (ref 2–4)
GLUCOSE BLD STRIP.AUTO-MCNC: 133 MG/DL (ref 70–110)
GLUCOSE SERPL-MCNC: 196 MG/DL (ref 74–99)
GLUCOSE UR STRIP.AUTO-MCNC: NEGATIVE MG/DL
HCT VFR BLD AUTO: 41.3 % (ref 35–45)
HGB BLD-MCNC: 13.2 G/DL (ref 12–16)
HGB UR QL STRIP: NEGATIVE
KETONES UR QL STRIP.AUTO: NEGATIVE MG/DL
LEUKOCYTE ESTERASE UR QL STRIP.AUTO: NEGATIVE
LIPASE SERPL-CCNC: 160 U/L (ref 73–393)
LYMPHOCYTES # BLD: 2 K/UL (ref 0.9–3.6)
LYMPHOCYTES NFR BLD: 24 % (ref 21–52)
MCH RBC QN AUTO: 29.5 PG (ref 24–34)
MCHC RBC AUTO-ENTMCNC: 32 G/DL (ref 31–37)
MCV RBC AUTO: 92.2 FL (ref 74–97)
MONOCYTES # BLD: 0.3 K/UL (ref 0.05–1.2)
MONOCYTES NFR BLD: 4 % (ref 3–10)
NEUTS SEG # BLD: 5.6 K/UL (ref 1.8–8)
NEUTS SEG NFR BLD: 71 % (ref 40–73)
NITRITE UR QL STRIP.AUTO: NEGATIVE
PH UR STRIP: 5 [PH] (ref 5–8)
PLATELET # BLD AUTO: 270 K/UL (ref 135–420)
PMV BLD AUTO: 10.3 FL (ref 9.2–11.8)
POTASSIUM SERPL-SCNC: 3.5 MMOL/L (ref 3.5–5.5)
PROT SERPL-MCNC: 7.5 G/DL (ref 6.4–8.2)
PROT UR STRIP-MCNC: NEGATIVE MG/DL
RBC # BLD AUTO: 4.48 M/UL (ref 4.2–5.3)
SODIUM SERPL-SCNC: 140 MMOL/L (ref 136–145)
SP GR UR REFRACTOMETRY: 1.02 (ref 1–1.03)
UROBILINOGEN UR QL STRIP.AUTO: 0.2 EU/DL (ref 0.2–1)
WBC # BLD AUTO: 8 K/UL (ref 4.6–13.2)

## 2019-01-31 PROCEDURE — 74011250636 HC RX REV CODE- 250/636: Performed by: PHYSICIAN ASSISTANT

## 2019-01-31 PROCEDURE — 83690 ASSAY OF LIPASE: CPT

## 2019-01-31 PROCEDURE — 82962 GLUCOSE BLOOD TEST: CPT

## 2019-01-31 PROCEDURE — 80053 COMPREHEN METABOLIC PANEL: CPT

## 2019-01-31 PROCEDURE — 81003 URINALYSIS AUTO W/O SCOPE: CPT

## 2019-01-31 PROCEDURE — 99284 EMERGENCY DEPT VISIT MOD MDM: CPT

## 2019-01-31 PROCEDURE — 96374 THER/PROPH/DIAG INJ IV PUSH: CPT

## 2019-01-31 PROCEDURE — 85025 COMPLETE CBC W/AUTO DIFF WBC: CPT

## 2019-01-31 RX ORDER — KETOROLAC TROMETHAMINE 30 MG/ML
30 INJECTION, SOLUTION INTRAMUSCULAR; INTRAVENOUS
Status: COMPLETED | OUTPATIENT
Start: 2019-01-31 | End: 2019-01-31

## 2019-01-31 RX ADMIN — KETOROLAC TROMETHAMINE 30 MG: 30 INJECTION, SOLUTION INTRAMUSCULAR at 18:24

## 2019-01-31 NOTE — ED TRIAGE NOTES
Abdominal pain and back pain x 3 weeks, onset of dysuria x 3 days Patient states her BGL was 72 earlier, had jsut eaten, informed the patient that when it is this low she should eat again.  
 
EMS was at the house today at 0300 as her BGL was low, 60, was told to eat a sandwich

## 2019-01-31 NOTE — ED NOTES
52 yo F who presents due to abdominal pain x 2 weeks, dysuria x 2 days. Notes low blood glucose at home, 70s. Blood sugar in triage 130. I performed a brief evaluation, including history and physical, of the patient here in triage and I have determined that pt will need further treatment and evaluation from the main side ER physician. I have placed initial orders to help in expediting patients care. January 31, 2019 at 4:50 PM - Bharati Ramirez

## 2019-01-31 NOTE — ED PROVIDER NOTES
EMERGENCY DEPARTMENT HISTORY AND PHYSICAL EXAM 
 
Date: 1/31/2019 Patient Name: Fredericka Claude History of Presenting Illness Chief Complaint Patient presents with  Abdominal Pain  Back Pain  Urinary Pain  Low Blood Sugar History Provided By: Patient Chief Complaint: abd pain Duration: 2 Weeks Timing:  Gradual 
Location: GI 
Quality: Aching and Pressure Severity: Mild Modifying Factors: none Associated Symptoms: low blood sugar, chronic pain, increased urinary incontinence HPI: Fredericka Claude is a 51 y.o. female with a PMH of DM, HTN, HLD, depression, breast cancer, ETOH abuse, anxiety, bipolar, chronic pain who presents to the ER c/o chronic lower abdominal pain, increased urinary incontinence, lower back pain and low blood sugar. Patient states she called EMS earlier today, when she noted her blood sugar was around 70. She ate a few pieces of protein and had a half cup of soda. She reports feeling much better after this. She is also having some chronic abdominal and back pain. She tried taking OTC meds with minimal relief. She states she has a pcp appt first thing in the morning. Patient denied any fevers, chills, N/V, and has no other symptoms or complaints. PCP: Edi Cotto NP Current Outpatient Medications Medication Sig Dispense Refill  ondansetron (ZOFRAN ODT) 4 mg disintegrating tablet Take 1 Tab by mouth every eight (8) hours as needed for Nausea. 15 Tab 0  
 dicyclomine (BENTYL) 20 mg tablet Take 1 Tab by mouth every six (6) hours. 20 Tab 0  Blood-Glucose Meter (ONETOUCH ULTRA2) monitoring kit Use to obtain two times a day. 1 Kit 0  
 lancets misc Use daily to monitor blood glucose 200 Each 0  
 ergocalciferol (ERGOCALCIFEROL) 50,000 unit capsule Take 1 Cap by mouth every seven (7) days for 12 doses.  12 Cap 0  
 glucose blood VI test strips (ASCENSIA AUTODISC VI, ONE TOUCH ULTRA TEST VI) strip 50 Each by Does Not Apply route two (2) times daily as needed. 200 Strip 3  
 benzonatate (TESSALON) 100 mg capsule  EPINEPHrine (EPIPEN) 0.3 mg/0.3 mL injection 0.3 mg.    
 ondansetron (ZOFRAN ODT) 4 mg disintegrating tablet Take 1 Tab by mouth every eight (8) hours as needed for Nausea. 15 Tab 0  
 cyanocobalamin (VITAMIN B-12) 1,000 mcg tablet Take 1 Tab by mouth daily. 90 Tab 0  
 lisinopril-hydroCHLOROthiazide (PRINZIDE, ZESTORETIC) 20-12.5 mg per tablet Take 1 Tab by mouth two (2) times a day. 180 Tab 3  carvedilol (COREG) 3.125 mg tablet Take 2 Tabs by mouth two (2) times daily (with meals). 90 Tab 3  
 budesonide (RHINOCORT ALLERGY) 32 mcg/actuation nasal spray 2 Sprays by Both Nostrils route daily. Indications: Allergic Rhinitis 1 Bottle 3  
 insulin nph-regular human rec (HUMULIN 70-30) 100 unit/mL (70-30) inpn Give 40 units in the QAM and 45 units at bedtime 5 Pen 3  
 ferrous sulfate 325 mg (65 mg iron) tablet Take 1 Tab by mouth Daily (before breakfast). 30 Tab 3  polyethylene glycol (MIRALAX) 17 gram packet Take 1 Packet by mouth daily. 30 Each 1  
 Insulin Needles, Disposable, 31 gauge x 5/16\" ndle Use to administer insulin as directed 1 Package 11  
 atorvastatin (LIPITOR) 40 mg tablet Take 1 Tab by mouth daily. 30 Tab 6  
 LORazepam (ATIVAN) 1 mg tablet 1 mg. Past History Past Medical History: 
Past Medical History:  
Diagnosis Date  Anxiety  Bipolar disorder ([address]  Breast cancer ([address] breast cancer, right, S/P Mastectomy and chemo, radiation in 2013  Depression  Diabetes ([address]  Drug abuse ([address]   
 H/O cocaine use in past  
 Drug-seeking behavior  Family history of early CAD  Gastrointestinal disorder   
 cholitis  H/O ETOH abuse  Hyperlipidemia  Hypertension  Malignant neoplasm without specification of site [facility])  Methamphetamine abuse ([address] self reported on 1/3/16  Spine injury  Vitamin D deficiency 5/1/2018 Past Surgical History: 
Past Surgical History:  
Procedure Laterality Date  COLONOSCOPY N/A 7/18/2016 COLONOSCOPY performed by Rosa Humphreys MD at [facility] ENDOSCOPY  
 HX BREAST LUMPECTOMY  06/2013  
 right  HX HYSTERECTOMY  HX MASTECTOMY    
 had expanders put in 6/2018  HX ORTHOPAEDIC Back fusion surgery 4/18/14.  HX OTHER SURGICAL    
 mediport  HX TONSILLECTOMY  HX TUBAL LIGATION Family History: 
Family History Problem Relation Age of Onset  Heart Disease Mother  Stroke Father  Heart Disease Father 48  Diabetes Other  Hypertension Other  Cancer Maternal Grandmother Social History: 
Social History Tobacco Use  Smoking status: Never Smoker  Smokeless tobacco: Never Used Substance Use Topics  Alcohol use: Yes Comment: \"Occasionally\"  Drug use: No  
 
 
Allergies: Allergies Allergen Reactions  Latex Hives and Itching  Other Food Rash Almonds  Amoxicillin Swelling Other reaction(s): mild rash/itching  Aspirin Not Reported This Time and Swelling Mouth swells  Beeswax Anaphylaxis  Fentanyl Anaphylaxis and Swelling Patches cause mouth to swell Swelling in mouth from fentanyl patch  Levaquin [Levofloxacin] Not Reported This Time and Swelling Other reaction(s): mild rash/itching  Chlorhexidine Rash  Norco [Hydrocodone-Acetaminophen] Nausea and Vomiting Other reaction(s): gi distress  Acetaminophen Other (comments)  [place] Rash and Itching  Codeine Other (comments)  Glycopyrrolate Swelling Pt  States  faciAL  SWELLING   POST  ROBINUL  IV Pt  States  faciAL  SWELLING   POST  ROBINUL  IV   
 Morphine Nausea and Vomiting Pt states she is not allergic  Pcn [Penicillins] Swelling  Percocet [Oxycodone-Acetaminophen] Nausea and Vomiting Other reaction(s): gi distress 
nausea Other reaction(s): anaphylaxis/angioedema Per pt. She is allergic  Tape [Adhesive] Rash  Tramadol Swelling Review of Systems Review of Systems Constitutional: Negative for chills, fatigue and fever. HENT: Negative. Negative for sore throat. Eyes: Negative. Respiratory: Negative for cough and shortness of breath. Cardiovascular: Negative for chest pain and palpitations. Gastrointestinal: Positive for abdominal pain. Negative for nausea and vomiting. Endocrine: Low blood sugar Genitourinary: Positive for frequency. Negative for dysuria. Musculoskeletal: Negative. Skin: Negative. Neurological: Negative for dizziness, weakness, light-headedness and headaches. Psychiatric/Behavioral: Negative. All other systems reviewed and are negative. Physical Exam  
 
Vitals:  
 01/31/19 1652 BP: (!) 134/94 Pulse: 80 Resp: 18 Temp: 98 °F (36.7 °C) SpO2: 100% Weight: 59 kg (130 lb) Height: 5' 2\" (1.575 m) Physical Exam  
Constitutional: She is oriented to person, place, and time. She appears well-developed and well-nourished. No distress. HENT:  
Head: Normocephalic and atraumatic. Mouth/Throat: Oropharynx is clear and moist.  
Eyes: Conjunctivae are normal. No scleral icterus. Neck: Neck supple. No JVD present. No tracheal deviation present. Cardiovascular: Normal rate, regular rhythm and normal heart sounds. No murmur heard. Pulmonary/Chest: Effort normal and breath sounds normal. No respiratory distress. She has no wheezes. She has no rales. Abdominal: Soft. Normal appearance and bowel sounds are normal. She exhibits no distension and no mass. There is no tenderness. There is no rebound, no guarding and no CVA tenderness. abd soft, non-tender; no peritoneal signs Musculoskeletal: Normal range of motion. Neurological: She is alert and oriented to person, place, and time.  She has normal strength. Gait normal. GCS eye subscore is 4. GCS verbal subscore is 5. GCS motor subscore is 6. Skin: Skin is warm and dry. She is not diaphoretic. Psychiatric: She has a normal mood and affect. Nursing note and vitals reviewed. Diagnostic Study Results Labs - Recent Results (from the past 12 hour(s)) GLUCOSE, POC Collection Time: 01/31/19  4:49 PM  
Result Value Ref Range Glucose (POC) 133 (H) 70 - 110 mg/dL URINALYSIS W/ RFLX MICROSCOPIC Collection Time: 01/31/19  5:01 PM  
Result Value Ref Range Color YELLOW Appearance CLEAR Specific gravity 1.017 1.005 - 1.030    
 pH (UA) 5.0 5.0 - 8.0 Protein NEGATIVE  NEG mg/dL Glucose NEGATIVE  NEG mg/dL Ketone NEGATIVE  NEG mg/dL Bilirubin NEGATIVE  NEG Blood NEGATIVE  NEG Urobilinogen 0.2 0.2 - 1.0 EU/dL Nitrites NEGATIVE  NEG Leukocyte Esterase NEGATIVE  NEG    
CBC WITH AUTOMATED DIFF Collection Time: 01/31/19  5:09 PM  
Result Value Ref Range WBC 8.0 4.6 - 13.2 K/uL  
 RBC 4.48 4.20 - 5.30 M/uL  
 HGB 13.2 12.0 - 16.0 g/dL HCT 41.3 35.0 - 45.0 % MCV 92.2 74.0 - 97.0 FL  
 MCH 29.5 24.0 - 34.0 PG  
 MCHC 32.0 31.0 - 37.0 g/dL  
 RDW 13.7 11.6 - 14.5 % PLATELET 835 712 - 104 K/uL MPV 10.3 9.2 - 11.8 FL  
 NEUTROPHILS 71 40 - 73 % LYMPHOCYTES 24 21 - 52 % MONOCYTES 4 3 - 10 % EOSINOPHILS 1 0 - 5 % BASOPHILS 0 0 - 2 %  
 ABS. NEUTROPHILS 5.6 1.8 - 8.0 K/UL  
 ABS. LYMPHOCYTES 2.0 0.9 - 3.6 K/UL  
 ABS. MONOCYTES 0.3 0.05 - 1.2 K/UL  
 ABS. EOSINOPHILS 0.1 0.0 - 0.4 K/UL  
 ABS. BASOPHILS 0.0 0.0 - 0.1 K/UL  
 DF AUTOMATED METABOLIC PANEL, COMPREHENSIVE Collection Time: 01/31/19  5:09 PM  
Result Value Ref Range Sodium 140 136 - 145 mmol/L Potassium 3.5 3.5 - 5.5 mmol/L Chloride 104 100 - 108 mmol/L  
 CO2 30 21 - 32 mmol/L Anion gap 6 3.0 - 18 mmol/L Glucose 196 (H) 74 - 99 mg/dL  BUN 17 7.0 - 18 MG/DL  
 Creatinine 0.90 0.6 - 1.3 MG/DL  
 BUN/Creatinine ratio 19 12 - 20 GFR est AA >60 >60 ml/min/1.73m2 GFR est non-AA >60 >60 ml/min/1.73m2 Calcium 9.2 8.5 - 10.1 MG/DL Bilirubin, total 0.3 0.2 - 1.0 MG/DL  
 ALT (SGPT) 35 13 - 56 U/L  
 AST (SGOT) 22 15 - 37 U/L Alk. phosphatase 127 (H) 45 - 117 U/L Protein, total 7.5 6.4 - 8.2 g/dL Albumin 3.8 3.4 - 5.0 g/dL Globulin 3.7 2.0 - 4.0 g/dL A-G Ratio 1.0 0.8 - 1.7 LIPASE Collection Time: 01/31/19  5:09 PM  
Result Value Ref Range Lipase 160 73 - 393 U/L Radiologic Studies - No orders to display CT Results  (Last 48 hours) None CXR Results  (Last 48 hours) None Medical Decision Making I am the first provider for this patient. I reviewed the vital signs, available nursing notes, past medical history, past surgical history, family history and social history. Vital Signs-Reviewed the patient's vital signs. Records Reviewed: Nursing Notes, Old Medical Records, Previous Radiology Studies and Previous Laboratory Studies 6:21 PM 
50 y/o female who presents to the ER c/o acute on chronic abd pain, and increased urinary incontinence. States she has pcp followup in the am.  Called EMS today after she also felt weak. Noted to have a glucose on 71. Reports eating protein and drinking half cup of soda with moderate improvement. States feeling well now, just having some lower abd pain, which is normal for her. Labs and ua reviewed; no acute findings. Based on presentation, no clinical indication for further testing at this time. Will tx pt with toradol and have f/u with pcp. Advised to continue OTC meds as needed for pain relief. All questions answered and patient in agreement with plan of care. Will plan for discharge. Leticia Milner PA-C Disposition: 
Discharged DISCHARGE NOTE:  
 
  Care plan outlined and precautions discussed.   Patient has no new complaints, changes, or physical findings. Results of labs, ua were reviewed with the patient. All medications were reviewed with the patient; will d/c home with n/a. All of pt's questions and concerns were addressed. Patient was instructed and agrees to follow up with pcp, as well as to return to the ED upon further deterioration. Patient is ready to go home. Follow-up Information Follow up With Specialties Details Why Contact [facility] EMERGENCY DEPT Emergency Medicine  If symptoms worsen [address] 
[phone] Nitish Tovar NP Nurse Practitioner Go in 1 day already scheduled appt tomorrow [address] [phone] 
[phone] Current Discharge Medication List  
  
CONTINUE these medications which have NOT CHANGED Details  
!! ondansetron (ZOFRAN ODT) 4 mg disintegrating tablet Take 1 Tab by mouth every eight (8) hours as needed for Nausea. Qty: 15 Tab, Refills: 0  
  
dicyclomine (BENTYL) 20 mg tablet Take 1 Tab by mouth every six (6) hours. Qty: 20 Tab, Refills: 0 Blood-Glucose Meter (ONETOUCH ULTRA2) monitoring kit Use to obtain two times a day. Qty: 1 Kit, Refills: 0  
  
lancets misc Use daily to monitor blood glucose Qty: 200 Each, Refills: 0 Associated Diagnoses: Type 2 diabetes mellitus with complication, with long-term current use of insulin ([address] ergocalciferol (ERGOCALCIFEROL) 50,000 unit capsule Take 1 Cap by mouth every seven (7) days for 12 doses. Qty: 12 Cap, Refills: 0 Associated Diagnoses: Vitamin D deficiency  
  
glucose blood VI test strips (ASCENSIA AUTODISC VI, ONE TOUCH ULTRA TEST VI) strip 50 Each by Does Not Apply route two (2) times daily as needed. Qty: 200 Strip, Refills: 3 Associated Diagnoses: Type 2 diabetes mellitus with complication, with long-term current use of insulin (HCC)  
  
benzonatate (TESSALON) 100 mg capsule EPINEPHrine (EPIPEN) 0.3 mg/0.3 mL injection 0.3 mg.  
  
!! ondansetron (ZOFRAN ODT) 4 mg disintegrating tablet Take 1 Tab by mouth every eight (8) hours as needed for Nausea. Qty: 15 Tab, Refills: 0  
  
cyanocobalamin (VITAMIN B-12) 1,000 mcg tablet Take 1 Tab by mouth daily. Qty: 90 Tab, Refills: 0 Associated Diagnoses: Chronic fatigue  
  
lisinopril-hydroCHLOROthiazide (PRINZIDE, ZESTORETIC) 20-12.5 mg per tablet Take 1 Tab by mouth two (2) times a day. Qty: 180 Tab, Refills: 3 Associated Diagnoses: Essential hypertension  
  
carvedilol (COREG) 3.125 mg tablet Take 2 Tabs by mouth two (2) times daily (with meals). Qty: 90 Tab, Refills: 3  
  
budesonide (RHINOCORT ALLERGY) 32 mcg/actuation nasal spray 2 Sprays by Both Nostrils route daily. Indications: Allergic Rhinitis Qty: 1 Bottle, Refills: 3  
  
insulin nph-regular human rec (HUMULIN 70-30) 100 unit/mL (70-30) inpn Give 40 units in the QAM and 45 units at bedtime 
Qty: 5 Pen, Refills: 3 Associated Diagnoses: Type 2 diabetes mellitus with complication, with long-term current use of insulin ([address] ferrous sulfate 325 mg (65 mg iron) tablet Take 1 Tab by mouth Daily (before breakfast). Qty: 30 Tab, Refills: 3 Associated Diagnoses: Anemia due to antineoplastic chemotherapy  
  
polyethylene glycol (MIRALAX) 17 gram packet Take 1 Packet by mouth daily. Qty: 30 Each, Refills: 1 Associated Diagnoses: Therapeutic opioid induced constipation Insulin Needles, Disposable, 31 gauge x 5/16\" ndle Use to administer insulin as directed Qty: 1 Package, Refills: 11  
  
atorvastatin (LIPITOR) 40 mg tablet Take 1 Tab by mouth daily. Qty: 30 Tab, Refills: 6 LORazepam (ATIVAN) 1 mg tablet 1 mg. !! - Potential duplicate medications found. Please discuss with provider. Provider Notes (Medical Decision Making):  
 
Procedures: 
Procedures Diagnosis Clinical Impression: 1. Chronic abdominal pain

## 2019-01-31 NOTE — DISCHARGE INSTRUCTIONS
Patient Education        Chronic Pain: Care Instructions  Your Care Instructions    Chronic pain is pain that lasts a long time (months or even years) and may or may not have a clear cause. It is different from acute pain, which usually does have a clear cause--like an injury or illness--and gets better over time. Chronic pain:  · Lasts over time but may vary from day to day. · Does not go away despite efforts to end it. · May disrupt your sleep and lead to fatigue. · May cause depression or anxiety. · May make your muscles tense, causing more pain. · Can disrupt your work, hobbies, home life, and relationships with friends and family. Chronic pain is a very real condition. It is not just in your head. Treatment can help and usually includes several methods used together, such as medicines, physical therapy, exercise, and other treatments. Learning how to relax and changing negative thought patterns can also help you cope. Chronic pain is complex. Taking an active role in your treatment will help you better manage your pain. Tell your doctor if you have trouble dealing with your pain. You may have to try several things before you find what works best for you. Follow-up care is a key part of your treatment and safety. Be sure to make and go to all appointments, and call your doctor if you are having problems. It's also a good idea to know your test results and keep a list of the medicines you take. How can you care for yourself at home? · Pace yourself. Break up large jobs into smaller tasks. Save harder tasks for days when you have less pain, or go back and forth between hard tasks and easier ones. Take rest breaks. · Relax, and reduce stress. Relaxation techniques such as deep breathing or meditation can help. · Keep moving. Gentle, daily exercise can help reduce pain over the long run. Try low- or no-impact exercises such as walking, swimming, and stationary biking.  Do stretches to stay flexible. · Try heat, cold packs, and massage. · Get enough sleep. Chronic pain can make you tired and drain your energy. Talk with your doctor if you have trouble sleeping because of pain. · Think positive. Your thoughts can affect your pain level. Do things that you enjoy to distract yourself when you have pain instead of focusing on the pain. See a movie, read a book, listen to music, or spend time with a friend. · If you think you are depressed, talk to your doctor about treatment. · Keep a daily pain diary. Record how your moods, thoughts, sleep patterns, activities, and medicine affect your pain. You may find that your pain is worse during or after certain activities or when you are feeling a certain emotion. Having a record of your pain can help you and your doctor find the best ways to treat your pain. · Take pain medicines exactly as directed. ? If the doctor gave you a prescription medicine for pain, take it as prescribed. ? If you are not taking a prescription pain medicine, ask your doctor if you can take an over-the-counter medicine. Reducing constipation caused by pain medicine  · Include fruits, vegetables, beans, and whole grains in your diet each day. These foods are high in fiber. · Drink plenty of fluids, enough so that your urine is light yellow or clear like water. If you have kidney, heart, or liver disease and have to limit fluids, talk with your doctor before you increase the amount of fluids you drink. · If your doctor recommends it, get more exercise. Walking is a good choice. Bit by bit, increase the amount you walk every day. Try for at least 30 minutes on most days of the week. · Schedule time each day for a bowel movement. A daily routine may help. Take your time and do not strain when having a bowel movement. When should you call for help?   Call your doctor now or seek immediate medical care if:    · Your pain gets worse or is out of control.     · You feel down or blue, or you do not enjoy things like you once did. You may be depressed, which is common in people with chronic pain. Depression can be treated.     · You have vomiting or cramps for more than 2 hours.    Watch closely for changes in your health, and be sure to contact your doctor if:    · You cannot sleep because of pain.     · You are very worried or anxious about your pain.     · You have trouble taking your pain medicine.     · You have any concerns about your pain medicine.     · You have trouble with bowel movements, such as:  ? No bowel movement in 3 days. ? Blood in the anal area, in your stool, or on the toilet paper. ? Diarrhea for more than 24 hours. Where can you learn more? Go to http://imani-jenny.info/. Enter N004 in the search box to learn more about \"Chronic Pain: Care Instructions. \"  Current as of: Myah 3, 2018  Content Version: 11.9  © 6314-7078 [employer]. Care instructions adapted under license by [employer] (which disclaims liability or warranty for this information). If you have questions about a medical condition or this instruction, always ask your healthcare professional. [place] [address] any warranty or liability for your use of this information.

## 2019-02-21 ENCOUNTER — HOSPITAL ENCOUNTER (EMERGENCY)
Age: 49
Discharge: HOME | End: 2019-02-21
Attending: EMERGENCY MEDICINE
Payer: MEDICAID

## 2019-02-21 VITALS
DIASTOLIC BLOOD PRESSURE: 83 MMHG | TEMPERATURE: 98.3 F | RESPIRATION RATE: 16 BRPM | BODY MASS INDEX: 29.44 KG/M2 | WEIGHT: 160 LBS | HEIGHT: 62 IN | HEART RATE: 97 BPM | SYSTOLIC BLOOD PRESSURE: 125 MMHG | OXYGEN SATURATION: 100 %

## 2019-02-21 DIAGNOSIS — R39.15 URINARY URGENCY: ICD-10-CM

## 2019-02-21 DIAGNOSIS — G89.29 CHRONIC BILATERAL BACK PAIN, UNSPECIFIED BACK LOCATION: ICD-10-CM

## 2019-02-21 DIAGNOSIS — R10.84 ABDOMINAL PAIN, GENERALIZED: Primary | ICD-10-CM

## 2019-02-21 DIAGNOSIS — M54.9 CHRONIC BILATERAL BACK PAIN, UNSPECIFIED BACK LOCATION: ICD-10-CM

## 2019-02-21 LAB
ALBUMIN SERPL-MCNC: 4 G/DL (ref 3.4–5)
ALBUMIN/GLOB SERPL: 0.9 {RATIO} (ref 0.8–1.7)
ALP SERPL-CCNC: 124 U/L (ref 45–117)
ALT SERPL-CCNC: 40 U/L (ref 13–56)
ANION GAP SERPL CALC-SCNC: 8 MMOL/L (ref 3–18)
APPEARANCE UR: CLEAR
AST SERPL-CCNC: 33 U/L (ref 15–37)
BACTERIA URNS QL MICRO: ABNORMAL /HPF
BASOPHILS # BLD: 0 K/UL (ref 0–0.1)
BASOPHILS NFR BLD: 0 % (ref 0–2)
BILIRUB SERPL-MCNC: 0.4 MG/DL (ref 0.2–1)
BILIRUB UR QL: NEGATIVE
BUN SERPL-MCNC: 19 MG/DL (ref 7–18)
BUN/CREAT SERPL: 22 (ref 12–20)
CALCIUM SERPL-MCNC: 9.9 MG/DL (ref 8.5–10.1)
CHLORIDE SERPL-SCNC: 102 MMOL/L (ref 100–108)
CO2 SERPL-SCNC: 29 MMOL/L (ref 21–32)
COLOR UR: YELLOW
CREAT SERPL-MCNC: 0.85 MG/DL (ref 0.6–1.3)
DIFFERENTIAL METHOD BLD: NORMAL
EOSINOPHIL # BLD: 0.1 K/UL (ref 0–0.4)
EOSINOPHIL NFR BLD: 2 % (ref 0–5)
EPITH CASTS URNS QL MICRO: ABNORMAL /LPF (ref 0–5)
ERYTHROCYTE [DISTWIDTH] IN BLOOD BY AUTOMATED COUNT: 13.4 % (ref 11.6–14.5)
GLOBULIN SER CALC-MCNC: 4.5 G/DL (ref 2–4)
GLUCOSE SERPL-MCNC: 161 MG/DL (ref 74–99)
GLUCOSE UR STRIP.AUTO-MCNC: NEGATIVE MG/DL
HCT VFR BLD AUTO: 44.4 % (ref 35–45)
HGB BLD-MCNC: 14.5 G/DL (ref 12–16)
HGB UR QL STRIP: NEGATIVE
KETONES UR QL STRIP.AUTO: ABNORMAL MG/DL
LEUKOCYTE ESTERASE UR QL STRIP.AUTO: NEGATIVE
LIPASE SERPL-CCNC: 181 U/L (ref 73–393)
LYMPHOCYTES # BLD: 1.9 K/UL (ref 0.9–3.6)
LYMPHOCYTES NFR BLD: 22 % (ref 21–52)
MCH RBC QN AUTO: 29.4 PG (ref 24–34)
MCHC RBC AUTO-ENTMCNC: 32.7 G/DL (ref 31–37)
MCV RBC AUTO: 90.1 FL (ref 74–97)
MONOCYTES # BLD: 0.4 K/UL (ref 0.05–1.2)
MONOCYTES NFR BLD: 5 % (ref 3–10)
MUCOUS THREADS URNS QL MICRO: ABNORMAL /LPF
NEUTS SEG # BLD: 6 K/UL (ref 1.8–8)
NEUTS SEG NFR BLD: 71 % (ref 40–73)
NITRITE UR QL STRIP.AUTO: NEGATIVE
PH UR STRIP: 7.5 [PH] (ref 5–8)
PLATELET # BLD AUTO: 274 K/UL (ref 135–420)
PMV BLD AUTO: 10.3 FL (ref 9.2–11.8)
POTASSIUM SERPL-SCNC: 4.2 MMOL/L (ref 3.5–5.5)
PROT SERPL-MCNC: 8.5 G/DL (ref 6.4–8.2)
PROT UR STRIP-MCNC: ABNORMAL MG/DL
RBC # BLD AUTO: 4.93 M/UL (ref 4.2–5.3)
RBC #/AREA URNS HPF: NEGATIVE /HPF (ref 0–5)
SODIUM SERPL-SCNC: 139 MMOL/L (ref 136–145)
SP GR UR REFRACTOMETRY: 1.02 (ref 1–1.03)
UROBILINOGEN UR QL STRIP.AUTO: 0.2 EU/DL (ref 0.2–1)
WBC # BLD AUTO: 8.4 K/UL (ref 4.6–13.2)
WBC URNS QL MICRO: ABNORMAL /HPF (ref 0–4)

## 2019-02-21 PROCEDURE — 74011250636 HC RX REV CODE- 250/636: Performed by: PHYSICIAN ASSISTANT

## 2019-02-21 PROCEDURE — 81001 URINALYSIS AUTO W/SCOPE: CPT

## 2019-02-21 PROCEDURE — 83690 ASSAY OF LIPASE: CPT

## 2019-02-21 PROCEDURE — 96374 THER/PROPH/DIAG INJ IV PUSH: CPT

## 2019-02-21 PROCEDURE — 99283 EMERGENCY DEPT VISIT LOW MDM: CPT

## 2019-02-21 PROCEDURE — 85025 COMPLETE CBC W/AUTO DIFF WBC: CPT

## 2019-02-21 PROCEDURE — 80053 COMPREHEN METABOLIC PANEL: CPT

## 2019-02-21 RX ORDER — KETOROLAC TROMETHAMINE 30 MG/ML
30 INJECTION, SOLUTION INTRAMUSCULAR; INTRAVENOUS
Status: COMPLETED | OUTPATIENT
Start: 2019-02-21 | End: 2019-02-21

## 2019-02-21 RX ADMIN — KETOROLAC TROMETHAMINE 30 MG: 30 INJECTION, SOLUTION INTRAMUSCULAR at 19:01

## 2019-02-21 NOTE — ED TRIAGE NOTES
Chronic lower abdominal pain. Left lower side. Feels like needs to urinate after just going. X 1 week

## 2019-02-21 NOTE — ED PROVIDER NOTES
EMERGENCY DEPARTMENT HISTORY AND PHYSICAL EXAM 
 
Date: 2/21/2019 Patient Name: Hanh Portillo History of Presenting Illness Chief Complaint Patient presents with  Abdominal Pain History Provided By: Patient Chief Complaint: abdominal pain Duration: 1 Weeks Timing:  Gradual 
Location: lower abdomen Quality: Cramping, Pressure and Tightness Severity: Severe Modifying Factors: none Associated Symptoms: abdominal pain, back pain, increased urinary frequency, urgency, pressure HPI: Hanh Portillo is a 51 y.o. female with a PMH of HTN, DM, HLD, depression, breast cancer, chronic abdominal pain, anxiety, bipolar who presents to the ER c/o lower abdominal pain. Patient states her pain has been intermittent x 1 week. She has had some associated back pain, urinary frequency, urgency and pressure as well. She has been taking OTC meds with minimal relief. She reports she had an appt for pain mgmt earlier this week, but \"did not know about it, so I could not go\". She denied any recent fevers, chills, sob, chest pain, palpitations, N/V, constipation, diarrhea, hematuria and has no other symptoms or complaints. PCP: Harris Macias NP Current Outpatient Medications Medication Sig Dispense Refill  ondansetron (ZOFRAN ODT) 4 mg disintegrating tablet Take 1 Tab by mouth every eight (8) hours as needed for Nausea. 15 Tab 0  
 dicyclomine (BENTYL) 20 mg tablet Take 1 Tab by mouth every six (6) hours. 20 Tab 0  Blood-Glucose Meter (ONETOUCH ULTRA2) monitoring kit Use to obtain two times a day. 1 Kit 0  
 lancets misc Use daily to monitor blood glucose 200 Each 0  
 ergocalciferol (ERGOCALCIFEROL) 50,000 unit capsule Take 1 Cap by mouth every seven (7) days for 12 doses. 12 Cap 0  
 glucose blood VI test strips (ASCENSIA AUTODISC VI, ONE TOUCH ULTRA TEST VI) strip 50 Each by Does Not Apply route two (2) times daily as needed. 200 Strip 3  benzonatate (TESSALON) 100 mg capsule  EPINEPHrine (EPIPEN) 0.3 mg/0.3 mL injection 0.3 mg.    
 ondansetron (ZOFRAN ODT) 4 mg disintegrating tablet Take 1 Tab by mouth every eight (8) hours as needed for Nausea. 15 Tab 0  
 cyanocobalamin (VITAMIN B-12) 1,000 mcg tablet Take 1 Tab by mouth daily. 90 Tab 0  
 lisinopril-hydroCHLOROthiazide (PRINZIDE, ZESTORETIC) 20-12.5 mg per tablet Take 1 Tab by mouth two (2) times a day. 180 Tab 3  carvedilol (COREG) 3.125 mg tablet Take 2 Tabs by mouth two (2) times daily (with meals). 90 Tab 3  
 budesonide (RHINOCORT ALLERGY) 32 mcg/actuation nasal spray 2 Sprays by Both Nostrils route daily. Indications: Allergic Rhinitis 1 Bottle 3  
 insulin nph-regular human rec (HUMULIN 70-30) 100 unit/mL (70-30) inpn Give 40 units in the QAM and 45 units at bedtime 5 Pen 3  
 ferrous sulfate 325 mg (65 mg iron) tablet Take 1 Tab by mouth Daily (before breakfast). 30 Tab 3  polyethylene glycol (MIRALAX) 17 gram packet Take 1 Packet by mouth daily. 30 Each 1  
 Insulin Needles, Disposable, 31 gauge x 5/16\" ndle Use to administer insulin as directed 1 Package 11  
 atorvastatin (LIPITOR) 40 mg tablet Take 1 Tab by mouth daily. 30 Tab 6  
 LORazepam (ATIVAN) 1 mg tablet 1 mg. Past History Past Medical History: 
Past Medical History:  
Diagnosis Date  Anxiety  Bipolar disorder ([address]  Breast cancer ([facility] [address] breast cancer, right, S/P Mastectomy and chemo, radiation in 2013  Depression  Diabetes ([facility] [facility]  Drug abuse ([facility] [address]   
 H/O cocaine use in past  
 Drug-seeking behavior  Family history of early CAD  Gastrointestinal disorder   
 cholitis  H/O ETOH abuse  Hyperlipidemia  Hypertension  Malignant neoplasm without specification of site [facility])  Methamphetamine abuse ([facility] [address] self reported on 1/3/16  Spine injury  Vitamin D deficiency 5/1/2018 Past Surgical History: 
Past Surgical History: Procedure Laterality Date  COLONOSCOPY N/A 7/18/2016 COLONOSCOPY performed by Linda [facility]zman [facility] at [facility] ENDOSCOPY  
 HX BREAST LUMPECTOMY  06/2013  
 right  HX HYSTERECTOMY  HX MASTECTOMY    
 had expanders put in 6/2018  HX ORTHOPAEDIC Back fusion surgery 4/18/14.  HX OTHER SURGICAL    
 mediport  HX TONSILLECTOMY  HX TUBAL LIGATION Family History: 
Family History Problem Relation Age of Onset  Heart Disease Mother  Stroke Father  Heart Disease Father 48  Diabetes Other  Hypertension Other  Cancer Maternal Grandmother Social History: 
Social History Tobacco Use  Smoking status: Never Smoker  Smokeless tobacco: Never Used Substance Use Topics  Alcohol use: Yes Comment: \"Occasionally\"  Drug use: No  
 
 
Allergies: Allergies Allergen Reactions  Latex Hives and Itching  Other Food Rash Almonds  Amoxicillin Swelling Other reaction(s): mild rash/itching  Aspirin Not Reported This Time and Swelling Mouth swells  Beeswax Anaphylaxis  Fentanyl Anaphylaxis and Swelling Patches cause mouth to swell Swelling in mouth from fentanyl patch  Levaquin [Levofloxacin] Not Reported This Time and Swelling Other reaction(s): mild rash/itching  Chlorhexidine Rash  Norco [Hydrocodone-Acetaminophen] Nausea and Vomiting Other reaction(s): gi distress  Acetaminophen Other (comments)  [place] Rash and Itching  Codeine Other (comments)  Glycopyrrolate Swelling Pt  States  faciAL  SWELLING   POST  ROBINUL  IV Pt  States  faciAL  SWELLING   POST  ROBINUL  IV   
 Morphine Nausea and Vomiting Pt states she is not allergic  Pcn [Penicillins] Swelling  Percocet [Oxycodone-Acetaminophen] Nausea and Vomiting Other reaction(s): gi distress 
nausea Other reaction(s): anaphylaxis/angioedema Per pt. She is allergic  Tape [Adhesive] Rash  Tramadol Swelling Review of Systems Review of Systems Constitutional: Negative for chills, fatigue and fever. HENT: Negative. Negative for sore throat. Eyes: Negative. Respiratory: Negative for cough and shortness of breath. Cardiovascular: Negative for chest pain and palpitations. Gastrointestinal: Positive for abdominal pain. Negative for nausea and vomiting. Genitourinary: Positive for frequency and urgency. Negative for dysuria. Musculoskeletal: Positive for back pain. Skin: Negative. Neurological: Negative for dizziness, weakness, light-headedness and headaches. Psychiatric/Behavioral: Negative. All other systems reviewed and are negative. Physical Exam  
 
Vitals:  
 02/21/19 1714 02/21/19 1935 02/21/19 1936 BP: (!) 144/105 145/79 Pulse: 97 Resp: 16 Temp: 98.3 °F (36.8 °C) SpO2: 98% 96% 99% Weight: 72.6 kg (160 lb) Height: 5' 2\" (1.575 m) Physical Exam  
Constitutional: She is oriented to person, place, and time. She appears well-developed and well-nourished. No distress. HENT:  
Head: Normocephalic and atraumatic. Mouth/Throat: Oropharynx is clear and moist.  
Eyes: Conjunctivae are normal. No scleral icterus. Neck: Neck supple. No JVD present. No tracheal deviation present. Cardiovascular: Normal rate, regular rhythm and normal heart sounds. No murmur heard. Pulmonary/Chest: Effort normal and breath sounds normal. No respiratory distress. She has no wheezes. She has no rales. She exhibits no tenderness. Abdominal: Soft. Normal appearance and bowel sounds are normal. She exhibits no distension and no mass. There is generalized tenderness. There is no rigidity, no rebound, no guarding, no CVA tenderness, no tenderness at McBurney's point and negative Mayo's sign. Musculoskeletal: Normal range of motion. Neurological: She is alert and oriented to person, place, and time. She has normal strength. Gait normal. GCS eye subscore is 4.  GCS verbal subscore is 5. GCS motor subscore is 6. Skin: Skin is warm and dry. She is not diaphoretic. Psychiatric: She has a normal mood and affect. Nursing note and vitals reviewed. Diagnostic Study Results Labs - Recent Results (from the past 12 hour(s)) CBC WITH AUTOMATED DIFF Collection Time: 02/21/19  6:52 PM  
Result Value Ref Range WBC 8.4 4.6 - 13.2 K/uL  
 RBC 4.93 4.20 - 5.30 M/uL  
 HGB 14.5 12.0 - 16.0 g/dL HCT 44.4 35.0 - 45.0 % MCV 90.1 74.0 - 97.0 FL  
 MCH 29.4 24.0 - 34.0 PG  
 MCHC 32.7 31.0 - 37.0 g/dL  
 RDW 13.4 11.6 - 14.5 % PLATELET 600 519 - 136 K/uL MPV 10.3 9.2 - 11.8 FL  
 NEUTROPHILS 71 40 - 73 % LYMPHOCYTES 22 21 - 52 % MONOCYTES 5 3 - 10 % EOSINOPHILS 2 0 - 5 % BASOPHILS 0 0 - 2 %  
 ABS. NEUTROPHILS 6.0 1.8 - 8.0 K/UL  
 ABS. LYMPHOCYTES 1.9 0.9 - 3.6 K/UL  
 ABS. MONOCYTES 0.4 0.05 - 1.2 K/UL  
 ABS. EOSINOPHILS 0.1 0.0 - 0.4 K/UL  
 ABS. BASOPHILS 0.0 0.0 - 0.1 K/UL  
 DF AUTOMATED METABOLIC PANEL, COMPREHENSIVE Collection Time: 02/21/19  6:52 PM  
Result Value Ref Range Sodium 139 136 - 145 mmol/L Potassium 4.2 3.5 - 5.5 mmol/L Chloride 102 100 - 108 mmol/L  
 CO2 29 21 - 32 mmol/L Anion gap 8 3.0 - 18 mmol/L Glucose 161 (H) 74 - 99 mg/dL BUN 19 (H) 7.0 - 18 MG/DL Creatinine 0.85 0.6 - 1.3 MG/DL  
 BUN/Creatinine ratio 22 (H) 12 - 20 GFR est AA >60 >60 ml/min/1.73m2 GFR est non-AA >60 >60 ml/min/1.73m2 Calcium 9.9 8.5 - 10.1 MG/DL Bilirubin, total 0.4 0.2 - 1.0 MG/DL  
 ALT (SGPT) 40 13 - 56 U/L  
 AST (SGOT) 33 15 - 37 U/L Alk. phosphatase 124 (H) 45 - 117 U/L Protein, total 8.5 (H) 6.4 - 8.2 g/dL Albumin 4.0 3.4 - 5.0 g/dL Globulin 4.5 (H) 2.0 - 4.0 g/dL A-G Ratio 0.9 0.8 - 1.7 LIPASE Collection Time: 02/21/19  6:52 PM  
Result Value Ref Range Lipase 181 73 - 393 U/L  
URINALYSIS W/ RFLX MICROSCOPIC Collection Time: 02/21/19  7:05 PM  
Result Value Ref Range Color YELLOW Appearance CLEAR Specific gravity 1.019 1.005 - 1.030    
 pH (UA) 7.5 5.0 - 8.0 Protein TRACE (A) NEG mg/dL Glucose NEGATIVE  NEG mg/dL Ketone TRACE (A) NEG mg/dL Bilirubin NEGATIVE  NEG Blood NEGATIVE  NEG Urobilinogen 0.2 0.2 - 1.0 EU/dL Nitrites NEGATIVE  NEG Leukocyte Esterase NEGATIVE  NEG Radiologic Studies - No orders to display CT Results  (Last 48 hours) None CXR Results  (Last 48 hours) None Medical Decision Making I am the first provider for this patient. I reviewed the vital signs, available nursing notes, past medical history, past surgical history, family history and social history. Vital Signs-Reviewed the patient's vital signs. Records Reviewed: Nursing Notes, Old Medical Records, Previous Radiology Studies and Previous Laboratory Studies 6:14 PM 
52 y/o female who presents to the ER c/o acute on chronic lower abd pain, back pain and urgency. States had an appt with pain mgmt, but it \"was cancelled and I didn't know I had it\". Symptoms today are same as previous visit. Pt stating no relief with OTC meds. No peritoneal signs on exam.  Vitals stable, and no signs of sepsis. Will plan on labs, ua at this time. Jayson Holloway PA-C 
  
7:47 PM 
Pt states her pain has resolved and is feeling much better. Labs reassuring and no acute findings noted on UA. No abd pain on exam.  No clinical indication for further imaging/testing at this time. Will have f/u with pcp as discussed. All questions answered and patient in agreement with plan of care. Will plan for discharge. Jayson Holloway PA-C Disposition: 
Discharged DISCHARGE NOTE:  
 
  Care plan outlined and precautions discussed. Patient has no new complaints, changes, or physical findings. Results of labs, ua were reviewed with the patient.  All medications were reviewed with the patient; will d/c home with n/a. All of pt's questions and concerns were addressed. Patient was instructed and agrees to follow up with pcp and pain mgmt, as well as to return to the ED upon further deterioration. Patient is ready to go home. Follow-up Information Follow up With Specialties Details Why Contact [facility] EMERGENCY DEPT Emergency Medicine  If symptoms worsen [id number] 
[phone] Benedict Cat NP Nurse Practitioner Call in 1 day ER follow up for abdominal pain as we discussed [phone] 
[phone] Current Discharge Medication List  
  
CONTINUE these medications which have NOT CHANGED Details  
!! ondansetron (ZOFRAN ODT) 4 mg disintegrating tablet Take 1 Tab by mouth every eight (8) hours as needed for Nausea. Qty: 15 Tab, Refills: 0  
  
dicyclomine (BENTYL) 20 mg tablet Take 1 Tab by mouth every six (6) hours. Qty: 20 Tab, Refills: 0 Blood-Glucose Meter (ONETOUCH ULTRA2) monitoring kit Use to obtain two times a day. Qty: 1 Kit, Refills: 0  
  
lancets misc Use daily to monitor blood glucose Qty: 200 Each, Refills: 0 Associated Diagnoses: Type 2 diabetes mellitus with complication, with long-term current use of insulin ([address] ergocalciferol (ERGOCALCIFEROL) 50,000 unit capsule Take 1 Cap by mouth every seven (7) days for 12 doses. Qty: 12 Cap, Refills: 0 Associated Diagnoses: Vitamin D deficiency  
  
glucose blood VI test strips (ASCENSIA AUTODISC VI, ONE TOUCH ULTRA TEST VI) strip 50 Each by Does Not Apply route two (2) times daily as needed. Qty: 200 Strip, Refills: 3 Associated Diagnoses: Type 2 diabetes mellitus with complication, with long-term current use of insulin (H[facility])  
  
benzonatate (TESSALON) 100 mg capsule EPINEPHrine (EPIPEN) 0.3 mg/0.3 mL injection 0.3 mg.  
  
!! ondansetron (ZOFRAN ODT) 4 mg disintegrating tablet Take 1 Tab by mouth every eight (8) hours as needed for Nausea. Qty: 15 Tab, Refills: 0  
  
cyanocobalamin (VITAMIN B-12) 1,000 mcg tablet Take 1 Tab by mouth daily. Qty: 90 Tab, Refills: 0 Associated Diagnoses: Chronic fatigue  
  
lisinopril-hydroCHLOROthiazide (PRINZIDE, ZESTORETIC) 20-12.5 mg per tablet Take 1 Tab by mouth two (2) times a day. Qty: 180 Tab, Refills: 3 Associated Diagnoses: Essential hypertension  
  
carvedilol (COREG) 3.125 mg tablet Take 2 Tabs by mouth two (2) times daily (with meals). Qty: 90 Tab, Refills: 3  
  
budesonide (RHINOCORT ALLERGY) 32 mcg/actuation nasal spray 2 Sprays by Both Nostrils route daily. Indications: Allergic Rhinitis Qty: 1 Bottle, Refills: 3  
  
insulin nph-regular human rec (HUMULIN 70-30) 100 unit/mL (70-30) inpn Give 40 units in the QAM and 45 units at bedtime 
Qty: 5 Pen, Refills: 3 Associated Diagnoses: Type 2 diabetes mellitus with complication, with long-term current use of insulin ([address] ferrous sulfate 325 mg (65 mg iron) tablet Take 1 Tab by mouth Daily (before breakfast). Qty: 30 Tab, Refills: 3 Associated Diagnoses: Anemia due to antineoplastic chemotherapy  
  
polyethylene glycol (MIRALAX) 17 gram packet Take 1 Packet by mouth daily. Qty: 30 Each, Refills: 1 Associated Diagnoses: Therapeutic opioid induced constipation Insulin Needles, Disposable, 31 gauge x 5/16\" ndle Use to administer insulin as directed Qty: 1 Package, Refills: 11  
  
atorvastatin (LIPITOR) 40 mg tablet Take 1 Tab by mouth daily. Qty: 30 Tab, Refills: 6 LORazepam (ATIVAN) 1 mg tablet 1 mg. !! - Potential duplicate medications found. Please discuss with provider. Provider Notes (Medical Decision Making):  
 
Procedures: 
Procedures Diagnosis Clinical Impression: 1. Abdominal pain, generalized 2. Urinary urgency 3. Chronic bilateral back pain, unspecified back location

## 2019-02-22 NOTE — ED NOTES
Discharge information reviewed with patient, patient verbalized understanding. Paperwork signed, all questions answered.

## 2019-02-22 NOTE — DISCHARGE INSTRUCTIONS

## 2019-03-30 ENCOUNTER — HOSPITAL ENCOUNTER (EMERGENCY)
Age: 49
Discharge: HOME | End: 2019-03-30
Attending: EMERGENCY MEDICINE
Payer: MEDICAID

## 2019-03-30 ENCOUNTER — APPOINTMENT (OUTPATIENT)
Dept: CT IMAGING | Age: 49
End: 2019-03-30
Attending: PHYSICIAN ASSISTANT
Payer: MEDICAID

## 2019-03-30 ENCOUNTER — APPOINTMENT (OUTPATIENT)
Dept: GENERAL RADIOLOGY | Age: 49
End: 2019-03-30
Attending: PHYSICIAN ASSISTANT
Payer: MEDICAID

## 2019-03-30 VITALS
HEART RATE: 102 BPM | WEIGHT: 160 LBS | OXYGEN SATURATION: 100 % | RESPIRATION RATE: 18 BRPM | HEIGHT: 62 IN | TEMPERATURE: 98 F | DIASTOLIC BLOOD PRESSURE: 99 MMHG | SYSTOLIC BLOOD PRESSURE: 161 MMHG | BODY MASS INDEX: 29.44 KG/M2

## 2019-03-30 DIAGNOSIS — S20.211A CONTUSION OF RIB ON RIGHT SIDE, INITIAL ENCOUNTER: Primary | ICD-10-CM
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
## 2019-03-30 PROCEDURE — 96361 HYDRATE IV INFUSION ADD-ON: CPT

## 2019-03-30 PROCEDURE — 71101 X-RAY EXAM UNILAT RIBS/CHEST: CPT

## 2019-03-30 PROCEDURE — 71250 CT THORAX DX C-: CPT

## 2019-03-30 PROCEDURE — 74011250636 HC RX REV CODE- 250/636: Performed by: PHYSICIAN ASSISTANT

## 2019-03-30 PROCEDURE — 74011250637 HC RX REV CODE- 250/637: Performed by: PHYSICIAN ASSISTANT

## 2019-03-30 PROCEDURE — 96374 THER/PROPH/DIAG INJ IV PUSH: CPT

## 2019-03-30 PROCEDURE — 96375 TX/PRO/DX INJ NEW DRUG ADDON: CPT

## 2019-03-30 PROCEDURE — 99284 EMERGENCY DEPT VISIT MOD MDM: CPT

## 2019-03-30 RX ORDER — LORAZEPAM 2 MG/ML
1 INJECTION INTRAMUSCULAR
Status: COMPLETED | OUTPATIENT
Start: 2019-03-30 | End: 2019-03-30

## 2019-03-30 RX ORDER — OXYCODONE HYDROCHLORIDE 5 MG/1
10 TABLET ORAL
Status: COMPLETED | OUTPATIENT
Start: 2019-03-30 | End: 2019-03-30

## 2019-03-30 RX ORDER — MORPHINE SULFATE 2 MG/ML
2 INJECTION, SOLUTION INTRAMUSCULAR; INTRAVENOUS
Status: COMPLETED | OUTPATIENT
Start: 2019-03-30 | End: 2019-03-30

## 2019-03-30 RX ORDER — LIDOCAINE 4 G/100G
PATCH TOPICAL
Qty: 5 PATCH | Refills: 0 | Status: SHIPPED | OUTPATIENT
Start: 2019-03-30 | End: 2019-09-23

## 2019-03-30 RX ADMIN — SODIUM CHLORIDE 1000 ML: 900 INJECTION, SOLUTION INTRAVENOUS at 13:55

## 2019-03-30 RX ADMIN — MORPHINE SULFATE 2 MG: 2 INJECTION, SOLUTION INTRAMUSCULAR; INTRAVENOUS at 14:46

## 2019-03-30 RX ADMIN — LORAZEPAM 1 MG: 2 INJECTION, SOLUTION INTRAMUSCULAR; INTRAVENOUS at 13:54

## 2019-03-30 RX ADMIN — OXYCODONE HYDROCHLORIDE 10 MG: 5 TABLET ORAL at 11:38

## 2019-03-30 NOTE — ED NOTES
Discharge medications reviewed with patient and appropriate educational materials and side effects teaching were provided. I have reviewed discharge instructions with the patient. The patient verbalized understanding. Pain addressed with meds given in ED and with prescription given to fill for home use.

## 2019-03-30 NOTE — ED PROVIDER NOTES
[facility] EMERGENCY DEPT 
 
 
12:03 PM 
 
Date: 3/30/2019 Patient Name: Eloise Lo History of Presenting Illness Chief Complaint Patient presents with  Fall  
 
 
51 y.o. female with noted past medical history who presents to the emergency department complaining of right rib pain since prior to arrival.  Patient states she lost her footing and fell, striking her right rib on the table. She notes now having severe constant stabbing pain to her right ribs, which is worse with movement. She denies any fever, chills, numbness, weakness, shortness of breath, chest pain, abdominal pain, back pain, head injury or LOC, or other symptoms at this time. Nursing notes regarding the HPI and triage nursing notes were reviewed. Prior medical records were reviewed. Current Facility-Administered Medications Medication Dose Route Frequency Provider Last Rate Last Dose  morphine injection 2 mg  2 mg IntraVENous NOW SAMANTHA Liu Current Outpatient Medications Medication Sig Dispense Refill  lidocaine (LIDOCAINE PAIN RELIEF) 4 % patch Apply to the affected area every 12 hours for pain. 5 Patch 0  
 ondansetron (ZOFRAN ODT) 4 mg disintegrating tablet Take 1 Tab by mouth every eight (8) hours as needed for Nausea. 15 Tab 0  
 dicyclomine (BENTYL) 20 mg tablet Take 1 Tab by mouth every six (6) hours. 20 Tab 0  Blood-Glucose Meter (ONETOUCH ULTRA2) monitoring kit Use to obtain two times a day. 1 Kit 0  
 lancets misc Use daily to monitor blood glucose 200 Each 0  
 ergocalciferol (ERGOCALCIFEROL) 50,000 unit capsule Take 1 Cap by mouth every seven (7) days for 12 doses. 12 Cap 0  
 glucose blood VI test strips (ASCENSIA AUTODISC VI, ONE TOUCH ULTRA TEST VI) strip 50 Each by Does Not Apply route two (2) times daily as needed. 200 Strip 3  
 benzonatate (TESSALON) 100 mg capsule  EPINEPHrine (EPIPEN) 0.3 mg/0.3 mL injection 0.3 mg.    
  ondansetron (ZOFRAN ODT) 4 mg disintegrating tablet Take 1 Tab by mouth every eight (8) hours as needed for Nausea. 15 Tab 0  
 cyanocobalamin (VITAMIN B-12) 1,000 mcg tablet Take 1 Tab by mouth daily. 90 Tab 0  
 lisinopril-hydroCHLOROthiazide (PRINZIDE, ZESTORETIC) 20-12.5 mg per tablet Take 1 Tab by mouth two (2) times a day. 180 Tab 3  carvedilol (COREG) 3.125 mg tablet Take 2 Tabs by mouth two (2) times daily (with meals). 90 Tab 3  
 budesonide (RHINOCORT ALLERGY) 32 mcg/actuation nasal spray 2 Sprays by Both Nostrils route daily. Indications: Allergic Rhinitis 1 Bottle 3  
 insulin nph-regular human rec (HUMULIN 70-30) 100 unit/mL (70-30) inpn Give 40 units in the QAM and 45 units at bedtime 5 Pen 3  
 ferrous sulfate 325 mg (65 mg iron) tablet Take 1 Tab by mouth Daily (before breakfast). 30 Tab 3  polyethylene glycol (MIRALAX) 17 gram packet Take 1 Packet by mouth daily. 30 Each 1  
 Insulin Needles, Disposable, 31 gauge x 5/16\" ndle Use to administer insulin as directed 1 Package 11  
 atorvastatin (LIPITOR) 40 mg tablet Take 1 Tab by mouth daily. 30 Tab 6  
 LORazepam (ATIVAN) 1 mg tablet 1 mg. Past History Past Medical History: 
Past Medical History:  
Diagnosis Date  Anxiety  Bipolar disorder ([address]  Breast cancer ([facility] [address] breast cancer, right, S/P Mastectomy and chemo, radiation in 2013  Depression  Diabetes ([address]  Drug abuse ([address]   
 H/O cocaine use in past  
 Drug-seeking behavior  Family history of early CAD  Gastrointestinal disorder   
 cholitis  H/O ETOH abuse  Hyperlipidemia  Hypertension  Malignant neoplasm without specification of site [facility])  Methamphetamine abuse ([facility] [address] self reported on 1/3/16  Spine injury  Vitamin D deficiency 5/1/2018 Past Surgical History: 
Past Surgical History:  
Procedure Laterality Date  COLONOSCOPY N/A 7/18/2016 COLONOSCOPY performed by Kate Castillo MD at [facility] ENDOSCOPY  
 HX BREAST LUMPECTOMY  06/2013  
 right  HX HYSTERECTOMY  HX MASTECTOMY    
 had expanders put in 6/2018  HX ORTHOPAEDIC Back fusion surgery 4/18/14.  HX OTHER SURGICAL    
 mediport  HX TONSILLECTOMY  HX TUBAL LIGATION Family History: 
Family History Problem Relation Age of Onset  Heart Disease Mother  Stroke Father  Heart Disease Father 48  Diabetes Other  Hypertension Other  Cancer Maternal Grandmother Social History: 
Social History Tobacco Use  Smoking status: Never Smoker  Smokeless tobacco: Never Used Substance Use Topics  Alcohol use: Yes Comment: \"Occasionally\"  Drug use: No  
  Types: Methamphetamines, Cocaine Allergies: Allergies Allergen Reactions  Latex Hives and Itching  Other Food Rash Almonds  Amoxicillin Swelling Other reaction(s): mild rash/itching  Aspirin Not Reported This Time and Swelling Mouth swells  Beeswax Anaphylaxis  Fentanyl Anaphylaxis and Swelling Patches cause mouth to swell Swelling in mouth from fentanyl patch  Levaquin [Levofloxacin] Not Reported This Time and Swelling Other reaction(s): mild rash/itching  Chlorhexidine Rash  Norco [Hydrocodone-Acetaminophen] Nausea and Vomiting Other reaction(s): gi distress  Acetaminophen Other (comments)  [place] Rash and Itching  Codeine Other (comments)  Glycopyrrolate Swelling Pt  States  faciAL  SWELLING   POST  ROBINUL  IV Pt  States  faciAL  SWELLING   POST  ROBINUL  IV   
 Morphine Nausea and Vomiting Pt states she is not allergic  Pcn [Penicillins] Swelling  Percocet [Oxycodone-Acetaminophen] Nausea and Vomiting Other reaction(s): gi distress 
nausea Other reaction(s): anaphylaxis/angioedema Per pt. She is allergic  Tape [Adhesive] Rash  Tramadol Swelling Patient's primary care provider (as noted in EPIC):  Sia Ribeiro NP Review of Systems Constitutional:  Denies malaise, fever, chills. Head:  Denies injury. Face:  Denies injury or pain. ENMT:  Denies sore throat. Neck:  Denies injury or pain. Chest:  + right rib pain. Cardiac:  Denies chest pain or palpitations. Respiratory:  Denies cough, wheezing, difficulty breathing, shortness of breath. GI/ABD:  Denies injury, pain, distention, nausea, vomiting, diarrhea. :  Denies injury, pain, dysuria or urgency. Back:  Denies injury or pain. Pelvis:  Denies injury or pain. Extremity/MS:  Denies injury or pain. Neuro:  Denies headache, LOC, dizziness, neurologic symptoms/deficits/paresthesias. Skin: Denies injury, rash, itching or skin changes. All other systems negative as reviewed. Visit Vitals BP (!) 161/99 (BP 1 Location: Right arm, BP Patient Position: At rest) Pulse (!) 102 Temp 98 °F (36.7 °C) Resp 18 Ht 5' 2\" (1.575 m) Wt 72.6 kg (160 lb) SpO2 100% BMI 29.26 kg/m² PHYSICAL EXAM: 
 
CONSTITUTIONAL: Alert, appears uncomfortable; well-developed; well-nourished. HEAD:  Normocephalic, atraumatic. No Battles sign. No Raccoons eyes. EYES:  EOM's intact. Normal conjunctiva. Anicteric sclera. ENTM: Nose: no rhinorrhea; Oropharynx:  mucous membranes moist 
Neck:  No cervical vertebral bony point tenderness or step-off. RESP: Chest clear, equal breath sounds. CARDIOVASCULAR: Tachycardia with regular rhythm. No murmurs, rubs, or gallops. Chest: Right lateral inferior ribs with reproducible tenderness to palpation, no crepitus or deformity noted. No overlying ecchymosis, edema, or other findings. GI: Normal bowel sounds, abdomen soft and non-tender. No masses or organomegaly. : No costo-vertebral angle tenderness. BACK:  No TLS vertebral bony point tenderness or step-off. UPPER EXT:  Normal inspection LOWER EXT: No edema, no calf tenderness. Distal pulses intact. NEURO: Grossly normal motor and sensation. SKIN: No rashes; Normal for age and stage. PSYCH:  Alert and oriented, normal affect. DIFFERENTIAL DIAGNOSES/ MEDICAL DECISION MAKING: 
Contusion, sprain, dislocation, fracture, ligamentous tear/ disruption or a combination of the above. ED COURSE:   
 
Ct Chest Wo Cont Result Date: 3/30/2019 EXAM: CT Chest INDICATION: Right-sided chest pain, fall, pain. COMPARISON: Same day rib series. Prior CT scan of the chest 7/3/2018. TECHNIQUE: Axial CT imaging from the thoracic inlet through the diaphragm without intravenous contrast. Multiplanar reformations were generated. One or more dose reduction techniques were used on this CT: automated exposure control, adjustment of the mAs and/or kVp according to patient size, and iterative reconstruction techniques. The specific techniques used on this CT exam have been documented in the patient's electronic medical record. Digital Imaging and Communications in Medicine (DICOM) format image data are available to nonaffiliated external healthcare facilities or entities on a secure, media free, reciprocally searchable basis with patient authorization for at least a 12-month period after this study. _______________ FINDINGS: LUNGS: There is mild dependent atelectasis present at the lung bases bilaterally. No pneumonic consolidation. No suspicious pulmonary nodule or mass. PLEURA: No pneumothorax or pleural effusion. AIRWAY: Central airways are patent. MEDIASTINUM: Included thyroid gland is unremarkable. Normal caliber thoracic aorta. Cardiac size normal. No pericardial effusion. LYMPH NODES: No enlarged lymph nodes by size criteria. UPPER ABDOMEN: Unremarkable. OSSEOUS STRUCTURES: No suspicious lytic or blastic osseous lesions. No acute osseous abnormality.  With specific attention to the right ribs, no discrete fracture demonstrated. OTHER: Bilateral mammoplasties with collapsed implants unchanged. _______________ IMPRESSION: 1. Minor bibasilar atelectasis without pneumothorax or pleural effusion. 2. No discrete right-sided rib fracture demonstrated. Xr Ribs Rt W Pa Cxr Min 3 V Result Date: 3/30/2019 EXAM: XR RIBS RT W PA CXR MIN 3 V CLINICAL INDICATION/HISTORY: Pain   > Additional: Sided chest pain COMPARISON: 1/11/2019 TECHNIQUE: PA upright projection of the chest as well as AP and oblique views of the right ribs performed. _______________ FINDINGS: Focal pneumonic consolidation, pneumothorax, or pleural effusion. Normal cardiac size and mediastinal contours. No displaced right-sided rib fracture. Bilateral mammoplasties with radiopaque valve material. Partial visualization of lumbar spine surgical instrumentation. _______________ IMPRESSION: . No acute radiographic cardiopulmonary abnormality. 2. No displaced right-sided rib fracture demonstrated. IMPRESSION AND MEDICAL DECISION MAKING: 
Based upon the patients presentation with noted HPI and PE, along with the work up done in the emergency department, I believe that the patient is having noted contusion(s) from noted fall. Will have her take Tylenol or Motrin at home and follow-up with her primary care doctor. Patient does appear in pain, initial heart rate of 126. Given oxycodone for pain. Will recheck after patient's pain improves. Heart rate was not improved, was persistently around 120. CT of chest performed, which was negative for acute process. Will discharge patient home at this time. 2:25 PM  
 
Diagnosis: 1. Contusion of rib on right side, initial encounter 2. Fall, initial encounter Disposition: Discharge Follow-up Information Follow up With Specialties Details Why Contact Info Shaneka Santos NP Nurse Practitioner In 3 days  [id number] [facility] [address] [id number] [phone] [facility] EMERGENCY DEPT Emergency Medicine  If symptoms worsen [id number] 
[phone] Patient's Medications Start Taking LIDOCAINE (LIDOCAINE PAIN RELIEF) 4 % PATCH    Apply to the affected area every 12 hours for pain. Continue Taking ATORVASTATIN (LIPITOR) 40 MG TABLET    Take 1 Tab by mouth daily. BENZONATATE (TESSALON) 100 MG CAPSULE      
 BLOOD-GLUCOSE METER (ONETOUCH ULTRA2) MONITORING KIT    Use to obtain two times a day. BUDESONIDE (RHINOCORT ALLERGY) 32 MCG/ACTUATION NASAL SPRAY    2 Sprays by Both Nostrils route daily. Indications: Allergic Rhinitis CARVEDILOL (COREG) 3.125 MG TABLET    Take 2 Tabs by mouth two (2) times daily (with meals). CYANOCOBALAMIN (VITAMIN B-12) 1,000 MCG TABLET    Take 1 Tab by mouth daily. DICYCLOMINE (BENTYL) 20 MG TABLET    Take 1 Tab by mouth every six (6) hours. EPINEPHRINE (EPIPEN) 0.3 MG/0.3 ML INJECTION    0.3 mg.  
 ERGOCALCIFEROL (ERGOCALCIFEROL) 50,000 UNIT CAPSULE    Take 1 Cap by mouth every seven (7) days for 12 doses. FERROUS SULFATE 325 MG (65 MG IRON) TABLET    Take 1 Tab by mouth Daily (before breakfast). GLUCOSE BLOOD VI TEST STRIPS (ASCENSIA AUTODISC VI, ONE TOUCH ULTRA TEST VI) STRIP    50 Each by Does Not Apply route two (2) times daily as needed. INSULIN NEEDLES, DISPOSABLE, 31 GAUGE X 5/16\" NDLE    Use to administer insulin as directed INSULIN NPH-REGULAR HUMAN REC (HUMULIN 70-30) 100 UNIT/ML (70-30) INPN    Give 40 units in the QAM and 45 units at bedtime LANCETS MISC    Use daily to monitor blood glucose LISINOPRIL-HYDROCHLOROTHIAZIDE (PRINZIDE, ZESTORETIC) 20-12.5 MG PER TABLET    Take 1 Tab by mouth two (2) times a day. LORAZEPAM (ATIVAN) 1 MG TABLET    1 mg. ONDANSETRON (ZOFRAN ODT) 4 MG DISINTEGRATING TABLET    Take 1 Tab by mouth every eight (8) hours as needed for Nausea.   
 ONDANSETRON (ZOFRAN ODT) 4 MG DISINTEGRATING TABLET    Take 1 Tab by mouth every eight (8) hours as needed for Nausea. POLYETHYLENE GLYCOL (MIRALAX) 17 GRAM PACKET    Take 1 Packet by mouth daily. These Medications have changed No medications on file Stop Taking No medications on file SAMANTHA Oconnell

## 2019-03-30 NOTE — DISCHARGE INSTRUCTIONS
Patient Education        Chest Contusion: Care Instructions  Your Care Instructions  A chest contusion, or bruise, is caused by a fall or direct blow to the chest. Car crashes, falls, getting punched, and injury from bicycle handlebars are common causes of chest contusions. A very forceful blow to the chest can injure the heart or blood vessels in the chest, the lungs, the airway, the liver, or the spleen. Pain may be caused by an injury to muscles, cartilage, or ribs. Deep breathing, coughing, or sneezing can increase your pain. Lying on the injured area also can cause pain. Follow-up care is a key part of your treatment and safety. Be sure to make and go to all appointments, and call your doctor if you are having problems. It's also a good idea to know your test results and keep a list of the medicines you take. How can you care for yourself at home? · Rest and protect the injured or sore area. Stop, change, or take a break from any activity that may be causing your pain. · Put ice or a cold pack on the area for 10 to 20 minutes at a time. Put a thin cloth between the ice and your skin. · After 2 or 3 days, if your swelling is gone, apply a heating pad set on low or a warm cloth to your chest. Some doctors suggest that you go back and forth between hot and cold. Put a thin cloth between the heating pad and your skin. · Do not wrap or tape your ribs for support. This may cause you to take smaller breaths, which could increase your risk of pneumonia and lung collapse. · Ask your doctor if you can take an over-the-counter pain medicine, such as acetaminophen (Tylenol), ibuprofen (Advil, Motrin), or naproxen (Aleve). Be safe with medicines. Read and follow all instructions on the label. · Take your medicines exactly as prescribed. Call your doctor if you think you are having a problem with your medicine.   · Gentle stretching and massage may help you feel better after a few days of rest. Stretch slowly to the point just before discomfort begins, then hold the stretch for at least 15 to 30 seconds. Do this 3 or 4 times per day. · As your pain gets better, slowly return to your normal activities. Be patient, because chest bruises can take weeks or months to heal. Any increased pain may be a sign that you need to rest a while longer. When should you call for help? Call 911 anytime you think you may need emergency care. For example, call if:    · You have severe trouble breathing.     · You cough up blood.    Call your doctor now or seek immediate medical care if:    · You have belly pain.     · You are dizzy or lightheaded, or you feel like you may faint.     · You develop new symptoms with the chest pain.     · Your chest pain gets worse.     · You have a fever.     · You have some shortness of breath.     · You have a cough that brings up mucus from the lungs.    Watch closely for changes in your health, and be sure to contact your doctor if:    · Your chest pain is not improving after 1 week. Where can you learn more? Go to http://imani-jenny.info/. Enter I174 in the search box to learn more about \"Chest Contusion: Care Instructions. \"  Current as of: September 23, 2018  Content Version: 11.9  © 2514-8008 [employer]. Care instructions adapted under license by [employer] (which disclaims liability or warranty for this information). If you have questions about a medical condition or this instruction, always ask your healthcare professional. [place] [address] any warranty or liability for your use of this information. Patient Education        Preventing Falls: Care Instructions  Your Care Instructions    Getting around your home safely can be a challenge if you have injuries or health problems that make it easy for you to fall.  Loose rugs and furniture in walkways are among the dangers for many older people who have problems walking or who have poor eyesight. People who have conditions such as arthritis, osteoporosis, or dementia also have to be careful not to fall. You can make your home safer with a few simple measures. Follow-up care is a key part of your treatment and safety. Be sure to make and go to all appointments, and call your doctor if you are having problems. It's also a good idea to know your test results and keep a list of the medicines you take. How can you care for yourself at home? Taking care of yourself  · You may get dizzy if you do not drink enough water. To prevent dehydration, drink plenty of fluids, enough so that your urine is light yellow or clear like water. Choose water and other caffeine-free clear liquids. If you have kidney, heart, or liver disease and have to limit fluids, talk with your doctor before you increase the amount of fluids you drink. · Exercise regularly to improve your strength, muscle tone, and balance. Walk if you can. Swimming may be a good choice if you cannot walk easily. · Have your vision and hearing checked each year or any time you notice a change. If you have trouble seeing and hearing, you might not be able to avoid objects and could lose your balance. · Know the side effects of the medicines you take. Ask your doctor or pharmacist whether the medicines you take can affect your balance. Sleeping pills or sedatives can affect your balance. · Limit the amount of alcohol you drink. Alcohol can impair your balance and other senses. · Ask your doctor whether calluses or corns on your feet need to be removed. If you wear loose-fitting shoes because of calluses or corns, you can lose your balance and fall. · Talk to your doctor if you have numbness in your feet. Preventing falls at home  · Remove raised doorway thresholds, throw rugs, and clutter. Repair loose carpet or raised areas in the floor. · Move furniture and electrical cords to keep them out of walking paths.   · Use nonskid floor wax, and wipe up spills right away, especially on ceramic tile floors. · If you use a walker or cane, put rubber tips on it. If you use crutches, clean the bottoms of them regularly with an abrasive pad, such as steel wool. · Keep your house well lit, especially [place], and outside walkways. Use night-lights in areas such as hallways and bathrooms. Add extra light switches or use remote switches (such as switches that go on or off when you clap your hands) to make it easier to turn lights on if you have to get up during the night. · Install sturdy handrails on stairways. · Move items in your cabinets so that the things you use a lot are on the lower shelves (about waist level). · Keep a cordless phone and a flashlight with new batteries by your bed. If possible, put a phone in each of the main rooms of your house, or carry a cell phone in case you fall and cannot reach a phone. Or, you can wear a device around your neck or wrist. You push a button that sends a signal for help. · Wear low-heeled shoes that fit well and give your feet good support. Use footwear with nonskid soles. Check the heels and soles of your shoes for wear. Repair or replace worn heels or soles. · Do not wear socks without shoes on wood floors. · Walk on the grass when the sidewalks are slippery. If you live in an area that gets snow and ice in the winter, sprinkle salt on slippery steps and sidewalks. Preventing falls in the bath  · Install grab bars and nonskid mats inside and outside your shower or tub and near the toilet and sinks. · Use shower chairs and bath benches. · Use a hand-held shower head that will allow you to sit while showering. · Get into a tub or shower by putting the weaker leg in first. Get out of a tub or shower with your strong side first.  · Repair loose toilet seats and consider installing a raised toilet seat to make getting on and off the toilet easier.   · Keep your bathroom door unlocked while you are in the shower. Where can you learn more? Go to http://imani-jenny.info/. Enter 0476 79 69 [id number] in the search box to learn more about \"Preventing Falls: Care Instructions. \"  Current as of: March 15, 2018  Content Version: 11.9  © 8737-4178 [email or web address]. Care instructions adapted under license by [employer] (which disclaims liability or warranty for this information). If you have questions about a medical condition or this instruction, always ask your healthcare professional. [place] [address] any warranty or liability for your use of this information.

## 2019-04-01 ENCOUNTER — HOSPITAL ENCOUNTER (EMERGENCY)
Age: 49
Discharge: HOME | End: 2019-04-01
Attending: EMERGENCY MEDICINE
Payer: MEDICAID

## 2019-04-01 ENCOUNTER — APPOINTMENT (OUTPATIENT)
Dept: GENERAL RADIOLOGY | Age: 49
End: 2019-04-01
Attending: EMERGENCY MEDICINE
Payer: MEDICAID

## 2019-04-01 VITALS
RESPIRATION RATE: 16 BRPM | WEIGHT: 160 LBS | BODY MASS INDEX: 29.44 KG/M2 | DIASTOLIC BLOOD PRESSURE: 86 MMHG | OXYGEN SATURATION: 98 % | TEMPERATURE: 98.2 F | SYSTOLIC BLOOD PRESSURE: 155 MMHG | HEIGHT: 62 IN | HEART RATE: 88 BPM

## 2019-04-01 DIAGNOSIS — S20.211D CONTUSION OF RIGHT CHEST WALL, SUBSEQUENT ENCOUNTER: Primary | ICD-10-CM

## 2019-04-01 PROCEDURE — 99283 EMERGENCY DEPT VISIT LOW MDM: CPT

## 2019-04-01 PROCEDURE — 71045 X-RAY EXAM CHEST 1 VIEW: CPT

## 2019-04-01 RX ORDER — LIDOCAINE 4 G/100G
1 PATCH TOPICAL
Status: DISCONTINUED | OUTPATIENT
Start: 2019-04-01 | End: 2019-04-02 | Stop reason: HOSPADM

## 2019-04-02 NOTE — ED TRIAGE NOTES
Pt c/o fall on Friday, seen and evaluated at this facility and told she had contusion to her rib cage. States the pain got worse tonight. Denies any new injury

## 2019-04-02 NOTE — DISCHARGE INSTRUCTIONS
Patient Education        Chest Contusion: Care Instructions  Your Care Instructions  A chest contusion, or bruise, is caused by a fall or direct blow to the chest. Car crashes, falls, getting punched, and injury from bicycle handlebars are common causes of chest contusions. A very forceful blow to the chest can injure the heart or blood vessels in the chest, the lungs, the airway, the liver, or the spleen. Pain may be caused by an injury to muscles, cartilage, or ribs. Deep breathing, coughing, or sneezing can increase your pain. Lying on the injured area also can cause pain. Follow-up care is a key part of your treatment and safety. Be sure to make and go to all appointments, and call your doctor if you are having problems. It's also a good idea to know your test results and keep a list of the medicines you take. How can you care for yourself at home? · Rest and protect the injured or sore area. Stop, change, or take a break from any activity that may be causing your pain. · Put ice or a cold pack on the area for 10 to 20 minutes at a time. Put a thin cloth between the ice and your skin. · After 2 or 3 days, if your swelling is gone, apply a heating pad set on low or a warm cloth to your chest. Some doctors suggest that you go back and forth between hot and cold. Put a thin cloth between the heating pad and your skin. · Do not wrap or tape your ribs for support. This may cause you to take smaller breaths, which could increase your risk of pneumonia and lung collapse. · Ask your doctor if you can take an over-the-counter pain medicine, such as acetaminophen (Tylenol), ibuprofen (Advil, Motrin), or naproxen (Aleve). Be safe with medicines. Read and follow all instructions on the label. · Take your medicines exactly as prescribed. Call your doctor if you think you are having a problem with your medicine.   · Gentle stretching and massage may help you feel better after a few days of rest. Stretch slowly to the point just before discomfort begins, then hold the stretch for at least 15 to 30 seconds. Do this 3 or 4 times per day. · As your pain gets better, slowly return to your normal activities. Be patient, because chest bruises can take weeks or months to heal. Any increased pain may be a sign that you need to rest a while longer. When should you call for help? Call 911 anytime you think you may need emergency care. For example, call if:    · You have severe trouble breathing.     · You cough up blood.    Call your doctor now or seek immediate medical care if:    · You have belly pain.     · You are dizzy or lightheaded, or you feel like you may faint.     · You develop new symptoms with the chest pain.     · Your chest pain gets worse.     · You have a fever.     · You have some shortness of breath.     · You have a cough that brings up mucus from the lungs.    Watch closely for changes in your health, and be sure to contact your doctor if:    · Your chest pain is not improving after 1 week. Where can you learn more? Go to http://imani-jenny.info/. Enter I174 in the search box to learn more about \"Chest Contusion: Care Instructions. \"  Current as of: September 23, 2018  Content Version: 11.9  © 0000-3215 [employer]. Care instructions adapted under license by [employer] (which disclaims liability or warranty for this information). If you have questions about a medical condition or this instruction, always ask your healthcare professional. [place] [address] any warranty or liability for your use of this information.

## 2019-04-02 NOTE — ED PROVIDER NOTES
Bianka Hernandez is a 51 y.o. Female with a history of recurrent chronic pain and multiple ER visits for pain related issues and a remote history of breast cancer who complains of recurrent pain in the right lower chest tonight. Patient was seen recently for a contusion to her ribs on that side. She had a CT that was negative for fracture. Patient denies any new injury but the pain just started hurting worse tonight. There is no increased swelling patient not increasingly short of breath nor is there any fever, chills, sweats, vomiting, diarrhea. She has not taken anything for the pain The history is provided by the patient and medical records. Past Medical History:  
Diagnosis Date  Anxiety  B[facility] disorder ([address]  Breast cancer ([address] breast cancer, right, S/P Mastectomy and chemo, radiation in 2013  Depression  Diabetes ([address]  Drug abuse ([address]   
 H/O cocaine use in past  
 Drug-seeking behavior  Family history of early CAD  Gastrointestinal disorder   
 cholitis  H/O ETOH abuse  Hyperlipidemia  Hypertension  Malignant neoplasm without specification of site [facility])  Methamphetamine abuse ([address] self reported on 1/3/16  Spine injury  Vitamin D deficiency 5/1/2018 Past Surgical History:  
Procedure Laterality Date  COLONOSCOPY N/A 7/18/2016 [facility]COPY performed by [facility] ENDOSCOPY  
 HX BREAST LUMPECTOMY  06/2013  
 right  HX HYSTERECTOMY  HX MASTECTOMY    
 had expanders put in 6/2018  HX ORTHOPAEDIC Back fusion surgery 4/18/14.  HX OTHER SURGICAL    
 mediport  HX TONSILLECTOMY  HX TUBAL LIGATION Family History:  
Problem Relation Age of Onset  Heart Disease Mother  Stroke Father  Heart Disease Father 48  Diabetes Other  Hypertension Other  Cancer Maternal Grandmother Social History Socioeconomic History  Marital status:  Spouse name: Not on file  Number of children: Not on file  Years of education: Not on file  Highest education level: Not on file Occupational History  Not on file Social Needs  Financial resource strain: Not on file  Food insecurity:  
  Worry: Not on file Inability: Not on file  Transportation needs:  
  Medical: Not on file Non-medical: Not on file Tobacco Use  Smoking status: Never Smoker  Smokeless tobacco: Never Used Substance and Sexual Activity  Alcohol use: Yes Comment: \"Occasionally\"  Drug use: No  
  Types: Methamphetamines, Cocaine  Sexual activity: Never Lifestyle  Physical activity:  
  Days per week: Not on file Minutes per session: Not on file  Stress: Not on file Relationships  Social connections:  
  Talks on phone: Not on file Gets together: Not on file Attends Jainism service: Not on file Active member of club or organization: Not on file Attends meetings of clubs or organizations: Not on file Relationship status: Not on file  Intimate partner violence:  
  Fear of current or ex partner: Not on file Emotionally abused: Not on file Physically abused: Not on file Forced sexual activity: Not on file Other Topics Concern  Not on file Social History Narrative ** Merged History Encounter ** ALLERGIES: Latex; Other food; Amoxicillin; Aspirin; Beeswax; Fentanyl; Levaquin [levofloxacin]; Chlorhexidine; Norco [hydrocodone-acetaminophen]; Acetaminophen; [place]; Codeine; Glycopyrrolate; Morphine; Pcn [penicillins]; Percocet [oxycodone-acetaminophen]; Tape [adhesive]; and Tramadol Review of Systems Constitutional: Negative for fever. HENT: Negative for sore throat. Respiratory: Negative for shortness of breath. Cardiovascular: Positive for chest pain. Gastrointestinal: Negative for abdominal pain. Genitourinary: Negative for difficulty urinating. Musculoskeletal: Positive for myalgias. Skin: Negative for rash and wound. Allergic/Immunologic: Negative for immunocompromised state. Neurological: Negative for syncope. Psychiatric/Behavioral: Positive for sleep disturbance. Vitals:  
 04/01/19 2157 BP: 155/86 Pulse: 88 Resp: 16 Temp: 98.2 °F (36.8 °C) SpO2: 98% Weight: 72.6 kg (160 lb) Height: 5' 2\" (1.575 m) Physical Exam  
Constitutional: She is oriented to person, place, and time. She appears well-developed and well-nourished. No distress. HENT:  
Head: Normocephalic and atraumatic. Right Ear: External ear normal.  
Left Ear: External ear normal.  
Nose: Nose normal.  
Mouth/Throat: Uvula is midline, oropharynx is clear and moist and mucous membranes are normal.  
Eyes: Conjunctivae are normal. No scleral icterus. Neck: Neck supple. Cardiovascular: Normal rate, regular rhythm, normal heart sounds and intact distal pulses. Pulmonary/Chest: Effort normal and breath sounds normal. She exhibits tenderness. Abdominal: Soft. There is no tenderness. Musculoskeletal: She exhibits no edema. Neurological: She is alert and oriented to person, place, and time. Gait normal.  
Skin: Skin is warm and dry. Capillary refill takes less than 2 seconds. She is not diaphoretic. Psychiatric: Her behavior is normal.  
Nursing note and vitals reviewed. MDM Procedures Vitals: 
Patient Vitals for the past 12 hrs: 
 Temp Pulse Resp BP SpO2  
04/01/19 2157 98.2 °F (36.8 °C) 88 16 155/86 98 % Medications ordered:  
Medications  
lidocaine 4 % patch 1 Patch (0 Patches TransDERmal Held 4/1/19 2964) Lab findings: 
No results found for this or any previous visit (from the past 12 hour(s)). EKG interpretation by ED Physician: X-Ray, CT or other radiology findings or impressions: 
XR CHEST PORT    (Results Pending) nap per my interp. There is no obvious pneumothorax, effusion, fracture. Tissue expander is present. Progress notes, Consult notes or additional Procedure notes:  
Doubt need for any other imaging or labs at this time. Do not feel patient requires any other medications at this time. She has multiple drug allergies. I have discussed with patient and/or family/sig other the results, interpretation of any imaging if performed, suspected diagnosis and treatment plan to include instructions regarding the diagnoses listed to which understanding was expressed with all questions answered Reevaluation of patient:  
stable Disposition: 
Diagnosis: 1. Contusion of right chest wall, subsequent encounter Disposition: home Follow-up Information Follow up With Specialties Details Why Contact Info Madhu Alexis NP Nurse Practitioner Schedule an appointment as soon as possible for a visit  [address] [facility] [address] [phone] 
[phone] Patient's Medications Start Taking No medications on file Continue Taking ATORVASTATIN (LIPITOR) 40 MG TABLET    Take 1 Tab by mouth daily. BENZONATATE (TESSALON) 100 MG CAPSULE      
 BLOOD-GLUCOSE METER (ONETOUCH ULTRA2) MONITORING KIT    Use to obtain two times a day. BUDESONIDE (RHINOCORT ALLERGY) 32 MCG/ACTUATION NASAL SPRAY    2 Sprays by Both Nostrils route daily. Indications: Allergic Rhinitis CARVEDILOL (COREG) 3.125 MG TABLET    Take 2 Tabs by mouth two (2) times daily (with meals). CYANOCOBALAMIN (VITAMIN B-12) 1,000 MCG TABLET    Take 1 Tab by mouth daily. DICYCLOMINE (BENTYL) 20 MG TABLET    Take 1 Tab by mouth every six (6) hours. EPINEPHRINE (EPIPEN) 0.3 MG/0.3 ML INJECTION    0.3 mg.  
 FERROUS SULFATE 325 MG (65 MG IRON) TABLET    Take 1 Tab by mouth Daily (before breakfast).   
 GLUCOSE BLOOD VI TEST STRIPS (ASCENSIA AUTODISC VI, ONE TOUCH ULTRA TEST VI) STRIP    50 Each by Does Not Apply route two (2) times daily as needed. INSULIN NEEDLES, DISPOSABLE, 31 GAUGE X 5/16\" NDLE    Use to administer insulin as directed INSULIN NPH-REGULAR HUMAN REC (HUMULIN 70-30) 100 UNIT/ML (70-30) INPN    Give 40 units in the QAM and 45 units at bedtime LANCETS MISC    Use daily to monitor blood glucose LIDOCAINE (LIDOCAINE PAIN RELIEF) 4 % PATCH    Apply to the affected area every 12 hours for pain. LISINOPRIL-HYDROCHLOROTHIAZIDE (PRINZIDE, ZESTORETIC) 20-12.5 MG PER TABLET    Take 1 Tab by mouth two (2) times a day. LORAZEPAM (ATIVAN) 1 MG TABLET    1 mg. ONDANSETRON (ZOFRAN ODT) 4 MG DISINTEGRATING TABLET    Take 1 Tab by mouth every eight (8) hours as needed for Nausea. ONDANSETRON (ZOFRAN ODT) 4 MG DISINTEGRATING TABLET    Take 1 Tab by mouth every eight (8) hours as needed for Nausea. POLYETHYLENE GLYCOL (MIRALAX) 17 GRAM PACKET    Take 1 Packet by mouth daily. These Medications have changed No medications on file Stop Taking No medications on file

## 2019-04-02 NOTE — ED NOTES
At pt request, ace bandage used to wrap around ribs for patient comfort. MD approved. Pt instructed not to wrap too tightly as she has breast expanders in place. Pt verbalizes understanding. I have reviewed discharge instructions with the patient. The patient verbalized understanding. Patient armband removed and shredded

## 2019-06-10 ENCOUNTER — APPOINTMENT (OUTPATIENT)
Dept: GENERAL RADIOLOGY | Age: 49
End: 2019-06-10
Attending: PHYSICIAN ASSISTANT
Payer: MEDICAID

## 2019-06-10 ENCOUNTER — HOSPITAL ENCOUNTER (EMERGENCY)
Age: 49
Discharge: HOME | End: 2019-06-10
Attending: EMERGENCY MEDICINE
Payer: MEDICAID

## 2019-06-10 VITALS
HEART RATE: 80 BPM | SYSTOLIC BLOOD PRESSURE: 144 MMHG | TEMPERATURE: 98.5 F | DIASTOLIC BLOOD PRESSURE: 81 MMHG | RESPIRATION RATE: 18 BRPM | WEIGHT: 165 LBS | HEIGHT: 62 IN | BODY MASS INDEX: 30.36 KG/M2 | OXYGEN SATURATION: 99 %

## 2019-06-10 DIAGNOSIS — R73.9 HYPERGLYCEMIA: ICD-10-CM

## 2019-06-10 DIAGNOSIS — R03.0 ELEVATED BLOOD PRESSURE READING: ICD-10-CM

## 2019-06-10 DIAGNOSIS — R07.89 CHEST WALL PAIN: Primary | ICD-10-CM

## 2019-06-10 LAB
ANION GAP SERPL CALC-SCNC: 10 MMOL/L (ref 3–18)
BASOPHILS # BLD: 0 K/UL (ref 0–0.1)
BASOPHILS NFR BLD: 0 % (ref 0–2)
BUN SERPL-MCNC: 13 MG/DL (ref 7–18)
BUN/CREAT SERPL: 11 (ref 12–20)
CALCIUM SERPL-MCNC: 9.6 MG/DL (ref 8.5–10.1)
CHLORIDE SERPL-SCNC: 102 MMOL/L (ref 100–108)
CK MB CFR SERPL CALC: NORMAL % (ref 0–4)
CK MB SERPL-MCNC: <1 NG/ML (ref 5–25)
CK SERPL-CCNC: 30 U/L (ref 26–192)
CO2 SERPL-SCNC: 25 MMOL/L (ref 21–32)
CREAT SERPL-MCNC: 1.17 MG/DL (ref 0.6–1.3)
DIFFERENTIAL METHOD BLD: NORMAL
EOSINOPHIL # BLD: 0.1 K/UL (ref 0–0.4)
EOSINOPHIL NFR BLD: 2 % (ref 0–5)
ERYTHROCYTE [DISTWIDTH] IN BLOOD BY AUTOMATED COUNT: 12.6 % (ref 11.6–14.5)
GLUCOSE SERPL-MCNC: 397 MG/DL (ref 74–99)
HCT VFR BLD AUTO: 40.7 % (ref 35–45)
HGB BLD-MCNC: 13.5 G/DL (ref 12–16)
LYMPHOCYTES # BLD: 2.3 K/UL (ref 0.9–3.6)
LYMPHOCYTES NFR BLD: 28 % (ref 21–52)
MCH RBC QN AUTO: 29 PG (ref 24–34)
MCHC RBC AUTO-ENTMCNC: 33.2 G/DL (ref 31–37)
MCV RBC AUTO: 87.5 FL (ref 74–97)
MONOCYTES # BLD: 0.4 K/UL (ref 0.05–1.2)
MONOCYTES NFR BLD: 4 % (ref 3–10)
NEUTS SEG # BLD: 5.2 K/UL (ref 1.8–8)
NEUTS SEG NFR BLD: 66 % (ref 40–73)
PLATELET # BLD AUTO: 243 K/UL (ref 135–420)
PMV BLD AUTO: 10.6 FL (ref 9.2–11.8)
POTASSIUM SERPL-SCNC: 3.8 MMOL/L (ref 3.5–5.5)
RBC # BLD AUTO: 4.65 M/UL (ref 4.2–5.3)
SODIUM SERPL-SCNC: 137 MMOL/L (ref 136–145)
TROPONIN I SERPL-MCNC: <0.02 NG/ML (ref 0–0.04)
WBC # BLD AUTO: 8 K/UL (ref 4.6–13.2)

## 2019-06-10 PROCEDURE — 99284 EMERGENCY DEPT VISIT MOD MDM: CPT

## 2019-06-10 PROCEDURE — 74011250636 HC RX REV CODE- 250/636: Performed by: PHYSICIAN ASSISTANT

## 2019-06-10 PROCEDURE — 96374 THER/PROPH/DIAG INJ IV PUSH: CPT

## 2019-06-10 PROCEDURE — 82550 ASSAY OF CK (CPK): CPT

## 2019-06-10 PROCEDURE — 85025 COMPLETE CBC W/AUTO DIFF WBC: CPT

## 2019-06-10 PROCEDURE — 93005 ELECTROCARDIOGRAM TRACING: CPT

## 2019-06-10 PROCEDURE — 80048 BASIC METABOLIC PNL TOTAL CA: CPT

## 2019-06-10 PROCEDURE — 71045 X-RAY EXAM CHEST 1 VIEW: CPT

## 2019-06-10 RX ORDER — KETOROLAC TROMETHAMINE 30 MG/ML
15 INJECTION, SOLUTION INTRAMUSCULAR; INTRAVENOUS
Status: COMPLETED | OUTPATIENT
Start: 2019-06-10 | End: 2019-06-10

## 2019-06-10 RX ADMIN — KETOROLAC TROMETHAMINE 15 MG: 30 INJECTION, SOLUTION INTRAMUSCULAR at 21:05

## 2019-06-10 NOTE — ED TRIAGE NOTES
Pt ambulatory into triage for c/o bilateral chest pain and shortness of breath x2 weeks. Pt states she has breast tissue expanders and states \"I feel like they are moving\", noted clear lung sounds in triage.

## 2019-06-11 LAB
ATRIAL RATE: 76 BPM
CALCULATED P AXIS, ECG09: 56 DEGREES
CALCULATED R AXIS, ECG10: -27 DEGREES
CALCULATED T AXIS, ECG11: 88 DEGREES
DIAGNOSIS, 93000: NORMAL
P-R INTERVAL, ECG05: 152 MS
Q-T INTERVAL, ECG07: 344 MS
QRS DURATION, ECG06: 80 MS
QTC CALCULATION (BEZET), ECG08: 387 MS
VENTRICULAR RATE, ECG03: 76 BPM

## 2019-06-11 NOTE — DISCHARGE INSTRUCTIONS

## 2019-06-11 NOTE — ED NOTES
10:13 PM  06/10/19     Discharge instructions given to patient (name) with verbalization of understanding. Patient accompanied by s/o. Patient discharged with the following prescriptions none. Patient discharged to home (destination).       Mary Lou Benson RN

## 2019-07-28 ENCOUNTER — HOSPITAL ENCOUNTER (EMERGENCY)
Age: 49
Discharge: HOME | End: 2019-07-29
Attending: EMERGENCY MEDICINE | Admitting: EMERGENCY MEDICINE
Payer: MEDICAID

## 2019-07-28 DIAGNOSIS — N76.0 BV (BACTERIAL VAGINOSIS): ICD-10-CM

## 2019-07-28 DIAGNOSIS — B96.89 BV (BACTERIAL VAGINOSIS): ICD-10-CM

## 2019-07-28 DIAGNOSIS — R73.9 HYPERGLYCEMIA: Primary | ICD-10-CM

## 2019-07-28 LAB
APPEARANCE UR: CLEAR
BILIRUB UR QL: NEGATIVE
COLOR UR: YELLOW
GLUCOSE BLD STRIP.AUTO-MCNC: 485 MG/DL (ref 70–110)
GLUCOSE UR STRIP.AUTO-MCNC: >1000 MG/DL
HGB UR QL STRIP: NEGATIVE
KETONES UR QL STRIP.AUTO: NEGATIVE MG/DL
LEUKOCYTE ESTERASE UR QL STRIP.AUTO: NEGATIVE
NITRITE UR QL STRIP.AUTO: NEGATIVE
PH UR STRIP: 5 [PH] (ref 5–8)
PROT UR STRIP-MCNC: NEGATIVE MG/DL
SP GR UR REFRACTOMETRY: >1.03 (ref 1–1.03)
UROBILINOGEN UR QL STRIP.AUTO: 0.2 EU/DL (ref 0.2–1)

## 2019-07-28 PROCEDURE — 82962 GLUCOSE BLOOD TEST: CPT

## 2019-07-28 PROCEDURE — 81003 URINALYSIS AUTO W/O SCOPE: CPT

## 2019-07-28 PROCEDURE — 80053 COMPREHEN METABOLIC PANEL: CPT

## 2019-07-28 PROCEDURE — 99285 EMERGENCY DEPT VISIT HI MDM: CPT

## 2019-07-28 PROCEDURE — 85025 COMPLETE CBC W/AUTO DIFF WBC: CPT

## 2019-07-28 NOTE — LETTER
[address] [facility] EMERGENCY DEPT 
[address] 
[address] [facility] [id number] [phone] Work/School Note Date: 7/28/2019 To Whom It May concern: 
 
Jose E Allen was seen and treated today in the emergency room by the following provider(s): 
Attending Provider: Kenzie Delacruz MD.   
 
Jose E Allen was seen and evaluated in the emergency department 07/28-07/29. Sincerely, Janet Hanna MD

## 2019-07-29 VITALS
OXYGEN SATURATION: 97 % | BODY MASS INDEX: 29.44 KG/M2 | RESPIRATION RATE: 15 BRPM | DIASTOLIC BLOOD PRESSURE: 76 MMHG | SYSTOLIC BLOOD PRESSURE: 134 MMHG | HEART RATE: 84 BPM | TEMPERATURE: 97.7 F | HEIGHT: 62 IN | WEIGHT: 160 LBS

## 2019-07-29 LAB
ALBUMIN SERPL-MCNC: 4.3 G/DL (ref 3.4–5)
ALBUMIN/GLOB SERPL: 1.1 {RATIO} (ref 0.8–1.7)
ALP SERPL-CCNC: 189 U/L (ref 45–117)
ALT SERPL-CCNC: 35 U/L (ref 13–56)
ANION GAP SERPL CALC-SCNC: 7 MMOL/L (ref 3–18)
AST SERPL-CCNC: 28 U/L (ref 10–38)
BASOPHILS # BLD: 0 K/UL (ref 0–0.1)
BASOPHILS NFR BLD: 0 % (ref 0–2)
BILIRUB SERPL-MCNC: 0.5 MG/DL (ref 0.2–1)
BUN SERPL-MCNC: 19 MG/DL (ref 7–18)
BUN/CREAT SERPL: 17 (ref 12–20)
C TRACH RRNA SPEC QL NAA+PROBE: NEGATIVE
CALCIUM SERPL-MCNC: 9.9 MG/DL (ref 8.5–10.1)
CHLORIDE SERPL-SCNC: 98 MMOL/L (ref 100–111)
CO2 SERPL-SCNC: 27 MMOL/L (ref 21–32)
CREAT SERPL-MCNC: 1.13 MG/DL (ref 0.6–1.3)
DIFFERENTIAL METHOD BLD: NORMAL
EOSINOPHIL # BLD: 0.1 K/UL (ref 0–0.4)
EOSINOPHIL NFR BLD: 1 % (ref 0–5)
ERYTHROCYTE [DISTWIDTH] IN BLOOD BY AUTOMATED COUNT: 13.4 % (ref 11.6–14.5)
GLOBULIN SER CALC-MCNC: 3.9 G/DL (ref 2–4)
GLUCOSE BLD STRIP.AUTO-MCNC: 245 MG/DL (ref 70–110)
GLUCOSE BLD STRIP.AUTO-MCNC: 359 MG/DL (ref 70–110)
GLUCOSE SERPL-MCNC: 479 MG/DL (ref 74–99)
HCT VFR BLD AUTO: 40.6 % (ref 35–45)
HGB BLD-MCNC: 13.7 G/DL (ref 12–16)
LYMPHOCYTES # BLD: 2.6 K/UL (ref 0.9–3.6)
LYMPHOCYTES NFR BLD: 32 % (ref 21–52)
MCH RBC QN AUTO: 29.2 PG (ref 24–34)
MCHC RBC AUTO-ENTMCNC: 33.7 G/DL (ref 31–37)
MCV RBC AUTO: 86.6 FL (ref 74–97)
MONOCYTES # BLD: 0.5 K/UL (ref 0.05–1.2)
MONOCYTES NFR BLD: 6 % (ref 3–10)
N GONORRHOEA RRNA SPEC QL NAA+PROBE: NEGATIVE
NEUTS SEG # BLD: 4.8 K/UL (ref 1.8–8)
NEUTS SEG NFR BLD: 61 % (ref 40–73)
PLATELET # BLD AUTO: 264 K/UL (ref 135–420)
PMV BLD AUTO: 11.6 FL (ref 9.2–11.8)
POTASSIUM SERPL-SCNC: 4.5 MMOL/L (ref 3.5–5.5)
PROT SERPL-MCNC: 8.2 G/DL (ref 6.4–8.2)
RBC # BLD AUTO: 4.69 M/UL (ref 4.2–5.3)
SERVICE CMNT-IMP: NORMAL
SODIUM SERPL-SCNC: 132 MMOL/L (ref 136–145)
SPECIMEN SOURCE: NORMAL
WBC # BLD AUTO: 8 K/UL (ref 4.6–13.2)
WET PREP GENITAL: NORMAL

## 2019-07-29 PROCEDURE — 74011636637 HC RX REV CODE- 636/637: Performed by: PHYSICIAN ASSISTANT

## 2019-07-29 PROCEDURE — 74011250636 HC RX REV CODE- 250/636: Performed by: PHYSICIAN ASSISTANT

## 2019-07-29 PROCEDURE — 96375 TX/PRO/DX INJ NEW DRUG ADDON: CPT

## 2019-07-29 PROCEDURE — 96361 HYDRATE IV INFUSION ADD-ON: CPT

## 2019-07-29 PROCEDURE — 87210 SMEAR WET MOUNT SALINE/INK: CPT

## 2019-07-29 PROCEDURE — 96374 THER/PROPH/DIAG INJ IV PUSH: CPT

## 2019-07-29 PROCEDURE — 87491 CHLMYD TRACH DNA AMP PROBE: CPT

## 2019-07-29 PROCEDURE — 74011250637 HC RX REV CODE- 250/637: Performed by: EMERGENCY MEDICINE

## 2019-07-29 PROCEDURE — 82962 GLUCOSE BLOOD TEST: CPT

## 2019-07-29 RX ORDER — KETOROLAC TROMETHAMINE 15 MG/ML
INJECTION, SOLUTION INTRAMUSCULAR; INTRAVENOUS
Status: DISCONTINUED
Start: 2019-07-29 | End: 2019-07-29 | Stop reason: WASHOUT

## 2019-07-29 RX ORDER — METRONIDAZOLE 250 MG/1
500 TABLET ORAL ONCE
Status: COMPLETED | OUTPATIENT
Start: 2019-07-29 | End: 2019-07-29

## 2019-07-29 RX ORDER — METRONIDAZOLE 500 MG/1
500 TABLET ORAL 2 TIMES DAILY
Qty: 14 TAB | Refills: 0 | Status: SHIPPED | OUTPATIENT
Start: 2019-07-29 | End: 2019-08-05

## 2019-07-29 RX ORDER — KETOROLAC TROMETHAMINE 30 MG/ML
INJECTION, SOLUTION INTRAMUSCULAR; INTRAVENOUS
Status: DISCONTINUED
Start: 2019-07-29 | End: 2019-07-29 | Stop reason: HOSPADM

## 2019-07-29 RX ORDER — KETOROLAC TROMETHAMINE 30 MG/ML
15 INJECTION, SOLUTION INTRAMUSCULAR; INTRAVENOUS
Status: COMPLETED | OUTPATIENT
Start: 2019-07-29 | End: 2019-07-29

## 2019-07-29 RX ADMIN — INSULIN HUMAN 8 UNITS: 100 INJECTION, SOLUTION PARENTERAL at 01:21

## 2019-07-29 RX ADMIN — KETOROLAC TROMETHAMINE 15 MG: 30 INJECTION, SOLUTION INTRAMUSCULAR at 01:26

## 2019-07-29 RX ADMIN — METRONIDAZOLE 500 MG: 250 TABLET ORAL at 03:16

## 2019-07-29 RX ADMIN — SODIUM CHLORIDE 1000 ML: 900 INJECTION, SOLUTION INTRAVENOUS at 01:27

## 2019-07-29 NOTE — ED PROVIDER NOTES
[facility] EMERGENCY DEPT      1:02 AM    Date: 7/28/2019  Patient Name: Magali Stockton    History of Presenting Illness     Chief Complaint   Patient presents with    Pelvic Pain    Vaginal Discharge       51 y.o. female with noted past medical history including insulin-dependent diabetes who presents to the emergency department complaining of vaginal discharge and pelvic pain for the past week. Patient states that she has not been controlling her blood sugar, not checking it as she has been looking after her son. She reports having a thick white discharge as well as some vaginal itching and irritation. She states this feels consistent with prior yeast infections. Patient reports having a hysterectomy in the distant past.  She denies any fever, chills, vaginal bleeding, nausea, vomiting, diarrhea, constipation, other symptoms at this time. Patient denies any other associated signs or symptoms. Patient denies any other complaints. Nursing notes regarding the HPI and triage nursing notes were reviewed. Prior medical records were reviewed. Current Facility-Administered Medications   Medication Dose Route Frequency Provider Last Rate Last Dose    insulin regular (NOVOLIN R, HUMULIN R) injection 8 Units  8 Units IntraVENous NOW Noreen Mendez PA        ketorolac (TORADOL) injection 15 mg  15 mg IntraVENous NOW Noreen Ratliff PA        sodium chloride 0.9 % bolus infusion 1,000 mL  1,000 mL IntraVENous ONCE Noreen Ratliff PA         Current Outpatient Medications   Medication Sig Dispense Refill    lidocaine (LIDOCAINE PAIN RELIEF) 4 % patch Apply to the affected area every 12 hours for pain. 5 Patch 0    ondansetron (ZOFRAN ODT) 4 mg disintegrating tablet Take 1 Tab by mouth every eight (8) hours as needed for Nausea. 15 Tab 0    dicyclomine (BENTYL) 20 mg tablet Take 1 Tab by mouth every six (6) hours.  20 Tab 0    Blood-Glucose Meter (ONETOUCH ULTRA2) monitoring kit Use to obtain two times a day. 1 Kit 0    lancets misc Use daily to monitor blood glucose 200 Each 0    glucose blood VI test strips (ASCENSIA AUTODISC VI, ONE TOUCH ULTRA TEST VI) strip 50 Each by Does Not Apply route two (2) times daily as needed. 200 Strip 3    benzonatate (TESSALON) 100 mg capsule       EPINEPHrine (EPIPEN) 0.3 mg/0.3 mL injection 0.3 mg.      ondansetron (ZOFRAN ODT) 4 mg disintegrating tablet Take 1 Tab by mouth every eight (8) hours as needed for Nausea. 15 Tab 0    cyanocobalamin (VITAMIN B-12) 1,000 mcg tablet Take 1 Tab by mouth daily. 90 Tab 0    lisinopril-hydroCHLOROthiazide (PRINZIDE, ZESTORETIC) 20-12.5 mg per tablet Take 1 Tab by mouth two (2) times a day. 180 Tab 3    carvedilol (COREG) 3.125 mg tablet Take 2 Tabs by mouth two (2) times daily (with meals). 90 Tab 3    budesonide (RHINOCORT ALLERGY) 32 mcg/actuation nasal spray 2 Sprays by Both Nostrils route daily. Indications: Allergic Rhinitis 1 Bottle 3    insulin nph-regular human rec (HUMULIN 70-30) 100 unit/mL (70-30) inpn Give 40 units in the QAM and 45 units at bedtime 5 Pen 3    ferrous sulfate 325 mg (65 mg iron) tablet Take 1 Tab by mouth Daily (before breakfast). 30 Tab 3    polyethylene glycol (MIRALAX) 17 gram packet Take 1 Packet by mouth daily. 30 Each 1    Insulin Needles, Disposable, 31 gauge x 5/16\" ndle Use to administer insulin as directed 1 Package 11    atorvastatin (LIPITOR) 40 mg tablet Take 1 Tab by mouth daily. 30 Tab 6    LORazepam (ATIVAN) 1 mg tablet 1 mg.          Past History     Past Medical History:  Past Medical History:   Diagnosis Date    Anxiety     [facility] ([address]     Breast cancer ([address]     breast cancer, right, S/P Mastectomy and chemo, radiation in 2013    Depression     Diabetes ([address]     Drug abuse ([address]     H/O cocaine use in past    Drug-seeking behavior     Family history of early CAD     Gastrointestinal disorder     cholitis    H/O ETOH abuse     Hyperlipidemia     Hypertension     Malignant neoplasm without specification of site [facility])     Methamphetamine abuse ([address]      self reported on 1/3/16    Spine injury     Vitamin D deficiency 5/1/2018       Past Surgical History:  Past Surgical History:   Procedure Laterality Date    COLONOSCOPY N/A 7/18/2016    COLONOSCOPY performed by Hanane Saeed [facility] ENDOSCOPY    HX BREAST LUMPECTOMY  06/2013    right    HX HYSTERECTOMY      HX MASTECTOMY      had expanders put in 6/2018    HX ORTHOPAEDIC      Back fusion surgery 4/18/14.  HX OTHER SURGICAL      mediport    HX TONSILLECTOMY      HX TUBAL LIGATION         Family History:  Family History   Problem Relation Age of Onset    Heart Disease Mother     Stroke Father     Heart Disease Father 48    Diabetes Other     Hypertension Other     Cancer Maternal Grandmother        Social History:  Social History     Tobacco Use    Smoking status: Never Smoker    Smokeless tobacco: Never Used   Substance Use Topics    Alcohol use: Yes     Comment: \"Occasionally\"    Drug use: No     Types: Methamphetamines, Cocaine       Allergies:   Allergies   Allergen Reactions    Latex Hives and Itching    Other Food Rash     Almonds    Amoxicillin Swelling     Other reaction(s): mild rash/itching    Aspirin Not Reported This Time and Swelling     Mouth swells    Beeswax Anaphylaxis    Fentanyl Anaphylaxis and Swelling     Patches cause mouth to swell  Swelling in mouth from fentanyl patch    Levaquin [Levofloxacin] Not Reported This Time and Swelling     Other reaction(s): mild rash/itching    Chlorhexidine Rash    Norco [Hydrocodone-Acetaminophen] Nausea and Vomiting     Other reaction(s): gi distress    Acetaminophen Other (comments)    Barry Rash and Itching    Codeine Other (comments)    Glycopyrrolate Swelling     Pt  States  faciAL  SWELLING   POST  ROBINUL  IV   Pt  States  faciAL  SWELLING   POST  ROBINUL  IV     Morphine Nausea and Vomiting     Pt states she is not allergic    Pcn [Penicillins] Swelling    Percocet [Oxycodone-Acetaminophen] Nausea and Vomiting     Other reaction(s): gi distress  nausea  Other reaction(s): anaphylaxis/angioedema  Per pt. She is allergic    Tape [Adhesive] Rash    Tramadol Swelling       Patient's primary care provider (as noted in EPIC): [place], MD Fiordaliza    Review of Systems   Constitutional:  Denies malaise, fever, chills. Cardiac:  Denies chest pain or palpitations. Respiratory:  Denies cough, wheezing, difficulty breathing, shortness of breath. GI/ABD:  Denies injury, pain, distention, nausea, vomiting, diarrhea. :  + Vaginal discharge, irritation, pelvic pain. Denies injury, pain, dysuria or urgency. Back:  Denies injury or pain. Skin: Denies injury, rash, itching or skin changes. All other systems negative as reviewed. Visit Vitals  BP (!) 166/93 (BP 1 Location: Right arm, BP Patient Position: Sitting)   Pulse 84   Temp 97.7 °F (36.5 °C)   Resp 15   Ht 5' 2\" (1.575 m)   Wt 72.6 kg (160 lb)   SpO2 100%   BMI 29.26 kg/m²       PHYSICAL EXAM:    CONSTITUTIONAL:  Alert, in no apparent distress;  well developed;  well nourished. HEAD:  Normocephalic, atraumatic. EYES:  EOMI. Non-icteric sclera. Normal conjunctiva. ENTM:  Mouth: mucous membranes mildly dry. NECK:  Supple  RESPIRATORY:  Chest clear, equal breath sounds, good air movement. CARDIOVASCULAR:  Regular rate and rhythm. No murmurs, rubs, or gallops. GI:  Normal bowel sounds, abdomen soft and non-tender. No rebound or guarding. No palpable masses. Pelvic exam: White thick vaginal discharge, without bleeding. Normal adnexal size. No adnexal fullness and no adnexal tenderness. Normal external genitalia exam with rash, lesions, bruising. Ryan Bean RN as chaperone)  BACK:  Non-tender. NEURO:  Moves all four extremities, and grossly normal motor exam.  SKIN:  No rashes;  Normal for age.   PSYCH:  Alert and normal affect. ED COURSE:    Differential diagnoses/ medical decision making:   Vaginal discharge secondary to yeast infection, bacterial vaginosis, urinary tract infection, other etiologies or combination of the above. Patient has greater than 1000 glucose in her urine. POC glucose noted to be 485. Patient is not in DKA, has potassium of 4.5. Given 8 units of insulin. 1:0 AM : Pt care transferred to Dr. Adrianne Jaquez, ED provider. History of patient complaint(s), available diagnostic reports and current treatment plan has been discussed thoroughly. Intended disposition of patient : TBD  Pending diagnostics reports and/or labs (please list): Wet prep, repeat Glucose.      SAMANTHA Mullins

## 2019-07-29 NOTE — ED NOTES
1:00 AM :Pt care assumed from Trudi Steel , ED provider. Pt complaint(s), current treatment plan, progression and available diagnostic results have been discussed thoroughly. Rounding occurred: no  Intended Disposition: Home   Pending diagnostic reports and/or labs (please list): Discharge after wet prep results and reduction in her blood sugar. 2:41 AM  Patient is feeling better. Aware that her wet prep indicates bacterial vaginosis. Blood sugar has also improved. Patient is agreeable to plan for discharge and close follow-up with her primary care doctor. ICD-10-CM ICD-9-CM   1. Hyperglycemia R73.9 790.29   2. BV (bacterial vaginosis) N76.0 616.10    B96.89 041. 120 Alex Tarango MD  7/29/2019

## 2019-07-29 NOTE — DISCHARGE INSTRUCTIONS
Patient Education        Bacterial Vaginosis: Care Instructions  Your Care Instructions    Bacterial vaginosis is a type of vaginal infection. It is caused by excess growth of certain bacteria that are normally found in the vagina. Symptoms can include itching, swelling, pain when you urinate or have sex, and a gray or yellow discharge with a \"fishy\" odor. It is not considered an infection that is spread through sexual contact. Although symptoms can be annoying and uncomfortable, bacterial vaginosis does not usually cause other health problems. However, if you have it while you are pregnant, it can cause complications. While the infection may go away on its own, most doctors use antibiotics to treat it. You may have been prescribed pills or vaginal cream. With treatment, bacterial vaginosis usually clears up in 5 to 7 days. Follow-up care is a key part of your treatment and safety. Be sure to make and go to all appointments, and call your doctor if you are having problems. It's also a good idea to know your test results and keep a list of the medicines you take. How can you care for yourself at home? · Take your antibiotics as directed. Do not stop taking them just because you feel better. You need to take the full course of antibiotics. · Do not eat or drink anything that contains alcohol if you are taking metronidazole (Flagyl). · Keep using your medicine if you start your period. Use pads instead of tampons while using a vaginal cream or suppository. Tampons can absorb the medicine. · Wear loose cotton clothing. Do not wear nylon and other materials that hold body heat and moisture close to the skin. · Do not scratch. Relieve itching with a cold pack or a cool bath. · Do not wash your vaginal area more than once a day. Use plain water or a mild, unscented soap. Do not douche. When should you call for help?   Watch closely for changes in your health, and be sure to contact your doctor if:    · You have unexpected vaginal bleeding.     · You have a fever.     · You have new or increased pain in your vagina or pelvis.     · You are not getting better after 1 week.     · Your symptoms return after you finish the course of your medicine. Where can you learn more? Go to http://imani-jenny.info/. Anjelica Castro in the search box to learn more about \"Bacterial Vaginosis: Care Instructions. \"  Current as of: February 19, 2019  Content Version: 12.1  © 0980-6101 Healthwise, [employer]. Care instructions adapted under license by [employer] (which disclaims liability or warranty for this information). If you have questions about a medical condition or this instruction, always ask your healthcare professional. [address] any warranty or liability for your use of this information.

## 2019-09-23 ENCOUNTER — HOSPITAL ENCOUNTER (EMERGENCY)
Age: 49
Discharge: HOME | End: 2019-09-23
Attending: EMERGENCY MEDICINE
Payer: MEDICAID

## 2019-09-23 VITALS
BODY MASS INDEX: 29.26 KG/M2 | TEMPERATURE: 98.7 F | OXYGEN SATURATION: 100 % | HEART RATE: 110 BPM | SYSTOLIC BLOOD PRESSURE: 158 MMHG | WEIGHT: 160 LBS | DIASTOLIC BLOOD PRESSURE: 99 MMHG | RESPIRATION RATE: 15 BRPM

## 2019-09-23 DIAGNOSIS — R22.0 FACIAL SWELLING: ICD-10-CM

## 2019-09-23 DIAGNOSIS — K08.89 PAIN, DENTAL: Primary | ICD-10-CM

## 2019-09-23 DIAGNOSIS — R03.0 ELEVATED BLOOD PRESSURE READING: ICD-10-CM

## 2019-09-23 PROCEDURE — 99283 EMERGENCY DEPT VISIT LOW MDM: CPT

## 2019-09-23 PROCEDURE — 74011000250 HC RX REV CODE- 250: Performed by: PHYSICIAN ASSISTANT

## 2019-09-23 PROCEDURE — 74011250637 HC RX REV CODE- 250/637: Performed by: PHYSICIAN ASSISTANT

## 2019-09-23 RX ORDER — CLINDAMYCIN HYDROCHLORIDE 300 MG/1
300 CAPSULE ORAL 4 TIMES DAILY
Qty: 28 CAP | Refills: 0 | Status: SHIPPED | OUTPATIENT
Start: 2019-09-23 | End: 2019-09-23

## 2019-09-23 RX ORDER — LIDOCAINE HYDROCHLORIDE 20 MG/ML
SOLUTION OROPHARYNGEAL
Qty: 200 ML | Refills: 0 | Status: SHIPPED | OUTPATIENT
Start: 2019-09-23 | End: 2021-12-02

## 2019-09-23 RX ORDER — ONDANSETRON 4 MG/1
4 TABLET, ORALLY DISINTEGRATING ORAL
Status: COMPLETED | OUTPATIENT
Start: 2019-09-23 | End: 2019-09-23

## 2019-09-23 RX ORDER — LIDOCAINE HYDROCHLORIDE 20 MG/ML
15 SOLUTION OROPHARYNGEAL
Status: COMPLETED | OUTPATIENT
Start: 2019-09-23 | End: 2019-09-23

## 2019-09-23 RX ORDER — CLINDAMYCIN HYDROCHLORIDE 300 MG/1
300 CAPSULE ORAL 4 TIMES DAILY
Qty: 28 CAP | Refills: 0 | Status: SHIPPED | OUTPATIENT
Start: 2019-09-23 | End: 2019-09-30

## 2019-09-23 RX ORDER — ONDANSETRON 4 MG/1
TABLET, ORALLY DISINTEGRATING ORAL
Qty: 10 TAB | Refills: 0 | Status: SHIPPED | OUTPATIENT
Start: 2019-09-23 | End: 2019-11-20

## 2019-09-23 RX ADMIN — LIDOCAINE HYDROCHLORIDE 15 ML: 20 SOLUTION ORAL; TOPICAL at 18:19

## 2019-09-23 RX ADMIN — ONDANSETRON 4 MG: 4 TABLET, ORALLY DISINTEGRATING ORAL at 18:19

## 2019-09-23 NOTE — ED TRIAGE NOTES
Upper right dental pain and facial swelling since last night. Vomiting from pain.  Hard to open mouth

## 2019-09-23 NOTE — DISCHARGE INSTRUCTIONS
Patient Education      Take medication as prescribed. Follow-up with your primary care physician within 2 days for reassessment. Bring the results from this visit with you for their review. Return to the ED immediately for any new, worsening, or persistent symptoms including fever, increased swelling, or any other medical concerns. Follow-up with one of the provided dental clinics below:    [facility] ([facility] [address] [phone] ([address] [facility] [phone]    Call to schedule an appointment. Elevated Blood Pressure: Care Instructions  Your Care Instructions    Blood pressure is a measure of how hard the blood pushes against the walls of your arteries. It's normal for blood pressure to go up and down throughout the day. But if it stays up over time, you have high blood pressure. Two numbers tell you your blood pressure. The first number is the systolic pressure. It shows how hard the blood pushes when your heart is pumping. The second number is the diastolic pressure. It shows how hard the blood pushes between heartbeats, when your heart is relaxed and filling with blood. An ideal blood pressure in adults is less than 120/80 (say \"120 over 80\"). High blood pressure is 140/90 or higher. You have high blood pressure if your top number is 140 or higher or your bottom number is 90 or higher, or both. The main test for high blood pressure is simple, fast, and painless. To diagnose high blood pressure, your doctor will test your blood pressure at different times. After testing your blood pressure, your doctor may ask you to test it again when you are home. If you are diagnosed with high blood pressure, you can work with your doctor to make a long-term plan to manage it. Follow-up care is a key part of your treatment and safety. Be sure to make and go to all appointments, and call your doctor if you are having problems. It's also a good idea to know your test results and keep a list of the medicines you take. How can you care for yourself at home? · Do not smoke. Smoking increases your risk for heart attack and stroke. If you need help quitting, talk to your doctor about stop-smoking programs and medicines. These can increase your chances of quitting for good. · Stay at a healthy weight. · Try to limit how much sodium you eat to less than 2,300 milligrams (mg) a day. Your doctor may ask you to try to eat less than 1,500 mg a day. · Be physically active. Get at least 30 minutes of exercise on most days of the week. Walking is a good choice. You also may want to do other activities, such as running, swimming, cycling, or playing tennis or team sports. · Avoid or limit alcohol. Talk to your doctor about whether you can drink any alcohol. · Eat plenty of fruits, vegetables, and low-fat dairy products. Eat less saturated and total fats. · Learn how to check your blood pressure at home. When should you call for help? Call your doctor now or seek immediate medical care if:  ? · Your blood pressure is much higher than normal (such as 180/110 or higher). ? · You think high blood pressure is causing symptoms such as:  ¨ Severe headache. ¨ Blurry vision. ? Watch closely for changes in your health, and be sure to contact your doctor if:  ? · You do not get better as expected. Where can you learn more? Go to http://imani-jenny.info/. Enter X413 in the search box to learn more about \"Elevated Blood Pressure: Care Instructions. \"  Current as of: September 21, 2016  Content Version: 11.4  © 0375-0210 [employer]. Care instructions adapted under license by [employer] (which disclaims liability or warranty for this information).  If you have questions about a medical condition or this instruction, always ask your healthcare professional. [employer] disclaims any warranty or liability for your use of this information. Patient Education        Tooth and Gum Pain: Care Instructions  Your Care Instructions    The most common causes of dental pain are tooth decay and gum disease. Pain can also be caused by an infection of the tooth (abscess) or the gums. Or you may have pain from a broken or cracked tooth. Other causes of pain include infection and damage to a tooth from nervous grinding of your teeth. A wisdom tooth can be painful when it is coming in but cannot break through the gum. It can also be painful when the tooth is only partway in and extra gum tissue has formed around it. The tissue can get inflamed (pericoronitis), and sometimes it gets infected. Prompt dental care can help find the cause of your toothache and keep the tooth from dying or gum disease from getting worse. Self-care at home may reduce your pain and discomfort. Follow-up care is a key part of your treatment and safety. Be sure to make and go to all appointments, and call your dentist or doctor if you are having problems. It's also a good idea to know your test results and keep a list of the medicines you take. How can you care for yourself at home? · To reduce pain and facial swelling, put an ice or cold pack on the outside of your cheek for 10 to 20 minutes at a time. Put a thin cloth between the ice and your skin. Do not use heat. · If your doctor prescribed antibiotics, take them as directed. Do not stop taking them just because you feel better. You need to take the full course of antibiotics. · Ask your doctor if you can take an over-the-counter pain medicine, such as acetaminophen (Tylenol), ibuprofen (Advil, Motrin), or naproxen (Aleve). Be safe with medicines. Read and follow all instructions on the label. · Avoid very hot, cold, or sweet foods and drinks if they increase your pain. · Rinse your mouth with warm salt water every 2 hours to help relieve pain and swelling.  Mix 1 teaspoon of salt in 8 ounces of water. · Talk to your dentist about using special toothpaste for sensitive teeth. To reduce pain on contact with heat or cold or when brushing, brush with this toothpaste regularly or rub a small amount of the paste on the sensitive area with a clean finger 2 or 3 times a day. Floss gently between your teeth. · Do not smoke or use spit tobacco. Tobacco use can make gum problems worse, decreases your ability to fight infection in your gums, and delays healing. If you need help quitting, talk to your doctor about stop-smoking programs and medicines. These can increase your chances of quitting for good. When should you call for help? Call 911 anytime you think you may need emergency care. For example, call if:    · You have trouble breathing.    Call your dentist or doctor now or seek immediate medical care if:    · You have signs of infection, such as:  ? Increased pain, swelling, warmth, or redness. ? Red streaks leading from the area. ? Pus draining from the area. ? A fever.    Watch closely for changes in your health, and be sure to contact your doctor if:    · You do not get better as expected. Where can you learn more? Go to http://imani-jenny.info/. Enter [id number] [id number] in the search box to learn more about \"Tooth and Gum Pain: Care Instructions. \"  Current as of: October 3, 2018  Content Version: 12.2  © 3466-1174 Healthwise, [employer]. Care instructions adapted under license by [employer] (which disclaims liability or warranty for this information). If you have questions about a medical condition or this instruction, always ask your healthcare professional. [place] [address] any warranty or liability for your use of this information.

## 2019-09-23 NOTE — ED PROVIDER NOTES
EMERGENCY DEPARTMENT HISTORY AND PHYSICAL EXAM    6:54 PM      Date: 9/23/2019  Patient Name: Loyda Chamberlain    History of Presenting Illness     Chief Complaint   Patient presents with    Dental Pain    Facial Swelling    Vomiting         History Provided By: Patient    Additional History (Context): Loyda Chamberlain is a 51 y.o. female with noted PMH who presents with c/o R upper dental pain associated with facial swelling x 2 days. Notes she has vomited due to the pain. Also notes R sided facial rash x 2 days. Denies fever/chills, throat swelling, drooling or stridor, abdominal pain, diarrhea. PCP: None    Current Outpatient Medications   Medication Sig Dispense Refill    lidocaine (LIDOCAINE VISCOUS) 2 % solution Put 10 mL in mouth and swish and spit out QAC and at bedtime 200 mL 0    clindamycin (CLEOCIN) 300 mg capsule Take 1 Cap by mouth four (4) times daily for 7 days. 28 Cap 0    ondansetron (ZOFRAN ODT) 4 mg disintegrating tablet Take 1-2 tablets every 6-8 hours as needed for nausea and vomiting. 10 Tab 0    Blood-Glucose Meter (ONETOUCH ULTRA2) monitoring kit Use to obtain two times a day. 1 Kit 0    lancets misc Use daily to monitor blood glucose 200 Each 0    glucose blood VI test strips (ASCENSIA AUTODISC VI, ONE TOUCH ULTRA TEST VI) strip 50 Each by Does Not Apply route two (2) times daily as needed. 200 Strip 3    benzonatate (TESSALON) 100 mg capsule       EPINEPHrine (EPIPEN) 0.3 mg/0.3 mL injection 0.3 mg.      cyanocobalamin (VITAMIN B-12) 1,000 mcg tablet Take 1 Tab by mouth daily. 90 Tab 0    lisinopril-hydroCHLOROthiazide (PRINZIDE, ZESTORETIC) 20-12.5 mg per tablet Take 1 Tab by mouth two (2) times a day. 180 Tab 3    carvedilol (COREG) 3.125 mg tablet Take 2 Tabs by mouth two (2) times daily (with meals). 90 Tab 3    budesonide (RHINOCORT ALLERGY) 32 mcg/actuation nasal spray 2 Sprays by Both Nostrils route daily. Indications:  Allergic Rhinitis 1 Bottle 3    insulin nph-regular human rec (HUMULIN 70-30) 100 unit/mL (70-30) inpn Give 40 units in the QAM and 45 units at bedtime 5 Pen 3    ferrous sulfate 325 mg (65 mg iron) tablet Take 1 Tab by mouth Daily (before breakfast). 30 Tab 3    polyethylene glycol (MIRALAX) 17 gram packet Take 1 Packet by mouth daily. 30 Each 1    Insulin Needles, Disposable, 31 gauge x 5/16\" ndle Use to administer insulin as directed 1 Package 11    atorvastatin (LIPITOR) 40 mg tablet Take 1 Tab by mouth daily. 30 Tab 6    LORazepam (ATIVAN) 1 mg tablet 1 mg. Past History     Past Medical History:  Past Medical History:   Diagnosis Date    Anxiety     Bipolar disorder ([address]     Breast cancer ([address]     breast cancer, right, S/P Mastectomy and chemo, radiation in 2013    Depression     Diabetes ([address]     Drug abuse [facility] [address]     H/O cocaine use in past    Drug-seeking behavior     Family history of early CAD     Gastrointestinal disorder     cholitis    H/O ETOH abuse     Hyperlipidemia     Hypertension     Malignant neoplasm without specification of site ([address]     Methamphetamine abuse ([address]      self reported on 1/3/16    Spine injury     Vitamin D deficiency 5/1/2018       Past Surgical History:  Past Surgical History:   Procedure Laterality Date    COLONOSCOPY N/A 7/18/2016    COL[facility] ENDOSCOPY    HX BREAST LUMPECTOMY  06/2013    right    HX HYSTERECTOMY      HX MASTECTOMY      had expanders put in 6/2018    HX ORTHOPAEDIC      Back fusion surgery 4/18/14.      HX OTHER SURGICAL      mediport    HX TONSILLECTOMY      HX TUBAL LIGATION         Family History:  Family History   Problem Relation Age of Onset    Heart Disease Mother     Stroke Father     Heart Disease Father 48    Diabetes Other     Hypertension Other     Cancer Maternal Grandmother        Social History:  Social History     Tobacco Use    Smoking status: Never Smoker    Smokeless tobacco: Never Used Substance Use Topics    Alcohol use: Yes     Comment: \"Occasionally\"    Drug use: No     Types: Methamphetamines, Cocaine       Allergies: Allergies   Allergen Reactions    Latex Hives and Itching    Other Food Rash     Almonds    Amoxicillin Swelling     Other reaction(s): mild rash/itching    Aspirin Not Reported This Time and Swelling     Mouth swells    Beeswax Anaphylaxis    Fentanyl Anaphylaxis and Swelling     Patches cause mouth to swell  Swelling in mouth from fentanyl patch    Levaquin [Levofloxacin] Not Reported This Time and Swelling     Other reaction(s): mild rash/itching    Chlorhexidine Rash    Norco [Hydrocodone-Acetaminophen] Nausea and Vomiting     Other reaction(s): gi distress    Acetaminophen Other (comments)    [place] Rash and Itching    Codeine Other (comments)    Glycopyrrolate Swelling     Pt  States  faciAL  SWELLING   POST  ROBINUL  IV   Pt  States  faciAL  SWELLING   POST  ROBINUL  IV     Morphine Nausea and Vomiting     Pt states she is not allergic    Pcn [Penicillins] Swelling    Percocet [Oxycodone-Acetaminophen] Nausea and Vomiting     Other reaction(s): gi distress  nausea  Other reaction(s): anaphylaxis/angioedema  Per pt. She is allergic    Tape [Adhesive] Rash    Tramadol Swelling         Review of Systems       Review of Systems   Constitutional: Negative for chills and fever. HENT: Positive for dental problem and facial swelling. Respiratory: Negative for shortness of breath. Cardiovascular: Negative for chest pain. Gastrointestinal: Positive for vomiting. Negative for abdominal pain and nausea. Skin: Positive for rash. Neurological: Negative for weakness. All other systems reviewed and are negative.         Physical Exam     Visit Vitals  BP (!) 158/99 (BP 1 Location: Right arm, BP Patient Position: At rest)   Pulse (!) 110   Temp 98.7 °F (37.1 °C)   Resp 15   Wt 72.6 kg (160 lb)   LMP 08/20/2013   SpO2 100%   BMI 29.26 kg/m² Physical Exam   Constitutional: She appears well-developed and well-nourished. No distress. HENT:   Head: Normocephalic and atraumatic. Right Ear: Tympanic membrane, external ear and ear canal normal.   Left Ear: Tympanic membrane, external ear and ear canal normal.   Nose: Nose normal.   Mouth/Throat: Uvula is midline and oropharynx is clear and moist. No trismus in the jaw. Abnormal dentition ( dental caries throughout mouth). Dental caries (R upper gumline TTP without fluctuance) present. No dental abscesses or uvula swelling. No oropharyngeal exudate, posterior oropharyngeal edema or posterior oropharyngeal erythema. Mild facial swelling R cheek, no drooling or stridor, soft floor of mouth   Eyes: Pupils are equal, round, and reactive to light. Neck: Normal range of motion. Neck supple. Cardiovascular: Regular rhythm, normal heart sounds and intact distal pulses. Exam reveals no gallop and no friction rub. No murmur heard. tachycardia   Pulmonary/Chest: Effort normal and breath sounds normal. No stridor. No respiratory distress. She has no wheezes. She has no rales. Musculoskeletal: Normal range of motion. Lymphadenopathy:     She has no cervical adenopathy. Neurological: She is alert. Skin: Skin is warm. No rash noted. She is not diaphoretic. Nursing note and vitals reviewed. Diagnostic Study Results     Labs -  No results found for this or any previous visit (from the past 12 hour(s)). Radiologic Studies -   No orders to display         Medical Decision Making   I am the first provider for this patient. I reviewed the vital signs, available nursing notes, past medical history, past surgical history, family history and social history. Vital Signs-Reviewed the patient's vital signs. Records Reviewed: Nursing Notes and Old Medical Records (Time of Review: 6:54 PM)    ED Course: Progress Notes, Reevaluation, and Consults:  6:54 PM  Reviewed plan with patient. Discussed need for close outpatient follow-up this week for reassessment. Discussed strict return precautions, including fever, increased swelling, or any other medical concerns. Tolerating PO, no distress, no emesis while in the ED. Provider Notes (Medical Decision Making): 18-year-old female who presents due to right upper dental pain associated with facial swelling for 2 days. Afebrile, nontoxic-appearing, looks well. No evidence of cellulitis or abscess. No dental abscess. Soft floor of mouth. No drooling or stridor. Stable for discharge with antibiotics, pain control, and dental resources for close outpatient follow-up      Diagnosis     Clinical Impression:   1. Pain, dental    2. Facial swelling    3. Elevated blood pressure reading        Disposition: home     Follow-up Information     Follow up With Specialties Details Why [facility] EMERGENCY DEPT Emergency Medicine  If symptoms worsen [address] [facility]. [phone]  [phone]           Patient's Medications   Start Taking    CLINDAMYCIN (CLEOCIN) 300 MG CAPSULE    Take 1 Cap by mouth four (4) times daily for 7 days. LIDOCAINE (LIDOCAINE VISCOUS) 2 % SOLUTION    Put 10 mL in mouth and swish and spit out QAC and at bedtime    ONDANSETRON (ZOFRAN ODT) 4 MG DISINTEGRATING TABLET    Take 1-2 tablets every 6-8 hours as needed for nausea and vomiting. Continue Taking    ATORVASTATIN (LIPITOR) 40 MG TABLET    Take 1 Tab by mouth daily. BENZONATATE (TESSALON) 100 MG CAPSULE        BLOOD-GLUCOSE METER (ONETOUCH ULTRA2) MONITORING KIT    Use to obtain two times a day. BUDESONIDE (RHINOCORT ALLERGY) 32 MCG/ACTUATION NASAL SPRAY    2 Sprays by Both Nostrils route daily. Indications: Allergic Rhinitis    CARVEDILOL (COREG) 3.125 MG TABLET    Take 2 Tabs by mouth two (2) times daily (with meals).     CYANOCOBALAMIN (VITAMIN B-12) 1,000 MCG TABLET Take 1 Tab by mouth daily. EPINEPHRINE (EPIPEN) 0.3 MG/0.3 ML INJECTION    0.3 mg.    FERROUS SULFATE 325 MG (65 MG IRON) TABLET    Take 1 Tab by mouth Daily (before breakfast). GLUCOSE BLOOD VI TEST STRIPS (ASCENSIA AUTODISC VI, ONE TOUCH ULTRA TEST VI) STRIP    50 Each by Does Not Apply route two (2) times daily as needed. INSULIN NEEDLES, DISPOSABLE, 31 GAUGE X 5/16\" NDLE    Use to administer insulin as directed    INSULIN NPH-REGULAR HUMAN REC (HUMULIN 70-30) 100 UNIT/ML (70-30) INPN    Give 40 units in the QAM and 45 units at bedtime    LANCETS MISC    Use daily to monitor blood glucose    LISINOPRIL-HYDROCHLOROTHIAZIDE (PRINZIDE, ZESTORETIC) 20-12.5 MG PER TABLET    Take 1 Tab by mouth two (2) times a day. LORAZEPAM (ATIVAN) 1 MG TABLET    1 mg. POLYETHYLENE GLYCOL (MIRALAX) 17 GRAM PACKET    Take 1 Packet by mouth daily. These Medications have changed    No medications on file   Stop Taking    DICYCLOMINE (BENTYL) 20 MG TABLET    Take 1 Tab by mouth every six (6) hours. LIDOCAINE (LIDOCAINE PAIN RELIEF) 4 % PATCH    Apply to the affected area every 12 hours for pain. ONDANSETRON (ZOFRAN ODT) 4 MG DISINTEGRATING TABLET    Take 1 Tab by mouth every eight (8) hours as needed for Nausea. ONDANSETRON (ZOFRAN ODT) 4 MG DISINTEGRATING TABLET    Take 1 Tab by mouth every eight (8) hours as needed for Nausea. Dictation disclaimer:  Please note that this dictation was completed with [employer], the [employer] voice recognition software. Quite often unanticipated grammatical, syntax, homophones, and other interpretive errors are inadvertently transcribed by the computer software. Please disregard these errors. Please excuse any errors that have escaped final proofreading.

## 2019-11-20 ENCOUNTER — HOSPITAL ENCOUNTER (EMERGENCY)
Age: 49
Discharge: HOME | End: 2019-11-20
Attending: EMERGENCY MEDICINE
Payer: MEDICAID

## 2019-11-20 ENCOUNTER — APPOINTMENT (OUTPATIENT)
Dept: GENERAL RADIOLOGY | Age: 49
End: 2019-11-20
Attending: EMERGENCY MEDICINE
Payer: MEDICAID

## 2019-11-20 VITALS
SYSTOLIC BLOOD PRESSURE: 113 MMHG | DIASTOLIC BLOOD PRESSURE: 97 MMHG | BODY MASS INDEX: 29.26 KG/M2 | WEIGHT: 160 LBS | TEMPERATURE: 97.7 F | HEART RATE: 85 BPM | OXYGEN SATURATION: 99 % | RESPIRATION RATE: 16 BRPM

## 2019-11-20 DIAGNOSIS — R11.2 NON-INTRACTABLE VOMITING WITH NAUSEA, UNSPECIFIED VOMITING TYPE: ICD-10-CM

## 2019-11-20 DIAGNOSIS — R21 RASH AND OTHER NONSPECIFIC SKIN ERUPTION: ICD-10-CM

## 2019-11-20 DIAGNOSIS — R07.9 ACUTE CHEST PAIN: Primary | ICD-10-CM

## 2019-11-20 LAB
ALBUMIN SERPL-MCNC: 3.8 G/DL (ref 3.4–5)
ALBUMIN/GLOB SERPL: 1.1 {RATIO} (ref 0.8–1.7)
ALP SERPL-CCNC: 141 U/L (ref 45–117)
ALT SERPL-CCNC: 34 U/L (ref 13–56)
ANION GAP SERPL CALC-SCNC: 9 MMOL/L (ref 3–18)
AST SERPL-CCNC: 18 U/L (ref 10–38)
ATRIAL RATE: 88 BPM
BASOPHILS # BLD: 0 K/UL (ref 0–0.1)
BASOPHILS NFR BLD: 1 % (ref 0–2)
BILIRUB SERPL-MCNC: 0.4 MG/DL (ref 0.2–1)
BUN SERPL-MCNC: 18 MG/DL (ref 7–18)
BUN/CREAT SERPL: 18 (ref 12–20)
CALCIUM SERPL-MCNC: 9 MG/DL (ref 8.5–10.1)
CALCULATED P AXIS, ECG09: 48 DEGREES
CALCULATED R AXIS, ECG10: -50 DEGREES
CALCULATED T AXIS, ECG11: 88 DEGREES
CHLORIDE SERPL-SCNC: 105 MMOL/L (ref 100–111)
CO2 SERPL-SCNC: 24 MMOL/L (ref 21–32)
CREAT SERPL-MCNC: 1.02 MG/DL (ref 0.6–1.3)
DIAGNOSIS, 93000: NORMAL
DIFFERENTIAL METHOD BLD: NORMAL
EOSINOPHIL # BLD: 0.1 K/UL (ref 0–0.4)
EOSINOPHIL NFR BLD: 2 % (ref 0–5)
ERYTHROCYTE [DISTWIDTH] IN BLOOD BY AUTOMATED COUNT: 13.5 % (ref 11.6–14.5)
GLOBULIN SER CALC-MCNC: 3.4 G/DL (ref 2–4)
GLUCOSE SERPL-MCNC: 376 MG/DL (ref 74–99)
HCT VFR BLD AUTO: 40.7 % (ref 35–45)
HGB BLD-MCNC: 13.6 G/DL (ref 12–16)
LYMPHOCYTES # BLD: 2 K/UL (ref 0.9–3.6)
LYMPHOCYTES NFR BLD: 30 % (ref 21–52)
MCH RBC QN AUTO: 30.2 PG (ref 24–34)
MCHC RBC AUTO-ENTMCNC: 33.4 G/DL (ref 31–37)
MCV RBC AUTO: 90.2 FL (ref 74–97)
MONOCYTES # BLD: 0.3 K/UL (ref 0.05–1.2)
MONOCYTES NFR BLD: 5 % (ref 3–10)
NEUTS SEG # BLD: 4.1 K/UL (ref 1.8–8)
NEUTS SEG NFR BLD: 62 % (ref 40–73)
P-R INTERVAL, ECG05: 146 MS
PLATELET # BLD AUTO: 260 K/UL (ref 135–420)
PMV BLD AUTO: 11.8 FL (ref 9.2–11.8)
POTASSIUM SERPL-SCNC: 3.6 MMOL/L (ref 3.5–5.5)
PROT SERPL-MCNC: 7.2 G/DL (ref 6.4–8.2)
Q-T INTERVAL, ECG07: 450 MS
QRS DURATION, ECG06: 78 MS
QTC CALCULATION (BEZET), ECG08: 544 MS
RBC # BLD AUTO: 4.51 M/UL (ref 4.2–5.3)
SODIUM SERPL-SCNC: 138 MMOL/L (ref 136–145)
TROPONIN I SERPL-MCNC: <0.02 NG/ML (ref 0–0.04)
VENTRICULAR RATE, ECG03: 88 BPM
WBC # BLD AUTO: 6.6 K/UL (ref 4.6–13.2)

## 2019-11-20 PROCEDURE — 85025 COMPLETE CBC W/AUTO DIFF WBC: CPT

## 2019-11-20 PROCEDURE — 84484 ASSAY OF TROPONIN QUANT: CPT

## 2019-11-20 PROCEDURE — 74011250636 HC RX REV CODE- 250/636: Performed by: EMERGENCY MEDICINE

## 2019-11-20 PROCEDURE — 93005 ELECTROCARDIOGRAM TRACING: CPT

## 2019-11-20 PROCEDURE — 71045 X-RAY EXAM CHEST 1 VIEW: CPT

## 2019-11-20 PROCEDURE — 80053 COMPREHEN METABOLIC PANEL: CPT

## 2019-11-20 PROCEDURE — 96374 THER/PROPH/DIAG INJ IV PUSH: CPT

## 2019-11-20 PROCEDURE — 74011250637 HC RX REV CODE- 250/637: Performed by: EMERGENCY MEDICINE

## 2019-11-20 PROCEDURE — 99284 EMERGENCY DEPT VISIT MOD MDM: CPT

## 2019-11-20 RX ORDER — ACETAMINOPHEN 500 MG
1000 TABLET ORAL
Status: COMPLETED | OUTPATIENT
Start: 2019-11-20 | End: 2019-11-20

## 2019-11-20 RX ORDER — IBUPROFEN 600 MG/1
600 TABLET ORAL
Qty: 20 TAB | Refills: 0 | OUTPATIENT
Start: 2019-11-20 | End: 2021-08-18

## 2019-11-20 RX ORDER — ACETAMINOPHEN 500 MG
1000 TABLET ORAL
Qty: 40 TAB | Refills: 0 | Status: SHIPPED | OUTPATIENT
Start: 2019-11-20 | End: 2020-03-31

## 2019-11-20 RX ORDER — KETOROLAC TROMETHAMINE 30 MG/ML
30 INJECTION, SOLUTION INTRAMUSCULAR; INTRAVENOUS
Status: COMPLETED | OUTPATIENT
Start: 2019-11-20 | End: 2019-11-20

## 2019-11-20 RX ORDER — HYDROXYZINE PAMOATE 25 MG/1
25 CAPSULE ORAL
Qty: 30 CAP | Refills: 0 | Status: SHIPPED | OUTPATIENT
Start: 2019-11-20 | End: 2019-12-04

## 2019-11-20 RX ORDER — ONDANSETRON 4 MG/1
TABLET, ORALLY DISINTEGRATING ORAL
Qty: 10 TAB | Refills: 0 | Status: SHIPPED | OUTPATIENT
Start: 2019-11-20 | End: 2021-12-02

## 2019-11-20 RX ADMIN — ACETAMINOPHEN 1000 MG: 500 TABLET, FILM COATED ORAL at 00:31

## 2019-11-20 RX ADMIN — KETOROLAC TROMETHAMINE 30 MG: 30 INJECTION, SOLUTION INTRAMUSCULAR at 00:31

## 2019-11-20 NOTE — ED NOTES
The documentation for this period is being entered following the guidelines as defined in the [facility] policy by Sahra Baig RN.

## 2019-11-20 NOTE — DISCHARGE INSTRUCTIONS
Patient Education        Musculoskeletal Chest Pain: Care Instructions  Your Care Instructions    Chest pain is not always a sign that something is wrong with your heart or that you have another serious problem. The doctor thinks your chest pain is caused by strained muscles or ligaments, inflamed chest cartilage, or another problem in your chest, rather than by your heart. You may need more tests to find the cause of your chest pain. Follow-up care is a key part of your treatment and safety. Be sure to make and go to all appointments, and call your doctor if you are having problems. It's also a good idea to know your test results and keep a list of the medicines you take. How can you care for yourself at home? · Take pain medicines exactly as directed. ? If the doctor gave you a prescription medicine for pain, take it as prescribed. ? If you are not taking a prescription pain medicine, ask your doctor if you can take an over-the-counter medicine. · Rest and protect the sore area. · Stop, change, or take a break from any activity that may be causing your pain or soreness. · Put ice or a cold pack on the sore area for 10 to 20 minutes at a time. Try to do this every 1 to 2 hours for the next 3 days (when you are awake) or until the swelling goes down. Put a thin cloth between the ice and your skin. · After 2 or 3 days, apply a heating pad set on low or a warm cloth to the area that hurts. Some doctors suggest that you go back and forth between hot and cold. · Do not wrap or tape your ribs for support. This may cause you to take smaller breaths, which could increase your risk of lung problems. · Mentholated creams such as Bengay or Icy Hot may soothe sore muscles. Follow the instructions on the package. · Follow your doctor's instructions for exercising. · Gentle stretching and massage may help you get better faster.  Stretch slowly to the point just before pain begins, and hold the stretch for at least 15 to 30 seconds. Do this 3 or 4 times a day. Stretch just after you have applied heat. · As your pain gets better, slowly return to your normal activities. Any increased pain may be a sign that you need to rest a while longer. When should you call for help? Call 911 anytime you think you may need emergency care. For example, call if:    · You have chest pain or pressure. This may occur with:  ? Sweating. ? Shortness of breath. ? Nausea or vomiting. ? Pain that spreads from the chest to the neck, jaw, or one or both shoulders or arms. ? Dizziness or lightheadedness. ? A fast or uneven pulse. After calling 911, chew 1 adult-strength aspirin. Wait for an ambulance. Do not try to drive yourself.     · You have sudden chest pain and shortness of breath, or you cough up blood.    Call your doctor now or seek immediate medical care if:    · You have any trouble breathing.     · Your chest pain gets worse.     · Your chest pain occurs consistently with exercise and is relieved by rest.    Watch closely for changes in your health, and be sure to contact your doctor if:    · Your chest pain does not get better after 1 week. Where can you learn more? Go to http://imani-jenny.info/. Enter V293 in the search box to learn more about \"Musculoskeletal Chest Pain: Care Instructions. \"  Current as of: June 26, 2019  Content Version: 12.2  © 4459-3949 [employer] Care instructions adapted under license by [employer] (which disclaims liability or warranty for this information). If you have questions about a medical condition or this instruction, always ask your healthcare professional. [place] [address] any warranty or liability for your use of this information. Patient Education        Rash: Care Instructions  Your Care Instructions  A rash is any irritation or inflammation of the skin.  Rashes have many possible causes, including allergy, infection, illness, heat, and emotional stress. Follow-up care is a key part of your treatment and safety. Be sure to make and go to all appointments, and call your doctor if you are having problems. It's also a good idea to know your test results and keep a list of the medicines you take. How can you care for yourself at home? · Wash the area with water only. Soap can make dryness and itching worse. Pat dry. · Put cold, wet cloths on the rash to reduce itching. · Keep cool, and stay out of the sun. · Leave the rash open to the air as much of the time as possible. · Sometimes petroleum jelly (Vaseline) can help relieve the discomfort caused by a rash. A moisturizing lotion, such as Cetaphil, also may help. Calamine lotion may help for rashes caused by contact with something (such as a plant or soap) that irritated the skin. Use it 3 or 4 times a day. · If your doctor prescribed a cream, use it as directed. If your doctor prescribed medicine, take it exactly as directed. · If your rash itches so badly that it interferes with your normal activities, take an over-the-counter antihistamine, such as diphenhydramine (Benadryl) or loratadine (Claritin). Read and follow all instructions on the label. When should you call for help? Call your doctor now or seek immediate medical care if:    · You have signs of infection, such as:  ? Increased pain, swelling, warmth, or redness. ? Red streaks leading from the area. ? Pus draining from the area. ? A fever.     · You have joint pain along with the rash.    Watch closely for changes in your health, and be sure to contact your doctor if:    · Your rash is changing or getting worse. For example, call if you have pain along with the rash, the rash is spreading, or you have new blisters.     · You do not get better after 1 week. Where can you learn more? Go to http://imani-jenny.info/.   Enter V333 in the search box to learn more about \"Rash: Care Instructions. \"  Current as of: April 1, 2019  Content Version: 12.2  © 4240-5507 [employer]. Care instructions adapted under license by [employer] (which disclaims liability or warranty for this information). If you have questions about a medical condition or this instruction, always ask your healthcare professional. [address] any warranty or liability for your use of this information.

## 2019-11-20 NOTE — ED TRIAGE NOTES
C/o chest pain x 2 weeks. States she has breast expanders d/t breast ca. States this is the second set of expanders she has had d/t the first ones getting infected. \"it feels like it did when I got the first infection\"  Reports decreased appetite as well.

## 2019-11-20 NOTE — ED PROVIDER NOTES
Markie Laird is a 51 y.o. Female with history of chronic pain with complaints of bilateral chest pain which she thinks may be related to her tissue expanders. Patient thinks sitting on the right position. There is no new swelling. She has no nipple discharge. The patient has recurrent lower abdominal pain as well. She has recurrent nausea and vomiting as well. She said no fever that she knows of. There is no diarrhea or blood in her stool. She has no urinary symptoms. She also has a rash on her head and neck and other parts of her body and that is itching. Past Medical History:   Diagnosis Date    Anxiety     Bipolar disorder ([address]     Breast cancer ([address]     breast cancer, right, S/P Mastectomy and chemo, radiation in 2013    Depression     Diabetes ([address]     Drug abuse ([address]     H/O cocaine use in past    Drug-seeking behavior     Family history of early CAD     Gastrointestinal disorder     cholitis    H/O ETOH abuse     Hyperlipidemia     Hypertension     Malignant neoplasm without specification of site ([address]     Methamphetamine abuse ([address]      self reported on 1/3/16    Spine injury     Vitamin D deficiency 5/1/2018       Past Surgical History:   Procedure Laterality Date    COLONOSCOPY N/A 7/18/2016    COL[facility] ENDOSCOPY    HX BREAST LUMPECTOMY  06/2013    right    HX HYSTERECTOMY      HX MASTECTOMY      had expanders put in 6/2018    HX ORTHOPAEDIC      Back fusion surgery 4/18/14.      HX OTHER SURGICAL      mediport    HX TONSILLECTOMY      HX TUBAL LIGATION           Family History:   Problem Relation Age of Onset    Heart Disease Mother     Stroke Father     Heart Disease Father 48    Diabetes Other     Hypertension Other     Cancer Maternal Grandmother        Social History     Socioeconomic History    Marital status:      Spouse name: Not on file    Number of children: Not on file    Years of education: Not on file    Highest education level: Not on file   Occupational History    Not on file   Social Needs    Financial resource strain: Not on file    Food insecurity:     Worry: Not on file     Inability: Not on file    Transportation needs:     Medical: Not on file     Non-medical: Not on file   Tobacco Use    Smoking status: Never Smoker    Smokeless tobacco: Never Used   Substance and Sexual Activity    Alcohol use: Yes     Comment: \"Occasionally\"    Drug use: No     Types: Methamphetamines, Cocaine    Sexual activity: Never   Lifestyle    Physical activity:     Days per week: Not on file     Minutes per session: Not on file    Stress: Not on file   Relationships    Social connections:     Talks on phone: Not on file     Gets together: Not on file     Attends Church service: Not on file     Active member of club or organization: Not on file     Attends meetings of clubs or organizations: Not on file     Relationship status: Not on file    Intimate partner violence:     Fear of current or ex partner: Not on file     Emotionally abused: Not on file     Physically abused: Not on file     Forced sexual activity: Not on file   Other Topics Concern    Not on file   Social History Narrative    ** Merged History Encounter **              ALLERGIES: Latex; Other food; Amoxicillin; Aspirin; Beeswax; Fentanyl; Levaquin [levofloxacin]; Chlorhexidine; Norco [hydrocodone-acetaminophen]; Acetaminophen; [place]; Codeine; Glycopyrrolate; Morphine; Pcn [penicillins]; Percocet [oxycodone-acetaminophen]; Tape [adhesive]; and Tramadol    Review of Systems   Constitutional: Negative for fever. HENT: Negative for sore throat and trouble swallowing. Eyes: Negative for visual disturbance. Respiratory: Positive for cough. Cardiovascular: Positive for chest pain. Gastrointestinal: Positive for abdominal pain. Genitourinary: Negative for difficulty urinating and dysuria.    Musculoskeletal: Negative for gait problem. Skin: Positive for rash. Allergic/Immunologic: Negative for immunocompromised state. Neurological: Negative for syncope. Psychiatric/Behavioral: Positive for sleep disturbance. Vitals:    11/20/19 0002 11/20/19 0030   BP: (!) 150/104 (!) 113/97   Pulse: (!) 102 85   Resp: 16 16   Temp: 97.7 °F (36.5 °C)    SpO2: 98% 99%   Weight: 72.6 kg (160 lb)             Physical Exam   Constitutional: She is oriented to person, place, and time. She appears well-developed and well-nourished. Non-toxic appearance. She does not have a sickly appearance. She does not appear ill. No distress. HENT:   Head: Normocephalic and atraumatic. Right Ear: External ear normal.   Left Ear: External ear normal.   Nose: Nose normal.   Mouth/Throat: Uvula is midline, oropharynx is clear and moist and mucous membranes are normal.   Eyes: Conjunctivae are normal. No scleral icterus. Neck: Neck supple. Cardiovascular: Normal rate, regular rhythm, normal heart sounds and intact distal pulses. Pulmonary/Chest: Effort normal and breath sounds normal.       Abdominal: Soft. There is no hepatosplenomegaly. There is tenderness. There is no rigidity, no rebound, no guarding, no CVA tenderness, no tenderness at McBurney's point and negative Mayo's sign. Musculoskeletal: She exhibits no edema. Neurological: She is alert and oriented to person, place, and time. Gait normal.   Skin: Skin is warm and dry. She is not diaphoretic. Scattered skin lesions measuring approximate 5 to 1 mm slightly scaly in appearance some excoriated secondary to itching. There is no pustules or vesicles. She has no petechiae. There is no urticarial rash. They are not nodular-like appearance   Psychiatric: Her behavior is normal.   Nursing note and vitals reviewed.        MDM       Procedures    Vitals:  Patient Vitals for the past 12 hrs:   Temp Pulse Resp BP SpO2   11/20/19 0030  85 16 (!) 113/97 99 %   11/20/19 0002 97.7 °F (36.5 °C) (!) 102 16 (!) 150/104 98 %         Medications ordered:   Medications   ketorolac (TORADOL) injection 30 mg (30 mg IntraVENous Given 11/20/19 0031)   acetaminophen (TYLENOL) tablet 1,000 mg (1,000 mg Oral Given 11/20/19 0031)         Lab findings:  Recent Results (from the past 12 hour(s))   CBC WITH AUTOMATED DIFF    Collection Time: 11/20/19 12:45 AM   Result Value Ref Range    WBC 6.6 4.6 - 13.2 K/uL    RBC 4.51 4.20 - 5.30 M/uL    HGB 13.6 12.0 - 16.0 g/dL    HCT 40.7 35.0 - 45.0 %    MCV 90.2 74.0 - 97.0 FL    MCH 30.2 24.0 - 34.0 PG    MCHC 33.4 31.0 - 37.0 g/dL    RDW 13.5 11.6 - 14.5 %    PLATELET 070 705 - 723 K/uL    MPV 11.8 9.2 - 11.8 FL    NEUTROPHILS 62 40 - 73 %    LYMPHOCYTES 30 21 - 52 %    MONOCYTES 5 3 - 10 %    EOSINOPHILS 2 0 - 5 %    BASOPHILS 1 0 - 2 %    ABS. NEUTROPHILS 4.1 1.8 - 8.0 K/UL    ABS. LYMPHOCYTES 2.0 0.9 - 3.6 K/UL    ABS. MONOCYTES 0.3 0.05 - 1.2 K/UL    ABS. EOSINOPHILS 0.1 0.0 - 0.4 K/UL    ABS. BASOPHILS 0.0 0.0 - 0.1 K/UL    DF AUTOMATED     METABOLIC PANEL, COMPREHENSIVE    Collection Time: 11/20/19 12:45 AM   Result Value Ref Range    Sodium 138 136 - 145 mmol/L    Potassium 3.6 3.5 - 5.5 mmol/L    Chloride 105 100 - 111 mmol/L    CO2 24 21 - 32 mmol/L    Anion gap 9 3.0 - 18 mmol/L    Glucose 376 (H) 74 - 99 mg/dL    BUN 18 7.0 - 18 MG/DL    Creatinine 1.02 0.6 - 1.3 MG/DL    BUN/Creatinine ratio 18 12 - 20      GFR est AA >60 >60 ml/min/1.73m2    GFR est non-AA 58 (L) >60 ml/min/1.73m2    Calcium 9.0 8.5 - 10.1 MG/DL    Bilirubin, total 0.4 0.2 - 1.0 MG/DL    ALT (SGPT) 34 13 - 56 U/L    AST (SGOT) 18 10 - 38 U/L    Alk.  phosphatase 141 (H) 45 - 117 U/L    Protein, total 7.2 6.4 - 8.2 g/dL    Albumin 3.8 3.4 - 5.0 g/dL    Globulin 3.4 2.0 - 4.0 g/dL    A-G Ratio 1.1 0.8 - 1.7     TROPONIN I    Collection Time: 11/20/19 12:45 AM   Result Value Ref Range    Troponin-I, QT <0.02 0.0 - 0.045 NG/ML       EKG interpretation by ED Physician:  Normal sinus rhythm with nonspecific ST and T wave changes  Baseline artifact is significant. Rate 88 QRS 78  but do not feel this is accurate given the baseline artifact. There is no significant change from previous    Cardiac monitor: Normal rate with regular rhythm and no ectopy  Pulse ox: 90% room air    X-Ray, CT or other radiology findings or impressions:  XR CHEST PORT    (Results Pending)     No acute process per my interpretation tissue expanders in place  Progress notes, Consult notes or additional Procedure notes:   Do not feel patient has a primary infectious, cardiovascular or other significant issues to require other work-up or imaging at this time. I have discussed with patient and/or family/sig other the results, interpretation of any imaging if performed, suspected diagnosis and treatment plan to include instructions regarding the diagnoses listed to which understanding was expressed with all questions answered      Reevaluation of patient:   stable    Disposition:  Diagnosis:   1. Acute chest pain    2. Rash and other nonspecific skin eruption    3. Non-intractable vomiting with nausea, unspecified vomiting type        Disposition: home      Follow-up Information     Follow up With Specialties Details Why [address] [facility]  Schedule an appointment as soon as possible for a visit  [address] [place] [address] EMERGENCY DEPT Emergency Medicine  If symptoms worsen [address] [facility]. [phone]            Patient's Medications   Start Taking    ACETAMINOPHEN (TYLENOL EXTRA STRENGTH) 500 MG TABLET    Take 2 Tabs by mouth every six (6) hours as needed for Pain. HYDROXYZINE PAMOATE (VISTARIL) 25 MG CAPSULE    Take 1 Cap by mouth three (3) times daily as needed for Itching for up to 14 days. IBUPROFEN (MOTRIN) 600 MG TABLET    Take 1 Tab by mouth every six (6) hours as needed for Pain.    Continue Taking ATORVASTATIN (LIPITOR) 40 MG TABLET    Take 1 Tab by mouth daily. BENZONATATE (TESSALON) 100 MG CAPSULE        BLOOD-GLUCOSE METER (ONETOUCH ULTRA2) MONITORING KIT    Use to obtain two times a day. BUDESONIDE (RHINOCORT ALLERGY) 32 MCG/ACTUATION NASAL SPRAY    2 Sprays by Both Nostrils route daily. Indications: Allergic Rhinitis    CARVEDILOL (COREG) 3.125 MG TABLET    Take 2 Tabs by mouth two (2) times daily (with meals). CYANOCOBALAMIN (VITAMIN B-12) 1,000 MCG TABLET    Take 1 Tab by mouth daily. EPINEPHRINE (EPIPEN) 0.3 MG/0.3 ML INJECTION    0.3 mg.    FERROUS SULFATE 325 MG (65 MG IRON) TABLET    Take 1 Tab by mouth Daily (before breakfast). GLUCOSE BLOOD VI TEST STRIPS (ASCENSIA AUTODISC VI, ONE TOUCH ULTRA TEST VI) STRIP    50 Each by Does Not Apply route two (2) times daily as needed. INSULIN NEEDLES, DISPOSABLE, 31 GAUGE X 5/16\" NDLE    Use to administer insulin as directed    INSULIN NPH-REGULAR HUMAN REC (HUMULIN 70-30) 100 UNIT/ML (70-30) INPN    Give 40 units in the QAM and 45 units at bedtime    LANCETS MISC    Use daily to monitor blood glucose    LIDOCAINE (LIDOCAINE VISCOUS) 2 % SOLUTION    Put 10 mL in mouth and swish and spit out QAC and at bedtime    LISINOPRIL-HYDROCHLOROTHIAZIDE (PRINZIDE, ZESTORETIC) 20-12.5 MG PER TABLET    Take 1 Tab by mouth two (2) times a day. LORAZEPAM (ATIVAN) 1 MG TABLET    1 mg. POLYETHYLENE GLYCOL (MIRALAX) 17 GRAM PACKET    Take 1 Packet by mouth daily. These Medications have changed    Modified Medication Previous Medication    ONDANSETRON (ZOFRAN ODT) 4 MG DISINTEGRATING TABLET ondansetron (ZOFRAN ODT) 4 mg disintegrating tablet       Take 1-2 tablets every 6-8 hours as needed for nausea and vomiting. Take 1-2 tablets every 6-8 hours as needed for nausea and vomiting.    Stop Taking    No medications on file

## 2020-03-14 ENCOUNTER — HOSPITAL ENCOUNTER (EMERGENCY)
Dept: GENERAL RADIOLOGY | Age: 50
Discharge: HOME | End: 2020-03-14
Attending: PHYSICIAN ASSISTANT
Payer: MEDICAID

## 2020-03-14 ENCOUNTER — HOSPITAL ENCOUNTER (EMERGENCY)
Age: 50
Discharge: HOME | End: 2020-03-14
Attending: EMERGENCY MEDICINE | Admitting: EMERGENCY MEDICINE
Payer: MEDICAID

## 2020-03-14 ENCOUNTER — APPOINTMENT (OUTPATIENT)
Dept: GENERAL RADIOLOGY | Age: 50
End: 2020-03-14
Attending: NURSE PRACTITIONER
Payer: MEDICAID

## 2020-03-14 VITALS
SYSTOLIC BLOOD PRESSURE: 150 MMHG | TEMPERATURE: 99 F | HEART RATE: 84 BPM | WEIGHT: 139 LBS | HEIGHT: 62 IN | OXYGEN SATURATION: 99 % | RESPIRATION RATE: 20 BRPM | DIASTOLIC BLOOD PRESSURE: 107 MMHG | BODY MASS INDEX: 25.58 KG/M2

## 2020-03-14 DIAGNOSIS — R73.9 HYPERGLYCEMIA: ICD-10-CM

## 2020-03-14 DIAGNOSIS — M25.562 ACUTE PAIN OF LEFT KNEE: ICD-10-CM

## 2020-03-14 DIAGNOSIS — M25.552 ACUTE HIP PAIN, LEFT: ICD-10-CM

## 2020-03-14 DIAGNOSIS — R51.9 ACUTE NONINTRACTABLE HEADACHE, UNSPECIFIED HEADACHE TYPE: ICD-10-CM

## 2020-03-14 DIAGNOSIS — R55 SYNCOPE AND COLLAPSE: Primary | ICD-10-CM

## 2020-03-14 LAB
ANION GAP SERPL CALC-SCNC: 5 MMOL/L (ref 3–18)
APPEARANCE UR: CLEAR
BASOPHILS # BLD: 0 K/UL (ref 0–0.1)
BASOPHILS NFR BLD: 0 % (ref 0–2)
BILIRUB UR QL: NEGATIVE
BUN SERPL-MCNC: 12 MG/DL (ref 7–18)
BUN/CREAT SERPL: 13 (ref 12–20)
CALCIUM SERPL-MCNC: 8.9 MG/DL (ref 8.5–10.1)
CHLORIDE SERPL-SCNC: 105 MMOL/L (ref 100–111)
CK MB CFR SERPL CALC: NORMAL % (ref 0–4)
CK MB SERPL-MCNC: <1 NG/ML (ref 5–25)
CK SERPL-CCNC: 41 U/L (ref 26–192)
CO2 SERPL-SCNC: 28 MMOL/L (ref 21–32)
COLOR UR: YELLOW
CREAT SERPL-MCNC: 0.95 MG/DL (ref 0.6–1.3)
DIFFERENTIAL METHOD BLD: NORMAL
EOSINOPHIL # BLD: 0.2 K/UL (ref 0–0.4)
EOSINOPHIL NFR BLD: 2 % (ref 0–5)
ERYTHROCYTE [DISTWIDTH] IN BLOOD BY AUTOMATED COUNT: 13.1 % (ref 11.6–14.5)
GLUCOSE SERPL-MCNC: 321 MG/DL (ref 74–99)
GLUCOSE UR STRIP.AUTO-MCNC: >1000 MG/DL
HCT VFR BLD AUTO: 39.4 % (ref 35–45)
HGB BLD-MCNC: 12.9 G/DL (ref 12–16)
HGB UR QL STRIP: NEGATIVE
KETONES UR QL STRIP.AUTO: NEGATIVE MG/DL
LEUKOCYTE ESTERASE UR QL STRIP.AUTO: NEGATIVE
LYMPHOCYTES # BLD: 2.1 K/UL (ref 0.9–3.6)
LYMPHOCYTES NFR BLD: 31 % (ref 21–52)
MCH RBC QN AUTO: 29.3 PG (ref 24–34)
MCHC RBC AUTO-ENTMCNC: 32.7 G/DL (ref 31–37)
MCV RBC AUTO: 89.5 FL (ref 74–97)
MONOCYTES # BLD: 0.3 K/UL (ref 0.05–1.2)
MONOCYTES NFR BLD: 5 % (ref 3–10)
NEUTS SEG # BLD: 4.1 K/UL (ref 1.8–8)
NEUTS SEG NFR BLD: 62 % (ref 40–73)
NITRITE UR QL STRIP.AUTO: NEGATIVE
PH UR STRIP: 6 [PH] (ref 5–8)
PLATELET # BLD AUTO: 258 K/UL (ref 135–420)
PMV BLD AUTO: 11.8 FL (ref 9.2–11.8)
POTASSIUM SERPL-SCNC: 4 MMOL/L (ref 3.5–5.5)
PROT UR STRIP-MCNC: NEGATIVE MG/DL
RBC # BLD AUTO: 4.4 M/UL (ref 4.2–5.3)
SODIUM SERPL-SCNC: 138 MMOL/L (ref 136–145)
SP GR UR REFRACTOMETRY: 1.03 (ref 1–1.03)
TROPONIN I SERPL-MCNC: <0.02 NG/ML (ref 0–0.04)
UROBILINOGEN UR QL STRIP.AUTO: 0.2 EU/DL (ref 0.2–1)
WBC # BLD AUTO: 6.7 K/UL (ref 4.6–13.2)

## 2020-03-14 PROCEDURE — 74011250636 HC RX REV CODE- 250/636: Performed by: PHYSICIAN ASSISTANT

## 2020-03-14 PROCEDURE — 80048 BASIC METABOLIC PNL TOTAL CA: CPT

## 2020-03-14 PROCEDURE — 73564 X-RAY EXAM KNEE 4 OR MORE: CPT

## 2020-03-14 PROCEDURE — 73502 X-RAY EXAM HIP UNI 2-3 VIEWS: CPT

## 2020-03-14 PROCEDURE — 71046 X-RAY EXAM CHEST 2 VIEWS: CPT

## 2020-03-14 PROCEDURE — 81003 URINALYSIS AUTO W/O SCOPE: CPT

## 2020-03-14 PROCEDURE — 99284 EMERGENCY DEPT VISIT MOD MDM: CPT

## 2020-03-14 PROCEDURE — 93005 ELECTROCARDIOGRAM TRACING: CPT

## 2020-03-14 PROCEDURE — 85025 COMPLETE CBC W/AUTO DIFF WBC: CPT

## 2020-03-14 PROCEDURE — 96361 HYDRATE IV INFUSION ADD-ON: CPT

## 2020-03-14 PROCEDURE — 96374 THER/PROPH/DIAG INJ IV PUSH: CPT

## 2020-03-14 PROCEDURE — 82550 ASSAY OF CK (CPK): CPT

## 2020-03-14 RX ORDER — KETOROLAC TROMETHAMINE 30 MG/ML
15 INJECTION, SOLUTION INTRAMUSCULAR; INTRAVENOUS
Status: COMPLETED | OUTPATIENT
Start: 2020-03-14 | End: 2020-03-14

## 2020-03-14 RX ORDER — CANE TIPS
1 EACH MISCELLANEOUS DAILY
Qty: 1 DEVICE | Refills: 0 | Status: SHIPPED | OUTPATIENT
Start: 2020-03-14 | End: 2022-09-12

## 2020-03-14 RX ADMIN — KETOROLAC TROMETHAMINE 15 MG: 30 INJECTION, SOLUTION INTRAMUSCULAR at 18:04

## 2020-03-14 RX ADMIN — SODIUM CHLORIDE 1000 ML: 900 INJECTION, SOLUTION INTRAVENOUS at 18:04

## 2020-03-14 NOTE — ED NOTES
Assumed care of pt. Pt is A&OX4. And appears in NAD. Pt is ambulatory. Breathing is spontaneous and unlabored. Equal chest rise/fall. Skin is warm and dry. Pt is on full cardiac monitor. VVS.  NSR noted. Pt reports having dizziness begin last night and feeling like \"everything was going dark. \" States she believes she \"passed out. \" Did not come in last night d/t personal family issues. Reports intermittent CP x 1 month that feels \"throbbing\" and abd pain. Reports nausea and some vomiting, SOB. Denies cough. Hx of HTN, high cholesterol, GERD and DM. Currently reports having a UTI she is being tx for.
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
## 2020-03-14 NOTE — ED TRIAGE NOTES
I performed a brief evaluation, including history and physical, of the patient here in triage and I have determined that pt will need further treatment and evaluation from the main side ER physician. I have placed initial orders to help in expediting patients care.          March 14, 2020 at Shraddha David [address], NP        Visit Vitals  BP (!) 150/107 (BP 1 Location: Right arm, BP Patient Position: Sitting)   Pulse 84   Temp 99 °F (37.2 °C)   Resp 20   Ht 5' 2\" (1.575 m)   Wt 63 kg (139 lb)   SpO2 100%   BMI 25.42 kg/m²

## 2020-03-14 NOTE — DISCHARGE INSTRUCTIONS
Patient Education        Fainting: Care Instructions  Your Care Instructions    When you faint, or pass out, you lose consciousness for a short time. A brief drop in blood flow to the brain often causes it. When you fall or lie down, more blood flows to your brain and you regain consciousness. Emotional stress, pain, or overheating--especially if you have been standing--can make you faint. In these cases, fainting is usually not serious. But fainting can be a sign of a more serious problem. Your doctor may want you to have more tests to rule out other causes. The treatment you need depends on the reason why you fainted. The doctor has checked you carefully, but problems can develop later. If you notice any problems or new symptoms, get medical treatment right away. Follow-up care is a key part of your treatment and safety. Be sure to make and go to all appointments, and call your doctor if you are having problems. It's also a good idea to know your test results and keep a list of the medicines you take. How can you care for yourself at home? · Drink plenty of fluids to prevent dehydration. If you have kidney, heart, or liver disease and have to limit fluids, talk with your doctor before you increase your fluid intake. When should you call for help? Call 911 anytime you think you may need emergency care. For example, call if:    · You have symptoms of a heart problem. These may include:  ? Chest pain or pressure. ? Severe trouble breathing. ? A fast or irregular heartbeat. ? Lightheadedness or sudden weakness. ? Coughing up pink, foamy mucus. ? Passing out. After you call  911, the  may tell you to chew 1 adult-strength or 2 to 4 low-dose aspirin. Wait for an ambulance. Do not try to drive yourself.     · You have symptoms of a stroke. These may include:  ? Sudden numbness, tingling, weakness, or loss of movement in your face, arm, or leg, especially on only one side of your body.   ? Sudden vision changes. ? Sudden trouble speaking. ? Sudden confusion or trouble understanding simple statements. ? Sudden problems with walking or balance. ? A sudden, severe headache that is different from past headaches.     · You passed out (lost consciousness) again.    Watch closely for changes in your health, and be sure to contact your doctor if:    · You do not get better as expected. Where can you learn more? Go to http://imani-jenny.info/  Enter A848 in the search box to learn more about \"Fainting: Care Instructions. \"  Current as of: June 26, 2019Content Version: 12.4  © 5787-6633 [employer]. Care instructions adapted under license by [facility] (which disclaims liability or warranty for this information). If you have questions about a medical condition or this instruction, always ask your healthcare professional. [address] any warranty or liability for your use of this information.

## 2020-03-16 LAB
ATRIAL RATE: 80 BPM
CALCULATED P AXIS, ECG09: 43 DEGREES
CALCULATED R AXIS, ECG10: -47 DEGREES
CALCULATED T AXIS, ECG11: 69 DEGREES
DIAGNOSIS, 93000: NORMAL
P-R INTERVAL, ECG05: 166 MS
Q-T INTERVAL, ECG07: 376 MS
QRS DURATION, ECG06: 80 MS
QTC CALCULATION (BEZET), ECG08: 433 MS
VENTRICULAR RATE, ECG03: 80 BPM

## 2020-03-31 ENCOUNTER — APPOINTMENT (OUTPATIENT)
Dept: GENERAL RADIOLOGY | Age: 50
End: 2020-03-31
Attending: EMERGENCY MEDICINE
Payer: MEDICAID

## 2020-03-31 ENCOUNTER — HOSPITAL ENCOUNTER (EMERGENCY)
Age: 50
Discharge: HOME | End: 2020-03-31
Attending: EMERGENCY MEDICINE
Payer: MEDICAID

## 2020-03-31 VITALS
TEMPERATURE: 98.5 F | WEIGHT: 149 LBS | HEART RATE: 89 BPM | DIASTOLIC BLOOD PRESSURE: 84 MMHG | HEIGHT: 62 IN | SYSTOLIC BLOOD PRESSURE: 122 MMHG | BODY MASS INDEX: 27.42 KG/M2 | OXYGEN SATURATION: 100 % | RESPIRATION RATE: 18 BRPM

## 2020-03-31 DIAGNOSIS — R73.9 HYPERGLYCEMIA: ICD-10-CM

## 2020-03-31 DIAGNOSIS — M25.562 CHRONIC PAIN OF LEFT KNEE: Primary | ICD-10-CM

## 2020-03-31 DIAGNOSIS — G89.29 CHRONIC PAIN OF LEFT KNEE: Primary | ICD-10-CM

## 2020-03-31 LAB
ALBUMIN SERPL-MCNC: 3.6 G/DL (ref 3.4–5)
ALBUMIN/GLOB SERPL: 0.9 {RATIO} (ref 0.8–1.7)
ALP SERPL-CCNC: 147 U/L (ref 45–117)
ALT SERPL-CCNC: 26 U/L (ref 13–56)
ANION GAP SERPL CALC-SCNC: 8 MMOL/L (ref 3–18)
AST SERPL-CCNC: 20 U/L (ref 10–38)
BASOPHILS # BLD: 0 K/UL (ref 0–0.1)
BASOPHILS NFR BLD: 0 % (ref 0–2)
BILIRUB SERPL-MCNC: 0.4 MG/DL (ref 0.2–1)
BUN SERPL-MCNC: 13 MG/DL (ref 7–18)
BUN/CREAT SERPL: 14 (ref 12–20)
CALCIUM SERPL-MCNC: 8.8 MG/DL (ref 8.5–10.1)
CHLORIDE SERPL-SCNC: 102 MMOL/L (ref 100–111)
CO2 SERPL-SCNC: 25 MMOL/L (ref 21–32)
CREAT SERPL-MCNC: 0.96 MG/DL (ref 0.6–1.3)
DIFFERENTIAL METHOD BLD: NORMAL
EOSINOPHIL # BLD: 0.1 K/UL (ref 0–0.4)
EOSINOPHIL NFR BLD: 1 % (ref 0–5)
ERYTHROCYTE [DISTWIDTH] IN BLOOD BY AUTOMATED COUNT: 13.1 % (ref 11.6–14.5)
GLOBULIN SER CALC-MCNC: 4 G/DL (ref 2–4)
GLUCOSE BLD STRIP.AUTO-MCNC: 347 MG/DL (ref 70–110)
GLUCOSE SERPL-MCNC: 379 MG/DL (ref 74–99)
HCT VFR BLD AUTO: 39.7 % (ref 35–45)
HGB BLD-MCNC: 13.5 G/DL (ref 12–16)
LYMPHOCYTES # BLD: 2.2 K/UL (ref 0.9–3.6)
LYMPHOCYTES NFR BLD: 25 % (ref 21–52)
MCH RBC QN AUTO: 29.5 PG (ref 24–34)
MCHC RBC AUTO-ENTMCNC: 34 G/DL (ref 31–37)
MCV RBC AUTO: 86.9 FL (ref 74–97)
MONOCYTES # BLD: 0.5 K/UL (ref 0.05–1.2)
MONOCYTES NFR BLD: 5 % (ref 3–10)
NEUTS SEG # BLD: 5.9 K/UL (ref 1.8–8)
NEUTS SEG NFR BLD: 69 % (ref 40–73)
PLATELET # BLD AUTO: 229 K/UL (ref 135–420)
PMV BLD AUTO: 11.6 FL (ref 9.2–11.8)
POTASSIUM SERPL-SCNC: 3.8 MMOL/L (ref 3.5–5.5)
PROT SERPL-MCNC: 7.6 G/DL (ref 6.4–8.2)
RBC # BLD AUTO: 4.57 M/UL (ref 4.2–5.3)
SODIUM SERPL-SCNC: 135 MMOL/L (ref 136–145)
WBC # BLD AUTO: 8.7 K/UL (ref 4.6–13.2)

## 2020-03-31 PROCEDURE — 99284 EMERGENCY DEPT VISIT MOD MDM: CPT

## 2020-03-31 PROCEDURE — 74011250637 HC RX REV CODE- 250/637: Performed by: EMERGENCY MEDICINE

## 2020-03-31 PROCEDURE — 85025 COMPLETE CBC W/AUTO DIFF WBC: CPT

## 2020-03-31 PROCEDURE — 93005 ELECTROCARDIOGRAM TRACING: CPT

## 2020-03-31 PROCEDURE — 80053 COMPREHEN METABOLIC PANEL: CPT

## 2020-03-31 PROCEDURE — 73564 X-RAY EXAM KNEE 4 OR MORE: CPT

## 2020-03-31 PROCEDURE — 82962 GLUCOSE BLOOD TEST: CPT

## 2020-03-31 PROCEDURE — 74011000250 HC RX REV CODE- 250: Performed by: EMERGENCY MEDICINE

## 2020-03-31 RX ORDER — DICLOFENAC SODIUM 10 MG/G
4 GEL TOPICAL
Qty: 100 G | Refills: 1 | Status: SHIPPED | OUTPATIENT
Start: 2020-03-31 | End: 2021-07-02

## 2020-03-31 RX ORDER — ACETAMINOPHEN 500 MG
1000 TABLET ORAL
Qty: 40 TAB | Refills: 0 | Status: SHIPPED | OUTPATIENT
Start: 2020-03-31 | End: 2021-07-02

## 2020-03-31 RX ORDER — LIDOCAINE 4 G/100G
1 PATCH TOPICAL
Status: DISCONTINUED | OUTPATIENT
Start: 2020-03-31 | End: 2020-04-01 | Stop reason: HOSPADM

## 2020-03-31 RX ORDER — ACETAMINOPHEN 500 MG
1000 TABLET ORAL
Status: COMPLETED | OUTPATIENT
Start: 2020-03-31 | End: 2020-03-31

## 2020-03-31 RX ADMIN — ACETAMINOPHEN 1000 MG: 500 TABLET, FILM COATED ORAL at 21:25
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
## 2020-04-01 ENCOUNTER — PATIENT OUTREACH (OUTPATIENT)
Dept: CASE MANAGEMENT | Age: 50
End: 2020-04-01

## 2020-04-01 NOTE — ED PROVIDER NOTES
Patric Sanz is a 47 y.o. female with noted past medical history with complaints of worsening left knee pain which she has had for several weeks that got worse after she states she fell in [facility] 2 weeks ago. Patient thinks it is more swollen. He has trouble walking on it. There is no other skin changes. Patient also has a decreased appetite as well and abdominal cramping. She has any fever cough or increased shortness of breath. The history is provided by the patient and medical records. Past Medical History:   Diagnosis Date    Anxiety     Bipolar disorder ([address]     Breast cancer ([address]     breast cancer, right, S/P Mastectomy and chemo, radiation in 2013    Depression     Diabetes ([address]     Drug abuse ([address]     H/O cocaine use in past    Drug-seeking behavior     Family history of early CAD     Gastrointestinal disorder     cholitis    H/O ETOH abuse     Hyperlipidemia     Hypertension     Malignant neoplasm without specification of site ([address]     Methamphetamine abuse ([address]      self reported on 1/3/16    Spine injury     Vitamin D deficiency 5/1/2018       Past Surgical History:   Procedure Laterality Date    COLONOSCOPY N/A 7/18/2016    COLONOSCOPY performed by Liyah Stone MD at [facility]    HX BREAST LUMPECTOMY  06/2013    right    HX HYSTERECTOMY      HX MASTECTOMY      had expanders put in 6/2018    HX ORTHOPAEDIC      Back fusion surgery 4/18/14.      HX OTHER SURGICAL      mediport    HX TONSILLECTOMY      HX TUBAL LIGATION           Family History:   Problem Relation Age of Onset    Heart Disease Mother     Stroke Father     Heart Disease Father 48    Diabetes Other     Hypertension Other     Cancer Maternal Grandmother        Social History     Socioeconomic History    Marital status:      Spouse name: Not on file    Number of children: Not on file    Years of education: Not on file    Highest education level: Not on file Occupational History    Not on file   Social Needs    Financial resource strain: Not on file    Food insecurity     Worry: Not on file     Inability: Not on file    Transportation needs     Medical: Not on file     Non-medical: Not on file   Tobacco Use    Smoking status: Never Smoker    Smokeless tobacco: Never Used   Substance and Sexual Activity    Alcohol use: Yes     Comment: \"Occasionally\"    Drug use: Not Currently     Types: Methamphetamines, Cocaine    Sexual activity: Never   Lifestyle    Physical activity     Days per week: Not on file     Minutes per session: Not on file    Stress: Not on file   Relationships    Social connections     Talks on phone: Not on file     Gets together: Not on file     Attends Worship service: Not on file     Active member of club or organization: Not on file     Attends meetings of clubs or organizations: Not on file     Relationship status: Not on file    Intimate partner violence     Fear of current or ex partner: Not on file     Emotionally abused: Not on file     Physically abused: Not on file     Forced sexual activity: Not on file   Other Topics Concern    Not on file   Social History Narrative    ** Merged History Encounter **              ALLERGIES: Latex; Other food; Amoxicillin; Aspirin; Beeswax; Fentanyl; Levaquin [levofloxacin]; Chlorhexidine; [place]; Codeine; Glycopyrrolate; Morphine; Pcn [penicillins]; Tape [adhesive]; and Tramadol    Review of Systems   Constitutional: Negative for fever. HENT: Negative for sore throat. Respiratory: Negative for shortness of breath. Cardiovascular: Negative for chest pain. Genitourinary: Negative for difficulty urinating. Musculoskeletal: Positive for arthralgias and joint swelling. Negative for gait problem. Skin: Negative for color change. Allergic/Immunologic: Negative for immunocompromised state. Neurological: Negative for syncope. Psychiatric/Behavioral: Positive for sleep disturbance. Vitals:    03/31/20 1955   BP: (!) 163/113   Pulse: 89   Resp: 18   Temp: 98.5 °F (36.9 °C)   SpO2: 99%   Weight: 67.6 kg (149 lb)   Height: 5' 2\" (1.575 m)            Physical Exam  Vitals signs and nursing note reviewed. Constitutional:       General: She is not in acute distress. Appearance: She is well-developed. She is not ill-appearing, toxic-appearing or diaphoretic. HENT:      Head: Normocephalic and atraumatic. Right Ear: External ear normal.      Left Ear: External ear normal.      Nose: Nose normal.      Mouth/Throat:      Pharynx: Uvula midline. Eyes:      General: No scleral icterus. Conjunctiva/sclera: Conjunctivae normal.   Neck:      Musculoskeletal: Neck supple. Cardiovascular:      Rate and Rhythm: Normal rate and regular rhythm. Heart sounds: Normal heart sounds. Pulmonary:      Effort: Pulmonary effort is normal.      Breath sounds: Normal breath sounds. Abdominal:      Palpations: Abdomen is soft. Tenderness: There is no abdominal tenderness. Musculoskeletal:      Left knee: She exhibits swelling and bony tenderness. She exhibits normal range of motion, no effusion, no ecchymosis, no deformity, no laceration, no erythema, normal alignment, no LCL laxity, normal patellar mobility, normal meniscus and no MCL laxity. Tenderness found. Medial joint line and lateral joint line tenderness noted. No MCL, no LCL and no patellar tendon tenderness noted. Comments: There is no distal swelling to the knee nor other skin changes to suggest infection. Normal distal pulses and sensation   Skin:     General: Skin is warm and dry. Capillary Refill: Capillary refill takes less than 2 seconds. Neurological:      Mental Status: She is alert and oriented to person, place, and time.       Gait: Gait normal.   Psychiatric:         Behavior: Behavior normal.          MDM       Procedures  Vitals:  Patient Vitals for the past 12 hrs:   Temp Pulse Resp BP SpO2 03/31/20 1955 98.5 °F (36.9 °C) 89 18 (!) 163/113 99 %         Medications ordered:   Medications   lidocaine 4 % patch 1 Patch (1 Patch TransDERmal Apply Patch 3/31/20 2126)   acetaminophen (TYLENOL) tablet 1,000 mg (1,000 mg Oral Given 3/31/20 2125)         Lab findings:  Recent Results (from the past 12 hour(s))   GLUCOSE, POC    Collection Time: 03/31/20  8:15 PM   Result Value Ref Range    Glucose (POC) 347 (H) 70 - 110 mg/dL   EKG, 12 LEAD, INITIAL    Collection Time: 03/31/20  8:15 PM   Result Value Ref Range    Ventricular Rate 79 BPM    Atrial Rate 79 BPM    P-R Interval 168 ms    QRS Duration 84 ms    Q-T Interval 384 ms    QTC Calculation (Bezet) 440 ms    Calculated P Axis 48 degrees    Calculated R Axis -21 degrees    Calculated T Axis 58 degrees    Diagnosis       Normal sinus rhythm  Normal ECG  When compared with ECG of 14-MAR-2020 16:43,  Left anterior fascicular block is no longer present  Criteria for Anterolateral infarct are no longer present     CBC WITH AUTOMATED DIFF    Collection Time: 03/31/20  8:35 PM   Result Value Ref Range    WBC 8.7 4.6 - 13.2 K/uL    RBC 4.57 4.20 - 5.30 M/uL    HGB 13.5 12.0 - 16.0 g/dL    HCT 39.7 35.0 - 45.0 %    MCV 86.9 74.0 - 97.0 FL    MCH 29.5 24.0 - 34.0 PG    MCHC 34.0 31.0 - 37.0 g/dL    RDW 13.1 11.6 - 14.5 %    PLATELET 385 435 - 873 K/uL    MPV 11.6 9.2 - 11.8 FL    NEUTROPHILS 69 40 - 73 %    LYMPHOCYTES 25 21 - 52 %    MONOCYTES 5 3 - 10 %    EOSINOPHILS 1 0 - 5 %    BASOPHILS 0 0 - 2 %    ABS. NEUTROPHILS 5.9 1.8 - 8.0 K/UL    ABS. LYMPHOCYTES 2.2 0.9 - 3.6 K/UL    ABS. MONOCYTES 0.5 0.05 - 1.2 K/UL    ABS. EOSINOPHILS 0.1 0.0 - 0.4 K/UL    ABS.  BASOPHILS 0.0 0.0 - 0.1 K/UL    DF AUTOMATED     METABOLIC PANEL, COMPREHENSIVE    Collection Time: 03/31/20  8:35 PM   Result Value Ref Range    Sodium 135 (L) 136 - 145 mmol/L    Potassium 3.8 3.5 - 5.5 mmol/L    Chloride 102 100 - 111 mmol/L    CO2 25 21 - 32 mmol/L    Anion gap 8 3.0 - 18 mmol/L Glucose 379 (H) 74 - 99 mg/dL    BUN 13 7.0 - 18 MG/DL    Creatinine 0.96 0.6 - 1.3 MG/DL    BUN/Creatinine ratio 14 12 - 20      GFR est AA >60 >60 ml/min/1.73m2    GFR est non-AA >60 >60 ml/min/1.73m2    Calcium 8.8 8.5 - 10.1 MG/DL    Bilirubin, total 0.4 0.2 - 1.0 MG/DL    ALT (SGPT) 26 13 - 56 U/L    AST (SGOT) 20 10 - 38 U/L    Alk. phosphatase 147 (H) 45 - 117 U/L    Protein, total 7.6 6.4 - 8.2 g/dL    Albumin 3.6 3.4 - 5.0 g/dL    Globulin 4.0 2.0 - 4.0 g/dL    A-G Ratio 0.9 0.8 - 1.7         EKG interpretation by ED Physician:  Normal sinus rhythm with no acute ST or T wave changes  Normal EKG  Rate 79 QRS 84   No significant changes    X-Ray, CT or other radiology findings or impressions:  XR KNEE LT MIN 4 V    (Results Pending)   No acute process per my interpretation    Progress notes, Consult notes or additional Procedure notes:   Evidence of acute infection or other significant abnormalities to require other work-up at this time. I have discussed with patient and/or family/sig other the results, interpretation of any imaging if performed, suspected diagnosis and treatment plan to include instructions regarding the diagnoses listed to which understanding was expressed with all questions answered      Reevaluation of patient:   stable    Disposition:  Diagnosis:   1. Chronic pain of left knee    2.  Hyperglycemia        Disposition: home    Follow-up Information     Follow up With Specialties Details Why [address] [facility]  Schedule an appointment as soon as possible for a visit  [address]    [facility] EMERGENCY DEPT Emergency Medicine  If symptoms worsen [address] [place] [address] [facility]OB  Schedule an appointment as soon as possible for a visit  [address] [address] [phone]            Patient's Medications   Start Taking DICLOFENAC (VOLTAREN) 1 % GEL    Apply 4 g to affected area four (4) times daily as needed for Pain. Continue Taking    ATORVASTATIN (LIPITOR) 40 MG TABLET    Take 1 Tab by mouth daily. BLOOD-GLUCOSE METER (ONETOUCH ULTRA2) MONITORING KIT    Use to obtain two times a day. BUDESONIDE (RHINOCORT ALLERGY) 32 MCG/ACTUATION NASAL SPRAY    2 Sprays by Both Nostrils route daily. Indications: Allergic Rhinitis    CANE JUAN R    1 Each by Does Not Apply route daily. EPINEPHRINE (EPIPEN) 0.3 MG/0.3 ML INJECTION    0.3 mg.    GLUCOSE BLOOD VI TEST STRIPS (ASCENSIA AUTODISC VI, ONE TOUCH ULTRA TEST VI) STRIP    50 Each by Does Not Apply route two (2) times daily as needed. IBUPROFEN (MOTRIN) 600 MG TABLET    Take 1 Tab by mouth every six (6) hours as needed for Pain. INSULIN NEEDLES, DISPOSABLE, 31 GAUGE X 5/16\" NDLE    Use to administer insulin as directed    INSULIN NPH-REGULAR HUMAN REC (HUMULIN 70-30) 100 UNIT/ML (70-30) INPN    Give 40 units in the QAM and 45 units at bedtime    LANCETS MISC    Use daily to monitor blood glucose    LIDOCAINE (LIDOCAINE VISCOUS) 2 % SOLUTION    Put 10 mL in mouth and swish and spit out QAC and at bedtime    LORAZEPAM (ATIVAN) 1 MG TABLET    1 mg. ONDANSETRON (ZOFRAN ODT) 4 MG DISINTEGRATING TABLET    Take 1-2 tablets every 6-8 hours as needed for nausea and vomiting. POLYETHYLENE GLYCOL (MIRALAX) 17 GRAM PACKET    Take 1 Packet by mouth daily. These Medications have changed    Modified Medication Previous Medication    ACETAMINOPHEN (TYLENOL EXTRA STRENGTH) 500 MG TABLET acetaminophen (TYLENOL EXTRA STRENGTH) 500 mg tablet       Take 2 Tabs by mouth every six (6) hours as needed for Pain. Take 2 Tabs by mouth every six (6) hours as needed for Pain. Stop Taking    BENZONATATE (TESSALON) 100 MG CAPSULE        CARVEDILOL (COREG) 3.125 MG TABLET    Take 2 Tabs by mouth two (2) times daily (with meals).     CYANOCOBALAMIN (VITAMIN B-12) 1,000 MCG TABLET    Take 1 Tab by mouth daily. FERROUS SULFATE 325 MG (65 MG IRON) TABLET    Take 1 Tab by mouth Daily (before breakfast). LISINOPRIL-HYDROCHLOROTHIAZIDE (PRINZIDE, ZESTORETIC) 20-12.5 MG PER TABLET    Take 1 Tab by mouth two (2) times a day.

## 2020-04-01 NOTE — DISCHARGE INSTRUCTIONS
Patient Education        Learning About High Blood Sugar  What is high blood sugar? Your body turns the food you eat into glucose (sugar), which it uses for energy. But if your body isn't able to use the sugar right away, it can build up in your blood and lead to high blood sugar. When the amount of sugar in your blood stays too high for too much of the time, you may have diabetes. Diabetes is a disease that can cause serious health problems. The good news is that lifestyle changes may help you get your blood sugar back to normal and avoid or delay diabetes. What causes high blood sugar? Sugar (glucose) can build up in your blood if you:  · Are overweight. · Have a family history of diabetes. · Take certain medicines, such as steroids. What are the symptoms? Having high blood sugar may not cause any symptoms at all. Or it may make you feel very thirsty or very hungry. You may also urinate more often than usual, have blurry vision, or lose weight without trying. How is high blood sugar treated? You can take steps to lower your blood sugar level if you understand what makes it get higher. Your doctor may want you to learn how to test your blood sugar level at home. Then you can see how illness, stress, or different kinds of food or medicine raise or lower your blood sugar level. Other tests may be needed to see if you have diabetes. How can you prevent high blood sugar? · Watch your weight. If you're overweight, losing just a small amount of weight may help. Reducing fat around your waist is most important. · Limit the amount of calories, sweets, and unhealthy fat you eat. Ask your doctor if a dietitian can help you. A registered dietitian can help you create meal plans that fit your lifestyle. · Get at least 30 minutes of exercise on most days of the week. Exercise helps control your blood sugar. It also helps you maintain a healthy weight. Walking is a good choice.  You also may want to do other activities, such as running, swimming, cycling, or playing tennis or team sports. · If your doctor prescribed medicines, take them exactly as prescribed. Call your doctor if you think you are having a problem with your medicine. You will get more details on the specific medicines your doctor prescribes. Follow-up care is a key part of your treatment and safety. Be sure to make and go to all appointments, and call your doctor if you are having problems. It's also a good idea to know your test results and keep a list of the medicines you take. Where can you learn more? Go to http://imaniInformed Tradesjenny.info/  Enter O108 in the search box to learn more about \"Learning About High Blood Sugar. \"  Current as of: December 19, 2019Content Version: 12.4  © 4281-9483 Healthwise, Incorporated. Care instructions adapted under license by [employer] (which disclaims liability or warranty for this information). If you have questions about a medical condition or this instruction, always ask your healthcare professional. [address] any warranty or liability for your use of this information. Joint Pain: Care Instructions  Your Care Instructions    Many people have small aches and pains from overuse or injury to muscles and joints. Joint injuries often happen during sports or recreation, work tasks, or projects around the home. An overuse injury can happen when you put too much stress on a joint or when you do an activity that stresses the joint over and over, such as using the computer or rowing a boat. You can take action at home to help your muscles and joints get better. You should feel better in 1 to 2 weeks, but it can take 3 months or more to heal completely. Follow-up care is a key part of your treatment and safety. Be sure to make and go to all appointments, and call your doctor if you are having problems.  It's also a good idea to know your test results and keep a list of the medicines you take. How can you care for yourself at home? · Do not put weight on the injured joint for at least a day or two. · For the first day or two after an injury, do not take hot showers or baths, and do not use hot packs. The heat could make swelling worse. · Put ice or a cold pack on the sore joint for 10 to 20 minutes at a time. Try to do this every 1 to 2 hours for the next 3 days (when you are awake) or until the swelling goes down. Put a thin cloth between the ice and your skin. · Wrap the injury in an elastic bandage. Do not wrap it too tightly because this can cause more swelling. · Prop up the sore joint on a pillow when you ice it or anytime you sit or lie down during the next 3 days. Try to keep it above the level of your heart. This will help reduce swelling. · Take an over-the-counter pain medicine, such as acetaminophen (Tylenol), ibuprofen (Advil, Motrin), or naproxen (Aleve). Read and follow all instructions on the label. · After 1 or 2 days of rest, begin moving the joint gently. While the joint is still healing, you can begin to exercise using activities that do not strain or hurt the painful joint. When should you call for help? Call your doctor now or seek immediate medical care if:    · You have signs of infection, such as:  ? Increased pain, swelling, warmth, and redness. ? Red streaks leading from the joint. ? A fever.    Watch closely for changes in your health, and be sure to contact your doctor if:    · Your movement or symptoms are not getting better after 1 to 2 weeks of home treatment. Where can you learn more? Go to http://imani-jenny.info/  Enter P205 in the search box to learn more about \"Joint Pain: Care Instructions. \"  Current as of: June 26, 2019Content Version: 12.4  © 8842-6050 Healthwise, Incorporated.   Care instructions adapted under license by [employer] (which disclaims liability or warranty for this information). If you have questions about a medical condition or this instruction, always ask your healthcare professional. [address] any warranty or liability for your use of this information. Patient Education        Joint Pain: Care Instructions  Your Care Instructions    Many people have small aches and pains from overuse or injury to muscles and joints. Joint injuries often happen during sports or recreation, work tasks, or projects around the home. An overuse injury can happen when you put too much stress on a joint or when you do an activity that stresses the joint over and over, such as using the computer or rowing a boat. You can take action at home to help your muscles and joints get better. You should feel better in 1 to 2 weeks, but it can take 3 months or more to heal completely. Follow-up care is a key part of your treatment and safety. Be sure to make and go to all appointments, and call your doctor if you are having problems. It's also a good idea to know your test results and keep a list of the medicines you take. How can you care for yourself at home? · Do not put weight on the injured joint for at least a day or two. · For the first day or two after an injury, do not take hot showers or baths, and do not use hot packs. The heat could make swelling worse. · Put ice or a cold pack on the sore joint for 10 to 20 minutes at a time. Try to do this every 1 to 2 hours for the next 3 days (when you are awake) or until the swelling goes down. Put a thin cloth between the ice and your skin. · Wrap the injury in an elastic bandage. Do not wrap it too tightly because this can cause more swelling. · Prop up the sore joint on a pillow when you ice it or anytime you sit or lie down during the next 3 days. Try to keep it above the level of your heart. This will help reduce swelling.   · Take an over-the-counter pain medicine, such as acetaminophen (Tylenol), ibuprofen (Advil, Motrin), or naproxen (Aleve). Read and follow all instructions on the label. · After 1 or 2 days of rest, begin moving the joint gently. While the joint is still healing, you can begin to exercise using activities that do not strain or hurt the painful joint. When should you call for help? Call your doctor now or seek immediate medical care if:    · You have signs of infection, such as:  ? Increased pain, swelling, warmth, and redness. ? Red streaks leading from the joint. ? A fever.    Watch closely for changes in your health, and be sure to contact your doctor if:    · Your movement or symptoms are not getting better after 1 to 2 weeks of home treatment. Where can you learn more? Go to http://imani-jenny.info/  Enter P205 in the search box to learn more about \"Joint Pain: Care Instructions. \"  Current as of: June 26, 2019Content Version: 12.4  © 6654-4630 Healthwise, Incorporated. Care instructions adapted under license by [employer] (which disclaims liability or warranty for this information). If you have questions about a medical condition or this instruction, always ask your healthcare professional. [address] any warranty or liability for your use of this information.

## 2020-04-01 NOTE — PROGRESS NOTES
Patient contacted regarding recent discharge and COVID-19 risk     CTN  Attempt to contact patient via telephone on 4/1/20 for post ED follow up. Unable to reach. Left message on voicemail with office contact information. No Patient medical information left on message.

## 2020-04-02 ENCOUNTER — PATIENT OUTREACH (OUTPATIENT)
Dept: CASE MANAGEMENT | Age: 50
End: 2020-04-02

## 2020-04-02 LAB
ATRIAL RATE: 79 BPM
CALCULATED P AXIS, ECG09: 48 DEGREES
CALCULATED R AXIS, ECG10: -21 DEGREES
CALCULATED T AXIS, ECG11: 58 DEGREES
DIAGNOSIS, 93000: NORMAL
P-R INTERVAL, ECG05: 168 MS
Q-T INTERVAL, ECG07: 384 MS
QRS DURATION, ECG06: 84 MS
QTC CALCULATION (BEZET), ECG08: 440 MS
VENTRICULAR RATE, ECG03: 79 BPM

## 2020-04-02 NOTE — PROGRESS NOTES
Patient contacted regarding recent discharge and COVID-19 risk     Patient stated \" It's okay , I already talked to somebody. Thank you. \" Patient ended the call.

## 2020-04-24 ENCOUNTER — PATIENT OUTREACH (OUTPATIENT)
Dept: CASE MANAGEMENT | Age: 50
End: 2020-04-24

## 2020-04-24 NOTE — PROGRESS NOTES
Patient resolved from Transition of Care episode on 4/24/20    Patient answered the call and ended the call. This is not the first time Pt. Ended the call on CTN. No further outreach scheduled with this CTN. Episode of Care resolved.

## 2020-05-28 ENCOUNTER — HOSPITAL ENCOUNTER (EMERGENCY)
Age: 50
Discharge: HOME | End: 2020-05-28
Attending: EMERGENCY MEDICINE
Payer: MEDICAID

## 2020-05-28 ENCOUNTER — APPOINTMENT (OUTPATIENT)
Dept: GENERAL RADIOLOGY | Age: 50
End: 2020-05-28
Attending: EMERGENCY MEDICINE
Payer: MEDICAID

## 2020-05-28 VITALS
BODY MASS INDEX: 26.13 KG/M2 | SYSTOLIC BLOOD PRESSURE: 139 MMHG | HEIGHT: 62 IN | WEIGHT: 142 LBS | DIASTOLIC BLOOD PRESSURE: 107 MMHG | HEART RATE: 85 BPM | OXYGEN SATURATION: 100 % | TEMPERATURE: 97.6 F | RESPIRATION RATE: 16 BRPM

## 2020-05-28 DIAGNOSIS — R07.89 CHEST WALL PAIN: Primary | ICD-10-CM

## 2020-05-28 DIAGNOSIS — Z98.82 H/O BREAST IMPLANT: ICD-10-CM

## 2020-05-28 PROCEDURE — 99282 EMERGENCY DEPT VISIT SF MDM: CPT

## 2020-05-28 PROCEDURE — 71045 X-RAY EXAM CHEST 1 VIEW: CPT

## 2020-05-28 RX ORDER — CYCLOBENZAPRINE HCL 5 MG
5 TABLET ORAL
Qty: 15 TAB | Refills: 0 | Status: SHIPPED | OUTPATIENT
Start: 2020-05-28 | End: 2021-07-02

## 2020-05-29 ENCOUNTER — PATIENT OUTREACH (OUTPATIENT)
Dept: CASE MANAGEMENT | Age: 50
End: 2020-05-29

## 2020-05-29 NOTE — PROGRESS NOTES
.Patient contacted regarding recent discharge and COVID-19 risk. Discussed COVID-19 related testing which was not done at this time. Test results were not done. Patient informed of results, if available? no    Care Transition Nurse/ Ambulatory Care Manager contacted the patient by telephone to perform post discharge assessment. Verified name and  with patient as identifiers. Patient very short with ACM, did not wait until ACM identified self. She hung up phone. [facility] did not want to make patient agitated. This episode is closed. Patient has following risk factors of: immunocompromised and diabetes. CTN/ACM reviewed discharge instructions, medical action plan and red flags related to discharge diagnosis. Reviewed and educated them on any new and changed medications related to discharge diagnosis. Advised obtaining a 90-day supply of all daily and as-needed medications. Education provided regarding infection prevention, and signs and symptoms of COVID-19 and when to seek medical attention with patient who verbalized understanding. Discussed exposure protocols and quarantine from [address] you at higher risk for severe illness  and given an opportunity for questions and concerns. The patient agrees to contact the COVID-19 hotline [phone] or PCP office for questions related to their healthcare. CTN/ACM provided contact information for future reference. From CDC: Are you at higher risk for severe illness?  Wash your hands often.  Avoid close contact (6 feet, which is about two arm lengths) with people who are sick.  Put distance between yourself and other people if COVID-19 is spreading in your community.  Clean and disinfect frequently touched surfaces.  Avoid all cruise travel and non-essential air travel.  Call your healthcare professional if you have concerns about COVID-19 and your underlying condition or if you are sick.

## 2020-05-29 NOTE — ED TRIAGE NOTES
Pt to triage with multiple complaints. Pt states she has tissue expanders r/t mastectomy and plans for breast implants. States the expanders have been in for 2 years, had issues where her plastic surgeon  and she didn't like the new one., also family problems  States hx of infection in previous expanders approx 5 years ago. Pt very anxious, states her PCP gave her valium last time they spoke but she hasnt taken it. C/o 'weird moving sensation' to right side of face where she has hx of dental abcess that put her in the hospital.  States she has on and off sharp chest pain to bilateral breast x 1 month. States she feels like 'something is running up and down (her) arms' when she lifts them. States she has been under increased stress since her sister passed away last week. Denies recent fevers, when asked about N/V, states 'I threw up last week'.   Also states 1 month ago she had 'weird white stuff coming out of (her) fingers'

## 2020-05-29 NOTE — DISCHARGE INSTRUCTIONS

## 2020-05-29 NOTE — ED PROVIDER NOTES
EMERGENCY DEPARTMENT HISTORY AND PHYSICAL EXAM      Date: 5/28/2020  Patient Name: Wendie Herring    History of Presenting Illness     Chief Complaint   Patient presents with    Other    Skin Problem       History Provided By: Patient    Chief Complaint: Pain at breast implant sites    Additional History (Context): Wendie Herring is a 47 y.o. female who presents with pain at her breast implant sites. Patient has a history of mastectomy and spacers that were placed by plastic surgery for reconstruction. She failed to follow-up with her plastic surgeon for a couple years, and then recently went back to [facility] and found that his practice is been taken over by someone else. She was unhappy with that provider, so left and now is decided to come to the emergency department for evaluation. States that for some time now, at least for months, she has had a sensation of shifting of her left breast spacer. Then about a week ago, she rolled over in bed and bumped her right implant and felt some pain at that site. Years ago she had an infection of her breast implants which required removal, and she is concerned that the current pain could be related to an infection. She denies any fevers, chills, sweats, redness or erythema at the implant site, or other skin changes around her implant site. She does have several old healing superficial skin wounds that she states are related to her picking at her self. PCP: None    Current Outpatient Medications   Medication Sig Dispense Refill    cyclobenzaprine (FLEXERIL) 5 mg tablet Take 1 Tab by mouth three (3) times daily as needed for Muscle Spasm(s) for up to 15 doses. 15 Tab 0    diclofenac (VOLTAREN) 1 % gel Apply 4 g to affected area four (4) times daily as needed for Pain. 100 g 1    acetaminophen (Tylenol Extra Strength) 500 mg tablet Take 2 Tabs by mouth every six (6) hours as needed for Pain. 40 Tab 0    Cane coco 1 Each by Does Not Apply route daily.  1 Device 0    ondansetron (ZOFRAN ODT) 4 mg disintegrating tablet Take 1-2 tablets every 6-8 hours as needed for nausea and vomiting. 10 Tab 0    ibuprofen (MOTRIN) 600 mg tablet Take 1 Tab by mouth every six (6) hours as needed for Pain. 20 Tab 0    lidocaine (LIDOCAINE VISCOUS) 2 % solution Put 10 mL in mouth and swish and spit out QAC and at bedtime 200 mL 0    Blood-Glucose Meter (ONETOUCH ULTRA2) monitoring kit Use to obtain two times a day. 1 Kit 0    lancets misc Use daily to monitor blood glucose 200 Each 0    glucose blood VI test strips (ASCENSIA AUTODISC VI, ONE TOUCH ULTRA TEST VI) strip 50 Each by Does Not Apply route two (2) times daily as needed. 200 Strip 3    EPINEPHrine (EPIPEN) 0.3 mg/0.3 mL injection 0.3 mg.      budesonide (RHINOCORT ALLERGY) 32 mcg/actuation nasal spray 2 Sprays by Both Nostrils route daily. Indications: Allergic Rhinitis 1 Bottle 3    insulin nph-regular human rec (HUMULIN 70-30) 100 unit/mL (70-30) inpn Give 40 units in the QAM and 45 units at bedtime 5 Pen 3    polyethylene glycol (MIRALAX) 17 gram packet Take 1 Packet by mouth daily. 30 Each 1    Insulin Needles, Disposable, 31 gauge x 5/16\" ndle Use to administer insulin as directed 1 Package 11    atorvastatin (LIPITOR) 40 mg tablet Take 1 Tab by mouth daily. 30 Tab 6    LORazepam (ATIVAN) 1 mg tablet 1 mg.          Past History     Past Medical History:  Past Medical History:   Diagnosis Date    Anxiety     Bipolar disorder ([address]     Breast cancer ([address]     breast cancer, right, S/P Mastectomy and chemo, radiation in 2013    Depression     [address]     Drug abuse ([address]     H/O cocaine use in past    Drug-seeking behavior     Family history of early CAD     Gastrointestinal disorder     cholitis    H/O ETOH abuse     Hyperlipidemia     Hypertension     Malignant neoplasm without specification of site ([address]     Methamphetamine abuse ([facility] [address]      self reported on 1/3/16    Spine injury     Vitamin D deficiency 5/1/2018       Past Surgical History:  Past Surgical History:   Procedure Laterality Date    COLONOSCOPY N/A 7/18/2016    COLONOSCOPY performed by [facility]OSCOPY    HX BREAST LUMPECTOMY  06/2013    right    HX HYSTERECTOMY      HX MASTECTOMY      had expanders put in 6/2018    HX ORTHOPAEDIC      Back fusion surgery 4/18/14.  HX OTHER SURGICAL      mediport    HX TONSILLECTOMY      HX TUBAL LIGATION         Family History:  Family History   Problem Relation Age of Onset    Heart Disease Mother     Stroke Father     Heart Disease Father 48    Diabetes Other     Hypertension Other     Cancer Maternal Grandmother        Social History:  Social History     Tobacco Use    Smoking status: Never Smoker    Smokeless tobacco: Never Used   Substance Use Topics    Alcohol use: Yes     Comment: \"Occasionally\"    Drug use: Not Currently     Types: Methamphetamines, Cocaine       Allergies: Allergies   Allergen Reactions    Latex Hives and Itching    Other Food Rash     Almonds    Amoxicillin Swelling     Other reaction(s): mild rash/itching    Aspirin Not Reported This Time and Swelling     Mouth swells    Beeswax Anaphylaxis    Fentanyl Anaphylaxis and Swelling     Patches cause mouth to swell  Swelling in mouth from fentanyl patch    Levaquin [Levofloxacin] Not Reported This Time and Swelling     Other reaction(s): mild rash/itching    Chlorhexidine Rash    Troy Rash and Itching    Codeine Other (comments)    Glycopyrrolate Swelling     Pt  States  faciAL  SWELLING   POST  ROBINUL  IV   Pt  States  faciAL  SWELLING   POST  ROBINUL  IV     Morphine Nausea and Vomiting     Pt states she is not allergic    Pcn [Penicillins] Swelling    Tape [Adhesive] Rash    Tramadol Swelling         Review of Systems   Review of Systems   Constitutional: Negative for chills, fatigue and fever.    HENT: Negative for congestion, rhinorrhea, sore throat and trouble swallowing. Eyes: Negative for discharge, redness and itching. Respiratory: Negative for cough, shortness of breath, wheezing and stridor. Cardiovascular: Positive for chest pain. Negative for palpitations and leg swelling. Chest pain at site of bilateral breast implants   Gastrointestinal: Negative for abdominal pain, blood in stool, diarrhea, nausea and vomiting. Genitourinary: Negative for difficulty urinating and dysuria. Musculoskeletal: Negative for back pain. Skin: Positive for wound. Negative for rash. Chronic subacute skin changes at sites were she picks at her skin   Neurological: Negative for syncope and light-headedness. Psychiatric/Behavioral: Positive for behavioral problems. Negative for confusion, self-injury and suicidal ideas. All other systems reviewed and are negative. Physical Exam     Vitals:    05/28/20 1952   BP: (!) 139/107   Pulse: 85   Resp: 16   Temp: 97.6 °F (36.4 °C)   SpO2: 100%   Weight: 64.4 kg (142 lb)   Height: 5' 2\" (1.575 m)     Physical Exam  Vitals signs and nursing note reviewed. Constitutional:       General: She is not in acute distress. Appearance: She is well-developed and normal weight. She is not diaphoretic. HENT:      Head: Normocephalic and atraumatic. Nose: Nose normal.      Mouth/Throat:      Mouth: Mucous membranes are moist.      Pharynx: Oropharynx is clear. Eyes:      Extraocular Movements: Extraocular movements intact. Conjunctiva/sclera: Conjunctivae normal.      Pupils: Pupils are equal, round, and reactive to light. Neck:      Musculoskeletal: Normal range of motion and neck supple. Cardiovascular:      Rate and Rhythm: Normal rate and regular rhythm. Heart sounds: Normal heart sounds. Comments: Breast/chest exam with female nurse standby. Well-healed surgical incisions.   Implants are in place at appropriate location with no point tenderness to palpation, no overlying skin changes or erythema or warmth, no focal area of tenderness. Pulmonary:      Effort: Pulmonary effort is normal.      Breath sounds: Normal breath sounds. No wheezing or rales. Abdominal:      General: Bowel sounds are normal. There is no distension. Palpations: Abdomen is soft. Tenderness: There is no abdominal tenderness. Musculoskeletal: Normal range of motion. Right lower leg: No edema. Left lower leg: No edema. Lymphadenopathy:      Cervical: No cervical adenopathy. Skin:     General: Skin is warm and dry. Capillary Refill: Capillary refill takes less than 2 seconds. Neurological:      General: No focal deficit present. Mental Status: She is alert and oriented to person, place, and time. Diagnostic Study Results     Labs -   No results found for this or any previous visit (from the past 12 hour(s)). Radiologic Studies -   XR CHEST PORT    (Results Pending)   ED reading: Implants in place, otherwise no acute findings. CT Results  (Last 48 hours)    None        CXR Results  (Last 48 hours)    None            Medical Decision Making   I am the first provider for this patient. I reviewed the vital signs, available nursing notes, past medical history, past surgical history, family history and social history. Vital Signs-Reviewed the patient's vital signs. Records Reviewed: Nursing Notes and Old Medical Records    ED Course:   Remained stable during her emergency department stay    Disposition:  Discharge home    DISCHARGE NOTE:     Pt has been reexamined. Patient has no new complaints, changes, or physical findings. Care plan outlined and precautions discussed. Results of chest x-ray were reviewed with the patient. All medications were reviewed with the patient; will d/c home with Flexeril. All of pt's questions and concerns were addressed.  Patient was instructed and agrees to follow up with her primary care provider as well as her plastic surgeon, as well as to return to the ED upon further deterioration. Patient is ready to go home. Follow-up Information     Follow up With Specialties Details Why Contact Info    your plastic surgeon and your primary care provider  Call in 1 day      [facility] EMERGENCY DEPT Emergency Medicine  As needed, If symptoms worsen [address]  [phone]          Current Discharge Medication List      START taking these medications    Details   cyclobenzaprine (FLEXERIL) 5 mg tablet Take 1 Tab by mouth three (3) times daily as needed for Muscle Spasm(s) for up to 15 doses. Qty: 15 Tab, Refills: 0         CONTINUE these medications which have NOT CHANGED    Details   diclofenac (VOLTAREN) 1 % gel Apply 4 g to affected area four (4) times daily as needed for Pain. Qty: 100 g, Refills: 1      acetaminophen (Tylenol Extra Strength) 500 mg tablet Take 2 Tabs by mouth every six (6) hours as needed for Pain. Qty: 40 Tab, Refills: 0      Cane coco 1 Each by Does Not Apply route daily. Qty: 1 Device, Refills: 0      ondansetron (ZOFRAN ODT) 4 mg disintegrating tablet Take 1-2 tablets every 6-8 hours as needed for nausea and vomiting. Qty: 10 Tab, Refills: 0      ibuprofen (MOTRIN) 600 mg tablet Take 1 Tab by mouth every six (6) hours as needed for Pain. Qty: 20 Tab, Refills: 0      lidocaine (LIDOCAINE VISCOUS) 2 % solution Put 10 mL in mouth and swish and spit out QAC and at bedtime  Qty: 200 mL, Refills: 0      Blood-Glucose Meter (ONETOUCH ULTRA2) monitoring kit Use to obtain two times a day. Qty: 1 Kit, Refills: 0      lancets misc Use daily to monitor blood glucose  Qty: 200 Each, Refills: 0    Associated Diagnoses: Type 2 diabetes mellitus with complication, with long-term current use of insulin (HCC)      glucose blood VI test strips (ASCENSIA AUTODISC VI, ONE TOUCH ULTRA TEST VI) strip 50 Each by Does Not Apply route two (2) times daily as needed.   Qty: 200 Strip, Refills: 3    Associated Diagnoses: Type 2 diabetes mellitus with complication, with long-term current use of insulin ([facility])      EPINEPHrine (EPIPEN) 0.3 mg/0.3 mL injection 0.3 mg.      budesonide (RHINOCORT ALLERGY) 32 mcg/actuation nasal spray 2 Sprays by Both Nostrils route daily. Indications: Allergic Rhinitis  Qty: 1 Bottle, Refills: 3      insulin nph-regular human rec (HUMULIN 70-30) 100 unit/mL (70-30) inpn Give 40 units in the QAM and 45 units at bedtime  Qty: 5 Pen, Refills: 3    Associated Diagnoses: Type 2 diabetes mellitus with complication, with long-term current use of insulin ([facility])      polyethylene glycol (MIRALAX) 17 gram packet Take 1 Packet by mouth daily. Qty: 30 Each, Refills: 1    Associated Diagnoses: Therapeutic opioid induced constipation      Insulin Needles, Disposable, 31 gauge x 5/16\" ndle Use to administer insulin as directed  Qty: 1 Package, Refills: 11      atorvastatin (LIPITOR) 40 mg tablet Take 1 Tab by mouth daily. Qty: 30 Tab, Refills: 6      LORazepam (ATIVAN) 1 mg tablet 1 mg. Provider Notes (Medical Decision Making):   Pain at breast implants/spacer site, no evidence of mechanical complication or infection or any other acute indication for emergent intervention. Will give Flexeril for musculoskeletal spasm relief, and patient to follow-up with her plastic surgeon and her primary care provider. Diagnosis     Clinical Impression:   1. Chest wall pain    2.  H/O breast implant

## 2020-05-29 NOTE — ED NOTES
I have reviewed discharge instructions with the patient. The patient verbalized understanding. Patient armband removed and shredded. VSS. Patient verbalized understanding of how to properly take any prescribed medications. Patient in NAD upon leaving ER.

## 2020-07-16 ENCOUNTER — HOSPITAL ENCOUNTER (EMERGENCY)
Age: 50
Discharge: HOME | End: 2020-07-16
Attending: EMERGENCY MEDICINE
Payer: MEDICAID

## 2020-07-16 ENCOUNTER — TELEPHONE (OUTPATIENT)
Dept: FAMILY MEDICINE CLINIC | Age: 50
End: 2020-07-16

## 2020-07-16 VITALS
DIASTOLIC BLOOD PRESSURE: 116 MMHG | BODY MASS INDEX: 26.68 KG/M2 | WEIGHT: 145 LBS | HEIGHT: 62 IN | TEMPERATURE: 97.2 F | SYSTOLIC BLOOD PRESSURE: 154 MMHG | OXYGEN SATURATION: 98 % | HEART RATE: 120 BPM

## 2020-07-16 DIAGNOSIS — T81.40XA POSTOPERATIVE INFECTION, UNSPECIFIED TYPE, INITIAL ENCOUNTER: Primary | ICD-10-CM

## 2020-07-16 LAB
ALBUMIN SERPL-MCNC: 3.4 G/DL (ref 3.4–5)
ALBUMIN/GLOB SERPL: 0.6 {RATIO} (ref 0.8–1.7)
ALP SERPL-CCNC: 219 U/L (ref 45–117)
ALT SERPL-CCNC: 27 U/L (ref 13–56)
ANION GAP SERPL CALC-SCNC: 6 MMOL/L (ref 3–18)
AST SERPL-CCNC: 16 U/L (ref 10–38)
BASOPHILS # BLD: 0 K/UL (ref 0–0.1)
BASOPHILS NFR BLD: 0 % (ref 0–2)
BILIRUB SERPL-MCNC: 0.5 MG/DL (ref 0.2–1)
BNP SERPL-MCNC: 262 PG/ML (ref 0–900)
BUN SERPL-MCNC: 15 MG/DL (ref 7–18)
BUN/CREAT SERPL: 15 (ref 12–20)
CALCIUM SERPL-MCNC: 9.9 MG/DL (ref 8.5–10.1)
CHLORIDE SERPL-SCNC: 104 MMOL/L (ref 100–111)
CO2 SERPL-SCNC: 29 MMOL/L (ref 21–32)
CREAT SERPL-MCNC: 0.98 MG/DL (ref 0.6–1.3)
DIFFERENTIAL METHOD BLD: ABNORMAL
EOSINOPHIL # BLD: 0.3 K/UL (ref 0–0.4)
EOSINOPHIL NFR BLD: 3 % (ref 0–5)
ERYTHROCYTE [DISTWIDTH] IN BLOOD BY AUTOMATED COUNT: 13.4 % (ref 11.6–14.5)
GLOBULIN SER CALC-MCNC: 5.6 G/DL (ref 2–4)
GLUCOSE SERPL-MCNC: 263 MG/DL (ref 74–99)
HCT VFR BLD AUTO: 37.8 % (ref 35–45)
HGB BLD-MCNC: 12.4 G/DL (ref 12–16)
INR PPP: 1.1 (ref 0.8–1.2)
LIPASE SERPL-CCNC: 131 U/L (ref 73–393)
LYMPHOCYTES # BLD: 1.8 K/UL (ref 0.9–3.6)
LYMPHOCYTES NFR BLD: 20 % (ref 21–52)
MCH RBC QN AUTO: 29.5 PG (ref 24–34)
MCHC RBC AUTO-ENTMCNC: 32.8 G/DL (ref 31–37)
MCV RBC AUTO: 89.8 FL (ref 74–97)
MONOCYTES # BLD: 0.5 K/UL (ref 0.05–1.2)
MONOCYTES NFR BLD: 5 % (ref 3–10)
NEUTS SEG # BLD: 6.8 K/UL (ref 1.8–8)
NEUTS SEG NFR BLD: 72 % (ref 40–73)
PLATELET # BLD AUTO: 509 K/UL (ref 135–420)
PMV BLD AUTO: 9.9 FL (ref 9.2–11.8)
POTASSIUM SERPL-SCNC: 3.6 MMOL/L (ref 3.5–5.5)
PROT SERPL-MCNC: 9 G/DL (ref 6.4–8.2)
PROTHROMBIN TIME: 13.6 SEC (ref 11.5–15.2)
RBC # BLD AUTO: 4.21 M/UL (ref 4.2–5.3)
SODIUM SERPL-SCNC: 139 MMOL/L (ref 136–145)
TROPONIN I SERPL-MCNC: <0.02 NG/ML (ref 0–0.04)
WBC # BLD AUTO: 9.4 K/UL (ref 4.6–13.2)

## 2020-07-16 PROCEDURE — 85025 COMPLETE CBC W/AUTO DIFF WBC: CPT

## 2020-07-16 PROCEDURE — 83880 ASSAY OF NATRIURETIC PEPTIDE: CPT

## 2020-07-16 PROCEDURE — 99282 EMERGENCY DEPT VISIT SF MDM: CPT

## 2020-07-16 PROCEDURE — 80053 COMPREHEN METABOLIC PANEL: CPT

## 2020-07-16 PROCEDURE — 83690 ASSAY OF LIPASE: CPT

## 2020-07-16 PROCEDURE — 84484 ASSAY OF TROPONIN QUANT: CPT

## 2020-07-16 PROCEDURE — 74011250637 HC RX REV CODE- 250/637: Performed by: EMERGENCY MEDICINE

## 2020-07-16 PROCEDURE — 85610 PROTHROMBIN TIME: CPT

## 2020-07-16 RX ORDER — OXYCODONE AND ACETAMINOPHEN 5; 325 MG/1; MG/1
1 TABLET ORAL
Qty: 12 TAB | Refills: 0 | Status: SHIPPED | OUTPATIENT
Start: 2020-07-16 | End: 2020-07-19

## 2020-07-16 RX ORDER — SULFAMETHOXAZOLE AND TRIMETHOPRIM 800; 160 MG/1; MG/1
1 TABLET ORAL 2 TIMES DAILY
Qty: 20 TAB | Refills: 0 | Status: SHIPPED | OUTPATIENT
Start: 2020-07-16 | End: 2020-07-26

## 2020-07-16 RX ORDER — OXYCODONE AND ACETAMINOPHEN 5; 325 MG/1; MG/1
1 TABLET ORAL ONCE
Status: COMPLETED | OUTPATIENT
Start: 2020-07-16 | End: 2020-07-16

## 2020-07-16 RX ORDER — SULFAMETHOXAZOLE AND TRIMETHOPRIM 800; 160 MG/1; MG/1
1 TABLET ORAL
Status: COMPLETED | OUTPATIENT
Start: 2020-07-16 | End: 2020-07-16

## 2020-07-16 RX ADMIN — OXYCODONE HYDROCHLORIDE AND ACETAMINOPHEN 1 TABLET: 5; 325 TABLET ORAL at 18:41

## 2020-07-16 RX ADMIN — SULFAMETHOXAZOLE AND TRIMETHOPRIM 1 TABLET: 800; 160 TABLET ORAL at 18:41

## 2020-07-16 NOTE — ED TRIAGE NOTES
7/2 had breast tissue expanders removed. Drains removed. Right side continues to drain. Seen frequently at [facility]. Has vomited x 3. On keflex. Went to bathroom bent over and felt rip in right breast. Concerned may have damaged it.

## 2020-07-16 NOTE — ED NOTES
Patient presents with postoperative complications after having breast expanders removed. I performed a brief history of the patient here in triage and I have determined that pt will need further treatment and evaluation from the main side ER physician. I have placed initial orders based on the history to help in expediting patients care.       Mouna Castro PA-C

## 2020-07-16 NOTE — TELEPHONE ENCOUNTER
Returned patients call, per Dr. Leigh Angelucci she needs to call the breast surgeon that did her surgery. Also told her that she will not be seen by Dr Leigh Angelucci. She said okay and I cancelled her appointment.

## 2020-07-16 NOTE — ED NOTES
Pt not seen by this nurse. I have reviewed discharge instructions with the patient. The patient verbalized understanding. Discharge medications reviewed with patient and appropriate educational materials and side effects teaching were provided.

## 2020-07-16 NOTE — DISCHARGE INSTRUCTIONS
Continue keflex; add bactrim as prescribed today. Call your surgeon tomorrow for follow-up. Patient Education        Infection After Surgery: Care Instructions  Your Care Instructions  After surgery, an infection is always possible. It doesn't mean that the surgery didn't go well. Because an infection can be serious, your doctor has taken steps to manage it. Your doctor checked the infection and cleaned it if necessary. He or she may have made an opening in the area so that the pus can drain out. You may have gauze in the cut so that the area will stay open and keep draining. You may need antibiotics. You will need to follow up with your doctor to make sure the infection has gone away. Follow-up care is a key part of your treatment and safety. Be sure to make and go to all appointments, and call your doctor if you are having problems. It's also a good idea to know your test results and keep a list of the medicines you take. How can you care for yourself at home? · Make sure your surgeon knows that you saw a doctor about the infection. · If your doctor prescribed antibiotics, take them as directed. Do not stop taking them just because you feel better. You need to take the full course of antibiotics. · Ask your doctor if you can take an over-the-counter pain medicine, such as acetaminophen (Tylenol), ibuprofen (Advil, Motrin), or naproxen (Aleve). Be safe with medicines. Read and follow all instructions on the label. · Do not take two or more pain medicines at the same time unless the doctor told you to. Many pain medicines have acetaminophen, which is Tylenol. Too much acetaminophen (Tylenol) can be harmful. · Prop up the area on a pillow anytime you sit or lie down during the next 3 days. Try to keep it above the level of your heart. This will help reduce swelling. · Keep the skin clean and dry. · If you have a bandage, keep it clean and dry. · You may have a dressing over the cut (incision).  A dressing helps the incision heal and protects it. Your doctor will tell you how to take care of this. You can expect drainage from the wound. · If your doctor told you how to care for your incision, follow your doctor's instructions. If you did not get instructions, follow this general advice:  ? Wash around the incision with clean water 2 times a day. Don't use hydrogen peroxide or alcohol, which can slow healing. When should you call for help? Call your doctor now or seek immediate medical care if:  · You have signs that your infection is getting worse, such as:  ? Increased pain, swelling, warmth, or redness in the area. ? Red streaks leading from the area. ? Pus draining from the wound. ? A new or higher fever. Watch closely for changes in your health, and be sure to contact your doctor if you have any problems. Where can you learn more? Go to http://imani-jenny.info/  Enter C340 in the search box to learn more about \"Infection After Surgery: Care Instructions. \"  Current as of: June 26, 2019               Content Version: 12.5  © 8162-4828 [employer]. Care instructions adapted under license by [employer] (which disclaims liability or warranty for this information). If you have questions about a medical condition or this instruction, always ask your healthcare professional. [address] any warranty or liability for your use of this information.

## 2020-07-16 NOTE — ED PROVIDER NOTES
EMERGENCY DEPARTMENT HISTORY AND PHYSICAL EXAM      Date: 7/16/2020  Patient Name: Jamel Milton    History of Presenting Illness     Chief Complaint   Patient presents with    Post-Op Problem       History Provided By: Patient    Chief Complaint: Wound infection    Additional History (Context): Jamel Milton is a 47 y.o. female who presents with right breast wound infection. Patient has history of breast cancer status post bilateral mastectomy, had 1 set of expanders placed which became infected, removed. Had a second set of breast expanders placed, which also became infected. Was a removed and she had a reaccumulation of the seroma that became infected in her right breast area. Saw her surgeon a few days ago, and he drained this and put a temporary drain in as well. Started her on Keflex 500 mg 4 times daily, which is markedly improved her symptoms, however notes that she still has significant pain and swelling at the site. Continues to drain purulent material, although it is been decreasing. Denies fevers, chills, sweats, chest pain, trouble breathing. PCP: None    Current Facility-Administered Medications   Medication Dose Route Frequency Provider Last Rate Last Dose    trimethoprim-sulfamethoxazole (BACTRIM DS, SEPTRA DS) 160-800 mg per tablet 1 Tab  1 Tab Oral NOW Elly Leiva MD        oxyCODONE-acetaminophen (PERCOCET) 5-325 mg per tablet 1 Tab  1 Tab Oral ONCE Elly Leiva MD         Current Outpatient Medications   Medication Sig Dispense Refill    trimethoprim-sulfamethoxazole (Bactrim DS) 160-800 mg per tablet Take 1 Tab by mouth two (2) times a day for 10 days. 20 Tab 0    oxyCODONE-acetaminophen (Percocet) 5-325 mg per tablet Take 1 Tab by mouth every six (6) hours as needed for Pain for up to 3 days. Max Daily Amount: 4 Tabs. 12 Tab 0    cyclobenzaprine (FLEXERIL) 5 mg tablet Take 1 Tab by mouth three (3) times daily as needed for Muscle Spasm(s) for up to 15 doses.  15 Tab 0  diclofenac (VOLTAREN) 1 % gel Apply 4 g to affected area four (4) times daily as needed for Pain. 100 g 1    acetaminophen (Tylenol Extra Strength) 500 mg tablet Take 2 Tabs by mouth every six (6) hours as needed for Pain. 40 Tab 0    Cane coco 1 Each by Does Not Apply route daily. 1 Device 0    ondansetron (ZOFRAN ODT) 4 mg disintegrating tablet Take 1-2 tablets every 6-8 hours as needed for nausea and vomiting. 10 Tab 0    ibuprofen (MOTRIN) 600 mg tablet Take 1 Tab by mouth every six (6) hours as needed for Pain. 20 Tab 0    lidocaine (LIDOCAINE VISCOUS) 2 % solution Put 10 mL in mouth and swish and spit out QAC and at bedtime 200 mL 0    Blood-Glucose Meter (ONETOUCH ULTRA2) monitoring kit Use to obtain two times a day. 1 Kit 0    lancets misc Use daily to monitor blood glucose 200 Each 0    glucose blood VI test strips (ASCENSIA AUTODISC VI, ONE TOUCH ULTRA TEST VI) strip 50 Each by Does Not Apply route two (2) times daily as needed. 200 Strip 3    EPINEPHrine (EPIPEN) 0.3 mg/0.3 mL injection 0.3 mg.      budesonide (RHINOCORT ALLERGY) 32 mcg/actuation nasal spray 2 Sprays by Both Nostrils route daily. Indications: Allergic Rhinitis 1 Bottle 3    insulin nph-regular human rec (HUMULIN 70-30) 100 unit/mL (70-30) inpn Give 40 units in the QAM and 45 units at bedtime 5 Pen 3    polyethylene glycol (MIRALAX) 17 gram packet Take 1 Packet by mouth daily. 30 Each 1    Insulin Needles, Disposable, 31 gauge x 5/16\" ndle Use to administer insulin as directed 1 Package 11    atorvastatin (LIPITOR) 40 mg tablet Take 1 Tab by mouth daily. 30 Tab 6    LORazepam (ATIVAN) 1 mg tablet 1 mg.          Past History     Past Medical History:  Past Medical History:   Diagnosis Date    Anxiety     Bipolar disorder [address]     Breast cancer ([address]     breast cancer, right, S/P Mastectomy and chemo, radiation in 2013    Depression     Diabetes ([address]     Drug abuse ([address]     H/O cocaine use in past    Drug-seeking behavior     Family history of early CAD     Gastrointestinal disorder     cholitis    H/O ETOH abuse     Hyperlipidemia     Hypertension     [facility]ignant neoplasm without specification of site [address]     Methamphetamine abuse ([address]      self reported on 1/3/16    Spine injury     Vitamin D deficiency 5/1/2018       Past Surgical History:  Past Surgical History:   Procedure Laterality Date    COLONOSCOPY N/A 7/18/2016    COLONOS[facility] performed by Katelynn Lovell [facility] ENDOSCOPY    HX BREAST LUMPECTOMY  06/2013    right    HX HYSTERECTOMY      HX MASTECTOMY      had expanders put in 6/2018    HX ORTHOPAEDIC      Back fusion surgery 4/18/14.  HX OTHER SURGICAL      mediport    HX TONSILLECTOMY      HX TUBAL LIGATION         Family History:  Family History   Problem Relation Age of Onset    Heart Disease Mother     Stroke Father     Heart Disease Father 48    Diabetes Other     Hypertension Other     Cancer Maternal Grandmother        Social History:  Social History     Tobacco Use    Smoking status: Never Smoker    Smokeless tobacco: Never Used   Substance Use Topics    Alcohol use: Yes     Comment: \"Occasionally\"    Drug use: Not Currently     Types: Methamphetamines, Cocaine       Allergies:   Allergies   Allergen Reactions    Latex Hives and Itching    Other Food Rash     Almonds    Amoxicillin Swelling     Other reaction(s): mild rash/itching    Aspirin Not Reported This Time and Swelling     Mouth swells    Beeswax Anaphylaxis    Fentanyl Anaphylaxis and Swelling     Patches cause mouth to swell  Swelling in mouth from fentanyl patch    Levaquin [Levofloxacin] Not Reported This Time and Swelling     Other reaction(s): mild rash/itching    Chlorhexidine Rash    [place] Rash and Itching    Codeine Other (comments)    Glycopyrrolate Swelling     Pt  States  faciAL  SWELLING   POST  ROBINUL  IV   Pt  States  faciAL  SWELLING   POST  ROBINUL  IV     Morphine Nausea and Vomiting     Pt states she is not allergic    Pcn [Penicillins] Swelling    Tape [Adhesive] Rash    Tramadol Swelling         Review of Systems   Review of Systems   Constitutional: Negative for chills, fatigue and fever. HENT: Negative for congestion, rhinorrhea, sore throat and trouble swallowing. Eyes: Negative for discharge, redness and itching. Respiratory: Negative for cough, shortness of breath, wheezing and stridor. Cardiovascular: Negative for chest pain, palpitations and leg swelling. Gastrointestinal: Negative for abdominal pain, blood in stool, diarrhea, nausea and vomiting. Genitourinary: Negative for difficulty urinating and dysuria. Musculoskeletal: Negative for back pain. Skin: Positive for color change and wound. Negative for rash. Neurological: Negative for syncope and light-headedness. Psychiatric/Behavioral: Negative for behavioral problems and confusion. All other systems reviewed and are negative. Physical Exam     Vitals:    07/16/20 1631   BP: (!) 154/116   Pulse: (!) 120   Temp: 97.2 °F (36.2 °C)   SpO2: 98%   Weight: 65.8 kg (145 lb)   Height: 5' 2\" (1.575 m)     Physical Exam  Vitals signs and nursing note reviewed. Constitutional:       General: She is not in acute distress. Appearance: She is well-developed. She is obese. She is not diaphoretic. HENT:      Head: Normocephalic and atraumatic. Nose: Nose normal.      Mouth/Throat:      Mouth: Mucous membranes are moist.      Pharynx: Oropharynx is clear. Eyes:      Extraocular Movements: Extraocular movements intact. Conjunctiva/sclera: Conjunctivae normal.      Pupils: Pupils are equal, round, and reactive to light. Neck:      Musculoskeletal: Normal range of motion and neck supple. Cardiovascular:      Rate and Rhythm: Normal rate and regular rhythm. Heart sounds: Normal heart sounds.    Pulmonary:      Effort: Pulmonary effort is normal.      Breath sounds: Normal breath sounds. No wheezing or rales. Abdominal:      General: Bowel sounds are normal. There is no distension. Palpations: Abdomen is soft. Tenderness: There is no abdominal tenderness. Musculoskeletal: Normal range of motion. Lymphadenopathy:      Cervical: No cervical adenopathy. Skin:     General: Skin is warm and dry. Capillary Refill: Capillary refill takes less than 2 seconds. Comments: Has a chest binder in place. Right breast wound has a drain in place, there is some purulent material coming out from the drain. There is mild erythema around the drain site. There is no fluctuance or evidence of retained abscess. Neurological:      General: No focal deficit present. Mental Status: She is alert and oriented to person, place, and time. Psychiatric:         Mood and Affect: Mood normal.         Behavior: Behavior normal.           Diagnostic Study Results     Labs -     Recent Results (from the past 12 hour(s))   CBC WITH AUTOMATED DIFF    Collection Time: 07/16/20  5:13 PM   Result Value Ref Range    WBC 9.4 4.6 - 13.2 K/uL    RBC 4.21 4.20 - 5.30 M/uL    HGB 12.4 12.0 - 16.0 g/dL    HCT 37.8 35.0 - 45.0 %    MCV 89.8 74.0 - 97.0 FL    MCH 29.5 24.0 - 34.0 PG    MCHC 32.8 31.0 - 37.0 g/dL    RDW 13.4 11.6 - 14.5 %    PLATELET 647 (H) 284 - 420 K/uL    MPV 9.9 9.2 - 11.8 FL    NEUTROPHILS 72 40 - 73 %    LYMPHOCYTES 20 (L) 21 - 52 %    MONOCYTES 5 3 - 10 %    EOSINOPHILS 3 0 - 5 %    BASOPHILS 0 0 - 2 %    ABS. NEUTROPHILS 6.8 1.8 - 8.0 K/UL    ABS. LYMPHOCYTES 1.8 0.9 - 3.6 K/UL    ABS. MONOCYTES 0.5 0.05 - 1.2 K/UL    ABS. EOSINOPHILS 0.3 0.0 - 0.4 K/UL    ABS.  BASOPHILS 0.0 0.0 - 0.1 K/UL    DF AUTOMATED     METABOLIC PANEL, COMPREHENSIVE    Collection Time: 07/16/20  5:13 PM   Result Value Ref Range    Sodium 139 136 - 145 mmol/L    Potassium 3.6 3.5 - 5.5 mmol/L    Chloride 104 100 - 111 mmol/L    CO2 29 21 - 32 mmol/L    Anion gap 6 3.0 - 18 mmol/L    Glucose 263 (H) 74 - 99 mg/dL    BUN 15 7.0 - 18 MG/DL    Creatinine 0.98 0.6 - 1.3 MG/DL    BUN/Creatinine ratio 15 12 - 20      GFR est AA >60 >60 ml/min/1.73m2    GFR est non-AA >60 >60 ml/min/1.73m2    Calcium 9.9 8.5 - 10.1 MG/DL    Bilirubin, total 0.5 0.2 - 1.0 MG/DL    ALT (SGPT) 27 13 - 56 U/L    AST (SGOT) 16 10 - 38 U/L    Alk. phosphatase 219 (H) 45 - 117 U/L    Protein, total 9.0 (H) 6.4 - 8.2 g/dL    Albumin 3.4 3.4 - 5.0 g/dL    Globulin 5.6 (H) 2.0 - 4.0 g/dL    A-G Ratio 0.6 (L) 0.8 - 1.7     PROTHROMBIN TIME + INR    Collection Time: 07/16/20  5:13 PM   Result Value Ref Range    Prothrombin time 13.6 11.5 - 15.2 sec    INR 1.1 0.8 - 1.2         Radiologic Studies -   No orders to display     CT Results  (Last 48 hours)    None        CXR Results  (Last 48 hours)    None            Medical Decision Making   I am the first provider for this patient. I reviewed the vital signs, available nursing notes, past medical history, past surgical history, family history and social history. Vital Signs-Reviewed the patient's vital signs. Records Reviewed: Nursing Notes and Old Medical Records    ED Course:   Remained stable during emergency department stay    Disposition:  Discharge    DISCHARGE NOTE:     Pt has been reexamined. Patient has no new complaints, changes, or physical findings. Care plan outlined and precautions discussed. Results of labs were reviewed with the patient. All medications were reviewed with the patient; will d/c home with Bactrim and Percocet. All of pt's questions and concerns were addressed. Patient was instructed and agrees to follow up with her primary care provider and her surgeon, as well as to return to the ED upon further deterioration. Patient is ready to go home.     Follow-up Information    None         Current Discharge Medication List      START taking these medications    Details   trimethoprim-sulfamethoxazole (Bactrim DS) 160-800 mg per tablet Take 1 Tab by mouth two (2) times a day for 10 days. Qty: 20 Tab, Refills: 0      oxyCODONE-acetaminophen (Percocet) 5-325 mg per tablet Take 1 Tab by mouth every six (6) hours as needed for Pain for up to 3 days. Max Daily Amount: 4 Tabs. Qty: 12 Tab, Refills: 0    Associated Diagnoses: Postoperative infection, unspecified type, initial encounter         CONTINUE these medications which have NOT CHANGED    Details   cyclobenzaprine (FLEXERIL) 5 mg tablet Take 1 Tab by mouth three (3) times daily as needed for Muscle Spasm(s) for up to 15 doses. Qty: 15 Tab, Refills: 0      diclofenac (VOLTAREN) 1 % gel Apply 4 g to affected area four (4) times daily as needed for Pain. Qty: 100 g, Refills: 1      acetaminophen (Tylenol Extra Strength) 500 mg tablet Take 2 Tabs by mouth every six (6) hours as needed for Pain. Qty: 40 Tab, Refills: 0      Cane coco 1 Each by Does Not Apply route daily. Qty: 1 Device, Refills: 0      ondansetron (ZOFRAN ODT) 4 mg disintegrating tablet Take 1-2 tablets every 6-8 hours as needed for nausea and vomiting. Qty: 10 Tab, Refills: 0      ibuprofen (MOTRIN) 600 mg tablet Take 1 Tab by mouth every six (6) hours as needed for Pain. Qty: 20 Tab, Refills: 0      lidocaine (LIDOCAINE VISCOUS) 2 % solution Put 10 mL in mouth and swish and spit out QAC and at bedtime  Qty: 200 mL, Refills: 0      Blood-Glucose Meter (ONETOUCH ULTRA2) monitoring kit Use to obtain two times a day. Qty: 1 Kit, Refills: 0      lancets misc Use daily to monitor blood glucose  Qty: 200 Each, Refills: 0    Associated Diagnoses: Type 2 diabetes mellitus with complication, with long-term current use of insulin ([facility])      glucose blood VI test strips (ASCENSIA AUTODISC VI, ONE TOUCH ULTRA TEST VI) strip 50 Each by Does Not Apply route two (2) times daily as needed.   Qty: 200 Strip, Refills: 3    Associated Diagnoses: Type 2 diabetes mellitus with complication, with long-term current use of insulin ([facility])      EPINEPHrine (EPIPEN) 0.3 mg/0.3 mL injection 0.3 mg.      budesonide (RHINOCORT ALLERGY) 32 mcg/actuation nasal spray 2 Sprays by Both Nostrils route daily. Indications: Allergic Rhinitis  Qty: 1 Bottle, Refills: 3      insulin nph-regular human rec (HUMULIN 70-30) 100 unit/mL (70-30) inpn Give 40 units in the QAM and 45 units at bedtime  Qty: 5 Pen, Refills: 3    Associated Diagnoses: Type 2 diabetes mellitus with complication, with long-term current use of insulin ([facility])      polyethylene glycol (MIRALAX) 17 gram packet Take 1 Packet by mouth daily. Qty: 30 Each, Refills: 1    Associated Diagnoses: Therapeutic opioid induced constipation      Insulin Needles, Disposable, 31 gauge x 5/16\" ndle Use to administer insulin as directed  Qty: 1 Package, Refills: 11      atorvastatin (LIPITOR) 40 mg tablet Take 1 Tab by mouth daily. Qty: 30 Tab, Refills: 6      LORazepam (ATIVAN) 1 mg tablet 1 mg. Provider Notes (Medical Decision Making):   Postoperative breast infection that is draining with a drain in place, already on Keflex, will add on Bactrim for double coverage as well as short course of Percocet #12 for better pain control. Patient will call her surgeon tomorrow for continued care. Diagnosis     Clinical Impression:   1.  Postoperative infection, unspecified type, initial encounter

## 2020-07-16 NOTE — TELEPHONE ENCOUNTER
Patient wants to re-establish with Dr. Romero Dave. PT was discharged from [facility]. Pt scheduled to be seen on 7/22/2020 but stated that she cannot wait any longer due to having severe pain in her right breast. Pt just had a surgery on both breast. Pt would like to know if she could be seen sooner.

## 2020-07-17 ENCOUNTER — PATIENT OUTREACH (OUTPATIENT)
Dept: CASE MANAGEMENT | Age: 50
End: 2020-07-17

## 2020-07-17 NOTE — PROGRESS NOTES
Patient contacted regarding recent discharge and COVID-19 risk. Discussed COVID-19 related testing which was not done at this time. Test results were not done. Patient informed of results, if available? n/a    Care Transition Nurse/ Ambulatory Care Manager contacted the patient by telephone to perform post discharge assessment. Verified name and  with patient as identifiers. Patient has following risk factors of: diabetes. CTN/ACM reviewed discharge instructions, medical action plan and red flags related to discharge diagnosis. Reviewed and educated them on any new and changed medications related to discharge diagnosis. Advised obtaining a 90-day supply of all daily and as-needed medications. Education provided regarding infection prevention, and signs and symptoms of COVID-19 and when to seek medical attention with patient who verbalized understanding. Discussed exposure protocols and quarantine from [address] you at higher risk for severe illness  and given an opportunity for questions and concerns. The patient agrees to contact the COVID-19 hotline [phone] or PCP office for questions related to their healthcare. CTN/ACM provided contact information for future reference. From CDC: Are you at higher risk for severe illness?  Wash your hands often.  Avoid close contact (6 feet, which is about two arm lengths) with people who are sick.  Put distance between yourself and other people if COVID-19 is spreading in your community.  Clean and disinfect frequently touched surfaces.  Avoid all cruise travel and non-essential air travel.  Call your healthcare professional if you have concerns about COVID-19 and your underlying condition or if you are sick.     For more information on steps you can take to protect yourself, see CDC's How to Protect Yourself      Patient/family/caregiver given information for [facility] and agrees to enroll no       Plan for follow-up call in 7-14 days based on severity of symptoms and risk factors.

## 2020-07-25 PROBLEM — Z82.49 FAMILY HISTORY OF EARLY CAD: Status: ACTIVE | Noted: 2020-07-25

## 2020-07-25 PROBLEM — Z90.13 ACQUIRED ABSENCE OF BOTH BREASTS: Status: ACTIVE | Noted: 2018-06-07

## 2020-08-27 ENCOUNTER — HOSPITAL ENCOUNTER (EMERGENCY)
Age: 50
Discharge: HOME | End: 2020-08-27
Attending: EMERGENCY MEDICINE
Payer: MEDICAID

## 2020-08-27 VITALS
RESPIRATION RATE: 18 BRPM | HEIGHT: 62 IN | OXYGEN SATURATION: 98 % | HEART RATE: 82 BPM | BODY MASS INDEX: 26.68 KG/M2 | TEMPERATURE: 98.5 F | DIASTOLIC BLOOD PRESSURE: 100 MMHG | WEIGHT: 145 LBS | SYSTOLIC BLOOD PRESSURE: 160 MMHG

## 2020-08-27 DIAGNOSIS — T82.898A: Primary | ICD-10-CM

## 2020-08-27 PROCEDURE — 99282 EMERGENCY DEPT VISIT SF MDM: CPT

## 2020-08-27 NOTE — ED PROVIDER NOTES
EMERGENCY DEPARTMENT HISTORY AND PHYSICAL EXAM    7:19 AM      Date: 8/27/2020  Patient Name: Heidi Islas    History of Presenting Illness     Chief Complaint   Patient presents with    Arm Pain    Nausea         History Provided By: Patient      Additional History (Context): Heidi Islas is a 47 y.o. female who presents with right arm pain. Patient seen yesterday at [facility] given IV medications no convincing she has increased pain to her right forearm. Denies any cough denies any shortness of breath fever chills associated with this denies any other trauma. Denies any numbness tingling or weakness distal to the area. Is negative for alcohol use this morning. PCP: None      Current Outpatient Medications   Medication Sig Dispense Refill    cyclobenzaprine (FLEXERIL) 5 mg tablet Take 1 Tab by mouth three (3) times daily as needed for Muscle Spasm(s) for up to 15 doses. 15 Tab 0    diclofenac (VOLTAREN) 1 % gel Apply 4 g to affected area four (4) times daily as needed for Pain. 100 g 1    acetaminophen (Tylenol Extra Strength) 500 mg tablet Take 2 Tabs by mouth every six (6) hours as needed for Pain. 40 Tab 0    Cane coco 1 Each by Does Not Apply route daily. 1 Device 0    ondansetron (ZOFRAN ODT) 4 mg disintegrating tablet Take 1-2 tablets every 6-8 hours as needed for nausea and vomiting. 10 Tab 0    ibuprofen (MOTRIN) 600 mg tablet Take 1 Tab by mouth every six (6) hours as needed for Pain. 20 Tab 0    lidocaine (LIDOCAINE VISCOUS) 2 % solution Put 10 mL in mouth and swish and spit out QAC and at bedtime 200 mL 0    Blood-Glucose Meter (ONETOUCH ULTRA2) monitoring kit Use to obtain two times a day. 1 Kit 0    lancets misc Use daily to monitor blood glucose 200 Each 0    glucose blood VI test strips (ASCENSIA AUTODISC VI, ONE TOUCH ULTRA TEST VI) strip 50 Each by Does Not Apply route two (2) times daily as needed.  200 Strip 3    EPINEPHrine (EPIPEN) 0.3 mg/0.3 mL injection 0.3 mg.     [facility] budesonide (RHINOCORT ALLERGY) 32 mcg/actuation nasal spray 2 Sprays by Both Nostrils route daily. Indications: Allergic Rhinitis 1 Bottle 3    insulin nph-regular human rec (HUMULIN 70-30) 100 unit/mL (70-30) inpn Give 40 units in the QAM and 45 units at bedtime 5 Pen 3    polyethylene glycol (MIRALAX) 17 gram packet Take 1 Packet by mouth daily. 30 Each 1    Insulin Needles, Disposable, 31 gauge x 5/16\" ndle Use to administer insulin as directed 1 Package 11    atorvastatin (LIPITOR) 40 mg tablet Take 1 Tab by mouth daily. 30 Tab 6    LORazepam (ATIVAN) 1 mg tablet 1 mg. Past History     Past Medical History:  Past Medical History:   Diagnosis Date    Anxiety     Bipolar disorder ([address]     Breast cancer ([address]     breast cancer, right, S/P Mastectomy and chemo, radiation in 2013    Depression     Diabetes ([address]     Drug abuse ([address]     H/O cocaine use in past    Drug-seeking behavior     Family history of early CAD     Gastrointestinal disorder     cholitis    H/O ETOH abuse     Hyperlipidemia     Hypertension     Malignant neoplasm without specification of site ([address]     Methamphetamine abuse ([address]      self reported on 1/3/16    Spine injury     Vitamin D deficiency 5/1/2018       Past Surgical History:  Past Surgical History:   Procedure Laterality Date    COLONOSCOPY N/A 7/18/2016    COL[facility] Constantin Lima [facility] END[facility]COPY    HX BREAST LUMPECTOMY  06/2013    right    HX HYSTERECTOMY      HX MASTECTOMY      had expanders put in 6/2018    HX ORTHOPAEDIC      Back fusion surgery 4/18/14.      HX OTHER SURGICAL      mediport    HX TONSILLECTOMY      HX TUBAL LIGATION         Family History:  Family History   Problem Relation Age of Onset    Heart Disease Mother     Stroke Father     Heart Disease Father 48    Diabetes Other     Hypertension Other     Cancer Maternal Grandmother        Social History:  Social History     Tobacco Use    Smoking status: Never Smoker    Smokeless tobacco: Never Used   Substance Use Topics    Alcohol use: Not Currently     Comment: \"Occasionally\"    Drug use: Not Currently     Types: Methamphetamines, Cocaine       Allergies: Allergies   Allergen Reactions    Latex Hives and Itching    Other Food Rash     Almonds    Amoxicillin Swelling     Other reaction(s): mild rash/itching    Aspirin Not Reported This Time and Swelling     Mouth swells    Beeswax Anaphylaxis    Levaquin [Levofloxacin] Not Reported This Time and Swelling     Other reaction(s): mild rash/itching    Chlorhexidine Rash    [place] Rash and Itching    Codeine Other (comments)    Glycopyrrolate Swelling     Pt  States  faciAL  SWELLING   POST  ROBINUL  IV   Pt  States  faciAL  SWELLING   POST  ROBINUL  IV     Pcn [Penicillins] Swelling    Tape [Adhesive] Rash    Tramadol Swelling         Review of Systems       Review of Systems   Constitutional: Positive for activity change. Negative for chills, diaphoresis and fever. HENT: Negative for congestion. Eyes: Negative for visual disturbance. Respiratory: Negative for cough, chest tightness and shortness of breath. Cardiovascular: Negative for chest pain. Gastrointestinal: Negative for abdominal pain, diarrhea, nausea and vomiting. Endocrine: Negative. Genitourinary: Negative. Musculoskeletal: Positive for myalgias. Negative for back pain and joint swelling. Skin: Negative for rash. Neurological: Negative for dizziness, syncope and weakness. All other systems reviewed and are negative. Physical Exam     Visit Vitals  BP (!) 160/100 (BP 1 Location: Right arm, BP Patient Position: At rest)   Pulse 82   Temp 98.5 °F (36.9 °C)   Resp 18   Ht 5' 2\" (1.575 m)   Wt 65.8 kg (145 lb)   LMP 08/20/2013   SpO2 98%   BMI 26.52 kg/m²       Physical Exam  Vitals signs and nursing note reviewed. Constitutional:       General: She is not in acute distress.      Appearance: She is well-developed. HENT:      Head: Normocephalic and atraumatic. Eyes:      General: No scleral icterus. Conjunctiva/sclera: Conjunctivae normal.      Pupils: Pupils are equal, round, and reactive to light. Neck:      Musculoskeletal: Normal range of motion and neck supple. Cardiovascular:      Rate and Rhythm: Normal rate and regular rhythm. Heart sounds: Normal heart sounds. No murmur. Pulmonary:      Effort: Pulmonary effort is normal. No respiratory distress. Breath sounds: Normal breath sounds. Abdominal:      General: Bowel sounds are normal. There is no distension. Palpations: Abdomen is soft. Tenderness: There is no abdominal tenderness. Musculoskeletal:        Arms:       Comments: Increased swelling along with soft tissue of the triceps no evidence of any clots swelling redness drainage at the IV site itself this appears to be more due to extravasation from an IV evidence of necrosis neurovascular distally intact   Lymphadenopathy:      Cervical: No cervical adenopathy. Skin:     General: Skin is warm and dry. Findings: No rash. Neurological:      Mental Status: She is alert and oriented to person, place, and time. Coordination: Coordination normal.   Psychiatric:         Behavior: Behavior normal.           Diagnostic Study Results     Labs -  No results found for this or any previous visit (from the past 12 hour(s)). Radiologic Studies -   No orders to display         Medical Decision Making   I am the first provider for this patient. I reviewed the vital signs, available nursing notes, past medical history, past surgical history, family history and social history. Vital Signs-Reviewed the patient's vital signs.       EKG:    Records Reviewed: Nursing Notes and Old Medical Records (Time of Review: 7:19 AM)    ED Course: Progress Notes, Reevaluation, and Consults:      Provider Notes (Medical Decision Making):   MDM  Number of Diagnoses or Management Options  Hematoma of IV site, initial encounter [facility]):   Diagnosis management comments: With Forestine Hamming versus extravasation with no evidence of any tissue necrosis redness infection or clot at present patient was given warm compress will discharge home          Critical Care Time:       Diagnosis     Clinical Impression:   1. Hematoma of IV site, initial encounter [facility])        Disposition: home     Follow-up Information     Follow up With Specialties Details Why [facility] EMERGENCY DEPT Emergency Medicine  As needed, If symptoms worsen [address]  [phone]    Your Doctor for recheck this week                Patient's Medications   Start Taking    No medications on file   Continue Taking    ACETAMINOPHEN (TYLENOL EXTRA STRENGTH) 500 MG TABLET    Take 2 Tabs by mouth every six (6) hours as needed for Pain. ATORVASTATIN (LIPITOR) 40 MG TABLET    Take 1 Tab by mouth daily. BLOOD-GLUCOSE METER (ONETOUCH ULTRA2) MONITORING KIT    Use to obtain two times a day. BUDESONIDE (RHINOCORT ALLERGY) 32 MCG/ACTUATION NASAL SPRAY    2 Sprays by Both Nostrils route daily. Indications: Allergic Rhinitis    CANE JUAN R    1 Each by Does Not Apply route daily. CYCLOBENZAPRINE (FLEXERIL) 5 MG TABLET    Take 1 Tab by mouth three (3) times daily as needed for Muscle Spasm(s) for up to 15 doses. DICLOFENAC (VOLTAREN) 1 % GEL    Apply 4 g to affected area four (4) times daily as needed for Pain. EPINEPHRINE (EPIPEN) 0.3 MG/0.3 ML INJECTION    0.3 mg.    GLUCOSE BLOOD VI TEST STRIPS (ASCENSIA AUTODISC VI, ONE TOUCH ULTRA TEST VI) STRIP    50 Each by Does Not Apply route two (2) times daily as needed. IBUPROFEN (MOTRIN) 600 MG TABLET    Take 1 Tab by mouth every six (6) hours as needed for Pain.     INSULIN NEEDLES, DISPOSABLE, 31 GAUGE X 5/16\" NDLE    Use to administer insulin as directed    INSULIN NPH-REGULAR HUMAN REC (HUMULIN 70-30) 100 UNIT/ML (70-30) INPN    Give 40 units in the QAM and 45 units at bedtime    LANCETS MISC    Use daily to monitor blood glucose    LIDOCAINE (LIDOCAINE VISCOUS) 2 % SOLUTION    Put 10 mL in mouth and swish and spit out QAC and at bedtime    LORAZEPAM (ATIVAN) 1 MG TABLET    1 mg. ONDANSETRON (ZOFRAN ODT) 4 MG DISINTEGRATING TABLET    Take 1-2 tablets every 6-8 hours as needed for nausea and vomiting. POLYETHYLENE GLYCOL (MIRALAX) 17 GRAM PACKET    Take 1 Packet by mouth daily. These Medications have changed    No medications on file   Stop Taking    No medications on file     _______________________________    Please note that this dictation was completed with [employer], the computer voice recognition software. Quite often unanticipated grammatical, syntax, homophones, and other interpretive errors are inadvertently transcribed by the computer software. Please disregard these errors. Please excuse any errors that have escaped final proofreading.

## 2020-08-27 NOTE — ED TRIAGE NOTES
Patient given IV fluids and Magnesium Sulfate at [facility]. Reports pain and swelling in right upper arm and around IV site. Pain causing nausea.

## 2021-01-18 ENCOUNTER — APPOINTMENT (OUTPATIENT)
Dept: GENERAL RADIOLOGY | Age: 51
End: 2021-01-18
Attending: PHYSICIAN ASSISTANT
Payer: MEDICAID

## 2021-01-18 ENCOUNTER — HOSPITAL ENCOUNTER (EMERGENCY)
Age: 51
Discharge: HOME | End: 2021-01-18
Attending: EMERGENCY MEDICINE
Payer: MEDICAID

## 2021-01-18 VITALS
BODY MASS INDEX: 27.6 KG/M2 | SYSTOLIC BLOOD PRESSURE: 133 MMHG | OXYGEN SATURATION: 100 % | DIASTOLIC BLOOD PRESSURE: 89 MMHG | RESPIRATION RATE: 16 BRPM | TEMPERATURE: 98.9 F | HEIGHT: 62 IN | WEIGHT: 150 LBS | HEART RATE: 84 BPM

## 2021-01-18 DIAGNOSIS — M25.512 LEFT SHOULDER PAIN, UNSPECIFIED CHRONICITY: Primary | ICD-10-CM

## 2021-01-18 PROCEDURE — 99282 EMERGENCY DEPT VISIT SF MDM: CPT

## 2021-01-18 PROCEDURE — 71045 X-RAY EXAM CHEST 1 VIEW: CPT

## 2021-01-18 PROCEDURE — 73030 X-RAY EXAM OF SHOULDER: CPT

## 2021-01-18 RX ORDER — HYDROCODONE BITARTRATE AND ACETAMINOPHEN 5; 325 MG/1; MG/1
1 TABLET ORAL
Qty: 6 TAB | Refills: 0 | Status: SHIPPED | OUTPATIENT
Start: 2021-01-18 | End: 2021-01-23

## 2021-01-19 NOTE — ED TRIAGE NOTES
Pt says she has been having left arm pain that radiates from her shoulder. Pt says she has not been able to eat, drink, or sleep since the pain began about a month ago. Pt says she has been seen and given robaxin but has had no relief.

## 2021-01-22 NOTE — ED PROVIDER NOTES
EMERGENCY DEPARTMENT HISTORY AND PHYSICAL EXAM      Date: 1/18/2021  Patient Name: Jose Andres    History of Presenting Illness     Chief Complaint   Patient presents with    Arm Pain     Left arm       History Provided By: Patient    HPI: Jose Andres, 45 y.o. female PMHx significant for hypertension, DM, hyperlipidemia, vitamin D deficiency, previous history of breast cancer, EtOH abuse, bipolar disorder, anxiety presents ambulatory to the ED with cc of left shoulder pain. Patient reports chronic history of left shoulder pain and arm pain that has been worsening over the past month. Denies chest pain. Denies numbness/tingling, radiating pain. Denies recent trauma or injury. Patient has been taking Robaxin without relief of symptoms. Patient has not followed up with Ortho. There are no other complaints, changes, or physical findings at this time. PCP: None    No current facility-administered medications on file prior to encounter. Current Outpatient Medications on File Prior to Encounter   Medication Sig Dispense Refill    cyclobenzaprine (FLEXERIL) 5 mg tablet Take 1 Tab by mouth three (3) times daily as needed for Muscle Spasm(s) for up to 15 doses. 15 Tab 0    diclofenac (VOLTAREN) 1 % gel Apply 4 g to affected area four (4) times daily as needed for Pain. 100 g 1    acetaminophen (Tylenol Extra Strength) 500 mg tablet Take 2 Tabs by mouth every six (6) hours as needed for Pain. 40 Tab 0    Cane coco 1 Each by Does Not Apply route daily. 1 Device 0    ondansetron (ZOFRAN ODT) 4 mg disintegrating tablet Take 1-2 tablets every 6-8 hours as needed for nausea and vomiting. 10 Tab 0    ibuprofen (MOTRIN) 600 mg tablet Take 1 Tab by mouth every six (6) hours as needed for Pain. 20 Tab 0    lidocaine (LIDOCAINE VISCOUS) 2 % solution Put 10 mL in mouth and swish and spit out QAC and at bedtime 200 mL 0    Blood-Glucose Meter (ONETOUCH ULTRA2) monitoring kit Use to obtain two times a day.  1 Kit 0    lancets misc Use daily to monitor blood glucose 200 Each 0    glucose blood VI test strips (ASCENSIA AUTODISC VI, ONE TOUCH ULTRA TEST VI) strip 50 Each by Does Not Apply route two (2) times daily as needed. 200 Strip 3    EPINEPHrine (EPIPEN) 0.3 mg/0.3 mL injection 0.3 mg.      budesonide (RHINOCORT ALLERGY) 32 mcg/actuation nasal spray 2 Sprays by Both Nostrils route daily. Indications: Allergic Rhinitis 1 Bottle 3    insulin nph-regular human rec (HUMULIN 70-30) 100 unit/mL (70-30) inpn Give 40 units in the QAM and 45 units at bedtime 5 Pen 3    polyethylene glycol (MIRALAX) 17 gram packet Take 1 Packet by mouth daily. 30 Each 1    Insulin Needles, Disposable, 31 gauge x 5/16\" ndle Use to administer insulin as directed 1 Package 11    atorvastatin (LIPITOR) 40 mg tablet Take 1 Tab by mouth daily. 30 Tab 6    LORazepam (ATIVAN) 1 mg tablet 1 mg. Past History     Past Medical History:  Past Medical History:   Diagnosis Date    Anxiety     Bipolar disorder ([address]     Breast cancer ([address]     breast cancer, right, S/P Mastectomy and chemo, radiation in 2013    Depression     Diabetes ([address]     Drug abuse ([address]     H/O cocaine use in past    Drug-seeking behavior     Family history of early CAD     Gastrointestinal disorder     cholitis    H/O ETOH abuse     Hyperlipidemia     Hypertension     Malignant neoplasm without specification of site ([address]     Methamphetamine abuse ([address]      self reported on 1/3/16    Spine injury     Vitamin D deficiency 5/1/2018       Past Surgical History:  Past Surgical History:   Procedure Laterality Date    COLONOSCOPY N/A 7/18/2016    [facility] ENDOSCOPY    HX BREAST LUMPECTOMY  06/2013    right    HX HYSTERECTOMY      HX MASTECTOMY      had expanders put in 6/2018    HX ORTHOPAEDIC      Back fusion surgery 4/18/14.      HX OTHER SURGICAL      mediport    HX TONSILLECTOMY      HX TUBAL LIGATION Family History:  Family History   Problem Relation Age of Onset    Heart Disease Mother     Stroke Father     Heart Disease Father 48    Diabetes Other     Hypertension Other     Cancer Maternal Grandmother        Social History:  Social History     Tobacco Use    Smoking status: Never Smoker    Smokeless tobacco: Never Used   Substance Use Topics    Alcohol use: Not Currently     Comment: \"Occasionally\"    Drug use: Not Currently     Types: Methamphetamines, Cocaine       Allergies: Allergies   Allergen Reactions    Latex Hives and Itching    Other Food Rash     Almonds    Amoxicillin Swelling     Other reaction(s): mild rash/itching    Aspirin Not Reported This Time and Swelling     Mouth swells    Beeswax Anaphylaxis    Levaquin [Levofloxacin] Not Reported This Time and Swelling     Other reaction(s): mild rash/itching    Chlorhexidine Rash    [place] Rash and Itching    Codeine Other (comments)    Glycopyrrolate Swelling     Pt  States  faciAL  SWELLING   POST  ROBINUL  IV   Pt  States  faciAL  SWELLING   POST  ROBINUL  IV     Pcn [Penicillins] Swelling    Tape [Adhesive] Rash    Tramadol Swelling         Review of Systems   Review of Systems   Constitutional: Negative for chills and fever. Respiratory: Negative for shortness of breath. Cardiovascular: Negative for chest pain. Gastrointestinal: Negative for abdominal pain, nausea and vomiting. Genitourinary: Negative for flank pain. Musculoskeletal: Positive for arthralgias (left shoulder pain). Negative for back pain and myalgias. Skin: Negative for color change, pallor, rash and wound. Neurological: Negative for dizziness, weakness and light-headedness. All other systems reviewed and are negative. Physical Exam   Physical Exam  Vitals signs and nursing note reviewed. Constitutional:       General: She is not in acute distress. Appearance: She is well-developed.       Comments: Patient well-appearing in NAD   HENT:      Head: Normocephalic and atraumatic. Eyes:      Conjunctiva/sclera: Conjunctivae normal.   Cardiovascular:      Rate and Rhythm: Normal rate and regular rhythm. Heart sounds: Normal heart sounds. Pulmonary:      Effort: Pulmonary effort is normal. No respiratory distress. Breath sounds: Normal breath sounds. Abdominal:      General: Bowel sounds are normal. There is no distension. Palpations: Abdomen is soft. Musculoskeletal: Normal range of motion. Left shoulder: She exhibits tenderness and bony tenderness. She exhibits normal range of motion (full AROM intact) and no swelling (no overlying erythema, warmth or skin changes). Comments: DP pulses strong and equal bilaterally  Sensation equal and intact upper extremities bilaterally  Pain reproducible on exam  Pain increased with movement   Skin:     General: Skin is warm. Findings: No rash. Neurological:      Mental Status: She is alert and oriented to person, place, and time. Psychiatric:         Behavior: Behavior normal.         Diagnostic Study Results     Labs -   No results found for this or any previous visit (from the past 12 hour(s)). Radiologic Studies -   XR SHOULDER LT AP/LAT MIN 2 V   Final Result   IMPRESSION:   1. No evidence of acute fracture or dislocation. 2.  Mild glenohumeral degenerative changes. XR CHEST PORT   Final Result   Impression:   1. No acute infiltrate or effusion. CT Results  (Last 48 hours)    None        CXR Results  (Last 48 hours)    None          Medical Decision Making   I am the first provider for this patient. I reviewed the vital signs, available nursing notes, past medical history, past surgical history, family history and social history. Vital Signs-Reviewed the patient's vital signs. No data found.     Records Reviewed: Nursing Notes and Old Medical Records    Provider Notes (Medical Decision Making):   DDx: Shoulder sprain vs strain vs fracture    49 yo F who presents with chronic left shoulder pain with acute worsening. On exam pain with palpation and full active range of motion intact. No overlying skin changes or swelling with full AROM intact. Hx chronic shoulder pain. Low level of concern for infection. Very low level of concern for ACS since pain is reproducible on exam. X-ray shows glenohumeral degenerative changes. Will treat patient symptomatically. Stressed importance of prompt Ortho follow-up. Patient nontoxic-appearing in NAD. P stable prompt outpatient follow-up with PCP in 1 to 2 days.    ED Course:   Initial assessment performed. The patients presenting problems have been discussed, and they are in agreement with the care plan formulated and outlined with them.  I have encouraged them to ask questions as they arise throughout their visit.         Disposition:  Discussed imaging results with pt along with dx and treatment plan. Discussed importance of PCP follow up. All questions answered. Pt voiced they understood. Return if sx worsen.       PLAN:  1.   Discharge Medication List as of 1/18/2021  8:57 PM      CONTINUE these medications which have NOT CHANGED    Details   cyclobenzaprine (FLEXERIL) 5 mg tablet Take 1 Tab by mouth three (3) times daily as needed for Muscle Spasm(s) for up to 15 doses., Print, Disp-15 Tab, R-0      diclofenac (VOLTAREN) 1 % gel Apply 4 g to affected area four (4) times daily as needed for Pain., Print, Disp-100 g, R-1      acetaminophen (Tylenol Extra Strength) 500 mg tablet Take 2 Tabs by mouth every six (6) hours as needed for Pain., Print, Disp-40 Tab, R-0      Cane coco 1 Each by Does Not Apply route daily., Print, Disp-1 Device, R-0      ondansetron (ZOFRAN ODT) 4 mg disintegrating tablet Take 1-2 tablets every 6-8 hours as needed for nausea and vomiting., Print, Disp-10 Tab, R-0      ibuprofen (MOTRIN) 600 mg tablet Take 1 Tab by mouth every six (6) hours as needed for Pain., Print, Disp-20 Tab,  R-0      lidocaine (LIDOCAINE VISCOUS) 2 % solution Put 10 mL in mouth and swish and spit out QAC and at bedtime, Print, Disp-200 mL, R-0      Blood-Glucose Meter (ONETOUCH ULTRA2) monitoring kit Use to obtain two times a day., Normal, Disp-1 Kit, R-0      lancets misc Use daily to monitor blood glucose, Normal, Disp-200 Each, R-0      glucose blood VI test strips (ASCENSIA AUTODISC VI, ONE TOUCH ULTRA TEST VI) strip 50 Each by Does Not Apply route two (2) times daily as needed., Normal, Disp-200 Strip, R-3      EPINEPHrine (EPIPEN) 0.3 mg/0.3 mL injection 0.3 mg., Historical Med      budesonide (RHINOCORT ALLERGY) 32 mcg/actuation nasal spray 2 Sprays by Both Nostrils route daily. Indications: Allergic Rhinitis, Normal, Disp-1 Bottle, R-3      insulin nph-regular human rec (HUMULIN 70-30) 100 unit/mL (70-30) inpn Give 40 units in the QAM and 45 units at bedtime, Normal, Disp-5 Pen, R-3      polyethylene glycol (MIRALAX) 17 gram packet Take 1 Packet by mouth daily. , Normal, Disp-30 Each, R-1      Insulin Needles, Disposable, 31 gauge x 5/16\" ndle Use to administer insulin as directed, Normal, Disp-1 Package, R-11      atorvastatin (LIPITOR) 40 mg tablet Take 1 Tab by mouth daily. , Normal, Disp-30 Tab, R-6      LORazepam (ATIVAN) 1 mg tablet 1 mg., Historical Med           2. Follow-up Information     Follow up With Specialties Details Why [address] [place]  Schedule an appointment as soon as possible for a visit today  [address]  [address]  Schedule an appointment as soon as possible for a visit today  [address] [facility] EMERGENCY DEPT Emergency Medicine  As needed, If symptoms worsen [address] [address]  [phone]        Return to ED if worse     Diagnosis     Clinical Impression:   1.  Left shoulder pain, unspecified chronicity        Attestations:    Nubia Duty Nader Ortiz    Please note that this dictation was completed with [employer], the computer voice recognition software. Quite often unanticipated grammatical, syntax, homophones, and other interpretive errors are inadvertently transcribed by the computer software. Please disregard these errors. Please excuse any errors that have escaped final proofreading. Thank you.

## 2021-03-23 ENCOUNTER — APPOINTMENT (OUTPATIENT)
Dept: GENERAL RADIOLOGY | Age: 51
End: 2021-03-23
Attending: PHYSICIAN ASSISTANT
Payer: MEDICAID

## 2021-03-23 ENCOUNTER — HOSPITAL ENCOUNTER (EMERGENCY)
Age: 51
Discharge: HOME | End: 2021-03-23
Attending: EMERGENCY MEDICINE | Admitting: EMERGENCY MEDICINE
Payer: MEDICAID

## 2021-03-23 VITALS
RESPIRATION RATE: 16 BRPM | TEMPERATURE: 97.8 F | HEART RATE: 99 BPM | OXYGEN SATURATION: 97 % | DIASTOLIC BLOOD PRESSURE: 88 MMHG | BODY MASS INDEX: 25.76 KG/M2 | WEIGHT: 140 LBS | SYSTOLIC BLOOD PRESSURE: 119 MMHG | HEIGHT: 62 IN

## 2021-03-23 DIAGNOSIS — M79.641 RIGHT HAND PAIN: Primary | ICD-10-CM

## 2021-03-23 PROCEDURE — 73130 X-RAY EXAM OF HAND: CPT

## 2021-03-23 PROCEDURE — 74011250637 HC RX REV CODE- 250/637: Performed by: PHYSICIAN ASSISTANT

## 2021-03-23 PROCEDURE — 99283 EMERGENCY DEPT VISIT LOW MDM: CPT

## 2021-03-23 PROCEDURE — 73110 X-RAY EXAM OF WRIST: CPT

## 2021-03-23 RX ORDER — HYDROCODONE BITARTRATE AND ACETAMINOPHEN 5; 325 MG/1; MG/1
1 TABLET ORAL
Status: COMPLETED | OUTPATIENT
Start: 2021-03-23 | End: 2021-03-23

## 2021-03-23 RX ORDER — HYDROCODONE BITARTRATE AND ACETAMINOPHEN 5; 325 MG/1; MG/1
1 TABLET ORAL
Qty: 10 TAB | Refills: 0 | Status: SHIPPED | OUTPATIENT
Start: 2021-03-23 | End: 2021-03-28

## 2021-03-23 RX ORDER — NALOXONE HYDROCHLORIDE 4 MG/.1ML
SPRAY NASAL
Qty: 1 EACH | Refills: 0 | Status: SHIPPED | OUTPATIENT
Start: 2021-03-23 | End: 2021-07-02

## 2021-03-23 RX ADMIN — HYDROCODONE BITARTRATE AND ACETAMINOPHEN 1 TABLET: 5; 325 TABLET ORAL at 19:48
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
## 2021-05-18 ENCOUNTER — APPOINTMENT (OUTPATIENT)
Dept: GENERAL RADIOLOGY | Age: 51
End: 2021-05-18
Attending: PHYSICIAN ASSISTANT
Payer: MEDICAID

## 2021-05-18 ENCOUNTER — HOSPITAL ENCOUNTER (EMERGENCY)
Age: 51
Discharge: HOME | End: 2021-05-18
Attending: EMERGENCY MEDICINE
Payer: MEDICAID

## 2021-05-18 VITALS
SYSTOLIC BLOOD PRESSURE: 115 MMHG | WEIGHT: 140 LBS | DIASTOLIC BLOOD PRESSURE: 81 MMHG | HEIGHT: 62 IN | HEART RATE: 103 BPM | BODY MASS INDEX: 25.76 KG/M2 | OXYGEN SATURATION: 100 % | TEMPERATURE: 98.9 F | RESPIRATION RATE: 16 BRPM

## 2021-05-18 DIAGNOSIS — J06.9 UPPER RESPIRATORY TRACT INFECTION, UNSPECIFIED TYPE: ICD-10-CM

## 2021-05-18 DIAGNOSIS — R10.9 CHRONIC ABDOMINAL PAIN: Primary | ICD-10-CM

## 2021-05-18 DIAGNOSIS — G89.29 CHRONIC ABDOMINAL PAIN: Primary | ICD-10-CM

## 2021-05-18 LAB
ALBUMIN SERPL-MCNC: 3.5 G/DL (ref 3.4–5)
ALBUMIN/GLOB SERPL: 0.9 {RATIO} (ref 0.8–1.7)
ALP SERPL-CCNC: 152 U/L (ref 45–117)
ALT SERPL-CCNC: 26 U/L (ref 13–56)
ANION GAP SERPL CALC-SCNC: 5 MMOL/L (ref 3–18)
APPEARANCE UR: CLEAR
AST SERPL-CCNC: 17 U/L (ref 10–38)
BASOPHILS # BLD: 0 K/UL (ref 0–0.1)
BASOPHILS NFR BLD: 0 % (ref 0–2)
BILIRUB SERPL-MCNC: 0.2 MG/DL (ref 0.2–1)
BILIRUB UR QL: NEGATIVE
BUN SERPL-MCNC: 19 MG/DL (ref 7–18)
BUN/CREAT SERPL: 17 (ref 12–20)
CALCIUM SERPL-MCNC: 9.3 MG/DL (ref 8.5–10.1)
CHLORIDE SERPL-SCNC: 103 MMOL/L (ref 100–111)
CK MB CFR SERPL CALC: ABNORMAL % (ref 0–4)
CK MB SERPL-MCNC: <1 NG/ML (ref 5–25)
CK SERPL-CCNC: 25 U/L (ref 26–192)
CO2 SERPL-SCNC: 30 MMOL/L (ref 21–32)
COLOR UR: YELLOW
COVID-19 RAPID TEST, COVR: NOT DETECTED
CREAT SERPL-MCNC: 1.1 MG/DL (ref 0.6–1.3)
DIFFERENTIAL METHOD BLD: ABNORMAL
EOSINOPHIL # BLD: 0.2 K/UL (ref 0–0.4)
EOSINOPHIL NFR BLD: 2 % (ref 0–5)
ERYTHROCYTE [DISTWIDTH] IN BLOOD BY AUTOMATED COUNT: 14.1 % (ref 11.6–14.5)
GLOBULIN SER CALC-MCNC: 3.8 G/DL (ref 2–4)
GLUCOSE SERPL-MCNC: 162 MG/DL (ref 74–99)
GLUCOSE UR STRIP.AUTO-MCNC: 100 MG/DL
HCT VFR BLD AUTO: 37.8 % (ref 35–45)
HGB BLD-MCNC: 11.8 G/DL (ref 12–16)
HGB UR QL STRIP: NEGATIVE
KETONES UR QL STRIP.AUTO: NEGATIVE MG/DL
LEUKOCYTE ESTERASE UR QL STRIP.AUTO: NEGATIVE
LIPASE SERPL-CCNC: 188 U/L (ref 73–393)
LYMPHOCYTES # BLD: 2.4 K/UL (ref 0.9–3.6)
LYMPHOCYTES NFR BLD: 24 % (ref 21–52)
MCH RBC QN AUTO: 27.8 PG (ref 24–34)
MCHC RBC AUTO-ENTMCNC: 31.2 G/DL (ref 31–37)
MCV RBC AUTO: 88.9 FL (ref 74–97)
MONOCYTES # BLD: 0.4 K/UL (ref 0.05–1.2)
MONOCYTES NFR BLD: 4 % (ref 3–10)
NEUTS SEG # BLD: 6.9 K/UL (ref 1.8–8)
NEUTS SEG NFR BLD: 69 % (ref 40–73)
NITRITE UR QL STRIP.AUTO: NEGATIVE
PH UR STRIP: 5.5 [PH] (ref 5–8)
PLATELET # BLD AUTO: 328 K/UL (ref 135–420)
PMV BLD AUTO: 10.8 FL (ref 9.2–11.8)
POTASSIUM SERPL-SCNC: 4.2 MMOL/L (ref 3.5–5.5)
PROT SERPL-MCNC: 7.3 G/DL (ref 6.4–8.2)
PROT UR STRIP-MCNC: NEGATIVE MG/DL
RBC # BLD AUTO: 4.25 M/UL (ref 4.2–5.3)
S PYO AG THROAT QL: NEGATIVE
SODIUM SERPL-SCNC: 138 MMOL/L (ref 136–145)
SOURCE, COVRS: NORMAL
SP GR UR REFRACTOMETRY: 1.01 (ref 1–1.03)
TROPONIN I SERPL-MCNC: <0.02 NG/ML (ref 0–0.04)
UROBILINOGEN UR QL STRIP.AUTO: 0.2 EU/DL (ref 0.2–1)
WBC # BLD AUTO: 10 K/UL (ref 4.6–13.2)

## 2021-05-18 PROCEDURE — 87635 SARS-COV-2 COVID-19 AMP PRB: CPT

## 2021-05-18 PROCEDURE — 93005 ELECTROCARDIOGRAM TRACING: CPT

## 2021-05-18 PROCEDURE — 99284 EMERGENCY DEPT VISIT MOD MDM: CPT

## 2021-05-18 PROCEDURE — 87880 STREP A ASSAY W/OPTIC: CPT

## 2021-05-18 PROCEDURE — 87070 CULTURE OTHR SPECIMN AEROBIC: CPT

## 2021-05-18 PROCEDURE — 71045 X-RAY EXAM CHEST 1 VIEW: CPT

## 2021-05-18 PROCEDURE — 85025 COMPLETE CBC W/AUTO DIFF WBC: CPT

## 2021-05-18 PROCEDURE — 83690 ASSAY OF LIPASE: CPT

## 2021-05-18 PROCEDURE — 80053 COMPREHEN METABOLIC PANEL: CPT

## 2021-05-18 PROCEDURE — 81003 URINALYSIS AUTO W/O SCOPE: CPT

## 2021-05-18 PROCEDURE — 82553 CREATINE MB FRACTION: CPT

## 2021-05-18 RX ORDER — DICYCLOMINE HYDROCHLORIDE 10 MG/1
10 CAPSULE ORAL 3 TIMES DAILY
Qty: 15 CAP | Refills: 0 | Status: SHIPPED | OUTPATIENT
Start: 2021-05-18 | End: 2021-07-02

## 2021-05-18 RX ORDER — ACETAMINOPHEN 500 MG
1000 TABLET ORAL
Status: DISCONTINUED | OUTPATIENT
Start: 2021-05-18 | End: 2021-05-18 | Stop reason: HOSPADM

## 2021-05-18 NOTE — ED PROVIDER NOTES
EMERGENCY DEPARTMENT HISTORY AND PHYSICAL EXAM    Date: 5/18/2021  Patient Name: Ashley Gracia    History of Presenting Illness     Chief Complaint   Patient presents with    Abdominal Pain    Vomiting         History Provided By: Patient    Chief Complaint: cough, uri sxs      Additional History (Context):   3:59 PM  Ashley Gracia is a 45 y.o. female with PMHX tension, diabetes, drug-seeking behavior, drug abuse, bipolar disorder history of breast cancer presents to the emergency department C/O cough with some green sputum production, runny nose sore throat body aches congestion for the past week she denies any vomiting or nausea. Also complaining of generalized abdominal pain. No constipation or diarrhea. States it has been a little bit more difficult to urinate. She is also complaining of some chest pain that feels like a pressure that is been going on for the past week as well. Denies any exacerbating or mitigating factors. Patient states she was seen a week ago and was tested for Covid and it was negative. PCP: Sarita VELEZ, NP    Current Outpatient Medications   Medication Sig Dispense Refill    dicyclomine (BENTYL) 10 mg capsule Take 1 Cap by mouth three (3) times daily. 15 Cap 0    naloxone (NARCAN) 4 mg/actuation nasal spray Use 1 spray intranasally, then discard. Repeat with new spray every 2 min as needed for opioid overdose symptoms, alternating nostrils. 1 Each 0    cyclobenzaprine (FLEXERIL) 5 mg tablet Take 1 Tab by mouth three (3) times daily as needed for Muscle Spasm(s) for up to 15 doses. 15 Tab 0    diclofenac (VOLTAREN) 1 % gel Apply 4 g to affected area four (4) times daily as needed for Pain. 100 g 1    acetaminophen (Tylenol Extra Strength) 500 mg tablet Take 2 Tabs by mouth every six (6) hours as needed for Pain. 40 Tab 0    Cane coco 1 Each by Does Not Apply route daily.  1 Device 0    ondansetron (ZOFRAN ODT) 4 mg disintegrating tablet Take 1-2 tablets every 6-8 hours as needed for nausea and vomiting. 10 Tab 0    ibuprofen (MOTRIN) 600 mg tablet Take 1 Tab by mouth every six (6) hours as needed for Pain. 20 Tab 0    lidocaine (LIDOCAINE VISCOUS) 2 % solution Put 10 mL in mouth and swish and spit out QAC and at bedtime 200 mL 0    Blood-Glucose Meter (ONETOUCH ULTRA2) monitoring kit Use to obtain two times a day. 1 Kit 0    lancets misc Use daily to monitor blood glucose 200 Each 0    glucose blood VI test strips (ASCENSIA AUTODISC VI, ONE TOUCH ULTRA TEST VI) strip 50 Each by Does Not Apply route two (2) times daily as needed. 200 Strip 3    EPINEPHrine (EPIPEN) 0.3 mg/0.3 mL injection 0.3 mg.      budesonide (RHINOCORT ALLERGY) 32 mcg/actuation nasal spray 2 Sprays by Both Nostrils route daily. Indications: Allergic Rhinitis 1 Bottle 3    insulin nph-regular human rec (HUMULIN 70-30) 100 unit/mL (70-30) inpn Give 40 units in the QAM and 45 units at bedtime 5 Pen 3    polyethylene glycol (MIRALAX) 17 gram packet Take 1 Packet by mouth daily. 30 Each 1    Insulin Needles, Disposable, 31 gauge x 5/16\" ndle Use to administer insulin as directed 1 Package 11    atorvastatin (LIPITOR) 40 mg tablet Take 1 Tab by mouth daily. 30 Tab 6    LORazepam (ATIVAN) 1 mg tablet 1 mg.          Past History     Past Medical History:  Past Medical History:   Diagnosis Date    Anxiety     Bipolar disorder ([address]     Breast cancer ([address]     breast cancer, right, S/P Mastectomy and chemo, radiation in 2013    Depression     Diabetes ([address]     Drug abuse ([address]     H/O cocaine use in past    Drug-seeking behavior     Family history of early CAD     Gastrointestinal disorder     cholitis    H/O ETOH abuse     Hyperlipidemia     Hypertension     Malignant neoplasm without specification of site ([address]     Methamphetamine abuse ([address]      self reported on 1/3/16    Spine injury     Vitamin D deficiency 5/1/2018       Past Surgical History:  Past Surgical History:   Procedure Laterality Date    COLONOSCOPY N/A 7/18/2016    COL[facility] performed [facility] ENDOSCOPY    HX BREAST LUMPECTOMY  06/2013    right    HX HYSTERECTOMY      HX MASTECTOMY      had expanders put in 6/2018    HX ORTHOPAEDIC      Back fusion surgery 4/18/14.  HX OTHER SURGICAL      mediport    HX TONSILLECTOMY      HX TUBAL LIGATION         Family History:  Family History   Problem Relation Age of Onset    Heart Disease Mother     Stroke Father     Heart Disease Father 48    Diabetes Other     Hypertension Other     Cancer Maternal Grandmother        Social History:  Social History     Tobacco Use    Smoking status: Never Smoker    Smokeless tobacco: Never Used   Substance Use Topics    Alcohol use: Not Currently     Comment: \"Occasionally\"    Drug use: Not Currently     Types: Methamphetamines, Cocaine       Allergies: Allergies   Allergen Reactions    Latex Hives and Itching    Other Food Rash     Almonds    Amoxicillin Swelling     Other reaction(s): mild rash/itching    Aspirin Not Reported This Time and Swelling     Mouth swells    Beeswax Anaphylaxis    Levaquin [Levofloxacin] Not Reported This Time and Swelling     Other reaction(s): mild rash/itching    Chlorhexidine Rash    Gilbert Rash and Itching    Codeine Other (comments)    Glycopyrrolate Swelling     Pt  States  faciAL  SWELLING   POST  ROBINUL  IV   Pt  States  faciAL  SWELLING   POST  ROBINUL  IV     Pcn [Penicillins] Swelling    Tape [Adhesive] Rash    Toradol [Ketorolac] Hives    Tramadol Swelling       Review of Systems   Review of Systems   Constitutional: Positive for chills. Negative for appetite change and fever. HENT: Positive for congestion, rhinorrhea, sinus pain and sore throat. Respiratory: Positive for cough. Negative for shortness of breath. Cardiovascular: Positive for chest pain. Negative for palpitations and leg swelling. Gastrointestinal: Positive for abdominal pain. Negative for blood in stool, diarrhea, nausea and vomiting. Genitourinary: Positive for decreased urine volume and difficulty urinating. Negative for dysuria, enuresis, flank pain, frequency and pelvic pain. Musculoskeletal: Positive for myalgias. Neurological: Negative for dizziness, weakness, numbness and headaches. All other systems reviewed and are negative. Physical Exam     Vitals:    05/18/21 1544   BP: 115/81   Pulse: (!) 103   Resp: 16   Temp: 98.9 °F (37.2 °C)   SpO2: 100%   Weight: 63.5 kg (140 lb)   Height: 5' 2\" (1.575 m)     Physical Exam  Vitals signs and nursing note reviewed. Constitutional:       General: She is not in acute distress. Appearance: She is well-developed. Comments: Patient is lying on stretcher nontoxic no acute distress, able to speak in full sentences   HENT:      Head: Normocephalic and atraumatic. Right Ear: Tympanic membrane, ear canal and external ear normal. Tympanic membrane is not perforated, erythematous, retracted or bulging. Left Ear: Tympanic membrane, ear canal and external ear normal. Tympanic membrane is not perforated, erythematous, retracted or bulging. Nose: Nose normal. No mucosal edema or rhinorrhea. Right Sinus: No maxillary sinus tenderness or frontal sinus tenderness. Left Sinus: No maxillary sinus tenderness or frontal sinus tenderness. Mouth/Throat:      Mouth: Mucous membranes are not dry. Pharynx: Uvula midline. No oropharyngeal exudate, posterior oropharyngeal erythema or uvula swelling. Tonsils: No tonsillar abscesses. Eyes:      Extraocular Movements: Extraocular movements intact. Pupils: Pupils are equal, round, and reactive to light. Neck:      Musculoskeletal: Normal range of motion and neck supple. Cardiovascular:      Rate and Rhythm: Normal rate and regular rhythm. Heart sounds: Normal heart sounds. No murmur.    Pulmonary:      Effort: Pulmonary effort is normal. No respiratory distress. Breath sounds: Normal breath sounds. No wheezing or rales. Abdominal:      General: Bowel sounds are normal.      Palpations: Abdomen is soft. Tenderness: There is generalized abdominal tenderness. Lymphadenopathy:      Cervical: No cervical adenopathy. Skin:     General: Skin is warm and dry. Findings: No rash. Comments: She has scattered lesions to the scalp and face that appear excoriated but no surrounding cellulitis   Neurological:      Mental Status: She is alert and oriented to person, place, and time. Psychiatric:         Judgment: Judgment normal.         Diagnostic Study Results     Labs:     Recent Results (from the past 12 hour(s))   CBC WITH AUTOMATED DIFF    Collection Time: 05/18/21  4:20 PM   Result Value Ref Range    WBC 10.0 4.6 - 13.2 K/uL    RBC 4.25 4.20 - 5.30 M/uL    HGB 11.8 (L) 12.0 - 16.0 g/dL    HCT 37.8 35.0 - 45.0 %    MCV 88.9 74.0 - 97.0 FL    MCH 27.8 24.0 - 34.0 PG    MCHC 31.2 31.0 - 37.0 g/dL    RDW 14.1 11.6 - 14.5 %    PLATELET 493 612 - 741 K/uL    MPV 10.8 9.2 - 11.8 FL    NEUTROPHILS 69 40 - 73 %    LYMPHOCYTES 24 21 - 52 %    MONOCYTES 4 3 - 10 %    EOSINOPHILS 2 0 - 5 %    BASOPHILS 0 0 - 2 %    ABS. NEUTROPHILS 6.9 1.8 - 8.0 K/UL    ABS. LYMPHOCYTES 2.4 0.9 - 3.6 K/UL    ABS. MONOCYTES 0.4 0.05 - 1.2 K/UL    ABS. EOSINOPHILS 0.2 0.0 - 0.4 K/UL    ABS.  BASOPHILS 0.0 0.0 - 0.1 K/UL    DF AUTOMATED     METABOLIC PANEL, COMPREHENSIVE    Collection Time: 05/18/21  4:20 PM   Result Value Ref Range    Sodium 138 136 - 145 mmol/L    Potassium 4.2 3.5 - 5.5 mmol/L    Chloride 103 100 - 111 mmol/L    CO2 30 21 - 32 mmol/L    Anion gap 5 3.0 - 18 mmol/L    Glucose 162 (H) 74 - 99 mg/dL    BUN 19 (H) 7.0 - 18 MG/DL    Creatinine 1.10 0.6 - 1.3 MG/DL    BUN/Creatinine ratio 17 12 - 20      GFR est AA >60 >60 ml/min/1.73m2    GFR est non-AA 52 (L) >60 ml/min/1.73m2    Calcium 9.3 8.5 - 10.1 MG/DL    Bilirubin, total 0.2 0.2 - 1.0 MG/DL ALT (SGPT) 26 13 - 56 U/L    AST (SGOT) 17 10 - 38 U/L    Alk. phosphatase 152 (H) 45 - 117 U/L    Protein, total 7.3 6.4 - 8.2 g/dL    Albumin 3.5 3.4 - 5.0 g/dL    Globulin 3.8 2.0 - 4.0 g/dL    A-G Ratio 0.9 0.8 - 1.7     LIPASE    Collection Time: 05/18/21  4:20 PM   Result Value Ref Range    Lipase 188 73 - 393 U/L   CARDIAC PANEL,(CK, CKMB & TROPONIN)    Collection Time: 05/18/21  4:20 PM   Result Value Ref Range    CK - MB <1.0 <3.6 ng/ml    CK-MB Index  0.0 - 4.0 %     CALCULATION NOT PERFORMED WHEN RESULT IS BELOW LINEAR LIMIT    CK 25 (L) 26 - 192 U/L    Troponin-I, QT <0.02 0.0 - 0.045 NG/ML   EKG, 12 LEAD, INITIAL    Collection Time: 05/18/21  4:42 PM   Result Value Ref Range    Ventricular Rate 83 BPM    Atrial Rate 83 BPM    P-R Interval 160 ms    QRS Duration 92 ms    Q-T Interval 386 ms    QTC Calculation (Bezet) 453 ms    Calculated P Axis 61 degrees    Calculated R Axis -13 degrees    Calculated T Axis 73 degrees    Diagnosis       Normal sinus rhythm  Normal ECG  When compared with ECG of 31-MAR-2020 20:15,  No significant change was found     COVID-19 RAPID TEST    Collection Time: 05/18/21  4:55 PM   Result Value Ref Range    Specimen source Nasopharyngeal      COVID-19 rapid test Not detected NOTD     POC GROUP A STREP    Collection Time: 05/18/21  5:07 PM   Result Value Ref Range    Group A strep (POC) Negative NEG     URINALYSIS W/ RFLX MICROSCOPIC    Collection Time: 05/18/21  5:40 PM   Result Value Ref Range    Color YELLOW      Appearance CLEAR      Specific gravity 1.013 1.005 - 1.030      pH (UA) 5.5 5.0 - 8.0      Protein Negative NEG mg/dL    Glucose 100 (A) NEG mg/dL    Ketone Negative NEG mg/dL    Bilirubin Negative NEG      Blood Negative NEG      Urobilinogen 0.2 0.2 - 1.0 EU/dL    Nitrites Negative NEG      Leukocyte Esterase Negative NEG         Radiologic Studies:   XR CHEST PORT   Final Result      No acute cardiopulmonary abnormality.         CT Results  (Last 48 hours)    None CXR Results  (Last 48 hours)               05/18/21 1616  XR CHEST PORT Final result    Impression:      No acute cardiopulmonary abnormality. Narrative:  EXAM: XR CHEST PORT       CLINICAL INDICATION/HISTORY: cough   -Additional: None       COMPARISON: 1/18/2021       TECHNIQUE: Portable frontal view of the chest       _______________       FINDINGS:       SUPPORT DEVICES: None. HEART AND MEDIASTINUM: Cardiomediastinal silhouette within normal limits. LUNGS AND PLEURAL SPACES: No dense consolidation, large effusion or   pneumothorax.       _______________                 Medical Decision Making   I am the first provider for this patient. I reviewed the vital signs, available nursing notes, past medical history, past surgical history, family history and social history. Vital Signs: Reviewed the patient's vital signs. Pulse Oximetry Analysis: 100% on Ra     EKG interpretation: (Preliminary)  3:59 PM   Sinus rhythm rate 83 bpm no STEMI  EKG read by Yusuf Comer PA-C at 4:45 PM     Records Reviewed: Nursing Notes and Old Medical Records     abd pelvis April 2021   There is a moderate amount of colonic stool suggesting constipation.  Otherwise, negative CT scan of the abdomen and pelvis. Findings are concordant with the preliminary interpretation provided. Digital Imaging and Communications in Medicine (DICOM) format image data are available to nonaffiliated external healthcare facilities or entities on a secure, media free, reciprocally searchable basis with patient authorization for at least a 12-month period after this study. INTERPRETING PHYSICIAN: Mike Abraham    Procedures:  Procedures    ED Course:   3:59 PM Initial assessment performed. The patients presenting problems have been discussed, and they are in agreement with the care plan formulated and outlined with them. I have encouraged them to ask questions as they arise throughout their visit.     Case discussed with Jacquelin Pack MD, agrees with plan. Discussion:  Pt presents with chronic abdominal pain presents with URI symptoms generalized body aches cough and abdominal pain. Labs within normal limits. She just had CT scan of her abdomen last month that showed constipation but no other findings. Exam is benign and reassuring vital signs are stable will discharge. Strict return precautions given, pt offering no questions or complaints. Diagnosis and Disposition     DISCHARGE NOTE:  Yazmin Chavarria  results have been reviewed with her. She has been counseled regarding her diagnosis, treatment, and plan. She verbally conveys understanding and agreement of the signs, symptoms, diagnosis, treatment and prognosis and additionally agrees to follow up as discussed. She also agrees with the care-plan and conveys that all of her questions have been answered. I have also provided discharge instructions for her that include: educational information regarding their diagnosis and treatment, and list of reasons why they would want to return to the ED prior to their follow-up appointment, should her condition change. She has been provided with education for proper emergency department utilization. CLINICAL IMPRESSION:    1. Chronic abdominal pain    2. Upper respiratory tract infection, unspecified type        PLAN:  1. D/C Home  2. Discharge Medication List as of 5/18/2021  6:21 PM      START taking these medications    Details   dicyclomine (BENTYL) 10 mg capsule Take 1 Cap by mouth three (3) times daily. , Normal, Disp-15 Cap, R-0         CONTINUE these medications which have NOT CHANGED    Details   naloxone (NARCAN) 4 mg/actuation nasal spray Use 1 spray intranasally, then discard. Repeat with new spray every 2 min as needed for opioid overdose symptoms, alternating nostrils. , Normal, Disp-1 Each, R-0      cyclobenzaprine (FLEXERIL) 5 mg tablet Take 1 Tab by mouth three (3) times daily as needed for Muscle Spasm(s) for up to 15 doses. , Print, Disp-15 Tab, R-0      diclofenac (VOLTAREN) 1 % gel Apply 4 g to affected area four (4) times daily as needed for Pain., Print, Disp-100 g, R-1      acetaminophen (Tylenol Extra Strength) 500 mg tablet Take 2 Tabs by mouth every six (6) hours as needed for Pain., Print, Disp-40 Tab, R-0      Cane coco 1 Each by Does Not Apply route daily. , Print, Disp-1 Device, R-0      ondansetron (ZOFRAN ODT) 4 mg disintegrating tablet Take 1-2 tablets every 6-8 hours as needed for nausea and vomiting., Print, Disp-10 Tab, R-0      ibuprofen (MOTRIN) 600 mg tablet Take 1 Tab by mouth every six (6) hours as needed for Pain., Print, Disp-20 Tab, R-0      lidocaine (LIDOCAINE VISCOUS) 2 % solution Put 10 mL in mouth and swish and spit out QAC and at bedtime, Print, Disp-200 mL, R-0      Blood-Glucose Meter (ONETOUCH ULTRA2) monitoring kit Use to obtain two times a day., Normal, Disp-1 Kit, R-0      lancets misc Use daily to monitor blood glucose, Normal, Disp-200 Each, R-0      glucose blood VI test strips (ASCENSIA AUTODISC VI, ONE TOUCH ULTRA TEST VI) strip 50 Each by Does Not Apply route two (2) times daily as needed., Normal, Disp-200 Strip, R-3      EPINEPHrine (EPIPEN) 0.3 mg/0.3 mL injection 0.3 mg., Historical Med      budesonide (RHINOCORT ALLERGY) 32 mcg/actuation nasal spray 2 Sprays by Both Nostrils route daily. Indications: Allergic Rhinitis, Normal, Disp-1 Bottle, R-3      insulin nph-regular human rec (HUMULIN 70-30) 100 unit/mL (70-30) inpn Give 40 units in the QAM and 45 units at bedtime, Normal, Disp-5 Pen, R-3      polyethylene glycol (MIRALAX) 17 gram packet Take 1 Packet by mouth daily. , Normal, Disp-30 Each, R-1      Insulin Needles, Disposable, 31 gauge x 5/16\" ndle Use to administer insulin as directed, Normal, Disp-1 Package, R-11      atorvastatin (LIPITOR) 40 mg tablet Take 1 Tab by mouth daily. , Normal, Disp-30 Tab, R-6      LORazepam (ATIVAN) 1 mg tablet 1 mg., Historical Med 3.   Follow-up Information     Follow up With Specialties Details Why Contact Info    Page Rima, OLINDA Nurse Practitioner Schedule an appointment as soon as possible for a visit   [address] [address] [phone]      [facility] EMERGENCY DEPT Emergency Medicine  If symptoms worsen [address] [id number]  [phone]               Please note that this dictation was completed with [employer], the computer voice recognition software. Quite often unanticipated grammatical, syntax, homophones, and other interpretive errors are inadvertently transcribed by the computer software. Please disregard these errors. Please excuse any errors that have escaped final proofreading.

## 2021-05-18 NOTE — ED TRIAGE NOTES
Pt to ED for generalized body pain, abdominal pain, nausea/vomiting for 2 weeks. Pt also c/o subjective fever/chills.

## 2021-05-19 LAB
ATRIAL RATE: 83 BPM
CALCULATED P AXIS, ECG09: 61 DEGREES
CALCULATED R AXIS, ECG10: -13 DEGREES
CALCULATED T AXIS, ECG11: 73 DEGREES
DIAGNOSIS, 93000: NORMAL
P-R INTERVAL, ECG05: 160 MS
Q-T INTERVAL, ECG07: 386 MS
QRS DURATION, ECG06: 92 MS
QTC CALCULATION (BEZET), ECG08: 453 MS
VENTRICULAR RATE, ECG03: 83 BPM

## 2021-05-20 ENCOUNTER — HOSPITAL ENCOUNTER (EMERGENCY)
Age: 51
Discharge: HOME | End: 2021-05-20
Attending: EMERGENCY MEDICINE
Payer: MEDICAID

## 2021-05-20 ENCOUNTER — APPOINTMENT (OUTPATIENT)
Dept: GENERAL RADIOLOGY | Age: 51
End: 2021-05-20
Attending: EMERGENCY MEDICINE
Payer: MEDICAID

## 2021-05-20 VITALS
SYSTOLIC BLOOD PRESSURE: 158 MMHG | HEIGHT: 62 IN | OXYGEN SATURATION: 100 % | DIASTOLIC BLOOD PRESSURE: 126 MMHG | BODY MASS INDEX: 25.76 KG/M2 | WEIGHT: 140 LBS | RESPIRATION RATE: 18 BRPM | TEMPERATURE: 97.9 F | HEART RATE: 102 BPM

## 2021-05-20 DIAGNOSIS — M25.512 LEFT SHOULDER PAIN, UNSPECIFIED CHRONICITY: Primary | ICD-10-CM

## 2021-05-20 DIAGNOSIS — R10.84 ABDOMINAL PAIN, GENERALIZED: ICD-10-CM

## 2021-05-20 LAB
BACTERIA SPEC CULT: NORMAL
SERVICE CMNT-IMP: NORMAL

## 2021-05-20 PROCEDURE — 74011250637 HC RX REV CODE- 250/637: Performed by: EMERGENCY MEDICINE

## 2021-05-20 PROCEDURE — 71045 X-RAY EXAM CHEST 1 VIEW: CPT

## 2021-05-20 PROCEDURE — 72110 X-RAY EXAM L-2 SPINE 4/>VWS: CPT

## 2021-05-20 PROCEDURE — 99283 EMERGENCY DEPT VISIT LOW MDM: CPT

## 2021-05-20 PROCEDURE — 74011636637 HC RX REV CODE- 636/637: Performed by: EMERGENCY MEDICINE

## 2021-05-20 RX ORDER — CARISOPRODOL 350 MG/1
350 TABLET ORAL
Status: COMPLETED | OUTPATIENT
Start: 2021-05-20 | End: 2021-05-20

## 2021-05-20 RX ORDER — CYCLOBENZAPRINE HCL 10 MG
10 TABLET ORAL
Status: COMPLETED | OUTPATIENT
Start: 2021-05-20 | End: 2021-05-20

## 2021-05-20 RX ORDER — DICYCLOMINE HYDROCHLORIDE 10 MG/1
20 CAPSULE ORAL
Status: COMPLETED | OUTPATIENT
Start: 2021-05-20 | End: 2021-05-20

## 2021-05-20 RX ORDER — PREDNISONE 20 MG/1
60 TABLET ORAL
Status: COMPLETED | OUTPATIENT
Start: 2021-05-20 | End: 2021-05-20

## 2021-05-20 RX ADMIN — PREDNISONE 60 MG: 20 TABLET ORAL at 03:15

## 2021-05-20 RX ADMIN — DICYCLOMINE HYDROCHLORIDE 20 MG: 10 CAPSULE ORAL at 04:40

## 2021-05-20 RX ADMIN — CYCLOBENZAPRINE 10 MG: 10 TABLET, FILM COATED ORAL at 04:40

## 2021-05-20 RX ADMIN — CARISOPRODOL 350 MG: 350 TABLET ORAL at 03:15
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
## 2021-07-02 ENCOUNTER — HOSPITAL ENCOUNTER (EMERGENCY)
Age: 51
Discharge: HOME | End: 2021-07-02
Attending: EMERGENCY MEDICINE
Payer: MEDICAID

## 2021-07-02 VITALS
WEIGHT: 145 LBS | HEART RATE: 99 BPM | TEMPERATURE: 97.4 F | OXYGEN SATURATION: 100 % | RESPIRATION RATE: 20 BRPM | DIASTOLIC BLOOD PRESSURE: 98 MMHG | BODY MASS INDEX: 26.68 KG/M2 | SYSTOLIC BLOOD PRESSURE: 135 MMHG | HEIGHT: 62 IN

## 2021-07-02 DIAGNOSIS — G89.29 CHRONIC NECK PAIN: Primary | ICD-10-CM

## 2021-07-02 DIAGNOSIS — M54.2 CHRONIC NECK PAIN: Primary | ICD-10-CM

## 2021-07-02 PROCEDURE — 96372 THER/PROPH/DIAG INJ SC/IM: CPT

## 2021-07-02 PROCEDURE — 74011250636 HC RX REV CODE- 250/636: Performed by: PHYSICIAN ASSISTANT

## 2021-07-02 PROCEDURE — 99282 EMERGENCY DEPT VISIT SF MDM: CPT

## 2021-07-02 RX ORDER — LISINOPRIL 10 MG/1
20 TABLET ORAL DAILY
COMMUNITY
End: 2022-09-12

## 2021-07-02 RX ORDER — KETOROLAC TROMETHAMINE 30 MG/ML
60 INJECTION, SOLUTION INTRAMUSCULAR; INTRAVENOUS
Status: COMPLETED | OUTPATIENT
Start: 2021-07-02 | End: 2021-07-02

## 2021-07-02 RX ADMIN — KETOROLAC TROMETHAMINE 60 MG: 60 INJECTION, SOLUTION INTRAMUSCULAR at 15:33

## 2021-07-02 NOTE — ED PROVIDER NOTES
EMERGENCY DEPARTMENT HISTORY AND PHYSICAL EXAM    Date: 7/2/2021  Patient Name: Imani Talavera    History of Presenting Illness     Chief Complaint   Patient presents with    Neck Pain    Vomiting         History Provided By: Patient    Chief Complaint: neck pain       Additional History (Context):   3:18 PM  Imani Talavera is a 45 y.o. female with PMHX retention, diabetes, chronic neck pain, drug abuse, drug-seeking behavior, bipolar disorder, anxiety presents to the emergency department C/O chronic neck pain. Patient states she just left [facility] from seeing her orthopedist who sent prescriptions in for her to help with her pain however the pharmacy said that they were not ready right away so she decided to come here. She is requesting a Toradol shot. No new pain states the pain is consistent with her typical pain. No new injury. PCP: None    Current Outpatient Medications   Medication Sig Dispense Refill    lisinopriL (PRINIVIL, ZESTRIL) 10 mg tablet Take  by mouth daily.  insulin nph-regular human rec (HUMULIN 70-30) 100 unit/mL (70-30) inpn Give 40 units in the QAM and 45 units at bedtime 5 Pen 3    Cane coco 1 Each by Does Not Apply route daily. 1 Device 0    ondansetron (ZOFRAN ODT) 4 mg disintegrating tablet Take 1-2 tablets every 6-8 hours as needed for nausea and vomiting. 10 Tab 0    ibuprofen (MOTRIN) 600 mg tablet Take 1 Tab by mouth every six (6) hours as needed for Pain. 20 Tab 0    lidocaine (LIDOCAINE VISCOUS) 2 % solution Put 10 mL in mouth and swish and spit out QAC and at bedtime 200 mL 0    Blood-Glucose Meter (ONETOUCH ULTRA2) monitoring kit Use to obtain two times a day. 1 Kit 0    lancets misc Use daily to monitor blood glucose 200 Each 0    glucose blood VI test strips (ASCENSIA AUTODISC VI, ONE TOUCH ULTRA TEST VI) strip 50 Each by Does Not Apply route two (2) times daily as needed. 200 Strip 3    polyethylene glycol (MIRALAX) 17 gram packet Take 1 Packet by mouth daily. 30 Each 1    Insulin Needles, Disposable, 31 gauge x 5/16\" ndle Use to administer insulin as directed 1 Package 11    atorvastatin (LIPITOR) 40 mg tablet Take 1 Tab by mouth daily. 30 Tab 6       Past History     Past Medical History:  Past Medical History:   Diagnosis Date    Anxiety     Bipolar disorder ([address]     Breast cancer ([facility] [address]     breast cancer, right, S/P Mastectomy and chemo, radiation in 2013    Depression     Diabetes ([facility] [address]     Drug abuse ([facility] [address]     H/O cocaine use in past    Drug-seeking behavior     Family history of early CAD     Gastrointestinal disorder     cholitis    H/O ETOH abuse     Hyperlipidemia     Hypertension     Malignant neoplasm without specification of site ([facility] [address]     Methamphetamine abuse ([facility] [address]      self reported on 1/3/16    Spine injury     Vitamin D deficiency 5/1/2018       Past Surgical History:  Past Surgical History:   Procedure Laterality Date    COLONOSCOPY N/A 7/18/2016    [facility] Sj [facility]aird [facility] at [facility] ENDOSCOPY    HX BREAST LUMPECTOMY  06/2013    right    HX HYSTERECTOMY      HX MASTECTOMY      had expanders put in 6/2018    HX ORTHOPAEDIC      Back fusion surgery 4/18/14.  HX OTHER SURGICAL      mediport    HX TONSILLECTOMY      HX TUBAL LIGATION         Family History:  Family History   Problem Relation Age of Onset    Heart Disease Mother     Stroke Father     Heart Disease Father 48    Diabetes Other     Hypertension Other     Cancer Maternal Grandmother        Social History:  Social History     Tobacco Use    Smoking status: Never Smoker    Smokeless tobacco: Never Used   Substance Use Topics    Alcohol use: Not Currently     Comment: \"Occasionally\"    Drug use: Not Currently     Types: Methamphetamines, Cocaine       Allergies:   Allergies   Allergen Reactions    Latex Hives and Itching    Other Food Rash     Almonds    Amoxicillin Swelling     Other reaction(s): mild rash/itching    Aspirin Not Reported This Time and Swelling     Mouth swells    Beeswax Anaphylaxis    Levaquin [Levofloxacin] Not Reported This Time and Swelling     Other reaction(s): mild rash/itching    Chlorhexidine Rash    [place] Rash and Itching    Codeine Other (comments)    Glycopyrrolate Swelling     Pt  States  faciAL  SWELLING   POST  ROBINUL  IV   Pt  States  faciAL  SWELLING   POST  ROBINUL  IV     Pcn [Penicillins] Swelling    Tape [Adhesive] Rash    Toradol [Ketorolac] Hives    Tramadol Swelling       Review of Systems   Review of Systems   Constitutional: Negative for chills and fever. Respiratory: Negative for shortness of breath. Cardiovascular: Negative for chest pain. Gastrointestinal: Negative for abdominal pain. Musculoskeletal: Positive for neck pain. Neurological: Negative for weakness and numbness. All other systems reviewed and are negative. Physical Exam     Vitals:    07/02/21 1519   BP: (!) 135/98   Pulse: 99   Resp: 20   Temp: 97.4 °F (36.3 °C)   SpO2: 100%   Weight: 65.8 kg (145 lb)   Height: 5' 2\" (1.575 m)     Physical Exam  Vitals and nursing note reviewed. Constitutional:       Appearance: She is well-developed. Comments: Lying on stretcher moaning   HENT:      Head: Normocephalic and atraumatic. Cardiovascular:      Rate and Rhythm: Normal rate and regular rhythm. Heart sounds: Normal heart sounds. No murmur heard. Pulmonary:      Effort: Pulmonary effort is normal. No respiratory distress. Breath sounds: Normal breath sounds. No wheezing or rales. Abdominal:      General: Bowel sounds are normal.      Palpations: Abdomen is soft. Tenderness: There is no abdominal tenderness. Musculoskeletal:      Cervical back: Neck supple. No edema, erythema, signs of trauma, rigidity or crepitus. Muscular tenderness (right) present. Decreased range of motion. Neurological:      Mental Status: She is alert and oriented to person, place, and time.    Psychiatric: Judgment: Judgment normal.           Diagnostic Study Results     Labs:   No results found for this or any previous visit (from the past 12 hour(s)). Radiologic Studies:   No orders to display     CT Results  (Last 48 hours)    None        CXR Results  (Last 48 hours)    None          Medical Decision Making   I am the first provider for this patient. I reviewed the vital signs, available nursing notes, past medical history, past surgical history, family history and social history. Vital Signs: Reviewed the patient's vital signs. Pulse Oximetry Analysis: 100% on RA     Records Reviewed: Nursing Notes and Old Medical Records    Procedures:  Procedures    ED Course:   3:18 PM Initial assessment performed. The patients presenting problems have been discussed, and they are in agreement with the care plan formulated and outlined with them. I have encouraged them to ask questions as they arise throughout their visit. Discussion:  Pt presents with chronic neck pain. Patient has been seen several times over the past month at different ERs for same complaint. She has had several work-ups all negative including CTA of her head neck 2 days ago. Patient presents today after leaving her orthopedist office who sent in prescriptions however she states her prescriptions were not ready at the pharmacy and she was going to have to come back later therefore she presents requesting Toradol shot until she is able to get her prescriptions. Feel that this is a reasonable request will give Toradol and discharge. Strict return precautions given, pt offering no questions or complaints. Diagnosis and Disposition     DISCHARGE NOTE:  Dwale Milagros  results have been reviewed with her. She has been counseled regarding her diagnosis, treatment, and plan.   She verbally conveys understanding and agreement of the signs, symptoms, diagnosis, treatment and prognosis and additionally agrees to follow up as discussed. She also agrees with the care-plan and conveys that all of her questions have been answered. I have also provided discharge instructions for her that include: educational information regarding their diagnosis and treatment, and list of reasons why they would want to return to the ED prior to their follow-up appointment, should her condition change. She has been provided with education for proper emergency department utilization. CLINICAL IMPRESSION:    1. Chronic neck pain        PLAN:  1. D/C Home  2. Current Discharge Medication List        3. Follow-up Information     Follow up With Specialties Details Why Contact Info    Emerald Celaya NP Nurse Practitioner Schedule an appointment as soon as possible for a visit   [address] [facility] [phone]      [facility] EMERGENCY DEPT Emergency Medicine  If symptoms worsen [address]  [phone]    Herb Monreal MD Orthopedic Surgery Schedule an appointment as soon as possible for a visit   [address] [facility]  [phone]                   Please note that this dictation was completed with [employer], the computer voice recognition software. Quite often unanticipated grammatical, syntax, homophones, and other interpretive errors are inadvertently transcribed by the computer software. Please disregard these errors. Please excuse any errors that have escaped final proofreading.

## 2021-07-02 NOTE — ED TRIAGE NOTES
Patient with complaints of right neck pain for the past week.  Patient has been seen previously for this, patient was also seen by [facility] prior to arrival and states that she could not wait for her pain medication to be filled

## 2021-08-15 ENCOUNTER — HOSPITAL ENCOUNTER (EMERGENCY)
Age: 51
Discharge: HOME | End: 2021-08-15
Attending: EMERGENCY MEDICINE
Payer: MEDICAID

## 2021-08-15 VITALS
RESPIRATION RATE: 20 BRPM | BODY MASS INDEX: 26.13 KG/M2 | TEMPERATURE: 97.3 F | HEIGHT: 62 IN | DIASTOLIC BLOOD PRESSURE: 85 MMHG | WEIGHT: 142 LBS | HEART RATE: 108 BPM | OXYGEN SATURATION: 100 % | SYSTOLIC BLOOD PRESSURE: 123 MMHG

## 2021-08-15 DIAGNOSIS — M25.40 PAINFUL SWELLING OF JOINT: Primary | ICD-10-CM

## 2021-08-15 DIAGNOSIS — K08.89 PAIN, DENTAL: ICD-10-CM

## 2021-08-15 PROCEDURE — 74011250637 HC RX REV CODE- 250/637: Performed by: EMERGENCY MEDICINE

## 2021-08-15 PROCEDURE — 96372 THER/PROPH/DIAG INJ SC/IM: CPT

## 2021-08-15 PROCEDURE — 99283 EMERGENCY DEPT VISIT LOW MDM: CPT

## 2021-08-15 PROCEDURE — 74011250636 HC RX REV CODE- 250/636: Performed by: EMERGENCY MEDICINE

## 2021-08-15 RX ORDER — CLINDAMYCIN HYDROCHLORIDE 300 MG/1
300 CAPSULE ORAL 3 TIMES DAILY
Qty: 21 CAPSULE | Refills: 0 | Status: SHIPPED | OUTPATIENT
Start: 2021-08-15 | End: 2021-08-22

## 2021-08-15 RX ORDER — PREDNISONE 10 MG/1
TABLET ORAL
Qty: 21 TABLET | Refills: 0 | Status: SHIPPED | OUTPATIENT
Start: 2021-08-15 | End: 2021-12-02

## 2021-08-15 RX ORDER — HYDROCODONE BITARTRATE AND ACETAMINOPHEN 5; 325 MG/1; MG/1
1 TABLET ORAL ONCE
Status: COMPLETED | OUTPATIENT
Start: 2021-08-15 | End: 2021-08-15

## 2021-08-15 RX ORDER — HYDROCODONE BITARTRATE AND ACETAMINOPHEN 5; 325 MG/1; MG/1
1 TABLET ORAL
Qty: 9 TABLET | Refills: 0 | Status: SHIPPED | OUTPATIENT
Start: 2021-08-15 | End: 2021-08-18

## 2021-08-15 RX ADMIN — HYDROCODONE BITARTRATE AND ACETAMINOPHEN 1 TABLET: 5; 325 TABLET ORAL at 21:04

## 2021-08-15 RX ADMIN — METHYLPREDNISOLONE SODIUM SUCCINATE 125 MG: 125 INJECTION, POWDER, FOR SOLUTION INTRAMUSCULAR; INTRAVENOUS at 21:06

## 2021-08-16 NOTE — ED PROVIDER NOTES
EMERGENCY DEPARTMENT HISTORY AND PHYSICAL EXAM    Date: 8/15/2021  Patient Name: Gaviota Brody    History of Presenting Illness     Chief Complaint   Patient presents with    Other         History Provided By: Patient    Gaviota Brody is a 45 y.o. female who presents to the emergency department C/O joint swelling, body pain, dental pain. Patient states she been dealing with this problem for about a week. States she is scheduled to see her doctor next week but felt she was unable to handle the pain. She states the joints in her hands and knees are very swollen causing her pain. States is making it difficult for her to walk. She states she has had this problem before and feels like is flaring up. She states she has had steroid injections in the past that has seemed to help with the problem. She notes that the dental pain is located in the left upper dental region and feels like her face is swollen but denies any difficulty breathing or swallowing. Denies any fevers. She does admit to a mild cough but states that this is a chronic problem. .         PCP: None    Current Outpatient Medications   Medication Sig Dispense Refill    HYDROcodone-acetaminophen (Norco) 5-325 mg per tablet Take 1 Tablet by mouth every eight (8) hours as needed for Pain for up to 3 days. Max Daily Amount: 3 Tablets. 9 Tablet 0    predniSONE (STERAPRED DS) 10 mg dose pack Take as directed 21 Tablet 0    clindamycin (CLEOCIN) 300 mg capsule Take 1 Capsule by mouth three (3) times daily for 7 days. 21 Capsule 0    lisinopriL (PRINIVIL, ZESTRIL) 10 mg tablet Take  by mouth daily.  Cane coco 1 Each by Does Not Apply route daily. 1 Device 0    ondansetron (ZOFRAN ODT) 4 mg disintegrating tablet Take 1-2 tablets every 6-8 hours as needed for nausea and vomiting. 10 Tab 0    ibuprofen (MOTRIN) 600 mg tablet Take 1 Tab by mouth every six (6) hours as needed for Pain.  20 Tab 0    lidocaine (LIDOCAINE VISCOUS) 2 % solution Put 10 mL in mouth and swish and spit out QAC and at bedtime 200 mL 0    Blood-Glucose Meter (ONETOUCH ULTRA2) monitoring kit Use to obtain two times a day. 1 Kit 0    lancets misc Use daily to monitor blood glucose 200 Each 0    glucose blood VI test strips (ASCENSIA AUTODISC VI, ONE TOUCH ULTRA TEST VI) strip 50 Each by Does Not Apply route two (2) times daily as needed. 200 Strip 3    insulin nph-regular human rec (HUMULIN 70-30) 100 unit/mL (70-30) inpn Give 40 units in the QAM and 45 units at bedtime 5 Pen 3    polyethylene glycol (MIRALAX) 17 gram packet Take 1 Packet by mouth daily. 30 Each 1    Insulin Needles, Disposable, 31 gauge x 5/16\" ndle Use to administer insulin as directed 1 Package 11    atorvastatin (LIPITOR) 40 mg tablet Take 1 Tab by mouth daily. 30 Tab 6       Past History     Past Medical History:  Past Medical History:   Diagnosis Date    Anxiety     Bipolar disorder ([address]     Breast cancer ([address]     breast cancer, right, S/P Mastectomy and chemo, radiation in 2013    Depression     Diabetes ([address]     Drug abuse ([address]     H/O cocaine use in past    Drug-seeking behavior     Family history of early CAD     Gastrointestinal disorder     cholitis    H/O ETOH abuse     Hyperlipidemia     Hypertension     Malignant neoplasm without specification of site ([address]     Methamphetamine abuse ([address]      self reported on 1/3/16    Spine injury     Vitamin D deficiency 5/1/2018       Past Surgical History:  Past Surgical History:   Procedure Laterality Date    COLONOSCOPY N/A 7/18/2016    COL[facility] performed by Bridger [facility] at [facility] ENDOSCOPY    HX BREAST LUMPECTOMY  06/2013    right    HX HYSTERECTOMY      HX MASTECTOMY      had expanders put in 6/2018    HX ORTHOPAEDIC      Back fusion surgery 4/18/14.      HX OTHER SURGICAL      mediport    HX TONSILLECTOMY      HX TUBAL LIGATION         Family History:  Family History   Problem Relation Age of Onset    Heart Disease Mother  Stroke Father     Heart Disease Father 48    Diabetes Other     Hypertension Other     Cancer Maternal Grandmother        Social History:  Social History     Tobacco Use    Smoking status: Never Smoker    Smokeless tobacco: Never Used   Substance Use Topics    Alcohol use: Not Currently     Comment: \"Occasionally\"    Drug use: Not Currently     Types: Methamphetamines, Cocaine       Allergies: Allergies   Allergen Reactions    Latex Hives and Itching    Other Food Rash     Almonds    Amoxicillin Swelling     Other reaction(s): mild rash/itching    Aspirin Not Reported This Time and Swelling     Mouth swells    Beeswax Anaphylaxis    Levaquin [Levofloxacin] Not Reported This Time and Swelling     Other reaction(s): mild rash/itching    Chlorhexidine Rash    [place] Rash and Itching    Codeine Other (comments)    Glycopyrrolate Swelling     Pt  States  faciAL  SWELLING   POST  ROBINUL  IV   Pt  States  faciAL  SWELLING   POST  ROBINUL  IV     Pcn [Penicillins] Swelling    Tape [Adhesive] Rash    Toradol [Ketorolac] Hives    Tramadol Swelling         Review of Systems   Review of Systems   Constitutional: Negative for fever. HENT: Positive for dental problem. Respiratory: Positive for cough. Gastrointestinal: Negative for abdominal pain. Musculoskeletal: Positive for arthralgias and joint swelling. All other systems reviewed and are negative. All other systems reviewed and are negative.     Physical Exam     Vitals:    08/15/21 2008   BP: 123/85   Pulse: (!) 108   Resp: 20   Temp: 97.3 °F (36.3 °C)   SpO2: 100%   Weight: 64.4 kg (142 lb)   Height: 5' 2\" (1.575 m)     Physical Exam    Nursing notes and vital signs reviewed    Constitutional: Non toxic appearing, uncomfortable appearing but no acute distress, appearing stated age  Eyes: PERRL, EOMI, No conjunctival injection  ENT: external ears normal, No rhinorrhea, external nose normal, mucous membranes moist, patient has poor dentition throughout  Cardiovascular: Regular rate and rhythm, no murmurs, No JVD  Respiratory: Clear to ausculation bilaterally, No stridor, Normal work of breathing and chest excursion bilaterally  Abdomen: Soft, non tender, non distended, normoactive bowel sounds, No rigidity, no peritoneal signs  Musculoskeletal: There is noticeable mild swelling to the joints of the patient's hands knees. no evidence of obvious injury to Head, Neck, Back, Extremities, no LE edema  Skin: Warm, dry, No obvious rashes  Neuro: Alert and oriented x 3, CN 2-12 intact, normal speech, strength and sensation full and symmetric bilaterally  Psychiatric: Normal mood and affect      Diagnostic Study Results     Labs -   No results found for this or any previous visit (from the past 72 hour(s)). Radiologic Studies -   No orders to display     CT Results  (Last 48 hours)    None        CXR Results  (Last 48 hours)    None          Medications given in the ED-  Medications   methylPREDNISolone (PF) (Solu-MEDROL) injection 125 mg (125 mg IntraMUSCular Given 8/15/21 2106)   HYDROcodone-acetaminophen (NORCO) 5-325 mg per tablet 1 Tablet (1 Tablet Oral Given 8/15/21 2104)         Medical Decision Making     I reviewed the vital signs, available nursing notes, past medical history, past surgical history, family history and social history. Vital Signs interpretation- I have reviewed the patient's vital signs. Pulse Oximetry interpretation - 100% on Room air     Cardiac Monitor interpretation:  Rate: 108 bpm  Rhythm: sinus    Records Reviewed: Nursing Notes and Old Medical Records    Procedures:  Procedures    ED Course and MDM:  I discussed with the patient obtaining blood work as well as chest x-ray. She states this was done for her when she was seen earlier this week at [facility] ER. States she would be fine with pain medications as well as steroid injection.  Patient was given Solu-Medrol and Norco. I recommended she continue with the prescribed medications as directed as well as antibiotics for her dental pain. Recommended follow-up with her primary care physician otherwise come back to emergency department symptoms worsen. She understands and agrees to plan    Diagnosis and Disposition         DISCHARGE NOTE:    Micaela Herrera  results have been reviewed with her. She has been counseled regarding her diagnosis, treatment, and plan. She verbally conveys understanding and agreement of the signs, symptoms, diagnosis, treatment and prognosis and additionally agrees to follow up as discussed. She also agrees with the care-plan and conveys that all of her questions have been answered. I have also provided discharge instructions for her that include: educational information regarding their diagnosis and treatment, and list of reasons why they would want to return to the ED prior to their follow-up appointment, should her condition change. She has been provided with education for proper emergency department utilization. CLINICAL IMPRESSION:    1. Painful swelling of joint    2. Pain, dental        PLAN:  1. D/C Home  2. Current Discharge Medication List      START taking these medications    Details   HYDROcodone-acetaminophen (Norco) 5-325 mg per tablet Take 1 Tablet by mouth every eight (8) hours as needed for Pain for up to 3 days. Max Daily Amount: 3 Tablets. Qty: 9 Tablet, Refills: 0  Start date: 8/15/2021, End date: 8/18/2021    Associated Diagnoses: Painful swelling of joint; Pain, dental      predniSONE (STERAPRED DS) 10 mg dose pack Take as directed  Qty: 21 Tablet, Refills: 0  Start date: 8/15/2021      clindamycin (CLEOCIN) 300 mg capsule Take 1 Capsule by mouth three (3) times daily for 7 days. Qty: 21 Capsule, Refills: 0  Start date: 8/15/2021, End date: 8/22/2021           3.    Follow-up Information     Follow up With Specialties Details Why Contact Info    Your primary care doctor  Go to _______________________________      Please note that this dictation was completed with [employer], the computer voice recognition software. Quite often unanticipated grammatical, syntax, homophones, and other interpretive errors are inadvertently transcribed by the computer software. Please disregard these errors. Please excuse any errors that have escaped final proofreading.

## 2021-08-16 NOTE — DISCHARGE INSTRUCTIONS
Take steroid pack as directed for swelling  Take Norco for pain  Take clindamycin as antibiotic for your tooth

## 2021-08-18 ENCOUNTER — HOSPITAL ENCOUNTER (EMERGENCY)
Age: 51
Discharge: HOME | End: 2021-08-18
Attending: EMERGENCY MEDICINE
Payer: MEDICAID

## 2021-08-18 ENCOUNTER — APPOINTMENT (OUTPATIENT)
Dept: GENERAL RADIOLOGY | Age: 51
End: 2021-08-18
Attending: EMERGENCY MEDICINE
Payer: MEDICAID

## 2021-08-18 VITALS
DIASTOLIC BLOOD PRESSURE: 96 MMHG | HEIGHT: 62 IN | TEMPERATURE: 98.4 F | OXYGEN SATURATION: 99 % | SYSTOLIC BLOOD PRESSURE: 123 MMHG | RESPIRATION RATE: 20 BRPM | HEART RATE: 117 BPM | BODY MASS INDEX: 26.13 KG/M2 | WEIGHT: 142 LBS

## 2021-08-18 DIAGNOSIS — G89.29 CHRONIC HAND PAIN, LEFT: ICD-10-CM

## 2021-08-18 DIAGNOSIS — M79.642 CHRONIC HAND PAIN, LEFT: ICD-10-CM

## 2021-08-18 DIAGNOSIS — G89.29 CHRONIC UPPER BACK PAIN: Primary | ICD-10-CM

## 2021-08-18 DIAGNOSIS — M54.9 CHRONIC UPPER BACK PAIN: Primary | ICD-10-CM

## 2021-08-18 PROCEDURE — 73130 X-RAY EXAM OF HAND: CPT

## 2021-08-18 PROCEDURE — 74011250636 HC RX REV CODE- 250/636: Performed by: EMERGENCY MEDICINE

## 2021-08-18 PROCEDURE — 99282 EMERGENCY DEPT VISIT SF MDM: CPT

## 2021-08-18 PROCEDURE — 96372 THER/PROPH/DIAG INJ SC/IM: CPT

## 2021-08-18 RX ORDER — IBUPROFEN 800 MG/1
800 TABLET ORAL EVERY 8 HOURS
Qty: 15 TABLET | Refills: 0 | Status: SHIPPED | OUTPATIENT
Start: 2021-08-18 | End: 2021-08-23

## 2021-08-18 RX ORDER — ACETAMINOPHEN 500 MG
1000 TABLET ORAL
Qty: 22 TABLET | Refills: 0 | Status: SHIPPED | OUTPATIENT
Start: 2021-08-18 | End: 2021-08-18 | Stop reason: ALTCHOICE

## 2021-08-18 RX ORDER — METAXALONE 800 MG/1
800 TABLET ORAL 4 TIMES DAILY
Qty: 20 TABLET | Refills: 0 | Status: SHIPPED | OUTPATIENT
Start: 2021-08-18 | End: 2021-08-23

## 2021-08-18 RX ADMIN — METHYLPREDNISOLONE SODIUM SUCCINATE 125 MG: 125 INJECTION, POWDER, FOR SOLUTION INTRAMUSCULAR; INTRAVENOUS at 13:11

## 2021-08-18 NOTE — CALL BACK NOTE
I have made patient an appointment with Valentino Bud at [facility] for Thursday September 9 @ 1:20pm. An appointment reminder letter has been provided.
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
## 2021-08-28 ENCOUNTER — HOSPITAL ENCOUNTER (EMERGENCY)
Age: 51
Discharge: HOME | End: 2021-08-28
Attending: EMERGENCY MEDICINE
Payer: MEDICAID

## 2021-08-28 VITALS
WEIGHT: 136 LBS | SYSTOLIC BLOOD PRESSURE: 122 MMHG | RESPIRATION RATE: 18 BRPM | TEMPERATURE: 98.7 F | DIASTOLIC BLOOD PRESSURE: 79 MMHG | HEART RATE: 115 BPM | HEIGHT: 62 IN | BODY MASS INDEX: 25.03 KG/M2 | OXYGEN SATURATION: 99 %

## 2021-08-28 DIAGNOSIS — M67.432 GANGLION CYST OF DORSUM OF LEFT WRIST: Primary | ICD-10-CM

## 2021-08-28 DIAGNOSIS — M25.50 POLYARTHRALGIA: ICD-10-CM

## 2021-08-28 PROCEDURE — 99283 EMERGENCY DEPT VISIT LOW MDM: CPT

## 2021-08-28 PROCEDURE — 74011250637 HC RX REV CODE- 250/637: Performed by: PHYSICIAN ASSISTANT

## 2021-08-28 RX ORDER — OXYCODONE HYDROCHLORIDE 5 MG/1
5 TABLET ORAL
Status: COMPLETED | OUTPATIENT
Start: 2021-08-28 | End: 2021-08-28

## 2021-08-28 RX ADMIN — OXYCODONE 5 MG: 5 TABLET ORAL at 15:10

## 2021-08-28 NOTE — ED TRIAGE NOTES
Pt arrives ambulatory to ED with c\o LEFT arm swelling and pain that started yesterday, pt also sts she has increased swelling to BLE

## 2021-08-28 NOTE — ED PROVIDER NOTES
EMERGENCY DEPARTMENT HISTORY AND PHYSICAL EXAM    Date: 8/28/2021  Patient Name: Imani Talavera    History of Presenting Illness     Chief Complaint   Patient presents with    Leg Swelling    Hand Swelling         History Provided By: Patient    2:07 PM  Imani Talavera is a 45 y.o. female with PMHX of bipolar disorder, diabetes, hypertension, hyperlipidemia who presents to the emergency department C/O multiple joint pains on and off for many months. Patient states she has suffered from neck pain, left shoulder pain, bilateral knee pain with intermittent swelling on and off for years, has seen orthopedists, currently Dr. Marcos Hansen and has received various injections and medications. She reports left hand/wrist pain for the past 1.5 months which she attributed to a failed IV attempt in her left hand. She has noticed intermittent swollen \"knot\" to dorsum of left hand/wrist that is painful. She has been seen at various ED's and does not have a new PCP appointment as set up by her  until September. She has been taking ibuprofen for the past week without relief. She states she cannot take Tylenol as it causes her lips to swell. She also reports allergies to Toradol, tramadol, codeine. She states she has taken Norco and oxycodone in the past without adverse reaction but Norco caused upset stomach. She has received morphine and Dilaudid in the hospital which she reports no adverse reaction. She also states she has taken Diclofenac PO without adverse reaction but Voltaren gel caused mild itching and hives. Pt denies fever, injury or trauma, and any other sxs or complaints. PCP: None    Current Outpatient Medications   Medication Sig Dispense Refill    predniSONE (STERAPRED DS) 10 mg dose pack Take as directed 21 Tablet 0    lisinopriL (PRINIVIL, ZESTRIL) 10 mg tablet Take  by mouth daily.  Cane coco 1 Each by Does Not Apply route daily.  1 Device 0    ondansetron (ZOFRAN ODT) 4 mg disintegrating tablet Take 1-2 tablets every 6-8 hours as needed for nausea and vomiting. 10 Tab 0    lidocaine (LIDOCAINE VISCOUS) 2 % solution Put 10 mL in mouth and swish and spit out QAC and at bedtime 200 mL 0    Blood-Glucose Meter (ONETOUCH ULTRA2) monitoring kit Use to obtain two times a day. 1 Kit 0    lancets misc Use daily to monitor blood glucose 200 Each 0    glucose blood VI test strips (ASCENSIA AUTODISC VI, ONE TOUCH ULTRA TEST VI) strip 50 Each by Does Not Apply route two (2) times daily as needed. 200 Strip 3    insulin nph-regular human rec (HUMULIN 70-30) 100 unit/mL (70-30) inpn Give 40 units in the QAM and 45 units at bedtime 5 Pen 3    polyethylene glycol (MIRALAX) 17 gram packet Take 1 Packet by mouth daily. 30 Each 1    Insulin Needles, Disposable, 31 gauge x 5/16\" ndle Use to administer insulin as directed 1 Package 11    atorvastatin (LIPITOR) 40 mg tablet Take 1 Tab by mouth daily. 30 Tab 6       Past History     Past Medical History:  Past Medical History:   Diagnosis Date    Anxiety     Bipolar disorder ([address]     Breast cancer ([address]     breast cancer, right, S/P Mastectomy and chemo, radiation in 2013    Depression     Diabetes ([address]     Drug abuse ([address]     H/O cocaine use in past    Drug-seeking behavior     Family history of early CAD     Gastrointestinal disorder     cholitis    H/O ETOH abuse     Hyperlipidemia     Hypertension     Malignant neoplasm without specification of site ([address]     Methamphetamine abuse ([address]      self reported on 1/3/16    Spine injury     Vitamin D deficiency 5/1/2018       Past Surgical History:  Past Surgical History:   Procedure Laterality Date    COLONOSCOPY N/A 7/18/2016    COLONOSCOPY performed by Estephanie [facility] at [facility] ENDOSCOPY    HX BREAST LUMPECTOMY  06/2013    right    HX HYSTERECTOMY      HX MASTECTOMY      had expanders put in 6/2018    HX ORTHOPAEDIC      Back fusion surgery 4/18/14.      HX OTHER SURGICAL      mediport    HX TONSILLECTOMY      HX TUBAL LIGATION         Family History:  Family History   Problem Relation Age of Onset    Heart Disease Mother     Stroke Father     Heart Disease Father 48    Diabetes Other     Hypertension Other     Cancer Maternal Grandmother        Social History:  Social History     Tobacco Use    Smoking status: Never Smoker    Smokeless tobacco: Never Used   Substance Use Topics    Alcohol use: Not Currently     Comment: \"Occasionally\"    Drug use: Not Currently     Types: Methamphetamines, Cocaine       Allergies: Allergies   Allergen Reactions    Latex Hives and Itching    Other Food Rash     Almonds    Amoxicillin Swelling     Other reaction(s): mild rash/itching    Aspirin Not Reported This Time and Swelling     Mouth swells    Beeswax Anaphylaxis    Levaquin [Levofloxacin] Not Reported This Time and Swelling     Other reaction(s): mild rash/itching    Chlorhexidine Rash    Moca Rash and Itching    Codeine Other (comments)    Glycopyrrolate Swelling     Pt  States  faciAL  SWELLING   POST  ROBINUL  IV   Pt  States  faciAL  SWELLING   POST  ROBINUL  IV     Pcn [Penicillins] Swelling    Tape [Adhesive] Rash    Toradol [Ketorolac] Hives    Tramadol Swelling         Review of Systems   Review of Systems   Constitutional: Negative for fever. Musculoskeletal: Positive for arthralgias and myalgias. Skin: Negative. Neurological: Negative for weakness and numbness. All other systems reviewed and are negative. Physical Exam     Vitals:    08/28/21 1357   BP: 122/79   Pulse: (!) 115   Resp: 18   Temp: 98.7 °F (37.1 °C)   SpO2: 99%   Weight: 61.7 kg (136 lb)   Height: 5' 2\" (1.575 m)     Physical Exam  Vital signs and nursing notes reviewed. CONSTITUTIONAL: Alert. Well-appearing; well-nourished; in no apparent distress. HEAD: Normocephalic; atraumatic. CV: Normal S1, S2; no murmurs, rubs, or gallops.  No chest wall tenderness. RESPIRATORY: Normal chest excursion with respiration; breath sounds clear and equal bilaterally; no wheezes, rhonchi, or rales. EXT: LUE: Left anterior shoulder is tender with mild swelling, no warmth or rash. Full range of motion but with some pain. There is a fluctuant and tender ganglion cyst over the dorsal aspect of her left wrist/hand, which is where she localizes her pain. There is no redness warmth or other abnormalities. Full range of motion of wrist but with some pain. 2+ radial pulse.  5/5. Able to move all fingers and cap refill less than 2 seconds. RLE: Points to right anterior knee for pain, nontender without swelling, erythema, ecchymosis or deformity. No abnormal patellar motion. Full range of motion. SKIN: Normal for age and race; warm; dry; good turgor; no apparent lesions or exudate. NEURO: A & O x3. Cranial nerves 2-12 intact. Motor 5/5 bilaterally. Sensation intact. PSYCH:  Mood and affect appropriate. Diagnostic Study Results     Labs -   No results found for this or any previous visit (from the past 12 hour(s)). Radiologic Studies -   No orders to display     CT Results  (Last 48 hours)    None        CXR Results  (Last 48 hours)    None          Medications given in the ED-  Medications   oxyCODONE IR (ROXICODONE) tablet 5 mg (5 mg Oral Given 8/28/21 1510)         Medical Decision Making   I am the first provider for this patient. I reviewed the vital signs, available nursing notes, past medical history, past surgical history, family history and social history. Vital Signs-Reviewed the patient's vital signs. Records Reviewed: Nursing Notes      Procedures:  Procedures    ED Course:  2:07 PM   Initial assessment performed. The patients presenting problems have been discussed, and they are in agreement with the care plan formulated and outlined with them. I have encouraged them to ask questions as they arise throughout their visit. Provider Notes (Medical Decision Making): Dora De Jesus is a 45 y.o. female presents with polyarthralgias, more specifically left shoulder left dorsal hand/wrist pain; not responsive to various medications including recent NSAIDs, steroids. Unfortunately she also has numerous medication allergies and long-term narcotics not indicated for these chronic issues. On exam of her chief complaint of left wrist pain she has an obvious ganglion cyst without signs of infection and doubt at all related to reported IV attempt 1.5 months ago. Will give single dose of oxycodone here to help with pain, but as far as long-term pain relief, recommend intermittent ibuprofen use, activity modification and RICE. She will call to follow-up with her orthopedist as she will likely benefit from steroid injection and also PCP for autoimmune work up for RA etc.    Diagnosis and Disposition       DISCHARGE NOTE:    Melissa Camilo  results have been reviewed with her. She has been counseled regarding her diagnosis, treatment, and plan. She verbally conveys understanding and agreement of the signs, symptoms, diagnosis, treatment and prognosis and additionally agrees to follow up as discussed. She also agrees with the care-plan and conveys that all of her questions have been answered. I have also provided discharge instructions for her that include: educational information regarding their diagnosis and treatment, and list of reasons why they would want to return to the ED prior to their follow-up appointment, should her condition change. She has been provided with education for proper emergency department utilization. CLINICAL IMPRESSION:    1. Ganglion cyst of dorsum of left wrist    2. Polyarthralgia        PLAN:  1. D/C Home  2. Discharge Medication List as of 8/28/2021  2:44 PM        3.    Follow-up Information     Follow up With Specialties Details Why Contact Info    Your PCP as scheduled in September Apple Andres MD Orthopedic Surgery Schedule an appointment as soon as possible for a visit   [place] [address] [address] [facility]      [facility] EMERGENCY DEPT Emergency Medicine  As needed, If symptoms worsen [address] [place] [address]  [phone]        _______________________________      Please note that this dictation was completed with [employer], the computer voice recognition software. Quite often unanticipated grammatical, syntax, homophones, and other interpretive errors are inadvertently transcribed by the computer software. Please disregard these errors. Please excuse any errors that have escaped final proofreading.

## 2021-09-01 ENCOUNTER — HOSPITAL ENCOUNTER (EMERGENCY)
Age: 51
Discharge: HOME | End: 2021-09-01
Attending: EMERGENCY MEDICINE
Payer: MEDICAID

## 2021-09-01 ENCOUNTER — APPOINTMENT (OUTPATIENT)
Dept: CT IMAGING | Age: 51
End: 2021-09-01
Attending: EMERGENCY MEDICINE
Payer: MEDICAID

## 2021-09-01 VITALS
OXYGEN SATURATION: 100 % | HEART RATE: 98 BPM | TEMPERATURE: 99.1 F | BODY MASS INDEX: 25.03 KG/M2 | RESPIRATION RATE: 20 BRPM | SYSTOLIC BLOOD PRESSURE: 145 MMHG | WEIGHT: 136 LBS | DIASTOLIC BLOOD PRESSURE: 104 MMHG | HEIGHT: 62 IN

## 2021-09-01 DIAGNOSIS — J01.00 ACUTE MAXILLARY SINUSITIS, RECURRENCE NOT SPECIFIED: Primary | ICD-10-CM

## 2021-09-01 LAB — SARS-COV-2, COV2: NORMAL

## 2021-09-01 PROCEDURE — 99283 EMERGENCY DEPT VISIT LOW MDM: CPT

## 2021-09-01 PROCEDURE — 70450 CT HEAD/BRAIN W/O DYE: CPT

## 2021-09-01 PROCEDURE — U0003 INFECTIOUS AGENT DETECTION BY NUCLEIC ACID (DNA OR RNA); SEVERE ACUTE RESPIRATORY SYNDROME CORONAVIRUS 2 (SARS-COV-2) (CORONAVIRUS DISEASE [COVID-19]), AMPLIFIED PROBE TECHNIQUE, MAKING USE OF HIGH THROUGHPUT TECHNOLOGIES AS DESCRIBED BY CMS-2020-01-R: HCPCS

## 2021-09-01 RX ORDER — DOXYCYCLINE HYCLATE 100 MG
100 TABLET ORAL 2 TIMES DAILY
Qty: 20 TABLET | Refills: 0 | Status: SHIPPED | OUTPATIENT
Start: 2021-09-01 | End: 2021-09-11

## 2021-09-01 RX ORDER — DEXTROMETHORPHAN HYDROBROMIDE AND GUAIFENESIN 10; 200 MG/1; MG/1
1 CAPSULE, GELATIN COATED ORAL
Qty: 20 CAPSULE | Refills: 0 | Status: SHIPPED | OUTPATIENT
Start: 2021-09-01 | End: 2021-12-02

## 2021-09-01 NOTE — ED TRIAGE NOTES
Patient reports she has a cyst on her left hand. Patient reports she had liquid taken out of her left knee. Patient is concerned about an infection in her cyst. States she feels \"water in her body\". Patient has an appt with her orthopedist tomorrow.  Patient reports hx of HTN

## 2021-09-02 NOTE — ED PROVIDER NOTES
EMERGENCY DEPARTMENT HISTORY AND PHYSICAL EXAM    Date: 9/1/2021  Patient Name: Dora De Jesus    History of Presenting Illness     Chief Complaint   Patient presents with    Cyst    Headache    Blurred Vision    Dizziness         History Provided By: Patient        Additional History (Context): Dora De Jesus is a 45 y.o. female with diabetes, hypertension, obesity and Seeking behavior, bipolar disease, anxiety, depression who presents with complaint of sore throat discomfort with postnasal drip and a headache. She has had her symptoms for several days and is not yet vaccinated for COVID has an appointment on the ninth. Additionally she is curious about her ganglion cyst on her left wrist which she thinks it might have \"popped\"  She does not know if that is causing her sore throat discomfort. Still reporting dizziness. She is unsure if it is just from her head congestion or if there is something else going on. Not currently have a primary care doctor; she is due to get reassigned someone later this month. PCP: None    Current Outpatient Medications   Medication Sig Dispense Refill    doxycycline (VIBRA-TABS) 100 mg tablet Take 1 Tablet by mouth two (2) times a day for 10 days. 20 Tablet 0    dextromethorphan-guaiFENesin (Coricidin HBP Chest Simone-Cough)  mg cap Take 1 Capsule by mouth two (2) times daily as needed for Cough or Other (congestion). 20 Capsule 0    predniSONE (STERAPRED DS) 10 mg dose pack Take as directed 21 Tablet 0    lisinopriL (PRINIVIL, ZESTRIL) 10 mg tablet Take  by mouth daily.  Cane coco 1 Each by Does Not Apply route daily. 1 Device 0    ondansetron (ZOFRAN ODT) 4 mg disintegrating tablet Take 1-2 tablets every 6-8 hours as needed for nausea and vomiting.  10 Tab 0    lidocaine (LIDOCAINE VISCOUS) 2 % solution Put 10 mL in mouth and swish and spit out QAC and at bedtime 200 mL 0    Blood-Glucose Meter (ONETOUCH ULTRA2) monitoring kit Use to obtain two times a day. 1 Kit 0    lancets misc Use daily to monitor blood glucose 200 Each 0    glucose blood VI test strips (ASCENSIA AUTODISC VI, ONE TOUCH ULTRA TEST VI) strip 50 Each by Does Not Apply route two (2) times daily as needed. 200 Strip 3    insulin nph-regular human rec (HUMULIN 70-30) 100 unit/mL (70-30) inpn Give 40 units in the QAM and 45 units at bedtime 5 Pen 3    polyethylene glycol (MIRALAX) 17 gram packet Take 1 Packet by mouth daily. 30 Each 1    Insulin Needles, Disposable, 31 gauge x 5/16\" ndle Use to administer insulin as directed 1 Package 11    atorvastatin (LIPITOR) 40 mg tablet Take 1 Tab by mouth daily. 30 Tab 6       Past History     Past Medical History:  Past Medical History:   Diagnosis Date    Anxiety     Bipolar disorder ([address]     Breast cancer ([address]     breast cancer, right, S/P Mastectomy and chemo, radiation in 2013    Depression     Diabetes ([address]     Drug abuse ([address]     H/O cocaine use in past    Drug-seeking behavior     Family history of early CAD     Gastrointestinal disorder     cholitis    H/O ETOH abuse     Hyperlipidemia     Hypertension     Malignant neoplasm without specification of site ([address]     Methamphetamine abuse ([address]      self reported on 1/3/16    Spine injury     Vitamin D deficiency 5/1/2018       Past Surgical History:  Past Surgical History:   Procedure Laterality Date    COLONOSCOPY N/A 7/18/2016    COLONOSCOPY performed by Khoi Bergeron MD at [address] [place] [address] ENDOSCOPY    HX BREAST LUMPECTOMY  06/2013    right    HX HYSTERECTOMY      HX MASTECTOMY      had expanders put in 6/2018    HX ORTHOPAEDIC      Back fusion surgery 4/18/14.      HX OTHER SURGICAL      mediport    HX TONSILLECTOMY      HX TUBAL LIGATION         Family History:  Family History   Problem Relation Age of Onset    Heart Disease Mother     Stroke Father     Heart Disease Father 48    Diabetes Other     Hypertension Other     Cancer Maternal Grandmother        Social History:  Social History     Tobacco Use    Smoking status: Never Smoker    Smokeless tobacco: Never Used   Substance Use Topics    Alcohol use: Not Currently     Comment: \"Occasionally\"    Drug use: Not Currently     Types: Methamphetamines, Cocaine       Allergies: Allergies   Allergen Reactions    Latex Hives and Itching    Other Food Rash     Almonds    Amoxicillin Swelling     Other reaction(s): mild rash/itching    Aspirin Not Reported This Time and Swelling     Mouth swells    Beeswax Anaphylaxis    Levaquin [Levofloxacin] Not Reported This Time and Swelling     Other reaction(s): mild rash/itching    Chlorhexidine Rash    Steele Rash and Itching    Codeine Other (comments)    Glycopyrrolate Swelling     Pt  States  faciAL  SWELLING   POST  ROBINUL  IV   Pt  States  faciAL  SWELLING   POST  ROBINUL  IV     Pcn [Penicillins] Swelling    Tape [Adhesive] Rash    Toradol [Ketorolac] Hives    Tramadol Swelling         Review of Systems   Review of Systems   Constitutional: Negative for fever. HENT: Positive for congestion, postnasal drip, rhinorrhea and sore throat. Neurological: Positive for headaches. All Other Systems Negative  Physical Exam     Vitals:    09/01/21 1845   BP: (!) 148/101   Pulse: (!) 104   Resp: 16   Temp: 99.1 °F (37.3 °C)   SpO2: 99%   Weight: 61.7 kg (136 lb)   Height: 5' 2\" (1.575 m)     Physical Exam  Vitals and nursing note reviewed. Constitutional:       Appearance: She is well-developed. HENT:      Head: Normocephalic and atraumatic. Nose: Congestion present. Mouth/Throat:      Mouth: Mucous membranes are moist.      Pharynx: Posterior oropharyngeal erythema present. No oropharyngeal exudate. Comments: Moderate postnasal drip visualized  Eyes:      Pupils: Pupils are equal, round, and reactive to light. Neck:      Thyroid: No thyromegaly. Vascular: No JVD. Trachea: No tracheal deviation.    Cardiovascular:      Rate and Rhythm: Normal rate and regular rhythm. Heart sounds: Normal heart sounds. No murmur heard. No friction rub. No gallop. Pulmonary:      Effort: Pulmonary effort is normal. No respiratory distress. Breath sounds: Normal breath sounds. No stridor. No wheezing or rales. Chest:      Chest wall: No tenderness. Abdominal:      General: There is no distension. Palpations: Abdomen is soft. There is no mass. Tenderness: There is no abdominal tenderness. There is no guarding or rebound. Musculoskeletal:         General: No tenderness. Lymphadenopathy:      Cervical: No cervical adenopathy. Skin:     General: Skin is warm and dry. Coloration: Skin is not pale. Findings: No erythema or rash. Neurological:      Mental Status: She is alert and oriented to person, place, and time. Psychiatric:         Behavior: Behavior normal.         Thought Content: Thought content normal.          Diagnostic Study Results     Labs -   No results found for this or any previous visit (from the past 12 hour(s)). Radiologic Studies -   CT HEAD WO CONT    (Results Pending)     CT Results  (Last 48 hours)    None        CXR Results  (Last 48 hours)    None            Medical Decision Making   I am the first provider for this patient. I reviewed the vital signs, available nursing notes, past medical history, past surgical history, family history and social history. Vital Signs-Reviewed the patient's vital signs. Records Reviewed: Nursing Notes    Procedures:  Procedures    Provider Notes (Medical Decision Making): Scan shows nothing acute intracranially likely her congestion and fullness and sinus infection contributing to her discomfort. Will swab for Covid prior to discharge and she can be safely sent home with an antibiotic prescription as well as Coricidin. Vies to isolate until negative Covid test results return.     MED RECONCILIATION:  No current facility-administered medications for this encounter. Current Outpatient Medications   Medication Sig    doxycycline (VIBRA-TABS) 100 mg tablet Take 1 Tablet by mouth two (2) times a day for 10 days.  dextromethorphan-guaiFENesin (Coricidin HBP Chest Simone-Cough)  mg cap Take 1 Capsule by mouth two (2) times daily as needed for Cough or Other (congestion).  predniSONE (STERAPRED DS) 10 mg dose pack Take as directed    lisinopriL (PRINIVIL, ZESTRIL) 10 mg tablet Take  by mouth daily.  Cane coco 1 Each by Does Not Apply route daily.  ondansetron (ZOFRAN ODT) 4 mg disintegrating tablet Take 1-2 tablets every 6-8 hours as needed for nausea and vomiting.  lidocaine (LIDOCAINE VISCOUS) 2 % solution Put 10 mL in mouth and swish and spit out QAC and at bedtime    Blood-Glucose Meter (ONETOUCH ULTRA2) monitoring kit Use to obtain two times a day.  lancets misc Use daily to monitor blood glucose    glucose blood VI test strips (ASCENSIA AUTODISC VI, ONE TOUCH ULTRA TEST VI) strip 50 Each by Does Not Apply route two (2) times daily as needed.  insulin nph-regular human rec (HUMULIN 70-30) 100 unit/mL (70-30) inpn Give 40 units in the QAM and 45 units at bedtime    polyethylene glycol (MIRALAX) 17 gram packet Take 1 Packet by mouth daily.  Insulin Needles, Disposable, 31 gauge x 5/16\" ndle Use to administer insulin as directed    atorvastatin (LIPITOR) 40 mg tablet Take 1 Tab by mouth daily. Disposition:  home    DISCHARGE NOTE:   10:52 PM    Pt has been reexamined. Patient has no new complaints, changes, or physical findings. Care plan outlined and precautions discussed. Results of CT were reviewed with the patient. All medications were reviewed with the patient; will d/c home with see below. All of pt's questions and concerns were addressed. Patient was instructed and agrees to follow up with PCP, as well as to return to the ED upon further deterioration. Patient is ready to go home.     Follow-up Information    None Current Discharge Medication List      START taking these medications    Details   doxycycline (VIBRA-TABS) 100 mg tablet Take 1 Tablet by mouth two (2) times a day for 10 days. Qty: 20 Tablet, Refills: 0  Start date: 9/1/2021, End date: 9/11/2021      dextromethorphan-guaiFENesin (Coricidin HBP Chest Simone-Cough)  mg cap Take 1 Capsule by mouth two (2) times daily as needed for Cough or Other (congestion). Qty: 20 Capsule, Refills: 0  Start date: 9/1/2021             Diagnosis     Clinical Impression:   1.  Acute maxillary sinusitis, recurrence not specified

## 2021-09-03 LAB — SARS-COV-2, COV2NT: NOT DETECTED

## 2021-09-05 ENCOUNTER — APPOINTMENT (OUTPATIENT)
Dept: CT IMAGING | Age: 51
End: 2021-09-05
Attending: EMERGENCY MEDICINE
Payer: MEDICAID

## 2021-09-05 ENCOUNTER — APPOINTMENT (OUTPATIENT)
Dept: GENERAL RADIOLOGY | Age: 51
End: 2021-09-05
Attending: EMERGENCY MEDICINE
Payer: MEDICAID

## 2021-09-05 ENCOUNTER — HOSPITAL ENCOUNTER (EMERGENCY)
Age: 51
Discharge: HOME | End: 2021-09-06
Attending: EMERGENCY MEDICINE
Payer: MEDICAID

## 2021-09-05 VITALS
TEMPERATURE: 99 F | DIASTOLIC BLOOD PRESSURE: 94 MMHG | SYSTOLIC BLOOD PRESSURE: 164 MMHG | HEART RATE: 100 BPM | OXYGEN SATURATION: 97 % | WEIGHT: 136 LBS | BODY MASS INDEX: 25.03 KG/M2 | HEIGHT: 62 IN | RESPIRATION RATE: 15 BRPM

## 2021-09-05 DIAGNOSIS — M48.02 CERVICAL SPINAL STENOSIS: Primary | ICD-10-CM

## 2021-09-05 DIAGNOSIS — E11.649 HYPOGLYCEMIA DUE TO TYPE 2 DIABETES MELLITUS (HCC): ICD-10-CM

## 2021-09-05 LAB
AMPHET UR QL SCN: NEGATIVE
ANION GAP SERPL CALC-SCNC: 8 MMOL/L (ref 3–18)
APPEARANCE UR: CLEAR
BACTERIA URNS QL MICRO: ABNORMAL /HPF
BARBITURATES UR QL SCN: NEGATIVE
BASOPHILS # BLD: 0 K/UL (ref 0–0.1)
BASOPHILS NFR BLD: 0 % (ref 0–2)
BENZODIAZ UR QL: NEGATIVE
BILIRUB UR QL: NEGATIVE
BUN SERPL-MCNC: 8 MG/DL (ref 7–18)
BUN/CREAT SERPL: 9 (ref 12–20)
CALCIUM SERPL-MCNC: 9.3 MG/DL (ref 8.5–10.1)
CANNABINOIDS UR QL SCN: NEGATIVE
CHLORIDE SERPL-SCNC: 98 MMOL/L (ref 100–111)
CO2 SERPL-SCNC: 28 MMOL/L (ref 21–32)
COCAINE UR QL SCN: NEGATIVE
COLOR UR: YELLOW
CREAT SERPL-MCNC: 0.91 MG/DL (ref 0.6–1.3)
DIFFERENTIAL METHOD BLD: ABNORMAL
EOSINOPHIL # BLD: 0.1 K/UL (ref 0–0.4)
EOSINOPHIL NFR BLD: 1 % (ref 0–5)
EPITH CASTS URNS QL MICRO: ABNORMAL /LPF (ref 0–5)
ERYTHROCYTE [DISTWIDTH] IN BLOOD BY AUTOMATED COUNT: 13.7 % (ref 11.6–14.5)
GLUCOSE SERPL-MCNC: 508 MG/DL (ref 74–99)
GLUCOSE UR STRIP.AUTO-MCNC: >1000 MG/DL
HCT VFR BLD AUTO: 34.9 % (ref 35–45)
HDSCOM,HDSCOM: NORMAL
HGB BLD-MCNC: 11.4 G/DL (ref 12–16)
HGB UR QL STRIP: NEGATIVE
KETONES UR QL STRIP.AUTO: NEGATIVE MG/DL
LEUKOCYTE ESTERASE UR QL STRIP.AUTO: NEGATIVE
LYMPHOCYTES # BLD: 1.6 K/UL (ref 0.9–3.6)
LYMPHOCYTES NFR BLD: 21 % (ref 21–52)
MCH RBC QN AUTO: 27.2 PG (ref 24–34)
MCHC RBC AUTO-ENTMCNC: 32.7 G/DL (ref 31–37)
MCV RBC AUTO: 83.3 FL (ref 78–100)
METHADONE UR QL: NEGATIVE
MONOCYTES # BLD: 0.5 K/UL (ref 0.05–1.2)
MONOCYTES NFR BLD: 7 % (ref 3–10)
NEUTS SEG # BLD: 5.7 K/UL (ref 1.8–8)
NEUTS SEG NFR BLD: 71 % (ref 40–73)
NITRITE UR QL STRIP.AUTO: NEGATIVE
OPIATES UR QL: NEGATIVE
PCP UR QL: NEGATIVE
PH UR STRIP: 7 [PH] (ref 5–8)
PLATELET # BLD AUTO: 370 K/UL (ref 135–420)
PMV BLD AUTO: 11 FL (ref 9.2–11.8)
POTASSIUM SERPL-SCNC: 3.6 MMOL/L (ref 3.5–5.5)
PROT UR STRIP-MCNC: 30 MG/DL
RBC # BLD AUTO: 4.19 M/UL (ref 4.2–5.3)
RBC #/AREA URNS HPF: ABNORMAL /HPF (ref 0–5)
SODIUM SERPL-SCNC: 134 MMOL/L (ref 136–145)
SP GR UR REFRACTOMETRY: >1.03 (ref 1–1.03)
UROBILINOGEN UR QL STRIP.AUTO: 0.2 EU/DL (ref 0.2–1)
WBC # BLD AUTO: 8 K/UL (ref 4.6–13.2)
WBC URNS QL MICRO: NEGATIVE /HPF (ref 0–5)
YEAST URNS QL MICRO: ABNORMAL

## 2021-09-05 PROCEDURE — 85025 COMPLETE CBC W/AUTO DIFF WBC: CPT

## 2021-09-05 PROCEDURE — 74011636637 HC RX REV CODE- 636/637: Performed by: EMERGENCY MEDICINE

## 2021-09-05 PROCEDURE — 81001 URINALYSIS AUTO W/SCOPE: CPT

## 2021-09-05 PROCEDURE — 99285 EMERGENCY DEPT VISIT HI MDM: CPT

## 2021-09-05 PROCEDURE — 71045 X-RAY EXAM CHEST 1 VIEW: CPT

## 2021-09-05 PROCEDURE — 80048 BASIC METABOLIC PNL TOTAL CA: CPT

## 2021-09-05 PROCEDURE — 96361 HYDRATE IV INFUSION ADD-ON: CPT

## 2021-09-05 PROCEDURE — 96374 THER/PROPH/DIAG INJ IV PUSH: CPT

## 2021-09-05 PROCEDURE — 80307 DRUG TEST PRSMV CHEM ANLYZR: CPT

## 2021-09-05 PROCEDURE — 72125 CT NECK SPINE W/O DYE: CPT

## 2021-09-05 PROCEDURE — 74011250637 HC RX REV CODE- 250/637: Performed by: EMERGENCY MEDICINE

## 2021-09-05 RX ORDER — OXYCODONE AND ACETAMINOPHEN 5; 325 MG/1; MG/1
1 TABLET ORAL
Status: COMPLETED | OUTPATIENT
Start: 2021-09-05 | End: 2021-09-05

## 2021-09-05 RX ORDER — CARISOPRODOL 350 MG/1
350 TABLET ORAL
Status: COMPLETED | OUTPATIENT
Start: 2021-09-05 | End: 2021-09-05

## 2021-09-05 RX ADMIN — CARISOPRODOL 350 MG: 350 TABLET ORAL at 23:57

## 2021-09-05 RX ADMIN — INSULIN HUMAN 10 UNITS: 100 INJECTION, SOLUTION PARENTERAL at 23:57

## 2021-09-05 RX ADMIN — OXYCODONE HYDROCHLORIDE AND ACETAMINOPHEN 1 TABLET: 5; 325 TABLET ORAL at 23:57

## 2021-09-05 NOTE — ED TRIAGE NOTES
Patient arrives ambulatory to ED with c/c of multiple complaints: left knee pain, left wrist pain, right sided neck pain, generalized body aches and severe fatigue since this morning.

## 2021-09-06 LAB — GLUCOSE BLD STRIP.AUTO-MCNC: 175 MG/DL (ref 70–110)

## 2021-09-06 PROCEDURE — 96361 HYDRATE IV INFUSION ADD-ON: CPT

## 2021-09-06 PROCEDURE — 74011250636 HC RX REV CODE- 250/636: Performed by: EMERGENCY MEDICINE

## 2021-09-06 PROCEDURE — 82962 GLUCOSE BLOOD TEST: CPT

## 2021-09-06 RX ORDER — CARISOPRODOL 350 MG/1
350 TABLET ORAL
Qty: 30 TABLET | Refills: 0 | Status: SHIPPED | OUTPATIENT
Start: 2021-09-06 | End: 2021-12-02

## 2021-09-06 RX ORDER — IBUPROFEN 600 MG/1
600 TABLET ORAL
Qty: 20 TABLET | Refills: 0 | Status: SHIPPED | OUTPATIENT
Start: 2021-09-06 | End: 2021-09-27

## 2021-09-06 RX ADMIN — SODIUM CHLORIDE 500 ML: 900 INJECTION, SOLUTION INTRAVENOUS at 00:02

## 2021-09-06 NOTE — ED PROVIDER NOTES
EMERGENCY DEPARTMENT HISTORY AND PHYSICAL EXAM    Date: 9/5/2021  Patient Name: Dora De Jesus    History of Presenting Illness     Chief Complaint   Patient presents with    Generalized Body Aches         History Provided By: Patient and Patient's Son    Additional History (Context):   9:15 PM  Dora De Jesus is a 45 y.o. female with PMHX of hypertension, diabetes, family history of heart disease, breast cancer status post mastectomy with chemo and radiation, bipolar disorder with anxiety and polysubstance abuse including cocaine and amphetamine in her distant past, who presents to the emergency department C/O multiple generalized complaints. Patient has a complicated medical history who presents this evening without fever but generalized complaints of fatigue malaise body aches in addition to knee pain wrist pain joint pain and neck pain. She describes her pain as 10 out of 10 cannot localize characterize or give increasing or decreasing factors. She is tearful tachycardic extremely anxious however as I begin to discuss her history she soon spontaneously becomes calm and cannot give me a clear history without agitation anxiety or tearful appearance just by communicating with her in a calm and productive manner. She denies fevers vomiting or diarrhea. Alert as I turned to leave the room she begins with the crying and moaning almost immediately. Social History  Denies smoking or alcohol use. Denies any recent cocaine or amphetamines stating she has been clean for the last 3 to 4 months. She lives with her  and son. Family History  Mother with heart failure Father with heart disease  Positive for high blood pressure    PCP: None    Current Outpatient Medications   Medication Sig Dispense Refill    carisoprodoL (Soma) 350 mg tablet Take 1 Tablet by mouth every eight (8) hours as needed for Muscle Spasm(s).  Max Daily Amount: 1,050 mg. 30 Tablet 0    ibuprofen (MOTRIN) 600 mg tablet Take 1 Tablet by mouth every six (6) hours as needed for Pain. 20 Tablet 0    doxycycline (VIBRA-TABS) 100 mg tablet Take 1 Tablet by mouth two (2) times a day for 10 days. 20 Tablet 0    dextromethorphan-guaiFENesin (Coricidin HBP Chest Simone-Cough)  mg cap Take 1 Capsule by mouth two (2) times daily as needed for Cough or Other (congestion). 20 Capsule 0    predniSONE (STERAPRED DS) 10 mg dose pack Take as directed 21 Tablet 0    lisinopriL (PRINIVIL, ZESTRIL) 10 mg tablet Take  by mouth daily.  Cane coco 1 Each by Does Not Apply route daily. 1 Device 0    ondansetron (ZOFRAN ODT) 4 mg disintegrating tablet Take 1-2 tablets every 6-8 hours as needed for nausea and vomiting. 10 Tab 0    lidocaine (LIDOCAINE VISCOUS) 2 % solution Put 10 mL in mouth and swish and spit out QAC and at bedtime 200 mL 0    Blood-Glucose Meter (ONETOUCH ULTRA2) monitoring kit Use to obtain two times a day. 1 Kit 0    lancets misc Use daily to monitor blood glucose 200 Each 0    glucose blood VI test strips (ASCENSIA AUTODISC VI, ONE TOUCH ULTRA TEST VI) strip 50 Each by Does Not Apply route two (2) times daily as needed. 200 Strip 3    insulin nph-regular human rec (HUMULIN 70-30) 100 unit/mL (70-30) inpn Give 40 units in the QAM and 45 units at bedtime 5 Pen 3    polyethylene glycol (MIRALAX) 17 gram packet Take 1 Packet by mouth daily. 30 Each 1    Insulin Needles, Disposable, 31 gauge x 5/16\" ndle Use to administer insulin as directed 1 Package 11    atorvastatin (LIPITOR) 40 mg tablet Take 1 Tab by mouth daily.  30 Tab 6       Past History     Past Medical History:  Past Medical History:   Diagnosis Date    Anxiety     Bipolar disorder ([facility] [facility]     Breast cancer ([facility] [address]     breast cancer, right, S/P Mastectomy and chemo, radiation in 2013    Depression     Diabetes ([facility] [address] [facility]     Drug abuse ([facility] [address]     H/O cocaine use in past    Drug-seeking behavior     Family history of early CAD     Gastrointestinal disorder cholitis    H/O ETOH abuse     Hyperlipidemia     Hypertension     Malignant neoplasm without specification of site ([facility] [facility]     Methamphetamine abuse ([facility] [address]      self reported on 1/3/16    Spine injury     Vitamin D deficiency 5/1/2018       Past Surgical History:  Past Surgical History:   Procedure Laterality Date    COLONOSCOPY N/A 7/18/2016    COLONOSCOPY performed by Charisma Navarrete MD at [facility] ENDOSCOPY    HX BREAST LUMPECTOMY  06/2013    right    HX HYSTERECTOMY      HX MASTECTOMY      had expanders put in 6/2018    HX ORTHOPAEDIC      Back fusion surgery 4/18/14.  HX OTHER SURGICAL      mediport    HX TONSILLECTOMY      HX TUBAL LIGATION         Family History:  Family History   Problem Relation Age of Onset    Heart Disease Mother     Stroke Father     Heart Disease Father 48    Diabetes Other     Hypertension Other     Cancer Maternal Grandmother        Social History:  Social History     Tobacco Use    Smoking status: Never Smoker    Smokeless tobacco: Never Used   Substance Use Topics    Alcohol use: Not Currently     Comment: \"Occasionally\"    Drug use: Not Currently     Types: Methamphetamines, Cocaine     Comment: none in 2 years       Allergies:   Allergies   Allergen Reactions    Latex Hives and Itching    Other Food Rash     Almonds    Amoxicillin Swelling     Other reaction(s): mild rash/itching    Aspirin Not Reported This Time and Swelling     Mouth swells    Beeswax Anaphylaxis    Levaquin [Levofloxacin] Not Reported This Time and Swelling     Other reaction(s): mild rash/itching    Chlorhexidine Rash    [place] Rash and Itching    Codeine Other (comments)    Glycopyrrolate Swelling     Pt  States  faciAL  SWELLING   POST  ROBINUL  IV   Pt  States  faciAL  SWELLING   POST  ROBINUL  IV     Pcn [Penicillins] Swelling    Tape [Adhesive] Rash    Toradol [Ketorolac] Hives     Patient denies this allergy      Tramadol Swelling         Review of Systems   Review of Systems   Constitutional: Positive for fatigue. HENT: Negative. Eyes: Negative. Respiratory: Negative. Cardiovascular: Negative. Endocrine: Positive for polydipsia and polyuria. Genitourinary: Positive for frequency. Musculoskeletal: Positive for arthralgias, back pain and myalgias. Skin: Negative for wound. Allergic/Immunologic: Negative. Neurological: Negative. Hematological: Negative. Psychiatric/Behavioral: Positive for dysphoric mood. Negative for suicidal ideas. All other systems reviewed and are negative. Physical Exam     Vitals:    09/05/21 2100 09/05/21 2145 09/05/21 2245 09/05/21 2356   BP: (!) 148/91 (!) 165/90 (!) 139/95 (!) 164/94   Pulse: (!) 104 97 (!) 103 100   Resp: 19 22 18 15   Temp:    99 °F (37.2 °C)   SpO2: 98% 96% 96% 97%   Weight:       Height:         Physical Exam  Vitals and nursing note reviewed. Constitutional:       General: She is not in acute distress. Appearance: She is well-developed. She is not diaphoretic. HENT:      Head: Normocephalic and atraumatic. Eyes:      General: No scleral icterus. Extraocular Movements:      Right eye: Normal extraocular motion. Left eye: Normal extraocular motion. Conjunctiva/sclera: Conjunctivae normal.      Pupils: Pupils are equal, round, and reactive to light. Neck:      Trachea: Trachea and phonation normal. No tracheal deviation. Cardiovascular:      Rate and Rhythm: Normal rate and regular rhythm. Pulses:           Carotid pulses are 2+ on the right side and 2+ on the left side. Radial pulses are 2+ on the right side and 2+ on the left side. Dorsalis pedis pulses are 2+ on the right side and 2+ on the left side. Heart sounds: Normal heart sounds. Pulmonary:      Effort: Pulmonary effort is normal. No respiratory distress. Breath sounds: Normal breath sounds. No stridor. Abdominal:      General: Bowel sounds are normal. There is no distension. Palpations: Abdomen is soft. Tenderness: There is no abdominal tenderness. There is no rebound. Musculoskeletal:         General: No tenderness. Normal range of motion. Cervical back: Normal range of motion and neck supple. No edema, erythema, signs of trauma, rigidity, torticollis or crepitus. Pain with movement, spinous process tenderness and muscular tenderness present. Normal range of motion. Comments: Grossly unremarkable without abnormalities   Lymphadenopathy:      Cervical: No cervical adenopathy. Skin:     General: Skin is warm and dry. Capillary Refill: Capillary refill takes less than 2 seconds. Findings: No erythema or rash. Neurological:      Mental Status: She is alert and oriented to person, place, and time. GCS: GCS eye subscore is 4. GCS verbal subscore is 5. GCS motor subscore is 6. Cranial Nerves: No cranial nerve deficit. Motor: No weakness. Comments: Radial medial ulnar nerve function is normal bilateral.  Radial pulses normal bilateral.  Cap refill is instant bilateral to all digits   Psychiatric:         Attention and Perception: Attention normal.         Mood and Affect: Mood is anxious. Affect is labile and tearful. Speech: Speech normal. Speech is not tangential.         Behavior: Behavior is not aggressive or withdrawn. Behavior is cooperative. Thought Content: Thought content normal. Thought content does not include homicidal or suicidal ideation.          Cognition and Memory: Cognition normal.         Judgment: Judgment normal.       Diagnostic Study Results     Labs -  Recent Results (from the past 24 hour(s))   CBC WITH AUTOMATED DIFF    Collection Time: 09/05/21  9:00 PM   Result Value Ref Range    WBC 8.0 4.6 - 13.2 K/uL    RBC 4.19 (L) 4.20 - 5.30 M/uL    HGB 11.4 (L) 12.0 - 16.0 g/dL    HCT 34.9 (L) 35.0 - 45.0 %    MCV 83.3 78.0 - 100.0 FL    MCH 27.2 24.0 - 34.0 PG    MCHC 32.7 31.0 - 37.0 g/dL    RDW 13.7 11.6 - 14.5 %    PLATELET 707 105 - 895 K/uL    MPV 11.0 9.2 - 11.8 FL    NEUTROPHILS 71 40 - 73 %    LYMPHOCYTES 21 21 - 52 %    MONOCYTES 7 3 - 10 %    EOSINOPHILS 1 0 - 5 %    BASOPHILS 0 0 - 2 %    ABS. NEUTROPHILS 5.7 1.8 - 8.0 K/UL    ABS. LYMPHOCYTES 1.6 0.9 - 3.6 K/UL    ABS. MONOCYTES 0.5 0.05 - 1.2 K/UL    ABS. EOSINOPHILS 0.1 0.0 - 0.4 K/UL    ABS.  BASOPHILS 0.0 0.0 - 0.1 K/UL    DF AUTOMATED     METABOLIC PANEL, BASIC    Collection Time: 09/05/21  9:00 PM   Result Value Ref Range    Sodium 134 (L) 136 - 145 mmol/L    Potassium 3.6 3.5 - 5.5 mmol/L    Chloride 98 (L) 100 - 111 mmol/L    CO2 28 21 - 32 mmol/L    Anion gap 8 3.0 - 18 mmol/L    Glucose 508 (HH) 74 - 99 mg/dL    BUN 8 7.0 - 18 MG/DL    Creatinine 0.91 0.6 - 1.3 MG/DL    BUN/Creatinine ratio 9 (L) 12 - 20      GFR est AA >60 >60 ml/min/1.73m2    GFR est non-AA >60 >60 ml/min/1.73m2    Calcium 9.3 8.5 - 10.1 MG/DL   URINALYSIS W/ RFLX MICROSCOPIC    Collection Time: 09/05/21  9:35 PM   Result Value Ref Range    Color YELLOW      Appearance CLEAR      Specific gravity >1.030 (H) 1.005 - 1.030    pH (UA) 7.0 5.0 - 8.0      Protein 30 (A) NEG mg/dL    Glucose >1,000 (A) NEG mg/dL    Ketone Negative NEG mg/dL    Bilirubin Negative NEG      Blood Negative NEG      Urobilinogen 0.2 0.2 - 1.0 EU/dL    Nitrites Negative NEG      Leukocyte Esterase Negative NEG     DRUG SCREEN, URINE    Collection Time: 09/05/21  9:35 PM   Result Value Ref Range    BENZODIAZEPINES Negative NEG      BARBITURATES Negative NEG      THC (TH-CANNABINOL) Negative NEG      OPIATES Negative NEG      PCP(PHENCYCLIDINE) Negative NEG      COCAINE Negative NEG      AMPHETAMINES Negative NEG      METHADONE Negative NEG      HDSCOM (NOTE)    URINE MICROSCOPIC ONLY    Collection Time: 09/05/21  9:35 PM   Result Value Ref Range    WBC Negative 0 - 5 /hpf    RBC 0 to 3 0 - 5 /hpf    Epithelial cells FEW 0 - 5 /lpf    Bacteria FEW (A) NEG /hpf    Yeast FEW (A) NEG     GLUCOSE, POC    Collection Time: 09/06/21  1:05 AM   Result Value Ref Range    Glucose (POC) 175 (H) 70 - 110 mg/dL        Radiologic Studies -   CT SPINE CERV WO CONT   Final Result      No acute fractures or listhesis. Spondylosis as described. XR CHEST PORT   Final Result   No acute process        CT Results  (Last 48 hours)               09/05/21 2206  CT SPINE CERV WO CONT Final result    Impression:      No acute fractures or listhesis. Spondylosis as described. Narrative:  EXAM: CT cervical spine       INDICATION: Neck pain       COMPARISON: None. TECHNIQUE: Axial CT imaging of the cervical spine was performed from the skull   base to the thoracic inlet without intravenous contrast. Multiplanar reformats   were generated. One or more dose reduction techniques were used on this CT:   automated exposure control, adjustment of the mAs and/or kVp according to   patient size, and iterative reconstruction techniques. The specific techniques   used on this CT exam have been documented in the patient's electronic medical   record. Digital Imaging and Communications in Medicine (DICOM) format image data   are available to nonaffiliated external healthcare facilities or entities on a   secure, media free, reciprocally searchable basis with patient authorization for   at least a 12-month period after this study. _______________       FINDINGS:       VERTEBRAE AND DISCS: Vertebral alignment and articulation are within normal   limits. Vertebral body heights are maintained. Mild to moderate disc space   narrowing present at C5-C6 with posterior disc osteophyte complex. Specifically,   no compression deformities are noted and there is no spondylolisthesis. No   displaced fractures are identified. There are no significant areas of bone   lucency or sclerosis. SPINAL CANAL AND FORAMINA: C4-C5 demonstrates lateral disc osteophyte complex   mild to moderately narrowing both neural foramen.        C5-C6 demonstrates posterior and lateral disc osteophyte complex severely   narrowing both neural foramen and resulting in moderate to severe spinal   stenosis. C6-C7 demonstrates lateral disc osteophyte complex moderate to significantly   narrowing both neural foramen. PREVERTEBRAL SOFT TISSUES: Normal.       VISIBLE PORTIONS OF POSTERIOR FOSSA/BRAIN: Normal.       LUNG APICES: Clear. OTHER: None.       _______________               CXR Results  (Last 48 hours)               09/05/21 2158  XR CHEST PORT Final result    Impression:  No acute process       Narrative:  EXAM:  AP Portable Chest X-ray 1 view        INDICATION: Extremity injury complains       COMPARISON: May 20, 2021       _______________       FINDINGS:  Heart and mediastinal contours are within normal limits for portable   radiograph. Lungs are clear of active disease. There are no pleural effusions. No acute osseous findings. ________________                     Medications given in the ED-  Medications   sodium chloride 0.9 % bolus infusion 500 mL (0 mL IntraVENous IV Completed 9/6/21 0110)   insulin regular (NOVOLIN R, HUMULIN R) injection 10 Units (10 Units IntraVENous Given 9/5/21 2357)   carisoprodoL (SOMA) tablet 350 mg (350 mg Oral Given 9/5/21 2357)   oxyCODONE-acetaminophen (PERCOCET) 5-325 mg per tablet 1 Tablet (1 Tablet Oral Given 9/5/21 2357)         Medical Decision Making   I am the first provider for this patient. I reviewed the vital signs, available nursing notes, past medical history, past surgical history, family history and social history. Vital Signs-Reviewed the patient's vital signs. Pulse Oximetry Analysis - 99% on room air    Cardiac Monitor:  Rate: 92 bpm  Rhythm: Sinus rhythm    Records Reviewed: NURSING NOTES AND PREVIOUS MEDICAL RECORDS    Provider Notes (Medical Decision Making):   Patient with complaints of diffuse myalgias and arthralgias in addition to neck problems.   Because of her history of breast cancer and the tenderness in her neck with radicular symptoms we CT for pain foraminal stenosis not just fracture possible malignancy all of which came back unremarkable. Initially started likely with pain medication. Given her history of opening give her the benefit of the doubt. Rechecked her blood sugars with diabetes and the result was \"high\" she was given fluid and insulin with significant improvement in her sugar numbers. She also stated the medications all. I will give her some outpatient muscle relaxants and encouraged her compliance with medication. She stated she was in such significant discomfort she had not had her insulin in the last day however she did have medications at home and did not need new prescriptions though it was offered. Unlikely this is associated with heart disease vascular lesion or infectious process. Procedures:  Procedures    ED Course:   9:15 p.m. : Initial assessment performed. The patients presenting problems have been discussed, and they are in agreement with the care plan formulated and outlined with them. I have encouraged them to ask questions as they arise throughout their visit. Diagnosis and Disposition       DISCHARGE NOTE:  1:27 AM  Francia Wheeler's  results have been reviewed with her. She has been counseled regarding her diagnosis, treatment, and plan. She verbally conveys understanding and agreement of the signs, symptoms, diagnosis, treatment and prognosis and additionally agrees to follow up as discussed. She also agrees with the care-plan and conveys that all of her questions have been answered. I have also provided discharge instructions for her that include: educational information regarding their diagnosis and treatment, and list of reasons why they would want to return to the ED prior to their follow-up appointment, should her condition change. She has been provided with education for proper emergency department utilization. CLINICAL IMPRESSION:    1. Cervical spinal stenosis    2. Hypoglycemia due to type 2 diabetes mellitus ([facility] [address]        PLAN:  1. D/C Home  2. Discharge Medication List as of 9/6/2021  1:33 AM      START taking these medications    Details   carisoprodoL (Soma) 350 mg tablet Take 1 Tablet by mouth every eight (8) hours as needed for Muscle Spasm(s). Max Daily Amount: 1,050 mg., Normal, Disp-30 Tablet, R-0      ibuprofen (MOTRIN) 600 mg tablet Take 1 Tablet by mouth every six (6) hours as needed for Pain., Normal, Disp-20 Tablet, R-0         CONTINUE these medications which have NOT CHANGED    Details   doxycycline (VIBRA-TABS) 100 mg tablet Take 1 Tablet by mouth two (2) times a day for 10 days. , Normal, Disp-20 Tablet, R-0      dextromethorphan-guaiFENesin (Coricidin HBP Chest Simone-Cough)  mg cap Take 1 Capsule by mouth two (2) times daily as needed for Cough or Other (congestion). , Normal, Disp-20 Capsule, R-0      predniSONE (STERAPRED DS) 10 mg dose pack Take as directed, Normal, Disp-21 Tablet, R-0      lisinopriL (PRINIVIL, ZESTRIL) 10 mg tablet Take  by mouth daily. , Historical Med      Cane coco 1 Each by Does Not Apply route daily. , Print, Disp-1 Device, R-0      ondansetron (ZOFRAN ODT) 4 mg disintegrating tablet Take 1-2 tablets every 6-8 hours as needed for nausea and vomiting., Print, Disp-10 Tab, R-0      lidocaine (LIDOCAINE VISCOUS) 2 % solution Put 10 mL in mouth and swish and spit out QAC and at bedtime, Print, Disp-200 mL, R-0      Blood-Glucose Meter (ONETOUCH ULTRA2) monitoring kit Use to obtain two times a day., Normal, Disp-1 Kit, R-0      lancets misc Use daily to monitor blood glucose, Normal, Disp-200 Each, R-0      glucose blood VI test strips (ASCENSIA AUTODISC VI, ONE TOUCH ULTRA TEST VI) strip 50 Each by Does Not Apply route two (2) times daily as needed., Normal, Disp-200 Strip, R-3      insulin nph-regular human rec (HUMULIN 70-30) 100 unit/mL (70-30) inpn Give 40 units in the QAM and 45 units at bedtime, Normal, Disp-5 Pen, R-3      polyethylene glycol (MIRALAX) 17 gram packet Take 1 Packet by mouth daily. , Normal, Disp-30 Each, R-1      Insulin Needles, Disposable, 31 gauge x 5/16\" ndle Use to administer insulin as directed, Normal, Disp-1 Package, R-11      atorvastatin (LIPITOR) 40 mg tablet Take 1 Tab by mouth daily. , Normal, Disp-30 Tab, R-6           3. Follow-up Information     Follow up With Specialties Details Why [address] surgeon  In 1 week          _______________________________    This note was partially transcribed via voice recognition software. Although efforts have been made to catch any discrepancies, it may contain sound alike words, grammatical errors, or nonsensical words.

## 2021-09-06 NOTE — ED NOTES
Pt discharged home stable and w/c to car without incident   Pain level at discharge 4/10. Pt discharged with . Reviewed discharged instructions with pt who verbalized understanding.   Patient armband removed and shredded

## 2021-09-07 ENCOUNTER — APPOINTMENT (OUTPATIENT)
Dept: CT IMAGING | Age: 51
End: 2021-09-07
Attending: STUDENT IN AN ORGANIZED HEALTH CARE EDUCATION/TRAINING PROGRAM
Payer: MEDICAID

## 2021-09-07 ENCOUNTER — HOSPITAL ENCOUNTER (EMERGENCY)
Age: 51
Discharge: HOME | End: 2021-09-08
Attending: STUDENT IN AN ORGANIZED HEALTH CARE EDUCATION/TRAINING PROGRAM
Payer: MEDICAID

## 2021-09-07 ENCOUNTER — APPOINTMENT (OUTPATIENT)
Dept: GENERAL RADIOLOGY | Age: 51
End: 2021-09-07
Attending: EMERGENCY MEDICINE
Payer: MEDICAID

## 2021-09-07 ENCOUNTER — APPOINTMENT (OUTPATIENT)
Dept: CT IMAGING | Age: 51
End: 2021-09-07
Attending: EMERGENCY MEDICINE
Payer: MEDICAID

## 2021-09-07 DIAGNOSIS — E13.9 SECONDARY DIABETES MELLITUS (HCC): ICD-10-CM

## 2021-09-07 DIAGNOSIS — R73.9 HYPERGLYCEMIA: Primary | ICD-10-CM

## 2021-09-07 DIAGNOSIS — Z76.89 FREQUENT PATIENT IN EMERGENCY DEPARTMENT: ICD-10-CM

## 2021-09-07 DIAGNOSIS — K08.89 PAIN, DENTAL: ICD-10-CM

## 2021-09-07 LAB
ANION GAP SERPL CALC-SCNC: 9 MMOL/L (ref 3–18)
ATRIAL RATE: 103 BPM
BASOPHILS # BLD: 0 K/UL (ref 0–0.1)
BASOPHILS NFR BLD: 0 % (ref 0–2)
BUN SERPL-MCNC: 9 MG/DL (ref 7–18)
BUN/CREAT SERPL: 9 (ref 12–20)
CALCIUM SERPL-MCNC: 9.5 MG/DL (ref 8.5–10.1)
CALCULATED P AXIS, ECG09: 70 DEGREES
CALCULATED R AXIS, ECG10: -11 DEGREES
CALCULATED T AXIS, ECG11: 71 DEGREES
CHLORIDE SERPL-SCNC: 99 MMOL/L (ref 100–111)
CO2 SERPL-SCNC: 28 MMOL/L (ref 21–32)
CREAT SERPL-MCNC: 0.95 MG/DL (ref 0.6–1.3)
DIAGNOSIS, 93000: NORMAL
DIFFERENTIAL METHOD BLD: ABNORMAL
EOSINOPHIL # BLD: 0.2 K/UL (ref 0–0.4)
EOSINOPHIL NFR BLD: 3 % (ref 0–5)
ERYTHROCYTE [DISTWIDTH] IN BLOOD BY AUTOMATED COUNT: 13.5 % (ref 11.6–14.5)
GLUCOSE SERPL-MCNC: 399 MG/DL (ref 74–99)
HCT VFR BLD AUTO: 37.4 % (ref 35–45)
HGB BLD-MCNC: 11.9 G/DL (ref 12–16)
LYMPHOCYTES # BLD: 1.5 K/UL (ref 0.9–3.6)
LYMPHOCYTES NFR BLD: 20 % (ref 21–52)
MCH RBC QN AUTO: 27.7 PG (ref 24–34)
MCHC RBC AUTO-ENTMCNC: 31.8 G/DL (ref 31–37)
MCV RBC AUTO: 87.2 FL (ref 78–100)
MONOCYTES # BLD: 0.3 K/UL (ref 0.05–1.2)
MONOCYTES NFR BLD: 4 % (ref 3–10)
NEUTS SEG # BLD: 5.7 K/UL (ref 1.8–8)
NEUTS SEG NFR BLD: 72 % (ref 40–73)
P-R INTERVAL, ECG05: 152 MS
PLATELET # BLD AUTO: 408 K/UL (ref 135–420)
PMV BLD AUTO: 10.3 FL (ref 9.2–11.8)
POTASSIUM SERPL-SCNC: 3.9 MMOL/L (ref 3.5–5.5)
Q-T INTERVAL, ECG07: 336 MS
QRS DURATION, ECG06: 80 MS
QTC CALCULATION (BEZET), ECG08: 440 MS
RBC # BLD AUTO: 4.29 M/UL (ref 4.2–5.3)
SODIUM SERPL-SCNC: 136 MMOL/L (ref 136–145)
VENTRICULAR RATE, ECG03: 103 BPM
WBC # BLD AUTO: 7.8 K/UL (ref 4.6–13.2)

## 2021-09-07 PROCEDURE — 74011250637 HC RX REV CODE- 250/637: Performed by: STUDENT IN AN ORGANIZED HEALTH CARE EDUCATION/TRAINING PROGRAM

## 2021-09-07 PROCEDURE — 74011250636 HC RX REV CODE- 250/636: Performed by: STUDENT IN AN ORGANIZED HEALTH CARE EDUCATION/TRAINING PROGRAM

## 2021-09-07 PROCEDURE — 71045 X-RAY EXAM CHEST 1 VIEW: CPT

## 2021-09-07 PROCEDURE — 80048 BASIC METABOLIC PNL TOTAL CA: CPT

## 2021-09-07 PROCEDURE — 96374 THER/PROPH/DIAG INJ IV PUSH: CPT

## 2021-09-07 PROCEDURE — 74011000636 HC RX REV CODE- 636: Performed by: STUDENT IN AN ORGANIZED HEALTH CARE EDUCATION/TRAINING PROGRAM

## 2021-09-07 PROCEDURE — 85025 COMPLETE CBC W/AUTO DIFF WBC: CPT

## 2021-09-07 PROCEDURE — 93005 ELECTROCARDIOGRAM TRACING: CPT

## 2021-09-07 PROCEDURE — 70450 CT HEAD/BRAIN W/O DYE: CPT

## 2021-09-07 PROCEDURE — 70487 CT MAXILLOFACIAL W/DYE: CPT

## 2021-09-07 PROCEDURE — 99284 EMERGENCY DEPT VISIT MOD MDM: CPT

## 2021-09-07 RX ORDER — CLINDAMYCIN HYDROCHLORIDE 300 MG/1
300 CAPSULE ORAL 4 TIMES DAILY
Qty: 40 CAPSULE | Refills: 0 | Status: SHIPPED | OUTPATIENT
Start: 2021-09-07 | End: 2021-09-17

## 2021-09-07 RX ORDER — CLINDAMYCIN HYDROCHLORIDE 150 MG/1
300 CAPSULE ORAL
Status: COMPLETED | OUTPATIENT
Start: 2021-09-07 | End: 2021-09-07

## 2021-09-07 RX ORDER — KETOROLAC TROMETHAMINE 15 MG/ML
15 INJECTION, SOLUTION INTRAMUSCULAR; INTRAVENOUS ONCE
Status: COMPLETED | OUTPATIENT
Start: 2021-09-07 | End: 2021-09-07

## 2021-09-07 RX ADMIN — CLINDAMYCIN HYDROCHLORIDE 300 MG: 150 CAPSULE ORAL at 21:27

## 2021-09-07 RX ADMIN — IOPAMIDOL 80 ML: 612 INJECTION, SOLUTION INTRAVENOUS at 21:43

## 2021-09-07 RX ADMIN — KETOROLAC TROMETHAMINE 15 MG: 15 INJECTION, SOLUTION INTRAMUSCULAR; INTRAVENOUS at 21:27

## 2021-09-07 NOTE — ED PROVIDER NOTES
EMERGENCY DEPARTMENT HISTORY AND PHYSICAL EXAM      Date: 9/7/2021  Patient Name: Alice Skinner    History of Presenting Illness     Chief Complaint   Patient presents with    Dental Pain    Facial Droop       History Provided By: Patient    HPI:  Alice Skinner is a 45 y.o. female with history of chronic pain, neck, back, shoulders, hands, knee. Who presents with left-sided dental pain, mild cheek feeling of fullness, she does have a mild soft tissue swelling of the left cheek, with multiple dental caries, 2 left lower, 1 left upper. She has had approximately 20 ED visits in the past 12 months for varying complaints, she states that she has a chiropractor appointment, orthopedic surgery appointment, as well as a primary care provider appointment later on this week. This is similar to previous stories, which gives me pause, I hope that she is truly following up with these providers and I explained to her my concern for her getting adequate primary care to manage her chronic pain issues as well as her chronic medical complaints so that she receives the best care possible. She is understanding of this plan. Tonight we will focus on making sure that she does not have a deep space infection in her face, and I will recommend that she follow-up with general dentistry. As well as OMFS. Patient denies allergy to Toradol. I removed this from her allergy list, she states she is allergic to tramadol. As well as amoxicillin \"any of the penicillins\"    The patient denies any aggravating or alleviating factors - and has not taken any medications in an attempt to alleviate her symptoms. PMH, PSH, family history, social history, allergies reviewed with the patient with significant items noted above. PCP: Xiomy De La Fuente NP    Current Outpatient Medications   Medication Sig Dispense Refill    clindamycin (CLEOCIN) 300 mg capsule Take 1 Capsule by mouth four (4) times daily for 10 days.  Indications: pulpitis 40 Capsule 0    carisoprodoL (Soma) 350 mg tablet Take 1 Tablet by mouth every eight (8) hours as needed for Muscle Spasm(s). Max Daily Amount: 1,050 mg. 30 Tablet 0    ibuprofen (MOTRIN) 600 mg tablet Take 1 Tablet by mouth every six (6) hours as needed for Pain. 20 Tablet 0    doxycycline (VIBRA-TABS) 100 mg tablet Take 1 Tablet by mouth two (2) times a day for 10 days. 20 Tablet 0    dextromethorphan-guaiFENesin (Coricidin HBP Chest Simone-Cough)  mg cap Take 1 Capsule by mouth two (2) times daily as needed for Cough or Other (congestion). 20 Capsule 0    predniSONE (STERAPRED DS) 10 mg dose pack Take as directed 21 Tablet 0    lisinopriL (PRINIVIL, ZESTRIL) 10 mg tablet Take  by mouth daily.  Cane coco 1 Each by Does Not Apply route daily. 1 Device 0    ondansetron (ZOFRAN ODT) 4 mg disintegrating tablet Take 1-2 tablets every 6-8 hours as needed for nausea and vomiting. 10 Tab 0    lidocaine (LIDOCAINE VISCOUS) 2 % solution Put 10 mL in mouth and swish and spit out QAC and at bedtime 200 mL 0    Blood-Glucose Meter (ONETOUCH ULTRA2) monitoring kit Use to obtain two times a day. 1 Kit 0    lancets misc Use daily to monitor blood glucose 200 Each 0    glucose blood VI test strips (ASCENSIA AUTODISC VI, ONE TOUCH ULTRA TEST VI) strip 50 Each by Does Not Apply route two (2) times daily as needed. 200 Strip 3    insulin nph-regular human rec (HUMULIN 70-30) 100 unit/mL (70-30) inpn Give 40 units in the QAM and 45 units at bedtime 5 Pen 3    polyethylene glycol (MIRALAX) 17 gram packet Take 1 Packet by mouth daily. 30 Each 1    Insulin Needles, Disposable, 31 gauge x 5/16\" ndle Use to administer insulin as directed 1 Package 11    atorvastatin (LIPITOR) 40 mg tablet Take 1 Tab by mouth daily.  30 Tab 6       Past History     Past Medical History:  Past Medical History:   Diagnosis Date    Anxiety     [facility] disorder [facility]     [facility]     breast cancer, right, S/P Mastectomy and chemo, radiation in 2013    Depression     Diabetes ([facility]     Drug abuse ([facility]     H/O cocaine use in past    Drug-seeking behavior     Family history of early CAD     Gastrointestinal disorder     cholitis    H/O ETOH abuse     Hyperlipidemia     Hypertension     Malignant neoplasm without specification of site ([facility]     Methamphetamine abuse ([facility]      self reported on 1/3/16    Spine injury     Vitamin D deficiency 5/1/2018       Past Surgical History:  Past Surgical History:   Procedure Laterality Date    COLONOSCOPY N/A 7/18/2016    COL[facility] ENDOSCOPY    HX BREAST LUMPECTOMY  06/2013    right    HX HYSTERECTOMY      HX MASTECTOMY      had expanders put in 6/2018    HX ORTHOPAEDIC      Back fusion surgery 4/18/14.  HX OTHER SURGICAL      mediport    HX TONSILLECTOMY      HX TUBAL LIGATION         Family History:  Family History   Problem Relation Age of Onset    Heart Disease Mother     Stroke Father     Heart Disease Father 48    Diabetes Other     Hypertension Other     Cancer Maternal Grandmother        Social History:  Social History     Tobacco Use    Smoking status: Never Smoker    Smokeless tobacco: Never Used   Substance Use Topics    Alcohol use: Not Currently     Comment: \"Occasionally\"    Drug use: Not Currently     Types: Methamphetamines, Cocaine     Comment: none in 2 years       Allergies:   Allergies   Allergen Reactions    Latex Hives and Itching    Other Food Rash     Almonds    Amoxicillin Swelling     Other reaction(s): mild rash/itching    Aspirin Not Reported This Time and Swelling     Mouth swells    Beeswax Anaphylaxis    Levaquin [Levofloxacin] Not Reported This Time and Swelling     Other reaction(s): mild rash/itching    Chlorhexidine Rash    [place] Rash and Itching    Codeine Other (comments)    Glycopyrrolate Swelling     Pt  States  faciAL  SWELLING   POST  ROBINUL  IV   Pt States  faciAL  SWELLING   POST  ROBINUL  IV     Pcn [Penicillins] Swelling    Tape [Adhesive] Rash    Tramadol Swelling       Review of Systems   Review of Systems    In addition to that documented in the HPI above  All other review of systems negative    Constitutional: Denies fevers or chills  Eyes: Denies vision changes  ENMT: Denies sore throat  CV: Denies chest pain  Resp: Denies SOB  GI: Denies vomiting or diarrhea  : Denies painful urination  MSK: Denies recent trauma  Skin: Denies new rashes  Neuro: Denies new numbness or tingling or weakness  Endocrine: Denies polyuria  Heme: Denies bleeding disorders    Physical Exam     Vitals:    09/07/21 1743   BP: 136/79   Pulse: (!) 107   Resp: 14   Temp: 98.6 °F (37 °C)   SpO2: 100%     Physical Exam    Nursing notes and vital signs reviewed  General: Patient is awake and alert, resting comfortably in no acute distress  Head: Normocephalic, Atraumatic  Eyes: EOMI, no conjunctival pallor  Neck: Supple, Normal external exam  Cardiovascular: RRR, well-perfused extremities  Chest: Normal work of breathing and chest excursion bilaterally  Respiratory: Patient is in no respiratory distress  Abdomen: Soft, non tender, non distended  Back: No evidence of trauma or deformity  Extremities: No evidence of trauma or deformity, no LE edema  Skin: Warm and dry, intact  Neuro: Alert and appropriate, CN intact, normal speech, strength and sensation full and symmetric bilaterally, normal gait, normal coordination  Psychiatric: Normal mood and affect    Medical Decision Making   I am the first provider for this patient. I reviewed the vital signs, available nursing notes, past medical history, past surgical history, family history and social history. Vital Signs-Reviewed the patient's vital signs.   Visit Vitals  /79   Pulse (!) 107   Temp 98.6 °F (37 °C)   Resp 14   LMP 08/20/2013   SpO2 100%       Pulse Oximetry Analysis -100% on room air    Cardiac Monitor:  Rate: 100 bpm  Rhythm: Sinus tach    EKG interpretation: (Preliminary)  Interpreted by the EP. Provider Notes (Medical Decision Making):   Meli Claudio is a 45 y.o. female with apparent dental      CT Head ordered by triage, does not adequately evaluate for deep space infection. Considered but do not suspect peritonsillar abscess, there is tender to palpation along 2 lower teeth, one upper tooth, not appropriate for local anesthetic. Due to significant history of emergency department visits, pain seeking behavior, I will not prescribe narcotics to this patient. Recommend ibuprofen, Tylenol. Presentation not consistent with intracranial pathology, normal neuro exam.  No facial droop associated with neuro exam, able to talk without difficulty, she does have some mild edema, noted as above, suspect localized infection of the dental nature. Patient moves all extremities without difficulty      Procedures:  Procedures    ED Course:   ED Course as of Sep 07 2151   Tue Sep 07, 2021   2044 CBC without leukocytosis or anemia. What     [DM]   2142 Elevated blood glucose, normal anion gap.    Glucose(!): 399 [DM]      ED Course User Index  [DM] Bhumika Bolanos MD        Vitals Review/addressed -     Diagnostic Study Results     Orders Placed This Encounter    XR CHEST PORT     Standing Status:   Standing     Number of Occurrences:   1     Order Specific Question:   Reason for Exam     Answer:   cp    CT HEAD WO CONT     Standing Status:   Standing     Number of Occurrences:   1     Order Specific Question:   Transport     Answer:   Wheelchair [7]     Order Specific Question:   Reason for Exam     Answer:   facial droop     Order Specific Question:   Decision Support Exception     Answer:   Emergency Medical Condition (MA) [1]    CT MAXILLOFACIAL W CONT     Standing Status:   Standing     Number of Occurrences:   1     Order Specific Question:   Transport     Answer:   BED [2]     Order Specific Question: Reason for Exam     Answer:   dental abscess     Order Specific Question:   Does patient have history of Renal Disease? Answer:   No    CBC WITH AUTOMATED DIFF     Standing Status:   Standing     Number of Occurrences:   1    METABOLIC PANEL, BASIC     Standing Status:   Standing     Number of Occurrences:   1    PULSE OXIMETRY CONTINUOUS (if clinically indicated)     Standing Status:   Standing     Number of Occurrences:   1    CARDIAC MONITOR (if clinically indicated)     Standing Status:   Standing     Number of Occurrences:   1     Order Specific Question:   Type: Answer:   Bedside    EKG, 12 LEAD, INITIAL     Standing Status:   Standing     Number of Occurrences:   1     Order Specific Question:   Reason for Exam:     Answer:   Shortness of breath    SALINE LOCK IV ONE TIME STAT     Standing Status:   Standing     Number of Occurrences:   1    ketorolac (TORADOL) injection 15 mg    clindamycin (CLEOCIN) capsule 300 mg     Order Specific Question:   Antibiotic Indications     Answer: Other     Order Specific Question:   Other Abx Indication     Answer:   dental infection    iopamidoL (ISOVUE 300) 61 % contrast injection 100 mL    clindamycin (CLEOCIN) 300 mg capsule     Sig: Take 1 Capsule by mouth four (4) times daily for 10 days.  Indications: pulpitis     Dispense:  40 Capsule     Refill:  0       Labs -     Recent Results (from the past 12 hour(s))   EKG, 12 LEAD, INITIAL    Collection Time: 09/07/21  5:55 PM   Result Value Ref Range    Ventricular Rate 103 BPM    Atrial Rate 103 BPM    P-R Interval 152 ms    QRS Duration 80 ms    Q-T Interval 336 ms    QTC Calculation (Bezet) 440 ms    Calculated P Axis 70 degrees    Calculated R Axis -11 degrees    Calculated T Axis 71 degrees    Diagnosis       Sinus tachycardia  Otherwise normal ECG  When compared with ECG of 18-MAY-2021 16:42,  No significant change was found     CBC WITH AUTOMATED DIFF    Collection Time: 09/07/21  8:00 PM   Result Value Ref Range    WBC 7.8 4.6 - 13.2 K/uL    RBC 4.29 4. 20 - 5.30 M/uL    HGB 11.9 (L) 12.0 - 16.0 g/dL    HCT 37.4 35.0 - 45.0 %    MCV 87.2 78.0 - 100.0 FL    MCH 27.7 24.0 - 34.0 PG    MCHC 31.8 31.0 - 37.0 g/dL    RDW 13.5 11.6 - 14.5 %    PLATELET 297 225 - 137 K/uL    MPV 10.3 9.2 - 11.8 FL    NEUTROPHILS 72 40 - 73 %    LYMPHOCYTES 20 (L) 21 - 52 %    MONOCYTES 4 3 - 10 %    EOSINOPHILS 3 0 - 5 %    BASOPHILS 0 0 - 2 %    ABS. NEUTROPHILS 5.7 1.8 - 8.0 K/UL    ABS. LYMPHOCYTES 1.5 0.9 - 3.6 K/UL    ABS. MONOCYTES 0.3 0.05 - 1.2 K/UL    ABS. EOSINOPHILS 0.2 0.0 - 0.4 K/UL    ABS. BASOPHILS 0.0 0.0 - 0.1 K/UL    DF AUTOMATED     METABOLIC PANEL, BASIC    Collection Time: 09/07/21  8:15 PM   Result Value Ref Range    Sodium 136 136 - 145 mmol/L    Potassium 3.9 3.5 - 5.5 mmol/L    Chloride 99 (L) 100 - 111 mmol/L    CO2 28 21 - 32 mmol/L    Anion gap 9 3.0 - 18 mmol/L    Glucose 399 (H) 74 - 99 mg/dL    BUN 9 7.0 - 18 MG/DL    Creatinine 0.95 0.6 - 1.3 MG/DL    BUN/Creatinine ratio 9 (L) 12 - 20      GFR est AA >60 >60 ml/min/1.73m2    GFR est non-AA >60 >60 ml/min/1.73m2    Calcium 9.5 8.5 - 10.1 MG/DL       Radiologic Studies -   CT HEAD WO CONT   Final Result      No acute intracranial abnormalities. No hemorrhage, mass effect or CT evident   acute cortical infarction. No significant abnormality and no change from   previous. XR CHEST PORT   Final Result   No active cardiopulmonary disease. CT MAXILLOFACIAL W CONT    (Results Pending)     CT Results  (Last 48 hours)               09/07/21 1852  CT HEAD WO CONT Final result    Impression:      No acute intracranial abnormalities. No hemorrhage, mass effect or CT evident   acute cortical infarction. No significant abnormality and no change from   previous. Narrative:  EXAM: CT head       INDICATION: Facial weakness. Left-sided facial weakness by report. \"Facial   droop\". COMPARISON: 9/1/2021.        TECHNIQUE: Axial CT imaging of the head was performed without intravenous   contrast. One or more dose reduction techniques were used on this CT: automated   exposure control, adjustment of the mAs and/or kVp according to patient size,   and iterative reconstruction techniques. The specific techniques used on this   CT exam have been documented in the patient's electronic medical record. Digital   Imaging and Communications in Medicine (DICOM) format image data are available   to nonaffiliated external healthcare facilities or entities on a secure, media   free, reciprocally searchable basis with patient authorization for at least a   12-month period after this study. _______________       FINDINGS:       BRAIN:   The sulci, folia, ventricles and basal cisterns are within normal   limits for the patient's age. There is no intracranial hemorrhage, mass effect,   or midline shift. There are no areas of abnormal parenchymal attenuation. No   hypodensity in cortex would be consistent with an acute cortical infarction. EXTRA-AXIAL SPACES AND MENINGES: There are no abnormal extra-axial fluid   collections or mass. CALVARIUM: Intact. OTHER: No additional significant abnormality. _______________           09/05/21 2206  CT SPINE CERV WO CONT Final result    Impression:      No acute fractures or listhesis. Spondylosis as described. Narrative:  EXAM: CT cervical spine       INDICATION: Neck pain       COMPARISON: None. TECHNIQUE: Axial CT imaging of the cervical spine was performed from the skull   base to the thoracic inlet without intravenous contrast. Multiplanar reformats   were generated. One or more dose reduction techniques were used on this CT:   automated exposure control, adjustment of the mAs and/or kVp according to   patient size, and iterative reconstruction techniques. The specific techniques   used on this CT exam have been documented in the patient's electronic medical   record.  Digital Imaging and Communications in Medicine (DICOM) format image data   are available to nonaffiliated external healthcare facilities or entities on a   secure, media free, reciprocally searchable basis with patient authorization for   at least a 12-month period after this study. _______________       FINDINGS:       VERTEBRAE AND DISCS: Vertebral alignment and articulation are within normal   limits. Vertebral body heights are maintained. Mild to moderate disc space   narrowing present at C5-C6 with posterior disc osteophyte complex. Specifically,   no compression deformities are noted and there is no spondylolisthesis. No   displaced fractures are identified. There are no significant areas of bone   lucency or sclerosis. SPINAL CANAL AND FORAMINA: C4-C5 demonstrates lateral disc osteophyte complex   mild to moderately narrowing both neural foramen. C5-C6 demonstrates posterior and lateral disc osteophyte complex severely   narrowing both neural foramen and resulting in moderate to severe spinal   stenosis. C6-C7 demonstrates lateral disc osteophyte complex moderate to significantly   narrowing both neural foramen. PREVERTEBRAL SOFT TISSUES: Normal.       VISIBLE PORTIONS OF POSTERIOR FOSSA/BRAIN: Normal.       LUNG APICES: Clear. OTHER: None.       _______________               CXR Results  (Last 48 hours)               09/07/21 1822  XR CHEST PORT Final result    Impression:  No active cardiopulmonary disease. Narrative:  AP portable chest, 9/7/2021 at 1742 hours:       INDICATION: Chest pain. Comparison 9/5/2021. The lungs are clear. Surgical clips overlie the left hemithorax and a single   clip is noted on the right. No infiltrate or mass. The heart and vascularity are   stable and unremarkable.            09/05/21 2158  XR CHEST PORT Final result    Impression:  No acute process       Narrative:  EXAM:  AP Portable Chest X-ray 1 view        INDICATION: Extremity injury complains       COMPARISON: May 20, 2021       _______________       FINDINGS:  Heart and mediastinal contours are within normal limits for portable   radiograph. Lungs are clear of active disease. There are no pleural effusions. No acute osseous findings. ________________                     Disposition     9:55 PM  Patient's presentation, labs/imaging and plan of care was reviewed with Dr. Eric Sheets as part of sign out. They will follow up with CT Face as part of the plan discussed with the patient. Dr. Gilda London assistance in completion of this plan is greatly appreciated but it should be noted that I will be the provider of record for this patient. Disposition:  Home    CLINICAL IMPRESSION:    1. Hyperglycemia    2. Pain, dental    3. Secondary diabetes mellitus ([address]    4. Frequent patient in emergency department        It should be noted that I will be the provider of record for this patient  Fiordaliza Light MD    Follow-up Information     Follow up With Specialties Details Why [address]    THE [facility] EMERGENCY DEPT Emergency Medicine Go to  If symptoms worsen [address] [place] [address]  [phone]    Bria Vivar NP Nurse Practitioner   [address] [place] [address]  [phone]            Current Discharge Medication List      START taking these medications    Details   clindamycin (CLEOCIN) 300 mg capsule Take 1 Capsule by mouth four (4) times daily for 10 days. Indications: pulpitis  Qty: 40 Capsule, Refills: 0  Start date: 9/7/2021, End date: 9/17/2021           Please note that this dictation was completed with [employer], the [employer] voice recognition software. Quite often unanticipated grammatical, syntax, homophones, and other interpretive errors are inadvertently transcribed by the computer software. Please disregard these errors. Please excuse any errors that have escaped final proofreading.

## 2021-09-07 NOTE — ED TRIAGE NOTES
Pt arrives ambulatory to ED with c\o LEFT sided dental pain x 1 day, pt sts she also was sent here by chiropractor for uneven smile, no facial droop noted in triage, pt sts her facial droop started 0200 this AM, pt able to make needs known speaking in complete and clear sentences, pt in nad at this time, pt also endorses LEFT sided CP that started this AM around 0300

## 2021-09-08 VITALS
OXYGEN SATURATION: 100 % | RESPIRATION RATE: 14 BRPM | HEART RATE: 107 BPM | TEMPERATURE: 98.6 F | DIASTOLIC BLOOD PRESSURE: 84 MMHG | SYSTOLIC BLOOD PRESSURE: 146 MMHG

## 2021-09-08 NOTE — DISCHARGE INSTRUCTIONS
Please return to the ER with any new or worsening symptoms or any other concerns. Please return to the Emergency Department if you develop a fever, chills, cannot eat or drink due to nausea or vomiting, or if any of your symptoms worsen. Please follow up with dentist for dental caries and infection. Also, It is extremely important that you follow-up with a primary care physician and if you do not have one currently use the contact information provided to obtain an appointment. If none was provided please call the number on the back of your insurance card to locate a Primary care doctor. Many offices have \"cancellation lists\" that you can ask to be placed on; should a patient with an earlier appointment cancel you will be notified to take their place. Please return to the Emergency Room immediately if your symptoms worsen. Please carefully read all discharge instructions    [email or web address]    What are GoodRx coupons? GoodRx coupons will help you pay less than the cash price for your prescription. Laurena Fuelling free to use and are accepted at virtually every [place] pharmacy. Your pharmacist will know how to enter the codes on the coupon to pull up the lowest discount available. Attached is a list of local dental clinics. Acetaminophen (Tylenol)   Take two 325 mg every 6 hours or two 500 mg tylenol every 8 hours as needed for pain/fever (do not take other tylenol containing products ). The ideal maximum dose is 3 grams per day, which would be 6 extra strength Tylenol (500 mg each). Dont take more than that if possible, therefore it is better to take 650 mg every 6 hours. Ibuprofen  For the treatment of mild to moderate pain, 400 mg of ibuprofen will work but doesn't help for inflammation   You can take 600mg every 6 hours. Generally taken every 6 to 8 hours, the maximum dose of Ibuprofen is 2400 mg per day which is 12 over-the-counter tablets.     You can alternate tylenol and ibuprofen every 3 hours so that you are taking something for fever/pain every 3 hours.

## 2021-09-08 NOTE — ED NOTES
Patient discharged home after receiving discharge instructions, vocalized understanding and provided a list of dental clinics, no distress, ambulatory with family member at discharge

## 2021-09-08 NOTE — ED NOTES
Patient c/o right side face pain with top teeth seen in the ED yesterday and also scheduled for PCP on 9/9 but pain unbearable today.  States pain extending to arms bilaterally

## 2021-09-10 PROCEDURE — 96372 THER/PROPH/DIAG INJ SC/IM: CPT

## 2021-09-10 PROCEDURE — 99282 EMERGENCY DEPT VISIT SF MDM: CPT

## 2021-09-11 ENCOUNTER — HOSPITAL ENCOUNTER (EMERGENCY)
Age: 51
Discharge: HOME | End: 2021-09-11
Attending: EMERGENCY MEDICINE
Payer: MEDICAID

## 2021-09-11 VITALS
TEMPERATURE: 99 F | HEART RATE: 110 BPM | DIASTOLIC BLOOD PRESSURE: 76 MMHG | BODY MASS INDEX: 25.03 KG/M2 | OXYGEN SATURATION: 100 % | WEIGHT: 136 LBS | RESPIRATION RATE: 18 BRPM | SYSTOLIC BLOOD PRESSURE: 131 MMHG | HEIGHT: 62 IN

## 2021-09-11 DIAGNOSIS — G89.29 OTHER CHRONIC PAIN: ICD-10-CM

## 2021-09-11 DIAGNOSIS — K04.7 DENTAL INFECTION: Primary | ICD-10-CM

## 2021-09-11 PROCEDURE — 74011250636 HC RX REV CODE- 250/636: Performed by: EMERGENCY MEDICINE

## 2021-09-11 PROCEDURE — 96372 THER/PROPH/DIAG INJ SC/IM: CPT

## 2021-09-11 RX ORDER — KETOROLAC TROMETHAMINE 10 MG/1
10 TABLET, FILM COATED ORAL
Qty: 20 TABLET | Refills: 0 | Status: SHIPPED | OUTPATIENT
Start: 2021-09-11 | End: 2021-09-16

## 2021-09-11 RX ORDER — KETOROLAC TROMETHAMINE 30 MG/ML
60 INJECTION, SOLUTION INTRAMUSCULAR; INTRAVENOUS ONCE
Status: COMPLETED | OUTPATIENT
Start: 2021-09-11 | End: 2021-09-11

## 2021-09-11 RX ORDER — CHLORHEXIDINE GLUCONATE 1.2 MG/ML
15 RINSE ORAL EVERY 12 HOURS
Qty: 420 ML | Refills: 0 | Status: SHIPPED | OUTPATIENT
Start: 2021-09-11 | End: 2021-09-25

## 2021-09-11 RX ADMIN — KETOROLAC TROMETHAMINE 60 MG: 30 INJECTION, SOLUTION INTRAMUSCULAR at 00:58

## 2021-09-11 NOTE — ED PROVIDER NOTES
EMERGENCY DEPARTMENT HISTORY AND PHYSICAL EXAM    Date: 9/11/2021  Patient Name: Baron Guallpa    History of Presenting Illness     Chief Complaint   Patient presents with    Other     Multiple complaints         History Provided By: Patient    Baron Guallpa is a 45 y.o. female who presents to the emergency department C/O dental pain, body aches, multiple complaints. Patient was seen several days ago and was started on antibiotics for dental infection. She states is the left lower molar region. States she has an appointment with a dentist next Monday but states the pain started to get worse and felt like the tooth cracked and has a foul odor in her mouth now. Denies any difficulty breathing or swallowing. States she has pain in her arms and backs and hands and feet. States she does have an appointment for an MRI of her spine later with her orthopedic surgeon. No other complaints. She states she is continuing to take the antibiotics and has run out of her ibuprofen. PCP: Vashti Pratt NP    Current Facility-Administered Medications   Medication Dose Route Frequency Provider Last Rate Last Admin    ketorolac (TORADOL) injection 60 mg  60 mg IntraMUSCular ONCE Agustin Gomez DO         Current Outpatient Medications   Medication Sig Dispense Refill    ketorolac (TORADOL) 10 mg tablet Take 1 Tablet by mouth every six (6) hours as needed for Pain for up to 5 days. 20 Tablet 0    chlorhexidine (PERIDEX) 0.12 % solution 15 mL by Swish and Spit route every twelve (12) hours for 14 days. 420 mL 0    clindamycin (CLEOCIN) 300 mg capsule Take 1 Capsule by mouth four (4) times daily for 10 days. Indications: pulpitis 40 Capsule 0    carisoprodoL (Soma) 350 mg tablet Take 1 Tablet by mouth every eight (8) hours as needed for Muscle Spasm(s). Max Daily Amount: 1,050 mg. 30 Tablet 0    ibuprofen (MOTRIN) 600 mg tablet Take 1 Tablet by mouth every six (6) hours as needed for Pain.  20 Tablet 0    doxycycline (VIBRA-TABS) 100 mg tablet Take 1 Tablet by mouth two (2) times a day for 10 days. 20 Tablet 0    dextromethorphan-guaiFENesin (Coricidin HBP Chest Simone-Cough)  mg cap Take 1 Capsule by mouth two (2) times daily as needed for Cough or Other (congestion). 20 Capsule 0    predniSONE (STERAPRED DS) 10 mg dose pack Take as directed 21 Tablet 0    lisinopriL (PRINIVIL, ZESTRIL) 10 mg tablet Take  by mouth daily.  Cane coco 1 Each by Does Not Apply route daily. 1 Device 0    ondansetron (ZOFRAN ODT) 4 mg disintegrating tablet Take 1-2 tablets every 6-8 hours as needed for nausea and vomiting. 10 Tab 0    lidocaine (LIDOCAINE VISCOUS) 2 % solution Put 10 mL in mouth and swish and spit out QAC and at bedtime 200 mL 0    Blood-Glucose Meter (ONETOUCH ULTRA2) monitoring kit Use to obtain two times a day. 1 Kit 0    lancets misc Use daily to monitor blood glucose 200 Each 0    glucose blood VI test strips (ASCENSIA AUTODISC VI, ONE TOUCH ULTRA TEST VI) strip 50 Each by Does Not Apply route two (2) times daily as needed. 200 Strip 3    insulin nph-regular human rec (HUMULIN 70-30) 100 unit/mL (70-30) inpn Give 40 units in the QAM and 45 units at bedtime 5 Pen 3    polyethylene glycol (MIRALAX) 17 gram packet Take 1 Packet by mouth daily. 30 Each 1    Insulin Needles, Disposable, 31 gauge x 5/16\" ndle Use to administer insulin as directed 1 Package 11    atorvastatin (LIPITOR) 40 mg tablet Take 1 Tab by mouth daily.  30 Tab 6       Past History     Past Medical History:  Past Medical History:   Diagnosis Date    Anxiety     [facility]     Breast cancer ([facility]     breast cancer, right, S/P Mastectomy and chemo, radiation in 2013    Depression     Diabetes ([facility]     Drug abuse ([facility]     H/O cocaine use in past    Drug-seeking behavior     Family history of early CAD     Gastrointestinal disorder     cholitis    H/O ETOH abuse     Hyperlipidemia     Hypertension     Malignant neoplasm without specification of site [facility])     Methamphetamine abuse ([facility]      self reported on 1/3/16    Spine injury     Vitamin D deficiency 5/1/2018       Past Surgical History:  Past Surgical History:   Procedure Laterality Date    COLONOSCOPY N/A 7/18/2016    COLONOSCOPY performed by Primitivo Rich MD at [facility] ENDOSCOPY    HX BREAST LUMPECTOMY  06/2013    right    HX HYSTERECTOMY      HX MASTECTOMY      had expanders put in 6/2018    HX ORTHOPAEDIC      Back fusion surgery 4/18/14.  HX OTHER SURGICAL      mediport    HX TONSILLECTOMY      HX TUBAL LIGATION         Family History:  Family History   Problem Relation Age of Onset    Heart Disease Mother     Stroke Father     Heart Disease Father 48    Diabetes Other     Hypertension Other     Cancer Maternal Grandmother        Social History:  Social History     Tobacco Use    Smoking status: Never Smoker    Smokeless tobacco: Never Used   Substance Use Topics    Alcohol use: Not Currently     Comment: \"Occasionally\"    Drug use: Not Currently     Types: Methamphetamines, Cocaine     Comment: none in 2 years       Allergies: Allergies   Allergen Reactions    Latex Hives and Itching    Other Food Rash     Almonds    Amoxicillin Swelling     Other reaction(s): mild rash/itching    Aspirin Not Reported This Time and Swelling     Mouth swells    Beeswax Anaphylaxis    Levaquin [Levofloxacin] Not Reported This Time and Swelling     Other reaction(s): mild rash/itching    [place] Rash and Itching    Codeine Other (comments)    Glycopyrrolate Swelling     Pt  States  faciAL  SWELLING   POST  ROBINUL  IV   Pt  States  faciAL  SWELLING   POST  ROBINUL  IV     Pcn [Penicillins] Swelling    Tape [Adhesive] Rash    Tramadol Swelling         Review of Systems   Review of Systems   HENT: Positive for dental problem. Negative for sore throat, trouble swallowing and voice change.     Musculoskeletal: Positive for arthralgias and myalgias. All other systems reviewed and are negative. All other systems reviewed and are negative. Physical Exam     Vitals:    09/11/21 0010   BP: 131/76   Pulse: (!) 110   Resp: 18   Temp: 99 °F (37.2 °C)   SpO2: 100%   Weight: 61.7 kg (136 lb)   Height: 5' 2\" (1.575 m)     Physical Exam    Nursing notes and vital signs reviewed    Constitutional: Non toxic appearing, no acute distress, appearing stated age  Eyes: PERRL, EOMI, No conjunctival injection  ENT: external ears normal, No rhinorrhea, external nose normal, mucous membranes moist, poor dentition throughout, the left lower posterior molar does appear slightly fractured but there is no significant gingival swelling  Cardiovascular: Regular rate and rhythm, no murmurs, No JVD  Respiratory: Clear to ausculation bilaterally, No stridor, Normal work of breathing and chest excursion bilaterally  Abdomen: Soft, non tender, non distended, normoactive bowel sounds, No rigidity, no peritoneal signs  Musculoskeletal:  No evidence of obvious injury to Head, Neck, Back, Extremities, no LE edema  Skin: Warm, dry, No obvious rashes  Neuro: Alert and oriented x 3, CN 2-12 intact, normal speech, strength and sensation full and symmetric bilaterally  Psychiatric: Normal mood and affect      Diagnostic Study Results     Labs -   No results found for this or any previous visit (from the past 72 hour(s)). Radiologic Studies -   No orders to display     CT Results  (Last 48 hours)    None        CXR Results  (Last 48 hours)    None          Medications given in the ED-  Medications   ketorolac (TORADOL) injection 60 mg (has no administration in time range)         Medical Decision Making     I reviewed the vital signs, available nursing notes, past medical history, past surgical history, family history and social history. Vital Signs interpretation- I have reviewed the patient's vital signs.     Pulse Oximetry interpretation - 100% on Room air     Cardiac Monitor interpretation:  Rate: 110 bpm  Rhythm: sinus    Records Reviewed: Nursing Notes, Old Medical Records, Previous Radiology Studies and Previous Laboratory Studies    Procedures:  Procedures    ED Course and MDM:  Patient has very frequent emergency department visits for chronic pain type issues. Her last visit several days ago she had blood work as well as a CT scan of her maxillofacial region. At this point repeat labs and imaging does not appear necessary as she clinically appears stable. Will supplement her treatment with chlorhexidine mouthwash and give her Toradol for pain. Diagnosis and Disposition       DISCHARGE NOTE:    Willard Combs  results have been reviewed with her. She has been counseled regarding her diagnosis, treatment, and plan. She verbally conveys understanding and agreement of the signs, symptoms, diagnosis, treatment and prognosis and additionally agrees to follow up as discussed. She also agrees with the care-plan and conveys that all of her questions have been answered. I have also provided discharge instructions for her that include: educational information regarding their diagnosis and treatment, and list of reasons why they would want to return to the ED prior to their follow-up appointment, should her condition change. She has been provided with education for proper emergency department utilization. CLINICAL IMPRESSION:    1. Dental infection    2. Other chronic pain        PLAN:  1. D/C Home  2. Current Discharge Medication List      START taking these medications    Details   ketorolac (TORADOL) 10 mg tablet Take 1 Tablet by mouth every six (6) hours as needed for Pain for up to 5 days. Qty: 20 Tablet, Refills: 0  Start date: 9/11/2021, End date: 9/16/2021      chlorhexidine (PERIDEX) 0.12 % solution 15 mL by Swish and Spit route every twelve (12) hours for 14 days. Qty: 420 mL, Refills: 0  Start date: 9/11/2021, End date: 9/25/2021           3.    Follow-up Information     Follow up With Specialties Details Why Contact Info    Your dentist  Go to           _______________________________      Please note that this dictation was completed with [employer], the computer voice recognition software. Quite often unanticipated grammatical, syntax, homophones, and other interpretive errors are inadvertently transcribed by the computer software. Please disregard these errors. Please excuse any errors that have escaped final proofreading.

## 2021-09-11 NOTE — ED TRIAGE NOTES
Patient arrives to ED with multiple complaints: tooth pain, mouth pain, throat pain, headache, stomach pain, cough, arm pain, hand pain, hand swelling. She was seen here the other day for the same reason, she states it is worse. She has a dentist appointment on Monday.

## 2021-09-11 NOTE — DISCHARGE INSTRUCTIONS
Continue taking clindamycin as antibiotic for your dental infection.   Supplement this with the Peridex mouthwash swish and spit twice a day and take Toradol for pain

## 2021-09-15 ENCOUNTER — APPOINTMENT (OUTPATIENT)
Dept: GENERAL RADIOLOGY | Age: 51
End: 2021-09-15
Attending: EMERGENCY MEDICINE
Payer: MEDICAID

## 2021-09-15 ENCOUNTER — HOSPITAL ENCOUNTER (EMERGENCY)
Age: 51
Discharge: HOME | End: 2021-09-15
Attending: EMERGENCY MEDICINE
Payer: MEDICAID

## 2021-09-15 VITALS
HEART RATE: 90 BPM | OXYGEN SATURATION: 98 % | DIASTOLIC BLOOD PRESSURE: 75 MMHG | TEMPERATURE: 97.2 F | SYSTOLIC BLOOD PRESSURE: 108 MMHG | RESPIRATION RATE: 18 BRPM

## 2021-09-15 DIAGNOSIS — M25.461 EFFUSION, RIGHT KNEE: Primary | ICD-10-CM

## 2021-09-15 PROCEDURE — 74011250637 HC RX REV CODE- 250/637: Performed by: EMERGENCY MEDICINE

## 2021-09-15 PROCEDURE — 99283 EMERGENCY DEPT VISIT LOW MDM: CPT

## 2021-09-15 PROCEDURE — 2709999900 HC NON-CHARGEABLE SUPPLY

## 2021-09-15 RX ORDER — IBUPROFEN 600 MG/1
600 TABLET ORAL
Status: COMPLETED | OUTPATIENT
Start: 2021-09-15 | End: 2021-09-15

## 2021-09-15 RX ADMIN — IBUPROFEN 600 MG: 600 TABLET, FILM COATED ORAL at 23:19

## 2021-09-16 NOTE — ED NOTES
Patient armband removed and shredded      I have reviewed discharge instructions with the patient. The patient verbalized understanding.

## 2021-09-16 NOTE — ED PROVIDER NOTES
EMERGENCY DEPARTMENT HISTORY AND PHYSICAL EXAM    Date: 9/15/2021  Patient Name: Kian Milian    History of Presenting Illness     Chief Complaint   Patient presents with    Knee Injury         History Provided By: Patient    Additional History (Context): Kian Milian is a 45 y.o. female with diabetes, hypertension, hyperlipidemia and drug seeking behavior who presents with c/o right knee swelling. Had arthrocentesis at [address] and thought was getting rubin but feels swollen now again. Denies new injury. PCP: Koby Carter NP    Current Facility-Administered Medications   Medication Dose Route Frequency Provider Last Rate Last Admin    ibuprofen (MOTRIN) tablet 600 mg  600 mg Oral NOW Lucy Rodriguez PA         Current Outpatient Medications   Medication Sig Dispense Refill    ketorolac (TORADOL) 10 mg tablet Take 1 Tablet by mouth every six (6) hours as needed for Pain for up to 5 days. 20 Tablet 0    chlorhexidine (PERIDEX) 0.12 % solution 15 mL by Swish and Spit route every twelve (12) hours for 14 days. 420 mL 0    clindamycin (CLEOCIN) 300 mg capsule Take 1 Capsule by mouth four (4) times daily for 10 days. Indications: pulpitis 40 Capsule 0    carisoprodoL (Soma) 350 mg tablet Take 1 Tablet by mouth every eight (8) hours as needed for Muscle Spasm(s). Max Daily Amount: 1,050 mg. 30 Tablet 0    ibuprofen (MOTRIN) 600 mg tablet Take 1 Tablet by mouth every six (6) hours as needed for Pain. 20 Tablet 0    dextromethorphan-guaiFENesin (Coricidin HBP Chest Simone-Cough)  mg cap Take 1 Capsule by mouth two (2) times daily as needed for Cough or Other (congestion). 20 Capsule 0    predniSONE (STERAPRED DS) 10 mg dose pack Take as directed 21 Tablet 0    lisinopriL (PRINIVIL, ZESTRIL) 10 mg tablet Take  by mouth daily.  Cane coco 1 Each by Does Not Apply route daily.  1 Device 0    ondansetron (ZOFRAN ODT) 4 mg disintegrating tablet Take 1-2 tablets every 6-8 hours as needed for nausea and vomiting. 10 Tab 0    lidocaine (LIDOCAINE VISCOUS) 2 % solution Put 10 mL in mouth and swish and spit out QAC and at bedtime 200 mL 0    Blood-Glucose Meter (ONETOUCH ULTRA2) monitoring kit Use to obtain two times a day. 1 Kit 0    lancets misc Use daily to monitor blood glucose 200 Each 0    glucose blood VI test strips (ASCENSIA AUTODISC VI, ONE TOUCH ULTRA TEST VI) strip 50 Each by Does Not Apply route two (2) times daily as needed. 200 Strip 3    insulin nph-regular human rec (HUMULIN 70-30) 100 unit/mL (70-30) inpn Give 40 units in the QAM and 45 units at bedtime 5 Pen 3    polyethylene glycol (MIRALAX) 17 gram packet Take 1 Packet by mouth daily. 30 Each 1    Insulin Needles, Disposable, 31 gauge x 5/16\" ndle Use to administer insulin as directed 1 Package 11    atorvastatin (LIPITOR) 40 mg tablet Take 1 Tab by mouth daily. 30 Tab 6       Past History     Past Medical History:  Past Medical History:   Diagnosis Date    Anxiety     B[address] disorder ([address]     Breast cancer ([address]     breast cancer, right, S/P Mastectomy and chemo, radiation in 2013    Depression     Diabetes ([address]     Drug abuse ([address]     H/O cocaine use in past    Drug-seeking behavior     Family history of early CAD     Gastrointestinal disorder     cholitis    H/O ETOH abuse     Hyperlipidemia     Hypertension     Malignant neoplasm without specification of site ([address]     Methamphetamine abuse ([address]      self reported on 1/3/16    Spine injury     Vitamin D deficiency 5/1/2018       Past Surgical History:  Past Surgical History:   Procedure Laterality Date    COLONOSCOPY N/A 7/18/2016    COL[facility] ENDOSCOPY    HX BREAST LUMPECTOMY  06/2013    right    HX HYSTERECTOMY      HX MASTECTOMY      had expanders put in 6/2018    HX ORTHOPAEDIC      Back fusion surgery 4/18/14.      HX OTHER SURGICAL      mediport    HX TONSILLECTOMY      HX TUBAL LIGATION Family History:  Family History   Problem Relation Age of Onset    Heart Disease Mother     Stroke Father     Heart Disease Father 48    Diabetes Other     Hypertension Other     Cancer Maternal Grandmother        Social History:  Social History     Tobacco Use    Smoking status: Never Smoker    Smokeless tobacco: Never Used   Substance Use Topics    Alcohol use: Not Currently     Comment: \"Occasionally\"    Drug use: Not Currently     Types: Methamphetamines, Cocaine     Comment: none in 2 years       Allergies: Allergies   Allergen Reactions    Latex Hives and Itching    Other Food Rash     Almonds    Amoxicillin Swelling     Other reaction(s): mild rash/itching    Aspirin Not Reported This Time and Swelling     Mouth swells    Beeswax Anaphylaxis    Levaquin [Levofloxacin] Not Reported This Time and Swelling     Other reaction(s): mild rash/itching    [place] Rash and Itching    Codeine Other (comments)    Glycopyrrolate Swelling     Pt  States  faciAL  SWELLING   POST  ROBINUL  IV   Pt  States  faciAL  SWELLING   POST  ROBINUL  IV     Pcn [Penicillins] Swelling    Tape [Adhesive] Rash    Tramadol Swelling         Review of Systems   Review of Systems   Musculoskeletal: Positive for joint swelling. Neurological: Negative for weakness. All Other Systems Negative  Physical Exam     Vitals:    09/15/21 2201   BP: 108/75   Pulse: 90   Resp: 18   Temp: 97.2 °F (36.2 °C)   SpO2: 98%     Physical Exam  Vitals and nursing note reviewed. Constitutional:       Appearance: She is well-developed. HENT:      Head: Normocephalic and atraumatic. Right Ear: External ear normal.      Left Ear: External ear normal.      Nose: Nose normal.   Eyes:      Conjunctiva/sclera: Conjunctivae normal.      Pupils: Pupils are equal, round, and reactive to light. Neck:      Vascular: No JVD. Trachea: No tracheal deviation. Cardiovascular:      Rate and Rhythm: Normal rate and regular rhythm. Heart sounds: Normal heart sounds. No murmur heard. No friction rub. No gallop. Pulmonary:      Effort: Pulmonary effort is normal. No respiratory distress. Breath sounds: Normal breath sounds. No wheezing or rales. Abdominal:      General: Bowel sounds are normal. There is no distension. Palpations: Abdomen is soft. There is no mass. Tenderness: There is no abdominal tenderness. There is no guarding or rebound. Musculoskeletal:         General: Swelling present. No tenderness. Normal range of motion. Cervical back: Normal range of motion and neck supple. Comments: Right knee w/effusion. No warmth, redness, or TTP. DP PT pulses palpable. Skin:     General: Skin is warm and dry. Findings: No rash. Neurological:      Mental Status: She is alert and oriented to person, place, and time. Cranial Nerves: No cranial nerve deficit. Deep Tendon Reflexes: Reflexes are normal and symmetric. Psychiatric:         Behavior: Behavior normal.            Diagnostic Study Results     Labs -   No results found for this or any previous visit (from the past 12 hour(s)). Radiologic Studies -   XR PATELLA RT 3 V    (Results Pending)     CT Results  (Last 48 hours)    None        CXR Results  (Last 48 hours)    None            Medical Decision Making   I am the first provider for this patient. I reviewed the vital signs, available nursing notes, past medical history, past surgical history, family history and social history. Vital Signs-Reviewed the patient's vital signs. Records Reviewed: Nursing Notes and Old Medical Records    Procedures:  Procedures    Provider Notes (Medical Decision Making): apply ace and advised icing x 72hrs to help diffuse swelling s/p arthrocentesis. No signs of infection. Ace bandage applied by nurse to right knee; excellent position. N/v intact before and after application.         MED RECONCILIATION:  Current Facility-Administered Medications   Medication Dose Route Frequency    ibuprofen (MOTRIN) tablet 600 mg  600 mg Oral NOW     Current Outpatient Medications   Medication Sig    ketorolac (TORADOL) 10 mg tablet Take 1 Tablet by mouth every six (6) hours as needed for Pain for up to 5 days.  chlorhexidine (PERIDEX) 0.12 % solution 15 mL by Swish and Spit route every twelve (12) hours for 14 days.  clindamycin (CLEOCIN) 300 mg capsule Take 1 Capsule by mouth four (4) times daily for 10 days. Indications: pulpitis    carisoprodoL (Soma) 350 mg tablet Take 1 Tablet by mouth every eight (8) hours as needed for Muscle Spasm(s). Max Daily Amount: 1,050 mg.    ibuprofen (MOTRIN) 600 mg tablet Take 1 Tablet by mouth every six (6) hours as needed for Pain.  dextromethorphan-guaiFENesin (Coricidin HBP Chest Simone-Cough)  mg cap Take 1 Capsule by mouth two (2) times daily as needed for Cough or Other (congestion).  predniSONE (STERAPRED DS) 10 mg dose pack Take as directed    lisinopriL (PRINIVIL, ZESTRIL) 10 mg tablet Take  by mouth daily.  Cane coco 1 Each by Does Not Apply route daily.  ondansetron (ZOFRAN ODT) 4 mg disintegrating tablet Take 1-2 tablets every 6-8 hours as needed for nausea and vomiting.  lidocaine (LIDOCAINE VISCOUS) 2 % solution Put 10 mL in mouth and swish and spit out QAC and at bedtime    Blood-Glucose Meter (ONETOUCH ULTRA2) monitoring kit Use to obtain two times a day.  lancets misc Use daily to monitor blood glucose    glucose blood VI test strips (ASCENSIA AUTODISC VI, ONE TOUCH ULTRA TEST VI) strip 50 Each by Does Not Apply route two (2) times daily as needed.  insulin nph-regular human rec (HUMULIN 70-30) 100 unit/mL (70-30) inpn Give 40 units in the QAM and 45 units at bedtime    polyethylene glycol (MIRALAX) 17 gram packet Take 1 Packet by mouth daily.     Insulin Needles, Disposable, 31 gauge x 5/16\" ndle Use to administer insulin as directed    atorvastatin (LIPITOR) 40 mg tablet Take 1 Tab by mouth daily. Disposition:  home    DISCHARGE NOTE:   11:14 PM    Pt has been reexamined. Patient has no new complaints, changes, or physical findings. Care plan outlined and precautions discussed. Results of exam were reviewed with the patient. All medications were reviewed with the patient. All of pt's questions and concerns were addressed. Patient was instructed and agrees to follow up with ortho, as well as to return to the ED upon further deterioration. Patient is ready to go home. Follow-up Information     Follow up With Specialties Details Why Contact Info    Eliazar Lemon MD Orthopedic Surgery Schedule an appointment as soon as possible for a visit in 1 day  [facility] [address] [place] [address]      [facility] EMERGENCY DEPT Emergency Medicine  If symptoms worsen return immediately [address] [facility]  [phone]          Current Discharge Medication List            Diagnosis     Clinical Impression:   1.  Effusion, right knee

## 2021-09-16 NOTE — ED NOTES
Pt in for swelling to right. PMH of arthritis. Pt reports recently had knee drained at [place]. Reported it as better or a few days and now is becoming worse.

## 2021-09-27 ENCOUNTER — HOSPITAL ENCOUNTER (OUTPATIENT)
Dept: PREADMISSION TESTING | Age: 51
Discharge: HOME | End: 2021-09-27
Payer: MEDICAID

## 2021-09-27 ENCOUNTER — HOSPITAL ENCOUNTER (EMERGENCY)
Age: 51
Discharge: HOME | End: 2021-09-27
Attending: EMERGENCY MEDICINE
Payer: MEDICAID

## 2021-09-27 ENCOUNTER — HOSPITAL ENCOUNTER (OUTPATIENT)
Dept: PREADMISSION TESTING | Age: 51
Discharge: HOME | End: 2021-09-27

## 2021-09-27 ENCOUNTER — APPOINTMENT (OUTPATIENT)
Dept: GENERAL RADIOLOGY | Age: 51
End: 2021-09-27
Attending: EMERGENCY MEDICINE
Payer: MEDICAID

## 2021-09-27 VITALS
BODY MASS INDEX: 25.03 KG/M2 | RESPIRATION RATE: 22 BRPM | WEIGHT: 136 LBS | OXYGEN SATURATION: 97 % | HEART RATE: 92 BPM | SYSTOLIC BLOOD PRESSURE: 172 MMHG | TEMPERATURE: 98.6 F | HEIGHT: 62 IN | DIASTOLIC BLOOD PRESSURE: 95 MMHG

## 2021-09-27 VITALS — BODY MASS INDEX: 25.03 KG/M2 | HEIGHT: 62 IN | WEIGHT: 136 LBS

## 2021-09-27 DIAGNOSIS — S80.00XA CONTUSION OF KNEE, UNSPECIFIED LATERALITY, INITIAL ENCOUNTER: Primary | ICD-10-CM

## 2021-09-27 DIAGNOSIS — R73.9 HYPERGLYCEMIA: ICD-10-CM

## 2021-09-27 DIAGNOSIS — M25.461 EFFUSION OF RIGHT KNEE: ICD-10-CM

## 2021-09-27 LAB
ANION GAP SERPL CALC-SCNC: 7 MMOL/L (ref 3–18)
BASOPHILS # BLD: 0 K/UL (ref 0–0.1)
BASOPHILS NFR BLD: 0 % (ref 0–2)
BUN SERPL-MCNC: 21 MG/DL (ref 7–18)
BUN/CREAT SERPL: 24 (ref 12–20)
CALCIUM SERPL-MCNC: 9.4 MG/DL (ref 8.5–10.1)
CHLORIDE SERPL-SCNC: 101 MMOL/L (ref 100–111)
CO2 SERPL-SCNC: 27 MMOL/L (ref 21–32)
CREAT SERPL-MCNC: 0.87 MG/DL (ref 0.6–1.3)
DIFFERENTIAL METHOD BLD: ABNORMAL
EOSINOPHIL # BLD: 0.1 K/UL (ref 0–0.4)
EOSINOPHIL NFR BLD: 1 % (ref 0–5)
ERYTHROCYTE [DISTWIDTH] IN BLOOD BY AUTOMATED COUNT: 13.4 % (ref 11.6–14.5)
GLUCOSE BLD STRIP.AUTO-MCNC: 312 MG/DL (ref 70–110)
GLUCOSE BLD STRIP.AUTO-MCNC: 381 MG/DL (ref 70–110)
GLUCOSE SERPL-MCNC: 406 MG/DL (ref 74–99)
HCT VFR BLD AUTO: 35.1 % (ref 35–45)
HGB BLD-MCNC: 11.3 G/DL (ref 12–16)
LYMPHOCYTES # BLD: 1.4 K/UL (ref 0.9–3.6)
LYMPHOCYTES NFR BLD: 14 % (ref 21–52)
MCH RBC QN AUTO: 26.8 PG (ref 24–34)
MCHC RBC AUTO-ENTMCNC: 32.2 G/DL (ref 31–37)
MCV RBC AUTO: 83.2 FL (ref 78–100)
MONOCYTES # BLD: 0.5 K/UL (ref 0.05–1.2)
MONOCYTES NFR BLD: 4 % (ref 3–10)
NEUTS SEG # BLD: 8.2 K/UL (ref 1.8–8)
NEUTS SEG NFR BLD: 80 % (ref 40–73)
PLATELET # BLD AUTO: 406 K/UL (ref 135–420)
PMV BLD AUTO: 10.3 FL (ref 9.2–11.8)
POTASSIUM SERPL-SCNC: 4.1 MMOL/L (ref 3.5–5.5)
RBC # BLD AUTO: 4.22 M/UL (ref 4.2–5.3)
SODIUM SERPL-SCNC: 135 MMOL/L (ref 136–145)
WBC # BLD AUTO: 10.3 K/UL (ref 4.6–13.2)

## 2021-09-27 PROCEDURE — 74011250636 HC RX REV CODE- 250/636: Performed by: EMERGENCY MEDICINE

## 2021-09-27 PROCEDURE — 85025 COMPLETE CBC W/AUTO DIFF WBC: CPT

## 2021-09-27 PROCEDURE — 74011000258 HC RX REV CODE- 258: Performed by: EMERGENCY MEDICINE

## 2021-09-27 PROCEDURE — 96365 THER/PROPH/DIAG IV INF INIT: CPT

## 2021-09-27 PROCEDURE — 83036 HEMOGLOBIN GLYCOSYLATED A1C: CPT

## 2021-09-27 PROCEDURE — 74011000250 HC RX REV CODE- 250: Performed by: EMERGENCY MEDICINE

## 2021-09-27 PROCEDURE — 96361 HYDRATE IV INFUSION ADD-ON: CPT

## 2021-09-27 PROCEDURE — 74011636637 HC RX REV CODE- 636/637: Performed by: EMERGENCY MEDICINE

## 2021-09-27 PROCEDURE — 96375 TX/PRO/DX INJ NEW DRUG ADDON: CPT

## 2021-09-27 PROCEDURE — 80048 BASIC METABOLIC PNL TOTAL CA: CPT

## 2021-09-27 PROCEDURE — 73564 X-RAY EXAM KNEE 4 OR MORE: CPT

## 2021-09-27 PROCEDURE — 99285 EMERGENCY DEPT VISIT HI MDM: CPT

## 2021-09-27 PROCEDURE — 82962 GLUCOSE BLOOD TEST: CPT

## 2021-09-27 RX ORDER — CLINDAMYCIN PHOSPHATE 900 MG/50ML
900 INJECTION, SOLUTION INTRAVENOUS ONCE
Status: CANCELLED | OUTPATIENT
Start: 2021-09-27 | End: 2021-09-27

## 2021-09-27 RX ORDER — KETOROLAC TROMETHAMINE 15 MG/ML
15 INJECTION, SOLUTION INTRAMUSCULAR; INTRAVENOUS
Status: COMPLETED | OUTPATIENT
Start: 2021-09-27 | End: 2021-09-27

## 2021-09-27 RX ORDER — SODIUM CHLORIDE, SODIUM LACTATE, POTASSIUM CHLORIDE, CALCIUM CHLORIDE 600; 310; 30; 20 MG/100ML; MG/100ML; MG/100ML; MG/100ML
125 INJECTION, SOLUTION INTRAVENOUS CONTINUOUS
Status: CANCELLED | OUTPATIENT
Start: 2021-09-27

## 2021-09-27 RX ORDER — DIAZEPAM 10 MG/1
10 TABLET ORAL
COMMUNITY
End: 2021-12-02

## 2021-09-27 RX ORDER — INSULIN ASPART 100 [IU]/ML
INJECTION, SUSPENSION SUBCUTANEOUS
COMMUNITY
End: 2022-09-12

## 2021-09-27 RX ADMIN — INSULIN HUMAN 10 UNITS: 100 INJECTION, SOLUTION PARENTERAL at 14:21

## 2021-09-27 RX ADMIN — KETOROLAC TROMETHAMINE 15 MG: 15 INJECTION, SOLUTION INTRAMUSCULAR; INTRAVENOUS at 14:42

## 2021-09-27 RX ADMIN — LIDOCAINE HYDROCHLORIDE 50 MG: 20 INJECTION, SOLUTION EPIDURAL; INFILTRATION; INTRACAUDAL; PERINEURAL at 12:56

## 2021-09-27 RX ADMIN — SODIUM CHLORIDE 1000 ML: 900 INJECTION, SOLUTION INTRAVENOUS at 12:56

## 2021-09-27 RX ADMIN — SODIUM CHLORIDE 1000 ML: 900 INJECTION, SOLUTION INTRAVENOUS at 14:22

## 2021-09-27 NOTE — ED PROVIDER NOTES
EMERGENCY DEPARTMENT HISTORY AND PHYSICAL EXAM    Date: 9/27/2021  Patient Name: Sander Malik    History of Presenting Illness     Chief Complaint   Patient presents with    Other       History Provided By: Patient     History Lidia Singh):   11:43 AM  Sander Malik is a 45 y.o. female with PMHX of Anxiety, bipolar, lipidemia HTN, HKD who presents to the emergency department C/O bilateral knee pain onset chronically but worse today. Associated sxs include recent falls and increased leg weakness. Pt denies any other sxs or complaints. Patient states that she had steroid injections in her knees with Dr. Buddy Del Castillo several days ago. They initially improved but were worse today. She has surgery scheduled in a couple weeks with him. She feels that her leg and knee pain is worse today. Chief Complaint: Bilateral knee pain  Onset: Worse today  Timing: Acute on chronic  Context: Falls brought symptoms on, symptoms have slowly worsened since onset  Location: Bilateral knees  Quality: Sharp  Severity: Moderate  Modifying Factors: Nothing makes it better, movement makes it worse. Associated Symptoms: Increased weakness, recent falls    PCP: Ana Paula Giraldo NP  Ortho: Dr. Buddy Del Castillo    Current Outpatient Medications   Medication Sig Dispense Refill    insulin aspart protamine/insulin aspart (NovoLOG Mix 70-30FlexPen U-100) 100 unit/mL (70-30) inpn by SubCUTAneous route Before breakfast, lunch, dinner and at bedtime. Sliding scale      fluoxetine HCl (PROZAC PO) Take  by mouth daily.  diazePAM (Valium) 10 mg tablet Take 10 mg by mouth every six (6) hours as needed for Anxiety.  carisoprodoL (Soma) 350 mg tablet Take 1 Tablet by mouth every eight (8) hours as needed for Muscle Spasm(s). Max Daily Amount: 1,050 mg. 30 Tablet 0    dextromethorphan-guaiFENesin (Coricidin HBP Chest Simone-Cough)  mg cap Take 1 Capsule by mouth two (2) times daily as needed for Cough or Other (congestion).  20 Capsule 0    predniSONE (STERAPRED DS) 10 mg dose pack Take as directed (Patient not taking: Reported on 9/27/2021) 21 Tablet 0    lisinopriL (PRINIVIL, ZESTRIL) 10 mg tablet Take 20 mg by mouth daily.  Cane coco 1 Each by Does Not Apply route daily. 1 Device 0    ondansetron (ZOFRAN ODT) 4 mg disintegrating tablet Take 1-2 tablets every 6-8 hours as needed for nausea and vomiting. 10 Tab 0    lidocaine (LIDOCAINE VISCOUS) 2 % solution Put 10 mL in mouth and swish and spit out QAC and at bedtime (Patient not taking: Reported on 9/27/2021) 200 mL 0    Blood-Glucose Meter (ONETOUCH ULTRA2) monitoring kit Use to obtain two times a day. 1 Kit 0    lancets misc Use daily to monitor blood glucose 200 Each 0    glucose blood VI test strips (ASCENSIA AUTODISC VI, ONE TOUCH ULTRA TEST VI) strip 50 Each by Does Not Apply route two (2) times daily as needed. 200 Strip 3    polyethylene glycol (MIRALAX) 17 gram packet Take 1 Packet by mouth daily. 30 Each 1    Insulin Needles, Disposable, 31 gauge x 5/16\" ndle Use to administer insulin as directed 1 Package 11    atorvastatin (LIPITOR) 40 mg tablet Take 1 Tab by mouth daily.  (Patient not taking: Reported on 9/27/2021) 30 Tab 6       Past History         Past Medical History:  Past Medical History:   Diagnosis Date    Anxiety     Bipolar disorder ([address]     Breast cancer ([address]     breast cancer, right, S/P Mastectomy and chemo, radiation in 2013    Depression     Diabetes ([address]     Drug abuse ([address]     H/O cocaine use in past    Drug-seeking behavior     Family history of early CAD     Gastrointestinal disorder     cholitis    H/O ETOH abuse     Hyperlipidemia     Hypertension     Malignant neoplasm without specification of site ([address]     Methamphetamine abuse ([address]      self reported on 1/3/16    Psychiatric disorder     multiple personality disorder    Psychiatric disorder     anxiety    Psychiatric disorder     bipolar     Spine injury     Vitamin D deficiency 5/1/2018       Past Surgical History:  Past Surgical History:   Procedure Laterality Date    COLONOSCOPY N/A 7/18/2016    [facility] END[facility]COPY    HX BREAST LUMPECTOMY  06/2013    right    HX HYSTERECTOMY      HX MASTECTOMY      had expanders put in 6/2018    HX ORTHOPAEDIC      Back fusion surgery 4/18/14.  HX OTHER SURGICAL      mediport    HX TONSILLECTOMY      HX TUBAL LIGATION      NEUROLOGICAL PROCEDURE UNLISTED  2020    back surgery       Family History:  Family History   Problem Relation Age of Onset    Heart Disease Mother     Stroke Father     Heart Disease Father 48    Diabetes Other     Hypertension Other     Cancer Maternal Grandmother    Reviewed and non-contributory    Social History:  Social History     Tobacco Use    Smoking status: Never Smoker    Smokeless tobacco: Never Used   Vaping Use    Vaping Use: Never used   Substance Use Topics    Alcohol use: Not Currently     Comment: \"Occasionally\"    Drug use: Not Currently     Types: Methamphetamines, Cocaine     Comment: none in 2 years       Allergies: Allergies   Allergen Reactions    Latex Hives and Itching    Other Food Rash     Almonds    Amoxicillin Swelling     Other reaction(s): mild rash/itching    Aspirin Not Reported This Time and Swelling     Mouth swells    Beeswax Anaphylaxis    Levaquin [Levofloxacin] Not Reported This Time and Swelling     Other reaction(s): mild rash/itching    [place] Rash and Itching    Codeine Other (comments)    Glycopyrrolate Swelling     Pt  States  faciAL  SWELLING   POST  ROBINUL  IV   Pt  States  faciAL  SWELLING   POST  ROBINUL  IV     Pcn [Penicillins] Swelling    Tape [Adhesive] Rash    Tramadol Swelling         Review of Systems      Review of Systems   Constitutional: Negative for chills and fever. HENT: Negative for congestion, rhinorrhea and sore throat. Eyes: Negative for pain and visual disturbance. Respiratory: Negative for cough, shortness of breath and wheezing. Cardiovascular: Negative for chest pain and palpitations. Gastrointestinal: Negative for abdominal pain, diarrhea and vomiting. Genitourinary: Negative for dysuria, flank pain, frequency and urgency. Musculoskeletal: Positive for arthralgias and gait problem. Negative for myalgias. Skin: Negative for rash and wound. Neurological: Negative for speech difficulty, weakness, light-headedness and headaches. Psychiatric/Behavioral: Negative for agitation and confusion. All other systems reviewed and are negative. Physical Exam     Vitals:    09/27/21 1300 09/27/21 1315 09/27/21 1330 09/27/21 1430   BP: (!) 147/104 (!) 117/97 (!) 147/95 (!) 172/95   Pulse: 95 96 98 92   Resp: 21 19 12 22   Temp:       SpO2: 99% 98% 98% 97%   Weight:       Height:           Physical Exam  Vitals and nursing note reviewed. Constitutional:       General: She is not in acute distress. Appearance: Normal appearance. She is normal weight. She is not ill-appearing. HENT:      Head: Normocephalic and atraumatic. Nose: Nose normal. No rhinorrhea. Mouth/Throat:      Mouth: Mucous membranes are moist.      Pharynx: No oropharyngeal exudate or posterior oropharyngeal erythema. Eyes:      Extraocular Movements: Extraocular movements intact. Conjunctiva/sclera: Conjunctivae normal.      Pupils: Pupils are equal, round, and reactive to light. Cardiovascular:      Rate and Rhythm: Normal rate and regular rhythm. Heart sounds: No murmur heard. No friction rub. No gallop. Pulmonary:      Effort: Pulmonary effort is normal. No respiratory distress. Breath sounds: Normal breath sounds. No wheezing, rhonchi or rales. Abdominal:      General: Bowel sounds are normal.      Palpations: Abdomen is soft. Tenderness: There is no abdominal tenderness. There is no guarding or rebound.    Musculoskeletal:         General: No swelling or deformity. Normal range of motion. Cervical back: Normal range of motion and neck supple. No rigidity. Right knee: No swelling, deformity, effusion, erythema, ecchymosis or lacerations. Normal range of motion. Tenderness present. Left knee: No swelling, effusion, erythema, ecchymosis or lacerations. Normal range of motion. Tenderness present. Lymphadenopathy:      Cervical: No cervical adenopathy. Skin:     General: Skin is warm and dry. Findings: No rash. Neurological:      General: No focal deficit present. Mental Status: She is alert and oriented to person, place, and time. Cranial Nerves: Cranial nerves are intact. Sensory: Sensation is intact. Motor: Motor function is intact. Psychiatric:         Mood and Affect: Mood normal.         Behavior: Behavior normal.         Diagnostic Study Results     Labs -     Recent Results (from the past 12 hour(s))   CBC WITH AUTOMATED DIFF    Collection Time: 09/27/21 12:50 PM   Result Value Ref Range    WBC 10.3 4.6 - 13.2 K/uL    RBC 4.22 4.20 - 5.30 M/uL    HGB 11.3 (L) 12.0 - 16.0 g/dL    HCT 35.1 35.0 - 45.0 %    MCV 83.2 78.0 - 100.0 FL    MCH 26.8 24.0 - 34.0 PG    MCHC 32.2 31.0 - 37.0 g/dL    RDW 13.4 11.6 - 14.5 %    PLATELET 243 044 - 710 K/uL    MPV 10.3 9.2 - 11.8 FL    NEUTROPHILS 80 (H) 40 - 73 %    LYMPHOCYTES 14 (L) 21 - 52 %    MONOCYTES 4 3 - 10 %    EOSINOPHILS 1 0 - 5 %    BASOPHILS 0 0 - 2 %    ABS. NEUTROPHILS 8.2 (H) 1.8 - 8.0 K/UL    ABS. LYMPHOCYTES 1.4 0.9 - 3.6 K/UL    ABS. MONOCYTES 0.5 0.05 - 1.2 K/UL    ABS. EOSINOPHILS 0.1 0.0 - 0.4 K/UL    ABS.  BASOPHILS 0.0 0.0 - 0.1 K/UL    DF AUTOMATED     METABOLIC PANEL, BASIC    Collection Time: 09/27/21 12:50 PM   Result Value Ref Range    Sodium 135 (L) 136 - 145 mmol/L    Potassium 4.1 3.5 - 5.5 mmol/L    Chloride 101 100 - 111 mmol/L    CO2 27 21 - 32 mmol/L    Anion gap 7 3.0 - 18 mmol/L    Glucose 406 (HH) 74 - 99 mg/dL    BUN 21 (H) 7.0 - 18 MG/DL    Creatinine 0.87 0.6 - 1.3 MG/DL    BUN/Creatinine ratio 24 (H) 12 - 20      GFR est AA >60 >60 ml/min/1.73m2    GFR est non-AA >60 >60 ml/min/1.73m2    Calcium 9.4 8.5 - 10.1 MG/DL   GLUCOSE, POC    Collection Time: 09/27/21  2:21 PM   Result Value Ref Range    Glucose (POC) 381 (H) 70 - 110 mg/dL   GLUCOSE, POC    Collection Time: 09/27/21  3:30 PM   Result Value Ref Range    Glucose (POC) 312 (H) 70 - 110 mg/dL       Radiologic Studies -   XR KNEE LT MIN 4 V   Final Result      No evidence of acute fracture or dislocation. XR KNEE RT MIN 4 V   Final Result      No evidence of acute fracture or dislocation. Trace right knee joint effusion. CT Results  (Last 48 hours)    None        CXR Results  (Last 48 hours)    None          Medications given in the ED-  Medications   sodium chloride 0.9 % bolus infusion 1,000 mL (1,000 mL IntraVENous New Bag 9/27/21 1256)   lidocaine (PF) (XYLOCAINE) 1 mg/kg = 50 mg in 0.9% sodium chloride 50 mL IVPB (0 mg/kg × 50.1 kg (Ideal) IntraVENous IV Completed 9/27/21 1326)   insulin regular (NOVOLIN R, HUMULIN R) injection 10 Units (10 Units SubCUTAneous Given 9/27/21 1421)   sodium chloride 0.9 % bolus infusion 1,000 mL (1,000 mL IntraVENous New Bag 9/27/21 1422)   ketorolac (TORADOL) injection 15 mg (15 mg IntraVENous Given 9/27/21 1442)         Medical Decision Making   I am the first provider for this patient. I reviewed the vital signs, available nursing notes, past medical history, past surgical history, family history and social history. Vital Signs-Reviewed the patient's vital signs. Records Reviewed: Nursing Notes    Pulse Oximetry Analysis - 97% on RA     Cardiac Monitor:  Rate: 113 bpm  Rhythm: Tachycardic rhythm    Provider Notes (Medical Decision Making):   DDX: Sprain, strain, contusion      Procedures:  Procedures    ED Course:   11:43 AM Initial assessment performed.  The patients presenting problems have been discussed, and they are in agreement with the care plan formulated and outlined with them. I have encouraged them to ask questions as they arise throughout their visit. 3:58 PM blood sugar improved, will discharge home. Patient left to follow-up with her orthopedic surgeon to get referred to pain management which she states is the plan. Diagnosis and Disposition     Discussion:  45 y.o. female with acute on chronic knee pain. This is worse today after several falls. Patient also states that she has leg weakness. She is scheduled for surgery on her neck with Dr. Angle Sue in approximately 2 weeks. Patient states that she had recent steroid injection of her knees for her pain. Patient does have some effusion in her right knee. She has no evidence of fracture in either knee. Patient had episode of hyperglycemia in the ED which was treated with IV insulin. Patient sugar is normalized and she may be discharged home safely. Patient agrees to discuss with her orthopedic surgeon and understands that the recommendation is for her to be in pain management. DISCHARGE NOTE:  3:58 PM   Serge Wheeler's  results have been reviewed with her. She has been counseled regarding her diagnosis, treatment, and plan. She verbally conveys understanding and agreement of the signs, symptoms, diagnosis, treatment and prognosis and additionally agrees to follow up as discussed. She also agrees with the care-plan and conveys that all of her questions have been answered. I have also provided discharge instructions for her that include: educational information regarding their diagnosis and treatment, and list of reasons why they would want to return to the ED prior to their follow-up appointment, should her condition change. She has been provided with education for proper emergency department utilization. CLINICAL IMPRESSION:    1. Contusion of knee, unspecified laterality, initial encounter    2. Effusion of right knee    3.  Hyperglycemia PLAN:  1. D/C Home  2. Current Discharge Medication List        3. Follow-up Information     Follow up With Specialties Details Why Contact Info    Nanette Benavides MD Orthopedic Surgery Call  As soon as possible, For follow up from Emergency Department visit. [address] [address]  [phone]      Silver Pretty NP Nurse Practitioner Schedule an appointment as soon as possible for a visit  As soon as possible, For follow up from Emergency Department visit. [address] [address]  [phone]      [facility] EMERGENCY DEPT Emergency Medicine  As needed; If symptoms worsen [address] [place] [phone]  [address]     Please note that this dictation was completed with [employer], the computer voice recognition software. Quite often unanticipated grammatical, syntax, homophones, and other interpretive errors are inadvertently transcribed by the computer software. Please disregard these errors. Please excuse any errors that have escaped final proofreading.     Daphne Crespo MD

## 2021-09-27 NOTE — PERIOP NOTES
Spoke with patient, who states she is in the ER post fall in home this am. Patient states her pain level is 10/10 on a numeric scale. Patient states she has pain bilateral knees and hands. Patient educated on NPO, CHG, and COVID 19 protocol. Patient to have COVID 19 screening on 10/14/2021. Patient instructed as to COVID screening date and time. Patient verbally acknowledges adherence with self quarantine per COVID 19 protocol. Patient states she will have labs and EKG performed while at [facility] today. Medications reviewed. Patient advised to leave valuables at home or with a loved one. All questions and concerns answered by RN.

## 2021-09-27 NOTE — ED NOTES
Patient brought in via EMS c/o generalized pain. Patient was dx w/ cervical stenosis. Pt received steroid injection in R knee yesterday, to have surgery 10/19.

## 2021-09-28 LAB
EST. AVERAGE GLUCOSE BLD GHB EST-MCNC: 332 MG/DL
HBA1C MFR BLD: 13.2 % (ref 4.2–5.6)

## 2021-09-29 ENCOUNTER — HOSPITAL ENCOUNTER (OUTPATIENT)
Dept: PREADMISSION TESTING | Age: 51
Discharge: HOME | End: 2021-09-29
Payer: MEDICAID

## 2021-09-29 ENCOUNTER — TRANSCRIBE ORDER (OUTPATIENT)
Dept: REGISTRATION | Age: 51
End: 2021-09-29

## 2021-09-29 DIAGNOSIS — E11.52 DIABETIC GANGRENE (HCC): ICD-10-CM

## 2021-09-29 DIAGNOSIS — M54.12 BRACHIAL NEURITIS: ICD-10-CM

## 2021-09-29 DIAGNOSIS — Z01.812 BLOOD TESTS PRIOR TO TREATMENT OR PROCEDURE: ICD-10-CM

## 2021-09-29 DIAGNOSIS — M54.12 BRACHIAL NEURITIS: Primary | ICD-10-CM

## 2021-09-29 DIAGNOSIS — M47.22 CERVICAL SPONDYLOSIS WITH RADICULOPATHY: ICD-10-CM

## 2021-09-29 LAB
ALBUMIN SERPL-MCNC: 2.7 G/DL (ref 3.4–5)
ALBUMIN/GLOB SERPL: 0.5 {RATIO} (ref 0.8–1.7)
ALP SERPL-CCNC: 124 U/L (ref 45–117)
ALT SERPL-CCNC: 14 U/L (ref 13–56)
AST SERPL-CCNC: 18 U/L (ref 10–38)
BILIRUB DIRECT SERPL-MCNC: 0.1 MG/DL (ref 0–0.2)
BILIRUB SERPL-MCNC: 0.3 MG/DL (ref 0.2–1)
GLOBULIN SER CALC-MCNC: 5.2 G/DL (ref 2–4)
PROT SERPL-MCNC: 7.9 G/DL (ref 6.4–8.2)

## 2021-09-29 PROCEDURE — 80076 HEPATIC FUNCTION PANEL: CPT

## 2021-09-29 PROCEDURE — 36415 COLL VENOUS BLD VENIPUNCTURE: CPT

## 2021-09-29 NOTE — PERIOP NOTES
CMP and A1C results faxed to [phone] office and Lucy Khan [phone] notified.

## 2021-10-01 LAB
BACTERIA SPEC CULT: NORMAL
BACTERIA SPEC CULT: NORMAL
SERVICE CMNT-IMP: NORMAL

## 2021-10-10 ENCOUNTER — HOSPITAL ENCOUNTER (EMERGENCY)
Age: 51
Discharge: HOME | End: 2021-10-10
Attending: STUDENT IN AN ORGANIZED HEALTH CARE EDUCATION/TRAINING PROGRAM
Payer: MEDICAID

## 2021-10-10 VITALS
TEMPERATURE: 97.1 F | BODY MASS INDEX: 24.48 KG/M2 | WEIGHT: 133 LBS | SYSTOLIC BLOOD PRESSURE: 144 MMHG | OXYGEN SATURATION: 98 % | DIASTOLIC BLOOD PRESSURE: 89 MMHG | RESPIRATION RATE: 16 BRPM | HEIGHT: 62 IN | HEART RATE: 111 BPM

## 2021-10-10 DIAGNOSIS — G89.29 OTHER CHRONIC PAIN: Primary | ICD-10-CM

## 2021-10-10 PROCEDURE — 74011250637 HC RX REV CODE- 250/637: Performed by: PHYSICIAN ASSISTANT

## 2021-10-10 PROCEDURE — 99284 EMERGENCY DEPT VISIT MOD MDM: CPT

## 2021-10-10 RX ORDER — KETOROLAC TROMETHAMINE 10 MG/1
10 TABLET, FILM COATED ORAL
Qty: 9 TABLET | Refills: 0 | Status: SHIPPED | OUTPATIENT
Start: 2021-10-10 | End: 2021-10-10 | Stop reason: SDUPTHER

## 2021-10-10 RX ORDER — OXYCODONE AND ACETAMINOPHEN 5; 325 MG/1; MG/1
1 TABLET ORAL
Status: COMPLETED | OUTPATIENT
Start: 2021-10-10 | End: 2021-10-10

## 2021-10-10 RX ORDER — KETOROLAC TROMETHAMINE 10 MG/1
10 TABLET, FILM COATED ORAL
Qty: 9 TABLET | Refills: 0 | Status: SHIPPED | OUTPATIENT
Start: 2021-10-10 | End: 2021-10-13

## 2021-10-10 RX ADMIN — OXYCODONE AND ACETAMINOPHEN 1 TABLET: 5; 325 TABLET ORAL at 15:55

## 2021-10-10 NOTE — ED PROVIDER NOTES
EMERGENCY DEPARTMENT HISTORY AND PHYSICAL EXAM    Date: 10/10/2021  Patient Name: Nuris Suero    History of Presenting Illness     Chief Complaint   Patient presents with    Pain (Chronic)         History Provided By: Patient    3:08 PM  Nuris Suero is a 45 y.o. female with PMHX of tension, diabetes, chronic pain, anxiety, bipolar disorder, prior substance abuse who presents to the emergency department C/O worsening of her chronic pain for the past week. Patient denies any new injury or trauma but most of her pain is in her neck shoulders bilateral hands and right knee. She states her PCP and Dr. Alexei Jenkins currently manage her pain with oxycodone, but she has been referred to pain management and rheumatology. However she has run out of her oxycodone and pain has worsened. She has appointment with her orthopedist Dr. Milana Winters tomorrow. Was supposed to have C-spine surgery in 9 days but it has been canceled as her A1c is too high. She states she has been on various medications over the years, has tried various muscle relaxers without relief and has intermittently been on steroids. Pt denies extremity numbness or weakness, and any other sxs or complaints. PCP: Tariq Parker DO    Current Facility-Administered Medications   Medication Dose Route Frequency Provider Last Rate Last Admin    oxyCODONE-acetaminophen (PERCOCET) 5-325 mg per tablet 1 Tablet  1 Tablet Oral NOW SAMANTHA Cox         Current Outpatient Medications   Medication Sig Dispense Refill    ketorolac (TORADOL) 10 mg tablet Take 1 Tablet by mouth three (3) times daily as needed for Pain for up to 3 days. 9 Tablet 0    insulin aspart protamine/insulin aspart (NovoLOG Mix 70-30FlexPen U-100) 100 unit/mL (70-30) inpn by SubCUTAneous route Before breakfast, lunch, dinner and at bedtime. Sliding scale      fluoxetine HCl (PROZAC PO) Take  by mouth daily.       diazePAM (Valium) 10 mg tablet Take 10 mg by mouth every six (6) hours as needed for Anxiety.  carisoprodoL (Soma) 350 mg tablet Take 1 Tablet by mouth every eight (8) hours as needed for Muscle Spasm(s). Max Daily Amount: 1,050 mg. 30 Tablet 0    dextromethorphan-guaiFENesin (Coricidin HBP Chest Simone-Cough)  mg cap Take 1 Capsule by mouth two (2) times daily as needed for Cough or Other (congestion). 20 Capsule 0    predniSONE (STERAPRED DS) 10 mg dose pack Take as directed (Patient not taking: Reported on 9/27/2021) 21 Tablet 0    lisinopriL (PRINIVIL, ZESTRIL) 10 mg tablet Take 20 mg by mouth daily.  Cane coco 1 Each by Does Not Apply route daily. 1 Device 0    ondansetron (ZOFRAN ODT) 4 mg disintegrating tablet Take 1-2 tablets every 6-8 hours as needed for nausea and vomiting. 10 Tab 0    lidocaine (LIDOCAINE VISCOUS) 2 % solution Put 10 mL in mouth and swish and spit out QAC and at bedtime (Patient not taking: Reported on 9/27/2021) 200 mL 0    Blood-Glucose Meter (ONETOUCH ULTRA2) monitoring kit Use to obtain two times a day. 1 Kit 0    lancets misc Use daily to monitor blood glucose 200 Each 0    glucose blood VI test strips (ASCENSIA AUTODISC VI, ONE TOUCH ULTRA TEST VI) strip 50 Each by Does Not Apply route two (2) times daily as needed. 200 Strip 3    polyethylene glycol (MIRALAX) 17 gram packet Take 1 Packet by mouth daily. 30 Each 1    Insulin Needles, Disposable, 31 gauge x 5/16\" ndle Use to administer insulin as directed 1 Package 11    atorvastatin (LIPITOR) 40 mg tablet Take 1 Tab by mouth daily.  (Patient not taking: Reported on 9/27/2021) 30 Tab 6       Past History     Past Medical History:  Past Medical History:   Diagnosis Date    Anxiety     Bipolar disorder ([address]     Breast cancer ([address]     breast cancer, right, S/P Mastectomy and chemo, radiation in 2013    Depression     Diabetes ([address]     Drug abuse ([address]     H/O cocaine use in past    Drug-seeking behavior     Family history of early CAD     Gastrointestinal disorder cholitis    H/O ETOH abuse     Hyperlipidemia     Hypertension     [facility]     Methamphetamine abuse ([facility]      self reported on 1/3/16    Psychiatric disorder     multiple personality disorder    Psychiatric disorder     anxiety    Psychiatric disorder     bipolar     Spine injury     Vitamin D deficiency 5/1/2018       Past Surgical History:  Past Surgical History:   Procedure Laterality Date    COLONOSCOPY N/A 7/18/2016    COLONOSCOPY performed [facility] Jasso [facility] at [facility] ENDOSCOPY    HX BREAST LUMPECTOMY  06/2013    right    HX HYSTERECTOMY      HX MASTECTOMY      had expanders put in 6/2018    HX ORTHOPAEDIC      Back fusion surgery 4/18/14.  HX OTHER SURGICAL      mediport    HX TONSILLECTOMY      HX TUBAL LIGATION      NEUROLOGICAL PROCEDURE UNLISTED  2020    back surgery       Family History:  Family History   Problem Relation Age of Onset    Heart Disease Mother     Stroke Father     Heart Disease Father 48    Diabetes Other     Hypertension Other     Cancer Maternal Grandmother        Social History:  Social History     Tobacco Use    Smoking status: Never Smoker    Smokeless tobacco: Never Used   Vaping Use    Vaping Use: Never used   Substance Use Topics    Alcohol use: Not Currently     Comment: \"Occasionally\"    Drug use: Not Currently     Types: Methamphetamines, Cocaine     Comment: none in 2 years       Allergies:   Allergies   Allergen Reactions    Latex Hives and Itching    Other Food Rash     Almonds    Amoxicillin Swelling     Other reaction(s): mild rash/itching    Aspirin Not Reported This Time and Swelling     Mouth swells    Beeswax Anaphylaxis    Levaquin [Levofloxacin] Not Reported This Time and Swelling     Other reaction(s): mild rash/itching    [place] Rash and Itching    Codeine Other (comments)    Glycopyrrolate Swelling     Pt  States  faciAL  SWELLING   POST  ROBINUL  IV   Pt  States  faciAL SWELLING   POST  ROBINUL  IV     Pcn [Penicillins] Swelling    Tape [Adhesive] Rash    Tramadol Swelling         Review of Systems   Review of Systems   Constitutional: Negative. Musculoskeletal: Positive for arthralgias, back pain, myalgias and neck pain. Skin: Negative. Neurological: Negative for weakness and numbness. All other systems reviewed and are negative. Physical Exam     Vitals:    10/10/21 1422   BP: (!) 144/89   Pulse: (!) 111   Resp: 16   Temp: 97.1 °F (36.2 °C)   SpO2: 98%   Weight: 60.3 kg (133 lb)   Height: 5' 2\" (1.575 m)     Physical Exam  Vital signs and nursing notes reviewed. CONSTITUTIONAL: Alert. Well-appearing; well-nourished; in no apparent distress. HEAD: Normocephalic; atraumatic. EYES: Conjunctiva clear. ENT: Moist mucus membranes. NECK: Supple; FROM without difficulty, non-tender; no cervical lymphadenopathy. CV: Normal S1, S2; no murmurs, rubs, or gallops. No chest wall tenderness. RESPIRATORY: Normal chest excursion with respiration; breath sounds clear and equal bilaterally; no wheezes, rhonchi, or rales. GI: Normal bowel sounds; non-distended; non-tender; no guarding or rigidity; no palpable organomegaly. No CVA tenderness. BACK:  No evidence of trauma or deformity. Non-tender to palpation. FROM without difficulty. Negative straight leg raise bilaterally. EXT: Normal ROM in all four extremities, nontender to palpation. RLE:  Anterior knee is mildly TTP with mild swelling, no erythema or large effusion. SKIN: Normal for age and race; warm; dry; good turgor; no apparent lesions or exudate. NEURO: A & O x3. PSYCH:  Mood and affect appropriate. Diagnostic Study Results     Labs -   No results found for this or any previous visit (from the past 12 hour(s)).     Radiologic Studies -   No orders to display     CT Results  (Last 48 hours)    None        CXR Results  (Last 48 hours)    None          Medications given in the ED-  Medications   oxyCODONE-acetaminophen (PERCOCET) 5-325 mg per tablet 1 Tablet (has no administration in time range)         Medical Decision Making   I am the first provider for this patient. I reviewed the vital signs, available nursing notes, past medical history, past surgical history, family history and social history. Vital Signs-Reviewed the patient's vital signs. Records Reviewed: Nursing Notes      Procedures:  Procedures    ED Course:  3:08 PM   Initial assessment performed. The patients presenting problems have been discussed, and they are in agreement with the care plan formulated and outlined with them. I have encouraged them to ask questions as they arise throughout their visit. Provider Notes (Medical Decision Making): Stan Leslie is a 45 y.o. female presents with her usual chronic pain, no new injuries or trauma. Requesting something for pain here, is out of her Norco or oxycodone which she states is prescribed by her PCP or orthopedist.  She has appoint with her orthopedist tomorrow, is requesting something for pain now and prescription for short course of Toradol she has tolerated well in the past without adverse reaction. Single dose of oxycodone given here, no prescription for narcotics for chronic pain and follow-up with her orthopedist tomorrow. I encouraged her to pursue her pain management and rheumatology referrals. Diagnosis and Disposition       DISCHARGE NOTE:    Wayne Love  results have been reviewed with her. She has been counseled regarding her diagnosis, treatment, and plan. She verbally conveys understanding and agreement of the signs, symptoms, diagnosis, treatment and prognosis and additionally agrees to follow up as discussed. She also agrees with the care-plan and conveys that all of her questions have been answered.   I have also provided discharge instructions for her that include: educational information regarding their diagnosis and treatment, and list of reasons why they would want to return to the ED prior to their follow-up appointment, should her condition change. She has been provided with education for proper emergency department utilization. CLINICAL IMPRESSION:    1. Other chronic pain        PLAN:  1. D/C Home  2. Current Discharge Medication List      START taking these medications    Details   ketorolac (TORADOL) 10 mg tablet Take 1 Tablet by mouth three (3) times daily as needed for Pain for up to 3 days. Qty: 9 Tablet, Refills: 0  Start date: 10/10/2021, End date: 10/13/2021           3. Follow-up Information     Follow up With Specialties Details Why Contact Info    Aram Guaman,  Family Medicine Schedule an appointment as soon as possible for a visit   [address] [phone]      Pelon Call MD Orthopedic Surgery  tomorrow, as scheduled [address] [id number]  [phone]      [facility] EMERGENCY DEPT Emergency Medicine  As needed, If symptoms worsen [address] [address]  [phone]        _______________________________      Please note that this dictation was completed with [employer], the computer voice recognition software. Quite often unanticipated grammatical, syntax, homophones, and other interpretive errors are inadvertently transcribed by the computer software. Please disregard these errors. Please excuse any errors that have escaped final proofreading.

## 2021-10-10 NOTE — ED TRIAGE NOTES
Pt in for pain in back and right knee.  Pt has a hx of spinal stenosis and was supposed to have surgery on 10/19 but that was canceled due to elevated A1C (13)

## 2021-10-10 NOTE — ED NOTES
I have reviewed discharge instructions with the patient. The patient verbalized understanding. Patient armband removed and shredded. Pt was educated on the importance not to drive at this time. Family member (son) verbalized that her will be he designated . Ace wrap was applied to pt right knee, tolerated well.

## 2021-10-21 ENCOUNTER — HOSPITAL ENCOUNTER (EMERGENCY)
Age: 51
Discharge: HOME | End: 2021-10-21
Attending: STUDENT IN AN ORGANIZED HEALTH CARE EDUCATION/TRAINING PROGRAM
Payer: MEDICAID

## 2021-10-21 ENCOUNTER — APPOINTMENT (OUTPATIENT)
Dept: GENERAL RADIOLOGY | Age: 51
End: 2021-10-21
Attending: PHYSICIAN ASSISTANT
Payer: MEDICAID

## 2021-10-21 VITALS
HEART RATE: 91 BPM | SYSTOLIC BLOOD PRESSURE: 100 MMHG | OXYGEN SATURATION: 98 % | WEIGHT: 136 LBS | TEMPERATURE: 97.5 F | BODY MASS INDEX: 25.03 KG/M2 | HEIGHT: 62 IN | DIASTOLIC BLOOD PRESSURE: 72 MMHG | RESPIRATION RATE: 18 BRPM

## 2021-10-21 DIAGNOSIS — S40.012A CONTUSION OF LEFT SHOULDER, INITIAL ENCOUNTER: Primary | ICD-10-CM

## 2021-10-21 DIAGNOSIS — S60.212A CONTUSION OF LEFT WRIST, INITIAL ENCOUNTER: ICD-10-CM

## 2021-10-21 PROCEDURE — 75810000053 HC SPLINT APPLICATION

## 2021-10-21 PROCEDURE — 73030 X-RAY EXAM OF SHOULDER: CPT

## 2021-10-21 PROCEDURE — 73110 X-RAY EXAM OF WRIST: CPT

## 2021-10-21 PROCEDURE — 99282 EMERGENCY DEPT VISIT SF MDM: CPT

## 2021-10-21 NOTE — ED TRIAGE NOTES
Patient into ER by wheelchair c/o fall Saturday. Patient states she went to go move a gate for dog in the house and fell. Patient endorses L shoulder pain.

## 2021-10-21 NOTE — ED PROVIDER NOTES
EMERGENCY DEPARTMENT HISTORY AND PHYSICAL EXAM    Date: 10/21/2021  Patient Name: Noe Red    History of Presenting Illness     Chief Complaint   Patient presents with    Shoulder Pain         History Provided By: Patient    Chief Complaint: left arm pain       Additional History (Context):   3:45 PM  Noe Red is a 45 y.o. female with PMHX hypertension, diabetes, polysubstance abuse, breast cancer presents to the emergency department C/O left arm pain since fall 5 days ago. Patient states she tripped over a dog gate falling onto her outstretched arms. Believes she did hit her head but does not believe she lost consciousness. No severe headache blurred vision vomiting. No tingling or numbness in the arms. No chest pain or shortness of breath. Patient does not take any blood thinners. PCP: Samantha Arias, DO    Current Outpatient Medications   Medication Sig Dispense Refill    insulin aspart protamine/insulin aspart (NovoLOG Mix 70-30FlexPen U-100) 100 unit/mL (70-30) inpn by SubCUTAneous route Before breakfast, lunch, dinner and at bedtime. Sliding scale      fluoxetine HCl (PROZAC PO) Take  by mouth daily.  diazePAM (Valium) 10 mg tablet Take 10 mg by mouth every six (6) hours as needed for Anxiety.  carisoprodoL (Soma) 350 mg tablet Take 1 Tablet by mouth every eight (8) hours as needed for Muscle Spasm(s). Max Daily Amount: 1,050 mg. 30 Tablet 0    dextromethorphan-guaiFENesin (Coricidin HBP Chest Simone-Cough)  mg cap Take 1 Capsule by mouth two (2) times daily as needed for Cough or Other (congestion). 20 Capsule 0    predniSONE (STERAPRED DS) 10 mg dose pack Take as directed (Patient not taking: Reported on 9/27/2021) 21 Tablet 0    lisinopriL (PRINIVIL, ZESTRIL) 10 mg tablet Take 20 mg by mouth daily.  Cane coco 1 Each by Does Not Apply route daily.  1 Device 0    ondansetron (ZOFRAN ODT) 4 mg disintegrating tablet Take 1-2 tablets every 6-8 hours as needed for nausea and vomiting. 10 Tab 0    lidocaine (LIDOCAINE VISCOUS) 2 % solution Put 10 mL in mouth and swish and spit out QAC and at bedtime (Patient not taking: Reported on 9/27/2021) 200 mL 0    Blood-Glucose Meter (ONETOUCH ULTRA2) monitoring kit Use to obtain two times a day. 1 Kit 0    lancets misc Use daily to monitor blood glucose 200 Each 0    glucose blood VI test strips (ASCENSIA AUTODISC VI, ONE TOUCH ULTRA TEST VI) strip 50 Each by Does Not Apply route two (2) times daily as needed. 200 Strip 3    polyethylene glycol (MIRALAX) 17 gram packet Take 1 Packet by mouth daily. 30 Each 1    Insulin Needles, Disposable, 31 gauge x 5/16\" ndle Use to administer insulin as directed 1 Package 11    atorvastatin (LIPITOR) 40 mg tablet Take 1 Tab by mouth daily. (Patient not taking: Reported on 9/27/2021) 30 Tab 6       Past History     Past Medical History:  Past Medical History:   Diagnosis Date    Anxiety     Bipolar disorder ([address]     Breast cancer ([address]     breast cancer, right, S/P Mastectomy and chemo, radiation in 2013    Depression     Diabetes ([address]     Drug abuse ([address]     H/O cocaine use in past    Drug-seeking behavior     Family history of early CAD     Gastrointestinal disorder     cholitis    H/O ETOH abuse     Hyperlipidemia     Hypertension     Malignant neoplasm without specification of site ([address]     Methamphetamine abuse ([address]      self reported on 1/3/16    Psychiatric disorder     multiple personality disorder    Psychiatric disorder     anxiety    Psychiatric disorder     bipolar     Spine injury     Vitamin D deficiency 5/1/2018       Past Surgical History:  Past Surgical History:   Procedure Laterality Date    COLONOSCOPY N/A 7/18/2016    [facility] ENDOSCOPY    HX BREAST LUMPECTOMY  06/2013    right    HX HYSTERECTOMY      HX MASTECTOMY      had expanders put in 6/2018    HX ORTHOPAEDIC      Back fusion surgery 4/18/14.  HX OTHER SURGICAL      mediport    HX TONSILLECTOMY      HX TUBAL LIGATION      NEUROLOGICAL PROCEDURE UNLISTED  2020    back surgery       Family History:  Family History   Problem Relation Age of Onset    Heart Disease Mother     Stroke Father     Heart Disease Father 48    Diabetes Other     Hypertension Other     Cancer Maternal Grandmother        Social History:  Social History     Tobacco Use    Smoking status: Never Smoker    Smokeless tobacco: Never Used   Vaping Use    Vaping Use: Never used   Substance Use Topics    Alcohol use: Not Currently     Comment: \"Occasionally\"    Drug use: Not Currently     Types: Methamphetamines, Cocaine     Comment: none in 2 years       Allergies: Allergies   Allergen Reactions    Latex Hives and Itching    Other Food Rash     Almonds    Amoxicillin Swelling     Other reaction(s): mild rash/itching    Aspirin Not Reported This Time and Swelling     Mouth swells    Beeswax Anaphylaxis    Levaquin [Levofloxacin] Not Reported This Time and Swelling     Other reaction(s): mild rash/itching    [place] Rash and Itching    Codeine Other (comments)    Glycopyrrolate Swelling     Pt  States  faciAL  SWELLING   POST  ROBINUL  IV   Pt  States  faciAL  SWELLING   POST  ROBINUL  IV     Pcn [Penicillins] Swelling    Tape [Adhesive] Rash    Tramadol Swelling       Review of Systems   Review of Systems   Constitutional: Negative for chills and fever. Respiratory: Negative for shortness of breath. Cardiovascular: Negative for chest pain. Gastrointestinal: Negative for abdominal pain, diarrhea, nausea and vomiting. Musculoskeletal: Positive for arthralgias (left arm and left wrist ). Skin: Negative for wound. Neurological: Negative for dizziness, weakness, numbness and headaches. All other systems reviewed and are negative.       Physical Exam     Vitals:    10/21/21 1505   BP: 100/72   Pulse: 91   Resp: 18   Temp: 97.5 °F (36.4 °C)   SpO2: 98% Weight: 61.7 kg (136 lb)   Height: 5' 2\" (1.575 m)     Physical Exam  Vitals and nursing note reviewed. Constitutional:       Appearance: She is well-developed. Comments: Sitting in wheelchair in fast track room   HENT:      Head: Normocephalic and atraumatic. Cardiovascular:      Rate and Rhythm: Normal rate and regular rhythm. Heart sounds: Normal heart sounds. No murmur heard. Pulmonary:      Effort: Pulmonary effort is normal. No respiratory distress. Breath sounds: Normal breath sounds. No wheezing or rales. Musculoskeletal:      Cervical back: Normal range of motion and neck supple. Comments: TTP over the left shoulder and left wrist, no obvious swelling or deformity, decreased range of motion slightly to the left shoulder and left wrist secondary to pain pulse is 2+ the radial pulse on the left   Neurological:      Mental Status: She is alert and oriented to person, place, and time. Psychiatric:         Judgment: Judgment normal.         Diagnostic Study Results     Labs:   No results found for this or any previous visit (from the past 12 hour(s)). Radiologic Studies:   XR SHOULDER LT AP/LAT MIN 2 V   Final Result      Mild degenerative changes as above without acute osseous abnormality or   malalignment      XR WRIST LT AP/LAT/OBL MIN 3V   Final Result      Circumferential left wrist soft tissue swelling without evidence of fracture. CT Results  (Last 48 hours)    None        CXR Results  (Last 48 hours)    None          Medical Decision Making   I am the first provider for this patient. I reviewed the vital signs, available nursing notes, past medical history, past surgical history, family history and social history. Vital Signs: Reviewed the patient's vital signs. Pulse Oximetry Analysis: 98% on RA       Records Reviewed: Nursing Notes and Old Medical Records    Procedures:  Procedures    ED Course:   3:45 PM Initial assessment performed.  The patients presenting problems have been discussed, and they are in agreement with the care plan formulated and outlined with them. I have encouraged them to ask questions as they arise throughout their visit. Discussion:  Pt presents with left shoulder pain and left wrist pain after a mechanical fall 6 days ago. She did hit her head but no LOC and is not on any blood thinners no indication for head imaging at this time. Shoulder and wrist show no acute fracture but does have soft tissue swelling around the wrist.  Will place in thumb spica splint in case of subtle fracture not initially seen on x-ray and will have patient follow-up with hand specialist.  Note suspicion for CAD or ACS. Strict return precautions given, pt offering no questions or complaints. Diagnosis and Disposition     DISCHARGE NOTE:  Madhuri Croft  results have been reviewed with her. She has been counseled regarding her diagnosis, treatment, and plan. She verbally conveys understanding and agreement of the signs, symptoms, diagnosis, treatment and prognosis and additionally agrees to follow up as discussed. She also agrees with the care-plan and conveys that all of her questions have been answered. I have also provided discharge instructions for her that include: educational information regarding their diagnosis and treatment, and list of reasons why they would want to return to the ED prior to their follow-up appointment, should her condition change. She has been provided with education for proper emergency department utilization. CLINICAL IMPRESSION:    1. Contusion of left shoulder, initial encounter    2. Contusion of left wrist, initial encounter        PLAN:  1. D/C Home  2. Current Discharge Medication List        3.    Follow-up Information     Follow up With Specialties Details Why [facility]    [facility] EMERGENCY DEPT Emergency Medicine  If symptoms worsen [address] [id number]  [phone] Liang Gallegos MD Orthopedic Surgery, Hand Surgery Schedule an appointment as soon as possible for a visit   [address] [address] [facility]  [phone]                 Please note that this dictation was completed with [employer], the computer voice recognition software. Quite often unanticipated grammatical, syntax, homophones, and other interpretive errors are inadvertently transcribed by the computer software. Please disregard these errors. Please excuse any errors that have escaped final proofreading.

## 2021-11-04 ENCOUNTER — APPOINTMENT (OUTPATIENT)
Dept: VASCULAR SURGERY | Age: 51
End: 2021-11-04
Attending: EMERGENCY MEDICINE
Payer: MEDICAID

## 2021-11-04 ENCOUNTER — HOSPITAL ENCOUNTER (EMERGENCY)
Age: 51
Discharge: HOME | End: 2021-11-04
Attending: EMERGENCY MEDICINE
Payer: MEDICAID

## 2021-11-04 ENCOUNTER — APPOINTMENT (OUTPATIENT)
Dept: GENERAL RADIOLOGY | Age: 51
End: 2021-11-04
Attending: EMERGENCY MEDICINE
Payer: MEDICAID

## 2021-11-04 VITALS
HEIGHT: 62 IN | RESPIRATION RATE: 20 BRPM | DIASTOLIC BLOOD PRESSURE: 91 MMHG | BODY MASS INDEX: 24.99 KG/M2 | OXYGEN SATURATION: 98 % | HEART RATE: 95 BPM | SYSTOLIC BLOOD PRESSURE: 144 MMHG | TEMPERATURE: 97.7 F | WEIGHT: 135.8 LBS

## 2021-11-04 DIAGNOSIS — R60.0 BILATERAL LEG EDEMA: ICD-10-CM

## 2021-11-04 DIAGNOSIS — I82.432 ACUTE DEEP VEIN THROMBOSIS (DVT) OF POPLITEAL VEIN OF LEFT LOWER EXTREMITY (HCC): Primary | ICD-10-CM

## 2021-11-04 LAB
ALBUMIN SERPL-MCNC: 2.8 G/DL (ref 3.4–5)
ALBUMIN/GLOB SERPL: 0.6 {RATIO} (ref 0.8–1.7)
ALP SERPL-CCNC: 117 U/L (ref 45–117)
ALT SERPL-CCNC: 20 U/L (ref 13–56)
ANION GAP SERPL CALC-SCNC: 5 MMOL/L (ref 3–18)
APPEARANCE UR: CLEAR
APTT PPP: 28.4 SEC (ref 23–36.4)
AST SERPL-CCNC: 15 U/L (ref 10–38)
BACTERIA URNS QL MICRO: NEGATIVE /HPF
BASOPHILS # BLD: 0 K/UL (ref 0–0.1)
BASOPHILS NFR BLD: 0 % (ref 0–2)
BILIRUB SERPL-MCNC: 0.2 MG/DL (ref 0.2–1)
BILIRUB UR QL: ABNORMAL
BNP SERPL-MCNC: 184 PG/ML (ref 0–900)
BUN SERPL-MCNC: 19 MG/DL (ref 7–18)
BUN/CREAT SERPL: 25 (ref 12–20)
CALCIUM SERPL-MCNC: 8.9 MG/DL (ref 8.5–10.1)
CHLORIDE SERPL-SCNC: 108 MMOL/L (ref 100–111)
CK MB CFR SERPL CALC: ABNORMAL % (ref 0–4)
CK MB SERPL-MCNC: <1 NG/ML (ref 5–25)
CK SERPL-CCNC: 18 U/L (ref 26–192)
CO2 SERPL-SCNC: 27 MMOL/L (ref 21–32)
COLOR UR: YELLOW
CREAT SERPL-MCNC: 0.77 MG/DL (ref 0.6–1.3)
DIFFERENTIAL METHOD BLD: ABNORMAL
EOSINOPHIL # BLD: 0.1 K/UL (ref 0–0.4)
EOSINOPHIL NFR BLD: 1 % (ref 0–5)
EPITH CASTS URNS QL MICRO: NORMAL /LPF (ref 0–5)
ERYTHROCYTE [DISTWIDTH] IN BLOOD BY AUTOMATED COUNT: 14.7 % (ref 11.6–14.5)
GLOBULIN SER CALC-MCNC: 4.4 G/DL (ref 2–4)
GLUCOSE SERPL-MCNC: 129 MG/DL (ref 74–99)
GLUCOSE UR STRIP.AUTO-MCNC: NEGATIVE MG/DL
HCT VFR BLD AUTO: 35.5 % (ref 35–45)
HGB BLD-MCNC: 10.7 G/DL (ref 12–16)
HGB UR QL STRIP: NEGATIVE
INR PPP: 1 (ref 0.8–1.2)
KETONES UR QL STRIP.AUTO: ABNORMAL MG/DL
LEUKOCYTE ESTERASE UR QL STRIP.AUTO: NEGATIVE
LYMPHOCYTES # BLD: 2.4 K/UL (ref 0.9–3.6)
LYMPHOCYTES NFR BLD: 22 % (ref 21–52)
MCH RBC QN AUTO: 25.4 PG (ref 24–34)
MCHC RBC AUTO-ENTMCNC: 30.1 G/DL (ref 31–37)
MCV RBC AUTO: 84.1 FL (ref 78–100)
MONOCYTES # BLD: 0.5 K/UL (ref 0.05–1.2)
MONOCYTES NFR BLD: 5 % (ref 3–10)
NEUTS SEG # BLD: 7.7 K/UL (ref 1.8–8)
NEUTS SEG NFR BLD: 71 % (ref 40–73)
NITRITE UR QL STRIP.AUTO: NEGATIVE
PH UR STRIP: 6 [PH] (ref 5–8)
PLATELET # BLD AUTO: 594 K/UL (ref 135–420)
PMV BLD AUTO: 9.6 FL (ref 9.2–11.8)
POTASSIUM SERPL-SCNC: 3.8 MMOL/L (ref 3.5–5.5)
PROT SERPL-MCNC: 7.2 G/DL (ref 6.4–8.2)
PROT UR STRIP-MCNC: 30 MG/DL
PROTHROMBIN TIME: 12.6 SEC (ref 11.5–15.2)
RBC # BLD AUTO: 4.22 M/UL (ref 4.2–5.3)
RBC #/AREA URNS HPF: NEGATIVE /HPF (ref 0–5)
SODIUM SERPL-SCNC: 140 MMOL/L (ref 136–145)
SP GR UR REFRACTOMETRY: 1.02 (ref 1–1.03)
TROPONIN I SERPL-MCNC: <0.02 NG/ML (ref 0–0.04)
UROBILINOGEN UR QL STRIP.AUTO: 0.2 EU/DL (ref 0.2–1)
WBC # BLD AUTO: 10.8 K/UL (ref 4.6–13.2)
WBC URNS QL MICRO: NORMAL /HPF (ref 0–5)

## 2021-11-04 PROCEDURE — 80053 COMPREHEN METABOLIC PANEL: CPT

## 2021-11-04 PROCEDURE — 85025 COMPLETE CBC W/AUTO DIFF WBC: CPT

## 2021-11-04 PROCEDURE — 83880 ASSAY OF NATRIURETIC PEPTIDE: CPT

## 2021-11-04 PROCEDURE — 81001 URINALYSIS AUTO W/SCOPE: CPT

## 2021-11-04 PROCEDURE — 71045 X-RAY EXAM CHEST 1 VIEW: CPT

## 2021-11-04 PROCEDURE — 93970 EXTREMITY STUDY: CPT

## 2021-11-04 PROCEDURE — 74011250637 HC RX REV CODE- 250/637: Performed by: EMERGENCY MEDICINE

## 2021-11-04 PROCEDURE — 99284 EMERGENCY DEPT VISIT MOD MDM: CPT

## 2021-11-04 PROCEDURE — 85610 PROTHROMBIN TIME: CPT

## 2021-11-04 PROCEDURE — 85730 THROMBOPLASTIN TIME PARTIAL: CPT

## 2021-11-04 PROCEDURE — 82553 CREATINE MB FRACTION: CPT

## 2021-11-04 RX ORDER — HYDROCODONE BITARTRATE AND ACETAMINOPHEN 5; 325 MG/1; MG/1
1 TABLET ORAL ONCE
Status: COMPLETED | OUTPATIENT
Start: 2021-11-04 | End: 2021-11-04

## 2021-11-04 RX ORDER — RIVAROXABAN 15 MG-20MG
KIT ORAL
Qty: 1 DOSE PACK | Refills: 0 | OUTPATIENT
Start: 2021-11-04 | End: 2021-11-11

## 2021-11-04 RX ORDER — HYDROCODONE BITARTRATE AND ACETAMINOPHEN 5; 325 MG/1; MG/1
1 TABLET ORAL
Qty: 28 TABLET | Refills: 0 | Status: SHIPPED | OUTPATIENT
Start: 2021-11-04 | End: 2021-11-11

## 2021-11-04 RX ADMIN — RIVAROXABAN 15 MG: 15 TABLET, FILM COATED ORAL at 23:32

## 2021-11-04 RX ADMIN — HYDROCODONE BITARTRATE AND ACETAMINOPHEN 1 TABLET: 5; 325 TABLET ORAL at 23:32

## 2021-11-05 NOTE — CALL BACK NOTE
11:03 AM 
11/05/2021 Received call from [address] about Rx norco from [facility] ED visit last night. Pt has allergy to codeine. Called patient. Confirmed she can take norco without allergy. Called pharmacist back to notify pt is able to take norco with no allergy.   
 
Dominic Melgar PA-C

## 2021-11-05 NOTE — ED TRIAGE NOTES
Swelling to bilateral feet for \"a while. \" Home health staff told her to come in to be seen for it.

## 2021-11-05 NOTE — ED PROVIDER NOTES
EMERGENCY DEPARTMENT HISTORY AND PHYSICAL EXAM    Date: 11/4/2021  Patient Name: Jesus Shields    History of Presenting Illness     Chief Complaint   Patient presents with    Ankle swelling         History Provided By: Patient    Jesus Shields is a 45 y.o. female who presents to the emergency department C/O swelling to the bilateral leg and ankles. Patient states she has had swelling to her feet in the past but is never had swelling to her ankles and pain in her lower legs. States it has been going on for about a week. States she had a home health nursing staff come to see her for it and recommended she come to the emergency department for evaluation for rule out DVT or other potential causes. Kamla Morrow PCP: [place], MD Fiord[place]    Current Outpatient Medications   Medication Sig Dispense Refill    rivaroxaban (Xarelto DVT-PE Treat 30d Start) 15 mg (42)- 20 mg (9) DsPk Take one 15 mg tablet twice a day with food for the first 21 days. Then, take one 20 mg tablet once a day with food for 9 days. 1 Dose Pack 0    HYDROcodone-acetaminophen (Norco) 5-325 mg per tablet Take 1 Tablet by mouth every six (6) hours as needed for Pain for up to 7 days. Max Daily Amount: 4 Tablets. 28 Tablet 0    insulin aspart protamine/insulin aspart (NovoLOG Mix 70-30FlexPen U-100) 100 unit/mL (70-30) inpn by SubCUTAneous route Before breakfast, lunch, dinner and at bedtime. Sliding scale      fluoxetine HCl (PROZAC PO) Take  by mouth daily.  diazePAM (Valium) 10 mg tablet Take 10 mg by mouth every six (6) hours as needed for Anxiety.  carisoprodoL (Soma) 350 mg tablet Take 1 Tablet by mouth every eight (8) hours as needed for Muscle Spasm(s). Max Daily Amount: 1,050 mg. 30 Tablet 0    dextromethorphan-guaiFENesin (Coricidin HBP Chest Simone-Cough)  mg cap Take 1 Capsule by mouth two (2) times daily as needed for Cough or Other (congestion).  20 Capsule 0    predniSONE (STERAPRED DS) 10 mg dose pack Take as directed (Patient not taking: Reported on 9/27/2021) 21 Tablet 0    lisinopriL (PRINIVIL, ZESTRIL) 10 mg tablet Take 20 mg by mouth daily.  Cane coco 1 Each by Does Not Apply route daily. 1 Device 0    ondansetron (ZOFRAN ODT) 4 mg disintegrating tablet Take 1-2 tablets every 6-8 hours as needed for nausea and vomiting. 10 Tab 0    lidocaine (LIDOCAINE VISCOUS) 2 % solution Put 10 mL in mouth and swish and spit out QAC and at bedtime (Patient not taking: Reported on 9/27/2021) 200 mL 0    Blood-Glucose Meter (ONETOUCH ULTRA2) monitoring kit Use to obtain two times a day. 1 Kit 0    lancets misc Use daily to monitor blood glucose 200 Each 0    glucose blood VI test strips (ASCENSIA AUTODISC VI, ONE TOUCH ULTRA TEST VI) strip 50 Each by Does Not Apply route two (2) times daily as needed. 200 Strip 3    polyethylene glycol (MIRALAX) 17 gram packet Take 1 Packet by mouth daily. 30 Each 1    Insulin Needles, Disposable, 31 gauge x 5/16\" ndle Use to administer insulin as directed 1 Package 11    atorvastatin (LIPITOR) 40 mg tablet Take 1 Tab by mouth daily.  (Patient not taking: Reported on 9/27/2021) 30 Tab 6       Past History     Past Medical History:  Past Medical History:   Diagnosis Date    Anxiety     Bipolar disorder ([address]     Breast cancer ([address]     breast cancer, right, S/P Mastectomy and chemo, radiation in 2013    Depression     Diabetes ([address]     Drug abuse ([address]     H/O cocaine use in past    Drug-seeking behavior     Family history of early CAD     Gastrointestinal disorder     cholitis    H/O ETOH abuse     Hyperlipidemia     Hypertension     Malignant neoplasm without specification of site ([address]     Methamphetamine abuse ([address]      self reported on 1/3/16    Psychiatric disorder     multiple personality disorder    Psychiatric disorder     anxiety    Psychiatric disorder     bipolar     Spine injury     Vitamin D deficiency 5/1/2018       Past Surgical History:  Past Surgical History:   Procedure Laterality Date    COLONOSCOPY N/A 7/18/2016    COLONOSCOPY performed [facility]ain[facility] Stephenson MD at [facility] ENDOSCOPY    HX BREAST LUMPECTOMY  06/2013    right    HX HYSTERECTOMY      HX MASTECTOMY      had expanders put in 6/2018    HX ORTHOPAEDIC      Back fusion surgery 4/18/14.  HX OTHER SURGICAL      mediport    HX TONSILLECTOMY      HX TUBAL LIGATION      NEUROLOGICAL PROCEDURE UNLISTED  2020    back surgery       Family History:  Family History   Problem Relation Age of Onset    Heart Disease Mother     Stroke Father     Heart Disease Father 48    Diabetes Other     Hypertension Other     Cancer Maternal Grandmother        Social History:  Social History     Tobacco Use    Smoking status: Never Smoker    Smokeless tobacco: Never Used   Vaping Use    Vaping Use: Never used   Substance Use Topics    Alcohol use: Not Currently     Comment: \"Occasionally\"    Drug use: Not Currently     Types: Methamphetamines, Cocaine     Comment: none in 2 years       Allergies: Allergies   Allergen Reactions    Latex Hives and Itching    Other Food Rash     Almonds    Amoxicillin Swelling     Other reaction(s): mild rash/itching    Aspirin Not Reported This Time and Swelling     Mouth swells    Beeswax Anaphylaxis    Levaquin [Levofloxacin] Not Reported This Time and Swelling     Other reaction(s): mild rash/itching    Wauseon Rash and Itching    Codeine Other (comments)    Glycopyrrolate Swelling     Pt  States  faciAL  SWELLING   POST  ROBINUL  IV   Pt  States  faciAL  SWELLING   POST  ROBINUL  IV     Pcn [Penicillins] Swelling    Tape [Adhesive] Rash    Tramadol Swelling         Review of Systems   Review of Systems   Constitutional: Negative for fever. Respiratory: Negative for chest tightness and shortness of breath. Cardiovascular: Positive for leg swelling. Gastrointestinal: Negative for abdominal pain, nausea and vomiting.    Musculoskeletal: Positive for arthralgias, joint swelling and myalgias. All other systems reviewed and are negative. All other systems reviewed and are negative. Physical Exam     Vitals:    11/04/21 2206 11/04/21 2208 11/04/21 2215 11/04/21 2330   BP: 113/88  (!) 124/57 (!) 144/91   Pulse:  95     Resp:       Temp:       SpO2: 97%  99% 98%   Weight:       Height:         Physical Exam    Nursing notes and vital signs reviewed    Constitutional: Chronically ill-appearing, non toxic appearing, no acute distress, appearing stated age  Eyes: PERRL, EOMI, No conjunctival injection  ENT: external ears normal, No rhinorrhea, external nose normal, mucous membranes moist  Cardiovascular: Regular rate and rhythm, no murmurs, No JVD  Respiratory: Clear to ausculation bilaterally, No stridor, Normal work of breathing and chest excursion bilaterally  Abdomen: Soft, non tender, non distended, normoactive bowel sounds, No rigidity, no peritoneal signs  Musculoskeletal: Patient has chronic joint swelling to her hands and feet, this has been seen prior from other evaluations. No evidence of obvious injury to Head, Neck, Back, Extremities, +1 bilateral LE edema  Skin: Warm, dry, No obvious rashes  Neuro: Alert and oriented x 3, CN 2-12 intact, normal speech, strength and sensation full and symmetric bilaterally  Psychiatric: Normal mood and affect      Diagnostic Study Results     Labs -     Recent Results (from the past 72 hour(s))   CBC WITH AUTOMATED DIFF    Collection Time: 11/04/21  9:55 PM   Result Value Ref Range    WBC 10.8 4.6 - 13.2 K/uL    RBC 4.22 4.20 - 5.30 M/uL    HGB 10.7 (L) 12.0 - 16.0 g/dL    HCT 35.5 35.0 - 45.0 %    MCV 84.1 78.0 - 100.0 FL    MCH 25.4 24.0 - 34.0 PG    MCHC 30.1 (L) 31.0 - 37.0 g/dL    RDW 14.7 (H) 11.6 - 14.5 %    PLATELET 602 (H) 472 - 420 K/uL    MPV 9.6 9.2 - 11.8 FL    NEUTROPHILS 71 40 - 73 %    LYMPHOCYTES 22 21 - 52 %    MONOCYTES 5 3 - 10 %    EOSINOPHILS 1 0 - 5 %    BASOPHILS 0 0 - 2 %    ABS. NEUTROPHILS 7.7 1.8 - 8.0 K/UL    ABS. LYMPHOCYTES 2.4 0.9 - 3.6 K/UL    ABS. MONOCYTES 0.5 0.05 - 1.2 K/UL    ABS. EOSINOPHILS 0.1 0.0 - 0.4 K/UL    ABS. BASOPHILS 0.0 0.0 - 0.1 K/UL    DF AUTOMATED     URINALYSIS W/ RFLX MICROSCOPIC    Collection Time: 11/04/21 10:00 PM   Result Value Ref Range    Color YELLOW      Appearance CLEAR      Specific gravity 1.025 1.003 - 1.030      pH (UA) 6.0 5.0 - 8.0      Protein 30 (A) NEG mg/dL    Glucose Negative NEG mg/dL    Ketone TRACE (A) NEG mg/dL    Bilirubin LARGE (A) NEG      Blood Negative NEG      Urobilinogen 0.2 0.2 - 1.0 EU/dL    Nitrites Negative NEG      Leukocyte Esterase Negative NEG     URINE MICROSCOPIC ONLY    Collection Time: 11/04/21 10:00 PM   Result Value Ref Range    WBC 0 to 3 0 - 5 /hpf    RBC Negative 0 - 5 /hpf    Epithelial cells FEW 0 - 5 /lpf    Bacteria Negative NEG /hpf   CARDIAC PANEL,(CK, CKMB & TROPONIN)    Collection Time: 11/04/21 10:27 PM   Result Value Ref Range    CK - MB <1.0 <3.6 ng/ml    CK-MB Index  0.0 - 4.0 %     CALCULATION NOT PERFORMED WHEN RESULT IS BELOW LINEAR LIMIT    CK 18 (L) 26 - 192 U/L    Troponin-I, QT <0.02 0.0 - 3.981 NG/ML   METABOLIC PANEL, COMPREHENSIVE    Collection Time: 11/04/21 10:27 PM   Result Value Ref Range    Sodium 140 136 - 145 mmol/L    Potassium 3.8 3.5 - 5.5 mmol/L    Chloride 108 100 - 111 mmol/L    CO2 27 21 - 32 mmol/L    Anion gap 5 3.0 - 18 mmol/L    Glucose 129 (H) 74 - 99 mg/dL    BUN 19 (H) 7.0 - 18 MG/DL    Creatinine 0.77 0.6 - 1.3 MG/DL    BUN/Creatinine ratio 25 (H) 12 - 20      GFR est AA >60 >60 ml/min/1.73m2    GFR est non-AA >60 >60 ml/min/1.73m2    Calcium 8.9 8.5 - 10.1 MG/DL    Bilirubin, total 0.2 0.2 - 1.0 MG/DL    ALT (SGPT) 20 13 - 56 U/L    AST (SGOT) 15 10 - 38 U/L    Alk.  phosphatase 117 45 - 117 U/L    Protein, total 7.2 6.4 - 8.2 g/dL    Albumin 2.8 (L) 3.4 - 5.0 g/dL    Globulin 4.4 (H) 2.0 - 4.0 g/dL    A-G Ratio 0.6 (L) 0.8 - 1.7     NT-PRO BNP    Collection Time: 11/04/21 10:27 PM   Result Value Ref Range    NT pro- 0 - 900 PG/ML   PROTHROMBIN TIME + INR    Collection Time: 11/04/21 10:27 PM   Result Value Ref Range    Prothrombin time 12.6 11.5 - 15.2 sec    INR 1.0 0.8 - 1.2     PTT    Collection Time: 11/04/21 10:27 PM   Result Value Ref Range    aPTT 28.4 23.0 - 36.4 SEC       Radiologic Studies -   DUPLEX LOWER EXT VENOUS BILAT   Final Result      XR CHEST PORT   Final Result       1. No acute pulmonary disease and no significant interval change. CT Results  (Last 48 hours)    None        CXR Results  (Last 48 hours)               11/04/21 2210  XR CHEST PORT Final result    Impression:      1. No acute pulmonary disease and no significant interval change. Narrative:  EXAM: Portable chest radiograph 11/4/2021       CLINICAL INDICATION/HISTORY: Bilateral lower extremity swelling. Evaluate for   pulmonary edema. TECHNIQUE: A semierect portable radiograph was obtained. COMPARISON: 9/7/2021. FINDINGS: The cardiomegaly is stable contours are unchanged. Surgical clips are   again noted projecting over the left hemithorax. The lungs remain clear. There   is no pneumothorax or pleural effusion. Medications given in the ED-  Medications   rivaroxaban (XARELTO) tablet 15 mg (15 mg Oral Given 11/4/21 2332)   HYDROcodone-acetaminophen (NORCO) 5-325 mg per tablet 1 Tablet (1 Tablet Oral Given 11/4/21 2332)         Medical Decision Making     I reviewed the vital signs, available nursing notes, past medical history, past surgical history, family history and social history. Vital Signs interpretation- I have reviewed the patient's vital signs.     Pulse Oximetry interpretation - 100% on Room air     Cardiac Monitor interpretation:  Rate: 107 bpm  Rhythm: sinus      Records Reviewed: Nursing Notes and Old Medical Records    Procedures:  Procedures    ED Course and MDM:  Patient presenting with bilateral lower extremity edema mild.  Will obtain duplex to rule out DVT, blood work to evaluate for kidney function and chest x-ray to evaluate for potential of pulmonary edema although low likelihood. Laboratory findings unremarkable, chest x-ray normal.  Ultrasound did show a nonocclusive left popliteal DVT. Coagulation studies normal.      I discussed with the patient the results of her laboratory findings and imaging studies and diagnosis of nonocclusive left popliteal DVT. Recommended treatment with Xarelto and pain medications as directed and to follow-up with her primary care physician. I stated she will need to be on these medications for the next 3 to 6 months then to be reassessed for if the DVT has resolved. I stated that there is a possibility she might need to be on these medications for an extended period of time although it would have to do with her follow-up and long-term management with her primary care physician. Patient was given return precautions regarding the nature of DVT as well as being on blood thinners. She understands and is agreeable to plan    Diagnosis and Disposition         DISCHARGE NOTE:    Luisana Skinner  results have been reviewed with her. She has been counseled regarding her diagnosis, treatment, and plan. She verbally conveys understanding and agreement of the signs, symptoms, diagnosis, treatment and prognosis and additionally agrees to follow up as discussed. She also agrees with the care-plan and conveys that all of her questions have been answered. I have also provided discharge instructions for her that include: educational information regarding their diagnosis and treatment, and list of reasons why they would want to return to the ED prior to their follow-up appointment, should her condition change. She has been provided with education for proper emergency department utilization. CLINICAL IMPRESSION:    1.  Acute deep vein thrombosis (DVT) of popliteal vein of left lower extremity ([facility]    2. Bilateral leg edema        PLAN:  1. D/C Home  2. Discharge Medication List as of 11/4/2021 11:32 PM      START taking these medications    Details   rivaroxaban (Xarelto DVT-PE Treat 30d Start) 15 mg (42)- 20 mg (9) DsPk Take one 15 mg tablet twice a day with food for the first 21 days. Then, take one 20 mg tablet once a day with food for 9 days. , Normal, Disp-1 Dose Pack, R-0      HYDROcodone-acetaminophen (Norco) 5-325 mg per tablet Take 1 Tablet by mouth every six (6) hours as needed for Pain for up to 7 days. Max Daily Amount: 4 Tablets., Normal, Disp-28 Tablet, R-0         CONTINUE these medications which have NOT CHANGED    Details   insulin aspart protamine/insulin aspart (NovoLOG Mix 70-30FlexPen U-100) 100 unit/mL (70-30) inpn by SubCUTAneous route Before breakfast, lunch, dinner and at bedtime. Sliding scale, Historical Med      fluoxetine HCl (PROZAC PO) Take  by mouth daily. , Historical Med      diazePAM (Valium) 10 mg tablet Take 10 mg by mouth every six (6) hours as needed for Anxiety. , Historical Med      carisoprodoL (Soma) 350 mg tablet Take 1 Tablet by mouth every eight (8) hours as needed for Muscle Spasm(s). Max Daily Amount: 1,050 mg., Normal, Disp-30 Tablet, R-0      dextromethorphan-guaiFENesin (Coricidin HBP Chest Simone-Cough)  mg cap Take 1 Capsule by mouth two (2) times daily as needed for Cough or Other (congestion). , Normal, Disp-20 Capsule, R-0      predniSONE (STERAPRED DS) 10 mg dose pack Take as directed, Normal, Disp-21 Tablet, R-0      lisinopriL (PRINIVIL, ZESTRIL) 10 mg tablet Take 20 mg by mouth daily. , Historical Med      Cane coco 1 Each by Does Not Apply route daily. , Print, Disp-1 Device, R-0      ondansetron (ZOFRAN ODT) 4 mg disintegrating tablet Take 1-2 tablets every 6-8 hours as needed for nausea and vomiting., Print, Disp-10 Tab, R-0      lidocaine (LIDOCAINE VISCOUS) 2 % solution Put 10 mL in mouth and swish and spit out QAC and at bedtime, Print, Disp-200 mL, R-0      Blood-Glucose Meter (ONETOUCH ULTRA2) monitoring kit Use to obtain two times a day., Normal, Disp-1 Kit, R-0      lancets misc Use daily to monitor blood glucose, Normal, Disp-200 Each, R-0      glucose blood VI test strips (ASCENSIA AUTODISC VI, ONE TOUCH ULTRA TEST VI) strip 50 Each by Does Not Apply route two (2) times daily as needed., Normal, Disp-200 Strip, R-3      polyethylene glycol (MIRALAX) 17 gram packet Take 1 Packet by mouth daily. , Normal, Disp-30 Each, R-1      Insulin Needles, Disposable, 31 gauge x 5/16\" ndle Use to administer insulin as directed, Normal, Disp-1 Package, R-11      atorvastatin (LIPITOR) 40 mg tablet Take 1 Tab by mouth daily. , Normal, Disp-30 Tab, R-6           3. Follow-up Information     Follow up With Specialties Details Why Contact Info    Your primary care doctor  Schedule an appointment as soon as possible for a visit  to continue prescribing further blood thinners, youll need to be on them for 3-6 months         _______________________________      Please note that this dictation was completed with [employer], the computer voice recognition software. Quite often unanticipated grammatical, syntax, homophones, and other interpretive errors are inadvertently transcribed by the computer software. Please disregard these errors. Please excuse any errors that have escaped final proofreading.

## 2021-11-10 ENCOUNTER — HOSPITAL ENCOUNTER (EMERGENCY)
Age: 51
Discharge: HOME | End: 2021-11-11
Attending: EMERGENCY MEDICINE
Payer: MEDICAID

## 2021-11-10 ENCOUNTER — APPOINTMENT (OUTPATIENT)
Dept: VASCULAR SURGERY | Age: 51
End: 2021-11-10
Attending: PHYSICIAN ASSISTANT
Payer: MEDICAID

## 2021-11-10 ENCOUNTER — APPOINTMENT (OUTPATIENT)
Dept: CT IMAGING | Age: 51
End: 2021-11-10
Attending: PHYSICIAN ASSISTANT
Payer: MEDICAID

## 2021-11-10 DIAGNOSIS — I82.401 ACUTE DEEP VEIN THROMBOSIS (DVT) OF RIGHT LOWER EXTREMITY, UNSPECIFIED VEIN (HCC): Primary | ICD-10-CM

## 2021-11-10 LAB
ALBUMIN SERPL-MCNC: 2.6 G/DL (ref 3.4–5)
ALBUMIN/GLOB SERPL: 0.5 {RATIO} (ref 0.8–1.7)
ALP SERPL-CCNC: 164 U/L (ref 45–117)
ALT SERPL-CCNC: 32 U/L (ref 13–56)
ANION GAP SERPL CALC-SCNC: 5 MMOL/L (ref 3–18)
APTT PPP: 25.4 SEC (ref 23–36.4)
AST SERPL-CCNC: 24 U/L (ref 10–38)
BASOPHILS # BLD: 0 K/UL (ref 0–0.1)
BASOPHILS NFR BLD: 0 % (ref 0–2)
BILIRUB SERPL-MCNC: 0.2 MG/DL (ref 0.2–1)
BUN SERPL-MCNC: 24 MG/DL (ref 7–18)
BUN/CREAT SERPL: 29 (ref 12–20)
CALCIUM SERPL-MCNC: 9.2 MG/DL (ref 8.5–10.1)
CHLORIDE SERPL-SCNC: 105 MMOL/L (ref 100–111)
CK MB CFR SERPL CALC: ABNORMAL % (ref 0–4)
CK MB SERPL-MCNC: <1 NG/ML (ref 5–25)
CK SERPL-CCNC: 17 U/L (ref 26–192)
CO2 SERPL-SCNC: 26 MMOL/L (ref 21–32)
CREAT SERPL-MCNC: 0.82 MG/DL (ref 0.6–1.3)
DIFFERENTIAL METHOD BLD: ABNORMAL
EOSINOPHIL # BLD: 0.1 K/UL (ref 0–0.4)
EOSINOPHIL NFR BLD: 1 % (ref 0–5)
ERYTHROCYTE [DISTWIDTH] IN BLOOD BY AUTOMATED COUNT: 15 % (ref 11.6–14.5)
GLOBULIN SER CALC-MCNC: 5.3 G/DL (ref 2–4)
GLUCOSE SERPL-MCNC: 270 MG/DL (ref 74–99)
HCT VFR BLD AUTO: 30.7 % (ref 35–45)
HGB BLD-MCNC: 9.5 G/DL (ref 12–16)
INR PPP: 1 (ref 0.8–1.2)
LYMPHOCYTES # BLD: 1.5 K/UL (ref 0.9–3.6)
LYMPHOCYTES NFR BLD: 17 % (ref 21–52)
MCH RBC QN AUTO: 25.7 PG (ref 24–34)
MCHC RBC AUTO-ENTMCNC: 30.9 G/DL (ref 31–37)
MCV RBC AUTO: 83.2 FL (ref 78–100)
MONOCYTES # BLD: 0.4 K/UL (ref 0.05–1.2)
MONOCYTES NFR BLD: 4 % (ref 3–10)
NEUTS SEG # BLD: 6.8 K/UL (ref 1.8–8)
NEUTS SEG NFR BLD: 77 % (ref 40–73)
PLATELET # BLD AUTO: 354 K/UL (ref 135–420)
PMV BLD AUTO: 9.5 FL (ref 9.2–11.8)
POTASSIUM SERPL-SCNC: 3.9 MMOL/L (ref 3.5–5.5)
PROT SERPL-MCNC: 7.9 G/DL (ref 6.4–8.2)
PROTHROMBIN TIME: 12.9 SEC (ref 11.5–15.2)
RBC # BLD AUTO: 3.69 M/UL (ref 4.2–5.3)
SODIUM SERPL-SCNC: 136 MMOL/L (ref 136–145)
TROPONIN I SERPL-MCNC: <0.02 NG/ML (ref 0–0.04)
WBC # BLD AUTO: 8.8 K/UL (ref 4.6–13.2)

## 2021-11-10 PROCEDURE — 85610 PROTHROMBIN TIME: CPT

## 2021-11-10 PROCEDURE — 93971 EXTREMITY STUDY: CPT

## 2021-11-10 PROCEDURE — 85025 COMPLETE CBC W/AUTO DIFF WBC: CPT

## 2021-11-10 PROCEDURE — 74011000636 HC RX REV CODE- 636: Performed by: EMERGENCY MEDICINE

## 2021-11-10 PROCEDURE — 85730 THROMBOPLASTIN TIME PARTIAL: CPT

## 2021-11-10 PROCEDURE — 93005 ELECTROCARDIOGRAM TRACING: CPT

## 2021-11-10 PROCEDURE — 82553 CREATINE MB FRACTION: CPT

## 2021-11-10 PROCEDURE — 80053 COMPREHEN METABOLIC PANEL: CPT

## 2021-11-10 PROCEDURE — 71275 CT ANGIOGRAPHY CHEST: CPT

## 2021-11-10 PROCEDURE — 99283 EMERGENCY DEPT VISIT LOW MDM: CPT

## 2021-11-10 RX ADMIN — IOPAMIDOL 75 ML: 755 INJECTION, SOLUTION INTRAVENOUS at 23:29

## 2021-11-11 VITALS
HEIGHT: 62 IN | WEIGHT: 138 LBS | DIASTOLIC BLOOD PRESSURE: 72 MMHG | HEART RATE: 65 BPM | RESPIRATION RATE: 18 BRPM | BODY MASS INDEX: 25.4 KG/M2 | OXYGEN SATURATION: 99 % | SYSTOLIC BLOOD PRESSURE: 110 MMHG | TEMPERATURE: 98.1 F

## 2021-11-11 PROBLEM — I82.401 ACUTE DEEP VEIN THROMBOSIS (DVT) OF RIGHT LOWER EXTREMITY (HCC): Status: ACTIVE | Noted: 2021-11-11

## 2021-11-11 LAB
ATRIAL RATE: 91 BPM
CALCULATED P AXIS, ECG09: 64 DEGREES
CALCULATED R AXIS, ECG10: -20 DEGREES
CALCULATED T AXIS, ECG11: 66 DEGREES
DIAGNOSIS, 93000: NORMAL
P-R INTERVAL, ECG05: 160 MS
Q-T INTERVAL, ECG07: 354 MS
QRS DURATION, ECG06: 82 MS
QTC CALCULATION (BEZET), ECG08: 435 MS
VENTRICULAR RATE, ECG03: 91 BPM

## 2021-11-11 PROCEDURE — 74011250637 HC RX REV CODE- 250/637: Performed by: PHYSICIAN ASSISTANT

## 2021-11-11 RX ORDER — RIVAROXABAN 15 MG-20MG
15 KIT ORAL 2 TIMES DAILY WITH MEALS
Qty: 1 DOSE PACK | Refills: 0 | Status: SHIPPED | OUTPATIENT
Start: 2021-11-11 | End: 2021-12-02

## 2021-11-11 RX ADMIN — RIVAROXABAN 15 MG: 15 TABLET, FILM COATED ORAL at 02:04

## 2021-11-11 NOTE — ED PROVIDER NOTES
EMERGENCY DEPARTMENT HISTORY AND PHYSICAL EXAM    Date: 11/10/2021  Patient Name: Jaz Ayon    History of Presenting Illness     Chief Complaint   Patient presents with    Leg Swelling         History Provided By: Patient    Chief Complaint: leg pain       Additional History (Context):   9:43 PM  Jaz Ayon is a 45 y.o. female with PMHX DVT, hypertension, diabetes, drug abuse, bipolar disorder, anxiety presents to the emergency department C/O right leg pain that started 3 days ago also with some swelling. She was diagnosed with a DVT in the left leg 2 weeks ago and was prescribed Xarelto but states when she went to the pharmacy she was unable to get it because they did not have it in stock therefore she just has not been taking any blood thinner since that time. She does note that she has been having some shortness of breath recently. No chest pain. PCP: Fiordaliza Peter MD    Current Facility-Administered Medications   Medication Dose Route Frequency Provider Last Rate Last Admin    rivaroxaban (XARELTO) tablet 15 mg  15 mg Oral NOW Talia Her PA         Current Outpatient Medications   Medication Sig Dispense Refill    rivaroxaban (Xarelto DVT-PE Treat 30d Start) 15 mg (42)- 20 mg (9) DsPk Take 15 mg by mouth two (2) times daily (with meals) for 21 days. 1 Dose Pack 0    HYDROcodone-acetaminophen (Norco) 5-325 mg per tablet Take 1 Tablet by mouth every six (6) hours as needed for Pain for up to 7 days. Max Daily Amount: 4 Tablets. 28 Tablet 0    insulin aspart protamine/insulin aspart (NovoLOG Mix 70-30FlexPen U-100) 100 unit/mL (70-30) inpn by SubCUTAneous route Before breakfast, lunch, dinner and at bedtime. Sliding scale      fluoxetine HCl (PROZAC PO) Take  by mouth daily.  diazePAM (Valium) 10 mg tablet Take 10 mg by mouth every six (6) hours as needed for Anxiety.       carisoprodoL (Soma) 350 mg tablet Take 1 Tablet by mouth every eight (8) hours as needed for Muscle Spasm(s). Max Daily Amount: 1,050 mg. 30 Tablet 0    dextromethorphan-guaiFENesin (Coricidin HBP Chest Simone-Cough)  mg cap Take 1 Capsule by mouth two (2) times daily as needed for Cough or Other (congestion). 20 Capsule 0    predniSONE (STERAPRED DS) 10 mg dose pack Take as directed (Patient not taking: Reported on 9/27/2021) 21 Tablet 0    lisinopriL (PRINIVIL, ZESTRIL) 10 mg tablet Take 20 mg by mouth daily.  Cane coco 1 Each by Does Not Apply route daily. 1 Device 0    ondansetron (ZOFRAN ODT) 4 mg disintegrating tablet Take 1-2 tablets every 6-8 hours as needed for nausea and vomiting. 10 Tab 0    lidocaine (LIDOCAINE VISCOUS) 2 % solution Put 10 mL in mouth and swish and spit out QAC and at bedtime (Patient not taking: Reported on 9/27/2021) 200 mL 0    Blood-Glucose Meter (ONETOUCH ULTRA2) monitoring kit Use to obtain two times a day. 1 Kit 0    lancets misc Use daily to monitor blood glucose 200 Each 0    glucose blood VI test strips (ASCENSIA AUTODISC VI, ONE TOUCH ULTRA TEST VI) strip 50 Each by Does Not Apply route two (2) times daily as needed. 200 Strip 3    polyethylene glycol (MIRALAX) 17 gram packet Take 1 Packet by mouth daily. 30 Each 1    Insulin Needles, Disposable, 31 gauge x 5/16\" ndle Use to administer insulin as directed 1 Package 11    atorvastatin (LIPITOR) 40 mg tablet Take 1 Tab by mouth daily.  (Patient not taking: Reported on 9/27/2021) 30 Tab 6       Past History     Past Medical History:  Past Medical History:   Diagnosis Date    Anxiety     Bipolar disorder ([address]     Breast cancer ([address]     breast cancer, right, S/P Mastectomy and chemo, radiation in 2013    Depression     Diabetes ([address]     Drug abuse ([address]     H/O cocaine use in past    Drug-seeking behavior     Family history of early CAD     Gastrointestinal disorder     cholitis    H/O ETOH abuse     Hyperlipidemia     Hypertension     Malignant neoplasm without specification of site ([address]     Methamphetamine abuse [address] [address]      self reported on 1/3/16    Psychiatric disorder     multiple personality disorder    Psychiatric disorder     anxiety    Psychiatric disorder     bipolar     Spine injury     Vitamin D deficiency 5/1/2018       Past Surgical History:  Past Surgical History:   Procedure Laterality Date    COLONOSCOPY N/A 7/18/2016    COLONOSCOPY performed by Emiliano Feliz MD at [facility] ENDOSCOPY    HX BREAST LUMPECTOMY  06/2013    right    HX HYSTERECTOMY      HX MASTECTOMY      had expanders put in 6/2018    HX ORTHOPAEDIC      Back fusion surgery 4/18/14.  HX OTHER SURGICAL      mediport    HX TONSILLECTOMY      HX TUBAL LIGATION      NEUROLOGICAL PROCEDURE UNLISTED  2020    back surgery       Family History:  Family History   Problem Relation Age of Onset    Heart Disease Mother     Stroke Father     Heart Disease Father 48    Diabetes Other     Hypertension Other     Cancer Maternal Grandmother        Social History:  Social History     Tobacco Use    Smoking status: Never Smoker    Smokeless tobacco: Never Used   Vaping Use    Vaping Use: Never used   Substance Use Topics    Alcohol use: Not Currently     Comment: \"Occasionally\"    Drug use: Not Currently     Types: Methamphetamines, Cocaine     Comment: none in 2 years       Allergies:   Allergies   Allergen Reactions    Latex Hives and Itching    Other Food Rash     Almonds    Amoxicillin Swelling     Other reaction(s): mild rash/itching    Aspirin Not Reported This Time and Swelling     Mouth swells    Beeswax Anaphylaxis    Levaquin [Levofloxacin] Not Reported This Time and Swelling     Other reaction(s): mild rash/itching    [place] Rash and Itching    Codeine Other (comments)    Glycopyrrolate Swelling     Pt  States  faciAL  SWELLING   POST  ROBINUL  IV   Pt  States  faciAL  SWELLING   POST  ROBINUL  IV     Pcn [Penicillins] Swelling    Tape [Adhesive] Rash    Tramadol Swelling       Review of Systems   Review of Systems   Constitutional: Negative for chills and fever. Respiratory: Positive for shortness of breath. Negative for cough. Cardiovascular: Positive for leg swelling. Negative for chest pain. Musculoskeletal: Positive for myalgias (right leg). Neurological: Negative for dizziness, weakness, light-headedness, numbness and headaches. All other systems reviewed and are negative. Physical Exam     Vitals:    11/10/21 2123   BP: 118/78   Pulse: (!) 56   Resp: 18   Temp: 98.1 °F (36.7 °C)   SpO2: 98%   Weight: 62.6 kg (138 lb)   Height: 5' 2\" (1.575 m)     Physical Exam  Vitals and nursing note reviewed. Constitutional:       Appearance: She is well-developed. HENT:      Head: Normocephalic and atraumatic. Cardiovascular:      Rate and Rhythm: Normal rate and regular rhythm. Heart sounds: Normal heart sounds. No murmur heard. Pulmonary:      Effort: Pulmonary effort is normal. No respiratory distress. Breath sounds: Normal breath sounds. No wheezing or rales. Abdominal:      General: Bowel sounds are normal.      Palpations: Abdomen is soft. Tenderness: There is no abdominal tenderness. Musculoskeletal:      Cervical back: Normal range of motion and neck supple. Neurological:      Mental Status: She is alert and oriented to person, place, and time.    Psychiatric:         Judgment: Judgment normal.             Diagnostic Study Results     Labs:     Recent Results (from the past 12 hour(s))   CBC WITH AUTOMATED DIFF    Collection Time: 11/10/21  9:45 PM   Result Value Ref Range    WBC 8.8 4.6 - 13.2 K/uL    RBC 3.69 (L) 4.20 - 5.30 M/uL    HGB 9.5 (L) 12.0 - 16.0 g/dL    HCT 30.7 (L) 35.0 - 45.0 %    MCV 83.2 78.0 - 100.0 FL    MCH 25.7 24.0 - 34.0 PG    MCHC 30.9 (L) 31.0 - 37.0 g/dL    RDW 15.0 (H) 11.6 - 14.5 %    PLATELET 629 208 - 340 K/uL    MPV 9.5 9.2 - 11.8 FL    NEUTROPHILS 77 (H) 40 - 73 %    LYMPHOCYTES 17 (L) 21 - 52 %    MONOCYTES 4 3 - 10 % EOSINOPHILS 1 0 - 5 %    BASOPHILS 0 0 - 2 %    ABS. NEUTROPHILS 6.8 1.8 - 8.0 K/UL    ABS. LYMPHOCYTES 1.5 0.9 - 3.6 K/UL    ABS. MONOCYTES 0.4 0.05 - 1.2 K/UL    ABS. EOSINOPHILS 0.1 0.0 - 0.4 K/UL    ABS. BASOPHILS 0.0 0.0 - 0.1 K/UL    DF AUTOMATED     METABOLIC PANEL, COMPREHENSIVE    Collection Time: 11/10/21  9:45 PM   Result Value Ref Range    Sodium 136 136 - 145 mmol/L    Potassium 3.9 3.5 - 5.5 mmol/L    Chloride 105 100 - 111 mmol/L    CO2 26 21 - 32 mmol/L    Anion gap 5 3.0 - 18 mmol/L    Glucose 270 (H) 74 - 99 mg/dL    BUN 24 (H) 7.0 - 18 MG/DL    Creatinine 0.82 0.6 - 1.3 MG/DL    BUN/Creatinine ratio 29 (H) 12 - 20      GFR est AA >60 >60 ml/min/1.73m2    GFR est non-AA >60 >60 ml/min/1.73m2    Calcium 9.2 8.5 - 10.1 MG/DL    Bilirubin, total 0.2 0.2 - 1.0 MG/DL    ALT (SGPT) 32 13 - 56 U/L    AST (SGOT) 24 10 - 38 U/L    Alk.  phosphatase 164 (H) 45 - 117 U/L    Protein, total 7.9 6.4 - 8.2 g/dL    Albumin 2.6 (L) 3.4 - 5.0 g/dL    Globulin 5.3 (H) 2.0 - 4.0 g/dL    A-G Ratio 0.5 (L) 0.8 - 1.7     PROTHROMBIN TIME + INR    Collection Time: 11/10/21  9:45 PM   Result Value Ref Range    Prothrombin time 12.9 11.5 - 15.2 sec    INR 1.0 0.8 - 1.2     PTT    Collection Time: 11/10/21  9:45 PM   Result Value Ref Range    aPTT 25.4 23.0 - 36.4 SEC   CARDIAC PANEL,(CK, CKMB & TROPONIN)    Collection Time: 11/10/21  9:45 PM   Result Value Ref Range    CK - MB <1.0 <3.6 ng/ml    CK-MB Index  0.0 - 4.0 %     CALCULATION NOT PERFORMED WHEN RESULT IS BELOW LINEAR LIMIT    CK 17 (L) 26 - 192 U/L    Troponin-I, QT <0.02 0.0 - 0.045 NG/ML   EKG, 12 LEAD, INITIAL    Collection Time: 11/10/21 10:32 PM   Result Value Ref Range    Ventricular Rate 91 BPM    Atrial Rate 91 BPM    P-R Interval 160 ms    QRS Duration 82 ms    Q-T Interval 354 ms    QTC Calculation (Bezet) 435 ms    Calculated P Axis 64 degrees    Calculated R Axis -20 degrees    Calculated T Axis 66 degrees    Diagnosis       Normal sinus rhythm  Normal ECG  Confirmed by Rufino Easley MD, Pete Cuevas ([id number]) on 11/11/2021 1:26:33 AM         Radiologic Studies:   CTA CHEST W OR W WO CONT   Final Result      1. No evidence of pulmonary embolism. 2.  No evidence for active cardiopulmonary disease. DUPLEX LOWER EXT VENOUS RIGHT    (Results Pending)     CT Results  (Last 48 hours)               11/10/21 2329  CTA CHEST W OR W WO CONT Final result    Impression:      1. No evidence of pulmonary embolism. 2.  No evidence for active cardiopulmonary disease. Narrative:  EXAM: CTA chest       INDICATION: Pain. COMPARISON: July 3, 2018. TECHNIQUE: Axial CT imaging from the thoracic inlet through the diaphragm with   intravenous contrast. Coronal and sagittal MIP reformats were generated. Dose   reduction techniques used: automated exposure control, adjustment of the mAs   and/or kVp according to patient size, and iterative reconstruction techniques. Digital imaging and communications in Medicine (DICOM) format image data are   available to nonaffiliated external healthcare facilities or entities on a   secure, media free, reciprocally searchable basis with patient authorization for   at least 12 months after this study. _______________       FINDINGS:       EXAM QUALITY: Adequate. PULMONARY ARTERIES: No evidence of pulmonary embolism. MEDIASTINUM: Normal heart size. No evidence of right heart strain. Aorta is   unremarkable. No pericardial effusion. LUNGS: No suspicious nodule or mass. No abnormal opacities. PLEURA: Normal.       AIRWAY: Normal.       LYMPH NODES: No enlarged nodes. UPPER ABDOMEN: Unremarkable. OTHER: No acute or aggressive osseous abnormalities identified. _______________               CXR Results  (Last 48 hours)    None          Medical Decision Making   I am the first provider for this patient.     I reviewed the vital signs, available nursing notes, past medical history, past surgical history, family history and social history. Vital Signs: Reviewed the patient's vital signs. Pulse Oximetry Analysis: 98% on RA     EKG interpretation: (Preliminary)  9:43 PM   Normal sinus rhythm rate 91 bpm  EKG read by Sofia Jansen PA-C at 10:32      Records Reviewed: Nursing Notes, Old Medical Records and Previous electrocardiograms    Procedures:  Procedures    ED Course:   9:43 PM Initial assessment performed. The patients presenting problems have been discussed, and they are in agreement with the care plan formulated and outlined with them. I have encouraged them to ask questions as they arise throughout their visit. Patient was unable to obtain her blood thinner when she was diagnosed with the first DVT and has not been taking any anticoagulation. prescription for Xarelto starter pack sent to [facility].  Spoke with pharmacist at that [facility] who ensured that they did have 1 more Xarelto starter pack remaining. Discussion:  Pt presents with right leg pain. Patient recently diagnosed with left-sided DVT. She was supposed to start on Xarelto but never did. She did complain of some shortness of breath but no PE seen on CTA of the chest.  Will start on anticoagulant starter pack and have patient follow-up with her primary doctor for continued management. . Strict return precautions given, pt offering no questions or complaints. Diagnosis and Disposition     DISCHARGE NOTE:  Taz Taylro  results have been reviewed with her. She has been counseled regarding her diagnosis, treatment, and plan. She verbally conveys understanding and agreement of the signs, symptoms, diagnosis, treatment and prognosis and additionally agrees to follow up as discussed. She also agrees with the care-plan and conveys that all of her questions have been answered.   I have also provided discharge instructions for her that include: educational information regarding their diagnosis and treatment, and list of reasons why they would want to return to the ED prior to their follow-up appointment, should her condition change. She has been provided with education for proper emergency department utilization. CLINICAL IMPRESSION:    1. Acute deep vein thrombosis (DVT) of right lower extremity, unspecified vein (HCC)        PLAN:  1. D/C Home  2. Current Discharge Medication List      CONTINUE these medications which have CHANGED    Details   rivaroxaban (Xarelto DVT-PE Treat 30d Start) 15 mg (42)- 20 mg (9) DsPk Take 15 mg by mouth two (2) times daily (with meals) for 21 days. Qty: 1 Dose Pack, Refills: 0  Start date: 11/11/2021, End date: 12/2/2021           3. Follow-up Information     Follow up With Specialties Details Why Contact Info    Your primary care doctor  Schedule an appointment as soon as possible for a visit       [facility], DO Internal Medicine Schedule an appointment as soon as possible for a visit  for primary care doctor if you need a new one [facility]  [phone]                   Please note that this dictation was completed with [employer], the computer voice recognition software. Quite often unanticipated grammatical, syntax, homophones, and other interpretive errors are inadvertently transcribed by the computer software. Please disregard these errors. Please excuse any errors that have escaped final proofreading.

## 2021-11-11 NOTE — ED NOTES
Pt reports she was diagnosed with a DVT 2 weeks and has not been able to take her xarelto because the pharmacy has been out of stock. Pt now has pain in her right leg.

## 2021-11-17 ENCOUNTER — HOSPITAL ENCOUNTER (EMERGENCY)
Age: 51
Discharge: HOME | End: 2021-11-17
Attending: EMERGENCY MEDICINE
Payer: MEDICAID

## 2021-11-17 VITALS
BODY MASS INDEX: 26.87 KG/M2 | DIASTOLIC BLOOD PRESSURE: 84 MMHG | WEIGHT: 146 LBS | SYSTOLIC BLOOD PRESSURE: 138 MMHG | OXYGEN SATURATION: 96 % | RESPIRATION RATE: 18 BRPM | HEART RATE: 111 BPM | TEMPERATURE: 98.6 F | HEIGHT: 62 IN

## 2021-11-17 DIAGNOSIS — E11.9 TYPE 2 DIABETES MELLITUS WITHOUT COMPLICATION, WITH LONG-TERM CURRENT USE OF INSULIN (HCC): ICD-10-CM

## 2021-11-17 DIAGNOSIS — M54.12 CERVICAL RADICULOPATHY: Primary | ICD-10-CM

## 2021-11-17 DIAGNOSIS — I82.553 CHRONIC DEEP VEIN THROMBOSIS (DVT) OF BOTH PERONEAL VEINS (HCC): ICD-10-CM

## 2021-11-17 DIAGNOSIS — G89.4 CHRONIC PAIN SYNDROME: ICD-10-CM

## 2021-11-17 DIAGNOSIS — Z79.4 TYPE 2 DIABETES MELLITUS WITHOUT COMPLICATION, WITH LONG-TERM CURRENT USE OF INSULIN (HCC): ICD-10-CM

## 2021-11-17 LAB
ALBUMIN SERPL-MCNC: 2.8 G/DL (ref 3.4–5)
ALBUMIN/GLOB SERPL: 0.5 {RATIO} (ref 0.8–1.7)
ALP SERPL-CCNC: 138 U/L (ref 45–117)
ALT SERPL-CCNC: 20 U/L (ref 13–56)
ANION GAP SERPL CALC-SCNC: 6 MMOL/L (ref 3–18)
AST SERPL-CCNC: 13 U/L (ref 10–38)
BASOPHILS # BLD: 0 K/UL (ref 0–0.1)
BASOPHILS NFR BLD: 0 % (ref 0–2)
BILIRUB SERPL-MCNC: 0.3 MG/DL (ref 0.2–1)
BUN SERPL-MCNC: 15 MG/DL (ref 7–18)
BUN/CREAT SERPL: 20 (ref 12–20)
CALCIUM SERPL-MCNC: 9.3 MG/DL (ref 8.5–10.1)
CHLORIDE SERPL-SCNC: 103 MMOL/L (ref 100–111)
CK MB CFR SERPL CALC: ABNORMAL % (ref 0–4)
CK MB SERPL-MCNC: <1 NG/ML (ref 5–25)
CK SERPL-CCNC: 14 U/L (ref 26–192)
CO2 SERPL-SCNC: 26 MMOL/L (ref 21–32)
CREAT SERPL-MCNC: 0.76 MG/DL (ref 0.6–1.3)
DIFFERENTIAL METHOD BLD: ABNORMAL
EOSINOPHIL # BLD: 0.1 K/UL (ref 0–0.4)
EOSINOPHIL NFR BLD: 1 % (ref 0–5)
ERYTHROCYTE [DISTWIDTH] IN BLOOD BY AUTOMATED COUNT: 15 % (ref 11.6–14.5)
ERYTHROCYTE [SEDIMENTATION RATE] IN BLOOD: 97 MM/HR (ref 0–30)
GLOBULIN SER CALC-MCNC: 5.5 G/DL (ref 2–4)
GLUCOSE SERPL-MCNC: 226 MG/DL (ref 74–99)
HCT VFR BLD AUTO: 32.3 % (ref 35–45)
HGB BLD-MCNC: 10.1 G/DL (ref 12–16)
IMM GRANULOCYTES # BLD AUTO: 0 K/UL (ref 0–0.04)
IMM GRANULOCYTES NFR BLD AUTO: 0 % (ref 0–0.5)
LYMPHOCYTES # BLD: 1.8 K/UL (ref 0.9–3.6)
LYMPHOCYTES NFR BLD: 20 % (ref 21–52)
MAGNESIUM SERPL-MCNC: 1.9 MG/DL (ref 1.6–2.6)
MCH RBC QN AUTO: 25.6 PG (ref 24–34)
MCHC RBC AUTO-ENTMCNC: 31.3 G/DL (ref 31–37)
MCV RBC AUTO: 82 FL (ref 78–100)
MONOCYTES # BLD: 0.5 K/UL (ref 0.05–1.2)
MONOCYTES NFR BLD: 5 % (ref 3–10)
NEUTS SEG # BLD: 6.6 K/UL (ref 1.8–8)
NEUTS SEG NFR BLD: 73 % (ref 40–73)
NRBC # BLD: 0 K/UL (ref 0–0.01)
NRBC BLD-RTO: 0 PER 100 WBC
PLATELET # BLD AUTO: 361 K/UL (ref 135–420)
PMV BLD AUTO: 9.6 FL (ref 9.2–11.8)
POTASSIUM SERPL-SCNC: 3.6 MMOL/L (ref 3.5–5.5)
PROT SERPL-MCNC: 8.3 G/DL (ref 6.4–8.2)
RBC # BLD AUTO: 3.94 M/UL (ref 4.2–5.3)
SODIUM SERPL-SCNC: 135 MMOL/L (ref 136–145)
TROPONIN I SERPL-MCNC: <0.02 NG/ML (ref 0–0.04)
WBC # BLD AUTO: 9 K/UL (ref 4.6–13.2)

## 2021-11-17 PROCEDURE — 85652 RBC SED RATE AUTOMATED: CPT

## 2021-11-17 PROCEDURE — 80053 COMPREHEN METABOLIC PANEL: CPT

## 2021-11-17 PROCEDURE — 96374 THER/PROPH/DIAG INJ IV PUSH: CPT

## 2021-11-17 PROCEDURE — 83735 ASSAY OF MAGNESIUM: CPT

## 2021-11-17 PROCEDURE — 85025 COMPLETE CBC W/AUTO DIFF WBC: CPT

## 2021-11-17 PROCEDURE — 82553 CREATINE MB FRACTION: CPT

## 2021-11-17 PROCEDURE — 99283 EMERGENCY DEPT VISIT LOW MDM: CPT

## 2021-11-17 PROCEDURE — 74011250637 HC RX REV CODE- 250/637: Performed by: EMERGENCY MEDICINE

## 2021-11-17 PROCEDURE — 74011250636 HC RX REV CODE- 250/636: Performed by: EMERGENCY MEDICINE

## 2021-11-17 RX ORDER — OXYCODONE AND ACETAMINOPHEN 5; 325 MG/1; MG/1
1 TABLET ORAL
Status: COMPLETED | OUTPATIENT
Start: 2021-11-17 | End: 2021-11-17

## 2021-11-17 RX ORDER — CYCLOBENZAPRINE HCL 10 MG
TABLET ORAL
COMMUNITY
End: 2021-12-02

## 2021-11-17 RX ORDER — CLONAZEPAM 1 MG/1
TABLET ORAL 2 TIMES DAILY
COMMUNITY
End: 2022-09-12

## 2021-11-17 RX ORDER — OXYCODONE AND ACETAMINOPHEN 5; 325 MG/1; MG/1
1 TABLET ORAL
Qty: 20 TABLET | Refills: 0 | Status: SHIPPED | OUTPATIENT
Start: 2021-11-17 | End: 2021-11-22

## 2021-11-17 RX ORDER — METHOCARBAMOL 500 MG/1
1000 TABLET, FILM COATED ORAL ONCE
Status: COMPLETED | OUTPATIENT
Start: 2021-11-17 | End: 2021-11-17

## 2021-11-17 RX ADMIN — OXYCODONE AND ACETAMINOPHEN 1 TABLET: 5; 325 TABLET ORAL at 07:30

## 2021-11-17 RX ADMIN — METHYLPREDNISOLONE SODIUM SUCCINATE 125 MG: 125 INJECTION, POWDER, FOR SOLUTION INTRAMUSCULAR; INTRAVENOUS at 05:39

## 2021-11-17 RX ADMIN — METHOCARBAMOL TABLETS 1000 MG: 500 TABLET, COATED ORAL at 05:39

## 2021-11-17 NOTE — ED NOTES
Pt  told where to  prescribed medication. Pt given verbal and written d/c instructions. pt to car per w/c with assist by [facility]. Pt alert, medicated for pain at d/c.

## 2021-11-17 NOTE — ED PROVIDER NOTES
EMERGENCY DEPARTMENT HISTORY AND PHYSICAL EXAM    Date: 11/17/2021  Patient Name: Jaz Ayon    History of Presenting Illness     Chief Complaint   Patient presents with    Other     Blood clots    Hand Swelling    Leg Pain         History Provided By: Patient    Additional History (Context):   4:58 AM  Jaz Ayon is a 45 y.o. female with PMHX of cervical radiculopathy, lower extremity DVTs, insulin-dependent diabetes, colitis, substance abuse in her distant past, bipolar disease and anxiety with multiple personality diagnosis, chronic pain, who presents to the emergency department C/O pain of both hands and elbows and wrist.  She states she has been using her medications as prescribed for her DVTs but it is not helping improve any of her pain. She was recently given a prescription for etodolac but stopped using this after she read the package insert documenting the risk of blood clots. She just recently been diagnosed with low extremity DVTs and is taking Xarelto as prescribed. She has been in and out of emergency rooms looking for assistance and help and even eloped once. At one point she was referred to [facility] for PCP services but according to their notes would not utilize her services since no one will prescribe morphine for her pain. She had a scheduled surgery for her cervical spine however this was postponed since her orthopedic surgeon waiting to get her A1c levels below 7 according to the patient. Review of her records show that her levels of A1c were 13.2    Social History  She states she is no longer drinking or smoking. Family History  Positive for high blood pressure diabetes      PCP: Fiordaliza Peter MD    Current Outpatient Medications   Medication Sig Dispense Refill    clonazePAM (KlonoPIN) 1 mg tablet Take  by mouth two (2) times a day.  cyclobenzaprine (FLEXERIL) 10 mg tablet Take  by mouth three (3) times daily as needed for Muscle Spasm(s).       rivaroxaban (Xarelto DVT-PE Treat 30d Start) 15 mg (42)- 20 mg (9) DsPk Take 15 mg by mouth two (2) times daily (with meals) for 21 days. 1 Dose Pack 0    insulin aspart protamine/insulin aspart (NovoLOG Mix 70-30FlexPen U-100) 100 unit/mL (70-30) inpn by SubCUTAneous route Before breakfast, lunch, dinner and at bedtime. Sliding scale      lisinopriL (PRINIVIL, ZESTRIL) 10 mg tablet Take 20 mg by mouth daily.  Cane coco 1 Each by Does Not Apply route daily. 1 Device 0    Blood-Glucose Meter (ONETOUCH ULTRA2) monitoring kit Use to obtain two times a day. 1 Kit 0    lancets misc Use daily to monitor blood glucose 200 Each 0    glucose blood VI test strips (ASCENSIA AUTODISC VI, ONE TOUCH ULTRA TEST VI) strip 50 Each by Does Not Apply route two (2) times daily as needed. 200 Strip 3    polyethylene glycol (MIRALAX) 17 gram packet Take 1 Packet by mouth daily. 30 Each 1    Insulin Needles, Disposable, 31 gauge x 5/16\" ndle Use to administer insulin as directed 1 Package 11    fluoxetine HCl (PROZAC PO) Take  by mouth daily. (Patient not taking: Reported on 11/17/2021)      diazePAM (Valium) 10 mg tablet Take 10 mg by mouth every six (6) hours as needed for Anxiety. (Patient not taking: Reported on 11/17/2021)      carisoprodoL (Soma) 350 mg tablet Take 1 Tablet by mouth every eight (8) hours as needed for Muscle Spasm(s). Max Daily Amount: 1,050 mg. (Patient not taking: Reported on 11/17/2021) 30 Tablet 0    dextromethorphan-guaiFENesin (Coricidin HBP Chest Simone-Cough)  mg cap Take 1 Capsule by mouth two (2) times daily as needed for Cough or Other (congestion). (Patient not taking: Reported on 11/17/2021) 20 Capsule 0    predniSONE (STERAPRED DS) 10 mg dose pack Take as directed (Patient not taking: Reported on 9/27/2021) 21 Tablet 0    ondansetron (ZOFRAN ODT) 4 mg disintegrating tablet Take 1-2 tablets every 6-8 hours as needed for nausea and vomiting.  (Patient not taking: Reported on 11/17/2021) 10 Tab 0    lidocaine (LIDOCAINE VISCOUS) 2 % solution Put 10 mL in mouth and swish and spit out QAC and at bedtime (Patient not taking: Reported on 9/27/2021) 200 mL 0    atorvastatin (LIPITOR) 40 mg tablet Take 1 Tab by mouth daily. (Patient not taking: Reported on 9/27/2021) 30 Tab 6       Past History     Past Medical History:  Past Medical History:   Diagnosis Date    Anxiety     Bipolar disorder ([address]     Breast cancer ([address]     breast cancer, right, S/P Mastectomy and chemo, radiation in 2013    Depression     Diabetes ([address]     Drug abuse ([address]     H/O cocaine use in past    Drug-seeking behavior     Family history of early CAD     Gastrointestinal disorder     cholitis    H/O ETOH abuse     Hyperlipidemia     Hypertension     Malignant neoplasm without specification of site ([address]     Methamphetamine abuse ([address]      self reported on 1/3/16    Psychiatric disorder     multiple personality disorder    Psychiatric disorder     anxiety    Psychiatric disorder     bipolar     Spine injury     Vitamin D deficiency 5/1/2018       Past Surgical History:  Past Surgical History:   Procedure Laterality Date    COLONOSCOPY N/A 7/18/2016    COLONOSCOPY performed by Joana [facility]duzco [facility] at [facility] ENDOSCOPY    HX BREAST LUMPECTOMY  06/2013    right    HX HYSTERECTOMY      HX MASTECTOMY      had expanders put in 6/2018    HX ORTHOPAEDIC      Back fusion surgery 4/18/14.      HX OTHER SURGICAL      mediport    HX TONSILLECTOMY      HX TUBAL LIGATION      NEUROLOGICAL PROCEDURE UNLISTED  2020    back surgery       Family History:  Family History   Problem Relation Age of Onset    Heart Disease Mother     Stroke Father     Heart Disease Father 48    Diabetes Other     Hypertension Other     Cancer Maternal Grandmother        Social History:  Social History     Tobacco Use    Smoking status: Never Smoker    Smokeless tobacco: Never Used   Vaping Use    Vaping Use: Never used   Substance Use Topics    Alcohol use: Not Currently     Comment: \"Occasionally\"    Drug use: Not Currently     Types: Methamphetamines, Cocaine     Comment: none in 2 years       Allergies: Allergies   Allergen Reactions    Latex Hives and Itching    Other Food Rash     Almonds    Amoxicillin Swelling     Other reaction(s): mild rash/itching    Aspirin Not Reported This Time and Swelling     Mouth swells    Beeswax Anaphylaxis    Levaquin [Levofloxacin] Not Reported This Time and Swelling     Other reaction(s): mild rash/itching    [place] Rash and Itching    Codeine Other (comments)    Glycopyrrolate Swelling     Pt  States  faciAL  SWELLING   POST  ROBINUL  IV   Pt  States  faciAL  SWELLING   POST  ROBINUL  IV     Pcn [Penicillins] Swelling    Tape [Adhesive] Rash    Tramadol Swelling         Review of Systems   Review of Systems   Musculoskeletal: Positive for arthralgias, back pain and neck pain. Neurological: Positive for numbness (Paresthesias and peripheral neuropathy). All other systems reviewed and are negative. Physical Exam     Vitals:    11/17/21 0438   BP: 111/75   Pulse: (!) 111   Resp: 20   Temp: 98.6 °F (37 °C)   SpO2: 96%   Weight: 66.2 kg (146 lb)   Height: 5' 2\" (1.575 m)     Physical Exam  Vitals and nursing note reviewed. Constitutional:       General: She is not in acute distress. Appearance: She is well-developed. She is not ill-appearing, toxic-appearing or diaphoretic. HENT:      Head: Normocephalic and atraumatic. Eyes:      General: No scleral icterus. Extraocular Movements:      Right eye: Normal extraocular motion. Left eye: Normal extraocular motion. Conjunctiva/sclera: Conjunctivae normal.      Pupils: Pupils are equal, round, and reactive to light. Neck:      Trachea: No tracheal deviation. Cardiovascular:      Rate and Rhythm: Normal rate and regular rhythm. Heart sounds: Normal heart sounds.    Pulmonary:      Effort: Pulmonary effort is normal. No respiratory distress. Breath sounds: Normal breath sounds. No stridor. Abdominal:      General: Bowel sounds are normal. There is no distension. Palpations: Abdomen is soft. Tenderness: There is no abdominal tenderness. There is no rebound. Musculoskeletal:         General: No tenderness. Normal range of motion. Cervical back: Normal range of motion and neck supple. Pain with movement, spinous process tenderness and muscular tenderness present. Comments: Grossly unremarkable without abnormalities. Limited range of motion of the upper extremities especially elbows and hands. There is minimal to no visible edema or redness. Skin:     General: Skin is warm and dry. Capillary Refill: Capillary refill takes less than 2 seconds. Findings: No erythema or rash. Neurological:      Mental Status: She is alert and oriented to person, place, and time. GCS: GCS eye subscore is 4. GCS verbal subscore is 5. GCS motor subscore is 6. Cranial Nerves: No cranial nerve deficit. Sensory: Sensation is intact. Motor: Motor function is intact. No weakness. Psychiatric:         Mood and Affect: Mood normal.         Behavior: Behavior normal.         Thought Content: Thought content normal.         Judgment: Judgment normal.       Diagnostic Study Results     Labs -  No results found for this or any previous visit (from the past 24 hour(s)). Radiologic Studies -   No orders to display     CT Results  (Last 48 hours)    None        CXR Results  (Last 48 hours)    None          Medications given in the ED-  Medications - No data to display      Medical Decision Making   I am the first provider for this patient. I reviewed the vital signs, available nursing notes, past medical history, past surgical history, family history and social history. Vital Signs-Reviewed the patient's vital signs.     Pulse Oximetry Analysis -96% on room air    Cardiac Monitor:  Rate: 102 bpm  Rhythm: Borderline sinus tach    Records Reviewed: NURSING NOTES AND PREVIOUS MEDICAL RECORDS    Provider Notes (Medical Decision Making): In reviewing her records and current presentation this clearly looks to be a presentation of acute exacerbation of her chronic chronic pain. I understand that she is got ongoing discomfort however I clearly explained to her that there is a certain limit to what we be able to provide for her. I was wondering provide her for a few medications told her very clearly that she is presenting which looks to be drug-seeking behavior and must be aligned with failure a chronic pain manager or make appropriate of her sugars so she might be able to consider surgery as well to improve her chronic cervical radiculopathy. Do not feel this represents any metabolic encephalopathy or ACS. Procedures:  Procedures    ED Course:   4:58 AM: Initial assessment performed. The patients presenting problems have been discussed, and they are in agreement with the care plan formulated and outlined with them. I have encouraged them to ask questions as they arise throughout their visit. Diagnosis and Disposition       DISCHARGE NOTE:  7 AM  Levy T Liam's  results have been reviewed with her. She has been counseled regarding her diagnosis, treatment, and plan. She verbally conveys understanding and agreement of the signs, symptoms, diagnosis, treatment and prognosis and additionally agrees to follow up as discussed. She also agrees with the care-plan and conveys that all of her questions have been answered. I have also provided discharge instructions for her that include: educational information regarding their diagnosis and treatment, and list of reasons why they would want to return to the ED prior to their follow-up appointment, should her condition change. She has been provided with education for proper emergency department utilization.      CLINICAL IMPRESSION:    No diagnosis found.    PLAN:  1. D/C Home  2. Current Discharge Medication List        3. Follow-up Information    None       _______________________________    This note was partially transcribed via voice recognition software. Although efforts have been made to catch any discrepancies, it may contain sound alike words, grammatical errors, or nonsensical words.

## 2021-11-17 NOTE — ED TRIAGE NOTES
Pt to Triage in a w/c, Cc: Pain in bilat  Knees and Bilat hands, with swelling. Pt here 2 weeks ago and Dx with Blood clots in Bilat legs at that time. Has been taking er Xarelto per orders. Pain is worsening and movement is decreasing.

## 2021-11-27 ENCOUNTER — APPOINTMENT (OUTPATIENT)
Dept: GENERAL RADIOLOGY | Age: 51
End: 2021-11-27
Attending: EMERGENCY MEDICINE
Payer: MEDICAID

## 2021-11-27 ENCOUNTER — HOSPITAL ENCOUNTER (EMERGENCY)
Age: 51
Discharge: HOME | End: 2021-11-27
Attending: EMERGENCY MEDICINE
Payer: MEDICAID

## 2021-11-27 VITALS
SYSTOLIC BLOOD PRESSURE: 128 MMHG | DIASTOLIC BLOOD PRESSURE: 75 MMHG | HEART RATE: 99 BPM | HEIGHT: 62 IN | BODY MASS INDEX: 25.76 KG/M2 | TEMPERATURE: 97.4 F | RESPIRATION RATE: 18 BRPM | OXYGEN SATURATION: 100 % | WEIGHT: 140 LBS

## 2021-11-27 DIAGNOSIS — S82.401A CLOSED FRACTURE OF SHAFT OF RIGHT FIBULA, UNSPECIFIED FRACTURE MORPHOLOGY, INITIAL ENCOUNTER: Primary | ICD-10-CM

## 2021-11-27 PROCEDURE — 99283 EMERGENCY DEPT VISIT LOW MDM: CPT

## 2021-11-27 PROCEDURE — 75810000053 HC SPLINT APPLICATION

## 2021-11-27 PROCEDURE — 74011250637 HC RX REV CODE- 250/637: Performed by: EMERGENCY MEDICINE

## 2021-11-27 PROCEDURE — 73502 X-RAY EXAM HIP UNI 2-3 VIEWS: CPT

## 2021-11-27 PROCEDURE — 73564 X-RAY EXAM KNEE 4 OR MORE: CPT

## 2021-11-27 RX ORDER — NALOXONE HYDROCHLORIDE 4 MG/.1ML
SPRAY NASAL
Qty: 2 EACH | Refills: 0 | Status: SHIPPED | OUTPATIENT
Start: 2021-11-27 | End: 2022-09-12

## 2021-11-27 RX ORDER — OXYCODONE AND ACETAMINOPHEN 5; 325 MG/1; MG/1
1 TABLET ORAL
Status: COMPLETED | OUTPATIENT
Start: 2021-11-27 | End: 2021-11-27

## 2021-11-27 RX ORDER — OXYCODONE AND ACETAMINOPHEN 5; 325 MG/1; MG/1
1 TABLET ORAL
Qty: 12 TABLET | Refills: 0 | Status: SHIPPED | OUTPATIENT
Start: 2021-11-27 | End: 2021-11-30

## 2021-11-27 RX ADMIN — OXYCODONE AND ACETAMINOPHEN 1 TABLET: 5; 325 TABLET ORAL at 02:44

## 2021-11-27 NOTE — ED TRIAGE NOTES
Patient arrives ambulatory with steady gait with c/c of right leg pain. Patient states she has a blood clot in her leg and then she fell down either Wednesday or Thursday, she can't remember exactly when landing on her right side.

## 2021-11-27 NOTE — ED PROVIDER NOTES
EMERGENCY DEPARTMENT HISTORY AND PHYSICAL EXAM    Date: 11/27/2021  Patient Name: Heaven Michelle    History of Presenting Illness     Chief Complaint   Patient presents with    Leg Pain         History Provided By: Patient and Patient's     1:05 AM  Heaven Michelle is a 45 y.o. female with PMHX of diabetes, chronic pain, DVT on Xarelto who presents to the emergency department C/O right hip and knee pain. Patient reports that the pain had been relatively under control until a few days ago when she fell getting out of bed. She reports she landed on her right side. She reports the pain is worse with movement or bearing weight. Denies head strike or loss of consciousness, fever, chest pain, shortness of breath, other complaints. She reports she has taken Tylenol and ibuprofen without relief. She reports she is working with orthopedist who is trying to get her into pain management but has not been successfully plugged in the pain management at this time. PCP: [place], MD Fiordaliza    Current Outpatient Medications   Medication Sig Dispense Refill    oxyCODONE-acetaminophen (Percocet) 5-325 mg per tablet Take 1 Tablet by mouth every four (4) hours as needed for Pain for up to 3 days. Max Daily Amount: 6 Tablets. 12 Tablet 0    naloxone (Narcan) 4 mg/actuation nasal spray Use 1 spray intranasally, then discard. Repeat with new spray every 2 min as needed for opioid overdose symptoms, alternating nostrils. 2 Each 0    clonazePAM (KlonoPIN) 1 mg tablet Take  by mouth two (2) times a day.  cyclobenzaprine (FLEXERIL) 10 mg tablet Take  by mouth three (3) times daily as needed for Muscle Spasm(s).  rivaroxaban (Xarelto DVT-PE Treat 30d Start) 15 mg (42)- 20 mg (9) DsPk Take 15 mg by mouth two (2) times daily (with meals) for 21 days.  1 Dose Pack 0    insulin aspart protamine/insulin aspart (NovoLOG Mix 70-30FlexPen U-100) 100 unit/mL (70-30) inpn by SubCUTAneous route Before breakfast, lunch, dinner and at bedtime. Sliding scale      fluoxetine HCl (PROZAC PO) Take  by mouth daily. (Patient not taking: Reported on 11/17/2021)      diazePAM (Valium) 10 mg tablet Take 10 mg by mouth every six (6) hours as needed for Anxiety. (Patient not taking: Reported on 11/17/2021)      carisoprodoL (Soma) 350 mg tablet Take 1 Tablet by mouth every eight (8) hours as needed for Muscle Spasm(s). Max Daily Amount: 1,050 mg. (Patient not taking: Reported on 11/17/2021) 30 Tablet 0    dextromethorphan-guaiFENesin (Coricidin HBP Chest Simone-Cough)  mg cap Take 1 Capsule by mouth two (2) times daily as needed for Cough or Other (congestion). (Patient not taking: Reported on 11/17/2021) 20 Capsule 0    predniSONE (STERAPRED DS) 10 mg dose pack Take as directed (Patient not taking: Reported on 9/27/2021) 21 Tablet 0    lisinopriL (PRINIVIL, ZESTRIL) 10 mg tablet Take 20 mg by mouth daily.  Cane coco 1 Each by Does Not Apply route daily. 1 Device 0    ondansetron (ZOFRAN ODT) 4 mg disintegrating tablet Take 1-2 tablets every 6-8 hours as needed for nausea and vomiting. (Patient not taking: Reported on 11/17/2021) 10 Tab 0    lidocaine (LIDOCAINE VISCOUS) 2 % solution Put 10 mL in mouth and swish and spit out QAC and at bedtime (Patient not taking: Reported on 9/27/2021) 200 mL 0    Blood-Glucose Meter (ONETOUCH ULTRA2) monitoring kit Use to obtain two times a day. 1 Kit 0    lancets misc Use daily to monitor blood glucose 200 Each 0    glucose blood VI test strips (ASCENSIA AUTODISC VI, ONE TOUCH ULTRA TEST VI) strip 50 Each by Does Not Apply route two (2) times daily as needed. 200 Strip 3    polyethylene glycol (MIRALAX) 17 gram packet Take 1 Packet by mouth daily. 30 Each 1    Insulin Needles, Disposable, 31 gauge x 5/16\" ndle Use to administer insulin as directed 1 Package 11    atorvastatin (LIPITOR) 40 mg tablet Take 1 Tab by mouth daily.  (Patient not taking: Reported on 9/27/2021) 30 Tab 6       Past History       Past Medical History:  Past Medical History:   Diagnosis Date    Anxiety     [facility]     Breast cancer ([facility]     breast cancer, right, S/P Mastectomy and chemo, radiation in 2013    Depression     Diabetes ([facility]     Drug abuse ([facility]     H/O cocaine use in past    Drug-seeking behavior     Family history of early CAD     Gastrointestinal disorder     cholitis    H/O ETOH abuse     Hyperlipidemia     Hypertension     Malignant neoplasm without specification of site ([facility]     Methamphetamine abuse ([facility]      self reported on 1/3/16    Psychiatric disorder     multiple personality disorder    Psychiatric disorder     anxiety    Psychiatric disorder     bipolar     Spine injury     Vitamin D deficiency 5/1/2018       Past Surgical History:  Past Surgical History:   Procedure Laterality Date    COLONOSCOPY N/A 7/18/2016    COLONOSCOPY performed by Indira Serrato MD at [facility] ENDOSCOPY    HX BREAST LUMPECTOMY  06/2013    right    HX HYSTERECTOMY      HX MASTECTOMY      had expanders put in 6/2018    HX ORTHOPAEDIC      Back fusion surgery 4/18/14.  HX OTHER SURGICAL      mediport    HX TONSILLECTOMY      HX TUBAL LIGATION      NEUROLOGICAL PROCEDURE UNLISTED  2020    back surgery       Family History:  Family History   Problem Relation Age of Onset    Heart Disease Mother     Stroke Father     Heart Disease Father 48    Diabetes Other     Hypertension Other     Cancer Maternal Grandmother        Social History:  Social History     Tobacco Use    Smoking status: Never Smoker    Smokeless tobacco: Never Used   Vaping Use    Vaping Use: Never used   Substance Use Topics    Alcohol use: Not Currently     Comment: \"Occasionally\"    Drug use: Not Currently     Types: Methamphetamines, Cocaine     Comment: none in 2 years       Allergies:   Allergies   Allergen Reactions    Latex Hives and Itching    Other Food Rash     Almonds    Amoxicillin Swelling Other reaction(s): mild rash/itching    Aspirin Not Reported This Time and Swelling     Mouth swells    Beeswax Anaphylaxis    Levaquin [Levofloxacin] Not Reported This Time and Swelling     Other reaction(s): mild rash/itching    [place] Rash and Itching    Codeine Other (comments)    Glycopyrrolate Swelling     Pt  States  faciAL  SWELLING   POST  ROBINUL  IV   Pt  States  faciAL  SWELLING   POST  ROBINUL  IV     Pcn [Penicillins] Swelling    Tape [Adhesive] Rash    Tramadol Swelling         Review of Systems   Review of Systems   Constitutional: Negative for fever. Respiratory: Negative for shortness of breath. Cardiovascular: Negative for chest pain. Musculoskeletal: Positive for arthralgias and myalgias. All other systems reviewed and are negative. Physical Exam     Vitals:    11/27/21 0058   BP: 128/75   Pulse: 99   Resp: 18   Temp: 97.4 °F (36.3 °C)   SpO2: 100%   Weight: 63.5 kg (140 lb)   Height: 5' 2\" (1.575 m)     Physical Exam    Nursing notes and vital signs reviewed    Constitutional: Non toxic appearing, moderate distress  Head: Normocephalic, Atraumatic  Eyes: EOMI  Neck: Supple  Cardiovascular: Regular rate and rhythm, no murmurs, rubs, or gallops  Chest: Normal work of breathing and chest excursion bilaterally  Lungs: Clear to ausculation bilaterally  Back: No evidence of trauma or deformity  Extremities: No evidence of trauma or deformity, no LE edema, diffusely tender over right hip, right thigh, right knee without bony point tenderness palpation, distal neurovascular intact  Skin: Warm and dry, normal cap refill  Neuro: Alert and appropriate  Psychiatric: Normal mood and affect      Diagnostic Study Results     Labs -   No results found for this or any previous visit (from the past 12 hour(s)). Radiologic Studies -   XR HIP RT W OR WO PELV 2-3 VWS   Final Result   1. No acute right hip fracture or dislocation.  Please note MRI is more   sensitive for evaluation of subtle nondisplaced fractures and should be utilized   if there is continued clinical concern. 2.  Osseous degenerative changes and additional findings, as detailed in the   body of the report. XR KNEE RT MIN 4 V   Final Result   1. Likely subtle nondisplaced proximal right fibula fracture. 2.  Swelling and suprapatellar joint effusion may be related to associated soft   tissue injury. CT Results  (Last 48 hours)    None        CXR Results  (Last 48 hours)    None          Medications given in the ED-  Medications   oxyCODONE-acetaminophen (PERCOCET) 5-325 mg per tablet 1 Tablet (1 Tablet Oral Given 11/27/21 0244)         Medical Decision Making   I am the first provider for this patient. I reviewed the vital signs, available nursing notes, past medical history, past surgical history, family history and social history. Vital Signs-Reviewed the patient's vital signs. Pulse Oximetry Analysis - 100% on room air, not hypoxic     Records Reviewed: Nursing Notes and Old Medical Records    Provider Notes (Medical Decision Making): Lidia Aranda is a 45 y.o. female presenting for right leg pain after a fall few days ago. Patient has history of chronic pain. X-rays were obtained with reading by radiologist of fibular fracture. Patient placed in posterior splint and given crutches with instructions to be nonweightbearing. Will provide short course of opiate pain medication in the setting of this new fracture but also prescribed Narcan due to patient's other controlled substances. Reviewed  and patient had not had any recent opiate prescriptions. Plan for discharge with referral to orthopedics for follow-up and return precautions. Patient understands and agrees with this plan.     Procedures:  Procedures    ED Course:   1:15 AM  On review of  aware patient received a 5-day supply of Percocet on the 17th of this month and a 7-day supply of Norco on the fourth of this month.    3:11 AM  Updated patient on all results and plan. All questions answered. 3:11 AM   Neurovascularly intact before splint placement, remains unchanged after splint placement. Splint applied by ED [facility]      Diagnosis and Disposition     Critical Care: None    DISCHARGE NOTE:    Morales Murguia  results have been reviewed with her. She has been counseled regarding her diagnosis, treatment, and plan. She verbally conveys understanding and agreement of the signs, symptoms, diagnosis, treatment and prognosis and additionally agrees to follow up as discussed. She also agrees with the care-plan and conveys that all of her questions have been answered. I have also provided discharge instructions for her that include: educational information regarding their diagnosis and treatment, and list of reasons why they would want to return to the ED prior to their follow-up appointment, should her condition change. She has been provided with education for proper emergency department utilization. CLINICAL IMPRESSION:    1. Closed fracture of shaft of right fibula, unspecified fracture morphology, initial encounter        PLAN:  1. D/C Home  2. Discharge Medication List as of 11/27/2021  3:10 AM      START taking these medications    Details   oxyCODONE-acetaminophen (Percocet) 5-325 mg per tablet Take 1 Tablet by mouth every four (4) hours as needed for Pain for up to 3 days. Max Daily Amount: 6 Tablets., Normal, Disp-12 Tablet, R-0      naloxone (Narcan) 4 mg/actuation nasal spray Use 1 spray intranasally, then discard. Repeat with new spray every 2 min as needed for opioid overdose symptoms, alternating nostrils. , Normal, Disp-2 Each, R-0         CONTINUE these medications which have NOT CHANGED    Details   clonazePAM (KlonoPIN) 1 mg tablet Take  by mouth two (2) times a day., Historical Med      cyclobenzaprine (FLEXERIL) 10 mg tablet Take  by mouth three (3) times daily as needed for Muscle Spasm(s). , Historical Med      rivaroxaban (Xarelto DVT-PE Treat 30d Start) 15 mg (42)- 20 mg (9) DsPk Take 15 mg by mouth two (2) times daily (with meals) for 21 days. , Normal, Disp-1 Dose Pack, R-0      insulin aspart protamine/insulin aspart (NovoLOG Mix 70-30FlexPen U-100) 100 unit/mL (70-30) inpn by SubCUTAneous route Before breakfast, lunch, dinner and at bedtime. Sliding scale, Historical Med      fluoxetine HCl (PROZAC PO) Take  by mouth daily. , Historical Med      diazePAM (Valium) 10 mg tablet Take 10 mg by mouth every six (6) hours as needed for Anxiety. , Historical Med      carisoprodoL (Soma) 350 mg tablet Take 1 Tablet by mouth every eight (8) hours as needed for Muscle Spasm(s). Max Daily Amount: 1,050 mg., Normal, Disp-30 Tablet, R-0      dextromethorphan-guaiFENesin (Coricidin HBP Chest Simone-Cough)  mg cap Take 1 Capsule by mouth two (2) times daily as needed for Cough or Other (congestion). , Normal, Disp-20 Capsule, R-0      predniSONE (STERAPRED DS) 10 mg dose pack Take as directed, Normal, Disp-21 Tablet, R-0      lisinopriL (PRINIVIL, ZESTRIL) 10 mg tablet Take 20 mg by mouth daily. , Historical Med      Cane coco 1 Each by Does Not Apply route daily. , Print, Disp-1 Device, R-0      ondansetron (ZOFRAN ODT) 4 mg disintegrating tablet Take 1-2 tablets every 6-8 hours as needed for nausea and vomiting., Print, Disp-10 Tab, R-0      lidocaine (LIDOCAINE VISCOUS) 2 % solution Put 10 mL in mouth and swish and spit out QAC and at bedtime, Print, Disp-200 mL, R-0      Blood-Glucose Meter (ONETOUCH ULTRA2) monitoring kit Use to obtain two times a day., Normal, Disp-1 Kit, R-0      lancets misc Use daily to monitor blood glucose, Normal, Disp-200 Each, R-0      glucose blood VI test strips (ASCENSIA AUTODISC VI, ONE TOUCH ULTRA TEST VI) strip 50 Each by Does Not Apply route two (2) times daily as needed., Normal, Disp-200 Strip, R-3      polyethylene glycol (MIRALAX) 17 gram packet Take 1 Packet by mouth daily. , Normal, Disp-30 Each, R-1      Insulin Needles, Disposable, 31 gauge x 5/16\" ndle Use to administer insulin as directed, Normal, Disp-1 Package, R-11      atorvastatin (LIPITOR) 40 mg tablet Take 1 Tab by mouth daily. , Normal, Disp-30 Tab, R-6           3. Follow-up Information     Follow up With Specialties Details Why Contact Info    Ct Naidu MD Orthopedic Surgery Schedule an appointment as soon as possible for a visit   [address] [facility] [id number]  [phone]      [facility] EMERGENCY DEPT Emergency Medicine  If symptoms worsen [address] [id number]  [phone]        _______________________________      Please note that this dictation was completed with [employer], the computer voice recognition software. Quite often unanticipated grammatical, syntax, homophones, and other interpretive errors are inadvertently transcribed by the computer software. Please disregard these errors. Please excuse any errors that have escaped final proofreading.

## 2021-11-30 ENCOUNTER — APPOINTMENT (OUTPATIENT)
Dept: GENERAL RADIOLOGY | Age: 51
End: 2021-11-30
Attending: EMERGENCY MEDICINE
Payer: MEDICAID

## 2021-11-30 ENCOUNTER — HOSPITAL ENCOUNTER (EMERGENCY)
Age: 51
Discharge: HOME | End: 2021-11-30
Attending: EMERGENCY MEDICINE
Payer: MEDICAID

## 2021-11-30 VITALS
HEART RATE: 104 BPM | BODY MASS INDEX: 25.76 KG/M2 | TEMPERATURE: 99.6 F | HEIGHT: 62 IN | SYSTOLIC BLOOD PRESSURE: 118 MMHG | OXYGEN SATURATION: 97 % | WEIGHT: 140 LBS | RESPIRATION RATE: 14 BRPM | DIASTOLIC BLOOD PRESSURE: 77 MMHG

## 2021-11-30 DIAGNOSIS — G89.4 CHRONIC PAIN SYNDROME: Primary | ICD-10-CM

## 2021-11-30 PROCEDURE — 99282 EMERGENCY DEPT VISIT SF MDM: CPT

## 2021-11-30 PROCEDURE — 73590 X-RAY EXAM OF LOWER LEG: CPT

## 2021-11-30 NOTE — ED TRIAGE NOTES
PT to triage with complaints of RIGHT leg pain. Pt states that she was seen here in the ER on 11/27 for a RIGHT leg injury, but her pain has worsened. PT was seen by orthopedics, and told to take ibuprofen for pain but it is not working. Pt moaning during triage.

## 2021-11-30 NOTE — ED PROVIDER NOTES
EMERGENCY DEPARTMENT HISTORY AND PHYSICAL EXAM    Date: 11/30/2021  Patient Name: Farooq Heath    History of Presenting Illness     Chief Complaint   Patient presents with    Leg Pain         History Provided By: Patient    1:47 PM  Farooq Heath is a 45 y.o. female with PMHX of diabetes, hypertension, chronic pain who presents to the emergency department C/O worsening pain at her fracture site. Patient was diagnosed with a fibular fracture a few days ago in the ED. She reports the pain has worsened. She reports she has had falls since then. Denies any fever, chest pain, shortness of breath, tatiana pain, other complaints. No other relieving or exacerbating factors identified. Patient has used up the Percocet she was prescribed at her last visit. PCP: Fiordaliza Peter MD    Current Outpatient Medications   Medication Sig Dispense Refill    oxyCODONE-acetaminophen (Percocet) 5-325 mg per tablet Take 1 Tablet by mouth every four (4) hours as needed for Pain for up to 3 days. Max Daily Amount: 6 Tablets. 12 Tablet 0    naloxone (Narcan) 4 mg/actuation nasal spray Use 1 spray intranasally, then discard. Repeat with new spray every 2 min as needed for opioid overdose symptoms, alternating nostrils. 2 Each 0    clonazePAM (KlonoPIN) 1 mg tablet Take  by mouth two (2) times a day.  cyclobenzaprine (FLEXERIL) 10 mg tablet Take  by mouth three (3) times daily as needed for Muscle Spasm(s).  rivaroxaban (Xarelto DVT-PE Treat 30d Start) 15 mg (42)- 20 mg (9) DsPk Take 15 mg by mouth two (2) times daily (with meals) for 21 days. 1 Dose Pack 0    insulin aspart protamine/insulin aspart (NovoLOG Mix 70-30FlexPen U-100) 100 unit/mL (70-30) inpn by SubCUTAneous route Before breakfast, lunch, dinner and at bedtime. Sliding scale      fluoxetine HCl (PROZAC PO) Take  by mouth daily.  (Patient not taking: Reported on 11/17/2021)      diazePAM (Valium) 10 mg tablet Take 10 mg by mouth every six (6) hours as needed for Anxiety. (Patient not taking: Reported on 11/17/2021)      carisoprodoL (Soma) 350 mg tablet Take 1 Tablet by mouth every eight (8) hours as needed for Muscle Spasm(s). Max Daily Amount: 1,050 mg. (Patient not taking: Reported on 11/17/2021) 30 Tablet 0    dextromethorphan-guaiFENesin (Coricidin HBP Chest Simone-Cough)  mg cap Take 1 Capsule by mouth two (2) times daily as needed for Cough or Other (congestion). (Patient not taking: Reported on 11/17/2021) 20 Capsule 0    predniSONE (STERAPRED DS) 10 mg dose pack Take as directed (Patient not taking: Reported on 9/27/2021) 21 Tablet 0    lisinopriL (PRINIVIL, ZESTRIL) 10 mg tablet Take 20 mg by mouth daily.  Cane coco 1 Each by Does Not Apply route daily. 1 Device 0    ondansetron (ZOFRAN ODT) 4 mg disintegrating tablet Take 1-2 tablets every 6-8 hours as needed for nausea and vomiting. (Patient not taking: Reported on 11/17/2021) 10 Tab 0    lidocaine (LIDOCAINE VISCOUS) 2 % solution Put 10 mL in mouth and swish and spit out QAC and at bedtime (Patient not taking: Reported on 9/27/2021) 200 mL 0    Blood-Glucose Meter (ONETOUCH ULTRA2) monitoring kit Use to obtain two times a day. 1 Kit 0    lancets misc Use daily to monitor blood glucose 200 Each 0    glucose blood VI test strips (ASCENSIA AUTODISC VI, ONE TOUCH ULTRA TEST VI) strip 50 Each by Does Not Apply route two (2) times daily as needed. 200 Strip 3    polyethylene glycol (MIRALAX) 17 gram packet Take 1 Packet by mouth daily. 30 Each 1    Insulin Needles, Disposable, 31 gauge x 5/16\" ndle Use to administer insulin as directed 1 Package 11    atorvastatin (LIPITOR) 40 mg tablet Take 1 Tab by mouth daily.  (Patient not taking: Reported on 9/27/2021) 30 Tab 6       Past History       Past Medical History:  Past Medical History:   Diagnosis Date    Anxiety     [facility]     Breast cancer ([facility]     breast cancer, right, S/P Mastectomy and chemo, radiation in 2013  Depression     Diabetes [facility]     Drug abuse ([facility]     H/O cocaine use in past    Drug-seeking behavior     Family history of early CAD     Gastrointestinal disorder     cholitis    H/O ETOH abuse     Hyperlipidemia     Hypertension     [facility]     Methamphetamine abuse ([facility]      self reported on 1/3/16    Psychiatric disorder     multiple personality disorder    Psychiatric disorder     anxiety    Psychiatric disorder     bipolar     Spine injury     Vitamin D deficiency 5/1/2018       Past Surgical History:  Past Surgical History:   Procedure Laterality Date    COLONOSCOPY N/A 7/18/2016    [facility]    HX BREAST LUMPECTOMY  06/2013    right    HX HYSTERECTOMY      HX MASTECTOMY      had expanders put in 6/2018    HX ORTHOPAEDIC      Back fusion surgery 4/18/14.  HX OTHER SURGICAL      mediport    HX TONSILLECTOMY      HX TUBAL LIGATION      NEUROLOGICAL PROCEDURE UNLISTED  2020    back surgery       Family History:  Family History   Problem Relation Age of Onset    Heart Disease Mother     Stroke Father     Heart Disease Father 48    Diabetes Other     Hypertension Other     Cancer Maternal Grandmother        Social History:  Social History     Tobacco Use    Smoking status: Never Smoker    Smokeless tobacco: Never Used   Vaping Use    Vaping Use: Never used   Substance Use Topics    Alcohol use: Not Currently     Comment: \"Occasionally\"    Drug use: Not Currently     Types: Methamphetamines, Cocaine     Comment: none in 2 years       Allergies:   Allergies   Allergen Reactions    Latex Hives and Itching    Other Food Rash     Almonds    Amoxicillin Swelling     Other reaction(s): mild rash/itching    Aspirin Not Reported This Time and Swelling     Mouth swells    Beeswax Anaphylaxis    Levaquin [Levofloxacin] Not Reported This Time and Swelling     Other reaction(s): mild rash/itching    [place] Rash and Itching    Codeine Other (comments)    Glycopyrrolate Swelling     Pt  States  faciAL  SWELLING   POST  ROBINUL  IV   Pt  States  faciAL  SWELLING   POST  ROBINUL  IV     Pcn [Penicillins] Swelling    Tape [Adhesive] Rash    Tramadol Swelling         Review of Systems   Review of Systems   Constitutional: Negative for fever. Respiratory: Negative for shortness of breath. Cardiovascular: Negative for chest pain. Musculoskeletal: Positive for arthralgias and myalgias. All other systems reviewed and are negative. Physical Exam     Vitals:    11/30/21 1324   BP: 118/77   Pulse: (!) 104   Resp: 14   Temp: 99.6 °F (37.6 °C)   SpO2: 97%   Weight: 63.5 kg (140 lb)   Height: 5' 2\" (1.575 m)     Physical Exam    Nursing notes and vital signs reviewed    Constitutional: Non toxic appearing, moderate distress  Head: Normocephalic, Atraumatic  Eyes: EOMI  Neck: Supple  Chest: Normal work of breathing and chest excursion bilaterally  Back: No evidence of trauma or deformity  Extremities: Posterior splint in place, leg is soft and not tense, diffusely tender, distally neurovascular intact  Skin: Warm and dry, normal cap refill  Neuro: Alert and appropriate  Psychiatric: Normal mood and affect      Diagnostic Study Results     Labs -   No results found for this or any previous visit (from the past 12 hour(s)). Radiologic Studies -   XR TIB/FIB RT   Final Result         1. No acute osseous abnormality or malalignment demonstrated. 2. Small right knee joint effusion. 3. Subtle proximal fibular fracture queried previously not seen on current   examination. CT Results  (Last 48 hours)    None        CXR Results  (Last 48 hours)    None          Medications given in the ED-  Medications - No data to display      Medical Decision Making   I am the first provider for this patient.     I reviewed the vital signs, available nursing notes, past medical history, past surgical history, family history and social history. Vital Signs-Reviewed the patient's vital signs. Pulse Oximetry Analysis - 97% on room air, not hypoxic     Records Reviewed: Nursing Notes and Old Medical Records    Provider Notes (Medical Decision Making): Annika Naranjo is a 45 y.o. female presenting for continued pain in her right leg where she was diagnosed with a subtle fibular fracture a few days ago. Patient is neurovascular intact without any signs or symptoms of compartment syndrome on exam.  X-ray no longer demonstrates any fracture. Provided patient with reassurance and encouraged her to use nonopiate pain management. Plan for discharge with primary care follow-up and return precautions. Patient understands and agrees this plan. Procedures:  Procedures    ED Course:   2:41 PM  Updated patient on all results and plan. All questions answered. Diagnosis and Disposition     Critical Care: None    DISCHARGE NOTE:    Cecilia Amador  results have been reviewed with her. She has been counseled regarding her diagnosis, treatment, and plan. She verbally conveys understanding and agreement of the signs, symptoms, diagnosis, treatment and prognosis and additionally agrees to follow up as discussed. She also agrees with the care-plan and conveys that all of her questions have been answered. I have also provided discharge instructions for her that include: educational information regarding their diagnosis and treatment, and list of reasons why they would want to return to the ED prior to their follow-up appointment, should her condition change. She has been provided with education for proper emergency department utilization. CLINICAL IMPRESSION:    1. Chronic pain syndrome        PLAN:  1. D/C Home  2. Current Discharge Medication List        3.    Follow-up Information     Follow up With Specialties Details Why Cristel Aragon MD Family Medicine Schedule an appointment as soon as possible for a visit  Or Your Primary Care Doctor [address] [address] [phone]      [facility] EMERGENCY DEPT Emergency Medicine  If symptoms worsen [address] [place] [address]  [phone]        _______________________________      Please note that this dictation was completed with Scan[employer]ano, the computer voice recognition software. Quite often unanticipated grammatical, syntax, homophones, and other interpretive errors are inadvertently transcribed by the computer software. Please disregard these errors. Please excuse any errors that have escaped final proofreading.

## 2021-12-13 ENCOUNTER — HOSPITAL ENCOUNTER (EMERGENCY)
Age: 51
Discharge: HOME | End: 2021-12-13
Attending: EMERGENCY MEDICINE
Payer: MEDICAID

## 2021-12-13 ENCOUNTER — APPOINTMENT (OUTPATIENT)
Dept: GENERAL RADIOLOGY | Age: 51
End: 2021-12-13
Attending: PHYSICIAN ASSISTANT
Payer: MEDICAID

## 2021-12-13 VITALS
BODY MASS INDEX: 23.92 KG/M2 | SYSTOLIC BLOOD PRESSURE: 122 MMHG | TEMPERATURE: 98.6 F | OXYGEN SATURATION: 99 % | DIASTOLIC BLOOD PRESSURE: 78 MMHG | HEART RATE: 107 BPM | RESPIRATION RATE: 18 BRPM | HEIGHT: 62 IN | WEIGHT: 130 LBS

## 2021-12-13 DIAGNOSIS — S63.92XA SPRAIN OF LEFT HAND, INITIAL ENCOUNTER: Primary | ICD-10-CM

## 2021-12-13 PROCEDURE — 99283 EMERGENCY DEPT VISIT LOW MDM: CPT

## 2021-12-13 PROCEDURE — 73130 X-RAY EXAM OF HAND: CPT

## 2021-12-13 NOTE — ED TRIAGE NOTES
Pt wheeled to triage in [facility]. Pt states her left arm started hurting on Friday night. She does remember hearing a crack after stretching her arm out. Pt has norco, but did not help the pain.   Denies any injury

## 2021-12-13 NOTE — ED PROVIDER NOTES
EMERGENCY DEPARTMENT HISTORY AND PHYSICAL EXAM    Date: 12/13/2021  Patient Name: Ijeoma Carter    History of Presenting Illness     Chief Complaint   Patient presents with    Arm Pain         History Provided By: Patient    9:56 AM  Ijeoma Carter is a 45 y.o. female with past medical history of hypertension, diabetes, bipolar disorder, anxiety, prior substance abuse, presents to ED complaints of left hand pain x2 days. Patient states 3 nights ago her  was giving her hand massage and rubbing cream.  Later that night she moved her hand to her abdomen and felt a popping sensation in her left hand and wrist followed by pain and slight swelling. She takes Norco for chronic pain which has not provided relief. Denies any other injury or trauma, extremity numbness or weakness, rash, any other symptoms or complaints. PCP: Fiordaliza Peter MD    Current Outpatient Medications   Medication Sig Dispense Refill    methocarbamoL (Robaxin) 500 mg tablet Take 1 Tablet by mouth four (4) times daily. 20 Tablet 0    naloxone (Narcan) 4 mg/actuation nasal spray Use 1 spray intranasally, then discard. Repeat with new spray every 2 min as needed for opioid overdose symptoms, alternating nostrils. 2 Each 0    clonazePAM (KlonoPIN) 1 mg tablet Take  by mouth two (2) times a day.  insulin aspart protamine/insulin aspart (NovoLOG Mix 70-30FlexPen U-100) 100 unit/mL (70-30) inpn by SubCUTAneous route Before breakfast, lunch, dinner and at bedtime. Sliding scale      Cane coco 1 Each by Does Not Apply route daily. 1 Device 0    Blood-Glucose Meter (ONETOUCH ULTRA2) monitoring kit Use to obtain two times a day. 1 Kit 0    lancets misc Use daily to monitor blood glucose 200 Each 0    glucose blood VI test strips (ASCENSIA AUTODISC VI, ONE TOUCH ULTRA TEST VI) strip 50 Each by Does Not Apply route two (2) times daily as needed. 200 Strip 3    polyethylene glycol (MIRALAX) 17 gram packet Take 1 Packet by mouth daily.  27 Each 1    Insulin Needles, Disposable, 31 gauge x 5/16\" ndle Use to administer insulin as directed 1 Package 11    lidocaine (Lidoderm) 5 % Apply patch to the affected area for 12 hours a day and remove for 12 hours a day. (Patient not taking: Reported on 12/13/2021) 10 Each 0    lisinopriL (PRINIVIL, ZESTRIL) 10 mg tablet Take 20 mg by mouth daily. (Patient not taking: Reported on 12/13/2021)         Past History     Past Medical History:  Past Medical History:   Diagnosis Date    Anxiety     Bipolar disorder ([address]     Breast cancer ([address]     breast cancer, right, S/P Mastectomy and chemo, radiation in 2013    Depression     Diabetes ([address]     Drug abuse ([address]     H/O cocaine use in past    Drug-seeking behavior     Family history of early CAD     Gastrointestinal disorder     cholitis    H/O ETOH abuse     Hyperlipidemia     Hypertension     Malignant neoplasm without specification of site ([address]     Methamphetamine abuse ([address]      self reported on 1/3/16    Psychiatric disorder     multiple personality disorder    Psychiatric disorder     anxiety    Psychiatric disorder     bipolar     Spine injury     Thromboembolus ([address]     Vitamin D deficiency 5/1/2018       Past Surgical History:  Past Surgical History:   Procedure Laterality Date    COLONOSCOPY N/A 7/18/2016    [facility] END[facility]COPY    HX BREAST LUMPECTOMY  06/2013    right    HX HYSTERECTOMY      HX MASTECTOMY      had expanders put in 6/2018    HX ORTHOPAEDIC      Back fusion surgery 4/18/14.      HX OTHER SURGICAL      mediport    HX TONSILLECTOMY      HX TUBAL LIGATION      NEUROLOGICAL PROCEDURE UNLISTED  2020    back surgery       Family History:  Family History   Problem Relation Age of Onset    Heart Disease Mother     Stroke Father     Heart Disease Father 48    Diabetes Other     Hypertension Other     Cancer Maternal Grandmother        Social History:  Social History Tobacco Use    Smoking status: Never Smoker    Smokeless tobacco: Never Used   Vaping Use    Vaping Use: Never used   Substance Use Topics    Alcohol use: Not Currently     Comment: \"Occasionally\"    Drug use: Not Currently     Types: Methamphetamines, Cocaine     Comment: none in 2 years       Allergies: Allergies   Allergen Reactions    Latex Hives and Itching    Other Food Rash     Almonds    Amoxicillin Swelling     Other reaction(s): mild rash/itching    Aspirin Not Reported This Time and Swelling     Mouth swells    Beeswax Anaphylaxis    Levaquin [Levofloxacin] Not Reported This Time and Swelling     Other reaction(s): mild rash/itching    [place] Rash and Itching    Codeine Other (comments)    Glycopyrrolate Swelling     Pt  States  faciAL  SWELLING   POST  ROBINUL  IV   Pt  States  faciAL  SWELLING   POST  ROBINUL  IV     Pcn [Penicillins] Swelling    Tape [Adhesive] Rash    Tramadol Swelling         Review of Systems   Review of Systems   Musculoskeletal: Positive for arthralgias and joint swelling. Skin: Negative. Neurological: Negative for weakness and numbness. All other systems reviewed and are negative. Physical Exam     Vitals:    12/13/21 0941 12/13/21 1051   BP: 122/78    Pulse: (!) 107    Resp: 18    Temp: 98.6 °F (37 °C)    SpO2: 99% 99%   Weight: 59 kg (130 lb)    Height: 5' 2\" (1.575 m)      Physical Exam  Vital signs and nursing notes reviewed. CONSTITUTIONAL: Alert. Well-appearing; well-nourished; in no apparent distress. HEAD: Normocephalic; atraumatic. EXT: LUE: Mild swelling and tenderness to dorsum of hand, over second and third MCPs without rash, erythema, increased warmth or wound. No snuffbox tenderness. Wrist fingers nontender/atraumatic. Cap refill less than 2 seconds. Sensation intact. Movement of fingers exacerbates pain. SKIN: Normal for age and race; warm; dry; good turgor; no apparent lesions or exudate. NEURO: A & O x3.   PSYCH: Mood and affect appropriate. Diagnostic Study Results     Labs -   No results found for this or any previous visit (from the past 12 hour(s)). Radiologic Studies -   X-ray left hand shows no acute process. Pending review by radiologist.  XR HAND LT MIN 3 V    (Results Pending)     CT Results  (Last 48 hours)    None        CXR Results  (Last 48 hours)    None          Medications given in the ED-  Medications - No data to display      Medical Decision Making   I am the first provider for this patient. I reviewed the vital signs, available nursing notes, past medical history, past surgical history, family history and social history. Vital Signs-Reviewed the patient's vital signs. Records Reviewed: Nursing Notes      Procedures:  Procedures    ED Course:  9:56 AM   Initial assessment performed. The patients presenting problems have been discussed, and they are in agreement with the care plan formulated and outlined with them. I have encouraged them to ask questions as they arise throughout their visit. Provider Notes (Medical Decision Making): Nuris Suero is a 45 y.o. female with chronic pain presents with left hand pain and slight swelling after \"popping sensation\" 3 days ago. Neurovascular intact without signs of injury or trauma but mild tenderness and slight swelling. Left hand x-ray shows no acute process. .  Consistent with hand sprain. Recommend ice intermittently, may use Ace wrap for comfort during the day, continue her normal pain medication and follow-up PCP if not improved after 1 to 2 weeks. Diagnosis and Disposition       DISCHARGE NOTE:    Fletcher Getting  results have been reviewed with her. She has been counseled regarding her diagnosis, treatment, and plan. She verbally conveys understanding and agreement of the signs, symptoms, diagnosis, treatment and prognosis and additionally agrees to follow up as discussed.   She also agrees with the care-plan and conveys that all of her questions have been answered. I have also provided discharge instructions for her that include: educational information regarding their diagnosis and treatment, and list of reasons why they would want to return to the ED prior to their follow-up appointment, should her condition change. She has been provided with education for proper emergency department utilization. CLINICAL IMPRESSION:    1. Sprain of left hand, initial encounter        PLAN:  1. D/C Home  2. Current Discharge Medication List        3. Follow-up Information     Follow up With Specialties Details Why Contact Info    Your PCP  In 1 week As needed     [facility] EMERGENCY DEPT Emergency Medicine  As needed, If symptoms worsen [address] [address]  [phone]        _______________________________      Please note that this dictation was completed with [employer], the computer voice recognition software. Quite often unanticipated grammatical, syntax, homophones, and other interpretive errors are inadvertently transcribed by the computer software. Please disregard these errors. Please excuse any errors that have escaped final proofreading.

## 2021-12-16 ENCOUNTER — HOSPITAL ENCOUNTER (EMERGENCY)
Age: 51
Discharge: HOME | End: 2021-12-17
Attending: EMERGENCY MEDICINE
Payer: MEDICAID

## 2021-12-16 VITALS
HEART RATE: 97 BPM | SYSTOLIC BLOOD PRESSURE: 135 MMHG | RESPIRATION RATE: 16 BRPM | OXYGEN SATURATION: 99 % | HEIGHT: 62 IN | DIASTOLIC BLOOD PRESSURE: 69 MMHG | BODY MASS INDEX: 25.03 KG/M2 | TEMPERATURE: 98.2 F | WEIGHT: 136 LBS

## 2021-12-16 DIAGNOSIS — M25.462 BILATERAL KNEE EFFUSIONS: ICD-10-CM

## 2021-12-16 DIAGNOSIS — G89.4 CHRONIC PAIN SYNDROME: Primary | ICD-10-CM

## 2021-12-16 DIAGNOSIS — M25.461 BILATERAL KNEE EFFUSIONS: ICD-10-CM

## 2021-12-16 PROCEDURE — 99283 EMERGENCY DEPT VISIT LOW MDM: CPT

## 2021-12-17 PROCEDURE — 74011250636 HC RX REV CODE- 250/636: Performed by: EMERGENCY MEDICINE

## 2021-12-17 PROCEDURE — 74011250637 HC RX REV CODE- 250/637: Performed by: EMERGENCY MEDICINE

## 2021-12-17 PROCEDURE — 96374 THER/PROPH/DIAG INJ IV PUSH: CPT

## 2021-12-17 RX ORDER — METHOCARBAMOL 500 MG/1
1000 TABLET, FILM COATED ORAL ONCE
Status: COMPLETED | OUTPATIENT
Start: 2021-12-17 | End: 2021-12-17

## 2021-12-17 RX ORDER — KETOROLAC TROMETHAMINE 30 MG/ML
30 INJECTION, SOLUTION INTRAMUSCULAR; INTRAVENOUS
Status: COMPLETED | OUTPATIENT
Start: 2021-12-17 | End: 2021-12-17

## 2021-12-17 RX ADMIN — METHOCARBAMOL TABLETS 1000 MG: 500 TABLET, COATED ORAL at 00:42

## 2021-12-17 RX ADMIN — KETOROLAC TROMETHAMINE 30 MG: 30 INJECTION, SOLUTION INTRAMUSCULAR; INTRAVENOUS at 00:43

## 2021-12-17 NOTE — ED TRIAGE NOTES
Pt ambulatory to triage, Cc: seen earlier at [facility], now reports she has continued pain in bilat legs and knee and swelling.  Also reports low Abd pain

## 2021-12-21 NOTE — ED PROVIDER NOTES
EMERGENCY DEPARTMENT HISTORY AND PHYSICAL EXAM    Date: 12/16/2021  Patient Name: Tiarra Mandujano    History of Presenting Illness     Chief Complaint   Patient presents with    Leg Pain    Abdominal Pain         History Provided By: Patient    Additional History (Context):   11:53 PM  Tiarra Mandujano is a 45 y.o. female with PMHX of type 2 diabetes with nephropathy, chronic pain syndrome, peptic ulcer disease, chronic low back pain, chronic narcotic use who presents to the emergency department C/O bilateral knee pain. Patient states she has chronic weakness and fell this morning to both knees. She states she was at [facility] did not tell me anything\". She is here for second evaluation wondering whether or not there is anything broken. She can walk on her legs but they provide significant discomfort. She denies any fevers chills. Prior to her fall today she did not have chest pain headache presyncopal episode. Social History  No smoking drinking or drugs    Family History  Positive for high blood pressure and diabetes      PCP: Other, MD Fiordaliza    Current Outpatient Medications   Medication Sig Dispense Refill    methocarbamoL (Robaxin) 500 mg tablet Take 1 Tablet by mouth four (4) times daily. 20 Tablet 0    lidocaine (Lidoderm) 5 % Apply patch to the affected area for 12 hours a day and remove for 12 hours a day. 10 Each 0    naloxone (Narcan) 4 mg/actuation nasal spray Use 1 spray intranasally, then discard. Repeat with new spray every 2 min as needed for opioid overdose symptoms, alternating nostrils. 2 Each 0    clonazePAM (KlonoPIN) 1 mg tablet Take  by mouth two (2) times a day.  insulin aspart protamine/insulin aspart (NovoLOG Mix 70-30FlexPen U-100) 100 unit/mL (70-30) inpn by SubCUTAneous route Before breakfast, lunch, dinner and at bedtime. Sliding scale      lisinopriL (PRINIVIL, ZESTRIL) 10 mg tablet Take 20 mg by mouth daily.       Cane coco 1 Each by Does Not Apply route daily. 1 Device 0    Blood-Glucose Meter (ONETOUCH ULTRA2) monitoring kit Use to obtain two times a day. 1 Kit 0    lancets misc Use daily to monitor blood glucose 200 Each 0    glucose blood VI test strips (ASCENSIA AUTODISC VI, ONE TOUCH ULTRA TEST VI) strip 50 Each by Does Not Apply route two (2) times daily as needed. 200 Strip 3    polyethylene glycol (MIRALAX) 17 gram packet Take 1 Packet by mouth daily. 30 Each 1    Insulin Needles, Disposable, 31 gauge x 5/16\" ndle Use to administer insulin as directed 1 Package 11       Past History     Past Medical History:  Past Medical History:   Diagnosis Date    Anxiety     Bipolar disorder ([address]     Breast cancer ([address]     breast cancer, right, S/P Mastectomy and chemo, radiation in 2013    Depression     Diabetes ([address]     Drug abuse ([address]     H/O cocaine use in past    Drug-seeking behavior     Family history of early CAD     Gastrointestinal disorder     cholitis    H/O ETOH abuse     Hyperlipidemia     Hypertension     Malignant neoplasm without specification of site ([address]     Methamphetamine abuse ([address]      self reported on 1/3/16    Psychiatric disorder     multiple personality disorder    Psychiatric disorder     anxiety    Psychiatric disorder     bipolar     Spine injury     Thromboembolus ([address]     Vitamin D deficiency 5/1/2018       Past Surgical History:  Past Surgical History:   Procedure Laterality Date    COLONOSCOPY N/A 7/18/2016    COLON[facility] performed [facility] ENDOSCOPY    HX BREAST LUMPECTOMY  06/2013    right    HX HYSTERECTOMY      HX MASTECTOMY      had expanders put in 6/2018    HX ORTHOPAEDIC      Back fusion surgery 4/18/14.      HX OTHER SURGICAL      mediport    HX TONSILLECTOMY      HX TUBAL LIGATION      NEUROLOGICAL PROCEDURE UNLISTED  2020    back surgery       Family History:  Family History   Problem Relation Age of Onset    Heart Disease Mother     Stroke Father  Heart Disease Father 48    Diabetes Other     Hypertension Other     Cancer Maternal Grandmother        Social History:  Social History     Tobacco Use    Smoking status: Never Smoker    Smokeless tobacco: Never Used   Vaping Use    Vaping Use: Never used   Substance Use Topics    Alcohol use: Not Currently     Comment: \"Occasionally\"    Drug use: Not Currently     Types: Methamphetamines, Cocaine     Comment: none in 2 years       Allergies: Allergies   Allergen Reactions    Latex Hives and Itching    Other Food Rash     Almonds    Amoxicillin Swelling     Other reaction(s): mild rash/itching    Aspirin Not Reported This Time and Swelling     Mouth swells    Beeswax Anaphylaxis    Levaquin [Levofloxacin] Not Reported This Time and Swelling     Other reaction(s): mild rash/itching    [place] Rash and Itching    Codeine Other (comments)    Glycopyrrolate Swelling     Pt  States  faciAL  SWELLING   POST  ROBINUL  IV   Pt  States  faciAL  SWELLING   POST  ROBINUL  IV     Pcn [Penicillins] Swelling    Tape [Adhesive] Rash    Tramadol Swelling         Review of Systems   Review of Systems   Constitutional: Positive for fatigue. Musculoskeletal: Positive for arthralgias and myalgias. Neurological: Positive for dizziness. Psychiatric/Behavioral: The patient is nervous/anxious. All other systems reviewed and are negative. Physical Exam     Vitals:    12/16/21 2219 12/16/21 2337   BP: 135/69    Pulse: 97    Resp: 16    Temp: 98.2 °F (36.8 °C)    SpO2: 97% 99%   Weight: 61.7 kg (136 lb)    Height: 5' 2\" (1.575 m)      Physical Exam  Vitals and nursing note reviewed. Constitutional:       General: She is not in acute distress. Appearance: She is well-developed. She is not diaphoretic. HENT:      Head: Normocephalic and atraumatic. Eyes:      General: No scleral icterus. Extraocular Movements:      Right eye: Normal extraocular motion. Left eye: Normal extraocular motion. Conjunctiva/sclera: Conjunctivae normal.      Pupils: Pupils are equal, round, and reactive to light. Neck:      Trachea: No tracheal deviation. Cardiovascular:      Rate and Rhythm: Normal rate and regular rhythm. Heart sounds: Normal heart sounds. Pulmonary:      Effort: Pulmonary effort is normal. No respiratory distress. Breath sounds: Normal breath sounds. No stridor. Abdominal:      General: Bowel sounds are normal. There is no distension. Palpations: Abdomen is soft. Tenderness: There is no abdominal tenderness. There is no rebound. Musculoskeletal:         General: No tenderness. Normal range of motion. Cervical back: Normal range of motion and neck supple. Comments: Grossly unremarkable without abnormalities. There is no redness or swelling to her lower extremities but she has very tender extremities from the distal thighs involving the supra and infrapatellar surfaces without by examination. The patella is free-floating and freely mobile but with tenderness. There is no unilateral edema or pitting to either leg. Distal pulses are normal dorsalis pedis and posterior tib. Skin:     General: Skin is warm and dry. Capillary Refill: Capillary refill takes less than 2 seconds. Findings: No erythema or rash. Neurological:      Mental Status: She is alert and oriented to person, place, and time. GCS: GCS eye subscore is 4. GCS verbal subscore is 5. GCS motor subscore is 6. Cranial Nerves: No cranial nerve deficit. Motor: No weakness. Psychiatric:         Mood and Affect: Mood normal.         Behavior: Behavior normal.         Thought Content: Thought content normal.         Judgment: Judgment normal.       Diagnostic Study Results     Labs -  No results found for this or any previous visit (from the past 24 hour(s)).      Radiologic Studies -   No new films are taken and reviewed the x-rays from earlier today and they showed mild degenerative changes with bilateral small patellar effusions. Cullen Blount MD    No orders to display     CT Results  (Last 48 hours)    None        CXR Results  (Last 48 hours)    None          Medications given in the ED-  Medications   methocarbamoL (ROBAXIN) tablet 1,000 mg (1,000 mg Oral Given 12/17/21 0042)   ketorolac (TORADOL) injection 30 mg (30 mg IntraVENous Given 12/17/21 0043)         Medical Decision Making   I am the first provider for this patient. I reviewed the vital signs, available nursing notes, past medical history, past surgical history, family history and social history. Vital Signs-Reviewed the patient's vital signs. Pulse Oximetry Analysis -99% on room air      Records Reviewed: NURSING NOTES AND PREVIOUS MEDICAL RECORDS    Provider Notes (Medical Decision Making):   Patient with chronic pain and falls earlier today. I reviewed her x-rays which showed no evidence of fractures. Examination she has no redness swelling significant tenderness. I doubt this is pain out of proportion to exam associated with vascular ischemia. Patient was reassured that she has for shot of Toradol so she can sleep. This was given with Robaxin and she was discharged directed follow-up with her orthopedist she was given earlier today. Procedures:  Procedures    ED Course:   11:53 PM : Initial assessment performed. The patients presenting problems have been discussed, and they are in agreement with the care plan formulated and outlined with them. I have encouraged them to ask questions as they arise throughout their visit. Diagnosis and Disposition       DISCHARGE NOTE:  12:41 AM  Simi Wheeler's  results have been reviewed with her. She has been counseled regarding her diagnosis, treatment, and plan. She verbally conveys understanding and agreement of the signs, symptoms, diagnosis, treatment and prognosis and additionally agrees to follow up as discussed.   She also agrees with the care-plan and conveys that all of her questions have been answered. I have also provided discharge instructions for her that include: educational information regarding their diagnosis and treatment, and list of reasons why they would want to return to the ED prior to their follow-up appointment, should her condition change. She has been provided with education for proper emergency department utilization. CLINICAL IMPRESSION:    1. Chronic pain syndrome    2. Bilateral knee effusions        PLAN:  1. D/C Home  2. Discharge Medication List as of 12/17/2021 12:37 AM        3. Follow-up Information     Follow up With Specialties Details [address], [place] MD [facility] Call  As needed [address] [facility] [email or web address]  [phone]          _______________________________    This note was partially transcribed via voice recognition software. Although efforts have been made to catch any discrepancies, it may contain sound alike words, grammatical errors, or nonsensical words.

## 2021-12-27 ENCOUNTER — APPOINTMENT (OUTPATIENT)
Dept: GENERAL RADIOLOGY | Age: 51
End: 2021-12-27
Attending: EMERGENCY MEDICINE
Payer: MEDICAID

## 2021-12-27 ENCOUNTER — APPOINTMENT (OUTPATIENT)
Dept: CT IMAGING | Age: 51
End: 2021-12-27
Attending: EMERGENCY MEDICINE
Payer: MEDICAID

## 2021-12-27 ENCOUNTER — HOSPITAL ENCOUNTER (EMERGENCY)
Age: 51
Discharge: HOME | End: 2021-12-27
Attending: EMERGENCY MEDICINE
Payer: MEDICAID

## 2021-12-27 VITALS
TEMPERATURE: 98.2 F | SYSTOLIC BLOOD PRESSURE: 169 MMHG | DIASTOLIC BLOOD PRESSURE: 98 MMHG | RESPIRATION RATE: 18 BRPM | HEART RATE: 88 BPM | OXYGEN SATURATION: 100 %

## 2021-12-27 DIAGNOSIS — R10.84 ABDOMINAL PAIN, GENERALIZED: ICD-10-CM

## 2021-12-27 DIAGNOSIS — R11.2 NON-INTRACTABLE VOMITING WITH NAUSEA, UNSPECIFIED VOMITING TYPE: ICD-10-CM

## 2021-12-27 DIAGNOSIS — R07.9 CHEST PAIN, UNSPECIFIED TYPE: ICD-10-CM

## 2021-12-27 DIAGNOSIS — M25.561 CHRONIC PAIN OF RIGHT KNEE: Primary | ICD-10-CM

## 2021-12-27 DIAGNOSIS — D72.829 LEUKOCYTOSIS, UNSPECIFIED TYPE: ICD-10-CM

## 2021-12-27 DIAGNOSIS — G89.29 CHRONIC PAIN OF RIGHT KNEE: Primary | ICD-10-CM

## 2021-12-27 LAB
ALBUMIN SERPL-MCNC: 2.7 G/DL (ref 3.4–5)
ALBUMIN/GLOB SERPL: 0.5 {RATIO} (ref 0.8–1.7)
ALP SERPL-CCNC: 143 U/L (ref 45–117)
ALT SERPL-CCNC: 19 U/L (ref 13–56)
ANION GAP SERPL CALC-SCNC: 7 MMOL/L (ref 3–18)
AST SERPL-CCNC: 22 U/L (ref 10–38)
BASOPHILS # BLD: 0 K/UL (ref 0–0.1)
BASOPHILS NFR BLD: 0 % (ref 0–2)
BILIRUB SERPL-MCNC: 0.2 MG/DL (ref 0.2–1)
BUN SERPL-MCNC: 22 MG/DL (ref 7–18)
BUN/CREAT SERPL: 23 (ref 12–20)
CALCIUM SERPL-MCNC: 8.8 MG/DL (ref 8.5–10.1)
CHLORIDE SERPL-SCNC: 105 MMOL/L (ref 100–111)
CK MB CFR SERPL CALC: NORMAL % (ref 0–4)
CK MB SERPL-MCNC: <1 NG/ML (ref 5–25)
CK SERPL-CCNC: 28 U/L (ref 26–192)
CO2 SERPL-SCNC: 26 MMOL/L (ref 21–32)
CREAT SERPL-MCNC: 0.95 MG/DL (ref 0.6–1.3)
DIFFERENTIAL METHOD BLD: ABNORMAL
EOSINOPHIL # BLD: 0 K/UL (ref 0–0.4)
EOSINOPHIL NFR BLD: 0 % (ref 0–5)
ERYTHROCYTE [DISTWIDTH] IN BLOOD BY AUTOMATED COUNT: 15.2 % (ref 11.6–14.5)
GLOBULIN SER CALC-MCNC: 5 G/DL (ref 2–4)
GLUCOSE SERPL-MCNC: 164 MG/DL (ref 74–99)
HCT VFR BLD AUTO: 29.9 % (ref 35–45)
HGB BLD-MCNC: 8.9 G/DL (ref 12–16)
IMM GRANULOCYTES # BLD AUTO: 0.1 K/UL (ref 0–0.04)
IMM GRANULOCYTES NFR BLD AUTO: 1 % (ref 0–0.5)
LIPASE SERPL-CCNC: 227 U/L (ref 73–393)
LYMPHOCYTES # BLD: 2 K/UL (ref 0.9–3.6)
LYMPHOCYTES NFR BLD: 14 % (ref 21–52)
MAGNESIUM SERPL-MCNC: 2.1 MG/DL (ref 1.6–2.6)
MCH RBC QN AUTO: 24.1 PG (ref 24–34)
MCHC RBC AUTO-ENTMCNC: 29.8 G/DL (ref 31–37)
MCV RBC AUTO: 80.8 FL (ref 78–100)
MONOCYTES # BLD: 0.5 K/UL (ref 0.05–1.2)
MONOCYTES NFR BLD: 3 % (ref 3–10)
NEUTS SEG # BLD: 11.6 K/UL (ref 1.8–8)
NEUTS SEG NFR BLD: 82 % (ref 40–73)
NRBC # BLD: 0 K/UL (ref 0–0.01)
NRBC BLD-RTO: 0 PER 100 WBC
PLATELET # BLD AUTO: 558 K/UL (ref 135–420)
PMV BLD AUTO: 9.7 FL (ref 9.2–11.8)
POTASSIUM SERPL-SCNC: 4.2 MMOL/L (ref 3.5–5.5)
PROT SERPL-MCNC: 7.7 G/DL (ref 6.4–8.2)
RBC # BLD AUTO: 3.7 M/UL (ref 4.2–5.3)
SODIUM SERPL-SCNC: 138 MMOL/L (ref 136–145)
TROPONIN-HIGH SENSITIVITY: 5 NG/L (ref 0–54)
WBC # BLD AUTO: 14.1 K/UL (ref 4.6–13.2)

## 2021-12-27 PROCEDURE — 96375 TX/PRO/DX INJ NEW DRUG ADDON: CPT

## 2021-12-27 PROCEDURE — 73590 X-RAY EXAM OF LOWER LEG: CPT

## 2021-12-27 PROCEDURE — 74011000636 HC RX REV CODE- 636: Performed by: EMERGENCY MEDICINE

## 2021-12-27 PROCEDURE — 84484 ASSAY OF TROPONIN QUANT: CPT

## 2021-12-27 PROCEDURE — 85025 COMPLETE CBC W/AUTO DIFF WBC: CPT

## 2021-12-27 PROCEDURE — 83735 ASSAY OF MAGNESIUM: CPT

## 2021-12-27 PROCEDURE — 80053 COMPREHEN METABOLIC PANEL: CPT

## 2021-12-27 PROCEDURE — 82553 CREATINE MB FRACTION: CPT

## 2021-12-27 PROCEDURE — 83690 ASSAY OF LIPASE: CPT

## 2021-12-27 PROCEDURE — 96374 THER/PROPH/DIAG INJ IV PUSH: CPT

## 2021-12-27 PROCEDURE — 74177 CT ABD & PELVIS W/CONTRAST: CPT

## 2021-12-27 PROCEDURE — 74011250636 HC RX REV CODE- 250/636: Performed by: EMERGENCY MEDICINE

## 2021-12-27 PROCEDURE — 99283 EMERGENCY DEPT VISIT LOW MDM: CPT

## 2021-12-27 PROCEDURE — 93005 ELECTROCARDIOGRAM TRACING: CPT

## 2021-12-27 PROCEDURE — 71045 X-RAY EXAM CHEST 1 VIEW: CPT

## 2021-12-27 PROCEDURE — 82550 ASSAY OF CK (CPK): CPT

## 2021-12-27 PROCEDURE — 96376 TX/PRO/DX INJ SAME DRUG ADON: CPT

## 2021-12-27 RX ORDER — MORPHINE SULFATE 4 MG/ML
4 INJECTION INTRAVENOUS ONCE
Status: COMPLETED | OUTPATIENT
Start: 2021-12-27 | End: 2021-12-27

## 2021-12-27 RX ORDER — ONDANSETRON 4 MG/1
4 TABLET, FILM COATED ORAL
Qty: 14 TABLET | Refills: 0 | OUTPATIENT
Start: 2021-12-27 | End: 2022-01-11

## 2021-12-27 RX ORDER — DICYCLOMINE HYDROCHLORIDE 20 MG/1
20 TABLET ORAL EVERY 6 HOURS
Qty: 12 TABLET | Refills: 0 | Status: SHIPPED | OUTPATIENT
Start: 2021-12-27 | End: 2022-09-12

## 2021-12-27 RX ORDER — OMEPRAZOLE 20 MG/1
20 CAPSULE, DELAYED RELEASE ORAL DAILY
Qty: 30 CAPSULE | Refills: 0 | Status: SHIPPED | OUTPATIENT
Start: 2021-12-27 | End: 2022-09-12

## 2021-12-27 RX ORDER — DICLOFENAC SODIUM 10 MG/G
2 GEL TOPICAL 4 TIMES DAILY
Qty: 20 G | Refills: 0 | Status: SHIPPED | OUTPATIENT
Start: 2021-12-27 | End: 2022-01-11 | Stop reason: SDUPTHER

## 2021-12-27 RX ORDER — ONDANSETRON 2 MG/ML
4 INJECTION INTRAMUSCULAR; INTRAVENOUS ONCE
Status: COMPLETED | OUTPATIENT
Start: 2021-12-27 | End: 2021-12-27

## 2021-12-27 RX ADMIN — ONDANSETRON 4 MG: 2 INJECTION INTRAMUSCULAR; INTRAVENOUS at 12:41

## 2021-12-27 RX ADMIN — MORPHINE SULFATE 4 MG: 4 INJECTION INTRAVENOUS at 14:26

## 2021-12-27 RX ADMIN — IOPAMIDOL 100 ML: 612 INJECTION, SOLUTION INTRAVENOUS at 14:05

## 2021-12-27 RX ADMIN — SODIUM CHLORIDE 1000 ML: 9 INJECTION, SOLUTION INTRAVENOUS at 12:41

## 2021-12-27 RX ADMIN — MORPHINE SULFATE 4 MG: 4 INJECTION INTRAVENOUS at 12:43

## 2021-12-27 NOTE — ED PROVIDER NOTES
EMERGENCY DEPARTMENT HISTORY AND PHYSICAL EXAM    Date: 12/27/2021  Patient Name: Philipp Cole    History of Presenting Illness     Chief Complaint   Patient presents with    Chest Pain    Vomiting       History Provided By: Patient and Patient's      History Elizabeth Lovelace):   11:32 AM  Philipp Cole is a 45 y.o. female with PMHX of DM, hypertension, hyperlipidemia who presents to the emergency department C/O chest pain onset 1 week ago. Associated sxs include abdominal pain, nausea and vomiting, right knee pain. Pt denies any other sxs or complaints. Patient states symptoms have been present for 1 week but she did not want to come in prior to [place]. Patient has had right knee pain for approximately 1 month and states that she broke her right fibula in October and she was splinted but never placed in a cast.    Chief Complaint: Chest pain  Onset: 1 week  Timing: Acute  Context: Symptoms started spontaneously, symptoms have progressively worsened since onset  Location: Central chest  Quality: Sharp  Severity: Moderate  Modifying Factors: Nothing makes it better, or worse. Associated Symptoms: Abdominal pain, nausea, vomiting, right knee pain    PCP: Fiordaliza Peter MD    Current Outpatient Medications   Medication Sig Dispense Refill    omeprazole (PRILOSEC) 20 mg capsule Take 1 Capsule by mouth daily. 30 Capsule 0    ondansetron hcl (Zofran) 4 mg tablet Take 1 Tablet by mouth every eight (8) hours as needed for Nausea. 14 Tablet 0    dicyclomine (BENTYL) 20 mg tablet Take 1 Tablet by mouth every six (6) hours. 12 Tablet 0    diclofenac (Voltaren) 1 % gel Apply 2 g to affected area four (4) times daily. 20 g 0    methocarbamoL (Robaxin) 500 mg tablet Take 1 Tablet by mouth four (4) times daily. 20 Tablet 0    lidocaine (Lidoderm) 5 % Apply patch to the affected area for 12 hours a day and remove for 12 hours a day.  10 Each 0    naloxone (Narcan) 4 mg/actuation nasal spray Use 1 spray intranasally, then discard. Repeat with new spray every 2 min as needed for opioid overdose symptoms, alternating nostrils. 2 Each 0    clonazePAM (KlonoPIN) 1 mg tablet Take  by mouth two (2) times a day.  insulin aspart protamine/insulin aspart (NovoLOG Mix 70-30FlexPen U-100) 100 unit/mL (70-30) inpn by SubCUTAneous route Before breakfast, lunch, dinner and at bedtime. Sliding scale      lisinopriL (PRINIVIL, ZESTRIL) 10 mg tablet Take 20 mg by mouth daily.  Cane coco 1 Each by Does Not Apply route daily. 1 Device 0    Blood-Glucose Meter (ONETOUCH ULTRA2) monitoring kit Use to obtain two times a day. 1 Kit 0    lancets misc Use daily to monitor blood glucose 200 Each 0    glucose blood VI test strips (ASCENSIA AUTODISC VI, ONE TOUCH ULTRA TEST VI) strip 50 Each by Does Not Apply route two (2) times daily as needed. 200 Strip 3    polyethylene glycol (MIRALAX) 17 gram packet Take 1 Packet by mouth daily.  30 Each 1    Insulin Needles, Disposable, 31 gauge x 5/16\" ndle Use to administer insulin as directed 1 Package 11       Past History         Past Medical History:  Past Medical History:   Diagnosis Date    Anxiety     Bipolar disorder ([address]     Breast cancer ([address]     breast cancer, right, S/P Mastectomy and chemo, radiation in 2013    Depression     Diabetes ([address]     Drug abuse ([address]     H/O cocaine use in past    Drug-seeking behavior     Family history of early CAD     Gastrointestinal disorder     cholitis    H/O ETOH abuse     Hyperlipidemia     Hypertension     Malignant neoplasm without specification of site ([address]     Methamphetamine abuse ([address]      self reported on 1/3/16    Psychiatric disorder     multiple personality disorder    Psychiatric disorder     anxiety    Psychiatric disorder     bipolar     Spine injury     Thromboembolus ([address]     Vitamin D deficiency 5/1/2018       Past Surgical History:  Past Surgical History:   Procedure Laterality Date    COLONOSCOPY N/A 7/18/2016    COL[facility]cal[facility] ENDOSCOPY    HX BREAST LUMPECTOMY  06/2013    right    HX HYSTERECTOMY      HX MASTECTOMY      had expanders put in 6/2018    HX ORTHOPAEDIC      Back fusion surgery 4/18/14.  HX OTHER SURGICAL      mediport    HX TONSILLECTOMY      HX TUBAL LIGATION      NEUROLOGICAL PROCEDURE UNLISTED  2020    back surgery       Family History:  Family History   Problem Relation Age of Onset    Heart Disease Mother     Stroke Father     Heart Disease Father 48    Diabetes Other     Hypertension Other     Cancer Maternal Grandmother    Reviewed and non-contributory    Social History:  Social History     Tobacco Use    Smoking status: Never Smoker    Smokeless tobacco: Never Used   Vaping Use    Vaping Use: Never used   Substance Use Topics    Alcohol use: Not Currently     Comment: \"Occasionally\"    Drug use: Not Currently     Types: Methamphetamines, Cocaine     Comment: none in 2 years       Allergies: Allergies   Allergen Reactions    Latex Hives and Itching    Other Food Rash     Almonds    Amoxicillin Swelling     Other reaction(s): mild rash/itching    Aspirin Not Reported This Time and Swelling     Mouth swells    Beeswax Anaphylaxis    Levaquin [Levofloxacin] Not Reported This Time and Swelling     Other reaction(s): mild rash/itching    [place] Rash and Itching    Codeine Other (comments)    Glycopyrrolate Swelling     Pt  States  faciAL  SWELLING   POST  ROBINUL  IV   Pt  States  faciAL  SWELLING   POST  ROBINUL  IV     Pcn [Penicillins] Swelling    Tape [Adhesive] Rash    Tramadol Swelling         Review of Systems      Review of Systems   Constitutional: Negative for chills and fever. HENT: Negative for congestion, rhinorrhea and sore throat. Eyes: Negative for pain and visual disturbance. Respiratory: Negative for cough, shortness of breath and wheezing.     Cardiovascular: Positive for chest pain. Negative for palpitations. Gastrointestinal: Positive for abdominal pain, nausea and vomiting. Negative for diarrhea. Genitourinary: Negative for dysuria, flank pain, frequency and urgency. Musculoskeletal: Positive for arthralgias. Negative for myalgias. Skin: Negative for rash and wound. Neurological: Negative for speech difficulty, weakness, light-headedness and headaches. Psychiatric/Behavioral: Negative for agitation and confusion. All other systems reviewed and are negative. Physical Exam     Vitals:    12/27/21 1026   BP: 139/75   Pulse: 100   Resp: 18   Temp: 98.2 °F (36.8 °C)   SpO2: 98%       Physical Exam  Vitals and nursing note reviewed. Constitutional:       General: She is not in acute distress. Appearance: Normal appearance. She is normal weight. She is not ill-appearing. HENT:      Head: Normocephalic and atraumatic. Nose: Nose normal. No rhinorrhea. Mouth/Throat:      Mouth: Mucous membranes are moist.      Pharynx: No oropharyngeal exudate or posterior oropharyngeal erythema. Eyes:      Extraocular Movements: Extraocular movements intact. Conjunctiva/sclera: Conjunctivae normal.      Pupils: Pupils are equal, round, and reactive to light. Cardiovascular:      Rate and Rhythm: Regular rhythm. Tachycardia present. Heart sounds: No murmur heard. No friction rub. No gallop. Pulmonary:      Effort: Pulmonary effort is normal. No respiratory distress. Breath sounds: Normal breath sounds. No wheezing, rhonchi or rales. Abdominal:      General: Bowel sounds are normal.      Palpations: Abdomen is soft. Tenderness: There is abdominal tenderness in the left lower quadrant. There is no guarding or rebound. Musculoskeletal:         General: No swelling, tenderness or deformity. Normal range of motion. Cervical back: Normal range of motion and neck supple. No rigidity. Lymphadenopathy:      Cervical: No cervical adenopathy. Skin:     General: Skin is warm and dry. Findings: No rash. Neurological:      General: No focal deficit present. Mental Status: She is alert and oriented to person, place, and time. Psychiatric:         Mood and Affect: Mood normal.         Behavior: Behavior normal.         Diagnostic Study Results     Labs -     Recent Results (from the past 12 hour(s))   EKG, 12 LEAD, INITIAL    Collection Time: 12/27/21 10:30 AM   Result Value Ref Range    Ventricular Rate 102 BPM    Atrial Rate 102 BPM    P-R Interval 138 ms    QRS Duration 72 ms    Q-T Interval 340 ms    QTC Calculation (Bezet) 443 ms    Calculated P Axis 59 degrees    Calculated R Axis -11 degrees    Calculated T Axis 71 degrees    Diagnosis       Sinus tachycardia  Otherwise normal ECG  When compared with ECG of 10-NOV-2021 22:32,  No significant change was found     CBC WITH AUTOMATED DIFF    Collection Time: 12/27/21 12:33 PM   Result Value Ref Range    WBC 14.1 (H) 4.6 - 13.2 K/uL    RBC 3.70 (L) 4.20 - 5.30 M/uL    HGB 8.9 (L) 12.0 - 16.0 g/dL    HCT 29.9 (L) 35.0 - 45.0 %    MCV 80.8 78.0 - 100.0 FL    MCH 24.1 24.0 - 34.0 PG    MCHC 29.8 (L) 31.0 - 37.0 g/dL    RDW 15.2 (H) 11.6 - 14.5 %    PLATELET 217 (H) 437 - 420 K/uL    MPV 9.7 9.2 - 11.8 FL    NRBC 0.0 0  WBC    ABSOLUTE NRBC 0.00 0.00 - 0.01 K/uL    NEUTROPHILS 82 (H) 40 - 73 %    LYMPHOCYTES 14 (L) 21 - 52 %    MONOCYTES 3 3 - 10 %    EOSINOPHILS 0 0 - 5 %    BASOPHILS 0 0 - 2 %    IMMATURE GRANULOCYTES 1 (H) 0.0 - 0.5 %    ABS. NEUTROPHILS 11.6 (H) 1.8 - 8.0 K/UL    ABS. LYMPHOCYTES 2.0 0.9 - 3.6 K/UL    ABS. MONOCYTES 0.5 0.05 - 1.2 K/UL    ABS. EOSINOPHILS 0.0 0.0 - 0.4 K/UL    ABS. BASOPHILS 0.0 0.0 - 0.1 K/UL    ABS. IMM.  GRANS. 0.1 (H) 0.00 - 0.04 K/UL    DF AUTOMATED     METABOLIC PANEL, COMPREHENSIVE    Collection Time: 12/27/21 12:33 PM   Result Value Ref Range    Sodium 138 136 - 145 mmol/L    Potassium 4.2 3.5 - 5.5 mmol/L    Chloride 105 100 - 111 mmol/L CO2 26 21 - 32 mmol/L    Anion gap 7 3.0 - 18 mmol/L    Glucose 164 (H) 74 - 99 mg/dL    BUN 22 (H) 7.0 - 18 MG/DL    Creatinine 0.95 0.6 - 1.3 MG/DL    BUN/Creatinine ratio 23 (H) 12 - 20      GFR est AA >60 >60 ml/min/1.73m2    GFR est non-AA >60 >60 ml/min/1.73m2    Calcium 8.8 8.5 - 10.1 MG/DL    Bilirubin, total 0.2 0.2 - 1.0 MG/DL    ALT (SGPT) 19 13 - 56 U/L    AST (SGOT) 22 10 - 38 U/L    Alk. phosphatase 143 (H) 45 - 117 U/L    Protein, total 7.7 6.4 - 8.2 g/dL    Albumin 2.7 (L) 3.4 - 5.0 g/dL    Globulin 5.0 (H) 2.0 - 4.0 g/dL    A-G Ratio 0.5 (L) 0.8 - 1.7     LIPASE    Collection Time: 12/27/21 12:33 PM   Result Value Ref Range    Lipase 227 73 - 393 U/L   MAGNESIUM    Collection Time: 12/27/21 12:33 PM   Result Value Ref Range    Magnesium 2.1 1.6 - 2.6 mg/dL   TROPONIN-HIGH SENSITIVITY    Collection Time: 12/27/21 12:33 PM   Result Value Ref Range    Troponin-High Sensitivity 5 0 - 54 ng/L   CK    Collection Time: 12/27/21 12:33 PM   Result Value Ref Range    CK 28 26 - 192 U/L   CK-MB,QT    Collection Time: 12/27/21 12:33 PM   Result Value Ref Range    CK - MB <1.0 <3.6 ng/ml    CK-MB Index  0.0 - 4.0 %     CALCULATION NOT PERFORMED WHEN RESULT IS BELOW LINEAR LIMIT       Radiologic Studies -   CT ABD PELV W CONT   Final Result      No acute intra-abdominal abnormality. XR TIB/FIB RT   Final Result      No evidence of acute fracture or dislocation. XR CHEST PORT   Final Result      No acute findings in the chest.         CT Results  (Last 48 hours)               12/27/21 1416  CT ABD PELV W CONT Final result    Impression:      No acute intra-abdominal abnormality. Narrative:  EXAM: CT of the abdomen and pelvis       INDICATION: Pain. COMPARISON: 7/3/2018. TECHNIQUE: Axial CT imaging of the abdomen and pelvis was performed with   intravenous contrast. Multiplanar reformats were generated.        One or more dose reduction techniques were used on this CT: automated exposure control, adjustment of the mAs and/or kVp according to patient size, and   iterative reconstruction techniques. The specific techniques used on this CT   exam have been documented in the patient's electronic medical record. Digital   Imaging and Communications in Medicine (DICOM) format image data are available   to nonaffiliated external healthcare facilities or entities on a secure, media   free, reciprocally searchable basis with patient authorization for at least a   12-month period after this study. _______________       FINDINGS:       LOWER CHEST: Unremarkable. LIVER, BILIARY: Liver is normal. No biliary dilation. Gallbladder is   unremarkable. PANCREAS: Normal.       SPLEEN: Normal.       ADRENALS: Chronic thickening of the left adrenal gland. KIDNEYS/URETERS/BLADDER: No calcification, hydronephrosis, or soft tissue   attenuation renal mass. PELVIC ORGANS: Prior GUADALUPE/BSO.       VASCULATURE: Unremarkable       LYMPH NODES: No enlarged lymph nodes. GASTROINTESTINAL TRACT: No bowel dilation or wall thickening. Normal appendix. BONES: No acute or aggressive osseous abnormalities identified. L4-S1 posterior   spinal fusion. OTHER: None.       _______________               CXR Results  (Last 48 hours)               12/27/21 1201  XR CHEST PORT Final result    Impression:      No acute findings in the chest.        Narrative:  EXAM: XR CHEST PORT       CLINICAL INDICATION/HISTORY: chest pain   -Additional: None       COMPARISON: 11/10/2021       TECHNIQUE: Frontal view of the chest       _______________       FINDINGS:       HEART AND MEDIASTINUM: Normal cardiac size and mediastinal contours. LUNGS AND PLEURAL SPACES: No focal pneumonic consolidation, pneumothorax, or   pleural effusion.        BONY THORAX AND SOFT TISSUES: No acute osseous abnormality       _______________                 Medications given in the ED-  Medications   sodium chloride 0.9 % bolus infusion 1,000 mL (1,000 mL IntraVENous New Bag 12/27/21 1241)   morphine injection 4 mg (4 mg IntraVENous Given 12/27/21 1243)   ondansetron (ZOFRAN) injection 4 mg (4 mg IntraVENous Given 12/27/21 1241)   iopamidoL (ISOVUE 300) 61 % contrast injection 100 mL (100 mL IntraVENous Given 12/27/21 1405)   morphine injection 4 mg (4 mg IntraVENous Given 12/27/21 1426)         Procedures     Procedures    ED Course     I am the first provider for this patient. I reviewed the vital signs, available nursing notes, past medical history, past surgical history, family history and social history. Records Reviewed: Nursing Notes    Pulse Oximetry Analysis - 98% on RA     Cardiac Monitor:  Rate: 100 bpm  Rhythm: tachycardic rhythm    EKG interpretation: (Preliminary)  Rhythm: Sinus tachycardia. Rate: 102 bpm; no STEMI  EKG read by Madina Wilkins MD at 10:30 AM    11:32 AM Initial assessment performed. The patients presenting problems have been discussed, and they are in agreement with the care plan formulated and outlined with them. I have encouraged them to ask questions as they arise throughout their visit. Medical Decision Making     Provider Notes (Medical Decision Making):   DDX: ACS, URI, pneumonia, sprain, strain, fracture, dislocation, drug-seeking behavior    Discussion:  45 y.o. female with chronic pain, pending pain management consult presents with chest and abdominal pain today. Patient had additional acute on subacute right knee and lower leg pain and diffuse bilateral lower extremity pain. Negative cardiac work-up. Negative CT scan for the abdomen. Patient may follow-up with her primary care doctor or 1 of ours. Patient understands agrees this plan. I had a long and thorough discussion with patient that narcotics were inappropriate for the treatment of this pain. She has a pending pain management consult. I will refill her Voltaren gel for muscular skeletal pain.   Will use dicyclomine and omeprazole for her abdominal pain. Diagnosis and Disposition     DISCHARGE NOTE:  3:08 PM   Leanna Wright  results have been reviewed with her. She has been counseled regarding her diagnosis, treatment, and plan. She verbally conveys understanding and agreement of the signs, symptoms, diagnosis, treatment and prognosis and additionally agrees to follow up as discussed. She also agrees with the care-plan and conveys that all of her questions have been answered. I have also provided discharge instructions for her that include: educational information regarding their diagnosis and treatment, and list of reasons why they would want to return to the ED prior to their follow-up appointment, should her condition change. She has been provided with education for proper emergency department utilization. CLINICAL IMPRESSION:    1. Chronic pain of right knee    2. Non-intractable vomiting with nausea, unspecified vomiting type    3. Abdominal pain, generalized    4. Chest pain, unspecified type    5. Leukocytosis, unspecified type        PLAN:  1. D/C Home  2. Current Discharge Medication List      START taking these medications    Details   omeprazole (PRILOSEC) 20 mg capsule Take 1 Capsule by mouth daily. Qty: 30 Capsule, Refills: 0  Start date: 12/27/2021      ondansetron hcl (Zofran) 4 mg tablet Take 1 Tablet by mouth every eight (8) hours as needed for Nausea. Qty: 14 Tablet, Refills: 0  Start date: 12/27/2021      dicyclomine (BENTYL) 20 mg tablet Take 1 Tablet by mouth every six (6) hours. Qty: 12 Tablet, Refills: 0  Start date: 12/27/2021      diclofenac (Voltaren) 1 % gel Apply 2 g to affected area four (4) times daily. Qty: 20 g, Refills: 0  Start date: 12/27/2021           3. Follow-up Information     Follow up With Specialties Details Why Contact Info    [facility]  Schedule an appointment as soon as possible for a visit  As soon as possible, For follow up from Emergency Department visit. [address] [facility]  [phone]    [place] [address] [facility]  Schedule an appointment as soon as possible for a visit  As soon as possible, For follow up from Emergency Department visit. [facility] [id number] [facility]  Schedule an appointment as soon as possible for a visit  As soon as possible, For follow up from Emergency Department visit. [facility] [id number]  [phone]    [facility] EMERGENCY DEPT Emergency Medicine  As needed; If symptoms worsen [address] [id number]  [address]     Please note that this dictation was completed with [employer], the computer voice recognition software. Quite often unanticipated grammatical, syntax, homophones, and other interpretive errors are inadvertently transcribed by the computer software. Please disregard these errors. Please excuse any errors that have escaped final proofreading.     Edgard Curtis MD

## 2021-12-27 NOTE — ROUTINE PROCESS
I have reviewed discharge instructions with the patient and spouse. The patient and spouse verbalized understanding. ID band removed.

## 2021-12-27 NOTE — DISCHARGE INSTRUCTIONS
Return to the ED for worsening symptoms, inability to tolerate oral medications, or for other concerns.

## 2021-12-29 ENCOUNTER — HOSPITAL ENCOUNTER (EMERGENCY)
Age: 51
Discharge: LEFT BEFORE DISCHARGE | End: 2021-12-29
Payer: MEDICAID

## 2021-12-29 LAB
ATRIAL RATE: 102 BPM
CALCULATED P AXIS, ECG09: 59 DEGREES
CALCULATED R AXIS, ECG10: -11 DEGREES
CALCULATED T AXIS, ECG11: 71 DEGREES
DIAGNOSIS, 93000: NORMAL
P-R INTERVAL, ECG05: 138 MS
Q-T INTERVAL, ECG07: 340 MS
QRS DURATION, ECG06: 72 MS
QTC CALCULATION (BEZET), ECG08: 443 MS
VENTRICULAR RATE, ECG03: 102 BPM

## 2021-12-29 PROCEDURE — 99282 EMERGENCY DEPT VISIT SF MDM: CPT

## 2021-12-29 PROCEDURE — 75810000275 HC EMERGENCY DEPT VISIT NO LEVEL OF CARE

## 2021-12-30 ENCOUNTER — HOSPITAL ENCOUNTER (EMERGENCY)
Age: 51
Discharge: HOME | End: 2021-12-30
Attending: EMERGENCY MEDICINE
Payer: MEDICAID

## 2021-12-30 VITALS
HEART RATE: 78 BPM | SYSTOLIC BLOOD PRESSURE: 145 MMHG | RESPIRATION RATE: 16 BRPM | TEMPERATURE: 98.1 F | DIASTOLIC BLOOD PRESSURE: 83 MMHG | OXYGEN SATURATION: 99 %

## 2021-12-30 DIAGNOSIS — K08.89 TOOTHACHE: ICD-10-CM

## 2021-12-30 DIAGNOSIS — K02.9 DENTAL CARIES: Primary | ICD-10-CM

## 2021-12-30 RX ORDER — CHLORHEXIDINE GLUCONATE 1.2 MG/ML
15 RINSE ORAL EVERY 12 HOURS
Qty: 420 ML | Refills: 0 | Status: SHIPPED | OUTPATIENT
Start: 2021-12-30 | End: 2022-01-13

## 2021-12-30 RX ORDER — LIDOCAINE HYDROCHLORIDE 20 MG/ML
5 SOLUTION ORAL; TOPICAL
Qty: 200 ML | Refills: 0 | Status: SHIPPED | OUTPATIENT
Start: 2021-12-30 | End: 2022-01-04 | Stop reason: SDUPTHER

## 2021-12-30 RX ORDER — CLINDAMYCIN HYDROCHLORIDE 300 MG/1
300 CAPSULE ORAL 4 TIMES DAILY
Qty: 40 CAPSULE | Refills: 0 | Status: SHIPPED | OUTPATIENT
Start: 2021-12-30 | End: 2022-01-09

## 2021-12-30 NOTE — ED PROVIDER NOTES
EMERGENCY DEPARTMENT HISTORY AND PHYSICAL EXAM    Date: 12/30/2021  Patient Name: Ceasar Montgomery    History of Presenting Illness     Chief Complaint   Patient presents with    Dental Pain         History Provided By: Patient    Additional History (Context): Ceasar Montgomery is a 45 y.o. female with diabetes, hypertension, hyperlipidemia and Alcohol abuse, bipolar disorder, multiple personality disorder who presents with complaint of bilateral upper dental pain. She says this is a longstanding pain and she has been trying to get into see dentist who told her that she had deep-seated infections. Denies fever drooling trismus. Denies difficulty swallowing but pain is made worse with p.o. PCP: Fiordaliza Peter MD    Current Outpatient Medications   Medication Sig Dispense Refill    clindamycin (CLEOCIN) 300 mg capsule Take 1 Capsule by mouth four (4) times daily for 10 days. 40 Capsule 0    chlorhexidine (Peridex) 0.12 % solution 15 mL by Swish and Spit route every twelve (12) hours for 14 days. 420 mL 0    Lidocaine Viscous 2 % solution Take 5 mL by mouth two (2) times daily as needed for Pain. Put 5ml onto guaze and put in mouth; hold for 30min. Can repeat up to twice daily. Indications: administration of local anesthetic drug 200 mL 0    omeprazole (PRILOSEC) 20 mg capsule Take 1 Capsule by mouth daily. 30 Capsule 0    ondansetron hcl (Zofran) 4 mg tablet Take 1 Tablet by mouth every eight (8) hours as needed for Nausea. 14 Tablet 0    dicyclomine (BENTYL) 20 mg tablet Take 1 Tablet by mouth every six (6) hours. 12 Tablet 0    diclofenac (Voltaren) 1 % gel Apply 2 g to affected area four (4) times daily. 20 g 0    methocarbamoL (Robaxin) 500 mg tablet Take 1 Tablet by mouth four (4) times daily. 20 Tablet 0    lidocaine (Lidoderm) 5 % Apply patch to the affected area for 12 hours a day and remove for 12 hours a day.  10 Each 0    naloxone (Narcan) 4 mg/actuation nasal spray Use 1 spray intranasally, then discard. Repeat with new spray every 2 min as needed for opioid overdose symptoms, alternating nostrils. 2 Each 0    clonazePAM (KlonoPIN) 1 mg tablet Take  by mouth two (2) times a day.  insulin aspart protamine/insulin aspart (NovoLOG Mix 70-30FlexPen U-100) 100 unit/mL (70-30) inpn by SubCUTAneous route Before breakfast, lunch, dinner and at bedtime. Sliding scale      lisinopriL (PRINIVIL, ZESTRIL) 10 mg tablet Take 20 mg by mouth daily.  Cane coco 1 Each by Does Not Apply route daily. 1 Device 0    Blood-Glucose Meter (ONETOUCH ULTRA2) monitoring kit Use to obtain two times a day. 1 Kit 0    lancets misc Use daily to monitor blood glucose 200 Each 0    glucose blood VI test strips (ASCENSIA AUTODISC VI, ONE TOUCH ULTRA TEST VI) strip 50 Each by Does Not Apply route two (2) times daily as needed. 200 Strip 3    polyethylene glycol (MIRALAX) 17 gram packet Take 1 Packet by mouth daily.  30 Each 1    Insulin Needles, Disposable, 31 gauge x 5/16\" ndle Use to administer insulin as directed 1 Package 11       Past History     Past Medical History:  Past Medical History:   Diagnosis Date    Anxiety     Bipolar disorder ([address]     Breast cancer ([address]     breast cancer, right, S/P Mastectomy and chemo, radiation in 2013    Depression     Diabetes ([address]     Drug abuse ([address]     H/O cocaine use in past    Drug-seeking behavior     Family history of early CAD     Gastrointestinal disorder     cholitis    H/O ETOH abuse     Hyperlipidemia     Hypertension     Malignant neoplasm without specification of site ([address]     Methamphetamine abuse ([address]      self reported on 1/3/16    Psychiatric disorder     multiple personality disorder    Psychiatric disorder     anxiety    Psychiatric disorder     bipolar     Spine injury     Thromboembolus ([address]     Vitamin D deficiency 5/1/2018       Past Surgical History:  Past Surgical History:   Procedure Laterality Date    COLONOSCOPY N/A 7/18/2016 COLON[facility]COPY performed [facility] ENDOSCOPY    HX BREAST LUMPECTOMY  06/2013    right    HX HYSTERECTOMY      HX MASTECTOMY      had expanders put in 6/2018    HX ORTHOPAEDIC      Back fusion surgery 4/18/14.  HX OTHER SURGICAL      mediport    HX TONSILLECTOMY      HX TUBAL LIGATION      NEUROLOGICAL PROCEDURE UNLISTED  2020    back surgery       Family History:  Family History   Problem Relation Age of Onset    Heart Disease Mother     Stroke Father     Heart Disease Father 48    Diabetes Other     Hypertension Other     Cancer Maternal Grandmother        Social History:  Social History     Tobacco Use    Smoking status: Never Smoker    Smokeless tobacco: Never Used   Vaping Use    Vaping Use: Never used   Substance Use Topics    Alcohol use: Not Currently     Comment: \"Occasionally\"    Drug use: Not Currently     Types: Methamphetamines, Cocaine     Comment: none in 2 years       Allergies: Allergies   Allergen Reactions    Latex Hives and Itching    Other Food Rash     Almonds    Amoxicillin Swelling     Other reaction(s): mild rash/itching    Aspirin Not Reported This Time and Swelling     Mouth swells    Beeswax Anaphylaxis    Levaquin [Levofloxacin] Not Reported This Time and Swelling     Other reaction(s): mild rash/itching    [place] Rash and Itching    Codeine Other (comments)    Glycopyrrolate Swelling     Pt  States  faciAL  SWELLING   POST  ROBINUL  IV   Pt  States  faciAL  SWELLING   POST  ROBINUL  IV     Pcn [Penicillins] Swelling    Tape [Adhesive] Rash    Tramadol Swelling         Review of Systems   Review of Systems   Constitutional: Negative for fever. HENT: Positive for dental problem. Negative for drooling. All Other Systems Negative  Physical Exam     Vitals:    12/30/21 0005   BP: (!) 145/83   Pulse: 78   Resp: 16   Temp: 98.1 °F (36.7 °C)   SpO2: 99%     Physical Exam  Vitals and nursing note reviewed.    Constitutional: Appearance: She is well-developed. HENT:      Head: Normocephalic and atraumatic. Mouth/Throat:      Comments: Right upper second premolar and left upper second molar are eroded and tender. No adjacent gingival fluctuance seen or palpated. No drooling or trismus. Eyes:      Pupils: Pupils are equal, round, and reactive to light. Neck:      Thyroid: No thyromegaly. Vascular: No JVD. Trachea: No tracheal deviation. Cardiovascular:      Rate and Rhythm: Normal rate and regular rhythm. Heart sounds: Normal heart sounds. No murmur heard. No friction rub. No gallop. Pulmonary:      Effort: Pulmonary effort is normal. No respiratory distress. Breath sounds: Normal breath sounds. No stridor. No wheezing or rales. Chest:      Chest wall: No tenderness. Abdominal:      General: There is no distension. Palpations: Abdomen is soft. There is no mass. Tenderness: There is no abdominal tenderness. There is no guarding or rebound. Musculoskeletal:         General: No tenderness. Lymphadenopathy:      Cervical: No cervical adenopathy. Skin:     General: Skin is warm and dry. Coloration: Skin is not pale. Findings: No erythema or rash. Neurological:      Mental Status: She is alert and oriented to person, place, and time. Psychiatric:         Behavior: Behavior normal.         Thought Content: Thought content normal.          Diagnostic Study Results     Labs -   No results found for this or any previous visit (from the past 12 hour(s)). Radiologic Studies -   No orders to display     CT Results  (Last 48 hours)    None        CXR Results  (Last 48 hours)    None            Medical Decision Making   I am the first provider for this patient. I reviewed the vital signs, available nursing notes, past medical history, past surgical history, family history and social history. Vital Signs-Reviewed the patient's vital signs.       Records Reviewed: Nursing Notes    Procedures:  Procedures    Provider Notes (Medical Decision Making): Treat dental caries and pain. Follow-up with dental clinics provided. MED RECONCILIATION:  No current facility-administered medications for this encounter. Current Outpatient Medications   Medication Sig    clindamycin (CLEOCIN) 300 mg capsule Take 1 Capsule by mouth four (4) times daily for 10 days.  chlorhexidine (Peridex) 0.12 % solution 15 mL by Swish and Spit route every twelve (12) hours for 14 days.  Lidocaine Viscous 2 % solution Take 5 mL by mouth two (2) times daily as needed for Pain. Put 5ml onto guaze and put in mouth; hold for 30min. Can repeat up to twice daily. Indications: administration of local anesthetic drug    omeprazole (PRILOSEC) 20 mg capsule Take 1 Capsule by mouth daily.  ondansetron hcl (Zofran) 4 mg tablet Take 1 Tablet by mouth every eight (8) hours as needed for Nausea.  dicyclomine (BENTYL) 20 mg tablet Take 1 Tablet by mouth every six (6) hours.  diclofenac (Voltaren) 1 % gel Apply 2 g to affected area four (4) times daily.  methocarbamoL (Robaxin) 500 mg tablet Take 1 Tablet by mouth four (4) times daily.  lidocaine (Lidoderm) 5 % Apply patch to the affected area for 12 hours a day and remove for 12 hours a day.  naloxone (Narcan) 4 mg/actuation nasal spray Use 1 spray intranasally, then discard. Repeat with new spray every 2 min as needed for opioid overdose symptoms, alternating nostrils.  clonazePAM (KlonoPIN) 1 mg tablet Take  by mouth two (2) times a day.  insulin aspart protamine/insulin aspart (NovoLOG Mix 70-30FlexPen U-100) 100 unit/mL (70-30) inpn by SubCUTAneous route Before breakfast, lunch, dinner and at bedtime. Sliding scale    lisinopriL (PRINIVIL, ZESTRIL) 10 mg tablet Take 20 mg by mouth daily.  Cane coco 1 Each by Does Not Apply route daily.  Blood-Glucose Meter (ONETOUCH ULTRA2) monitoring kit Use to obtain two times a day.     lancets misc Use daily to monitor blood glucose    glucose blood VI test strips (ASCENSIA AUTODISC VI, ONE TOUCH ULTRA TEST VI) strip 50 Each by Does Not Apply route two (2) times daily as needed.  polyethylene glycol (MIRALAX) 17 gram packet Take 1 Packet by mouth daily.  Insulin Needles, Disposable, 31 gauge x 5/16\" ndle Use to administer insulin as directed       Disposition:  home    DISCHARGE NOTE:   12:57 AM    Pt has been reexamined. Patient has no new complaints, changes, or physical findings. Care plan outlined and precautions discussed. Results of exam were reviewed with the patient. All medications were reviewed with the patient; will d/c home with clindamycin, lidocaine, peridex. All of pt's questions and concerns were addressed. Patient was instructed and agrees to follow up with dental, as well as to return to the ED upon further deterioration. Patient is ready to go home. Follow-up Information     Follow up With Specialties Details Why Contact Info    [address] [place] [address]  Schedule an appointment as soon as possible for a visit in 1 day  [address] [facility], [address] [place] [address] [address] [place] [address],[address]    [facility] EMERGENCY DEPT Emergency Medicine  If symptoms worsen return immediately [address] [facility] [id number]  [phone]          Current Discharge Medication List      START taking these medications    Details   clindamycin (CLEOCIN) 300 mg capsule Take 1 Capsule by mouth four (4) times daily for 10 days. Qty: 40 Capsule, Refills: 0  Start date: 12/30/2021, End date: 1/9/2022      chlorhexidine (Peridex) 0.12 % solution 15 mL by Swish and Spit route every twelve (12) hours for 14 days. Qty: 420 mL, Refills: 0  Start date: 12/30/2021, End date: 1/13/2022      Lidocaine Viscous 2 % solution Take 5 mL by mouth two (2) times daily as needed for Pain. Put 5ml onto guaze and put in mouth; hold for 30min. Can repeat up to twice daily.   Indications: administration of local anesthetic drug  Qty: 200 mL, Refills: 0  Start date: 12/30/2021               Diagnosis     Clinical Impression:   1. Dental caries    2.  Toothache

## 2022-01-04 ENCOUNTER — HOSPITAL ENCOUNTER (EMERGENCY)
Age: 52
Discharge: HOME | End: 2022-01-04
Attending: EMERGENCY MEDICINE
Payer: MEDICAID

## 2022-01-04 VITALS
BODY MASS INDEX: 25.81 KG/M2 | TEMPERATURE: 98.1 F | WEIGHT: 141.09 LBS | RESPIRATION RATE: 19 BRPM | HEART RATE: 84 BPM | OXYGEN SATURATION: 98 % | DIASTOLIC BLOOD PRESSURE: 78 MMHG | SYSTOLIC BLOOD PRESSURE: 134 MMHG

## 2022-01-04 DIAGNOSIS — K02.9 DENTAL CARIES: ICD-10-CM

## 2022-01-04 DIAGNOSIS — K08.89 TOOTHACHE: Primary | ICD-10-CM

## 2022-01-04 PROCEDURE — 99282 EMERGENCY DEPT VISIT SF MDM: CPT

## 2022-01-04 RX ORDER — LIDOCAINE HYDROCHLORIDE 20 MG/ML
5 SOLUTION ORAL; TOPICAL
Qty: 200 ML | Refills: 0 | Status: SHIPPED | OUTPATIENT
Start: 2022-01-04 | End: 2022-09-12

## 2022-01-04 RX ORDER — OXYCODONE AND ACETAMINOPHEN 5; 325 MG/1; MG/1
1 TABLET ORAL
Qty: 12 TABLET | Refills: 0 | Status: SHIPPED | OUTPATIENT
Start: 2022-01-04 | End: 2022-01-07

## 2022-01-05 NOTE — ED NOTES
Pt in for dental pain and vomiting for 1 day. Pt reports she is unable to get a dentist appt and has had continued pain which has lead to vomiting.

## 2022-01-05 NOTE — ED PROVIDER NOTES
EMERGENCY DEPARTMENT HISTORY AND PHYSICAL EXAM    Date: 1/4/2022  Patient Name: Kaitlynn Culver    History of Presenting Illness     Chief Complaint   Patient presents with    Dental Pain         History Provided By: Patient  Additional History (Context): Kaitlynn Culver is a 45 y.o. female with Chronic pain who presents with complaint of lower left dental pain for 2 days. Finish the antibiotics and pain medications prescribed for her and has been unable because of the holidays and Covid to get into see a dentist but has been calling and is on wait list.  Is also being referred to pain management for her chronic pain issues. Denies fever drooling trismus. Is throbbing worse with p.o. PCP: Fiordaliza Peter MD    Current Outpatient Medications   Medication Sig Dispense Refill    Lidocaine Viscous 2 % solution Take 5 mL by mouth two (2) times daily as needed for Pain. Put 5ml onto guaze and put in mouth; hold for 30min. Can repeat up to twice daily. Indications: administration of local anesthetic drug 200 mL 0    oxyCODONE-acetaminophen (Percocet) 5-325 mg per tablet Take 1 Tablet by mouth every six (6) hours as needed for Pain for up to 3 days. Max Daily Amount: 4 Tablets. 12 Tablet 0    clindamycin (CLEOCIN) 300 mg capsule Take 1 Capsule by mouth four (4) times daily for 10 days. 40 Capsule 0    chlorhexidine (Peridex) 0.12 % solution 15 mL by Swish and Spit route every twelve (12) hours for 14 days. 420 mL 0    omeprazole (PRILOSEC) 20 mg capsule Take 1 Capsule by mouth daily. 30 Capsule 0    ondansetron hcl (Zofran) 4 mg tablet Take 1 Tablet by mouth every eight (8) hours as needed for Nausea. 14 Tablet 0    dicyclomine (BENTYL) 20 mg tablet Take 1 Tablet by mouth every six (6) hours. 12 Tablet 0    diclofenac (Voltaren) 1 % gel Apply 2 g to affected area four (4) times daily. 20 g 0    methocarbamoL (Robaxin) 500 mg tablet Take 1 Tablet by mouth four (4) times daily.  20 Tablet 0    lidocaine (Lidoderm) 5 % Apply patch to the affected area for 12 hours a day and remove for 12 hours a day. 10 Each 0    naloxone (Narcan) 4 mg/actuation nasal spray Use 1 spray intranasally, then discard. Repeat with new spray every 2 min as needed for opioid overdose symptoms, alternating nostrils. 2 Each 0    clonazePAM (KlonoPIN) 1 mg tablet Take  by mouth two (2) times a day.  insulin aspart protamine/insulin aspart (NovoLOG Mix 70-30FlexPen U-100) 100 unit/mL (70-30) inpn by SubCUTAneous route Before breakfast, lunch, dinner and at bedtime. Sliding scale      lisinopriL (PRINIVIL, ZESTRIL) 10 mg tablet Take 20 mg by mouth daily.  Cane coco 1 Each by Does Not Apply route daily. 1 Device 0    Blood-Glucose Meter (ONETOUCH ULTRA2) monitoring kit Use to obtain two times a day. 1 Kit 0    lancets misc Use daily to monitor blood glucose 200 Each 0    glucose blood VI test strips (ASCENSIA AUTODISC VI, ONE TOUCH ULTRA TEST VI) strip 50 Each by Does Not Apply route two (2) times daily as needed. 200 Strip 3    polyethylene glycol (MIRALAX) 17 gram packet Take 1 Packet by mouth daily.  30 Each 1    Insulin Needles, Disposable, 31 gauge x 5/16\" ndle Use to administer insulin as directed 1 Package 11       Past History     Past Medical History:  Past Medical History:   Diagnosis Date    Anxiety     Bipolar disorder ([address]     Breast cancer ([address]     breast cancer, right, S/P Mastectomy and chemo, radiation in 2013    Depression     Diabetes ([address]     Drug abuse ([address]     H/O cocaine use in past    Drug-seeking behavior     Family history of early CAD     Gastrointestinal disorder     cholitis    H/O ETOH abuse     Hyperlipidemia     Hypertension     Malignant neoplasm without specification of site ([address]     Methamphetamine abuse ([address]      self reported on 1/3/16    Psychiatric disorder     multiple personality disorder    Psychiatric disorder     anxiety    Psychiatric disorder     bipolar     Spine injury     Thromboembolus [facility])     Vitamin D deficiency 5/1/2018       Past Surgical History:  Past Surgical History:   Procedure Laterality Date    COLONOSCOPY N/A 7/18/2016    COLONOSCOPY performed by Alfred Brar [facility] at [facility]    HX BREAST LUMPECTOMY  06/2013    right    HX HYSTERECTOMY      HX MASTECTOMY      had expanders put in 6/2018    HX ORTHOPAEDIC      Back fusion surgery 4/18/14.  HX OTHER SURGICAL      mediport    HX TONSILLECTOMY      HX TUBAL LIGATION      NEUROLOGICAL PROCEDURE UNLISTED  2020    back surgery       Family History:  Family History   Problem Relation Age of Onset    Heart Disease Mother     Stroke Father     Heart Disease Father 48    Diabetes Other     Hypertension Other     Cancer Maternal Grandmother        Social History:  Social History     Tobacco Use    Smoking status: Never Smoker    Smokeless tobacco: Never Used   Vaping Use    Vaping Use: Never used   Substance Use Topics    Alcohol use: Not Currently     Comment: \"Occasionally\"    Drug use: Not Currently     Types: Methamphetamines, Cocaine     Comment: none in 2 years       Allergies: Allergies   Allergen Reactions    Latex Hives and Itching    Other Food Rash     Almonds    Amoxicillin Swelling     Other reaction(s): mild rash/itching    Aspirin Not Reported This Time and Swelling     Mouth swells    Beeswax Anaphylaxis    Levaquin [Levofloxacin] Not Reported This Time and Swelling     Other reaction(s): mild rash/itching    Norton Rash and Itching    Codeine Other (comments)    Glycopyrrolate Swelling     Pt  States  faciAL  SWELLING   POST  ROBINUL  IV   Pt  States  faciAL  SWELLING   POST  ROBINUL  IV     Pcn [Penicillins] Swelling    Tape [Adhesive] Rash    Tramadol Swelling         Review of Systems   Review of Systems   Constitutional: Negative for fever. HENT: Positive for dental problem. Negative for drooling.       All Other Systems Negative  Physical Exam Vitals:    01/04/22 2014   BP: 134/78   Pulse: 84   Resp: 19   Temp: 98.1 °F (36.7 °C)   SpO2: 98%   Weight: 64 kg (141 lb 1.5 oz)     Physical Exam  Vitals and nursing note reviewed. Constitutional:       Appearance: She is well-developed. HENT:      Head: Normocephalic and atraumatic. Mouth/Throat:      Comments: Lower left second and first premolars are eroded and tender. Upper left first premolar is avulsed tender and decayed. No adjacent gingival fluctuance seen or palpated. No drooling or trismus. Eyes:      Pupils: Pupils are equal, round, and reactive to light. Neck:      Thyroid: No thyromegaly. Vascular: No JVD. Trachea: No tracheal deviation. Cardiovascular:      Rate and Rhythm: Normal rate and regular rhythm. Heart sounds: Normal heart sounds. No murmur heard. No friction rub. No gallop. Pulmonary:      Effort: Pulmonary effort is normal. No respiratory distress. Breath sounds: Normal breath sounds. No stridor. No wheezing or rales. Chest:      Chest wall: No tenderness. Abdominal:      General: There is no distension. Palpations: Abdomen is soft. There is no mass. Tenderness: There is no abdominal tenderness. There is no guarding or rebound. Musculoskeletal:         General: No tenderness. Lymphadenopathy:      Cervical: No cervical adenopathy. Skin:     General: Skin is warm and dry. Coloration: Skin is not pale. Findings: No erythema or rash. Neurological:      Mental Status: She is alert and oriented to person, place, and time. Psychiatric:         Behavior: Behavior normal.         Thought Content: Thought content normal.          Diagnostic Study Results     Labs -   No results found for this or any previous visit (from the past 12 hour(s)).     Radiologic Studies -   No orders to display     CT Results  (Last 48 hours)    None        CXR Results  (Last 48 hours)    None            Medical Decision Making   I am the first provider for this patient. I reviewed the vital signs, available nursing notes, past medical history, past surgical history, family history and social history. Vital Signs-Reviewed the patient's vital signs. Records Reviewed: Nursing Notes    Procedures:  Procedures    Provider Notes (Medical Decision Making): Treat her pain advised her to follow-up as this is becoming a chronic issue. MED RECONCILIATION:  No current facility-administered medications for this encounter. Current Outpatient Medications   Medication Sig    Lidocaine Viscous 2 % solution Take 5 mL by mouth two (2) times daily as needed for Pain. Put 5ml onto guaze and put in mouth; hold for 30min. Can repeat up to twice daily. Indications: administration of local anesthetic drug    oxyCODONE-acetaminophen (Percocet) 5-325 mg per tablet Take 1 Tablet by mouth every six (6) hours as needed for Pain for up to 3 days. Max Daily Amount: 4 Tablets.  clindamycin (CLEOCIN) 300 mg capsule Take 1 Capsule by mouth four (4) times daily for 10 days.  chlorhexidine (Peridex) 0.12 % solution 15 mL by Swish and Spit route every twelve (12) hours for 14 days.  omeprazole (PRILOSEC) 20 mg capsule Take 1 Capsule by mouth daily.  ondansetron hcl (Zofran) 4 mg tablet Take 1 Tablet by mouth every eight (8) hours as needed for Nausea.  dicyclomine (BENTYL) 20 mg tablet Take 1 Tablet by mouth every six (6) hours.  diclofenac (Voltaren) 1 % gel Apply 2 g to affected area four (4) times daily.  methocarbamoL (Robaxin) 500 mg tablet Take 1 Tablet by mouth four (4) times daily.  lidocaine (Lidoderm) 5 % Apply patch to the affected area for 12 hours a day and remove for 12 hours a day.  naloxone (Narcan) 4 mg/actuation nasal spray Use 1 spray intranasally, then discard. Repeat with new spray every 2 min as needed for opioid overdose symptoms, alternating nostrils.     clonazePAM (KlonoPIN) 1 mg tablet Take  by mouth two (2) times a day.    insulin aspart protamine/insulin aspart (NovoLOG Mix 70-30FlexPen U-100) 100 unit/mL (70-30) inpn by SubCUTAneous route Before breakfast, lunch, dinner and at bedtime. Sliding scale    lisinopriL (PRINIVIL, ZESTRIL) 10 mg tablet Take 20 mg by mouth daily.  Cane coco 1 Each by Does Not Apply route daily.  Blood-Glucose Meter (ONETOUCH ULTRA2) monitoring kit Use to obtain two times a day.  lancets misc Use daily to monitor blood glucose    glucose blood VI test strips (ASCENSIA AUTODISC VI, ONE TOUCH ULTRA TEST VI) strip 50 Each by Does Not Apply route two (2) times daily as needed.  polyethylene glycol (MIRALAX) 17 gram packet Take 1 Packet by mouth daily.  Insulin Needles, Disposable, 31 gauge x 5/16\" ndle Use to administer insulin as directed       Disposition:  home    DISCHARGE NOTE:   9:12 PM    Pt has been reexamined. Patient has no new complaints, changes, or physical findings. Care plan outlined and precautions discussed. Results of exam were reviewed with the patient. All medications were reviewed with the patient; will d/c home with lidocaine, percocet. All of pt's questions and concerns were addressed. Patient was instructed and agrees to follow up with dental, as well as to return to the ED upon further deterioration. Patient is ready to go home. Follow-up Information     Follow up With Specialties Details Why Contact Info    [id number] [place] [address]  Schedule an appointment as soon as possible for a visit in 2 days  [id number] [facility], [address] [facility] [address]    [facility] EMERGENCY DEPT Emergency Medicine  If symptoms worsen return immediately [address] [place] [address] Bypass  [phone]          Current Discharge Medication List      START taking these medications    Details   oxyCODONE-acetaminophen (Percocet) 5-325 mg per tablet Take 1 Tablet by mouth every six (6) hours as needed for Pain for up to 3 days.  Max Daily Amount: 4 Tablets. Qty: 12 Tablet, Refills: 0  Start date: 1/4/2022, End date: 1/7/2022    Associated Diagnoses: Toothache         CONTINUE these medications which have CHANGED    Details   Lidocaine Viscous 2 % solution Take 5 mL by mouth two (2) times daily as needed for Pain. Put 5ml onto guaze and put in mouth; hold for 30min. Can repeat up to twice daily. Indications: administration of local anesthetic drug  Qty: 200 mL, Refills: 0  Start date: 1/4/2022             Diagnosis     Clinical Impression:   1. Toothache    2.  Dental caries

## 2022-01-10 NOTE — ED NOTES
I have reviewed discharge instructions with the patient. The patient verbalized understanding. Released to the care of family member.
Patient requesting morphine or dilaudid for pain. Patient states she has not been able to take her dilaudid at home because her script is on hold because of the quantity. Dr. Mita Iqbal aware. See MAR.
MD SHAFFER:  I was present for the critical portions of this procedure and provided direct attending supervision.

## 2022-01-11 ENCOUNTER — HOSPITAL ENCOUNTER (EMERGENCY)
Age: 52
Discharge: HOME | End: 2022-01-11
Attending: EMERGENCY MEDICINE
Payer: MEDICAID

## 2022-01-11 ENCOUNTER — APPOINTMENT (OUTPATIENT)
Dept: GENERAL RADIOLOGY | Age: 52
End: 2022-01-11
Attending: EMERGENCY MEDICINE
Payer: MEDICAID

## 2022-01-11 VITALS
OXYGEN SATURATION: 98 % | HEART RATE: 92 BPM | RESPIRATION RATE: 16 BRPM | DIASTOLIC BLOOD PRESSURE: 64 MMHG | SYSTOLIC BLOOD PRESSURE: 123 MMHG | TEMPERATURE: 97.8 F

## 2022-01-11 DIAGNOSIS — M77.9 TENDONITIS: ICD-10-CM

## 2022-01-11 DIAGNOSIS — M25.562 CHRONIC PAIN OF LEFT KNEE: Primary | ICD-10-CM

## 2022-01-11 DIAGNOSIS — G89.29 CHRONIC PAIN OF LEFT KNEE: Primary | ICD-10-CM

## 2022-01-11 DIAGNOSIS — R11.0 NAUSEA WITHOUT VOMITING: ICD-10-CM

## 2022-01-11 LAB
APPEARANCE UR: CLEAR
BACTERIA URNS QL MICRO: NEGATIVE /HPF
BILIRUB UR QL: NEGATIVE
COLOR UR: YELLOW
EPITH CASTS URNS QL MICRO: NORMAL /LPF (ref 0–5)
GLUCOSE UR STRIP.AUTO-MCNC: 100 MG/DL
HGB UR QL STRIP: NEGATIVE
KETONES UR QL STRIP.AUTO: NEGATIVE MG/DL
LEUKOCYTE ESTERASE UR QL STRIP.AUTO: NEGATIVE
NITRITE UR QL STRIP.AUTO: NEGATIVE
PH UR STRIP: 7 [PH] (ref 5–8)
PROT UR STRIP-MCNC: 30 MG/DL
RBC #/AREA URNS HPF: NEGATIVE /HPF (ref 0–5)
SP GR UR REFRACTOMETRY: 1.02 (ref 1–1.03)
UROBILINOGEN UR QL STRIP.AUTO: 1 EU/DL (ref 0.2–1)
WBC URNS QL MICRO: NORMAL /HPF (ref 0–5)

## 2022-01-11 PROCEDURE — 81001 URINALYSIS AUTO W/SCOPE: CPT

## 2022-01-11 PROCEDURE — 96372 THER/PROPH/DIAG INJ SC/IM: CPT

## 2022-01-11 PROCEDURE — 73564 X-RAY EXAM KNEE 4 OR MORE: CPT

## 2022-01-11 PROCEDURE — 74011250636 HC RX REV CODE- 250/636: Performed by: EMERGENCY MEDICINE

## 2022-01-11 PROCEDURE — 99283 EMERGENCY DEPT VISIT LOW MDM: CPT

## 2022-01-11 RX ORDER — DICLOFENAC SODIUM 10 MG/G
2 GEL TOPICAL 4 TIMES DAILY
Qty: 20 G | Refills: 0 | Status: SHIPPED | OUTPATIENT
Start: 2022-01-11 | End: 2022-09-12

## 2022-01-11 RX ORDER — DROPERIDOL 2.5 MG/ML
1.25 INJECTION, SOLUTION INTRAMUSCULAR; INTRAVENOUS ONCE
Status: COMPLETED | OUTPATIENT
Start: 2022-01-11 | End: 2022-01-11

## 2022-01-11 RX ORDER — ONDANSETRON 4 MG/1
4 TABLET, FILM COATED ORAL
Qty: 14 TABLET | Refills: 0 | Status: SHIPPED | OUTPATIENT
Start: 2022-01-11 | End: 2022-09-12

## 2022-01-11 RX ADMIN — DROPERIDOL 1.25 MG: 2.5 INJECTION, SOLUTION INTRAMUSCULAR; INTRAVENOUS at 18:18

## 2022-01-11 NOTE — ED PROVIDER NOTES
EMERGENCY DEPARTMENT HISTORY AND PHYSICAL EXAM    Date: 1/11/2022  Patient Name: Erika Knapp    History of Presenting Illness     Chief Complaint   Patient presents with    Vomiting    Leg Swelling         History Provided By: Patient    Erika Knapp is a 58-year-old female with past medical history of gastroparesis, polyneuropathy, hypertension, hyperlipidemia, polysubstance abuse including cocaine methamphetamines, opiates, bipolar personality disorder who presents for evaluation of left knee pain. Patient states that she has had pain and swelling of her left knee. She was seen and evaluated by orthopedics yesterday where she was told that she had fluid on her knee. They did not remove the fluid which caused the patient to be upset because she has previously had fluid removed from her knees and that is helped. Patient states that she had an episode yesterday that she felt like the pain made her vomit. She also reports some associated urinary discomfort. PCP: Fiordaliza Peter MD    Current Outpatient Medications   Medication Sig Dispense Refill    ondansetron hcl (Zofran) 4 mg tablet Take 1 Tablet by mouth every eight (8) hours as needed for Nausea. 14 Tablet 0    diclofenac (Voltaren) 1 % gel Apply 2 g to affected area four (4) times daily. 20 g 0    Lidocaine Viscous 2 % solution Take 5 mL by mouth two (2) times daily as needed for Pain. Put 5ml onto guaze and put in mouth; hold for 30min. Can repeat up to twice daily. Indications: administration of local anesthetic drug 200 mL 0    chlorhexidine (Peridex) 0.12 % solution 15 mL by Swish and Spit route every twelve (12) hours for 14 days. 420 mL 0    omeprazole (PRILOSEC) 20 mg capsule Take 1 Capsule by mouth daily. 30 Capsule 0    dicyclomine (BENTYL) 20 mg tablet Take 1 Tablet by mouth every six (6) hours. 12 Tablet 0    methocarbamoL (Robaxin) 500 mg tablet Take 1 Tablet by mouth four (4) times daily.  20 Tablet 0    lidocaine (Lidoderm) 5 % Apply patch to the affected area for 12 hours a day and remove for 12 hours a day. 10 Each 0    naloxone (Narcan) 4 mg/actuation nasal spray Use 1 spray intranasally, then discard. Repeat with new spray every 2 min as needed for opioid overdose symptoms, alternating nostrils. 2 Each 0    clonazePAM (KlonoPIN) 1 mg tablet Take  by mouth two (2) times a day.  insulin aspart protamine/insulin aspart (NovoLOG Mix 70-30FlexPen U-100) 100 unit/mL (70-30) inpn by SubCUTAneous route Before breakfast, lunch, dinner and at bedtime. Sliding scale      lisinopriL (PRINIVIL, ZESTRIL) 10 mg tablet Take 20 mg by mouth daily.  Cane coco 1 Each by Does Not Apply route daily. 1 Device 0    Blood-Glucose Meter (ONETOUCH ULTRA2) monitoring kit Use to obtain two times a day. 1 Kit 0    lancets misc Use daily to monitor blood glucose 200 Each 0    glucose blood VI test strips (ASCENSIA AUTODISC VI, ONE TOUCH ULTRA TEST VI) strip 50 Each by Does Not Apply route two (2) times daily as needed. 200 Strip 3    polyethylene glycol (MIRALAX) 17 gram packet Take 1 Packet by mouth daily.  30 Each 1    Insulin Needles, Disposable, 31 gauge x 5/16\" ndle Use to administer insulin as directed 1 Package 11       Past History     Past Medical History:  Past Medical History:   Diagnosis Date    Anxiety     Bipolar disorder ([address]     Breast cancer ([address]     breast cancer, right, S/P Mastectomy and chemo, radiation in 2013    Depression     Diabetes ([address]     Drug abuse ([address]     H/O cocaine use in past    Drug-seeking behavior     Family history of early CAD     Gastrointestinal disorder     cholitis    H/O ETOH abuse     Hyperlipidemia     Hypertension     Malignant neoplasm without specification of site ([address]     Methamphetamine abuse ([address]      self reported on 1/3/16    Psychiatric disorder     multiple personality disorder    Psychiatric disorder     anxiety    Psychiatric disorder     bipolar     Spine injury  [address]emb[address] [address]     Vitamin D deficiency 5/1/2018       Past Surgical History:  Past Surgical History:   Procedure Laterality Date    COLONOSCOPY N/A 7/18/2016    COLONOSCOPY performed by Jose Luis Zambrano MD at [facility] ENDOSCOPY    HX BREAST LUMPECTOMY  06/2013    right    HX HYSTERECTOMY      HX MASTECTOMY      had expanders put in 6/2018    HX ORTHOPAEDIC      Back fusion surgery 4/18/14.  HX OTHER SURGICAL      mediport    HX TONSILLECTOMY      HX TUBAL LIGATION      NEUROLOGICAL PROCEDURE UNLISTED  2020    back surgery       Family History:  Family History   Problem Relation Age of Onset    Heart Disease Mother     Stroke Father     Heart Disease Father 48    Diabetes Other     Hypertension Other     Cancer Maternal Grandmother        Social History:  Social History     Tobacco Use    Smoking status: Never Smoker    Smokeless tobacco: Never Used   Vaping Use    Vaping Use: Never used   Substance Use Topics    Alcohol use: Not Currently     Comment: \"Occasionally\"    Drug use: Not Currently     Types: Methamphetamines, Cocaine     Comment: none in 2 years       Allergies: Allergies   Allergen Reactions    Latex Hives and Itching    Other Food Rash     Almonds    Amoxicillin Swelling     Other reaction(s): mild rash/itching    Aspirin Not Reported This Time and Swelling     Mouth swells    Beeswax Anaphylaxis    Levaquin [Levofloxacin] Not Reported This Time and Swelling     Other reaction(s): mild rash/itching    [place] Rash and Itching    Codeine Other (comments)    Glycopyrrolate Swelling     Pt  States  faciAL  SWELLING   POST  ROBINUL  IV   Pt  States  faciAL  SWELLING   POST  ROBINUL  IV     Pcn [Penicillins] Swelling    Tape [Adhesive] Rash    Tramadol Swelling         Review of Systems   Review of Systems   Constitutional: Negative for activity change and fever. HENT: Negative for congestion and sore throat. Eyes: Negative for discharge.    Respiratory: Negative for apnea. Cardiovascular: Negative for chest pain. Gastrointestinal: Negative for abdominal distention. Genitourinary: Positive for dysuria. Negative for flank pain. Musculoskeletal: Positive for arthralgias. Skin: Negative for rash. Neurological: Negative for dizziness and weakness. Hematological: Negative for adenopathy. Psychiatric/Behavioral: Negative for agitation. All other systems reviewed and are negative. Physical Exam     Vitals:    01/11/22 1704 01/11/22 1916   BP: 127/73 123/64   Pulse: 98 92   Resp: 18 16   Temp: 97.8 °F (36.6 °C)    SpO2: 99% 98%     Physical Exam    Nursing notes and vital signs reviewed    Constitutional: Non toxic appearing, moderate distress  Head: Normocephalic, Atraumatic  Eyes: EOMI  Neck: Supple  Cardiovascular: Regular rate and rhythm, no murmurs, rubs, or gallops  Chest: Normal work of breathing and chest excursion bilaterally  Lungs: Clear to ausculation bilaterally  Abdomen: Soft, non tender, non distended, normoactive bowel sounds  Back: No evidence of trauma or deformity  Extremities: Isolated swelling noted to the left knee with no appreciable erythema, deformity, no swelling noted below the knee, no calf tenderness, patient otherwise neurovascularly intact  Skin: Warm and dry, normal cap refill  Neuro: Alert and appropriate, CN intact, normal speech, strength and sensation full and symmetric bilaterally, normal gait, normal coordination  Psychiatric: Normal mood and affect      Diagnostic Study Results     Labs -   No results found for this or any previous visit (from the past 12 hour(s)). Radiologic Studies -   XR KNEE LT MIN 4 V   Final Result      New thickening is noted of the tendons, most prominent laterally. There is also   a new associated suprapatellar joint effusion. There is no acute osseous   finding.         CT Results  (Last 48 hours)    None        CXR Results  (Last 48 hours)    None          Medications given in the ED-  Medications   droperidoL (INAPSINE) injection 1.25 mg (1.25 mg IntraMUSCular Given 1/11/22 1818)         Medical Decision Making   I am the first provider for this patient. I reviewed the vital signs, available nursing notes, past medical history, past surgical history, family history and social history. Vital Signs-Reviewed the patient's vital signs. Pulse Oximetry Analysis - 99% on room air, not hypoxic     Records Reviewed: Old Medical Records    Provider Notes (Medical Decision Making): Gianluca Traylor is a 45 y.o. female well-known to the emergency department presents for evaluation of left knee pain, swelling. On arrival patient is afebrile, nontoxic-appearing and hemodynamically stable. Reviewed previous records in which patient has been seen and evaluated by orthopedics yesterday for the same knee pain that she prevents today. There is no evidence of erythema, fever, low suspicion for septic arthritis. Patient has full range of motion. Plain films obtained and found to be negative for fracture dislocation, patient does have mild tendon thickening as well as a small effusion. Advised patient to continue with ice, anti-inflammatories and ongoing follow-up with outpatient provider. Patient given a dose of droperidol for chronic pain, nausea. Do not feel that laboratory imaging is indicated at this time. Patient endorsed some urinary discomfort, urinalysis unremarkable. Patient feels improved following droperidol. She will be discharged with refills of Voltaren and Zofran as she has previously been prescribed. Procedures:  Procedures    ED Course:     Diagnosis and Disposition     DISCHARGE NOTE:    Cayla Larsen  results have been reviewed with her. She has been counseled regarding her diagnosis, treatment, and plan. She verbally conveys understanding and agreement of the signs, symptoms, diagnosis, treatment and prognosis and additionally agrees to follow up as discussed.   She also agrees with the care-plan and conveys that all of her questions have been answered. I have also provided discharge instructions for her that include: educational information regarding their diagnosis and treatment, and list of reasons why they would want to return to the ED prior to their follow-up appointment, should her condition change. She has been provided with education for proper emergency department utilization. CLINICAL IMPRESSION:    1. Chronic pain of left knee    2. Nausea without vomiting    3. Tendonitis        PLAN:  1. D/C Home  2. Discharge Medication List as of 1/11/2022  7:01 PM      CONTINUE these medications which have CHANGED    Details   ondansetron hcl (Zofran) 4 mg tablet Take 1 Tablet by mouth every eight (8) hours as needed for Nausea., Normal, Disp-14 Tablet, R-0      diclofenac (Voltaren) 1 % gel Apply 2 g to affected area four (4) times daily. , Normal, Disp-20 g, R-0         CONTINUE these medications which have NOT CHANGED    Details   Lidocaine Viscous 2 % solution Take 5 mL by mouth two (2) times daily as needed for Pain. Put 5ml onto guaze and put in mouth; hold for 30min. Can repeat up to twice daily. Indications: administration of local anesthetic drug, Normal, Disp-200 mL, R-0, PATRICIA      chlorhexidine (Peridex) 0.12 % solution 15 mL by Swish and Spit route every twelve (12) hours for 14 days. , Normal, Disp-420 mL, R-0      omeprazole (PRILOSEC) 20 mg capsule Take 1 Capsule by mouth daily. , Normal, Disp-30 Capsule, R-0      dicyclomine (BENTYL) 20 mg tablet Take 1 Tablet by mouth every six (6) hours. , Normal, Disp-12 Tablet, R-0      methocarbamoL (Robaxin) 500 mg tablet Take 1 Tablet by mouth four (4) times daily. , Normal, Disp-20 Tablet, R-0      lidocaine (Lidoderm) 5 % Apply patch to the affected area for 12 hours a day and remove for 12 hours a day., Normal, Disp-10 Each, R-0      naloxone (Narcan) 4 mg/actuation nasal spray Use 1 spray intranasally, then discard. Repeat with new spray every 2 min as needed for opioid overdose symptoms, alternating nostrils. , Normal, Disp-2 Each, R-0      clonazePAM (KlonoPIN) 1 mg tablet Take  by mouth two (2) times a day., Historical Med      insulin aspart protamine/insulin aspart (NovoLOG Mix 70-30FlexPen U-100) 100 unit/mL (70-30) inpn by SubCUTAneous route Before breakfast, lunch, dinner and at bedtime. Sliding scale, Historical Med      lisinopriL (PRINIVIL, ZESTRIL) 10 mg tablet Take 20 mg by mouth daily. , Historical Med      Cane coco 1 Each by Does Not Apply route daily. , Print, Disp-1 Device, R-0      Blood-Glucose Meter (ONETOUCH ULTRA2) monitoring kit Use to obtain two times a day., Normal, Disp-1 Kit, R-0      lancets misc Use daily to monitor blood glucose, Normal, Disp-200 Each, R-0      glucose blood VI test strips (ASCENSIA AUTODISC VI, ONE TOUCH ULTRA TEST VI) strip 50 Each by Does Not Apply route two (2) times daily as needed., Normal, Disp-200 Strip, R-3      polyethylene glycol (MIRALAX) 17 gram packet Take 1 Packet by mouth daily. , Normal, Disp-30 Each, R-1      Insulin Needles, Disposable, 31 gauge x 5/16\" ndle Use to administer insulin as directed, Normal, Disp-1 Package, R-11           3. Follow-up Information     Follow up With Specialties Details Why [address]    [facility] Emergency Medicine  As needed, If symptoms worsen [address] [id number] [phone]        _______________________________      Please note that this dictation was completed with [employer], the computer voice recognition software. Quite often unanticipated grammatical, syntax, homophones, and other interpretive errors are inadvertently transcribed by the computer software. Please disregard these errors. Please excuse any errors that have escaped final proofreading.

## 2022-01-12 NOTE — ED NOTES
Pt discharged per w/c, no acute distress on discharge, written inst given to pt, verbalizes understanding  Patient armband removed and shredded

## 2022-01-17 ENCOUNTER — APPOINTMENT (OUTPATIENT)
Dept: GENERAL RADIOLOGY | Age: 52
End: 2022-01-17
Attending: PHYSICIAN ASSISTANT
Payer: MEDICAID

## 2022-01-17 ENCOUNTER — HOSPITAL ENCOUNTER (EMERGENCY)
Age: 52
Discharge: HOME | End: 2022-01-17
Attending: EMERGENCY MEDICINE
Payer: MEDICAID

## 2022-01-17 VITALS
RESPIRATION RATE: 17 BRPM | HEART RATE: 106 BPM | WEIGHT: 142 LBS | SYSTOLIC BLOOD PRESSURE: 131 MMHG | OXYGEN SATURATION: 100 % | BODY MASS INDEX: 26.13 KG/M2 | HEIGHT: 62 IN | DIASTOLIC BLOOD PRESSURE: 73 MMHG | TEMPERATURE: 98.2 F

## 2022-01-17 DIAGNOSIS — M25.469 SUPRAPATELLAR EFFUSION OF KNEE: ICD-10-CM

## 2022-01-17 DIAGNOSIS — M25.562 CHRONIC PAIN OF LEFT KNEE: Primary | ICD-10-CM

## 2022-01-17 DIAGNOSIS — G89.29 CHRONIC PAIN OF LEFT KNEE: Primary | ICD-10-CM

## 2022-01-17 PROCEDURE — 73564 X-RAY EXAM KNEE 4 OR MORE: CPT

## 2022-01-17 PROCEDURE — 99283 EMERGENCY DEPT VISIT LOW MDM: CPT

## 2022-01-17 PROCEDURE — 74011250637 HC RX REV CODE- 250/637: Performed by: PHYSICIAN ASSISTANT

## 2022-01-17 RX ORDER — OXYCODONE AND ACETAMINOPHEN 5; 325 MG/1; MG/1
1 TABLET ORAL
Status: COMPLETED | OUTPATIENT
Start: 2022-01-17 | End: 2022-01-17

## 2022-01-17 RX ORDER — ONDANSETRON 4 MG/1
4 TABLET, ORALLY DISINTEGRATING ORAL
Status: COMPLETED | OUTPATIENT
Start: 2022-01-17 | End: 2022-01-17

## 2022-01-17 RX ORDER — PROMETHAZINE HYDROCHLORIDE 25 MG/1
25 TABLET ORAL
Qty: 12 TABLET | Refills: 0 | Status: SHIPPED | OUTPATIENT
Start: 2022-01-17 | End: 2022-09-12

## 2022-01-17 RX ADMIN — OXYCODONE AND ACETAMINOPHEN 1 TABLET: 5; 325 TABLET ORAL at 10:05

## 2022-01-17 RX ADMIN — ONDANSETRON 4 MG: 4 TABLET, ORALLY DISINTEGRATING ORAL at 08:41

## 2022-01-17 NOTE — ED PROVIDER NOTES
EMERGENCY DEPARTMENT HISTORY AND PHYSICAL EXAM    Date: 1/17/2022  Patient Name: Olivia Michaud    History of Presenting Illness     Chief Complaint   Patient presents with    Knee Pain    Vomiting         History Provided By: Patient    Chief Complaint: knee pain    HPI(Context):   8:19 AM  Olivia Michaud is a 45 y.o. female with PMHX of gastroparesis, neuropathy, hypertension, hyperlipidemia, polysubstance abuse (cocaine, methamphetamines, opiates) bipolar personality disorder who presents to the emergency department C/O left knee pain. Associated sxs include swelling. Pain worse with ambulation and ROM. Pt notes chronic and recurrent bilateral knee pain with left>>right. Pt is followed by Dr. Av Cervantes (Orthopedics). Pt was given IM steroid shot 2 week ago which helped pain. Pt has FU this week with Ortho for possible PT. Pt notes pain causes her to vomiting. Pt denies numbness, weakness, trauma, leg swelling, and any other sxs or complaints. PCP: [place], MD Fiordaliza    Current Facility-Administered Medications   Medication Dose Route Frequency Provider Last Rate Last Admin    oxyCODONE-acetaminophen (PERCOCET) 5-325 mg per tablet 1 Tablet  1 Tablet Oral NOW Roger Mcwilliams PA-C         Current Outpatient Medications   Medication Sig Dispense Refill    promethazine (PHENERGAN) 25 mg tablet Take 1 Tablet by mouth every six (6) hours as needed for Nausea. 12 Tablet 0    ondansetron hcl (Zofran) 4 mg tablet Take 1 Tablet by mouth every eight (8) hours as needed for Nausea. 14 Tablet 0    diclofenac (Voltaren) 1 % gel Apply 2 g to affected area four (4) times daily. 20 g 0    Lidocaine Viscous 2 % solution Take 5 mL by mouth two (2) times daily as needed for Pain. Put 5ml onto guaze and put in mouth; hold for 30min. Can repeat up to twice daily. Indications: administration of local anesthetic drug 200 mL 0    omeprazole (PRILOSEC) 20 mg capsule Take 1 Capsule by mouth daily.  30 Capsule 0    dicyclomine (BENTYL) 20 mg tablet Take 1 Tablet by mouth every six (6) hours. 12 Tablet 0    methocarbamoL (Robaxin) 500 mg tablet Take 1 Tablet by mouth four (4) times daily. 20 Tablet 0    lidocaine (Lidoderm) 5 % Apply patch to the affected area for 12 hours a day and remove for 12 hours a day. 10 Each 0    naloxone (Narcan) 4 mg/actuation nasal spray Use 1 spray intranasally, then discard. Repeat with new spray every 2 min as needed for opioid overdose symptoms, alternating nostrils. 2 Each 0    clonazePAM (KlonoPIN) 1 mg tablet Take  by mouth two (2) times a day.  insulin aspart protamine/insulin aspart (NovoLOG Mix 70-30FlexPen U-100) 100 unit/mL (70-30) inpn by SubCUTAneous route Before breakfast, lunch, dinner and at bedtime. Sliding scale      lisinopriL (PRINIVIL, ZESTRIL) 10 mg tablet Take 20 mg by mouth daily.  Cane coco 1 Each by Does Not Apply route daily. 1 Device 0    Blood-Glucose Meter (ONETOUCH ULTRA2) monitoring kit Use to obtain two times a day. 1 Kit 0    lancets misc Use daily to monitor blood glucose 200 Each 0    glucose blood VI test strips (ASCENSIA AUTODISC VI, ONE TOUCH ULTRA TEST VI) strip 50 Each by Does Not Apply route two (2) times daily as needed. 200 Strip 3    polyethylene glycol (MIRALAX) 17 gram packet Take 1 Packet by mouth daily.  30 Each 1    Insulin Needles, Disposable, 31 gauge x 5/16\" ndle Use to administer insulin as directed 1 Package 11       Past History     Past Medical History:  Past Medical History:   Diagnosis Date    Anxiety     [facility]     Breast cancer ([facility]     breast cancer, right, S/P Mastectomy and chemo, radiation in 2013    Depression     Diabetes ([facility]     Drug abuse ([address]     H/O cocaine use in past    Drug-seeking behavior     Family history of early CAD     Gastrointestinal disorder     cholitis    H/O ETOH abuse     Hyperlipidemia     Hypertension     Malignant neoplasm without specification of site ([address]  Methamphetamine abuse ([facility]      self reported on 1/3/16    Psychiatric disorder     multiple personality disorder    Psychiatric disorder     anxiety    Psychiatric disorder     bipolar     Spine injury     Thromboembolus ([address]     Vitamin D deficiency 5/1/2018       Past Surgical History:  Past Surgical History:   Procedure Laterality Date    COLONOSCOPY N/A 7/18/2016    COLONOSCOPY performed by Larry Lemos MD at [address] [place] [address] Drive ENDOSCOPY    HX BREAST LUMPECTOMY  06/2013    right    HX HYSTERECTOMY      HX MASTECTOMY      had expanders put in 6/2018    HX ORTHOPAEDIC      Back fusion surgery 4/18/14.  HX OTHER SURGICAL      mediport    HX TONSILLECTOMY      HX TUBAL LIGATION      NEUROLOGICAL PROCEDURE UNLISTED  2020    back surgery       Family History:  Family History   Problem Relation Age of Onset    Heart Disease Mother     Stroke Father     Heart Disease Father 48    Diabetes Other     Hypertension Other     Cancer Maternal Grandmother        Social History:  Social History     Tobacco Use    Smoking status: Never Smoker    Smokeless tobacco: Never Used   Vaping Use    Vaping Use: Never used   Substance Use Topics    Alcohol use: Not Currently     Comment: \"Occasionally\"    Drug use: Not Currently     Types: Methamphetamines, Cocaine     Comment: none in 2 years       Allergies:   Allergies   Allergen Reactions    Latex Hives and Itching    Other Food Rash     Almonds    Amoxicillin Swelling     Other reaction(s): mild rash/itching    Aspirin Not Reported This Time and Swelling     Mouth swells    Beeswax Anaphylaxis    Levaquin [Levofloxacin] Not Reported This Time and Swelling     Other reaction(s): mild rash/itching    [place] Rash and Itching    Codeine Other (comments)    Glycopyrrolate Swelling     Pt  States  faciAL  SWELLING   POST  ROBINUL  IV   Pt  States  faciAL  SWELLING   POST  ROBINUL  IV     Pcn [Penicillins] Swelling    Tape [Adhesive] Rash    Tramadol Swelling         Review of Systems   Review of Systems   Cardiovascular: Negative for leg swelling. Gastrointestinal: Positive for vomiting. Musculoskeletal: Positive for arthralgias and joint swelling. Neurological: Negative for tremors and numbness. All other systems reviewed and are negative. Physical Exam     Vitals:    01/17/22 0758 01/17/22 0902 01/17/22 0903   BP: 131/73     Pulse: (!) 106     Resp: 17     Temp: 98.2 °F (36.8 °C)     SpO2: 100%  100%   Weight:  64.4 kg (142 lb)    Height:  5' 2\" (1.575 m)      Physical Exam  Vitals and nursing note reviewed. Constitutional:       General: She is not in acute distress. Appearance: She is well-developed. She is not diaphoretic. Comments:  female in NAD. Alert. anxious. HENT:      Head: Normocephalic and atraumatic. Right Ear: External ear normal.      Left Ear: External ear normal.      Nose: Nose normal.   Eyes:      General: No scleral icterus. Right eye: No discharge. Left eye: No discharge. Conjunctiva/sclera: Conjunctivae normal.   Cardiovascular:      Rate and Rhythm: Normal rate and regular rhythm. Pulses:           Dorsalis pedis pulses are 2+ on the right side and 2+ on the left side. Posterior tibial pulses are 2+ on the right side and 2+ on the left side. Heart sounds: Normal heart sounds. No murmur heard. No friction rub. No gallop. Pulmonary:      Effort: Pulmonary effort is normal. No tachypnea, accessory muscle usage or respiratory distress. Breath sounds: Normal breath sounds. No decreased breath sounds, wheezing, rhonchi or rales. Musculoskeletal:      Cervical back: Normal range of motion. Right upper leg: Normal. No swelling or edema. Left upper leg: Normal. No swelling or edema. Right knee: No swelling, deformity or effusion. Normal pulse. Left knee: Swelling present. Decreased range of motion. Tenderness present. Normal pulse. Right lower leg: Normal. No swelling or tenderness. Left lower leg: Normal. No swelling or tenderness. Right ankle:      Right Achilles Tendon: Normal.      Left ankle:      Left Achilles Tendon: Normal.      Right foot: Normal. Normal capillary refill. Normal pulse. Left foot: Normal. Normal capillary refill. Normal pulse. Skin:     General: Skin is warm and dry. Neurological:      Mental Status: She is alert and oriented to person, place, and time. Psychiatric:         Mood and Affect: Mood is anxious. Judgment: Judgment normal.             Diagnostic Study Results     Labs -   No results found for this or any previous visit (from the past 12 hour(s)). XR KNEE LT MIN 4 V   Final Result      Suprapatellar effusion. No acute osseous abnormalities. CT Results  (Last 48 hours)    None        CXR Results  (Last 48 hours)    None          Medications given in the ED-  Medications   oxyCODONE-acetaminophen (PERCOCET) 5-325 mg per tablet 1 Tablet (has no administration in time range)   ondansetron (ZOFRAN ODT) tablet 4 mg (4 mg Oral Given 1/17/22 0841)         Medical Decision Making   I am the first provider for this patient. I reviewed the vital signs, available nursing notes, past medical history, past surgical history, family history and social history. Vital Signs-Reviewed the patient's vital signs. Pulse Oximetry Analysis - 100% on RA. NORMAL    Records Reviewed: Nursing Notes, Old Medical Records, Previous Radiology Studies and Previous Laboratory Studies    Provider Notes (Medical Decision Making): chronic pain, OA, gout, tendonitis, bursitis, ligamentous, fx. No evidence of septic joint, cellulitis, ischemic limb, or compartment syndrome. Doubt DVT. Procedures:  Procedures    ED Course:   8:19 AM Initial assessment performed. The patients presenting problems have been discussed, and they are in agreement with the care plan formulated and outlined with them. I have encouraged them to ask questions as they arise throughout their visit. Diagnosis and Disposition       Pain treated in ED. Will tx sxs, ACE wrap, ICE as tolerated, and elevation. FU with Ortho. Reasons to RTED discussed with pt. All questions answered. Pt feels comfortable going home at this time. Pt expressed understanding and she agrees with plan. 1. Chronic pain of left knee    2. Suprapatellar effusion of knee        PLAN:  1. D/C Home  2. Current Discharge Medication List      START taking these medications    Details   promethazine (PHENERGAN) 25 mg tablet Take 1 Tablet by mouth every six (6) hours as needed for Nausea. Qty: 12 Tablet, Refills: 0  Start date: 1/17/2022           3. Follow-up Information     Follow up With Specialties Details Why Contact Info    Manda Melvin MD Orthopedic Surgery   [address] [place] [address]  Orthopedic and [phone]  [phone]      [facility] EMERGENCY DEPT Emergency Medicine   [address] [place] [address]  [phone]        _______________________________    Attestations: This note is prepared by Rock Mckenna PA-C.  _______________________________          Please note that this dictation was completed with [employer]gl[employer], the computer voice recognition software. Quite often unanticipated grammatical, syntax, homophones, and other interpretive errors are inadvertently transcribed by the computer software. Please disregard these errors. Please excuse any errors that have escaped final proofreading.

## 2022-03-15 ENCOUNTER — HOSPITAL ENCOUNTER (EMERGENCY)
Age: 52
Discharge: HOME | End: 2022-03-15
Attending: EMERGENCY MEDICINE
Payer: MEDICAID

## 2022-03-15 VITALS
HEIGHT: 65 IN | WEIGHT: 145 LBS | SYSTOLIC BLOOD PRESSURE: 124 MMHG | BODY MASS INDEX: 24.16 KG/M2 | HEART RATE: 82 BPM | OXYGEN SATURATION: 98 % | RESPIRATION RATE: 20 BRPM | TEMPERATURE: 98.2 F | DIASTOLIC BLOOD PRESSURE: 82 MMHG

## 2022-03-15 DIAGNOSIS — M25.562 CHRONIC PAIN OF BOTH KNEES: Primary | ICD-10-CM

## 2022-03-15 DIAGNOSIS — G89.29 CHRONIC PAIN OF BOTH KNEES: Primary | ICD-10-CM

## 2022-03-15 DIAGNOSIS — M25.561 CHRONIC PAIN OF BOTH KNEES: Primary | ICD-10-CM

## 2022-03-15 LAB — GLUCOSE BLD STRIP.AUTO-MCNC: 182 MG/DL (ref 70–110)

## 2022-03-15 PROCEDURE — 99284 EMERGENCY DEPT VISIT MOD MDM: CPT

## 2022-03-15 PROCEDURE — 82962 GLUCOSE BLOOD TEST: CPT

## 2022-03-15 PROCEDURE — 96372 THER/PROPH/DIAG INJ SC/IM: CPT

## 2022-03-15 PROCEDURE — 74011250636 HC RX REV CODE- 250/636: Performed by: EMERGENCY MEDICINE

## 2022-03-15 RX ORDER — KETOROLAC TROMETHAMINE 30 MG/ML
30 INJECTION, SOLUTION INTRAMUSCULAR; INTRAVENOUS
Status: COMPLETED | OUTPATIENT
Start: 2022-03-15 | End: 2022-03-15

## 2022-03-15 RX ORDER — CELECOXIB 100 MG/1
100 CAPSULE ORAL 2 TIMES DAILY
Qty: 20 CAPSULE | Refills: 0 | Status: SHIPPED | OUTPATIENT
Start: 2022-03-15 | End: 2022-03-25

## 2022-03-15 RX ADMIN — KETOROLAC TROMETHAMINE 30 MG: 30 INJECTION, SOLUTION INTRAMUSCULAR; INTRAVENOUS at 04:37

## 2022-03-15 NOTE — ED NOTES
Acute on chronic bilat knee pain; No meds taken PTA. Denies new injuries to site, +CSM/no numbness/tingling noted.

## 2022-03-15 NOTE — ED PROVIDER NOTES
59-year-old female with history of anxiety, chronic opiate abuse and use, alcohol abuse hypertension, hyperlipidemia, methamphetamine abuse, bipolar disorder, diabetes presents emergency department with complaint of bilateral knee pain worsening since she ran out of her Celebrex. Patient was seen for similar complaints in the [facility] emergency department on 5 March, patient has had 874 hospital visits in the last 3 and 65 days largely related to chronic pain. There has been no recent injury patient arrives emergency department in no acute distress with unremarkable vital signs           Past Medical History:   Diagnosis Date    Anxiety     Bipolar disorder ([address]     Breast cancer ([address]     breast cancer, right, S/P Mastectomy and chemo, radiation in 2013    Depression     Diabetes ([address]     Drug abuse ([address]     H/O cocaine use in past    Drug-seeking behavior     Family history of early CAD     Gastrointestinal disorder     cholitis    H/O ETOH abuse     Hyperlipidemia     Hypertension     Malignant neoplasm without specification of site ([address]     Methamphetamine abuse ([address]      self reported on 1/3/16    Psychiatric disorder     multiple personality disorder    Psychiatric disorder     anxiety    Psychiatric disorder     bipolar     Spine injury     Thromboembolus ([address]     Vitamin D deficiency 5/1/2018       Past Surgical History:   Procedure Laterality Date    COLONOSCOPY N/A 7/18/2016    [facility]ones [facility] END[facility]COPY    HX BREAST LUMPECTOMY  06/2013    right    HX HYSTERECTOMY      HX MASTECTOMY      had expanders put in 6/2018    HX ORTHOPAEDIC      Back fusion surgery 4/18/14.      HX OTHER SURGICAL      mediport    HX TONSILLECTOMY      HX TUBAL LIGATION      NEUROLOGICAL PROCEDURE UNLISTED  2020    back surgery         Family History:   Problem Relation Age of Onset    Heart Disease Mother     Stroke Father     Heart Disease Father 48    Diabetes Other     Hypertension Other     Cancer Maternal Grandmother        Social History     Socioeconomic History    Marital status:      Spouse name: Not on file    Number of children: Not on file    Years of education: Not on file    Highest education level: Not on file   Occupational History    Not on file   Tobacco Use    Smoking status: Never Smoker    Smokeless tobacco: Never Used   Vaping Use    Vaping Use: Never used   Substance and Sexual Activity    Alcohol use: Not Currently     Comment: \"Occasionally\"    Drug use: Not Currently     Types: Methamphetamines, Cocaine     Comment: none in 2 years    Sexual activity: Never   Other Topics Concern    Not on file   Social History Narrative    ** Merged History Encounter **          Social Determinants of Health     Financial Resource Strain:     Difficulty of Paying Living Expenses: Not on file   Food Insecurity:     Worried About Running Out of Food in the Last Year: Not on file    Argenis of Food in the Last Year: Not on file   Transportation Needs:     Lack of Transportation (Medical): Not on file    Lack of Transportation (Non-Medical):  Not on file   Physical Activity:     Days of Exercise per Week: Not on file    Minutes of Exercise per Session: Not on file   Stress:     Feeling of Stress : Not on file   Social Connections:     Frequency of Communication with Friends and Family: Not on file    Frequency of Social Gatherings with Friends and Family: Not on file    Attends Nondenominational Services: Not on file    Active Member of Clubs or Organizations: Not on file    Attends Club or Organization Meetings: Not on file    Marital Status: Not on file   Intimate Partner Violence:     Fear of Current or Ex-Partner: Not on file    Emotionally Abused: Not on file    Physically Abused: Not on file    Sexually Abused: Not on file   Housing Stability:     Unable to Pay for Housing in the Last Year: Not on file    Number of Places Lived in the Last Year: Not on file    Unstable Housing in the Last Year: Not on file         ALLERGIES: Latex, Other food, Amoxicillin, Aspirin, Beeswax, Levaquin [levofloxacin], [place], Codeine, Glycopyrrolate, Pcn [penicillins], Tape [adhesive], and Tramadol    Review of Systems   Constitutional: Negative for activity change, appetite change, chills, diaphoresis, fatigue and fever. HENT: Negative. Eyes: Negative. Respiratory: Negative. Cardiovascular: Negative. Gastrointestinal: Positive for nausea. Negative for abdominal pain, blood in stool, constipation and rectal pain. Endocrine: Negative. Genitourinary: Negative. Musculoskeletal: Positive for arthralgias, back pain, gait problem and myalgias. Negative for joint swelling, neck pain and neck stiffness. Hematological: Negative. Psychiatric/Behavioral: Negative. Vitals:    03/15/22 0402 03/15/22 0428 03/15/22 0429   BP: 114/78     Pulse: 85     Resp: 18     Temp: 98.1 °F (36.7 °C)     SpO2: 98%  98%   Weight:  65.8 kg (145 lb)    Height:  5' 5\" (1.651 m)             Physical Exam  Vitals and nursing note reviewed. Constitutional:       General: She is not in acute distress. Appearance: She is normal weight. She is not ill-appearing, toxic-appearing or diaphoretic. HENT:      Head: Normocephalic and atraumatic. Nose: Nose normal. No congestion or rhinorrhea. Mouth/Throat:      Mouth: Mucous membranes are moist.      Pharynx: Oropharynx is clear. No oropharyngeal exudate or posterior oropharyngeal erythema. Eyes:      General:         Right eye: No discharge. Left eye: No discharge. Extraocular Movements: Extraocular movements intact. Conjunctiva/sclera: Conjunctivae normal.      Pupils: Pupils are equal, round, and reactive to light. Cardiovascular:      Rate and Rhythm: Normal rate and regular rhythm. Pulses: Normal pulses. Heart sounds: Normal heart sounds.  No murmur heard.      Pulmonary:      Effort: Pulmonary effort is normal. No respiratory distress. Breath sounds: Normal breath sounds. No stridor. No wheezing. Abdominal:      General: Abdomen is flat. There is no distension. Palpations: Abdomen is soft. Tenderness: There is no abdominal tenderness. Musculoskeletal:      Cervical back: No rigidity. Skin:     General: Skin is warm. Capillary Refill: Capillary refill takes less than 2 seconds. Neurological:      General: No focal deficit present. Mental Status: She is alert. Mental status is at baseline. MDM  Number of Diagnoses or Management Options  Chronic pain of both knees  Diagnosis management comments: 44 y/o F presents to the ED in NAD complaining of bilateral knee pain worse in the left, acute exacerbation of chronic pain. Pt is in pain management program for multiple chronic pain complaints and opiate dependency. There has been no recent trauma or injury. Pt states she was recently prescribed celebrex through her pain management program but  Her prescription ran out and this has resulted in increased pain. . Exam is at patient's baseline. There is no calf pain or swelling. Biltateral neurovascular intact.   PT treated with NSAIDs in the ED (Toradol)  DC with celebrex Rx         Procedures

## 2022-03-15 NOTE — ED NOTES
Pt medicated per [facility] following allergy verification. Upon receiving injection, Pt verbalizes if there is any \"stronger\" medication that could be given. Pt educated that medication would take some time to have an effect, and MD would be notified r/t Pt concern.     Pt was offered to be assisted with position changes, warm blanket given and offered TV as a distraction

## 2022-03-18 PROBLEM — E11.21 TYPE 2 DIABETES WITH NEPHROPATHY (HCC): Status: ACTIVE | Noted: 2018-05-22

## 2022-03-18 PROBLEM — I82.401 ACUTE DEEP VEIN THROMBOSIS (DVT) OF RIGHT LOWER EXTREMITY (HCC): Status: ACTIVE | Noted: 2021-11-11

## 2022-03-18 PROBLEM — M25.40 PAINFUL SWELLING OF JOINT: Status: ACTIVE | Noted: 2021-08-15

## 2022-03-19 PROBLEM — K08.89 PAIN, DENTAL: Status: ACTIVE | Noted: 2021-08-15

## 2022-03-19 PROBLEM — Z90.13 ACQUIRED ABSENCE OF BOTH BREASTS: Status: ACTIVE | Noted: 2018-06-07

## 2022-03-19 PROBLEM — Z76.89 FREQUENT PATIENT IN EMERGENCY DEPARTMENT: Status: ACTIVE | Noted: 2021-09-07

## 2022-03-19 PROBLEM — E55.9 VITAMIN D DEFICIENCY: Status: ACTIVE | Noted: 2018-05-01

## 2022-03-19 PROBLEM — K04.7 DENTAL INFECTION: Status: ACTIVE | Noted: 2021-09-11

## 2022-03-19 PROBLEM — C50.411 MALIGNANT NEOPLASM OF UPPER-OUTER QUADRANT OF RIGHT FEMALE BREAST (HCC): Status: ACTIVE | Noted: 2017-01-25

## 2022-03-19 PROBLEM — R60.0 BILATERAL LEG EDEMA: Status: ACTIVE | Noted: 2021-11-04

## 2022-03-19 PROBLEM — I82.432 ACUTE DEEP VEIN THROMBOSIS (DVT) OF POPLITEAL VEIN OF LEFT LOWER EXTREMITY (HCC): Status: ACTIVE | Noted: 2021-11-04

## 2022-03-19 PROBLEM — Z82.49 FAMILY HISTORY OF EARLY CAD: Status: ACTIVE | Noted: 2020-07-25

## 2022-05-12 ENCOUNTER — APPOINTMENT (OUTPATIENT)
Dept: GENERAL RADIOLOGY | Age: 52
End: 2022-05-12
Attending: PHYSICIAN ASSISTANT
Payer: MEDICAID

## 2022-05-12 ENCOUNTER — HOSPITAL ENCOUNTER (EMERGENCY)
Age: 52
Discharge: HOME | End: 2022-05-12
Attending: EMERGENCY MEDICINE
Payer: MEDICAID

## 2022-05-12 VITALS
BODY MASS INDEX: 25.76 KG/M2 | HEIGHT: 62 IN | SYSTOLIC BLOOD PRESSURE: 142 MMHG | WEIGHT: 140 LBS | OXYGEN SATURATION: 100 % | RESPIRATION RATE: 18 BRPM | TEMPERATURE: 97.3 F | HEART RATE: 105 BPM | DIASTOLIC BLOOD PRESSURE: 76 MMHG

## 2022-05-12 DIAGNOSIS — Z98.1 HISTORY OF LUMBAR FUSION: ICD-10-CM
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
## 2022-05-12 DIAGNOSIS — S30.0XXA CONTUSION OF LOWER BACK, INITIAL ENCOUNTER: Primary | ICD-10-CM

## 2022-05-12 DIAGNOSIS — G89.29 OTHER CHRONIC PAIN: ICD-10-CM

## 2022-05-12 PROCEDURE — 72110 X-RAY EXAM L-2 SPINE 4/>VWS: CPT

## 2022-05-12 PROCEDURE — 99283 EMERGENCY DEPT VISIT LOW MDM: CPT

## 2022-05-12 RX ORDER — METHOCARBAMOL 750 MG/1
750 TABLET, FILM COATED ORAL 4 TIMES DAILY
Qty: 20 TABLET | Refills: 0 | Status: SHIPPED | OUTPATIENT
Start: 2022-05-12 | End: 2022-05-17

## 2022-05-12 RX ORDER — LIDOCAINE 4 G/100G
PATCH TOPICAL
Qty: 15 PATCH | Refills: 0 | Status: SHIPPED | OUTPATIENT
Start: 2022-05-12 | End: 2022-09-12

## 2022-05-12 NOTE — ED TRIAGE NOTES
Pt arrives to ed reporting a fall last night after losing her balance trying to go to the bathroom. Pt states she gets around with her walker. Pt fell hitting her left side on the corner of the table. Denies loc and use of blood thinners.

## 2022-05-12 NOTE — ED PROVIDER NOTES
EMERGENCY DEPARTMENT HISTORY AND PHYSICAL EXAM    Date: 5/12/2022  Patient Name: Roma Chao    History of Presenting Illness     Chief Complaint   Patient presents with    Fall         History Provided By: Patient    Chief Complaint: fall    HPI(Context):   7:28 PM  Roam Chao is a 45 y.o. female who presents to the emergency department C/O fall. Associated sxs include left lumbar back pain. Pt was at home when she was rising from bed she slipped and struck back against frame of bed. Pt was helped to her feet by family member. Pt has hx of chronic pain and lumbar fusion. Pt denies numbness, weakness, urinary/fecal incontinence, urinary retention, and any other sxs or complaints. PCP: Other, MD Fiord[place]iza    Current Outpatient Medications   Medication Sig Dispense Refill    lidocaine 4 % patch Apply to site of pain. 12 hours on and 12 hours off 15 Patch 0    methocarbamoL (Robaxin-750) 750 mg tablet Take 1 Tablet by mouth four (4) times daily for 5 days. Indications: muscle spasm 20 Tablet 0    promethazine (PHENERGAN) 25 mg tablet Take 1 Tablet by mouth every six (6) hours as needed for Nausea. 12 Tablet 0    ondansetron hcl (Zofran) 4 mg tablet Take 1 Tablet by mouth every eight (8) hours as needed for Nausea. 14 Tablet 0    diclofenac (Voltaren) 1 % gel Apply 2 g to affected area four (4) times daily. 20 g 0    Lidocaine Viscous 2 % solution Take 5 mL by mouth two (2) times daily as needed for Pain. Put 5ml onto guaze and put in mouth; hold for 30min. Can repeat up to twice daily. Indications: administration of local anesthetic drug 200 mL 0    omeprazole (PRILOSEC) 20 mg capsule Take 1 Capsule by mouth daily. 30 Capsule 0    dicyclomine (BENTYL) 20 mg tablet Take 1 Tablet by mouth every six (6) hours. 12 Tablet 0    naloxone (Narcan) 4 mg/actuation nasal spray Use 1 spray intranasally, then discard.  Repeat with new spray every 2 min as needed for opioid overdose symptoms, alternating nostrils. 2 Each 0    clonazePAM (KlonoPIN) 1 mg tablet Take  by mouth two (2) times a day.  insulin aspart protamine/insulin aspart (NovoLOG Mix 70-30FlexPen U-100) 100 unit/mL (70-30) inpn by SubCUTAneous route Before breakfast, lunch, dinner and at bedtime. Sliding scale      lisinopriL (PRINIVIL, ZESTRIL) 10 mg tablet Take 20 mg by mouth daily.  Cane coco 1 Each by Does Not Apply route daily. 1 Device 0    Blood-Glucose Meter (ONETOUCH ULTRA2) monitoring kit Use to obtain two times a day. 1 Kit 0    lancets misc Use daily to monitor blood glucose 200 Each 0    glucose blood VI test strips (ASCENSIA AUTODISC VI, ONE TOUCH ULTRA TEST VI) strip 50 Each by Does Not Apply route two (2) times daily as needed. 200 Strip 3    polyethylene glycol (MIRALAX) 17 gram packet Take 1 Packet by mouth daily.  30 Each 1    Insulin Needles, Disposable, 31 gauge x 5/16\" ndle Use to administer insulin as directed 1 Package 11       Past History     Past Medical History:  Past Medical History:   Diagnosis Date    Anxiety     Bipolar disorder ([address]     Breast cancer ([address]     breast cancer, right, S/P Mastectomy and chemo, radiation in 2013    Depression     Diabetes ([address]     Drug abuse ([address]     H/O cocaine use in past    Drug-seeking behavior     Family history of early CAD     Gastrointestinal disorder     cholitis    H/O ETOH abuse     Hyperlipidemia     Hypertension     Malignant neoplasm without specification of site ([address]     Methamphetamine abuse ([address]      self reported on 1/3/16    Psychiatric disorder     multiple personality disorder    Psychiatric disorder     anxiety    Psychiatric disorder     bipolar     Spine injury     Thromboembolus ([address]     Vitamin D deficiency 5/1/2018       Past Surgical History:  Past Surgical History:   Procedure Laterality Date    COLONOSCOPY N/A 7/18/2016    COL[facility] performed [facility] ENDOSCOPY    HX BREAST LUMPECTOMY 06/2013    right    HX HYSTERECTOMY      HX MASTECTOMY      had expanders put in 6/2018    HX ORTHOPAEDIC      Back fusion surgery 4/18/14.  HX OTHER SURGICAL      mediport    HX TONSILLECTOMY      HX TUBAL LIGATION      NEUROLOGICAL PROCEDURE UNLISTED  2020    back surgery       Family History:  Family History   Problem Relation Age of Onset    Heart Disease Mother     Stroke Father     Heart Disease Father 48    Diabetes Other     Hypertension Other     Cancer Maternal Grandmother        Social History:  Social History     Tobacco Use    Smoking status: Never Smoker    Smokeless tobacco: Never Used   Vaping Use    Vaping Use: Never used   Substance Use Topics    Alcohol use: Not Currently     Comment: \"Occasionally\"    Drug use: Not Currently     Types: Methamphetamines, Cocaine     Comment: none in 2 years       Allergies: Allergies   Allergen Reactions    Latex Hives and Itching    Other Food Rash     Almonds    Amoxicillin Swelling     Other reaction(s): mild rash/itching    Aspirin Not Reported This Time and Swelling     Mouth swells    Beeswax Anaphylaxis    Levaquin [Levofloxacin] Not Reported This Time and Swelling     Other reaction(s): mild rash/itching    [place] Rash and Itching    Codeine Other (comments)    Glycopyrrolate Swelling     Pt  States  faciAL  SWELLING   POST  ROBINUL  IV   Pt  States  faciAL  SWELLING   POST  ROBINUL  IV     Pcn [Penicillins] Swelling    Tape [Adhesive] Rash    Tramadol Swelling         Review of Systems   Review of Systems   Genitourinary: Negative for difficulty urinating. Musculoskeletal: Positive for back pain. Negative for neck pain. Neurological: Negative for syncope, weakness and numbness. All other systems reviewed and are negative.       Physical Exam     Vitals:    05/12/22 1923 05/12/22 1947   BP: (!) 142/76    Pulse: (!) 105    Resp: 18    Temp: 97.3 °F (36.3 °C)    SpO2: 100% 100%   Weight: 63.5 kg (140 lb)    Height: 5' 2\" (1.575 m)      Physical Exam  Vitals and nursing note reviewed. Constitutional:       General: She is not in acute distress. Appearance: She is well-developed. She is not diaphoretic. Comments:  feamel   HENT:      Head: Normocephalic and atraumatic. Right Ear: External ear normal.      Left Ear: External ear normal.      Nose: Nose normal.   Eyes:      General: No scleral icterus. Right eye: No discharge. Left eye: No discharge. Conjunctiva/sclera: Conjunctivae normal.   Cardiovascular:      Rate and Rhythm: Normal rate and regular rhythm. Heart sounds: Normal heart sounds. No murmur heard. No friction rub. No gallop. Pulmonary:      Effort: Pulmonary effort is normal. No tachypnea, accessory muscle usage or respiratory distress. Breath sounds: Normal breath sounds. No decreased breath sounds, wheezing, rhonchi or rales. Musculoskeletal:      Cervical back: Normal and normal range of motion. Thoracic back: Normal.      Lumbar back: Tenderness (right lumbar paraspinal muscle tenderness) present. No swelling, edema, deformity or signs of trauma. Decreased range of motion. Skin:     General: Skin is warm and dry. Neurological:      Mental Status: She is alert and oriented to person, place, and time. Psychiatric:         Judgment: Judgment normal.             Diagnostic Study Results     Labs -   No results found for this or any previous visit (from the past 12 hour(s)). Radiologic Studies    XR SPINE LUMB MIN 4 V   Final Result   No acute fractures or listhesis. Posterior fusion hardware in place. CT Results  (Last 48 hours)    None        CXR Results  (Last 48 hours)    None          Medications given in the ED-  Medications - No data to display      Medical Decision Making   I am the first provider for this patient.     I reviewed the vital signs, available nursing notes, past medical history, past surgical history, family history and social history. Vital Signs-Reviewed the patient's vital signs. Pulse Oximetry Analysis - 100% on RA. NORMAL      Records Reviewed: Nursing Notes and Old Medical Records    Provider Notes (Medical Decision Making): Mechanical fall. No head trauma or LOC. No neck pain or midline spinal tenderness. Will image L spine. No low back alarm sxs. Pt has hx of chronic pain    Procedures:  Procedures    ED Course:   7:28 PM Initial assessment performed. The patients presenting problems have been discussed, and they are in agreement with the care plan formulated and outlined with them. I have encouraged them to ask questions as they arise throughout their visit. Diagnosis and Disposition       Imaging unremarkable. Most c/w contusion. Will tx sxs. PCP FU. Reasons to RTED discussed with pt. All questions answered. Pt feels comfortable going home at this time. Pt expressed understanding and she agrees with plan. 1. Contusion of lower back, initial encounter    2. Fall against object    3. History of lumbar fusion    4. Other chronic pain        PLAN:  1. D/C Home  2. Discharge Medication List as of 5/12/2022  9:14 PM      START taking these medications    Details   lidocaine 4 % patch Apply to site of pain. 12 hours on and 12 hours off, Normal, Disp-15 Patch, R-0         CONTINUE these medications which have CHANGED    Details   methocarbamoL (Robaxin-750) 750 mg tablet Take 1 Tablet by mouth four (4) times daily for 5 days. Indications: muscle spasm, Normal, Disp-20 Tablet, R-0         CONTINUE these medications which have NOT CHANGED    Details   promethazine (PHENERGAN) 25 mg tablet Take 1 Tablet by mouth every six (6) hours as needed for Nausea., Normal, Disp-12 Tablet, R-0      ondansetron hcl (Zofran) 4 mg tablet Take 1 Tablet by mouth every eight (8) hours as needed for Nausea., Normal, Disp-14 Tablet, R-0      diclofenac (Voltaren) 1 % gel Apply 2 g to affected area four (4) times daily. , Normal, Disp-20 g, R-0      Lidocaine Viscous 2 % solution Take 5 mL by mouth two (2) times daily as needed for Pain. Put 5ml onto guaze and put in mouth; hold for 30min. Can repeat up to twice daily. Indications: administration of local anesthetic drug, Normal, Disp-200 mL, R-0, PATRICIA      omeprazole (PRILOSEC) 20 mg capsule Take 1 Capsule by mouth daily. , Normal, Disp-30 Capsule, R-0      dicyclomine (BENTYL) 20 mg tablet Take 1 Tablet by mouth every six (6) hours. , Normal, Disp-12 Tablet, R-0      naloxone (Narcan) 4 mg/actuation nasal spray Use 1 spray intranasally, then discard. Repeat with new spray every 2 min as needed for opioid overdose symptoms, alternating nostrils. , Normal, Disp-2 Each, R-0      clonazePAM (KlonoPIN) 1 mg tablet Take  by mouth two (2) times a day., Historical Med      insulin aspart protamine/insulin aspart (NovoLOG Mix 70-30FlexPen U-100) 100 unit/mL (70-30) inpn by SubCUTAneous route Before breakfast, lunch, dinner and at bedtime. Sliding scale, Historical Med      lisinopriL (PRINIVIL, ZESTRIL) 10 mg tablet Take 20 mg by mouth daily. , Historical Med      Cane coco 1 Each by Does Not Apply route daily. , Print, Disp-1 Device, R-0      Blood-Glucose Meter (ONETOUCH ULTRA2) monitoring kit Use to obtain two times a day., Normal, Disp-1 Kit, R-0      lancets misc Use daily to monitor blood glucose, Normal, Disp-200 Each, R-0      glucose blood VI test strips (ASCENSIA AUTODISC VI, ONE TOUCH ULTRA TEST VI) strip 50 Each by Does Not Apply route two (2) times daily as needed., Normal, Disp-200 Strip, R-3      polyethylene glycol (MIRALAX) 17 gram packet Take 1 Packet by mouth daily. , Normal, Disp-30 Each, R-1      Insulin Needles, Disposable, 31 gauge x 5/16\" ndle Use to administer insulin as directed, Normal, Disp-1 Package, R-11         STOP taking these medications       lidocaine (Lidoderm) 5 % Comments:   Reason for Stopping:             3.   Follow-up Information     Follow up With Specialties Details Why Contact Info    Fiona Mathews MD Orthopedic Surgery   [address] [place] [address] Rd  [address] [facility]      THE [facility] EMERGENCY DEPT Emergency Medicine   [address] [address]  [phone]        _______________________________    Attestations: This note is prepared by Vicky Pina PA-C.  _______________________________          Please note that this dictation was completed with [employer], the computer voice recognition software. Quite often unanticipated grammatical, syntax, homophones, and other interpretive errors are inadvertently transcribed by the computer software. Please disregard these errors. Please excuse any errors that have escaped final proofreading.

## 2022-06-23 ENCOUNTER — APPOINTMENT (OUTPATIENT)
Dept: GENERAL RADIOLOGY | Age: 52
End: 2022-06-23
Attending: EMERGENCY MEDICINE
Payer: MEDICAID

## 2022-06-23 ENCOUNTER — HOSPITAL ENCOUNTER (EMERGENCY)
Age: 52
Discharge: HOME | End: 2022-06-23
Attending: EMERGENCY MEDICINE
Payer: MEDICAID

## 2022-06-23 VITALS
SYSTOLIC BLOOD PRESSURE: 157 MMHG | DIASTOLIC BLOOD PRESSURE: 77 MMHG | HEIGHT: 62 IN | HEART RATE: 73 BPM | BODY MASS INDEX: 23.92 KG/M2 | RESPIRATION RATE: 11 BRPM | TEMPERATURE: 97.4 F | OXYGEN SATURATION: 100 % | WEIGHT: 130 LBS

## 2022-06-23 DIAGNOSIS — R07.9 CHEST PAIN, UNSPECIFIED TYPE: Primary | ICD-10-CM

## 2022-06-23 DIAGNOSIS — G89.29 CHRONIC PAIN OF RIGHT KNEE: ICD-10-CM

## 2022-06-23 DIAGNOSIS — M25.561 CHRONIC PAIN OF RIGHT KNEE: ICD-10-CM

## 2022-06-23 LAB
ALBUMIN SERPL-MCNC: 3.1 G/DL (ref 3.4–5)
ALBUMIN/GLOB SERPL: 0.8 {RATIO} (ref 0.8–1.7)
ALP SERPL-CCNC: 133 U/L (ref 45–117)
ALT SERPL-CCNC: 27 U/L (ref 13–56)
ANION GAP SERPL CALC-SCNC: 7 MMOL/L (ref 3–18)
AST SERPL-CCNC: 22 U/L (ref 10–38)
BASOPHILS # BLD: 0 K/UL (ref 0–0.1)
BASOPHILS NFR BLD: 0 % (ref 0–2)
BILIRUB SERPL-MCNC: 0.2 MG/DL (ref 0.2–1)
BUN SERPL-MCNC: 21 MG/DL (ref 7–18)
BUN/CREAT SERPL: 20 (ref 12–20)
CALCIUM SERPL-MCNC: 8.9 MG/DL (ref 8.5–10.1)
CHLORIDE SERPL-SCNC: 107 MMOL/L (ref 100–111)
CO2 SERPL-SCNC: 27 MMOL/L (ref 21–32)
CREAT SERPL-MCNC: 1.07 MG/DL (ref 0.6–1.3)
DIFFERENTIAL METHOD BLD: ABNORMAL
EOSINOPHIL # BLD: 0.2 K/UL (ref 0–0.4)
EOSINOPHIL NFR BLD: 3 % (ref 0–5)
ERYTHROCYTE [DISTWIDTH] IN BLOOD BY AUTOMATED COUNT: 16.8 % (ref 11.6–14.5)
GLOBULIN SER CALC-MCNC: 4.1 G/DL (ref 2–4)
GLUCOSE SERPL-MCNC: 122 MG/DL (ref 74–99)
HCT VFR BLD AUTO: 29.5 % (ref 35–45)
HGB BLD-MCNC: 8.9 G/DL (ref 12–16)
IMM GRANULOCYTES # BLD AUTO: 0 K/UL (ref 0–0.04)
IMM GRANULOCYTES NFR BLD AUTO: 0 % (ref 0–0.5)
LYMPHOCYTES # BLD: 2 K/UL (ref 0.9–3.6)
LYMPHOCYTES NFR BLD: 28 % (ref 21–52)
MAGNESIUM SERPL-MCNC: 2.2 MG/DL (ref 1.6–2.6)
MCH RBC QN AUTO: 23.5 PG (ref 24–34)
MCHC RBC AUTO-ENTMCNC: 30.2 G/DL (ref 31–37)
MCV RBC AUTO: 77.8 FL (ref 78–100)
MONOCYTES # BLD: 0.4 K/UL (ref 0.05–1.2)
MONOCYTES NFR BLD: 5 % (ref 3–10)
NEUTS SEG # BLD: 4.5 K/UL (ref 1.8–8)
NEUTS SEG NFR BLD: 63 % (ref 40–73)
NRBC # BLD: 0 K/UL (ref 0–0.01)
NRBC BLD-RTO: 0 PER 100 WBC
PLATELET # BLD AUTO: 396 K/UL (ref 135–420)
PMV BLD AUTO: 9.3 FL (ref 9.2–11.8)
POTASSIUM SERPL-SCNC: 4 MMOL/L (ref 3.5–5.5)
PROT SERPL-MCNC: 7.2 G/DL (ref 6.4–8.2)
RBC # BLD AUTO: 3.79 M/UL (ref 4.2–5.3)
SODIUM SERPL-SCNC: 141 MMOL/L (ref 136–145)
TROPONIN-HIGH SENSITIVITY: 6 NG/L (ref 0–54)
TROPONIN-HIGH SENSITIVITY: 6 NG/L (ref 0–54)
WBC # BLD AUTO: 7.1 K/UL (ref 4.6–13.2)

## 2022-06-23 PROCEDURE — 99285 EMERGENCY DEPT VISIT HI MDM: CPT

## 2022-06-23 PROCEDURE — 74011250636 HC RX REV CODE- 250/636: Performed by: EMERGENCY MEDICINE

## 2022-06-23 PROCEDURE — 83735 ASSAY OF MAGNESIUM: CPT

## 2022-06-23 PROCEDURE — 85025 COMPLETE CBC W/AUTO DIFF WBC: CPT

## 2022-06-23 PROCEDURE — 93005 ELECTROCARDIOGRAM TRACING: CPT

## 2022-06-23 PROCEDURE — 84484 ASSAY OF TROPONIN QUANT: CPT

## 2022-06-23 PROCEDURE — 73560 X-RAY EXAM OF KNEE 1 OR 2: CPT

## 2022-06-23 PROCEDURE — 80053 COMPREHEN METABOLIC PANEL: CPT

## 2022-06-23 PROCEDURE — 96374 THER/PROPH/DIAG INJ IV PUSH: CPT

## 2022-06-23 PROCEDURE — 71045 X-RAY EXAM CHEST 1 VIEW: CPT

## 2022-06-23 RX ORDER — MORPHINE SULFATE 2 MG/ML
2 INJECTION, SOLUTION INTRAMUSCULAR; INTRAVENOUS ONCE
Status: COMPLETED | OUTPATIENT
Start: 2022-06-23 | End: 2022-06-23

## 2022-06-23 RX ADMIN — MORPHINE SULFATE 2 MG: 2 INJECTION, SOLUTION INTRAMUSCULAR; INTRAVENOUS at 20:47

## 2022-06-23 NOTE — ED PROVIDER NOTES
Chief EMERGENCY DEPARTMENT HISTORY AND PHYSICAL EXAM    Date: 6/23/2022  Patient Name: Galindo Arnold    History of Presenting Illness     Chief Complaint   Patient presents with    Chest Pain       History Provided By: Patient     History Garima Singh):   7:57 PM  Galindo Arnold is a 45 y.o. female with a PMHX of Bipolar, diabetes, depression, hypertension, hyperlipidemia, alcohol and methamphetamine abuse who presents to the emergency department (room 12) C/O right knee pain onset 2 months ago. Associated sxs include chest pain starting on Saturday (5 days ago). Pt denies recent injury or any other sxs or complaints. Patient states the chest pain has started over the last week because the knee pain has been persistent for several months. Patient denies shortness of breath with this. Chief Complaint: Right knee pain  Onset: 2 months  Timing:  Subacute  Context: Patient thinks that standing up at a party several months ago brought symptoms on, symptoms have slowly worsened since onset  Location: Right knee  Quality: Sharp  Severity: Moderate  Modifying Factors: Nothing makes it better, or worse.   Associated Symptoms: Chest pain starting over the last 5 days due to the pain    PCP: Fiordaliza Peter MD     Past History         Past Medical History:  Past Medical History:   Diagnosis Date    Anxiety     Bipolar disorder ([address]     Breast cancer ([address]     breast cancer, right, S/P Mastectomy and chemo, radiation in 2013    Depression     Diabetes ([address]     Drug abuse ([address]     H/O cocaine use in past    Drug-seeking behavior     Family history of early CAD     Gastrointestinal disorder     cholitis    H/O ETOH abuse     Hyperlipidemia     Hypertension     Malignant neoplasm without specification of site ([address]     Methamphetamine abuse ([address]      self reported on 1/3/16    Psychiatric disorder     multiple personality disorder    Psychiatric disorder     anxiety    Psychiatric disorder     bipolar     Spine injury     [facility] [address]     Vitamin D deficiency 5/1/2018       Past Surgical History:  Past Surgical History:   Procedure Laterality Date    COLONOSCOPY N/A 7/18/2016    COLONOSCOPY performed by Joana Wheeler MD at [facility] ENDOSCOPY    HX BREAST LUMPECTOMY  06/2013    right    HX HYSTERECTOMY      HX MASTECTOMY      had expanders put in 6/2018    HX ORTHOPAEDIC      Back fusion surgery 4/18/14.  HX OTHER SURGICAL      mediport    HX TONSILLECTOMY      HX TUBAL LIGATION      NEUROLOGICAL PROCEDURE UNLISTED  2020    back surgery       Family History:  Family History   Problem Relation Age of Onset    Heart Disease Mother     Stroke Father     Heart Disease Father 48    Diabetes Other     Hypertension Other     Cancer Maternal Grandmother    Reviewed and non-contributory    Social History:  Social History     Tobacco Use    Smoking status: Never Smoker    Smokeless tobacco: Never Used   Vaping Use    Vaping Use: Never used   Substance Use Topics    Alcohol use: Not Currently     Comment: \"Occasionally\"    Drug use: Not Currently     Types: Methamphetamines, Cocaine     Comment: none in 2 years       Medications:  Current Outpatient Medications   Medication Sig Dispense Refill    lidocaine 4 % patch Apply to site of pain. 12 hours on and 12 hours off 15 Patch 0    promethazine (PHENERGAN) 25 mg tablet Take 1 Tablet by mouth every six (6) hours as needed for Nausea. 12 Tablet 0    ondansetron hcl (Zofran) 4 mg tablet Take 1 Tablet by mouth every eight (8) hours as needed for Nausea. 14 Tablet 0    diclofenac (Voltaren) 1 % gel Apply 2 g to affected area four (4) times daily. 20 g 0    Lidocaine Viscous 2 % solution Take 5 mL by mouth two (2) times daily as needed for Pain. Put 5ml onto guaze and put in mouth; hold for 30min. Can repeat up to twice daily.   Indications: administration of local anesthetic drug 200 mL 0    omeprazole (PRILOSEC) 20 mg capsule Take 1 Capsule by mouth daily. 30 Capsule 0    dicyclomine (BENTYL) 20 mg tablet Take 1 Tablet by mouth every six (6) hours. 12 Tablet 0    naloxone (Narcan) 4 mg/actuation nasal spray Use 1 spray intranasally, then discard. Repeat with new spray every 2 min as needed for opioid overdose symptoms, alternating nostrils. 2 Each 0    clonazePAM (KlonoPIN) 1 mg tablet Take  by mouth two (2) times a day.  insulin aspart protamine/insulin aspart (NovoLOG Mix 70-30FlexPen U-100) 100 unit/mL (70-30) inpn by SubCUTAneous route Before breakfast, lunch, dinner and at bedtime. Sliding scale      lisinopriL (PRINIVIL, ZESTRIL) 10 mg tablet Take 20 mg by mouth daily.  Cane coco 1 Each by Does Not Apply route daily. 1 Device 0    Blood-Glucose Meter (ONETOUCH ULTRA2) monitoring kit Use to obtain two times a day. 1 Kit 0    lancets misc Use daily to monitor blood glucose 200 Each 0    glucose blood VI test strips (ASCENSIA AUTODISC VI, ONE TOUCH ULTRA TEST VI) strip 50 Each by Does Not Apply route two (2) times daily as needed. 200 Strip 3    polyethylene glycol (MIRALAX) 17 gram packet Take 1 Packet by mouth daily. 30 Each 1    Insulin Needles, Disposable, 31 gauge x 5/16\" ndle Use to administer insulin as directed 1 Package 11       Allergies:   Allergies   Allergen Reactions    Latex Hives and Itching    Other Food Rash     Almonds    Amoxicillin Swelling     Other reaction(s): mild rash/itching    Aspirin Not Reported This Time and Swelling     Mouth swells    Beeswax Anaphylaxis    Levaquin [Levofloxacin] Not Reported This Time and Swelling     Other reaction(s): mild rash/itching    Phoenix Rash and Itching    Codeine Other (comments)    Glycopyrrolate Swelling     Pt  States  faciAL  SWELLING   POST  ROBINUL  IV   Pt  States  faciAL  SWELLING   POST  ROBINUL  IV     Pcn [Penicillins] Swelling    Tape [Adhesive] Rash    Tramadol Swelling       Review of Systems      Review of Systems   Constitutional: Negative for chills and fever. HENT: Negative for congestion, rhinorrhea and sore throat. Eyes: Negative for pain and visual disturbance. Respiratory: Negative for cough, shortness of breath and wheezing. Cardiovascular: Positive for chest pain. Negative for palpitations. Gastrointestinal: Negative for abdominal pain, diarrhea and vomiting. Genitourinary: Negative for dysuria, flank pain, frequency and urgency. Musculoskeletal: Positive for joint swelling. Negative for arthralgias and myalgias. Skin: Negative for rash and wound. Neurological: Negative for speech difficulty, weakness, light-headedness and headaches. Psychiatric/Behavioral: Negative for agitation and confusion. All other systems reviewed and are negative. Physical Exam     Vitals:    06/23/22 2123 06/23/22 2149 06/23/22 2208 06/23/22 2254   BP:       Pulse: 75 81 85 73   Resp: 16 16 19 11   Temp:       SpO2: 100% 100% 100% 100%   Weight:       Height:           Physical Exam  Vitals and nursing note reviewed. Constitutional:       General: She is not in acute distress. Appearance: Normal appearance. She is normal weight. She is not ill-appearing. HENT:      Head: Normocephalic and atraumatic. Nose: Nose normal. No rhinorrhea. Mouth/Throat:      Mouth: Mucous membranes are moist.      Pharynx: No oropharyngeal exudate or posterior oropharyngeal erythema. Eyes:      Extraocular Movements: Extraocular movements intact. Conjunctiva/sclera: Conjunctivae normal.      Pupils: Pupils are equal, round, and reactive to light. Cardiovascular:      Rate and Rhythm: Normal rate and regular rhythm. Heart sounds: No murmur heard. No friction rub. No gallop. Pulmonary:      Effort: Pulmonary effort is normal. No respiratory distress. Breath sounds: Normal breath sounds. No wheezing, rhonchi or rales. Abdominal:      General: Bowel sounds are normal.      Palpations: Abdomen is soft. Tenderness:  There is no abdominal tenderness. There is no guarding or rebound. Musculoskeletal:         General: No swelling or deformity. Normal range of motion. Cervical back: Normal range of motion and neck supple. No rigidity. Right knee: No swelling, deformity, effusion, erythema, ecchymosis, lacerations, bony tenderness or crepitus. Normal range of motion. Tenderness present. No LCL laxity, MCL laxity, ACL laxity or PCL laxity. Normal alignment and normal patellar mobility. Abnormal pulse. Lymphadenopathy:      Cervical: No cervical adenopathy. Skin:     General: Skin is warm and dry. Findings: No rash. Neurological:      General: No focal deficit present. Mental Status: She is alert and oriented to person, place, and time.    Psychiatric:         Mood and Affect: Mood normal.         Behavior: Behavior normal.         Diagnostic Study Results     Labs -  Recent Results (from the past 12 hour(s))   EKG, 12 LEAD, INITIAL    Collection Time: 06/23/22  7:53 PM   Result Value Ref Range    Ventricular Rate 94 BPM    Atrial Rate 94 BPM    P-R Interval 168 ms    QRS Duration 84 ms    Q-T Interval 348 ms    QTC Calculation (Bezet) 435 ms    Calculated P Axis 64 degrees    Calculated R Axis -24 degrees    Calculated T Axis 73 degrees    Diagnosis       Normal sinus rhythm  Normal ECG  When compared with ECG of 27-DEC-2021 10:30,  No significant change was found     CBC WITH AUTOMATED DIFF    Collection Time: 06/23/22  8:30 PM   Result Value Ref Range    WBC 7.1 4.6 - 13.2 K/uL    RBC 3.79 (L) 4.20 - 5.30 M/uL    HGB 8.9 (L) 12.0 - 16.0 g/dL    HCT 29.5 (L) 35.0 - 45.0 %    MCV 77.8 (L) 78.0 - 100.0 FL    MCH 23.5 (L) 24.0 - 34.0 PG    MCHC 30.2 (L) 31.0 - 37.0 g/dL    RDW 16.8 (H) 11.6 - 14.5 %    PLATELET 727 207 - 309 K/uL    MPV 9.3 9.2 - 11.8 FL    NRBC 0.0 0  WBC    ABSOLUTE NRBC 0.00 0.00 - 0.01 K/uL    NEUTROPHILS 63 40 - 73 %    LYMPHOCYTES 28 21 - 52 %    MONOCYTES 5 3 - 10 %    EOSINOPHILS 3 0 - 5 % BASOPHILS 0 0 - 2 %    IMMATURE GRANULOCYTES 0 0.0 - 0.5 %    ABS. NEUTROPHILS 4.5 1.8 - 8.0 K/UL    ABS. LYMPHOCYTES 2.0 0.9 - 3.6 K/UL    ABS. MONOCYTES 0.4 0.05 - 1.2 K/UL    ABS. EOSINOPHILS 0.2 0.0 - 0.4 K/UL    ABS. BASOPHILS 0.0 0.0 - 0.1 K/UL    ABS. IMM. GRANS. 0.0 0.00 - 0.04 K/UL    DF AUTOMATED     METABOLIC PANEL, COMPREHENSIVE    Collection Time: 06/23/22  8:30 PM   Result Value Ref Range    Sodium 141 136 - 145 mmol/L    Potassium 4.0 3.5 - 5.5 mmol/L    Chloride 107 100 - 111 mmol/L    CO2 27 21 - 32 mmol/L    Anion gap 7 3.0 - 18 mmol/L    Glucose 122 (H) 74 - 99 mg/dL    BUN 21 (H) 7.0 - 18 MG/DL    Creatinine 1.07 0.6 - 1.3 MG/DL    BUN/Creatinine ratio 20 12 - 20      GFR est AA >60 >60 ml/min/1.73m2    GFR est non-AA 54 (L) >60 ml/min/1.73m2    Calcium 8.9 8.5 - 10.1 MG/DL    Bilirubin, total 0.2 0.2 - 1.0 MG/DL    ALT (SGPT) 27 13 - 56 U/L    AST (SGOT) 22 10 - 38 U/L    Alk.  phosphatase 133 (H) 45 - 117 U/L    Protein, total 7.2 6.4 - 8.2 g/dL    Albumin 3.1 (L) 3.4 - 5.0 g/dL    Globulin 4.1 (H) 2.0 - 4.0 g/dL    A-G Ratio 0.8 0.8 - 1.7     MAGNESIUM    Collection Time: 06/23/22  8:30 PM   Result Value Ref Range    Magnesium 2.2 1.6 - 2.6 mg/dL   TROPONIN-HIGH SENSITIVITY    Collection Time: 06/23/22  8:30 PM   Result Value Ref Range    Troponin-High Sensitivity 6 0 - 54 ng/L   EKG, 12 LEAD, SUBSEQUENT    Collection Time: 06/23/22 10:09 PM   Result Value Ref Range    Ventricular Rate 77 BPM    Atrial Rate 77 BPM    P-R Interval 168 ms    QRS Duration 80 ms    Q-T Interval 378 ms    QTC Calculation (Bezet) 427 ms    Calculated P Axis 54 degrees    Calculated R Axis -29 degrees    Calculated T Axis 63 degrees    Diagnosis       Normal sinus rhythm  Minimal voltage criteria for LVH, may be normal variant ( [place] product )  Borderline ECG  When compared with ECG of 23-JUN-2022 19:53,  No significant change was found     TROPONIN-HIGH SENSITIVITY    Collection Time: 06/23/22 10:15 PM   Result Value Ref Range    Troponin-High Sensitivity 6 0 - 54 ng/L       Radiologic Studies -   XR CHEST PORT   Final Result   No active cardiopulmonary disease. XR KNEE RT MAX 2 VWS   Final Result      No acute bony abnormality. CT Results  (Last 48 hours)    None        CXR Results  (Last 48 hours)               22  XR CHEST PORT Final result    Impression:  No active cardiopulmonary disease. Narrative:  AP portable chest, 2022 at 8:34 PM:       INDICATION: Chest pain. Comparison 2021. Top normal heart size. No congestive heart failure. No change. The lungs are   clear. Medications given in the ED-  Medications   morphine injection 2 mg (2 mg IntraVENous Given 22)       Procedures     Procedures    ED Course     I Cordell Gunter MD) am the first provider for this patient. I reviewed the vital signs, available nursing notes, past medical history, past surgical history, family history and social history. Records Reviewed: Nursing Notes    Cardiac Monitor:  Rate: 94 bpm  Rhythm: sinus rhythm    Pulse Oximetry Analysis - 100% on RA    EKG interpretation: (Preliminary)  Rhythm: NSR. Rate: 94 bpm; no STEMI  EKG read by Cordell Gunter MD at 7:53 PM    EKG interpretation: (Repeat)  Rhythm: NSR. Rate: 77 bpm; no STEMI  EKG read by Cordell Gunter MD at 10:09 PM    7:57 PM Initial assessment performed. The patients presenting problems have been discussed, and they are in agreement with the care plan formulated and outlined with them. I have encouraged them to ask questions as they arise throughout their visit. HEART Score:    History:  0: Slightly suspicious  EK: Normal  Age:  1: 45-65  Risk Factors:  2: 3+, Prior CAD, PVD, CVA  Risk Factors:  Hypercholesterolemia, Hypertension, Diabetes Mellitus  Troponin 1: 6 ng/L  Troponin 2: 6 ng/L    Total: 3  0-3: Low risk: less than 2% risk of Major Adverse Cardiac Event at 6 weeks.   Troponin increase by 1.4x or more: No           Medical Decision Making     Provider Notes (Medical Decision Making):   DDX: Sprain, strain, fracture, dislocation, contusion, chronic knee pain, unlikely ACS    Discussion:  45 y.o. female with prior history of right knee pain which is chronic in nature. Patient has complaints of chest pain today due to the knee pain. Patient has a low risk heart score of 3 with very low risk of Mace (less than 2%) he has chronic right knee pain. She is gotten pain medications in the ED but there is no indication for treatment of chronic pain as an outpatient. She can follow-up with her primary care doctor or orthopedics to have this arranged. Patient and family understand and agree with this plan      Diagnosis and Disposition     DISCHARGE NOTE:  11:39 PM   Eris Aquino  results have been reviewed with her. She has been counseled regarding her diagnosis, treatment, and plan. She verbally conveys understanding and agreement of the signs, symptoms, diagnosis, treatment and prognosis and additionally agrees to follow up as discussed. She also agrees with the care-plan and conveys that all of her questions have been answered. I have also provided discharge instructions for her that include: educational information regarding their diagnosis and treatment, and list of reasons why they would want to return to the ED prior to their follow-up appointment, should her condition change. She has been provided with education for proper emergency department utilization. CLINICAL IMPRESSION:    1. Chest pain, unspecified type    2. Chronic pain of right knee        PLAN:  1. D/C Home  2. Current Discharge Medication List        3. Follow-up Information     Follow up With Specialties Details Why Carmine Carpenter  Schedule an appointment as soon as possible for a visit  As soon as possible, For follow up from Emergency Department visit.  Kash Juarez [address] [facility] [id number] [facility]  Schedule an appointment as soon as possible for a visit  As soon as possible, For follow up from Emergency Department visit. [facility] [address] [facility]  Schedule an appointment as soon as possible for a visit  As soon as possible, For follow up from Emergency Department visit. [facility] [address] [facility]    Can Valverde MD Orthopedic Surgery Schedule an appointment as soon as possible for a visit  As soon as possible, For follow up from Emergency Department visit. [address] [place] [id number] [phone]      [facility] EMERGENCY DEPT Emergency Medicine  As needed; If symptoms worsen [address] [facility] [id number] [facility] Parul Obando MD am the primary clinician of record. Dragon Disclaimer     Please note that this dictation was completed with [employer], the computer voice recognition software. Quite often unanticipated grammatical, syntax, homophones, and other interpretive errors are inadvertently transcribed by the computer software. Please disregard these errors. Please excuse any errors that have escaped final proofreading.     Jamal Obando MD

## 2022-06-27 LAB
ATRIAL RATE: 77 BPM
ATRIAL RATE: 94 BPM
CALCULATED P AXIS, ECG09: 54 DEGREES
CALCULATED P AXIS, ECG09: 64 DEGREES
CALCULATED R AXIS, ECG10: -24 DEGREES
CALCULATED R AXIS, ECG10: -29 DEGREES
CALCULATED T AXIS, ECG11: 63 DEGREES
CALCULATED T AXIS, ECG11: 73 DEGREES
DIAGNOSIS, 93000: NORMAL
DIAGNOSIS, 93000: NORMAL
P-R INTERVAL, ECG05: 168 MS
P-R INTERVAL, ECG05: 168 MS
Q-T INTERVAL, ECG07: 348 MS
Q-T INTERVAL, ECG07: 378 MS
QRS DURATION, ECG06: 80 MS
QRS DURATION, ECG06: 84 MS
QTC CALCULATION (BEZET), ECG08: 427 MS
QTC CALCULATION (BEZET), ECG08: 435 MS
VENTRICULAR RATE, ECG03: 77 BPM
VENTRICULAR RATE, ECG03: 94 BPM

## 2022-07-15 ENCOUNTER — HOSPITAL ENCOUNTER (EMERGENCY)
Age: 52
Discharge: HOME | End: 2022-07-15
Attending: EMERGENCY MEDICINE
Payer: MEDICAID

## 2022-07-15 VITALS
BODY MASS INDEX: 22.76 KG/M2 | WEIGHT: 145 LBS | OXYGEN SATURATION: 96 % | SYSTOLIC BLOOD PRESSURE: 124 MMHG | RESPIRATION RATE: 16 BRPM | HEART RATE: 110 BPM | TEMPERATURE: 98.2 F | HEIGHT: 67 IN | DIASTOLIC BLOOD PRESSURE: 66 MMHG

## 2022-07-15 DIAGNOSIS — L30.9 DERMATITIS: ICD-10-CM

## 2022-07-15 DIAGNOSIS — M47.812 OSTEOARTHRITIS OF CERVICAL SPINE, UNSPECIFIED SPINAL OSTEOARTHRITIS COMPLICATION STATUS: ICD-10-CM

## 2022-07-15 DIAGNOSIS — M54.2 CHRONIC NECK PAIN: Primary | ICD-10-CM

## 2022-07-15 DIAGNOSIS — M50.30 DEGENERATIVE DISC DISEASE, CERVICAL: ICD-10-CM

## 2022-07-15 DIAGNOSIS — G89.29 CHRONIC NECK PAIN: Primary | ICD-10-CM

## 2022-07-15 LAB
ALBUMIN SERPL-MCNC: 3.1 G/DL (ref 3.4–5)
ALBUMIN/GLOB SERPL: 0.7 {RATIO} (ref 0.8–1.7)
ALP SERPL-CCNC: 117 U/L (ref 45–117)
ALT SERPL-CCNC: 13 U/L (ref 13–56)
ANION GAP SERPL CALC-SCNC: 4 MMOL/L (ref 3–18)
APPEARANCE UR: CLEAR
AST SERPL-CCNC: 16 U/L (ref 10–38)
BASOPHILS # BLD: 0 K/UL (ref 0–0.1)
BASOPHILS NFR BLD: 0 % (ref 0–2)
BILIRUB SERPL-MCNC: 0.3 MG/DL (ref 0.2–1)
BILIRUB UR QL: ABNORMAL
BUN SERPL-MCNC: 13 MG/DL (ref 7–18)
BUN/CREAT SERPL: 13 (ref 12–20)
CALCIUM SERPL-MCNC: 9.4 MG/DL (ref 8.5–10.1)
CHLORIDE SERPL-SCNC: 106 MMOL/L (ref 100–111)
CO2 SERPL-SCNC: 28 MMOL/L (ref 21–32)
COLOR UR: YELLOW
CREAT SERPL-MCNC: 1.03 MG/DL (ref 0.6–1.3)
DIFFERENTIAL METHOD BLD: ABNORMAL
EOSINOPHIL # BLD: 0.2 K/UL (ref 0–0.4)
EOSINOPHIL NFR BLD: 2 % (ref 0–5)
ERYTHROCYTE [DISTWIDTH] IN BLOOD BY AUTOMATED COUNT: 15.5 % (ref 11.6–14.5)
GLOBULIN SER CALC-MCNC: 4.7 G/DL (ref 2–4)
GLUCOSE SERPL-MCNC: 199 MG/DL (ref 74–99)
GLUCOSE UR STRIP.AUTO-MCNC: NEGATIVE MG/DL
HCT VFR BLD AUTO: 30.8 % (ref 35–45)
HGB BLD-MCNC: 9.5 G/DL (ref 12–16)
HGB UR QL STRIP: NEGATIVE
IMM GRANULOCYTES # BLD AUTO: 0 K/UL (ref 0–0.04)
IMM GRANULOCYTES NFR BLD AUTO: 0 % (ref 0–0.5)
KETONES UR QL STRIP.AUTO: NEGATIVE MG/DL
LEUKOCYTE ESTERASE UR QL STRIP.AUTO: NEGATIVE
LIPASE SERPL-CCNC: 170 U/L (ref 73–393)
LYMPHOCYTES # BLD: 1.7 K/UL (ref 0.9–3.6)
LYMPHOCYTES NFR BLD: 23 % (ref 21–52)
MCH RBC QN AUTO: 23.9 PG (ref 24–34)
MCHC RBC AUTO-ENTMCNC: 30.8 G/DL (ref 31–37)
MCV RBC AUTO: 77.4 FL (ref 78–100)
MONOCYTES # BLD: 0.4 K/UL (ref 0.05–1.2)
MONOCYTES NFR BLD: 5 % (ref 3–10)
NEUTS SEG # BLD: 5.1 K/UL (ref 1.8–8)
NEUTS SEG NFR BLD: 69 % (ref 40–73)
NITRITE UR QL STRIP.AUTO: NEGATIVE
NRBC # BLD: 0 K/UL (ref 0–0.01)
NRBC BLD-RTO: 0 PER 100 WBC
PH UR STRIP: 5.5 [PH] (ref 5–8)
PLATELET # BLD AUTO: 475 K/UL (ref 135–420)
PMV BLD AUTO: 9.3 FL (ref 9.2–11.8)
POTASSIUM SERPL-SCNC: 3.9 MMOL/L (ref 3.5–5.5)
PROT SERPL-MCNC: 7.8 G/DL (ref 6.4–8.2)
PROT UR STRIP-MCNC: 100 MG/DL
RBC # BLD AUTO: 3.98 M/UL (ref 4.2–5.3)
SODIUM SERPL-SCNC: 138 MMOL/L (ref 136–145)
SP GR UR REFRACTOMETRY: >1.03 (ref 1–1.03)
UROBILINOGEN UR QL STRIP.AUTO: 1 EU/DL (ref 0.2–1)
WBC # BLD AUTO: 7.4 K/UL (ref 4.6–13.2)

## 2022-07-15 PROCEDURE — 74011250636 HC RX REV CODE- 250/636: Performed by: PHYSICIAN ASSISTANT

## 2022-07-15 PROCEDURE — 80053 COMPREHEN METABOLIC PANEL: CPT

## 2022-07-15 PROCEDURE — 85025 COMPLETE CBC W/AUTO DIFF WBC: CPT

## 2022-07-15 PROCEDURE — 83690 ASSAY OF LIPASE: CPT

## 2022-07-15 PROCEDURE — 96374 THER/PROPH/DIAG INJ IV PUSH: CPT

## 2022-07-15 PROCEDURE — 81001 URINALYSIS AUTO W/SCOPE: CPT

## 2022-07-15 PROCEDURE — 96375 TX/PRO/DX INJ NEW DRUG ADDON: CPT

## 2022-07-15 PROCEDURE — 99284 EMERGENCY DEPT VISIT MOD MDM: CPT

## 2022-07-15 RX ORDER — MORPHINE SULFATE 4 MG/ML
4 INJECTION INTRAVENOUS
Status: COMPLETED | OUTPATIENT
Start: 2022-07-15 | End: 2022-07-15

## 2022-07-15 RX ORDER — ONDANSETRON 2 MG/ML
4 INJECTION INTRAMUSCULAR; INTRAVENOUS
Status: COMPLETED | OUTPATIENT
Start: 2022-07-15 | End: 2022-07-15

## 2022-07-15 RX ADMIN — ONDANSETRON 4 MG: 2 INJECTION, SOLUTION INTRAMUSCULAR; INTRAVENOUS at 22:25

## 2022-07-15 RX ADMIN — MORPHINE SULFATE 4 MG: 4 INJECTION INTRAVENOUS at 22:25

## 2022-07-16 NOTE — DISCHARGE INSTRUCTIONS
Continue current medications at home for pain  Consider moist heat  Gentle stretching/exercises  Call Dr. Sylvia Bruce on Monday to advise of ER visit and worsening pain  Worse? Return to ER  Apply topical hydrocortisone cream to the areas of rash on your legs.   You may apply 3-4 times a day (over-the-counter)

## 2022-07-16 NOTE — ED PROVIDER NOTES
EMERGENCY DEPARTMENT HISTORY AND PHYSICAL EXAM    Date: 7/15/2022  Patient Name: Sander Malik    History of Presenting Illness     Time Seen: 9:50 PM    Chief Complaint   Patient presents with    Diarrhea    Rash       History Provided By: Patient    Additional History (Context):   Sander Malik is a 45 y.o. female who presents to the emergency room with c/o severe neck pain. Patient has a longstanding history of neck issues. States that she is actually scheduled to have surgery done by Dr. Buddy Del Castillo next month. However pain has gotten worse. Now she complains of pain going predominantly of the right side of her neck up into the base of her head. Denies any pain radiating down towards her arms. No numbness, tingling or weakness. Current neck pain is 10 out of 10. Patient also complains of rash on both of her legs. Red itchy rash. Scattered on legs. Feels swollen. No bleeding or drainage. Also complains of some nausea and diarrhea. No severe abdominal pain. Evidently sporadic after eating heavy foods. Believes a lot of it may be related to stress. Patient well-known to the ER. PCP: Fiordaliza Peter MD    Current Outpatient Medications   Medication Sig Dispense Refill    lidocaine 4 % patch Apply to site of pain. 12 hours on and 12 hours off 15 Patch 0    promethazine (PHENERGAN) 25 mg tablet Take 1 Tablet by mouth every six (6) hours as needed for Nausea. 12 Tablet 0    ondansetron hcl (Zofran) 4 mg tablet Take 1 Tablet by mouth every eight (8) hours as needed for Nausea. 14 Tablet 0    diclofenac (Voltaren) 1 % gel Apply 2 g to affected area four (4) times daily. 20 g 0    Lidocaine Viscous 2 % solution Take 5 mL by mouth two (2) times daily as needed for Pain. Put 5ml onto guaze and put in mouth; hold for 30min. Can repeat up to twice daily. Indications: administration of local anesthetic drug 200 mL 0    omeprazole (PRILOSEC) 20 mg capsule Take 1 Capsule by mouth daily.  27 Capsule 0    dicyclomine (BENTYL) 20 mg tablet Take 1 Tablet by mouth every six (6) hours. 12 Tablet 0    naloxone (Narcan) 4 mg/actuation nasal spray Use 1 spray intranasally, then discard. Repeat with new spray every 2 min as needed for opioid overdose symptoms, alternating nostrils. 2 Each 0    clonazePAM (KlonoPIN) 1 mg tablet Take  by mouth two (2) times a day.  insulin aspart protamine/insulin aspart (NovoLOG Mix 70-30FlexPen U-100) 100 unit/mL (70-30) inpn by SubCUTAneous route Before breakfast, lunch, dinner and at bedtime. Sliding scale      lisinopriL (PRINIVIL, ZESTRIL) 10 mg tablet Take 20 mg by mouth daily.  Cane coco 1 Each by Does Not Apply route daily. 1 Device 0    Blood-Glucose Meter (ONETOUCH ULTRA2) monitoring kit Use to obtain two times a day. 1 Kit 0    lancets misc Use daily to monitor blood glucose 200 Each 0    glucose blood VI test strips (ASCENSIA AUTODISC VI, ONE TOUCH ULTRA TEST VI) strip 50 Each by Does Not Apply route two (2) times daily as needed. 200 Strip 3    polyethylene glycol (MIRALAX) 17 gram packet Take 1 Packet by mouth daily.  30 Each 1    Insulin Needles, Disposable, 31 gauge x 5/16\" ndle Use to administer insulin as directed 1 Package 11       Past History     Past Medical History:  Past Medical History:   Diagnosis Date    Anxiety     Bipolar disorder ([address]     Breast cancer ([address]     breast cancer, right, S/P Mastectomy and chemo, radiation in 2013    Depression     Diabetes ([address]     Drug abuse ([address]     H/O cocaine use in past    Drug-seeking behavior     Family history of early CAD     Gastrointestinal disorder     cholitis    H/O ETOH abuse     Hyperlipidemia     Hypertension     Malignant neoplasm without specification of site ([address]     Methamphetamine abuse ([address]      self reported on 1/3/16    Psychiatric disorder     multiple personality disorder    Psychiatric disorder     anxiety    Psychiatric disorder     bipolar     Spine injury     [address]     Vitamin D deficiency 5/1/2018       Past Surgical History:  Past Surgical History:   Procedure Laterality Date    COLONOSCOPY N/A 7/18/2016    [facility]    HX BREAST LUMPECTOMY  06/2013    right    HX HYSTERECTOMY      HX MASTECTOMY      had expanders put in 6/2018    HX ORTHOPAEDIC      Back fusion surgery 4/18/14.  HX OTHER SURGICAL      mediport    HX TONSILLECTOMY      HX TUBAL LIGATION      NEUROLOGICAL PROCEDURE UNLISTED  2020    back surgery       Family History:  Family History   Problem Relation Age of Onset    Heart Disease Mother     Stroke Father     Heart Disease Father 48    Diabetes Other     Hypertension Other     Cancer Maternal Grandmother        Social History:  Social History     Tobacco Use    Smoking status: Never Smoker    Smokeless tobacco: Never Used   Vaping Use    Vaping Use: Never used   Substance Use Topics    Alcohol use: Not Currently     Comment: \"Occasionally\"    Drug use: Not Currently     Types: Methamphetamines, Cocaine     Comment: none in 2 years       Allergies: Allergies   Allergen Reactions    Latex Hives and Itching    Other Food Rash     Almonds    Amoxicillin Swelling     Other reaction(s): mild rash/itching    Aspirin Not Reported This Time and Swelling     Mouth swells    Beeswax Anaphylaxis    Levaquin [Levofloxacin] Not Reported This Time and Swelling     Other reaction(s): mild rash/itching    [place] Rash and Itching    Codeine Other (comments)    Glycopyrrolate Swelling     Pt  States  faciAL  SWELLING   POST  ROBINUL  IV   Pt  States  faciAL  SWELLING   POST  ROBINUL  IV     Pcn [Penicillins] Swelling    Tape [Adhesive] Rash    Tramadol Swelling         Review of Systems   Review of Systems   Constitutional: Positive for fatigue. Negative for chills and fever. Cardiovascular: Negative for chest pain and palpitations.    Gastrointestinal: Positive for diarrhea and nausea. Negative for abdominal pain, constipation and vomiting. Genitourinary: Negative for decreased urine volume, dysuria, flank pain, frequency, hematuria and urgency. Musculoskeletal: Positive for neck pain and neck stiffness. Negative for back pain. Skin: Positive for rash. Neurological: Negative for dizziness, light-headedness and headaches. All other systems reviewed and are negative. Physical Exam     Vitals:    07/15/22 2029 07/15/22 2228 07/15/22 2237   BP: 125/78 113/74 124/66   Pulse: (!) 110     Resp: 18  16   Temp: 98.2 °F (36.8 °C)     SpO2: 96%     Weight: 65.8 kg (145 lb)     Height: 5' 7\" (1.702 m)       Physical Exam  Vitals and nursing note reviewed. Constitutional:       General: She is not in acute distress. Appearance: Normal appearance. She is well-developed, well-groomed and normal weight. She is not ill-appearing. Comments: 50-year-old female no apparent distress. Vital signs reveals mild tachycardia heart rate 110 otherwise stable. Cooperative. HENT:      Head: Normocephalic and atraumatic. Cardiovascular:      Rate and Rhythm: Normal rate and regular rhythm. Heart sounds: Normal heart sounds. Pulmonary:      Effort: Pulmonary effort is normal.      Breath sounds: Normal breath sounds. Abdominal:      General: Bowel sounds are normal.      Palpations: Abdomen is soft. Tenderness: There is no abdominal tenderness. Musculoskeletal:      Cervical back: Tenderness and bony tenderness present. No swelling, deformity or crepitus. Pain with movement present. Decreased range of motion. Thoracic back: Normal.        Back:       Comments: Increased tenderness palpation over the cervical spine and in the paracervical musculature. Most of the pain is along the right side. Pain is experienced with any attempted range of motion. Skin:     General: Skin is warm and dry. Findings: Rash present.       Comments: Scattered papular rash. Slightly raised, reddened. Some excoriated. Not indurated or fluctuant. Seems to be most prevalent on the upper thighs. Neurological:      General: No focal deficit present. Mental Status: She is alert and oriented to person, place, and time. Psychiatric:         Behavior: Behavior is cooperative. Nursing note and vitals reviewed      Diagnostic Study Results     Labs -     No results found for this or any previous visit (from the past 24 hour(s)). Radiologic Studies   No orders to display     CT Results  (Last 48 hours)    None        CXR Results  (Last 48 hours)    None            Medical Decision Making   I am the first provider for this patient. I reviewed the vital signs, available nursing notes, past medical history, past surgical history, family history and social history. Vital Signs-Reviewed the patient's vital signs. Records Reviewed: Nursing Notes, Old Medical Records, Previous Radiology Studies and Previous Laboratory Studies    DDX:  Acute on chronic neck pain  Pruritic dermatitis etiology unclear      Procedures:  Procedures    ED Course:   Initial assessment performed. The patients presenting problems have been discussed, and they are in agreement with the care plan formulated and outlined with them. I have encouraged them to ask questions as they arise throughout their visit. ED Physician Discussion Note:   Long talk with patient while examining her. It seems as though the biggest reason she is here is for her neck pain. Patient is scheduled to have surgery on September 20, 2022 by Dr. Peg Brewster for a cervical 5-6 anterior cervical disc fusion. Patient really just wanted something for discomfort here to help control the acute exacerbation. She does not want to go home with medications for pain. Patient was ultimately given some morphine for her discomfort. Did not feel patient warranted any other further work-up.     Patient can continue her home medications for pain, stiffness. Discharge    Diagnosis and Disposition       DISCHARGE NOTE:  Sander Murray  results have been reviewed with her. She has been counseled regarding her diagnosis, treatment, and plan. She verbally conveys understanding and agreement of the signs, symptoms, diagnosis, treatment and prognosis and additionally agrees to follow up as discussed. She also agrees with the care-plan and conveys that all of her questions have been answered. I have also provided discharge instructions for her that include: educational information regarding their diagnosis and treatment, and list of reasons why they would want to return to the ED prior to their follow-up appointment, should her condition change. She has been provided with education for proper emergency department utilization. CLINICAL IMPRESSION:    1. Chronic neck pain    2. Degenerative disc disease, cervical    3. Osteoarthritis of cervical spine, unspecified spinal osteoarthritis complication status    4. Dermatitis        PLAN:  1. D/C Home  2. Discharge Medication List as of 7/15/2022 10:52 PM        3. Follow-up Information     Follow up With Specialties Details Why Contact Info    Diego Leggett MD Orthopedic Surgery Go to   [facility] [address] [place] [address] and [address] [address]      [facility] EMERGENCY DEPT Emergency Medicine  If symptoms worsen, As needed [address] [facility] [address]  [phone]        ____________________________________     Please note that this dictation was completed with [employer], the computer voice recognition software. Quite often unanticipated grammatical, syntax, homophones, and other interpretive errors are inadvertently transcribed by the computer software. Please disregard these errors. Please excuse any errors that have escaped final proofreading.

## 2022-07-16 NOTE — ED TRIAGE NOTES
C/O bilat LE rash that is \"swollen and itching\" with nausea/vomiting/diarrhea x 1 wk. Unable to keep down food/fluids. Endorses \"feeling a lot better\" before 1 wk ago. Endorses increased life stressors prior to N/V/D/rash.

## 2022-07-24 ENCOUNTER — APPOINTMENT (OUTPATIENT)
Dept: VASCULAR SURGERY | Age: 52
End: 2022-07-24
Attending: PHYSICIAN ASSISTANT
Payer: MEDICAID

## 2022-07-24 ENCOUNTER — HOSPITAL ENCOUNTER (EMERGENCY)
Age: 52
Discharge: HOME | End: 2022-07-24
Attending: EMERGENCY MEDICINE
Payer: MEDICAID

## 2022-07-24 VITALS
TEMPERATURE: 97.6 F | HEART RATE: 107 BPM | OXYGEN SATURATION: 100 % | RESPIRATION RATE: 16 BRPM | DIASTOLIC BLOOD PRESSURE: 74 MMHG | SYSTOLIC BLOOD PRESSURE: 131 MMHG

## 2022-07-24 DIAGNOSIS — M79.661 BILATERAL CALF PAIN: Primary | ICD-10-CM

## 2022-07-24 DIAGNOSIS — M79.662 BILATERAL CALF PAIN: Primary | ICD-10-CM

## 2022-07-24 PROCEDURE — 93970 EXTREMITY STUDY: CPT

## 2022-07-24 PROCEDURE — 99284 EMERGENCY DEPT VISIT MOD MDM: CPT

## 2022-07-24 PROCEDURE — 74011250637 HC RX REV CODE- 250/637: Performed by: PHYSICIAN ASSISTANT

## 2022-07-24 RX ORDER — ACETAMINOPHEN 500 MG
1000 TABLET ORAL
Status: DISCONTINUED | OUTPATIENT
Start: 2022-07-24 | End: 2022-07-25 | Stop reason: HOSPADM

## 2022-07-25 NOTE — ED TRIAGE NOTES
C/o bilateral leg pain, pt stated left calf felt hot and something \"ruptured\" last night and pt want to have legs checked.  Pt is on blood thinner

## 2022-07-25 NOTE — ED PROVIDER NOTES
EMERGENCY DEPARTMENT HISTORY AND PHYSICAL EXAM    Date: 7/24/2022  Patient Name: Robbi Zhu    History of Presenting Illness     Chief Complaint   Patient presents with    Leg Pain         History Provided By: Patient    Chief Complaint: leg pain       Additional History (Context):   8:59 PM  Robbi Zhu is a 45 y.o. female with PMHX hypertension, diabetes, thromboembolus, drug-seeking behavior presents to the emergency department C/O bilateral lower extremity swelling and pain. States that she always has pain and swelling in the right leg but yesterday she noticed some swelling in the left leg and felt a pop. No redness or increased warmth. States that she does have a history of clots and is on Xarelto and compliant with medications. States she did have some palpitations a couple days ago but no chest pain or shortness of breath at this time. PCP: Fiordaliza Peter MD    Current Facility-Administered Medications   Medication Dose Route Frequency Provider Last Rate Last Admin    acetaminophen (TYLENOL) tablet 1,000 mg  1,000 mg Oral NOW Cynthia Her PA         Current Outpatient Medications   Medication Sig Dispense Refill    lidocaine 4 % patch Apply to site of pain. 12 hours on and 12 hours off 15 Patch 0    promethazine (PHENERGAN) 25 mg tablet Take 1 Tablet by mouth every six (6) hours as needed for Nausea. 12 Tablet 0    ondansetron hcl (Zofran) 4 mg tablet Take 1 Tablet by mouth every eight (8) hours as needed for Nausea. 14 Tablet 0    diclofenac (Voltaren) 1 % gel Apply 2 g to affected area four (4) times daily. 20 g 0    Lidocaine Viscous 2 % solution Take 5 mL by mouth two (2) times daily as needed for Pain. Put 5ml onto guaze and put in mouth; hold for 30min. Can repeat up to twice daily. Indications: administration of local anesthetic drug 200 mL 0    omeprazole (PRILOSEC) 20 mg capsule Take 1 Capsule by mouth daily.  30 Capsule 0    dicyclomine (BENTYL) 20 mg tablet Take 1 Tablet by mouth every six (6) hours. 12 Tablet 0    naloxone (Narcan) 4 mg/actuation nasal spray Use 1 spray intranasally, then discard. Repeat with new spray every 2 min as needed for opioid overdose symptoms, alternating nostrils. 2 Each 0    clonazePAM (KlonoPIN) 1 mg tablet Take  by mouth two (2) times a day. insulin aspart protamine/insulin aspart (NovoLOG Mix 70-30FlexPen U-100) 100 unit/mL (70-30) inpn by SubCUTAneous route Before breakfast, lunch, dinner and at bedtime. Sliding scale      lisinopriL (PRINIVIL, ZESTRIL) 10 mg tablet Take 20 mg by mouth daily. Cane coco 1 Each by Does Not Apply route daily. 1 Device 0    Blood-Glucose Meter (ONETOUCH ULTRA2) monitoring kit Use to obtain two times a day. 1 Kit 0    lancets misc Use daily to monitor blood glucose 200 Each 0    glucose blood VI test strips (ASCENSIA AUTODISC VI, ONE TOUCH ULTRA TEST VI) strip 50 Each by Does Not Apply route two (2) times daily as needed. 200 Strip 3    polyethylene glycol (MIRALAX) 17 gram packet Take 1 Packet by mouth daily.  30 Each 1    Insulin Needles, Disposable, 31 gauge x 5/16\" ndle Use to administer insulin as directed 1 Package 11       Past History     Past Medical History:  Past Medical History:   Diagnosis Date    Anxiety     Bipolar disorder ([address]     Breast cancer ([address]     breast cancer, right, S/P Mastectomy and chemo, radiation in 2013    Depression     Diabetes ([address]     Drug abuse ([address]     H/O cocaine use in past    Drug-seeking behavior     Family history of early CAD     Gastrointestinal disorder     cholitis    H/O ETOH abuse     Hyperlipidemia     Hypertension     Malignant neoplasm without specification of site [facility])     Methamphetamine abuse ([address]      self reported on 1/3/16    Psychiatric disorder     multiple personality disorder    Psychiatric disorder     anxiety    Psychiatric disorder     bipolar     Spine injury     Thromboembolus ([address]     Vitamin D deficiency 5/1/2018       Past Surgical History:  Past Surgical History:   Procedure Laterality Date    COLONOSCOPY N/A 7/18/2016    COL[facility] MD at [facility] ENDOSCOPY    HX BREAST LUMPECTOMY  06/2013    right    HX HYSTERECTOMY      HX MASTECTOMY      had expanders put in 6/2018    HX ORTHOPAEDIC      Back fusion surgery 4/18/14. HX OTHER SURGICAL      mediport    HX TONSILLECTOMY      HX TUBAL LIGATION      NEUROLOGICAL PROCEDURE UNLISTED  2020    back surgery       Family History:  Family History   Problem Relation Age of Onset    Heart Disease Mother     Stroke Father     Heart Disease Father 48    Diabetes Other     Hypertension Other     Cancer Maternal Grandmother        Social History:  Social History     Tobacco Use    Smoking status: Never    Smokeless tobacco: Never   Vaping Use    Vaping Use: Never used   Substance Use Topics    Alcohol use: Not Currently     Comment: \"Occasionally\"    Drug use: Not Currently     Types: Methamphetamines, Cocaine     Comment: none in 2 years       Allergies: Allergies   Allergen Reactions    Latex Hives and Itching    Other Food Rash     Almonds    Amoxicillin Swelling     Other reaction(s): mild rash/itching    Aspirin Not Reported This Time and Swelling     Mouth swells    Beeswax Anaphylaxis    Levaquin [Levofloxacin] Not Reported This Time and Swelling     Other reaction(s): mild rash/itching    [place] Rash and Itching    Codeine Other (comments)    Glycopyrrolate Swelling     Pt  States  faciAL  SWELLING   POST  ROBINUL  IV   Pt  States  faciAL  SWELLING   POST  ROBINUL  IV     Pcn [Penicillins] Swelling    Tape [Adhesive] Rash    Tramadol Swelling       Review of Systems   Review of Systems   Constitutional:  Negative for chills and fever. Respiratory:  Negative for shortness of breath. Cardiovascular:  Negative for chest pain and leg swelling. Gastrointestinal:  Negative for abdominal pain, diarrhea, nausea and vomiting.    Musculoskeletal:         Leg swelling Skin:  Negative for rash and wound. Neurological:  Negative for weakness and numbness. All other systems reviewed and are negative. Physical Exam     Vitals:    07/24/22 2034   BP: 131/74   Pulse: (!) 107   Resp: 16   Temp: 97.6 °F (36.4 °C)   SpO2: 100%     Physical Exam  Vitals and nursing note reviewed. Constitutional:       Appearance: She is well-developed. HENT:      Head: Normocephalic and atraumatic. Cardiovascular:      Rate and Rhythm: Normal rate and regular rhythm. Heart sounds: Normal heart sounds. No murmur heard. Pulmonary:      Effort: Pulmonary effort is normal. No respiratory distress. Breath sounds: Normal breath sounds. No wheezing or rales. Abdominal:      General: Bowel sounds are normal.      Palpations: Abdomen is soft. Tenderness: There is no abdominal tenderness. Musculoskeletal:      Cervical back: Normal range of motion and neck supple. Comments: TTP over the bilateral calves but no redness swelling or increased warmth, pulses 2+ for DP and PT   Neurological:      Mental Status: She is alert and oriented to person, place, and time. Psychiatric:         Judgment: Judgment normal.       Diagnostic Study Results     Labs:   No results found for this or any previous visit (from the past 12 hour(s)). Radiologic Studies:   DUPLEX LOWER EXT VENOUS BILAT    (Results Pending)     CT Results  (Last 48 hours)      None          CXR Results  (Last 48 hours)      None            Medical Decision Making   I am the first provider for this patient. I reviewed the vital signs, available nursing notes, past medical history, past surgical history, family history and social history. Vital Signs: Reviewed the patient's vital signs. Pulse Oximetry Analysis: 100% on RA       Records Reviewed: Nursing Notes and Old Medical Records    Procedures:  Procedures    ED Course:   8:59 PM Initial assessment performed.  The patients presenting problems have been discussed, and they are in agreement with the care plan formulated and outlined with them. I have encouraged them to ask questions as they arise throughout their visit. Discussion:  Pt presents with bilateral lower extremity pain started yesterday with history of DVT on Xarelto and compliant. PVL study negative for DVT. Pulses 2+ for DP and PT. No redness or swelling seen. Strict return precautions given, pt offering no questions or complaints. Diagnosis and Disposition     DISCHARGE NOTE:  Sander Murray  results have been reviewed with her. She has been counseled regarding her diagnosis, treatment, and plan. She verbally conveys understanding and agreement of the signs, symptoms, diagnosis, treatment and prognosis and additionally agrees to follow up as discussed. She also agrees with the care-plan and conveys that all of her questions have been answered. I have also provided discharge instructions for her that include: educational information regarding their diagnosis and treatment, and list of reasons why they would want to return to the ED prior to their follow-up appointment, should her condition change. She has been provided with education for proper emergency department utilization. CLINICAL IMPRESSION:    1. Bilateral calf pain        PLAN:  1. D/C Home  2. Discharge Medication List as of 7/24/2022  9:55 PM        3. Follow-up Information       Follow up With Specialties Details Why Contact Info    [address] [place]  Schedule an appointment as soon as possible for a visit   [id number] [facility], [address] [facility] [address]    [facility] EMERGENCY DEPT Emergency Medicine  If symptoms worsen [address] [facility] [address] [phone]                   Please note that this dictation was completed with [employer], the [employer] voice recognition software.   Quite often unanticipated grammatical, syntax, homophones, and other interpretive errors are inadvertently transcribed by the computer software. Please disregard these errors. Please excuse any errors that have escaped final proofreading.

## 2022-07-25 NOTE — ED NOTES
Pt refused the tylenol. Pt states \"I can't have it, its in my chart. \". Pt was told that her allergies were for Aspirin. Pt stated \"Tylenol has aspirin in it\". This RN educated the patient that there in fact was no aspirin in tylenol pt stated \"well I don't want it\".

## 2022-07-30 NOTE — ED PROVIDER NOTES
EMERGENCY DEPARTMENT HISTORY AND PHYSICAL EXAM 
 
7:26 AM 
 
 
Date: 10/1/2018 Patient Name: Zoey Umana History of Presenting Illness Chief Complaint Patient presents with  Rash  
 Headache  Fatigue History Provided By: Patient Chief Complaint: Headache Duration:  1 week Timing:  Constant Location: Frontal 
Quality: Throbbing Severity: Moderate Modifying Factors:  
Associated Symptoms: Rash to the medial thighs. Denies change in vision. Additional History (Context): Zoey Umana is a 49 y.o. female with a history of breast cancer 3-4 who is currently in remission who presents with a moderate constant frontal headache for 1 week. Denies change in vision. The patient also reports a rash to the medial thighs. The patient states she had fallen last week, but denies head injury. She also reports chronic abdominal pain, nausea, vomiting, and diarrhea that she has seen her PCP for and has been referred to GI. Denies new medications. Denies recent travel. Denies recent antibiotics. Denies sick contacts. History of tubal ligation and hysterectomy. PCP: Louann Guerra NP Current Facility-Administered Medications Medication Dose Route Frequency Provider Last Rate Last Dose  diphenhydrAMINE (BENADRYL) injection 25 mg  25 mg IntraVENous NOW Hever Armendariz MD      
 sodium chloride 0.9 % bolus infusion 1,000 mL  1,000 mL IntraVENous ONCE Hever Armendariz MD 1,000 mL/hr at 10/01/18 0954 1,000 mL at 10/01/18 0954 Current Outpatient Prescriptions Medication Sig Dispense Refill  clotrimazole (LOTRIMIN) 1 % topical cream Apply  to affected area two (2) times a day for 30 days. Apply twice a day for 2-4 weeks 1 Tube 0  
 famotidine (PEPCID) 20 mg tablet Take 1 Tab by mouth two (2) times a day for 10 days. 20 Tab 0  
 alum-mag hydroxide-simeth (MYLANTA) 200-200-20 mg/5 mL susp Take 30 mL by mouth four (4) times daily as needed.  200 mL 0  
  prochlorperazine (COMPAZINE) 5 mg tablet Take 1 Tab by mouth every eight (8) hours as needed for Nausea for up to 7 days. 12 Tab 0  
 cyanocobalamin (VITAMIN B-12) 1,000 mcg tablet Take 1 Tab by mouth daily. 90 Tab 0  
 lisinopril-hydroCHLOROthiazide (PRINZIDE, ZESTORETIC) 20-12.5 mg per tablet Take 1 Tab by mouth two (2) times a day. 180 Tab 3  carvedilol (COREG) 3.125 mg tablet Take 2 Tabs by mouth two (2) times daily (with meals). 90 Tab 3  
 budesonide (RHINOCORT ALLERGY) 32 mcg/actuation nasal spray 2 Sprays by Both Nostrils route daily. Indications: Allergic Rhinitis 1 Bottle 3  
 insulin nph-regular human rec (HUMULIN 70-30) 100 unit/mL (70-30) inpn Give 40 units in the QAM and 45 units at bedtime 5 Pen 3  
 ferrous sulfate 325 mg (65 mg iron) tablet Take 1 Tab by mouth Daily (before breakfast). 30 Tab 3  polyethylene glycol (MIRALAX) 17 gram packet Take 1 Packet by mouth daily. 30 Each 1  
 hydrOXYzine HCl (ATARAX) 25 mg tablet Take 1-2 tablets by mouth every 6 hours as needed. 30 Tab 0  
 HYDROcodone-acetaminophen (NORCO) 5-325 mg per tablet Take  by mouth.  Insulin Needles, Disposable, 31 gauge x 5/16\" ndle Use to administer insulin as directed 1 Package 11  Lancets misc Use daily to monitor blood glucose 100 Each 11  
 glucose blood VI test strips (ASCENSIA AUTODISC VI, ONE TOUCH ULTRA TEST VI) strip Use daily to monitor blood glucose 100 Strip 11  Blood Pressure Test Kit-Medium kit Blood pressure as needed 1 Kit 0  
 atorvastatin (LIPITOR) 40 mg tablet Take 1 Tab by mouth daily. 30 Tab 6  
 bisacodyl (DULCOLAX, BISACODYL,) 10 mg suppository Insert 10 mg into rectum daily as needed. 28 Suppository 1  
 lidocaine (LIDODERM) 5 % 2 Patches by TransDERmal route every twenty-four (24) hours. Apply patch to the affected area for 12 hours a day and remove for 12 hours a day. 90 Each 3  
 mupirocin (BACTROBAN) 2 % ointment Use 1 Application to affected area 3 Times Daily.  valACYclovir (VALTREX) 1 gram tablet Take  by mouth.  Blood-Glucose Meter monitoring kit Use daily to check blood sugar 1 Kit 0  
 ondansetron (ZOFRAN ODT) 4 mg disintegrating tablet Take 1 Tab by mouth every eight (8) hours as needed for Nausea. 15 Tab 0  
 dicyclomine (BENTYL) 20 mg tablet Take 1 Tab by mouth every six (6) hours as needed (abdominal cramps) for up to 20 doses. 20 Tab 0  ALBUTEROL IN Take  by inhalation.  LORazepam (ATIVAN) 1 mg tablet 1 mg. Past History Past Medical History: 
Past Medical History:  
Diagnosis Date  Breast cancer ([facility] [address] breast cancer, right, S/P Mastectomy and chemo, radiation in 2013  Depression  Diabetes ([facility] [address]  Drug abuse ([facility] [address]   
 H/O cocaine use in past  
 Gastrointestinal disorder   
 cholitis  H/O ETOH abuse  Hyperlipidemia  Hypertension  Spine injury  Vitamin D deficiency 5/1/2018 Past Surgical History: 
Past Surgical History:  
Procedure Laterality Date  COLONOSCOPY N/A 7/18/2016 COLON[facility]COPY performed by Nancy García MD at [facility] ENDOSCOPY  
 HX BREAST LUMPECTOMY  06/2013  
 right  HX HYSTERECTOMY  HX ORTHOPAEDIC Back fusion surgery 4/18/14.  HX OTHER SURGICAL    
 mediport  HX TONSILLECTOMY  HX TUBAL LIGATION Family History: 
Family History Problem Relation Age of Onset  Heart Disease Mother  Stroke Father  Heart Disease Father 48  Diabetes Other  Hypertension Other  Cancer Maternal Grandmother Social History: 
Social History Substance Use Topics  Smoking status: Never Smoker  Smokeless tobacco: Never Used  Alcohol use No  
   Comment: \"Occasionally\" Allergies: Allergies Allergen Reactions  Latex Hives and Itching  Other Food Rash Almonds  Amoxicillin Swelling Other reaction(s): mild rash/itching  Aspirin Not Reported This Time and Swelling Mouth swells  Fentanyl Anaphylaxis and Swelling Patches cause mouth to swell Swelling in mouth from fentanyl patch  Levaquin [Levofloxacin] Not Reported This Time and Swelling Other reaction(s): mild rash/itching  Chlorhexidine Rash  Norco [Hydrocodone-Acetaminophen] Nausea and Vomiting Other reaction(s): gi distress  Acetaminophen Other (comments)  Danielson Rash and Itching  Codeine Other (comments)  Glycopyrrolate Swelling Pt  States  faciAL  SWELLING   POST  ROBINUL  IV Pt  States  faciAL  SWELLING   POST  ROBINUL  IV   
 Morphine Nausea and Vomiting Pt states she is not allergic  Pcn [Penicillins] Swelling  Percocet [Oxycodone-Acetaminophen] Nausea and Vomiting Other reaction(s): gi distress 
nausea Other reaction(s): anaphylaxis/angioedema Per pt. She is allergic  Tape [Adhesive] Rash  Tramadol Swelling Review of Systems Review of Systems Constitutional: Negative for activity change, fatigue and fever. HENT: Negative for congestion and rhinorrhea. Eyes: Negative for visual disturbance. Respiratory: Negative for shortness of breath. Cardiovascular: Negative for chest pain and palpitations. Gastrointestinal: Positive for abdominal pain, diarrhea, nausea and vomiting. Genitourinary: Negative for dysuria and hematuria. Musculoskeletal: Negative for back pain. Skin: Positive for rash. Neurological: Positive for headaches. Negative for dizziness, weakness and light-headedness. Psychiatric/Behavioral: Negative for agitation. All other systems reviewed and are negative. Physical Exam  
 
Visit Vitals  /77  Pulse 87  Temp 98.7 °F (37.1 °C)  Resp 18  Ht 5' 2\" (1.575 m)  Wt 68 kg (150 lb)  LMP 08/20/2013  SpO2 100%  Breastfeeding No  
 BMI 27.44 kg/m2 Physical Exam  
Constitutional: She appears well-developed and well-nourished. No distress.   
HENT:  
 Head: Normocephalic and atraumatic. Right Ear: External ear normal.  
Left Ear: External ear normal.  
Nose: Nose normal.  
Mouth/Throat: Oropharynx is clear and moist.  
Eyes: Conjunctivae and EOM are normal. Pupils are equal, round, and reactive to light. No scleral icterus. Neck: Normal range of motion. Neck supple. No JVD present. No tracheal deviation present. No thyromegaly present. Cardiovascular: Normal rate and regular rhythm. Exam reveals no friction rub. No murmur heard. Pulmonary/Chest: Effort normal and breath sounds normal. No stridor. She exhibits no tenderness. Abdominal: Soft. Bowel sounds are normal. She exhibits no distension and no pulsatile midline mass. There is tenderness (chronic) in the epigastric area. There is no rebound and no guarding. No hernia. Musculoskeletal: Normal range of motion. She exhibits no edema or tenderness. Lymphadenopathy:  
  She has no cervical adenopathy. Neurological: She is alert. No cranial nerve deficit. Coordination normal.  
Skin: Skin is warm and dry. Bilateral inner upper thigh rash with erythema and macula, no scaling or sign of cellulitis. No blistering. Blanching rash. Patient associates some pruritis with it. Psychiatric: She has a normal mood and affect. Her behavior is normal. Judgment and thought content normal.  
Nursing note and vitals reviewed. Diagnostic Study Results Labs - Recent Results (from the past 12 hour(s)) URINALYSIS W/ RFLX MICROSCOPIC Collection Time: 10/01/18  7:50 AM  
Result Value Ref Range Color YELLOW Appearance CLEAR Specific gravity >1.030 (H) 1.005 - 1.030  
 pH (UA) 6.5 5.0 - 8.0 Protein NEGATIVE  NEG mg/dL Glucose >1000 (A) NEG mg/dL Ketone NEGATIVE  NEG mg/dL Bilirubin NEGATIVE  NEG Blood NEGATIVE  NEG Urobilinogen 0.2 0.2 - 1.0 EU/dL Nitrites NEGATIVE  NEG  Leukocyte Esterase NEGATIVE  NEG    
CBC WITH AUTOMATED DIFF  
 Collection Time: 10/01/18  8:00 AM  
Result Value Ref Range WBC 6.6 4.6 - 13.2 K/uL  
 RBC 4.82 4.20 - 5.30 M/uL  
 HGB 14.0 12.0 - 16.0 g/dL HCT 42.0 35.0 - 45.0 % MCV 87.1 74.0 - 97.0 FL  
 MCH 29.0 24.0 - 34.0 PG  
 MCHC 33.3 31.0 - 37.0 g/dL  
 RDW 13.7 11.6 - 14.5 % PLATELET 318 272 - 502 K/uL MPV 10.9 9.2 - 11.8 FL  
 NEUTROPHILS 60 40 - 73 % LYMPHOCYTES 32 21 - 52 % MONOCYTES 6 3 - 10 % EOSINOPHILS 2 0 - 5 % BASOPHILS 0 0 - 2 %  
 ABS. NEUTROPHILS 4.0 1.8 - 8.0 K/UL  
 ABS. LYMPHOCYTES 2.1 0.9 - 3.6 K/UL  
 ABS. MONOCYTES 0.4 0.05 - 1.2 K/UL  
 ABS. EOSINOPHILS 0.1 0.0 - 0.4 K/UL  
 ABS. BASOPHILS 0.0 0.0 - 0.1 K/UL  
 DF AUTOMATED METABOLIC PANEL, COMPREHENSIVE Collection Time: 10/01/18  8:00 AM  
Result Value Ref Range Sodium 135 (L) 136 - 145 mmol/L Potassium 3.7 3.5 - 5.5 mmol/L Chloride 97 (L) 100 - 108 mmol/L  
 CO2 28 21 - 32 mmol/L Anion gap 10 3.0 - 18 mmol/L Glucose 435 (HH) 74 - 99 mg/dL BUN 15 7.0 - 18 MG/DL Creatinine 1.07 0.6 - 1.3 MG/DL  
 BUN/Creatinine ratio 14 12 - 20 GFR est AA >60 >60 ml/min/1.73m2 GFR est non-AA 55 (L) >60 ml/min/1.73m2 Calcium 9.1 8.5 - 10.1 MG/DL Bilirubin, total 0.3 0.2 - 1.0 MG/DL  
 ALT (SGPT) 34 13 - 56 U/L  
 AST (SGOT) 21 15 - 37 U/L Alk. phosphatase 178 (H) 45 - 117 U/L Protein, total 8.2 6.4 - 8.2 g/dL Albumin 3.9 3.4 - 5.0 g/dL Globulin 4.3 (H) 2.0 - 4.0 g/dL A-G Ratio 0.9 0.8 - 1.7 LIPASE Collection Time: 10/01/18  8:00 AM  
Result Value Ref Range Lipase 264 73 - 393 U/L Radiologic Studies -  
CT HEAD WO CONT Final Result CT HEAD WO CONT IMPRESSION: 
 
 
1. No acute intracranial abnormality Medical Decision Making I am the first provider for this patient. I reviewed the vital signs, available nursing notes, past medical history, past surgical history, family history and social history. Vital Signs-Reviewed the patient's vital signs. Pulse Oximetry Analysis -  100% on room air (Interpretation) WNL Records Reviewed: Nursing Notes and Old Medical Records (Time of Review: 7:26 AM) EK:30. Rate 60. Normal Sinus Rhythm. Normal intervals and axis. No acute sign of STEMI. ED Course: Progress Notes, Reevaluation, and Consults: 
 
7:37 AM I have assessed the patient who will be discharged in stable condition. Patient is to return to emergency department if any new or worsening condition. I have given the patient instructions for discharge and follow-up. Patient understands and verbalizes agreement with plan, diagnosis, discharge, and follow-up. 10:45 AM CT head negative. Glucose 435. Fluids. Give dose of morning insulin. Otherwise patent's labs are unremarkable. Will obtain POC glucose and discharge her at that point. 10:48 AM Patient reevaluated. Patient feeling much better, no headache. Abdominal pain irritation as prior. Not complaining of new abdominal pain or tenderness. 10:52 AM I have assessed the patient who will be discharged in stable condition. Patient is to return to emergency department if any new or worsening condition. I have given the patient instructions for discharge and follow-up. Patient understands and verbalizes agreement with plan, diagnosis, discharge, and follow-up. Provider Notes (Medical Decision Making): Patient with acute on chronic headaches recent for 7 days after a fall. Denies head injury, but will check head CT. Symptomatic treatment. patient does states she used to have similar headaches in the past. 
1 episode of vomiting and loose stool. No black/bloody stool. Abdominal pain is chronic and epigastric, has a GI follow up. Patient also has inner thigh rash maybe consistent with tinea. I will give symptomatic relief.  patient does states history of breast cancer, but no acute reoccurrence and had recent stressors at home with sister in the hospital and has been concerned and wanted to get herself checked out. Diagnosis Clinical Impression: 1. Acute nonintractable headache, unspecified headache type 2. Abdominal pain, epigastric 3. Non-intractable vomiting with nausea, unspecified vomiting type 4. Diarrhea, unspecified type 5. Rash Disposition: home Follow-up Information None Patient's Medications Start Taking ALUM-MAG HYDROXIDE-SIMETH (MYLANTA) 200-200-20 MG/5 ML SUSP    Take 30 mL by mouth four (4) times daily as needed. CLOTRIMAZOLE (LOTRIMIN) 1 % TOPICAL CREAM    Apply  to affected area two (2) times a day for 30 days. Apply twice a day for 2-4 weeks FAMOTIDINE (PEPCID) 20 MG TABLET    Take 1 Tab by mouth two (2) times a day for 10 days. PROCHLORPERAZINE (COMPAZINE) 5 MG TABLET    Take 1 Tab by mouth every eight (8) hours as needed for Nausea for up to 7 days. Continue Taking ALBUTEROL IN    Take  by inhalation. ATORVASTATIN (LIPITOR) 40 MG TABLET    Take 1 Tab by mouth daily. BISACODYL (DULCOLAX, BISACODYL,) 10 MG SUPPOSITORY    Insert 10 mg into rectum daily as needed. BLOOD PRESSURE TEST KIT-MEDIUM KIT    Blood pressure as needed BLOOD-GLUCOSE METER MONITORING KIT    Use daily to check blood sugar BUDESONIDE (RHINOCORT ALLERGY) 32 MCG/ACTUATION NASAL SPRAY    2 Sprays by Both Nostrils route daily. Indications: Allergic Rhinitis CARVEDILOL (COREG) 3.125 MG TABLET    Take 2 Tabs by mouth two (2) times daily (with meals). CYANOCOBALAMIN (VITAMIN B-12) 1,000 MCG TABLET    Take 1 Tab by mouth daily. DICYCLOMINE (BENTYL) 20 MG TABLET    Take 1 Tab by mouth every six (6) hours as needed (abdominal cramps) for up to 20 doses. FERROUS SULFATE 325 MG (65 MG IRON) TABLET    Take 1 Tab by mouth Daily (before breakfast). GLUCOSE BLOOD VI TEST STRIPS (ASCENSIA AUTODISC VI, ONE TOUCH ULTRA TEST VI) STRIP    Use daily to monitor blood glucose HYDROCODONE-ACETAMINOPHEN (NORCO) 5-325 MG PER TABLET    Take  by mouth. HYDROXYZINE HCL (ATARAX) 25 MG TABLET    Take 1-2 tablets by mouth every 6 hours as needed. INSULIN NEEDLES, DISPOSABLE, 31 GAUGE X 5/16\" NDLE    Use to administer insulin as directed INSULIN NPH-REGULAR HUMAN REC (HUMULIN 70-30) 100 UNIT/ML (70-30) INPN    Give 40 units in the QAM and 45 units at bedtime LANCETS MISC    Use daily to monitor blood glucose LIDOCAINE (LIDODERM) 5 %    2 Patches by TransDERmal route every twenty-four (24) hours. Apply patch to the affected area for 12 hours a day and remove for 12 hours a day. LISINOPRIL-HYDROCHLOROTHIAZIDE (PRINZIDE, ZESTORETIC) 20-12.5 MG PER TABLET    Take 1 Tab by mouth two (2) times a day. LORAZEPAM (ATIVAN) 1 MG TABLET    1 mg. MUPIROCIN (BACTROBAN) 2 % OINTMENT    Use 1 Application to affected area 3 Times Daily. ONDANSETRON (ZOFRAN ODT) 4 MG DISINTEGRATING TABLET    Take 1 Tab by mouth every eight (8) hours as needed for Nausea. POLYETHYLENE GLYCOL (MIRALAX) 17 GRAM PACKET    Take 1 Packet by mouth daily. VALACYCLOVIR (VALTREX) 1 GRAM TABLET    Take  by mouth. These Medications have changed No medications on file Stop Taking No medications on file  
 
_______________________________ Attestations: 
Scribe Attestation Karl Gottron acting as a scribe for and in the presence of Ana Ortiz MD     
October 01, 2018 at 10:48 AM 
    
Provider Attestation:     
I personally performed the services described in the documentation, reviewed the documentation, as recorded by the scribe in my presence, and it accurately and completely records my words and actions. October 01, 2018 at 10:48 AM - Ana Ortiz MD   
_______________________________ Alcohol withdrawal

## 2022-08-11 ENCOUNTER — HOSPITAL ENCOUNTER (EMERGENCY)
Age: 52
Discharge: HOME | End: 2022-08-12
Attending: EMERGENCY MEDICINE
Payer: MEDICAID

## 2022-08-11 DIAGNOSIS — R10.9 ACUTE LEFT FLANK PAIN: Primary | ICD-10-CM

## 2022-08-11 LAB
ALBUMIN SERPL-MCNC: 2.8 G/DL (ref 3.4–5)
ALBUMIN/GLOB SERPL: 0.6 {RATIO} (ref 0.8–1.7)
ALP SERPL-CCNC: 155 U/L (ref 45–117)
ALT SERPL-CCNC: 20 U/L (ref 13–56)
ANION GAP SERPL CALC-SCNC: 4 MMOL/L (ref 3–18)
APPEARANCE UR: CLEAR
AST SERPL-CCNC: 18 U/L (ref 10–38)
BACTERIA URNS QL MICRO: NEGATIVE /HPF
BASOPHILS # BLD: 0 K/UL (ref 0–0.1)
BASOPHILS NFR BLD: 0 % (ref 0–2)
BILIRUB SERPL-MCNC: 0.1 MG/DL (ref 0.2–1)
BILIRUB UR QL: NEGATIVE
BUN SERPL-MCNC: 14 MG/DL (ref 7–18)
BUN/CREAT SERPL: 17 (ref 12–20)
CALCIUM SERPL-MCNC: 9.2 MG/DL (ref 8.5–10.1)
CHLORIDE SERPL-SCNC: 104 MMOL/L (ref 100–111)
CO2 SERPL-SCNC: 30 MMOL/L (ref 21–32)
COLOR UR: YELLOW
CREAT SERPL-MCNC: 0.84 MG/DL (ref 0.6–1.3)
DIFFERENTIAL METHOD BLD: ABNORMAL
EOSINOPHIL # BLD: 0.3 K/UL (ref 0–0.4)
EOSINOPHIL NFR BLD: 4 % (ref 0–5)
EPITH CASTS URNS QL MICRO: NORMAL /LPF (ref 0–5)
ERYTHROCYTE [DISTWIDTH] IN BLOOD BY AUTOMATED COUNT: 16.1 % (ref 11.6–14.5)
GLOBULIN SER CALC-MCNC: 5 G/DL (ref 2–4)
GLUCOSE BLD STRIP.AUTO-MCNC: 157 MG/DL (ref 70–110)
GLUCOSE SERPL-MCNC: 184 MG/DL (ref 74–99)
GLUCOSE UR STRIP.AUTO-MCNC: 100 MG/DL
HCT VFR BLD AUTO: 29.1 % (ref 35–45)
HGB BLD-MCNC: 8.9 G/DL (ref 12–16)
HGB UR QL STRIP: ABNORMAL
IMM GRANULOCYTES # BLD AUTO: 0 K/UL (ref 0–0.04)
IMM GRANULOCYTES NFR BLD AUTO: 0 % (ref 0–0.5)
KETONES UR QL STRIP.AUTO: NEGATIVE MG/DL
LACTATE SERPL-SCNC: 1.3 MMOL/L (ref 0.4–2)
LEUKOCYTE ESTERASE UR QL STRIP.AUTO: NEGATIVE
LYMPHOCYTES # BLD: 1.9 K/UL (ref 0.9–3.6)
LYMPHOCYTES NFR BLD: 27 % (ref 21–52)
MCH RBC QN AUTO: 23.9 PG (ref 24–34)
MCHC RBC AUTO-ENTMCNC: 30.6 G/DL (ref 31–37)
MCV RBC AUTO: 78 FL (ref 78–100)
MONOCYTES # BLD: 0.4 K/UL (ref 0.05–1.2)
MONOCYTES NFR BLD: 5 % (ref 3–10)
NEUTS SEG # BLD: 4.6 K/UL (ref 1.8–8)
NEUTS SEG NFR BLD: 64 % (ref 40–73)
NITRITE UR QL STRIP.AUTO: NEGATIVE
NRBC # BLD: 0 K/UL (ref 0–0.01)
NRBC BLD-RTO: 0 PER 100 WBC
PH UR STRIP: 5.5 [PH] (ref 5–8)
PLATELET # BLD AUTO: 387 K/UL (ref 135–420)
PMV BLD AUTO: 10.1 FL (ref 9.2–11.8)
POTASSIUM SERPL-SCNC: 3.9 MMOL/L (ref 3.5–5.5)
PROT SERPL-MCNC: 7.8 G/DL (ref 6.4–8.2)
PROT UR STRIP-MCNC: 30 MG/DL
RBC # BLD AUTO: 3.73 M/UL (ref 4.2–5.3)
RBC #/AREA URNS HPF: NORMAL /HPF (ref 0–5)
SODIUM SERPL-SCNC: 138 MMOL/L (ref 136–145)
SP GR UR REFRACTOMETRY: 1.02 (ref 1–1.03)
UROBILINOGEN UR QL STRIP.AUTO: 1 EU/DL (ref 0.2–1)
WBC # BLD AUTO: 7.2 K/UL (ref 4.6–13.2)
WBC URNS QL MICRO: NEGATIVE /HPF (ref 0–5)

## 2022-08-11 PROCEDURE — 87086 URINE CULTURE/COLONY COUNT: CPT

## 2022-08-11 PROCEDURE — 87040 BLOOD CULTURE FOR BACTERIA: CPT

## 2022-08-11 PROCEDURE — 99284 EMERGENCY DEPT VISIT MOD MDM: CPT

## 2022-08-11 PROCEDURE — 82962 GLUCOSE BLOOD TEST: CPT

## 2022-08-11 PROCEDURE — 96374 THER/PROPH/DIAG INJ IV PUSH: CPT

## 2022-08-11 PROCEDURE — 81001 URINALYSIS AUTO W/SCOPE: CPT

## 2022-08-11 PROCEDURE — 74011250636 HC RX REV CODE- 250/636: Performed by: EMERGENCY MEDICINE

## 2022-08-11 PROCEDURE — 96375 TX/PRO/DX INJ NEW DRUG ADDON: CPT

## 2022-08-11 PROCEDURE — 80053 COMPREHEN METABOLIC PANEL: CPT

## 2022-08-11 PROCEDURE — 85025 COMPLETE CBC W/AUTO DIFF WBC: CPT

## 2022-08-11 PROCEDURE — 83605 ASSAY OF LACTIC ACID: CPT

## 2022-08-11 RX ORDER — DIPHENHYDRAMINE HYDROCHLORIDE 50 MG/ML
50 INJECTION, SOLUTION INTRAMUSCULAR; INTRAVENOUS ONCE
Status: COMPLETED | OUTPATIENT
Start: 2022-08-11 | End: 2022-08-11

## 2022-08-11 RX ORDER — MORPHINE SULFATE 4 MG/ML
4 INJECTION INTRAVENOUS
Status: COMPLETED | OUTPATIENT
Start: 2022-08-11 | End: 2022-08-11

## 2022-08-11 RX ORDER — ONDANSETRON 2 MG/ML
4 INJECTION INTRAMUSCULAR; INTRAVENOUS
Status: COMPLETED | OUTPATIENT
Start: 2022-08-11 | End: 2022-08-11

## 2022-08-11 RX ADMIN — SODIUM CHLORIDE 1000 ML: 9 INJECTION, SOLUTION INTRAVENOUS at 22:59

## 2022-08-11 RX ADMIN — MORPHINE SULFATE 4 MG: 4 INJECTION INTRAVENOUS at 23:09

## 2022-08-11 RX ADMIN — ONDANSETRON 4 MG: 2 INJECTION INTRAMUSCULAR; INTRAVENOUS at 22:58

## 2022-08-11 RX ADMIN — DIPHENHYDRAMINE HYDROCHLORIDE 50 MG: 50 INJECTION, SOLUTION INTRAMUSCULAR; INTRAVENOUS at 23:09

## 2022-08-12 ENCOUNTER — APPOINTMENT (OUTPATIENT)
Dept: CT IMAGING | Age: 52
End: 2022-08-12
Attending: EMERGENCY MEDICINE
Payer: MEDICAID

## 2022-08-12 VITALS
BODY MASS INDEX: 24.29 KG/M2 | OXYGEN SATURATION: 100 % | RESPIRATION RATE: 16 BRPM | TEMPERATURE: 97.7 F | HEIGHT: 62 IN | WEIGHT: 132 LBS | HEART RATE: 106 BPM | DIASTOLIC BLOOD PRESSURE: 51 MMHG | SYSTOLIC BLOOD PRESSURE: 109 MMHG

## 2022-08-12 PROCEDURE — 74176 CT ABD & PELVIS W/O CONTRAST: CPT

## 2022-08-12 PROCEDURE — 87040 BLOOD CULTURE FOR BACTERIA: CPT

## 2022-08-12 RX ORDER — ACETAMINOPHEN 325 MG/1
650 TABLET ORAL ONCE
Status: DISCONTINUED | OUTPATIENT
Start: 2022-08-12 | End: 2022-08-12 | Stop reason: HOSPADM

## 2022-08-12 NOTE — ED PROVIDER NOTES
24-year-old female with multiple medical issues including anxiety bipolar breast cancer treated depression diabetes drug abuse drug-seeking behavior alcoholism multiple personalities and hypertension presents to the emergency department with left flank and abdominal pain. She reported the pain started yesterday. History of similar episode with UTI. Patient vomited 3 times did not eat today. Fingerstick has been running in the 200s. Pain is constant nonradiating nothing makes it better or worse. No treatment with medications. Patient has no fevers or chills. No other issues expressed. Past Medical History:   Diagnosis Date    Anxiety     Bipolar disorder ([address]     Breast cancer ([address]     breast cancer, right, S/P Mastectomy and chemo, radiation in 2013    Depression     Diabetes ([address]     Drug abuse ([address]     H/O cocaine use in past    Drug-seeking behavior     Family history of early CAD     Gastrointestinal disorder     cholitis    H/O ETOH abuse     Hyperlipidemia     Hypertension     Malignant neoplasm without specification of site [facility])     Methamphetamine abuse ([facility] [address]      self reported on 1/3/16    Psychiatric disorder     multiple personality disorder    Psychiatric disorder     anxiety    Psychiatric disorder     bipolar     Spine injury     Thromboembolus ([address]     Vitamin D deficiency 5/1/2018       Past Surgical History:   Procedure Laterality Date    COLONOSCOPY N/A 7/18/2016    COLONOSCOPY performed by Mac Hutton MD at [facility] ENDOSCOPY    HX BREAST LUMPECTOMY  06/2013    right    HX HYSTERECTOMY      HX MASTECTOMY      had expanders put in 6/2018    HX ORTHOPAEDIC      Back fusion surgery 4/18/14.      HX OTHER SURGICAL      mediport    HX TONSILLECTOMY      HX TUBAL LIGATION      NEUROLOGICAL PROCEDURE UNLISTED  2020    back surgery         Family History:   Problem Relation Age of Onset    Heart Disease Mother     Stroke Father     Heart Disease Father 48    Diabetes Other Hypertension Other     Cancer Maternal Grandmother        Social History     Socioeconomic History    Marital status:      Spouse name: Not on file    Number of children: Not on file    Years of education: Not on file    Highest education level: Not on file   Occupational History    Not on file   Tobacco Use    Smoking status: Never    Smokeless tobacco: Never   Vaping Use    Vaping Use: Never used   Substance and Sexual Activity    Alcohol use: Not Currently     Comment: \"Occasionally\"    Drug use: Not Currently     Types: Methamphetamines, Cocaine     Comment: none in 2 years    Sexual activity: Never   Other Topics Concern    Not on file   Social History Narrative    ** Merged History Encounter **          Social Determinants of Health     Financial Resource Strain: Not on file   Food Insecurity: Not on file   Transportation Needs: Not on file   Physical Activity: Not on file   Stress: Not on file   Social Connections: Not on file   Intimate Partner Violence: Not on file   Housing Stability: Not on file         ALLERGIES: Latex, Other food, Amoxicillin, Aspirin, Beeswax, Levaquin [levofloxacin], Issue, Codeine, Glycopyrrolate, Pcn [penicillins], Tape [adhesive], and Tramadol    Review of Systems   Constitutional: Negative. HENT: Negative. Eyes: Negative. Respiratory: Negative. Cardiovascular: Negative. Gastrointestinal:  Positive for abdominal pain. Genitourinary:  Positive for flank pain. Skin: Negative. Neurological: Negative. Hematological: Negative. Psychiatric/Behavioral: Negative. All other systems reviewed and are negative. Vitals:    08/11/22 2132   BP: (!) 147/81   Pulse: (!) 106   Resp: 18   Temp: 97.7 °F (36.5 °C)   SpO2: 99%   Weight: 59.9 kg (132 lb)   Height: 5' 2\" (1.575 m)            Physical Exam  Vitals and nursing note reviewed. Constitutional:       General: She is not in acute distress. Appearance: Normal appearance.  She is not ill-appearing, toxic-appearing or diaphoretic. HENT:      Head: Normocephalic and atraumatic. Nose: Nose normal. No congestion or rhinorrhea. Mouth/Throat:      Mouth: Mucous membranes are moist.   Eyes:      Extraocular Movements: Extraocular movements intact. Neck:      Vascular: No carotid bruit. Cardiovascular:      Rate and Rhythm: Normal rate and regular rhythm. Pulmonary:      Effort: Pulmonary effort is normal.      Breath sounds: Normal breath sounds. Abdominal:      General: There is no distension. Palpations: Abdomen is soft. There is no mass. Tenderness: There is abdominal tenderness. There is left CVA tenderness. There is no right CVA tenderness, guarding or rebound. Hernia: No hernia is present. Comments: LUQ tenderness    Musculoskeletal:         General: No swelling, tenderness, deformity or signs of injury. Normal range of motion. Cervical back: Normal range of motion and neck supple. No rigidity or tenderness. Right lower leg: No edema. Left lower leg: No edema. Lymphadenopathy:      Cervical: No cervical adenopathy. Skin:     General: Skin is warm. Capillary Refill: Capillary refill takes less than 2 seconds. Coloration: Skin is not jaundiced or pale. Findings: No bruising, erythema, lesion or rash. Neurological:      General: No focal deficit present. Mental Status: She is alert. Psychiatric:         Mood and Affect: Mood normal.         Behavior: Behavior normal.        MDM  Number of Diagnoses or Management Options  Diagnosis management comments: 69-year-old female multiple medical issues presents with left flank pain and burning with urination. Concerned she has a UTI may be followed. Patient is nontoxic in appearance does have flank pain. Heart rate of 106. We will get basic labs get CAT scan. Differential diagnosis pyelonephritis musculoskeletal pain kidney stone.     Work-up is negative will discharge patient home.            Procedures

## 2022-08-12 NOTE — ED TRIAGE NOTES
Presents to ED ambulatory from triage with c/o left flank pain, N/V, dysuria and urinary frequency for 1 week;  CVA tenderness in triage

## 2022-08-13 LAB
BACTERIA SPEC CULT: NORMAL
CC UR VC: NORMAL
SERVICE CMNT-IMP: NORMAL

## 2022-08-17 LAB
BACTERIA SPEC CULT: NORMAL
SERVICE CMNT-IMP: NORMAL

## 2022-08-18 LAB
BACTERIA SPEC CULT: NORMAL
SERVICE CMNT-IMP: NORMAL

## 2022-08-23 ENCOUNTER — HOSPITAL ENCOUNTER (EMERGENCY)
Age: 52
Discharge: HOME | End: 2022-08-23
Attending: EMERGENCY MEDICINE
Payer: MEDICAID

## 2022-08-23 VITALS
HEART RATE: 100 BPM | HEIGHT: 62 IN | BODY MASS INDEX: 26.68 KG/M2 | OXYGEN SATURATION: 99 % | SYSTOLIC BLOOD PRESSURE: 148 MMHG | TEMPERATURE: 98.9 F | WEIGHT: 145 LBS | DIASTOLIC BLOOD PRESSURE: 73 MMHG | RESPIRATION RATE: 18 BRPM

## 2022-08-23 DIAGNOSIS — G89.29 CHRONIC PAIN OF RIGHT KNEE: Primary | ICD-10-CM

## 2022-08-23 DIAGNOSIS — M25.561 CHRONIC PAIN OF RIGHT KNEE: Primary | ICD-10-CM

## 2022-08-23 PROCEDURE — 99282 EMERGENCY DEPT VISIT SF MDM: CPT

## 2022-08-23 PROCEDURE — 74011000250 HC RX REV CODE- 250: Performed by: PHYSICIAN ASSISTANT

## 2022-08-23 RX ORDER — LIDOCAINE 4 G/100G
1 PATCH TOPICAL
Status: DISCONTINUED | OUTPATIENT
Start: 2022-08-23 | End: 2022-08-24 | Stop reason: HOSPADM

## 2022-08-24 NOTE — ED TRIAGE NOTES
Patient arrives ambulatory to ED with c/c of right knee pain, onset a while. Patient reports she has started physical therapy and they have her up walking around which lately she has been lying around due to the pain. She states the medications she had (muscle relaxers, celebrex) are not helping any more. She states also she has been under a lot of stress which may be contributing to her increased pain. She also is reporting a rash, bumps to her chest and around her neck that she would like to have looked at.

## 2022-08-24 NOTE — ED PROVIDER NOTES
EMERGENCY DEPARTMENT HISTORY AND PHYSICAL EXAM    Date: 8/23/2022  Patient Name: Jaz Ayon    History of Presenting Illness     Chief Complaint   Patient presents with    Knee Pain    Skin Problem         History Provided By: Patient    Chief Complaint: right knee pain       Additional History (Context):   8:59 PM  Jaz Ayon is a 45 y.o. female with PMHX of substance abuse, chronic pain, diabetes, hypertension, bipolar disorder presents to the emergency department C/O right knee pain has been going on for quite some time with no recent injury. States the pain radiates all the way down the leg and feels like a burning sensation. No swelling to the leg. States she recently had cortisone injection which did not provide much relief. No redness to the knee. No fevers. She does take Xarelto for history of thromboembolus. PCP: Everett Brewster MD    Current Facility-Administered Medications   Medication Dose Route Frequency Provider Last Rate Last Admin    lidocaine 4 % patch 1 Patch  1 Patch TransDERmal NOW Talia Her PA         Current Outpatient Medications   Medication Sig Dispense Refill    lidocaine 4 % patch Apply to site of pain. 12 hours on and 12 hours off 15 Patch 0    promethazine (PHENERGAN) 25 mg tablet Take 1 Tablet by mouth every six (6) hours as needed for Nausea. 12 Tablet 0    ondansetron hcl (Zofran) 4 mg tablet Take 1 Tablet by mouth every eight (8) hours as needed for Nausea. 14 Tablet 0    diclofenac (Voltaren) 1 % gel Apply 2 g to affected area four (4) times daily. 20 g 0    Lidocaine Viscous 2 % solution Take 5 mL by mouth two (2) times daily as needed for Pain. Put 5ml onto guaze and put in mouth; hold for 30min. Can repeat up to twice daily. Indications: administration of local anesthetic drug 200 mL 0    omeprazole (PRILOSEC) 20 mg capsule Take 1 Capsule by mouth daily. 30 Capsule 0    dicyclomine (BENTYL) 20 mg tablet Take 1 Tablet by mouth every six (6) hours. 12 Tablet 0    naloxone (Narcan) 4 mg/actuation nasal spray Use 1 spray intranasally, then discard. Repeat with new spray every 2 min as needed for opioid overdose symptoms, alternating nostrils. 2 Each 0    clonazePAM (KlonoPIN) 1 mg tablet Take  by mouth two (2) times a day. insulin aspart protamine/insulin aspart (NovoLOG Mix 70-30FlexPen U-100) 100 unit/mL (70-30) inpn by SubCUTAneous route Before breakfast, lunch, dinner and at bedtime. Sliding scale      lisinopriL (PRINIVIL, ZESTRIL) 10 mg tablet Take 20 mg by mouth daily. Cane coco 1 Each by Does Not Apply route daily. 1 Device 0    Blood-Glucose Meter (ONETOUCH ULTRA2) monitoring kit Use to obtain two times a day. 1 Kit 0    lancets misc Use daily to monitor blood glucose 200 Each 0    glucose blood VI test strips (ASCENSIA AUTODISC VI, ONE TOUCH ULTRA TEST VI) strip 50 Each by Does Not Apply route two (2) times daily as needed. 200 Strip 3    polyethylene glycol (MIRALAX) 17 gram packet Take 1 Packet by mouth daily.  30 Each 1    Insulin Needles, Disposable, 31 gauge x 5/16\" ndle Use to administer insulin as directed 1 Package 11       Past History     Past Medical History:  Past Medical History:   Diagnosis Date    Anxiety     Bipolar disorder ([address]     Breast cancer ([address]     breast cancer, right, S/P Mastectomy and chemo, radiation in 2013    Depression     Diabetes ([address]     Drug abuse ([address]     H/O cocaine use in past    Drug-seeking behavior     Family history of early CAD     Gastrointestinal disorder     cholitis    H/O ETOH abuse     Hyperlipidemia     Hypertension     Malignant neoplasm without specification of site [facility])     Methamphetamine abuse ([address]      self reported on 1/3/16    Psychiatric disorder     multiple personality disorder    Psychiatric disorder     anxiety    Psychiatric disorder     bipolar     Spine injury     Thromboembolus ([address]     Vitamin D deficiency 5/1/2018       Past Surgical History:  Past Surgical History: Procedure Laterality Date    COLONOSCOPY N/A 7/18/2016    COLONOSCOPY performed by Vikram Zaragoza MD at [facility] ENDOSCOPY    HX BREAST LUMPECTOMY  06/2013    right    HX HYSTERECTOMY      HX MASTECTOMY      had expanders put in 6/2018    HX ORTHOPAEDIC      Back fusion surgery 4/18/14. HX OTHER SURGICAL      mediport    HX TONSILLECTOMY      HX TUBAL LIGATION      NEUROLOGICAL PROCEDURE UNLISTED  2020    back surgery       Family History:  Family History   Problem Relation Age of Onset    Heart Disease Mother     Stroke Father     Heart Disease Father 48    Diabetes Other     Hypertension Other     Cancer Maternal Grandmother        Social History:  Social History     Tobacco Use    Smoking status: Never    Smokeless tobacco: Never   Vaping Use    Vaping Use: Never used   Substance Use Topics    Alcohol use: Not Currently     Comment: \"Occasionally\"    Drug use: Not Currently     Types: Methamphetamines, Cocaine     Comment: none in 2 years       Allergies: Allergies   Allergen Reactions    Latex Hives and Itching    Other Food Rash     Almonds    Amoxicillin Swelling     Other reaction(s): mild rash/itching    Aspirin Not Reported This Time and Swelling     Mouth swells    Beeswax Anaphylaxis    Levaquin [Levofloxacin] Not Reported This Time and Swelling     Other reaction(s): mild rash/itching    [place] Rash and Itching    Codeine Other (comments)     denies    Glycopyrrolate Swelling     Pt  States  faciAL  SWELLING   POST  ROBINUL  IV   Pt  States  faciAL  SWELLING   POST  ROBINUL  IV     Pcn [Penicillins] Swelling    Tape [Adhesive] Rash    Tramadol Swelling       Review of Systems   Review of Systems   Constitutional:  Negative for chills and fever. Respiratory:  Negative for shortness of breath. Cardiovascular:  Negative for chest pain. Gastrointestinal:  Negative for abdominal pain, diarrhea, nausea and vomiting. Musculoskeletal:  Positive for arthralgias (right knee).  Negative for back pain and neck pain. Neurological:  Negative for weakness and numbness. All other systems reviewed and are negative. Physical Exam     Vitals:    08/23/22 2036   BP: (!) 148/73   Pulse: 100   Resp: 18   Temp: 98.9 °F (37.2 °C)   SpO2: 99%   Weight: 65.8 kg (145 lb)   Height: 5' 2\" (1.575 m)     Physical Exam  Vitals and nursing note reviewed. Constitutional:       Appearance: She is well-developed. Comments: Lying on stretcher able to ambulate in ED without assistance   HENT:      Head: Normocephalic and atraumatic. Cardiovascular:      Rate and Rhythm: Normal rate and regular rhythm. Heart sounds: Normal heart sounds. No murmur heard. Pulmonary:      Effort: Pulmonary effort is normal. No respiratory distress. Breath sounds: Normal breath sounds. No wheezing or rales. Abdominal:      General: Bowel sounds are normal.      Palpations: Abdomen is soft. Tenderness: There is no abdominal tenderness. Musculoskeletal:         General: No swelling or tenderness. Cervical back: Normal range of motion and neck supple. Comments: RLE: Right knee tender to palpation to the anterior aspect pulses 2+ for DP and PT no generalized leg swelling, erythema, increased warmth, full range of motion of the knee   Skin:     General: Skin is warm and dry. Findings: No erythema or rash. Neurological:      Mental Status: She is alert and oriented to person, place, and time. Psychiatric:         Judgment: Judgment normal.       Diagnostic Study Results     Labs:   No results found for this or any previous visit (from the past 12 hour(s)). Radiologic Studies:   No orders to display     CT Results  (Last 48 hours)      None          CXR Results  (Last 48 hours)      None            Medical Decision Making   I am the first provider for this patient.     I reviewed the vital signs, available nursing notes, past medical history, past surgical history, family history and social history. Vital Signs: Reviewed the patient's vital signs. Pulse Oximetry Analysis: 99% on Ra       Records Reviewed: Nursing Notes and Old Medical Records    Procedures:  Procedures    ED Course:   8:59 PM Initial assessment performed. The patients presenting problems have been discussed, and they are in agreement with the care plan formulated and outlined with them. I have encouraged them to ask questions as they arise throughout their visit. Discussion:  Pt presents with right knee pain no new injury patient followed by pain management. No evidence of septic joint. No findings consistent with DVT. Patient is currently on Xarelto and states she has been compliant. She is neurovascularly intact. No imaging indicated at this time. Patient requesting Toradol shot but explained that there are interactions between NSAIDs and Xarelto. Patient states she is unable to take Tylenol. Offered lidocaine patch as she is in pain management and should follow-up with her pain management doctor for management of chronic pain. Pt Understands and agrees with plan. strict return precautions given, pt offering no questions or complaints. Diagnosis and Disposition     DISCHARGE NOTE:  Daphne Evelyne  results have been reviewed with her. She has been counseled regarding her diagnosis, treatment, and plan. She verbally conveys understanding and agreement of the signs, symptoms, diagnosis, treatment and prognosis and additionally agrees to follow up as discussed. She also agrees with the care-plan and conveys that all of her questions have been answered. I have also provided discharge instructions for her that include: educational information regarding their diagnosis and treatment, and list of reasons why they would want to return to the ED prior to their follow-up appointment, should her condition change. She has been provided with education for proper emergency department utilization.      CLINICAL IMPRESSION:    1. Chronic pain of right knee        PLAN:  1. D/C Home  2. Current Discharge Medication List        3. Follow-up Information       Follow up With Specialties Details Why Contact Info    Dillon Adams MD [facility] Medicine Schedule an appointment as soon as possible for a visit   [address] [address]  [phone]      [facility] EMERGENCY DEPT Emergency Medicine  If symptoms worsen [address] [id number] [phone]                   Please note that this dictation was completed with [employer], the computer voice recognition software. Quite often unanticipated grammatical, syntax, homophones, and other interpretive errors are inadvertently transcribed by the computer software. Please disregard these errors. Please excuse any errors that have escaped final proofreading.

## 2022-09-06 ENCOUNTER — TRANSCRIBE ORDER (OUTPATIENT)
Dept: REGISTRATION | Age: 52
End: 2022-09-06

## 2022-09-06 ENCOUNTER — HOSPITAL ENCOUNTER (OUTPATIENT)
Dept: PREADMISSION TESTING | Age: 52
Discharge: HOME | End: 2022-09-06
Payer: MEDICAID

## 2022-09-06 DIAGNOSIS — M54.2 CERVICALGIA: ICD-10-CM

## 2022-09-06 DIAGNOSIS — M47.22 CERVICAL SPONDYLOSIS WITH RADICULOPATHY: ICD-10-CM

## 2022-09-06 DIAGNOSIS — I10 ESSENTIAL HYPERTENSION, MALIGNANT: ICD-10-CM

## 2022-09-06 DIAGNOSIS — Z01.812 BLOOD TESTS PRIOR TO TREATMENT OR PROCEDURE: ICD-10-CM

## 2022-09-06 DIAGNOSIS — E11.52 DIABETIC GANGRENE (HCC): ICD-10-CM

## 2022-09-06 DIAGNOSIS — M54.2 CERVICALGIA: Primary | ICD-10-CM

## 2022-09-06 LAB
ALBUMIN SERPL-MCNC: 2.8 G/DL (ref 3.4–5)
ALBUMIN/GLOB SERPL: 0.6 {RATIO} (ref 0.8–1.7)
ALP SERPL-CCNC: 362 U/L (ref 45–117)
ALT SERPL-CCNC: 132 U/L (ref 13–56)
ANION GAP SERPL CALC-SCNC: 9 MMOL/L (ref 3–18)
AST SERPL-CCNC: 145 U/L (ref 10–38)
ATRIAL RATE: 108 BPM
BILIRUB SERPL-MCNC: 0.2 MG/DL (ref 0.2–1)
BUN SERPL-MCNC: 24 MG/DL (ref 7–18)
BUN/CREAT SERPL: 28 (ref 12–20)
CALCIUM SERPL-MCNC: 9.4 MG/DL (ref 8.5–10.1)
CALCULATED P AXIS, ECG09: 70 DEGREES
CALCULATED R AXIS, ECG10: 13 DEGREES
CALCULATED T AXIS, ECG11: 76 DEGREES
CHLORIDE SERPL-SCNC: 104 MMOL/L (ref 100–111)
CO2 SERPL-SCNC: 24 MMOL/L (ref 21–32)
CREAT SERPL-MCNC: 0.86 MG/DL (ref 0.6–1.3)
DIAGNOSIS, 93000: NORMAL
ERYTHROCYTE [DISTWIDTH] IN BLOOD BY AUTOMATED COUNT: 15.9 % (ref 11.6–14.5)
GLOBULIN SER CALC-MCNC: 5 G/DL (ref 2–4)
GLUCOSE SERPL-MCNC: 224 MG/DL (ref 74–99)
HBA1C MFR BLD: 7.4 % (ref 4.2–5.6)
HCT VFR BLD AUTO: 27.5 % (ref 35–45)
HGB BLD-MCNC: 8.4 G/DL (ref 12–16)
MCH RBC QN AUTO: 24.1 PG (ref 24–34)
MCHC RBC AUTO-ENTMCNC: 30.5 G/DL (ref 31–37)
MCV RBC AUTO: 78.8 FL (ref 78–100)
NRBC # BLD: 0 K/UL (ref 0–0.01)
NRBC BLD-RTO: 0 PER 100 WBC
P-R INTERVAL, ECG05: 158 MS
PLATELET # BLD AUTO: 452 K/UL (ref 135–420)
PMV BLD AUTO: 9.4 FL (ref 9.2–11.8)
POTASSIUM SERPL-SCNC: 4.3 MMOL/L (ref 3.5–5.5)
PROT SERPL-MCNC: 7.8 G/DL (ref 6.4–8.2)
Q-T INTERVAL, ECG07: 334 MS
QRS DURATION, ECG06: 76 MS
QTC CALCULATION (BEZET), ECG08: 447 MS
RBC # BLD AUTO: 3.49 M/UL (ref 4.2–5.3)
SODIUM SERPL-SCNC: 137 MMOL/L (ref 136–145)
VENTRICULAR RATE, ECG03: 108 BPM
WBC # BLD AUTO: 7.7 K/UL (ref 4.6–13.2)

## 2022-09-06 PROCEDURE — 85027 COMPLETE CBC AUTOMATED: CPT

## 2022-09-06 PROCEDURE — 93005 ELECTROCARDIOGRAM TRACING: CPT

## 2022-09-06 PROCEDURE — 83036 HEMOGLOBIN GLYCOSYLATED A1C: CPT

## 2022-09-06 PROCEDURE — 80053 COMPREHEN METABOLIC PANEL: CPT

## 2022-09-06 PROCEDURE — 36415 COLL VENOUS BLD VENIPUNCTURE: CPT

## 2022-09-08 LAB
BACTERIA SPEC CULT: NORMAL
BACTERIA SPEC CULT: NORMAL
SERVICE CMNT-IMP: NORMAL

## 2022-09-09 PROBLEM — M48.02 CERVICAL SPINAL STENOSIS: Status: ACTIVE | Noted: 2022-09-09

## 2022-09-09 PROBLEM — M50.20 HNP (HERNIATED NUCLEUS PULPOSUS), CERVICAL: Status: ACTIVE | Noted: 2022-09-09

## 2022-09-09 PROBLEM — M50.30 DDD (DEGENERATIVE DISC DISEASE), CERVICAL: Status: ACTIVE | Noted: 2022-09-09

## 2022-09-09 NOTE — NURSE NAVIGATOR
Krishan Almanza watched the pre op seminar online and received a preoperative education booklet in anticipation of surgery

## 2022-09-12 ENCOUNTER — HOSPITAL ENCOUNTER (OUTPATIENT)
Dept: PREADMISSION TESTING | Age: 52
Discharge: HOME | End: 2022-09-12

## 2022-09-12 VITALS — HEIGHT: 62 IN | BODY MASS INDEX: 26.68 KG/M2 | WEIGHT: 145 LBS

## 2022-09-12 RX ORDER — CLINDAMYCIN PHOSPHATE 900 MG/50ML
900 INJECTION INTRAVENOUS ONCE
Status: CANCELLED | OUTPATIENT
Start: 2022-09-12 | End: 2022-09-12

## 2022-09-12 RX ORDER — INSULIN GLARGINE 100 [IU]/ML
35 INJECTION, SOLUTION SUBCUTANEOUS
COMMUNITY

## 2022-09-12 RX ORDER — PREGABALIN 50 MG/1
50 CAPSULE ORAL 2 TIMES DAILY
COMMUNITY

## 2022-09-12 RX ORDER — CYCLOBENZAPRINE HCL 5 MG
5 TABLET ORAL
Status: ON HOLD | COMMUNITY
End: 2022-09-20 | Stop reason: SDUPTHER

## 2022-09-12 RX ORDER — SODIUM CHLORIDE, SODIUM LACTATE, POTASSIUM CHLORIDE, CALCIUM CHLORIDE 600; 310; 30; 20 MG/100ML; MG/100ML; MG/100ML; MG/100ML
125 INJECTION, SOLUTION INTRAVENOUS CONTINUOUS
Status: CANCELLED | OUTPATIENT
Start: 2022-09-12

## 2022-09-12 RX ORDER — INSULIN ASPART 100 [IU]/ML
25 INJECTION, SOLUTION INTRAVENOUS; SUBCUTANEOUS
COMMUNITY
End: 2022-09-12

## 2022-09-12 NOTE — PERIOP NOTES
Operative consent filled out according to MD order on surgical posting, verbatim. Patient instructed to not bring any valuables on DOS including Pocketbook, wallet, jewelry, cell phone, lap top computer or tablet. Patient instructed not to wear make up, powders, deodorant, creams, or lotions on DOS. Patient confirmed receipt of CHG skin preparation and instructions. Patient is aware of one visitor and masking policy in surgical pavilion.

## 2022-09-13 NOTE — H&P
Patient Name:  Иван Gordon   YOB: 1970    Chief Complaint:  Cervical spinal stenosis at C5-6, disc herniation, bilateral upper extremity radiculopathy, and myelopathy. History of Chief Complaint:  Ms. Jeannie Hutchison is a 45 y.o female being seen for cervical spinal stenosis at C5-6, disc herniation, bilateral upper extremity radiculopathy, and myelopathy,  She says her hands are getting worse. She still has swelling in both hands. She has difficulty with grasping, turning, twisting, and lifting anything. She says she cannot open things because her hands are unable to full flex. Past Medical/Surgical History:    Disease/Disorder Date Side Surgery Date Side Comment   Anxiety         Bipolar affective disorder         Cellulitis         Depression         Diabetes, type 1         Gastroparesis due to diabetes mellitus         Headache, migraine         Hypercholesterolemia         Hypertension         Peptic ulcer disease         Transient ischemic attack 2018        Vitamin D deficiency            Bilateral tubal ligation         Expanders removed 07/2020 bilateral       Hysterectomy      Cancer, breast (female) 2015  Mastectomy, bilateral 2015 bilateral 02/09/2021 - with lymphadenectomy      Salpingo-oophorectomy         Spinal fusion, lumbar 12/2016  L4-5      Tonsillectomy        Allergies:    Ingredient Reaction Medication Name Comment   HYDROCODONE Hives     BEESWAX      CHLORHEXIDINE      GLYCOPYRROLATE      FENTANYL   pt had reaction to patches; no issues w/IV use in hospital   CODEINE      LEVOFLOXACIN      ASPIRIN      LATEX      PENICILLINS      ACETAMINOPHEN      MORPHINE      ALMOND OIL      ADHESIVE TAPE      TRAMADOL        Current Medications:    Medication Directions   Advil 200 mg tablet  as needed   Celebrex 200 mg capsule take 1 capsule (200MG)  by oral route  every day with food.    gabapentin 100 mg capsule    hydrocodone 5 mg-acetaminophen 325 mg tablet    ibuprofen 600 mg tablet    ondansetron 4 mg disintegrating tablet      Social History:      SMOKING  Status Tobacco Type Units Per Day Yrs Used   Never smoker        ALCOHOL  There is no history of alcohol use. Family History:    Disease Detail Family Member Age Cause of Death Comments   Family history of Diabetes mellitus   N    Family history of Hypertension   N    Family history of Cardiovascular disease   N      Vitals:  Date BP Pulse Temp (F) Resp. (per min.) Height (Total in.) Weight (lbs.) BMI   09/02/2021     62.00  26.52   07/02/2021     62.00  26.52   02/11/2021     62.00  25.61     Physical Examination:    General:  The patient appears healthy. Cardiovascular:  Arterial pulses are normal.  Skin:  The skin is normal appearing with no contusions or wounds noted. Musculoskeletal:  The right knee has a moderate effusion. There is tenderness around the medial and lateral tibial joint lines. Otherwise, the knee demonstrates normal movement and no medial or lateral instability. The quadriceps tendon of the leg is not tender on palpation. The knee has no anterior or posterior drawer signs. Results of the Raffy and apprehension tests of the knee are negative. The cervical spine has a normal appearance, no tenderness to palpation, and no spasm of the paracervical muscle. There is no tenderness on palpation of the trapezius muscle. Flexion, extension, and right and left rotation of the cervical spine are normal.  Examination of the shoulder shows no warmth, no deformity, and no muscle atrophy. There is no tenderness on palpation of the subacromial bursa, the glenohumeral joint region, or the bicipital groove. Range of motion of the shoulder is normal in abduction, forward flexion, extension, and in internal and external rotation. No pain is elicited by motion of the shoulder or by impingement testing. No instability of the shoulder is noted. She has flexion to 45 degrees of both wrists.   Neurological:  She has 4/5  strength in both hands. There is no weakness noted in the lower extremities, hips, knees, or ankles. No muscle atrophy is seen. Reflexes and peripheral nerves are normal.  Sensory and motor examination of the cervical spine elicited no tactile dysesthesia or hyperesthesia. Shoulder strength is normal in flexion, abduction, adduction, and internal rotation. Reflexes and peripheral nerves are intact. Normal reflexes are present in the biceps, brachioradialis, and triceps. There is no weakness in the fingers. Gait and stance are normal.  Knee and ankle jerks are normal with no clonus. Radiograph Examination: Radiograph Examination:  AP, lateral, bilateral oblique, flexion and extension views of the cervical spine were obtained and interpreted in the office 4/15/22 and reveal degenerative disc disease at C5-6. Review of her MRI scan of the cervical spine  [facility] 9/17/21 reveals severe spinal stenosis, degenerative disc disease, and disc herniation at C5-6. Plan-We discussed treatments for her cervical spinal stenosis and disc herniation including surgical intervention, the risks, and benefits as well as the different surgical approaches and decision making. We also discussed nonsurgical care for this condition including medications, injections, physical therapy, rehabilitation, activity modification, and brace utilization. Given the neurogenic changes, myelopathic signs, and severe spinal stenosis, the best option would be operative intervention. We will plan for C5-6 ACDF as an outpatient at her earliest convenience. The risks versus the benefits as well as the alternatives were fully explained to the patient.   Risks include, but are not limited to, paralysis, death, heart attack, stroke, pulmonary embolism, deep vein thrombosis, infection, failure to relieve pain, increase in back or arm pain, reherniation, scarring, spinal fluid leak, bowel or bladder dysfunction, bleeding, disease transmission, instrumentation failure, pseudarthrosis, difficulty swallowing, and need for revision surgery. The patient states full understanding of the risks and benefits and wishes to proceed.

## 2022-09-19 NOTE — PERIOP NOTES
Spoke with Ms. Lovey Blizzard RN, aware of pt status of going to her MD office to  the clearance form

## 2022-09-20 ENCOUNTER — HOSPITAL ENCOUNTER (OUTPATIENT)
Age: 52
Discharge: HOME HEALTH | End: 2022-09-20
Attending: ORTHOPAEDIC SURGERY | Admitting: ORTHOPAEDIC SURGERY
Payer: MEDICAID

## 2022-09-20 ENCOUNTER — ANESTHESIA (OUTPATIENT)
Dept: SURGERY | Age: 52
End: 2022-09-20
Payer: MEDICAID

## 2022-09-20 ENCOUNTER — APPOINTMENT (OUTPATIENT)
Dept: GENERAL RADIOLOGY | Age: 52
End: 2022-09-20
Attending: ORTHOPAEDIC SURGERY
Payer: MEDICAID

## 2022-09-20 ENCOUNTER — ANESTHESIA EVENT (OUTPATIENT)
Dept: SURGERY | Age: 52
End: 2022-09-20
Payer: MEDICAID

## 2022-09-20 VITALS
DIASTOLIC BLOOD PRESSURE: 80 MMHG | BODY MASS INDEX: 26.37 KG/M2 | OXYGEN SATURATION: 97 % | RESPIRATION RATE: 18 BRPM | TEMPERATURE: 98.4 F | SYSTOLIC BLOOD PRESSURE: 159 MMHG | WEIGHT: 143.3 LBS | HEART RATE: 61 BPM | HEIGHT: 62 IN

## 2022-09-20 DIAGNOSIS — M48.02 CERVICAL SPINAL STENOSIS: Primary | ICD-10-CM

## 2022-09-20 PROBLEM — M50.20 HNP (HERNIATED NUCLEUS PULPOSUS), CERVICAL: Status: RESOLVED | Noted: 2022-09-09 | Resolved: 2022-09-20

## 2022-09-20 LAB
ABO + RH BLD: NORMAL
BLOOD BANK CMNT PATIENT-IMP: NORMAL
BLOOD GROUP ANTIBODIES SERPL: NORMAL
BLOOD GROUP ANTIBODIES SERPL: NORMAL
CALLED TO:,BCALL1: NORMAL
GLUCOSE BLD STRIP.AUTO-MCNC: 120 MG/DL (ref 70–110)
GLUCOSE BLD STRIP.AUTO-MCNC: 211 MG/DL (ref 70–110)
SPECIMEN EXP DATE BLD: NORMAL

## 2022-09-20 PROCEDURE — 77030027138 HC INCENT SPIROMETER -A: Performed by: ORTHOPAEDIC SURGERY

## 2022-09-20 PROCEDURE — 77030020782 HC GWN BAIR PAWS FLX 3M -B: Performed by: ORTHOPAEDIC SURGERY

## 2022-09-20 PROCEDURE — 77030040361 HC SLV COMPR DVT MDII -B: Performed by: ORTHOPAEDIC SURGERY

## 2022-09-20 PROCEDURE — 82962 GLUCOSE BLOOD TEST: CPT

## 2022-09-20 PROCEDURE — 76210000026 HC REC RM PH II 1 TO 1.5 HR: Performed by: ORTHOPAEDIC SURGERY

## 2022-09-20 PROCEDURE — 74011000250 HC RX REV CODE- 250: Performed by: ORTHOPAEDIC SURGERY

## 2022-09-20 PROCEDURE — 76060000033 HC ANESTHESIA 1 TO 1.5 HR: Performed by: ORTHOPAEDIC SURGERY

## 2022-09-20 PROCEDURE — 77030004402 HC BUR NEUR STRY -C: Performed by: ORTHOPAEDIC SURGERY

## 2022-09-20 PROCEDURE — 86900 BLOOD TYPING SEROLOGIC ABO: CPT

## 2022-09-20 PROCEDURE — 77030010507 HC ADH SKN DERMBND J&J -B: Performed by: ORTHOPAEDIC SURGERY

## 2022-09-20 PROCEDURE — 77030020268 HC MISC GENERAL SUPPLY: Performed by: ORTHOPAEDIC SURGERY

## 2022-09-20 PROCEDURE — 77030002986 HC SUT PROL J&J -A: Performed by: ORTHOPAEDIC SURGERY

## 2022-09-20 PROCEDURE — 74011250636 HC RX REV CODE- 250/636: Performed by: ORTHOPAEDIC SURGERY

## 2022-09-20 PROCEDURE — 74011636637 HC RX REV CODE- 636/637: Performed by: INTERNAL MEDICINE

## 2022-09-20 PROCEDURE — 77030040506 HC DRN WND MDII -A: Performed by: ORTHOPAEDIC SURGERY

## 2022-09-20 PROCEDURE — 77030036881: Performed by: ORTHOPAEDIC SURGERY

## 2022-09-20 PROCEDURE — 77030008683 HC TU ET CUF COVD -A: Performed by: INTERNAL MEDICINE

## 2022-09-20 PROCEDURE — 74011250636 HC RX REV CODE- 250/636: Performed by: INTERNAL MEDICINE

## 2022-09-20 PROCEDURE — 36415 COLL VENOUS BLD VENIPUNCTURE: CPT

## 2022-09-20 PROCEDURE — 74011000272 HC RX REV CODE- 272: Performed by: ORTHOPAEDIC SURGERY

## 2022-09-20 PROCEDURE — 74011250636 HC RX REV CODE- 250/636: Performed by: NURSE ANESTHETIST, CERTIFIED REGISTERED

## 2022-09-20 PROCEDURE — 77030008477 HC STYL SATN SLP COVD -A: Performed by: INTERNAL MEDICINE

## 2022-09-20 PROCEDURE — 86870 RBC ANTIBODY IDENTIFICATION: CPT

## 2022-09-20 PROCEDURE — 76210000006 HC OR PH I REC 0.5 TO 1 HR: Performed by: ORTHOPAEDIC SURGERY

## 2022-09-20 PROCEDURE — 74011000250 HC RX REV CODE- 250: Performed by: NURSE ANESTHETIST, CERTIFIED REGISTERED

## 2022-09-20 PROCEDURE — 76010000149 HC OR TIME 1 TO 1.5 HR: Performed by: ORTHOPAEDIC SURGERY

## 2022-09-20 PROCEDURE — C1889 IMPLANT/INSERT DEVICE, NOC: HCPCS | Performed by: ORTHOPAEDIC SURGERY

## 2022-09-20 PROCEDURE — 2709999900 HC NON-CHARGEABLE SUPPLY: Performed by: ORTHOPAEDIC SURGERY

## 2022-09-20 PROCEDURE — 77030003029 HC SUT VCRL J&J -B: Performed by: ORTHOPAEDIC SURGERY

## 2022-09-20 PROCEDURE — 77030034475 HC MISC IMPL SPN: Performed by: ORTHOPAEDIC SURGERY

## 2022-09-20 PROCEDURE — C1713 ANCHOR/SCREW BN/BN,TIS/BN: HCPCS | Performed by: ORTHOPAEDIC SURGERY

## 2022-09-20 PROCEDURE — 77030008462 HC STPLR SKN PROX J&J -A: Performed by: ORTHOPAEDIC SURGERY

## 2022-09-20 PROCEDURE — 77030006643: Performed by: INTERNAL MEDICINE

## 2022-09-20 DEVICE — GRAFT SPNL SPCR W145XH8XL12MM 7DEG CERV LORDOSIS PRESERVON: Type: IMPLANTABLE DEVICE | Site: SPINE CERVICAL | Status: FUNCTIONAL

## 2022-09-20 RX ORDER — ONDANSETRON 2 MG/ML
INJECTION INTRAMUSCULAR; INTRAVENOUS AS NEEDED
Status: DISCONTINUED | OUTPATIENT
Start: 2022-09-20 | End: 2022-09-20 | Stop reason: HOSPADM

## 2022-09-20 RX ORDER — LIDOCAINE HYDROCHLORIDE 20 MG/ML
INJECTION, SOLUTION EPIDURAL; INFILTRATION; INTRACAUDAL; PERINEURAL AS NEEDED
Status: DISCONTINUED | OUTPATIENT
Start: 2022-09-20 | End: 2022-09-20 | Stop reason: HOSPADM

## 2022-09-20 RX ORDER — HYDROMORPHONE HYDROCHLORIDE 1 MG/ML
0.2 INJECTION, SOLUTION INTRAMUSCULAR; INTRAVENOUS; SUBCUTANEOUS AS NEEDED
Status: DISCONTINUED | OUTPATIENT
Start: 2022-09-20 | End: 2022-09-20 | Stop reason: HOSPADM

## 2022-09-20 RX ORDER — SODIUM CHLORIDE 0.9 % (FLUSH) 0.9 %
5-40 SYRINGE (ML) INJECTION EVERY 8 HOURS
Status: DISCONTINUED | OUTPATIENT
Start: 2022-09-20 | End: 2022-09-20 | Stop reason: HOSPADM

## 2022-09-20 RX ORDER — MAGNESIUM SULFATE 100 %
4 CRYSTALS MISCELLANEOUS AS NEEDED
Status: DISCONTINUED | OUTPATIENT
Start: 2022-09-20 | End: 2022-09-20 | Stop reason: HOSPADM

## 2022-09-20 RX ORDER — SODIUM CHLORIDE 0.9 G/100ML
IRRIGANT IRRIGATION AS NEEDED
Status: DISCONTINUED | OUTPATIENT
Start: 2022-09-20 | End: 2022-09-20 | Stop reason: HOSPADM

## 2022-09-20 RX ORDER — NALOXONE HYDROCHLORIDE 4 MG/.1ML
SPRAY NASAL
Qty: 1 EACH | Refills: 0 | Status: SHIPPED | OUTPATIENT
Start: 2022-09-20

## 2022-09-20 RX ORDER — FENTANYL CITRATE 50 UG/ML
INJECTION, SOLUTION INTRAMUSCULAR; INTRAVENOUS AS NEEDED
Status: DISCONTINUED | OUTPATIENT
Start: 2022-09-20 | End: 2022-09-20 | Stop reason: HOSPADM

## 2022-09-20 RX ORDER — MIDAZOLAM HYDROCHLORIDE 1 MG/ML
INJECTION, SOLUTION INTRAMUSCULAR; INTRAVENOUS AS NEEDED
Status: DISCONTINUED | OUTPATIENT
Start: 2022-09-20 | End: 2022-09-20 | Stop reason: HOSPADM

## 2022-09-20 RX ORDER — PROPOFOL 10 MG/ML
INJECTION, EMULSION INTRAVENOUS AS NEEDED
Status: DISCONTINUED | OUTPATIENT
Start: 2022-09-20 | End: 2022-09-20 | Stop reason: HOSPADM

## 2022-09-20 RX ORDER — INSULIN LISPRO 100 [IU]/ML
INJECTION, SOLUTION INTRAVENOUS; SUBCUTANEOUS ONCE
Status: DISCONTINUED | OUTPATIENT
Start: 2022-09-20 | End: 2022-09-20

## 2022-09-20 RX ORDER — HYDROMORPHONE HYDROCHLORIDE 2 MG/ML
INJECTION, SOLUTION INTRAMUSCULAR; INTRAVENOUS; SUBCUTANEOUS AS NEEDED
Status: DISCONTINUED | OUTPATIENT
Start: 2022-09-20 | End: 2022-09-20 | Stop reason: HOSPADM

## 2022-09-20 RX ORDER — FENTANYL CITRATE 50 UG/ML
50 INJECTION, SOLUTION INTRAMUSCULAR; INTRAVENOUS
Status: DISCONTINUED | OUTPATIENT
Start: 2022-09-20 | End: 2022-09-20 | Stop reason: HOSPADM

## 2022-09-20 RX ORDER — PHENYLEPHRINE HCL IN 0.9% NACL 1 MG/10 ML
SYRINGE (ML) INTRAVENOUS AS NEEDED
Status: DISCONTINUED | OUTPATIENT
Start: 2022-09-20 | End: 2022-09-20 | Stop reason: HOSPADM

## 2022-09-20 RX ORDER — SODIUM CHLORIDE 0.9 % (FLUSH) 0.9 %
5-40 SYRINGE (ML) INJECTION AS NEEDED
Status: DISCONTINUED | OUTPATIENT
Start: 2022-09-20 | End: 2022-09-20 | Stop reason: HOSPADM

## 2022-09-20 RX ORDER — GLYCOPYRROLATE 0.2 MG/ML
INJECTION INTRAMUSCULAR; INTRAVENOUS AS NEEDED
Status: DISCONTINUED | OUTPATIENT
Start: 2022-09-20 | End: 2022-09-20 | Stop reason: HOSPADM

## 2022-09-20 RX ORDER — INSULIN LISPRO 100 [IU]/ML
INJECTION, SOLUTION INTRAVENOUS; SUBCUTANEOUS ONCE
Status: COMPLETED | OUTPATIENT
Start: 2022-09-20 | End: 2022-09-20

## 2022-09-20 RX ORDER — DEXTROSE MONOHYDRATE 100 MG/ML
0-250 INJECTION, SOLUTION INTRAVENOUS AS NEEDED
Status: DISCONTINUED | OUTPATIENT
Start: 2022-09-20 | End: 2022-09-20 | Stop reason: HOSPADM

## 2022-09-20 RX ORDER — SODIUM CHLORIDE, SODIUM LACTATE, POTASSIUM CHLORIDE, CALCIUM CHLORIDE 600; 310; 30; 20 MG/100ML; MG/100ML; MG/100ML; MG/100ML
125 INJECTION, SOLUTION INTRAVENOUS CONTINUOUS
Status: DISCONTINUED | OUTPATIENT
Start: 2022-09-20 | End: 2022-09-20 | Stop reason: HOSPADM

## 2022-09-20 RX ORDER — DEXAMETHASONE SODIUM PHOSPHATE 4 MG/ML
INJECTION, SOLUTION INTRA-ARTICULAR; INTRALESIONAL; INTRAMUSCULAR; INTRAVENOUS; SOFT TISSUE AS NEEDED
Status: DISCONTINUED | OUTPATIENT
Start: 2022-09-20 | End: 2022-09-20 | Stop reason: HOSPADM

## 2022-09-20 RX ORDER — ROCURONIUM BROMIDE 10 MG/ML
INJECTION, SOLUTION INTRAVENOUS AS NEEDED
Status: DISCONTINUED | OUTPATIENT
Start: 2022-09-20 | End: 2022-09-20 | Stop reason: HOSPADM

## 2022-09-20 RX ORDER — HYDROCODONE BITARTRATE AND ACETAMINOPHEN 5; 325 MG/1; MG/1
1-2 TABLET ORAL
Qty: 56 TABLET | Refills: 0 | Status: SHIPPED | OUTPATIENT
Start: 2022-09-20 | End: 2022-09-28

## 2022-09-20 RX ORDER — CLINDAMYCIN PHOSPHATE 900 MG/50ML
900 INJECTION INTRAVENOUS ONCE
Status: COMPLETED | OUTPATIENT
Start: 2022-09-20 | End: 2022-09-20

## 2022-09-20 RX ORDER — BUPIVACAINE HYDROCHLORIDE 2.5 MG/ML
INJECTION, SOLUTION EPIDURAL; INFILTRATION; INTRACAUDAL AS NEEDED
Status: DISCONTINUED | OUTPATIENT
Start: 2022-09-20 | End: 2022-09-20 | Stop reason: HOSPADM

## 2022-09-20 RX ORDER — CYCLOBENZAPRINE HCL 5 MG
5 TABLET ORAL
Qty: 30 TABLET | Refills: 0 | Status: SHIPPED | OUTPATIENT
Start: 2022-09-20 | End: 2022-09-30

## 2022-09-20 RX ADMIN — HYDROMORPHONE HYDROCHLORIDE 1 MG: 2 INJECTION, SOLUTION INTRAMUSCULAR; INTRAVENOUS; SUBCUTANEOUS at 07:46

## 2022-09-20 RX ADMIN — DEXAMETHASONE SODIUM PHOSPHATE 4 MG: 4 INJECTION, SOLUTION INTRAMUSCULAR; INTRAVENOUS at 07:48

## 2022-09-20 RX ADMIN — CLINDAMYCIN PHOSPHATE 900 MG: 900 INJECTION, SOLUTION INTRAVENOUS at 07:37

## 2022-09-20 RX ADMIN — FENTANYL CITRATE 100 MCG: 50 INJECTION, SOLUTION INTRAMUSCULAR; INTRAVENOUS at 07:46

## 2022-09-20 RX ADMIN — SODIUM CHLORIDE, SODIUM LACTATE, POTASSIUM CHLORIDE, AND CALCIUM CHLORIDE 125 ML/HR: 600; 310; 30; 20 INJECTION, SOLUTION INTRAVENOUS at 06:24

## 2022-09-20 RX ADMIN — SUGAMMADEX 200 MG: 100 INJECTION, SOLUTION INTRAVENOUS at 08:41

## 2022-09-20 RX ADMIN — ONDANSETRON HYDROCHLORIDE 4 MG: 2 INJECTION INTRAMUSCULAR; INTRAVENOUS at 07:48

## 2022-09-20 RX ADMIN — Medication 200 MCG: at 07:52

## 2022-09-20 RX ADMIN — ROCURONIUM BROMIDE 30 MG: 10 INJECTION, SOLUTION INTRAVENOUS at 07:46

## 2022-09-20 RX ADMIN — MIDAZOLAM 2 MG: 1 INJECTION INTRAMUSCULAR; INTRAVENOUS at 07:35

## 2022-09-20 RX ADMIN — PROPOFOL 200 MG: 10 INJECTION, EMULSION INTRAVENOUS at 07:46

## 2022-09-20 RX ADMIN — GLYCOPYRROLATE 0.2 MG: 0.2 INJECTION INTRAMUSCULAR; INTRAVENOUS at 07:35

## 2022-09-20 RX ADMIN — INSULIN LISPRO 6 UNITS: 100 INJECTION, SOLUTION INTRAVENOUS; SUBCUTANEOUS at 07:04

## 2022-09-20 RX ADMIN — FENTANYL CITRATE 50 MCG: 50 INJECTION, SOLUTION INTRAMUSCULAR; INTRAVENOUS at 09:28

## 2022-09-20 RX ADMIN — LIDOCAINE HYDROCHLORIDE 80 MG: 20 INJECTION, SOLUTION INTRAVENOUS at 07:46

## 2022-09-20 RX ADMIN — SODIUM CHLORIDE, SODIUM LACTATE, POTASSIUM CHLORIDE, AND CALCIUM CHLORIDE: 600; 310; 30; 20 INJECTION, SOLUTION INTRAVENOUS at 08:41

## 2022-09-20 NOTE — ANESTHESIA PREPROCEDURE EVALUATION
Relevant Problems   NEUROLOGY   (+) Bipolar affective disorder (HCC)   (+) Depression      CARDIOVASCULAR   (+) Essential hypertension      GASTROINTESTINAL   (+) Peptic ulcer disease      ENDOCRINE   (+) Secondary diabetes mellitus (HCC)   (+) Type 2 diabetes with nephropathy (HCC)      PERSONAL HX & FAMILY HX OF CANCER   (+) Malignant neoplasm of upper-outer quadrant of right female breast (HCC)       Anesthetic History   No history of anesthetic complications            Review of Systems / Medical History  Patient summary reviewed, nursing notes reviewed and pertinent labs reviewed    Pulmonary  Within defined limits            Pertinent negatives: No pneumonia     Neuro/Psych         Psychiatric history     Cardiovascular  Within defined limits                Exercise tolerance: >4 METS     GI/Hepatic/Renal           PUD     Endo/Other    Diabetes: using insulin    Arthritis     Other Findings   Comments: Pt reports numbness tingling to RUE           Physical Exam    Airway  Mallampati: II  TM Distance: 4 - 6 cm  Neck ROM: normal range of motion   Mouth opening: Normal     Cardiovascular    Rhythm: regular  Rate: normal         Dental  No notable dental hx       Pulmonary  Breath sounds clear to auscultation               Abdominal  GI exam deferred       Other Findings            Anesthetic Plan    ASA: 2  Anesthesia type: general    Monitoring Plan: Continuous noninvasive hemodynamic monitoring      Induction: Intravenous  Anesthetic plan and risks discussed with: Patient

## 2022-09-20 NOTE — ANESTHESIA POSTPROCEDURE EVALUATION
Procedure(s):  CERVICAL 5-6 ANTERIOR CERVICAL DISC FUSION WITH C-ARM \"SPEC POP\". general    Anesthesia Post Evaluation      Multimodal analgesia: multimodal analgesia used between 6 hours prior to anesthesia start to PACU discharge  Patient location during evaluation: PACU  Patient participation: complete - patient participated  Level of consciousness: awake and alert  Pain score: 2  Pain management: adequate  Airway patency: patent  Anesthetic complications: no  Cardiovascular status: blood pressure returned to baseline, hemodynamically stable and tachycardic  Respiratory status: nonlabored ventilation, spontaneous ventilation and room air  Hydration status: balanced  Post anesthesia nausea and vomiting:  none  Final Post Anesthesia Temperature Assessment:  Normothermia (36.0-37.5 degrees C)      INITIAL Post-op Vital signs:   Vitals Value Taken Time   /78 09/20/22 0944   Temp 36.5 °C (97.7 °F) 09/20/22 0901   Pulse 112 09/20/22 0944   Resp 17 09/20/22 0944   SpO2 91 % 09/20/22 0944   Vitals shown include unvalidated device data.

## 2022-09-20 NOTE — PERIOP NOTES
TRANSFER - OUT REPORT:    Verbal report given to MANSI Vleasquez RN (name) on Gege Lujan  being transferred to Phase 2 Recovery (unit) for routine progression of care       Report consisted of patients Situation, Background, Assessment and   Recommendations(SBAR). Information from the following report(s) SBAR, Kardex, Procedure Summary, Intake/Output, and MAR was reviewed with the receiving nurse. Lines:   Peripheral IV 09/20/22 Left;Posterior Hand (Active)   Site Assessment Clean, dry, & intact 09/20/22 0944   Phlebitis Assessment 0 09/20/22 0944   Infiltration Assessment 0 09/20/22 0944   Dressing Status Clean, dry, & intact 09/20/22 0944   Dressing Type Transparent;Tape 09/20/22 0944   Hub Color/Line Status Pink; Infusing 09/20/22 0944        Opportunity for questions and clarification was provided.       Patient transported with:   O2 @ 2 liters  Registered Nurse

## 2022-09-20 NOTE — OP NOTES
Operative Note      Patient: Deanna Forbes               Sex: female          DOA: 9/20/2022         YOB: 1970      Age:  45 y.o. Preoperative Diagnosis: CERVICALGIA, SPONDYLOSIS WITH RADCULOPATHY CERVICAL REGION, HYPERTENSION, DIABETIC TYPE II    Postoperative Diagnosis:  CERVICALGIA, SPONDYLOSIS WITH RADCULOPATHY CERVICAL REGION, HYPERTENSION, DIABETIC TYPE II    Surgeon: Surgeon(s) and Role:     * Mitra Mims MD - Primary  Assistant: Michelle Young PA-C    OR Assitance: Physician Assistant: SAMANTHA Au  Surg Asst-1: Veronica Gray    Anesthesia:  General    Procedure:  Procedure(s):  CERVICAL 5-6 ANTERIOR CERVICAL DISC FUSION WITH C-ARM \"SPEC POP\"     Estimated Blood Loss: 50cc                 Implants:   Implant Name Type Inv. Item Serial No.  Lot No. LRB No. Used Action   GRAFT SPNL SPCR [id number] 7DEG CERV LORDOSIS PRESERVON - [id number]  GRAFT SPNL SPCR [id number] 7DEG CERV LORDOSIS PRESERVON [id number] [facility]  N/A 1 Implanted   4.0 x 14mm screw   [employer]  N/A 4 Implanted   ONE LEVEL PLATE 37OQ   [employer]  N/A 1 Implanted       Specimens: * No specimens in log *    Drains: ANTON           Complications:  None              Indications: This is a 45y.o. year-old female who presents with significant neck and arm pain worsening ability to do activities of daily living. X-rays and MRI scan revealing spinal stenosis, degenerative disk disease and disk herniation. Having failed conservative care, he comes for operative intervention. Procedure Details:   After appropriate informed consent was obtained, the patient was taken to the operating suite and placed in a supine position on the operating table. Satisfactory general endotracheal anesthesia was induced. All pressure points were carefully padded. Perioperative antibiotics were given.   The anterior neck was shaved, prepped, and draped in the usual sterile fashion. Intraoperative fluoroscopy was used to localize the C5-6 level. A transverse linear incision was made in the left anterior neck directly and was carried down through the subcutaneous tissue. The platysma was divided with electrocautery in a transverse fashion and was undermined both superiorly and inferiorly. Dissection was continued down along the anterior border of the sternocleidomastoid muscle. The carotid artery was palpated and was swept laterally. A plane was identified between the paratracheal musculature and the sternocleidmastoid  into the prevertebral space and was developed both superiorly and inferiorly using blunt dissection. Intraoperative fluoroscopy and a spinal needle were used to localize theC 5-6 disc space. The prevertebral fascia was then incised longitudinally, and the longus colli muscles were swept laterally away from the bony elements of the spine on both sides. A self-retaining retractor with smooth blades was placed in the medial and lateral wound. 14 mm distraction pins were placed in the superior and inferior vertebral bodies. Next, the C5-6 disc were removed completely with curettes and pituitary rongeurs. Cartilaginous endplates were removed. Posterolateral osteophytes were removed using the 2mm Kerrison rongeur. The posterior longitudinal ligament was opened with nerve hook and generous foraminotomies were created bilaterally. The nerve roots and spinal cord were found to be freely decompressed. The wound was then irrigated copiously with 3.5% Betadine solution. Meticulous hemostasis was obtained. Osteophytes were removed from the posterior and anterior vertebral bodies with the teresita. The cartilagenous endplates were also removed from each of the vertebral bodies down to bleeding subchondral bone. The interbody space were then sized for allograft spacer with trial sizers.   The allograft was then impacted into the interbody space. Each found to have a nice, snug fit. ANTERIOR INSTRUMENTATION:  Next, an anterior cervical plate was placed from C5-6. Screws were then placed sequentially starting with an awl then followed by self-tapping screws. Two screws were placed in each vertebra under fluoroscopic guidance. The screws visualized to locked into the plate with the wings of the screw head snapping under the plate edge. Intraoperative fluoroscopy confirmed the entire construct to be in good position. The wound was once more copiously irrigated. The self-retaining retractor was removed from the wound. Meticulous hemostasis was obtained. The esophagus was inspected and was found to be intact. A ANTON drain was inserted. The platysma was closed using interrupted 2-0 Vicryl sutures. The skin edges were closed with 3-0 PDS suture and approximated using Dermabond. A sterile dressing was applied to the wound. The patient was then transferred back to the stretcher where satisfactory general endotracheal anesthesia was reversed. The patient was then taken to the Recovery Room in satisfactory condition. Assistant surgeon was needed throughout the procedure for managing bleeding, improving visualization and closure of the surgical wounds.

## 2022-09-20 NOTE — PERIOP NOTES
TTRANSFER - IN REPORT:    Verbal report received from Monalisa Zabala (name) on Sebastián Carlos  being received from or (unit) for routine progression of care      Report consisted of patients Situation, Background, Assessment and   Recommendations(SBAR). Information from the following report(s) SBAR, Kardex, Procedure Summary, Intake/Output, and MAR was reviewed with the receiving nurse. Opportunity for questions and clarification was provided. Assessment completed upon patients arrival to unit and care assumed.

## 2022-09-20 NOTE — DISCHARGE INSTRUCTIONS
Dr. Ayah Kenny Operative Instructions: Cervical Fusion    *YOU MUST AVOID SMOKING OR BEING AROUND ANYONE WHO SMOKES. AVOID ALL PRODUCTS THAT CONTAIN NICOTINE. DO NOT TAKE IBUPROFEN OR ANTI--INFLAMMATORIES, AS THESE MAY ALTER THE HEALING OF THE FUSION. Diet:   1. Begin with liquids and light foods such as jell-o or soups  2. Advance as tolerated to your regular diet if not nauseated. 3.  Swallowing difficulties may be experienced up to 2 weeks post-operatively. Modify food thickness as necessary. You may also obtain over-the-counter chloraseptic spray. Medications:  1. Strong oral narcotic pain medications have been prescribed for the first few days. Use only as directed. No pain medication is capable of taking away all the pain. Taking your pills at regular intervals will give you the best chance of having less pain. 2. If you need a refill PLEASE PLAN AHEAD. Call our office during regular hours (8-5). 3. Do not combine with alcoholic beverages. 4. Be careful as you walk, climb stairs or drive as mild dizziness is not unusual.  5. Do not take medications that have not been prescribed by your surgeon. 6. You may switch to over the counter pain medication of your choice as you become more comfortable. Activity and Restrictions:  1. You should not drive until you are clear by your surgeon at your post-operative visit. 2.  In regards to your cervical collar, you MUST wear this at all times until your first follow-up appointment. 3.  You should wear the collar even when sleeping. 4.  It can be removed for short periods of time as long as you are keeping your head centered over the shoulders without rotating or looking up or down. 5. You may take short walks inside and outside of your home and climb stairs. 6. You are to avoid work, housework, yard work, snow shoveling, lifting of more than a few pounds, overhead work or strenuous activity.   7. Avoid any excessive stretching or range-of-motion of the neck. Non-painful range-of-motion of the neck is allowed  8. Follow-up with Dr. Keith Redmond in 7-10 days. DRIVIN. You should not drive until after your follow-up appointment. 2.  You can be in a vehicle for short distances, but if you travel any long distance, please stop about every 30 minutes and walk/stretch. 3.  You should NEVER drive while taking narcotic medication. BATHING and INCISION CARE:  1. The incision may be tender to touch or feel numb: this is normal.   2.  Keep the incision clean and dry. Do not get the incision wet for 5 days. The incision will be closed with sutures under the skin and the skin will be glued. 3.  Do not apply any lotions, ointments or oils on the incision. 4.  If you notice any excessive swelling, redness, or persistent drainage around the incision, notify the office immediately. RETURN TO WORK :  1. The decision to return to work will be determined on an individual basis. 2.  Many people who have a strenuous job (construction, heavy labor, etc) may need to be off work for up to 8 weeks. 3.  If you need a work note, please let us know as soon as possible, and not the same day you are planning to return to work. NUTRITION :  1.  Good nutrition is an essential part of healing. 2.  You should eat a balanced diet each day, including fruits, vegetables, dairy products and protein. 3.  Remember to drink plenty of water. 4.  If you have not had a bowel movement within 3 days of surgery, you will need to use a laxative or suppository that can be obtained over-the-counter at your local pharmacy. BONE STIMULATOR:  1. A spinal bone stimulator may be prescribed for you under certain situations. 2.  A nurse will call you if one has been requested and discuss its use for approximately 4-6 months post-op every day. 3.  This device assists in bone healing and fusion.     CALL THE OFFICE:  If you have severe pain unrelieved by the medications, new numbness or tingling in your arms; If you have a fever of 101.0°F or greater; If you notice excessive swelling, redness, or persistent drainage from the incision or IV site; [facility] office number is [phone] from 8:00am to 5:00pm Monday through Friday. After 5:00pm, on weekends, or holidays, please leave a message with our answering service and the doctor on-call will get back to you shortly. DISCHARGE SUMMARY from Nurse    PATIENT INSTRUCTIONS:    After general anesthesia or intravenous sedation, for 24 hours or while taking prescription Narcotics:  Limit your activities  Do not drive and operate hazardous machinery  Do not make important personal or business decisions  Do  not drink alcoholic beverages  If you have not urinated within 8 hours after discharge, please contact your surgeon on call. Report the following to your surgeon:  Excessive pain, swelling, redness or odor of or around the surgical area  Temperature over 100.5  Nausea and vomiting lasting longer than 4 hours or if unable to take medications  Any signs of decreased circulation or nerve impairment to extremity: change in color, persistent  numbness, tingling, coldness or increase pain  Any questions    What to do at Home:  Recommended activity: see Dr Modesta Avendano instructions above    *  Please give a list of your current medications to your Primary Care Provider. *  Please update this list whenever your medications are discontinued, doses are      changed, or new medications (including over-the-counter products) are added. *  Please carry medication information at all times in case of emergency situations. These are general instructions for a healthy lifestyle:    No smoking/ No tobacco products/ Avoid exposure to second hand smoke  Surgeon General's Warning:  Quitting smoking now greatly reduces serious risk to your health.     Obesity, smoking, and sedentary lifestyle greatly increases your risk for illness    A healthy diet, regular physical exercise & weight monitoring are important for maintaining a healthy lifestyle    You may be retaining fluid if you have a history of heart failure or if you experience any of the following symptoms:  Weight gain of 3 pounds or more overnight or 5 pounds in a week, increased swelling in our hands or feet or shortness of breath while lying flat in bed. Please call your doctor as soon as you notice any of these symptoms; do not wait until your next office visit. The discharge information has been reviewed with the patient and caregiver. The patient and caregiver verbalized understanding. Discharge medications reviewed with the patient and caregiver and appropriate educational materials and side effects teaching were provided.   ___________________________________________________________________________________________________________________________________

## 2022-09-20 NOTE — INTERVAL H&P NOTE
Update History & Physical    The Patient's History and Physical was reviewed with the patient and I examined the patient. There was no change. The surgical site was confirmed by the patient and me. Plan:  The risk, benefits, expected outcome, and alternative to the recommended procedure have been discussed with the patient. Patient understands and wants to proceed with the procedure.     Electronically signed by Oneida Santiago MD on 9/20/2022 at 7:05 AM

## 2022-09-20 NOTE — PERIOP NOTES
TRANSFER - IN REPORT:    Verbal report received from Alicja RN(name) on Ivana  being received from PACU(unit) for routine progression of care      Report consisted of patients Situation, Background, Assessment and   Recommendations(SBAR). Information from the following report(s) Procedure Summary was reviewed with the receiving nurse. Opportunity for questions and clarification was provided. Assessment completed upon patients arrival to unit and care assumed.

## 2022-11-13 ENCOUNTER — HOSPITAL ENCOUNTER (EMERGENCY)
Age: 52
Discharge: HOME | End: 2022-11-13
Attending: EMERGENCY MEDICINE
Payer: MEDICAID

## 2022-11-13 VITALS
BODY MASS INDEX: 26.68 KG/M2 | SYSTOLIC BLOOD PRESSURE: 139 MMHG | WEIGHT: 145 LBS | HEIGHT: 62 IN | DIASTOLIC BLOOD PRESSURE: 81 MMHG | TEMPERATURE: 98.4 F | RESPIRATION RATE: 16 BRPM | OXYGEN SATURATION: 100 % | HEART RATE: 112 BPM

## 2022-11-13 DIAGNOSIS — N39.0 URINARY TRACT INFECTION WITH HEMATURIA, SITE UNSPECIFIED: Primary | ICD-10-CM

## 2022-11-13 DIAGNOSIS — D64.9 ANEMIA, UNSPECIFIED TYPE: ICD-10-CM

## 2022-11-13 DIAGNOSIS — R31.9 URINARY TRACT INFECTION WITH HEMATURIA, SITE UNSPECIFIED: Primary | ICD-10-CM

## 2022-11-13 LAB
ALBUMIN SERPL-MCNC: 3 G/DL (ref 3.4–5)
ALBUMIN/GLOB SERPL: 0.7 {RATIO} (ref 0.8–1.7)
ALP SERPL-CCNC: 141 U/L (ref 45–117)
ALT SERPL-CCNC: 16 U/L (ref 13–56)
ANION GAP SERPL CALC-SCNC: 5 MMOL/L (ref 3–18)
APPEARANCE UR: ABNORMAL
AST SERPL-CCNC: 16 U/L (ref 10–38)
BACTERIA URNS QL MICRO: ABNORMAL /HPF
BASOPHILS # BLD: 0 K/UL (ref 0–0.1)
BASOPHILS NFR BLD: 0 % (ref 0–2)
BILIRUB SERPL-MCNC: 0.2 MG/DL (ref 0.2–1)
BILIRUB UR QL: NEGATIVE
BUN SERPL-MCNC: 28 MG/DL (ref 7–18)
BUN/CREAT SERPL: 25 (ref 12–20)
CALCIUM SERPL-MCNC: 8.7 MG/DL (ref 8.5–10.1)
CHLORIDE SERPL-SCNC: 107 MMOL/L (ref 100–111)
CO2 SERPL-SCNC: 27 MMOL/L (ref 21–32)
COLOR UR: YELLOW
CREAT SERPL-MCNC: 1.13 MG/DL (ref 0.6–1.3)
DIFFERENTIAL METHOD BLD: ABNORMAL
EOSINOPHIL # BLD: 0.3 K/UL (ref 0–0.4)
EOSINOPHIL NFR BLD: 3 % (ref 0–5)
EPITH CASTS URNS QL MICRO: ABNORMAL /LPF (ref 0–5)
ERYTHROCYTE [DISTWIDTH] IN BLOOD BY AUTOMATED COUNT: 15.8 % (ref 11.6–14.5)
GLOBULIN SER CALC-MCNC: 4.4 G/DL (ref 2–4)
GLUCOSE SERPL-MCNC: 168 MG/DL (ref 74–99)
GLUCOSE UR STRIP.AUTO-MCNC: NEGATIVE MG/DL
HCT VFR BLD AUTO: 28 % (ref 35–45)
HGB BLD-MCNC: 8.8 G/DL (ref 12–16)
HGB UR QL STRIP: ABNORMAL
IMM GRANULOCYTES # BLD AUTO: 0 K/UL (ref 0–0.04)
IMM GRANULOCYTES NFR BLD AUTO: 0 % (ref 0–0.5)
KETONES UR QL STRIP.AUTO: NEGATIVE MG/DL
LEUKOCYTE ESTERASE UR QL STRIP.AUTO: ABNORMAL
LIPASE SERPL-CCNC: 202 U/L (ref 73–393)
LYMPHOCYTES # BLD: 1.9 K/UL (ref 0.9–3.6)
LYMPHOCYTES NFR BLD: 23 % (ref 21–52)
MCH RBC QN AUTO: 23.9 PG (ref 24–34)
MCHC RBC AUTO-ENTMCNC: 31.4 G/DL (ref 31–37)
MCV RBC AUTO: 76.1 FL (ref 78–100)
MONOCYTES # BLD: 0.4 K/UL (ref 0.05–1.2)
MONOCYTES NFR BLD: 4 % (ref 3–10)
NEUTS SEG # BLD: 5.7 K/UL (ref 1.8–8)
NEUTS SEG NFR BLD: 69 % (ref 40–73)
NITRITE UR QL STRIP.AUTO: NEGATIVE
NRBC # BLD: 0 K/UL (ref 0–0.01)
NRBC BLD-RTO: 0 PER 100 WBC
PH UR STRIP: 5.5 [PH] (ref 5–8)
PLATELET # BLD AUTO: 380 K/UL (ref 135–420)
PMV BLD AUTO: 9.4 FL (ref 9.2–11.8)
POTASSIUM SERPL-SCNC: 4 MMOL/L (ref 3.5–5.5)
PROT SERPL-MCNC: 7.4 G/DL (ref 6.4–8.2)
PROT UR STRIP-MCNC: 100 MG/DL
RBC # BLD AUTO: 3.68 M/UL (ref 4.2–5.3)
RBC #/AREA URNS HPF: ABNORMAL /HPF (ref 0–5)
SERVICE CMNT-IMP: NORMAL
SODIUM SERPL-SCNC: 139 MMOL/L (ref 136–145)
SP GR UR REFRACTOMETRY: 1.02 (ref 1–1.03)
UROBILINOGEN UR QL STRIP.AUTO: 0.2 EU/DL (ref 0.2–1)
WBC # BLD AUTO: 8.2 K/UL (ref 4.6–13.2)
WBC URNS QL MICRO: ABNORMAL /HPF (ref 0–5)
WET PREP GENITAL: NORMAL

## 2022-11-13 PROCEDURE — 99283 EMERGENCY DEPT VISIT LOW MDM: CPT

## 2022-11-13 PROCEDURE — 74011250637 HC RX REV CODE- 250/637: Performed by: PHYSICIAN ASSISTANT

## 2022-11-13 PROCEDURE — 85025 COMPLETE CBC W/AUTO DIFF WBC: CPT

## 2022-11-13 PROCEDURE — 87210 SMEAR WET MOUNT SALINE/INK: CPT

## 2022-11-13 PROCEDURE — 87491 CHLMYD TRACH DNA AMP PROBE: CPT

## 2022-11-13 PROCEDURE — 80053 COMPREHEN METABOLIC PANEL: CPT

## 2022-11-13 PROCEDURE — 83690 ASSAY OF LIPASE: CPT

## 2022-11-13 PROCEDURE — 81001 URINALYSIS AUTO W/SCOPE: CPT

## 2022-11-13 RX ORDER — PHENAZOPYRIDINE HYDROCHLORIDE 100 MG/1
200 TABLET, FILM COATED ORAL
Status: COMPLETED | OUTPATIENT
Start: 2022-11-13 | End: 2022-11-13

## 2022-11-13 RX ORDER — SULFAMETHOXAZOLE AND TRIMETHOPRIM 800; 160 MG/1; MG/1
1 TABLET ORAL 2 TIMES DAILY
Qty: 6 TABLET | Refills: 0 | Status: SHIPPED | OUTPATIENT
Start: 2022-11-13 | End: 2022-11-16

## 2022-11-13 RX ORDER — ACETAMINOPHEN 500 MG
1000 TABLET ORAL
Status: DISCONTINUED | OUTPATIENT
Start: 2022-11-13 | End: 2022-11-13 | Stop reason: HOSPADM

## 2022-11-13 RX ADMIN — PHENAZOPYRIDINE HYDROCHLORIDE 200 MG: 100 TABLET ORAL at 20:31

## 2022-11-13 NOTE — ED TRIAGE NOTES
Patient reports she has abdominal pain, body pain, they told her she has the flu and placed her on antibiotic.

## 2022-11-13 NOTE — ED PROVIDER NOTES
EMERGENCY DEPARTMENT HISTORY AND PHYSICAL EXAM    Date: 11/13/2022  Patient Name: Jerrie Najjar    History of Presenting Illness     Chief Complaint   Patient presents with    Abdominal Pain    Leg Pain    Nausea         History Provided By: Patient    Chief Complaint: abd pain       Additional History (Context):   5:04 PM  Jerrie Najjar is a 45 y.o. female with PMHX pretension, diabetes, history of alcohol abuse history of drug abuse, thromboembolus, peripheral neuropathy presents to the emergency department C/O lower abdominal pain that radiates into the front of the right leg for the past week. States she has felt some hesitance with urination. She has had some nausea and vomiting no diarrhea or constipation. No fevers. No leg swelling. Denies any injury to the abdomen or leg. PCP: Kyle Galicia MD    Current Facility-Administered Medications   Medication Dose Route Frequency Provider Last Rate Last Admin    acetaminophen (TYLENOL) tablet 1,000 mg  1,000 mg Oral NOW Brandon Her PA         Current Outpatient Medications   Medication Sig Dispense Refill    trimethoprim-sulfamethoxazole (Bactrim DS) 160-800 mg per tablet Take 1 Tablet by mouth two (2) times a day for 3 days. 6 Tablet 0    naloxone (NARCAN) 4 mg/actuation nasal spray Use 1 spray intranasally, then discard. Repeat with new spray every 2 min as needed for opioid overdose symptoms, alternating nostrils. 1 Each 0    insulin glargine (LANTUS,BASAGLAR) 100 unit/mL (3 mL) inpn 35 Units by SubCUTAneous route nightly. pregabalin (LYRICA) 50 mg capsule Take 50 mg by mouth two (2) times a day. Blood-Glucose Meter (ONETOUCH ULTRA2) monitoring kit Use to obtain two times a day. 1 Kit 0    lancets misc Use daily to monitor blood glucose 200 Each 0    glucose blood VI test strips (ASCENSIA AUTODISC VI, ONE TOUCH ULTRA TEST VI) strip 50 Each by Does Not Apply route two (2) times daily as needed.  200 Strip 3    polyethylene glycol (MIRALAX) 17 gram packet Take 1 Packet by mouth daily.  30 Each 1    Insulin Needles, Disposable, 31 gauge x 5/16\" ndle Use to administer insulin as directed 1 Package 11       Past History     Past Medical History:  Past Medical History:   Diagnosis Date    Anxiety     Bipolar disorder ([facility]     Breast cancer ([facility] [address]     breast cancer, right, S/P Mastectomy and chemo, radiation in 2013    Chronic pain     cervical    Depression     Diabetes ([facility] [address]     Drug abuse ([facility] [address]     H/O cocaine use in past    Drug-seeking behavior     Family history of early CAD     Gastrointestinal disorder     cholitis    H/O ETOH abuse     History of chemotherapy 2013    History of radiation therapy 2013    Hyperlipidemia     Hypertension     Malignant neoplasm without specification of site [facility])     Methamphetamine abuse ([facility] [address]      self reported on 1/3/16    Peripheral neuropathy 2022    Psychiatric disorder     multiple personality disorder    Psychiatric disorder     anxiety    Psychiatric disorder     bipolar     Spine injury     Thromboembolus ([facility] [address] 2022    Vitamin D deficiency 05/01/2018       Past Surgical History:  Past Surgical History:   Procedure Laterality Date    COLONOSCOPY N/A 07/18/2016    COLONOSCOPY performed by Dinora Lagos MD at [facility] ENDOSCOPY    HX BREAST LUMPECTOMY  06/2013    right    HX BREAST RECONSTRUCTION Bilateral 2018    tissue expanders insertion    HX BREAST RECONSTRUCTION Bilateral 2018    tissue expanders removed, no implants    HX HYSTERECTOMY  2013    HX LUMBAR FUSION  2020    HX MASTECTOMY Bilateral 2013    HX SALPINGO-OOPHORECTOMY Bilateral 2013    HX TONSILLECTOMY      HX TUBAL LIGATION  1998    HX VASCULAR ACCESS  2013    medi port    HX VASCULAR ACCESS      medi port removed       Family History:  Family History   Problem Relation Age of Onset    Heart Disease Mother     Stroke Father     Heart Disease Father 48    Diabetes Other     Hypertension Other     Cancer Maternal Grandmother        Social History:  Social History     Tobacco Use    Smoking status: Never    Smokeless tobacco: Never   Vaping Use    Vaping Use: Never used   Substance Use Topics    Alcohol use: Not Currently     Alcohol/week: 2.0 standard drinks     Types: 2 Cans of beer per week    Drug use: Not Currently     Types: Methamphetamines, Cocaine     Comment: last used >1.5 years       Allergies: Allergies   Allergen Reactions    Latex Hives and Itching    Other Food Rash     Almonds    Amoxicillin Swelling     Other reaction(s): mild rash/itching    Aspirin Not Reported This Time and Swelling     Mouth swells    Beeswax Anaphylaxis    Levaquin [Levofloxacin] Not Reported This Time and Swelling     Other reaction(s): mild rash/itching    [place] Rash and Itching    Glycopyrrolate Swelling     Pt  States  faciAL  SWELLING   POST  ROBINUL  IV   Pt  States  faciAL  SWELLING   POST  ROBINUL  IV     Pcn [Penicillins] Swelling    Tape [Adhesive] Rash    Tramadol Swelling       Review of Systems   Review of Systems   Constitutional:  Negative for chills and fever. Respiratory:  Negative for shortness of breath. Cardiovascular:  Positive for leg swelling. Negative for chest pain. Gastrointestinal:  Positive for abdominal pain, nausea and vomiting. Negative for diarrhea. Genitourinary:  Positive for difficulty urinating, dysuria and vaginal discharge. Negative for decreased urine volume, flank pain, frequency, genital sores, hematuria, pelvic pain, urgency, vaginal bleeding and vaginal pain. Musculoskeletal:  Negative for back pain and neck pain. Neurological:  Negative for weakness and numbness. All other systems reviewed and are negative. Physical Exam     Vitals:    11/13/22 1629   BP: 139/81   Pulse: (!) 112   Resp: 16   Temp: 98.4 °F (36.9 °C)   SpO2: 100%   Weight: 65.8 kg (145 lb)   Height: 5' 2\" (1.575 m)     Physical Exam  Vitals and nursing note reviewed. Constitutional:       Appearance: She is well-developed.    HENT: Head: Normocephalic and atraumatic. Cardiovascular:      Rate and Rhythm: Normal rate and regular rhythm. Heart sounds: Normal heart sounds. No murmur heard. Pulmonary:      Effort: Pulmonary effort is normal. No respiratory distress. Breath sounds: Normal breath sounds. No wheezing or rales. Abdominal:      General: Bowel sounds are normal.      Palpations: Abdomen is soft. Tenderness: There is abdominal tenderness in the right lower quadrant, suprapubic area and left lower quadrant. There is no right CVA tenderness or left CVA tenderness. Musculoskeletal:      Cervical back: Normal range of motion and neck supple. Comments: Tenderness to the anterior right thigh, no swelling or skin changes, full range of motion of the knee and hip, pulses 2+ for DP and PT bilaterally   Neurological:      Mental Status: She is alert and oriented to person, place, and time. Psychiatric:         Judgment: Judgment normal.         Diagnostic Study Results     Labs:     Recent Results (from the past 12 hour(s))   CBC WITH AUTOMATED DIFF    Collection Time: 11/13/22  5:15 PM   Result Value Ref Range    WBC 8.2 4.6 - 13.2 K/uL    RBC 3.68 (L) 4.20 - 5.30 M/uL    HGB 8.8 (L) 12.0 - 16.0 g/dL    HCT 28.0 (L) 35.0 - 45.0 %    MCV 76.1 (L) 78.0 - 100.0 FL    MCH 23.9 (L) 24.0 - 34.0 PG    MCHC 31.4 31.0 - 37.0 g/dL    RDW 15.8 (H) 11.6 - 14.5 %    PLATELET 638 354 - 279 K/uL    MPV 9.4 9.2 - 11.8 FL    NRBC 0.0 0  WBC    ABSOLUTE NRBC 0.00 0.00 - 0.01 K/uL    NEUTROPHILS 69 40 - 73 %    LYMPHOCYTES 23 21 - 52 %    MONOCYTES 4 3 - 10 %    EOSINOPHILS 3 0 - 5 %    BASOPHILS 0 0 - 2 %    IMMATURE GRANULOCYTES 0 0.0 - 0.5 %    ABS. NEUTROPHILS 5.7 1.8 - 8.0 K/UL    ABS. LYMPHOCYTES 1.9 0.9 - 3.6 K/UL    ABS. MONOCYTES 0.4 0.05 - 1.2 K/UL    ABS. EOSINOPHILS 0.3 0.0 - 0.4 K/UL    ABS. BASOPHILS 0.0 0.0 - 0.1 K/UL    ABS. IMM.  GRANS. 0.0 0.00 - 0.04 K/UL    DF AUTOMATED     METABOLIC PANEL, COMPREHENSIVE Collection Time: 11/13/22  5:15 PM   Result Value Ref Range    Sodium 139 136 - 145 mmol/L    Potassium 4.0 3.5 - 5.5 mmol/L    Chloride 107 100 - 111 mmol/L    CO2 27 21 - 32 mmol/L    Anion gap 5 3.0 - 18 mmol/L    Glucose 168 (H) 74 - 99 mg/dL    BUN 28 (H) 7.0 - 18 MG/DL    Creatinine 1.13 0.6 - 1.3 MG/DL    BUN/Creatinine ratio 25 (H) 12 - 20      eGFR 59 (L) >60 ml/min/1.73m2    Calcium 8.7 8.5 - 10.1 MG/DL    Bilirubin, total 0.2 0.2 - 1.0 MG/DL    ALT (SGPT) 16 13 - 56 U/L    AST (SGOT) 16 10 - 38 U/L    Alk. phosphatase 141 (H) 45 - 117 U/L    Protein, total 7.4 6.4 - 8.2 g/dL    Albumin 3.0 (L) 3.4 - 5.0 g/dL    Globulin 4.4 (H) 2.0 - 4.0 g/dL    A-G Ratio 0.7 (L) 0.8 - 1.7     LIPASE    Collection Time: 11/13/22  5:15 PM   Result Value Ref Range    Lipase 202 73 - 393 U/L   URINALYSIS W/ RFLX MICROSCOPIC    Collection Time: 11/13/22  5:15 PM   Result Value Ref Range    Color YELLOW      Appearance TURBID      Specific gravity 1.021 1.005 - 1.030      pH (UA) 5.5 5.0 - 8.0      Protein 100 (A) NEG mg/dL    Glucose Negative NEG mg/dL    Ketone Negative NEG mg/dL    Bilirubin Negative NEG      Blood SMALL (A) NEG      Urobilinogen 0.2 0.2 - 1.0 EU/dL    Nitrites Negative NEG      Leukocyte Esterase LARGE (A) NEG     URINE MICROSCOPIC ONLY    Collection Time: 11/13/22  5:15 PM   Result Value Ref Range    WBC TOO NUMEROUS TO COUNT 0 - 5 /hpf    RBC 4 to 10 0 - 5 /hpf    Epithelial cells FEW 0 - 5 /lpf    Bacteria 4+ (A) NEG /hpf   WET PREP    Collection Time: 11/13/22  6:45 PM    Specimen: Cervical   Result Value Ref Range    Special Requests: NO SPECIAL REQUESTS      Wet prep NO YEAST,TRICHOMONAS OR CLUE CELLS NOTED         Radiologic Studies:   No orders to display     CT Results  (Last 48 hours)      None          CXR Results  (Last 48 hours)      None            Medical Decision Making   I am the first provider for this patient.     I reviewed the vital signs, available nursing notes, past medical history, past surgical history, family history and social history. Vital Signs: Reviewed the patient's vital signs. Pulse Oximetry Analysis: 100% on RA     Records Reviewed: Nursing Notes and Old Medical Records    Procedures:  Procedures    ED Course:   5:04 PM Initial assessment performed. The patients presenting problems have been discussed, and they are in agreement with the care plan formulated and outlined with them. I have encouraged them to ask questions as they arise throughout their visit. Discussion:  Pt presents with lower abdominal pain that radiates into the right anterior leg. No swelling of the leg. No injury. She is complaining of some urinary symptoms and some vaginal discharge. Urine does show signs of infection will send for culture we will start on Bactrim since patient has long allergy list.  Wet prep normal.  All other labs at patient's baseline. No indication for emergent imaging at this time. . Strict return precautions given, pt offering no questions or complaints. Diagnosis and Disposition     DISCHARGE NOTE:  [place]  results have been reviewed with her. She has been counseled regarding her diagnosis, treatment, and plan. She verbally conveys understanding and agreement of the signs, symptoms, diagnosis, treatment and prognosis and additionally agrees to follow up as discussed. She also agrees with the care-plan and conveys that all of her questions have been answered. I have also provided discharge instructions for her that include: educational information regarding their diagnosis and treatment, and list of reasons why they would want to return to the ED prior to their follow-up appointment, should her condition change. She has been provided with education for proper emergency department utilization. CLINICAL IMPRESSION:    1. Urinary tract infection with hematuria, site unspecified    2. Anemia, unspecified type        PLAN:  1. D/C Home  2. Discharge Medication List as of 11/13/2022  8:46 PM        START taking these medications    Details   trimethoprim-sulfamethoxazole (Bactrim DS) 160-800 mg per tablet Take 1 Tablet by mouth two (2) times a day for 3 days. , Normal, Disp-6 Tablet, R-0           CONTINUE these medications which have NOT CHANGED    Details   naloxone (NARCAN) 4 mg/actuation nasal spray Use 1 spray intranasally, then discard. Repeat with new spray every 2 min as needed for opioid overdose symptoms, alternating nostrils. , Normal, Disp-1 Each, R-0      insulin glargine (LANTUS,BASAGLAR) 100 unit/mL (3 mL) inpn 35 Units by SubCUTAneous route nightly., Historical Med      pregabalin (LYRICA) 50 mg capsule Take 50 mg by mouth two (2) times a day., Historical Med      Blood-Glucose Meter (ONETOUCH ULTRA2) monitoring kit Use to obtain two times a day., Normal, Disp-1 Kit, R-0      lancets misc Use daily to monitor blood glucose, Normal, Disp-200 Each, R-0      glucose blood VI test strips (ASCENSIA AUTODISC VI, ONE TOUCH ULTRA TEST VI) strip 50 Each by Does Not Apply route two (2) times daily as needed., Normal, Disp-200 Strip, R-3      polyethylene glycol (MIRALAX) 17 gram packet Take 1 Packet by mouth daily. , Normal, Disp-30 Each, R-1      Insulin Needles, Disposable, 31 gauge x 5/16\" ndle Use to administer insulin as directed, Normal, Disp-1 Package, R-11           3. Follow-up Information       Follow up With Specialties Details Why Contact Info    Suha Craven MD [facility] Schedule an appointment as soon as possible for a visit   [address] [place] [address]  [phone]      [facility] EMERGENCY DEPT Emergency Medicine  If symptoms worsen [address] [id number] [phone]                   Please note that this dictation was completed with [employer], the [employer] voice recognition software.   Quite often unanticipated grammatical, syntax, homophones, and other interpretive errors are inadvertently transcribed by the computer software. Please disregard these errors. Please excuse any errors that have escaped final proofreading.

## 2022-11-28 ENCOUNTER — HOSPITAL ENCOUNTER (EMERGENCY)
Age: 52
Discharge: HOME | End: 2022-11-28
Attending: EMERGENCY MEDICINE
Payer: MEDICAID

## 2022-11-28 ENCOUNTER — APPOINTMENT (OUTPATIENT)
Dept: GENERAL RADIOLOGY | Age: 52
End: 2022-11-28
Attending: PHYSICIAN ASSISTANT
Payer: MEDICAID

## 2022-11-28 ENCOUNTER — APPOINTMENT (OUTPATIENT)
Dept: VASCULAR SURGERY | Age: 52
End: 2022-11-28
Attending: STUDENT IN AN ORGANIZED HEALTH CARE EDUCATION/TRAINING PROGRAM
Payer: MEDICAID

## 2022-11-28 VITALS
TEMPERATURE: 98 F | HEIGHT: 62 IN | RESPIRATION RATE: 16 BRPM | BODY MASS INDEX: 26.68 KG/M2 | DIASTOLIC BLOOD PRESSURE: 91 MMHG | OXYGEN SATURATION: 99 % | HEART RATE: 115 BPM | WEIGHT: 145 LBS | SYSTOLIC BLOOD PRESSURE: 147 MMHG

## 2022-11-28 DIAGNOSIS — M25.561 ACUTE PAIN OF RIGHT KNEE: Primary | ICD-10-CM

## 2022-11-28 DIAGNOSIS — R20.2 PARESTHESIA OF BOTH HANDS: ICD-10-CM

## 2022-11-28 PROCEDURE — 99284 EMERGENCY DEPT VISIT MOD MDM: CPT

## 2022-11-28 PROCEDURE — 93971 EXTREMITY STUDY: CPT

## 2022-11-28 PROCEDURE — 73564 X-RAY EXAM KNEE 4 OR MORE: CPT

## 2022-11-28 NOTE — ED PROVIDER NOTES
[facility] DEPARTMENT HISTORY AND PHYSICAL EXAM    Date: 11/28/2022  Patient Name: Dev Roca    History of Presenting Illness     Chief Complaint   Patient presents with    Leg Pain    Hand Pain         History Provided By: Patient    Chief Complaint: right leg pain, hand pain       Additional History (Context):   5:09 PM  Dev Roca is a 47 y.o. female with PMHX hypertension, diabetes, bipolar disorder 30 drug-seeking behavior, peripheral neuropathy, thromboembolism presents to the emergency department C/O right knee pain this been going on for the past week after she feels like she injured it while she was getting into the bathtub. No blunt injury. No obvious swelling to the legs. No tingling or numbness. She is also complaining of bilateral hand pain and tingling that is been going on for the past several months. No injury. She has been compliant with her Xarelto as previously directed     PCP: Eduin Dhaliwal MD    Current Outpatient Medications   Medication Sig Dispense Refill    naloxone (NARCAN) 4 mg/actuation nasal spray Use 1 spray intranasally, then discard. Repeat with new spray every 2 min as needed for opioid overdose symptoms, alternating nostrils. 1 Each 0    insulin glargine (LANTUS,BASAGLAR) 100 unit/mL (3 mL) inpn 35 Units by SubCUTAneous route nightly. pregabalin (LYRICA) 50 mg capsule Take 50 mg by mouth two (2) times a day. Blood-Glucose Meter (ONETOUCH ULTRA2) monitoring kit Use to obtain two times a day. 1 Kit 0    lancets misc Use daily to monitor blood glucose 200 Each 0    glucose blood VI test strips (ASCENSIA AUTODISC VI, ONE TOUCH ULTRA TEST VI) strip 50 Each by Does Not Apply route two (2) times daily as needed. 200 Strip 3    polyethylene glycol (MIRALAX) 17 gram packet Take 1 Packet by mouth daily.  30 Each 1    Insulin Needles, Disposable, 31 gauge x 5/16\" ndle Use to administer insulin as directed 1 Package 11       Past History     Past Medical History:  Past Medical History:   Diagnosis Date    Anxiety     Bipolar disorder ([facility] [address] [facility]     [facility]     breast cancer, right, S/P Mastectomy and chemo, radiation in 2013    Chronic pain     cervical    Depression     Diabetes ([facility] [address]     Drug abuse ([facility]     H/O cocaine use in past    Drug-seeking behavior     Family history of early CAD     Gastrointestinal disorder     cholitis    H/O ETOH abuse     History of chemotherapy 2013    History of radiation therapy 2013    Hyperlipidemia     Hypertension     Malignant neoplasm without specification of site [facility])     Methamphetamine abuse ([facility] [address]      self reported on 1/3/16    Peripheral neuropathy 2022    Psychiatric disorder     multiple personality disorder    Psychiatric disorder     anxiety    Psychiatric disorder     bipolar     Spine injury     Thromboembolus ([facility] 2022    Vitamin D deficiency 05/01/2018       Past Surgical History:  Past Surgical History:   Procedure Laterality Date    COLONOSCOPY N/A 07/18/2016    COLONOSCOPY performed by Steven Escobedo MD at [facility] ENDOSCOPY    HX BREAST LUMPECTOMY  06/2013    right    HX BREAST RECONSTRUCTION Bilateral 2018    tissue expanders insertion    HX BREAST RECONSTRUCTION Bilateral 2018    tissue expanders removed, no implants    HX HYSTERECTOMY  2013    HX LUMBAR FUSION  2020    HX MASTECTOMY Bilateral 2013    HX SALPINGO-OOPHORECTOMY Bilateral 2013    HX TONSILLECTOMY      HX TUBAL LIGATION  1998    HX VASCULAR ACCESS  2013    medi port    HX VASCULAR ACCESS      medi port removed       Family History:  Family History   Problem Relation Age of Onset    Heart Disease Mother     Stroke Father     Heart Disease Father 48    Diabetes Other     Hypertension Other     Cancer Maternal Grandmother        Social History:  Social History     Tobacco Use    Smoking status: Never    Smokeless tobacco: Never   Vaping Use    Vaping Use: Never used   Substance Use Topics    Alcohol use: Not Currently     Alcohol/week: 2.0 standard drinks     Types: 2 Cans of beer per week    Drug use: Not Currently     Types: Methamphetamines, Cocaine     Comment: last used >1.5 years       Allergies: Allergies   Allergen Reactions    Latex Hives and Itching    Other Food Rash     Almonds    Amoxicillin Swelling     Other reaction(s): mild rash/itching    Aspirin Not Reported This Time and Swelling     Mouth swells    Beeswax Anaphylaxis    Levaquin [Levofloxacin] Not Reported This Time and Swelling     Other reaction(s): mild rash/itching    [place] Rash and Itching    Glycopyrrolate Swelling     Pt  States  faciAL  SWELLING   POST  ROBINUL  IV   Pt  States  faciAL  SWELLING   POST  ROBINUL  IV     Pcn [Penicillins] Swelling    Tape [Adhesive] Rash    Tramadol Swelling       Review of Systems   Review of Systems   Constitutional:  Negative for chills and fever. Alert sitting up in wheelchair in fast-track room no acute distress nontoxic   Respiratory:  Negative for shortness of breath. Cardiovascular:  Negative for chest pain and leg swelling. Gastrointestinal:  Negative for abdominal pain, diarrhea, nausea and vomiting. Musculoskeletal:  Positive for arthralgias (right knee). Bilateral hands TTP but full range of motion   Skin:  Negative for wound. Neurological:  Negative for weakness and numbness. All other systems reviewed and are negative. Physical Exam     Vitals:    11/28/22 1613   BP: (!) 147/91   Pulse: (!) 115   Resp: 16   Temp: 98 °F (36.7 °C)   SpO2: 99%   Weight: 65.8 kg (145 lb)   Height: 5' 2\" (1.575 m)     Physical Exam  Vitals and nursing note reviewed. Constitutional:       Appearance: She is well-developed. HENT:      Head: Normocephalic and atraumatic. Cardiovascular:      Rate and Rhythm: Normal rate and regular rhythm. Heart sounds: Normal heart sounds. No murmur heard. Pulmonary:      Effort: Pulmonary effort is normal. No respiratory distress.       Breath sounds: Normal breath sounds. No wheezing or rales. Abdominal:      General: Bowel sounds are normal.      Palpations: Abdomen is soft. Tenderness: There is no abdominal tenderness. Musculoskeletal:      Cervical back: Normal range of motion and neck supple. Right knee: No swelling, deformity, effusion, erythema, ecchymosis, lacerations, bony tenderness or crepitus. Decreased range of motion. Tenderness present over the lateral joint line. No medial joint line tenderness. Normal pulse. Comments: TTP to the lateral aspect of the right knee decreased range of motion secondary to pain, pulses 2+ for DP and PT bilaterally, no calf swelling   Neurological:      Mental Status: She is alert and oriented to person, place, and time. Psychiatric:         Judgment: Judgment normal.           Diagnostic Study Results     Labs:   No results found for this or any previous visit (from the past 12 hour(s)). Radiologic Studies:   XR KNEE RT MIN 4 V   Final Result   No significant abnormality appreciated. DUPLEX LOWER EXT VENOUS RIGHT   Final Result        CT Results  (Last 48 hours)      None          CXR Results  (Last 48 hours)      None            Medical Decision Making   I am the first provider for this patient. I reviewed the vital signs, available nursing notes, past medical history, past surgical history, family history and social history. Vital Signs: Reviewed the patient's vital signs. Pulse Oximetry Analysis: 99% on RA       Records Reviewed: Nursing Notes and Old Medical Records    Procedures:  Procedures    ED Course:   5:09 PM Initial assessment performed. The patients presenting problems have been discussed, and they are in agreement with the care plan formulated and outlined with them. I have encouraged them to ask questions as they arise throughout their visit. Discussion:  Pt presents with knee pain that started. X-ray shows no acute process, PVL study negative for DVT.   Also complaining of tingling and pain in the bilateral hands is been going on for several months without injury. No evidence of infection or cellulitis to the hands or knee. Suspect diabetic related neuropathy. Could be ligamentous strain to the knee will discharge home and have patient follow-up with Dr. Ester French. Strict return precautions given, pt offering no questions or complaints. Diagnosis and Disposition     DISCHARGE NOTE:  Cheyanne Berry  results have been reviewed with her. She has been counseled regarding her diagnosis, treatment, and plan. She verbally conveys understanding and agreement of the signs, symptoms, diagnosis, treatment and prognosis and additionally agrees to follow up as discussed. She also agrees with the care-plan and conveys that all of her questions have been answered. I have also provided discharge instructions for her that include: educational information regarding their diagnosis and treatment, and list of reasons why they would want to return to the ED prior to their follow-up appointment, should her condition change. She has been provided with education for proper emergency department utilization. CLINICAL IMPRESSION:    1. Acute pain of right knee    2. Paresthesia of both hands        PLAN:  1. D/C Home  2. Discharge Medication List as of 11/28/2022  6:24 PM        3. Follow-up Information       Follow up With Specialties Details Why Contact Info    Geraldine Deshpande MD [facility] Medicine Schedule an appointment as soon as possible for a visit   [address] [facility] [address]  [phone]      THE [facility] EMERGENCY DEPT Emergency Medicine  If symptoms worsen [address] [place] [address]  [phone]    Walker Martines MD Orthopedic Surgery   [facility] [address] [place]  [address]                     Please note that this dictation was completed with [employer] the [employer] voice recognition software.   Quite often unanticipated grammatical, syntax, homophones, and other interpretive errors are inadvertently transcribed by the computer software. Please disregard these errors. Please excuse any errors that have escaped final proofreading.

## 2023-01-04 ENCOUNTER — HOSPITAL ENCOUNTER (EMERGENCY)
Age: 53
Discharge: HOME | End: 2023-01-04
Attending: EMERGENCY MEDICINE
Payer: MEDICAID

## 2023-01-04 ENCOUNTER — APPOINTMENT (OUTPATIENT)
Dept: GENERAL RADIOLOGY | Age: 53
End: 2023-01-04
Attending: EMERGENCY MEDICINE
Payer: MEDICAID

## 2023-01-04 VITALS
HEART RATE: 92 BPM | RESPIRATION RATE: 18 BRPM | TEMPERATURE: 98.8 F | OXYGEN SATURATION: 98 % | SYSTOLIC BLOOD PRESSURE: 163 MMHG | DIASTOLIC BLOOD PRESSURE: 71 MMHG

## 2023-01-04 DIAGNOSIS — R52 GENERALIZED BODY ACHES: Primary | ICD-10-CM

## 2023-01-04 DIAGNOSIS — R11.2 NAUSEA AND VOMITING, UNSPECIFIED VOMITING TYPE: ICD-10-CM

## 2023-01-04 LAB
ANION GAP SERPL CALC-SCNC: 4 MMOL/L (ref 3–18)
APPEARANCE UR: CLEAR
ATRIAL RATE: 92 BPM
BACTERIA URNS QL MICRO: NEGATIVE /HPF
BASOPHILS # BLD: 0 K/UL (ref 0–0.1)
BASOPHILS NFR BLD: 0 % (ref 0–2)
BILIRUB UR QL: NEGATIVE
BUN SERPL-MCNC: 19 MG/DL (ref 7–18)
BUN/CREAT SERPL: 21 (ref 12–20)
CALCIUM SERPL-MCNC: 9 MG/DL (ref 8.5–10.1)
CALCULATED P AXIS, ECG09: 55 DEGREES
CALCULATED R AXIS, ECG10: -22 DEGREES
CALCULATED T AXIS, ECG11: 68 DEGREES
CHLORIDE SERPL-SCNC: 105 MMOL/L (ref 100–111)
CK SERPL-CCNC: 21 U/L (ref 26–192)
CO2 SERPL-SCNC: 29 MMOL/L (ref 21–32)
COLOR UR: YELLOW
CREAT SERPL-MCNC: 0.9 MG/DL (ref 0.6–1.3)
DIAGNOSIS, 93000: NORMAL
DIFFERENTIAL METHOD BLD: ABNORMAL
EOSINOPHIL # BLD: 0.2 K/UL (ref 0–0.4)
EOSINOPHIL NFR BLD: 2 % (ref 0–5)
EPITH CASTS URNS QL MICRO: NORMAL /LPF (ref 0–5)
ERYTHROCYTE [DISTWIDTH] IN BLOOD BY AUTOMATED COUNT: 16 % (ref 11.6–14.5)
FLUAV RNA SPEC QL NAA+PROBE: NOT DETECTED
FLUBV RNA SPEC QL NAA+PROBE: NOT DETECTED
GLUCOSE SERPL-MCNC: 278 MG/DL (ref 74–99)
GLUCOSE UR STRIP.AUTO-MCNC: 250 MG/DL
GRAN CASTS URNS QL MICRO: NORMAL /LPF
HCT VFR BLD AUTO: 27.5 % (ref 35–45)
HGB BLD-MCNC: 8.4 G/DL (ref 12–16)
HGB UR QL STRIP: NEGATIVE
IMM GRANULOCYTES # BLD AUTO: 0 K/UL (ref 0–0.04)
IMM GRANULOCYTES NFR BLD AUTO: 0 % (ref 0–0.5)
KETONES UR QL STRIP.AUTO: ABNORMAL MG/DL
LEUKOCYTE ESTERASE UR QL STRIP.AUTO: NEGATIVE
LYMPHOCYTES # BLD: 1.7 K/UL (ref 0.9–3.6)
LYMPHOCYTES NFR BLD: 21 % (ref 21–52)
MAGNESIUM SERPL-MCNC: 1.8 MG/DL (ref 1.6–2.6)
MCH RBC QN AUTO: 23.7 PG (ref 24–34)
MCHC RBC AUTO-ENTMCNC: 30.5 G/DL (ref 31–37)
MCV RBC AUTO: 77.7 FL (ref 78–100)
MONOCYTES # BLD: 0.5 K/UL (ref 0.05–1.2)
MONOCYTES NFR BLD: 6 % (ref 3–10)
NEUTS SEG # BLD: 5.7 K/UL (ref 1.8–8)
NEUTS SEG NFR BLD: 70 % (ref 40–73)
NITRITE UR QL STRIP.AUTO: NEGATIVE
NRBC # BLD: 0 K/UL (ref 0–0.01)
NRBC BLD-RTO: 0 PER 100 WBC
P-R INTERVAL, ECG05: 164 MS
PH UR STRIP: 5.5 (ref 5–8)
PLATELET # BLD AUTO: 372 K/UL (ref 135–420)
PMV BLD AUTO: 9.5 FL (ref 9.2–11.8)
POTASSIUM SERPL-SCNC: 3.8 MMOL/L (ref 3.5–5.5)
PROT UR STRIP-MCNC: >1000 MG/DL
Q-T INTERVAL, ECG07: 354 MS
QRS DURATION, ECG06: 84 MS
QTC CALCULATION (BEZET), ECG08: 437 MS
RBC # BLD AUTO: 3.54 M/UL (ref 4.2–5.3)
RBC #/AREA URNS HPF: NORMAL /HPF (ref 0–5)
SARS-COV-2, COV2: NOT DETECTED
SODIUM SERPL-SCNC: 138 MMOL/L (ref 136–145)
SP GR UR REFRACTOMETRY: >1.03 (ref 1–1.03)
UROBILINOGEN UR QL STRIP.AUTO: 1 EU/DL (ref 0.2–1)
VENTRICULAR RATE, ECG03: 92 BPM
WBC # BLD AUTO: 8.1 K/UL (ref 4.6–13.2)
WBC URNS QL MICRO: NORMAL /HPF (ref 0–5)

## 2023-01-04 PROCEDURE — 74011250637 HC RX REV CODE- 250/637: Performed by: EMERGENCY MEDICINE

## 2023-01-04 PROCEDURE — 82550 ASSAY OF CK (CPK): CPT

## 2023-01-04 PROCEDURE — 74011250636 HC RX REV CODE- 250/636: Performed by: EMERGENCY MEDICINE

## 2023-01-04 PROCEDURE — 71045 X-RAY EXAM CHEST 1 VIEW: CPT

## 2023-01-04 PROCEDURE — 74011636637 HC RX REV CODE- 636/637: Performed by: EMERGENCY MEDICINE

## 2023-01-04 PROCEDURE — 87636 SARSCOV2 & INF A&B AMP PRB: CPT

## 2023-01-04 PROCEDURE — 83735 ASSAY OF MAGNESIUM: CPT

## 2023-01-04 PROCEDURE — 85025 COMPLETE CBC W/AUTO DIFF WBC: CPT

## 2023-01-04 PROCEDURE — 96374 THER/PROPH/DIAG INJ IV PUSH: CPT

## 2023-01-04 PROCEDURE — 99285 EMERGENCY DEPT VISIT HI MDM: CPT

## 2023-01-04 PROCEDURE — 81001 URINALYSIS AUTO W/SCOPE: CPT

## 2023-01-04 PROCEDURE — 80048 BASIC METABOLIC PNL TOTAL CA: CPT

## 2023-01-04 RX ORDER — METHOCARBAMOL 750 MG/1
750 TABLET, FILM COATED ORAL 4 TIMES DAILY
Qty: 15 TABLET | Refills: 0 | Status: SHIPPED | OUTPATIENT
Start: 2023-01-04

## 2023-01-04 RX ORDER — METOCLOPRAMIDE 10 MG/1
10 TABLET ORAL
Qty: 12 TABLET | Refills: 0 | Status: SHIPPED | OUTPATIENT
Start: 2023-01-04 | End: 2023-01-14

## 2023-01-04 RX ORDER — METHOCARBAMOL 500 MG/1
750 TABLET, FILM COATED ORAL ONCE
Status: COMPLETED | OUTPATIENT
Start: 2023-01-04 | End: 2023-01-04

## 2023-01-04 RX ORDER — ONDANSETRON 4 MG/1
4 TABLET, FILM COATED ORAL
Qty: 12 TABLET | Refills: 0 | Status: SHIPPED | OUTPATIENT
Start: 2023-01-04

## 2023-01-04 RX ORDER — PREDNISONE 20 MG/1
60 TABLET ORAL
Status: COMPLETED | OUTPATIENT
Start: 2023-01-04 | End: 2023-01-04

## 2023-01-04 RX ORDER — ONDANSETRON 4 MG/1
4 TABLET, FILM COATED ORAL
Qty: 12 TABLET | Refills: 0 | Status: SHIPPED | OUTPATIENT
Start: 2023-01-04 | End: 2023-01-04 | Stop reason: SDUPTHER

## 2023-01-04 RX ORDER — KETOROLAC TROMETHAMINE 30 MG/ML
30 INJECTION, SOLUTION INTRAMUSCULAR; INTRAVENOUS
Status: COMPLETED | OUTPATIENT
Start: 2023-01-04 | End: 2023-01-04

## 2023-01-04 RX ADMIN — PREDNISONE 60 MG: 20 TABLET ORAL at 04:58

## 2023-01-04 RX ADMIN — KETOROLAC TROMETHAMINE 30 MG: 30 INJECTION, SOLUTION INTRAMUSCULAR at 04:58

## 2023-01-04 RX ADMIN — METHOCARBAMOL TABLETS 750 MG: 500 TABLET, COATED ORAL at 04:58

## 2023-01-04 NOTE — Clinical Note
[facility] [place]  THE [facility] EMERGENCY DEPT  [address] [facility] [address] [phone]    Work/School Note    Date: 1/4/2023    To Whom It May concern:    Erika Knapp was seen and treated today in the emergency room by the following provider(s):  Attending Provider: Bobbi Ricci MD.      Erika Knapp is excused from work/school on 01/04/23 and 01/05/23. She is medically clear to return to work/school on 1/6/2023.        Sincerely,          Ford Hooker MD

## 2023-01-05 RX ORDER — NAPROXEN 250 MG/1
250 TABLET ORAL 2 TIMES DAILY WITH MEALS
Qty: 20 TABLET | Refills: 0 | Status: SHIPPED | OUTPATIENT
Start: 2023-01-05 | End: 2023-01-15

## 2023-01-05 NOTE — ED PROVIDER NOTES
EMERGENCY DEPARTMENT HISTORY AND PHYSICAL EXAM    Date: 1/4/2023  Patient Name: Magalie Lloyd    History of Presenting Illness     Chief Complaint   Patient presents with    Flu Like Symptoms         History Provided By: Patient    Additional History (Context):   1:37 AM  Magalie Lloyd is a 47 y.o. female with PMHX of extensive medical history including obesity with insulin use, colitis, breast cancer, polysubstance use and abuse anxiety with bipolar disorder, chronic cervical pain and low back pain, who presents to the emergency department C/O infection symptoms. Patient started with some congestion and cough symptoms at the end of December between the 27th and 29th. After phone conversation telemedicine with her regular doctor she was started on Medrol 4 mg Dosepak and Zithromax for diagnosis of sinusitis. She presents today with generalized body aches and soreness in addition to subjective fevers. She states her family members have had \"flu\". Her cough is productive but not of purulent sputum. She has no lebron chest pain. He is nauseous without vomiting or diarrhea. She has been extensive prescriptions with regular doctor including Lantus, Flonase, Cymbalta, Zyrtec, Zofran,    Social History  Fibrosis no cigarettes. She has been clean from alcohol or drug use for a long time. Family History  Father with heart disease, mother with heart failure, sister with thyroid disease      PCP: Megan Linton MD    Current Outpatient Medications   Medication Sig Dispense Refill    ondansetron hcl (Zofran) 4 mg tablet Take 1 Tablet by mouth every eight (8) hours as needed for Nausea. 12 Tablet 0    metoclopramide HCl (Reglan) 10 mg tablet Take 1 Tablet by mouth every six (6) hours as needed for Nausea for up to 10 days. 12 Tablet 0    methocarbamoL (Robaxin-750) 750 mg tablet Take 1 Tablet by mouth four (4) times daily.  15 Tablet 0    naloxone (NARCAN) 4 mg/actuation nasal spray Use 1 spray intranasally, then discard. Repeat with new spray every 2 min as needed for opioid overdose symptoms, alternating nostrils. 1 Each 0    insulin glargine (LANTUS,BASAGLAR) 100 unit/mL (3 mL) inpn 35 Units by SubCUTAneous route nightly. pregabalin (LYRICA) 50 mg capsule Take 50 mg by mouth two (2) times a day. Blood-Glucose Meter (ONETOUCH ULTRA2) monitoring kit Use to obtain two times a day. 1 Kit 0    lancets misc Use daily to monitor blood glucose 200 Each 0    glucose blood VI test strips (ASCENSIA AUTODISC VI, ONE TOUCH ULTRA TEST VI) strip 50 Each by Does Not Apply route two (2) times daily as needed. 200 Strip 3    polyethylene glycol (MIRALAX) 17 gram packet Take 1 Packet by mouth daily.  30 Each 1    Insulin Needles, Disposable, 31 gauge x 5/16\" ndle Use to administer insulin as directed 1 Package 11       Past History     Past Medical History:  Past Medical History:   Diagnosis Date    Anxiety     Bipolar disorder ([facility] [address]     Breast cancer ([facility] [address]     breast cancer, right, S/P Mastectomy and chemo, radiation in 2013    Chronic pain     cervical    Depression     Diabetes ([address]     Drug abuse ([address]     H/O cocaine use in past    Drug-seeking behavior     Family history of early CAD     Gastrointestinal disorder     cholitis    H/O ETOH abuse     History of chemotherapy 2013    History of radiation therapy 2013    Hyperlipidemia     Hypertension     Malignant neoplasm without specification of site [facility])     Methamphetamine abuse ([address]      self reported on 1/3/16    Peripheral neuropathy 2022    Psychiatric disorder     multiple personality disorder    Psychiatric disorder     anxiety    Psychiatric disorder     bipolar     Spine injury     Thromboembolus ([address] 2022    Vitamin D deficiency 05/01/2018       Past Surgical History:  Past Surgical History:   Procedure Laterality Date    COLONOSCOPY N/A 07/18/2016    COLONOSCOPY performed by Franko Delaney MD at [facility] ENDOSCOPY    HX BREAST LUMPECTOMY  06/2013    right    HX BREAST RECONSTRUCTION Bilateral 2018    tissue expanders insertion    HX BREAST RECONSTRUCTION Bilateral 2018    tissue expanders removed, no implants    HX HYSTERECTOMY  2013    HX LUMBAR FUSION  2020    HX MASTECTOMY Bilateral 2013    HX SALPINGO-OOPHORECTOMY Bilateral 2013    HX TONSILLECTOMY      HX TUBAL LIGATION  1998    HX VASCULAR ACCESS  2013    medi port    HX VASCULAR ACCESS      medi port removed       Family History:  Family History   Problem Relation Age of Onset    Heart Disease Mother     Stroke Father     Heart Disease Father 48    Diabetes Other     Hypertension Other     Cancer Maternal Grandmother        Social History:  Social History     Tobacco Use    Smoking status: Never    Smokeless tobacco: Never   Vaping Use    Vaping Use: Never used   Substance Use Topics    Alcohol use: Not Currently     Alcohol/week: 2.0 standard drinks     Types: 2 Cans of beer per week    Drug use: Not Currently     Types: Methamphetamines, Cocaine     Comment: last used >1.5 years       Allergies: Allergies   Allergen Reactions    Latex Hives and Itching    Other Food Rash     Almonds    Amoxicillin Swelling     Other reaction(s): mild rash/itching    Aspirin Not Reported This Time and Swelling     Mouth swells    Beeswax Anaphylaxis    Levaquin [Levofloxacin] Not Reported This Time and Swelling     Other reaction(s): mild rash/itching    [place] Rash and Itching    Glycopyrrolate Swelling     Pt  States  faciAL  SWELLING   POST  ROBINUL  IV   Pt  States  faciAL  SWELLING   POST  ROBINUL  IV     Pcn [Penicillins] Swelling    Tape [Adhesive] Rash    Tramadol Swelling         Review of Systems   Review of Systems   Constitutional:  Positive for chills and fever. Respiratory:  Positive for cough. Gastrointestinal:  Positive for nausea. Musculoskeletal:  Positive for back pain, myalgias and neck pain.      Physical Exam     Vitals:    01/04/23 0025 01/04/23 0311 01/04/23 0330   BP: (!) 151/77 (!) 166/77 (!) 163/71 Pulse: 92     Resp: 18     Temp: 98.8 °F (37.1 °C)     SpO2: 98%       Physical Exam  Vitals and nursing note reviewed. Constitutional:       General: She is not in acute distress. Appearance: She is well-developed. She is not diaphoretic. HENT:      Head: Normocephalic and atraumatic. Eyes:      General: No scleral icterus. Extraocular Movements:      Right eye: Normal extraocular motion. Left eye: Normal extraocular motion. Conjunctiva/sclera: Conjunctivae normal.      Pupils: Pupils are equal, round, and reactive to light. Neck:      Trachea: No tracheal deviation. Cardiovascular:      Rate and Rhythm: Normal rate and regular rhythm. Heart sounds: Normal heart sounds. Pulmonary:      Effort: Pulmonary effort is normal. No respiratory distress. Breath sounds: Normal breath sounds. No stridor. Abdominal:      General: Bowel sounds are normal. There is no distension. Palpations: Abdomen is soft. Tenderness: There is no abdominal tenderness. There is no rebound. Musculoskeletal:         General: No tenderness. Normal range of motion. Cervical back: Normal range of motion and neck supple. Comments: Grossly unremarkable without abnormalities   Skin:     General: Skin is warm and dry. Capillary Refill: Capillary refill takes less than 2 seconds. Findings: No erythema or rash. Neurological:      Mental Status: She is alert and oriented to person, place, and time. GCS: GCS eye subscore is 4. GCS verbal subscore is 5. GCS motor subscore is 6. Cranial Nerves: No cranial nerve deficit. Sensory: Sensation is intact. Motor: Motor function is intact. No weakness. Coordination: Coordination is intact. Gait: Gait is intact. Psychiatric:         Attention and Perception: Attention normal.         Mood and Affect: Mood normal.         Speech: Speech normal.         Behavior: Behavior normal.         Thought Content:  Thought content normal.         Judgment: Judgment normal.     Diagnostic Study Results     Labs -  Recent Results (from the past 24 hour(s))   URINALYSIS W/ RFLX MICROSCOPIC    Collection Time: 01/04/23  1:18 AM   Result Value Ref Range    Color YELLOW      Appearance CLEAR      Specific gravity >1.030 (H) 1.003 - 1.030    pH (UA) 5.5 5.0 - 8.0      Protein >1,000 (A) NEG mg/dL    Glucose 250 (A) NEG mg/dL    Ketone TRACE (A) NEG mg/dL    Bilirubin Negative NEG      Blood Negative NEG      Urobilinogen 1.0 0.2 - 1.0 EU/dL    Nitrites Negative NEG      Leukocyte Esterase Negative NEG     URINE MICROSCOPIC ONLY    Collection Time: 01/04/23  1:18 AM   Result Value Ref Range    WBC 0 to 1 0 - 5 /hpf    RBC 0 to 1 0 - 5 /hpf    Epithelial cells 2+ 0 - 5 /lpf    Bacteria Negative NEG /hpf    Granular cast 4 to 10 NEG /lpf   EKG, 12 LEAD, INITIAL    Collection Time: 01/04/23  1:43 AM   Result Value Ref Range    Ventricular Rate 92 BPM    Atrial Rate 92 BPM    P-R Interval 164 ms    QRS Duration 84 ms    Q-T Interval 354 ms    QTC Calculation (Bezet) 437 ms    Calculated P Axis 55 degrees    Calculated R Axis -22 degrees    Calculated T Axis 68 degrees    Diagnosis       Normal sinus rhythm  Minimal voltage criteria for LVH, may be normal variant ( [place] product )  Borderline ECG  When compared with ECG of 06-SEP-2022 13:03,  No significant change was found     CBC WITH AUTOMATED DIFF    Collection Time: 01/04/23  2:00 AM   Result Value Ref Range    WBC 8.1 4.6 - 13.2 K/uL    RBC 3.54 (L) 4.20 - 5.30 M/uL    HGB 8.4 (L) 12.0 - 16.0 g/dL    HCT 27.5 (L) 35.0 - 45.0 %    MCV 77.7 (L) 78.0 - 100.0 FL    MCH 23.7 (L) 24.0 - 34.0 PG    MCHC 30.5 (L) 31.0 - 37.0 g/dL    RDW 16.0 (H) 11.6 - 14.5 %    PLATELET 492 404 - 760 K/uL    MPV 9.5 9.2 - 11.8 FL    NRBC 0.0 0  WBC    ABSOLUTE NRBC 0.00 0.00 - 0.01 K/uL    NEUTROPHILS 70 40 - 73 %    LYMPHOCYTES 21 21 - 52 %    MONOCYTES 6 3 - 10 %    EOSINOPHILS 2 0 - 5 %    BASOPHILS 0 0 - 2 %    IMMATURE GRANULOCYTES 0 0.0 - 0.5 %    ABS. NEUTROPHILS 5.7 1.8 - 8.0 K/UL    ABS. LYMPHOCYTES 1.7 0.9 - 3.6 K/UL    ABS. MONOCYTES 0.5 0.05 - 1.2 K/UL    ABS. EOSINOPHILS 0.2 0.0 - 0.4 K/UL    ABS. BASOPHILS 0.0 0.0 - 0.1 K/UL    ABS. IMM. GRANS. 0.0 0.00 - 0.04 K/UL    DF AUTOMATED     METABOLIC PANEL, BASIC    Collection Time: 01/04/23  2:00 AM   Result Value Ref Range    Sodium 138 136 - 145 mmol/L    Potassium 3.8 3.5 - 5.5 mmol/L    Chloride 105 100 - 111 mmol/L    CO2 29 21 - 32 mmol/L    Anion gap 4 3.0 - 18 mmol/L    Glucose 278 (H) 74 - 99 mg/dL    BUN 19 (H) 7.0 - 18 MG/DL    Creatinine 0.90 0.6 - 1.3 MG/DL    BUN/Creatinine ratio 21 (H) 12 - 20      eGFR >60 >60 ml/min/1.73m2    Calcium 9.0 8.5 - 10.1 MG/DL   MAGNESIUM    Collection Time: 01/04/23  2:00 AM   Result Value Ref Range    Magnesium 1.8 1.6 - 2.6 mg/dL   CK    Collection Time: 01/04/23  2:00 AM   Result Value Ref Range    CK 21 (L) 26 - 192 U/L   COVID-19 WITH INFLUENZA A/B    Collection Time: 01/04/23  3:05 AM   Result Value Ref Range    SARS-CoV-2 by PCR Not detected NOTD      Influenza A by PCR Not detected NOTD      Influenza B by PCR Not detected NOTD          Radiologic Studies -     XR CHEST PORT   Final Result      1. No acute cardiopulmonary process. CT Results  (Last 48 hours)      None          CXR Results  (Last 48 hours)                 01/04/23 0208  XR CHEST PORT Final result    Impression:      1. No acute cardiopulmonary process. Narrative:  EXAM: XR CHEST PORT       CLINICAL INDICATION/HISTORY: CP   -Additional: None       COMPARISON: 6/23/2022       TECHNIQUE: Frontal view of the chest       _______________       FINDINGS:       HEART AND MEDIASTINUM: Midline cardiac silhouette, normal in size. Unremarkable   hilar vascular structures. LUNGS AND PLEURAL SPACES: No focal consolidation, parenchymal opacity. No   pneumothorax or pleural effusion.        BONY THORAX AND SOFT TISSUES: No acute or destructive osseous abnormality. _______________                   Medications given in the ED-  Medications   ketorolac (TORADOL) injection 30 mg (30 mg IntraVENous Given 1/4/23 0458)   methocarbamoL (ROBAXIN) tablet 750 mg (750 mg Oral Given 1/4/23 0458)   predniSONE (DELTASONE) tablet 60 mg (60 mg Oral Given 1/4/23 0458)         Medical Decision Making   I am the first provider for this patient. I reviewed the vital signs, available nursing notes, past medical history, past surgical history, family history and social history. Vital Signs-Reviewed the patient's vital signs. Pulse Oximetry Analysis - 98% on room air    Cardiac Monitor:  Rate: 88 bpm  Rhythm: Sinus rhythm    EKG interpretation: (Preliminary)  1:43 AM    Normal sinus rhythm, rate 9012, normal ST segments, QTC is 437. EKG read by Theresa Jordan MD      Records Reviewed: NURSING NOTES AND PREVIOUS MEDICAL RECORDS    Provider Notes (Medical Decision Making):   23-year-old female with history of diabetes complicated musculoskeletal history along with polysubstance abuse in her past presents with flulike symptoms along with generalized body aches. She does not have a headache or stiff neck or other symptoms suggestive of meningitis. COVID and flu test were negative. Will count is normal without left shift. She has chronic anemia which looks stable consistent with microcytic microchromic iron deficiency. Soft electrolytes came back unremarkable except for a little dehydration. Blood sugars were up to 278, not surprising given recent Medrol steroids like diabetes. Kidney function looks to be uncompromised. Other salts electrolytes including made CPK came back unremarkable as did her urine. Chest x-ray looks good without evidence of pneumonia. EKG is normal unlikely this represents arrhythmia or ACS. We do not see any evidence of muscle injury rhabdomyolysis with metabolic syndrome.   She probably has a acute or subacute infection on top of her chronic conditions making her generalized body aches. She responded well to medications without controlled substances. Review of steroids to complicate blood sugar. We will ensure she does have Zofran and Reglan to go home with. Follow-up with primary care doctor. Procedures:  Procedures    ED Course:   1:37 AM: Initial assessment performed. The patients presenting problems have been discussed, and they are in agreement with the care plan formulated and outlined with them. I have encouraged them to ask questions as they arise throughout their visit. Diagnosis and Disposition       DISCHARGE NOTE:  5:20 AM  Jose Miller  results have been reviewed with her. She has been counseled regarding her diagnosis, treatment, and plan. She verbally conveys understanding and agreement of the signs, symptoms, diagnosis, treatment and prognosis and additionally agrees to follow up as discussed. She also agrees with the care-plan and conveys that all of her questions have been answered. I have also provided discharge instructions for her that include: educational information regarding their diagnosis and treatment, and list of reasons why they would want to return to the ED prior to their follow-up appointment, should her condition change. She has been provided with education for proper emergency department utilization. CLINICAL IMPRESSION:    1. Generalized body aches    2. Nausea and vomiting, unspecified vomiting type        PLAN:  1. D/C Home  2. Discharge Medication List as of 1/4/2023  5:18 AM        CONTINUE these medications which have NOT CHANGED    Details   naloxone (NARCAN) 4 mg/actuation nasal spray Use 1 spray intranasally, then discard. Repeat with new spray every 2 min as needed for opioid overdose symptoms, alternating nostrils. , Normal, Disp-1 Each, R-0      insulin glargine (LANTUS,BASAGLAR) 100 unit/mL (3 mL) inpn 35 Units by SubCUTAneous route nightly. , Historical Med      pregabalin (LYRICA) 50 mg capsule Take 50 mg by mouth two (2) times a day., Historical Med      Blood-Glucose Meter (ONETOUCH ULTRA2) monitoring kit Use to obtain two times a day., Normal, Disp-1 Kit, R-0      lancets misc Use daily to monitor blood glucose, Normal, Disp-200 Each, R-0      glucose blood VI test strips (ASCENSIA AUTODISC VI, ONE TOUCH ULTRA TEST VI) strip 50 Each by Does Not Apply route two (2) times daily as needed., Normal, Disp-200 Strip, R-3      polyethylene glycol (MIRALAX) 17 gram packet Take 1 Packet by mouth daily. , Normal, Disp-30 Each, R-1      Insulin Needles, Disposable, 31 gauge x 5/16\" ndle Use to administer insulin as directed, Normal, Disp-1 Package, R-11           3. Follow-up Information       Follow up With Specialties Details Why Contact Info    Andrea Bruner MD [facility]   [address] [place] [address] [id number]  [phone]            _______________________________    This note was partially transcribed via voice recognition software. Although efforts have been made to catch any discrepancies, it may contain sound alike words, grammatical errors, or nonsensical words.

## 2023-04-16 ENCOUNTER — APPOINTMENT (OUTPATIENT)
Facility: HOSPITAL | Age: 53
DRG: 347 | End: 2023-04-16
Payer: COMMERCIAL

## 2023-04-16 ENCOUNTER — HOSPITAL ENCOUNTER (INPATIENT)
Facility: HOSPITAL | Age: 53
LOS: 3 days | Discharge: HOME | DRG: 347 | End: 2023-04-20
Attending: EMERGENCY MEDICINE | Admitting: INTERNAL MEDICINE
Payer: COMMERCIAL

## 2023-04-16 DIAGNOSIS — M54.42 CHRONIC LEFT-SIDED LOW BACK PAIN WITH LEFT-SIDED SCIATICA: ICD-10-CM

## 2023-04-16 DIAGNOSIS — M48.00 SPINAL STENOSIS, UNSPECIFIED SPINAL REGION: Primary | ICD-10-CM

## 2023-04-16 DIAGNOSIS — G89.29 CHRONIC LEFT-SIDED LOW BACK PAIN WITH LEFT-SIDED SCIATICA: ICD-10-CM

## 2023-04-16 DIAGNOSIS — M50.30 DDD (DEGENERATIVE DISC DISEASE), CERVICAL: ICD-10-CM

## 2023-04-16 DIAGNOSIS — R29.898 WEAKNESS OF LEFT LOWER EXTREMITY: ICD-10-CM

## 2023-04-16 LAB
ALBUMIN SERPL-MCNC: 2.9 G/DL (ref 3.4–5)
ALBUMIN/GLOB SERPL: 0.6 (ref 0.8–1.7)
ALP SERPL-CCNC: 178 U/L (ref 45–117)
ALT SERPL-CCNC: 21 U/L (ref 13–56)
ANION GAP SERPL CALC-SCNC: 4 MMOL/L (ref 3–18)
APPEARANCE UR: ABNORMAL
AST SERPL-CCNC: 21 U/L (ref 10–38)
BACTERIA URNS QL MICRO: ABNORMAL /HPF
BASOPHILS # BLD: 0 K/UL (ref 0–0.1)
BASOPHILS NFR BLD: 1 % (ref 0–2)
BILIRUB SERPL-MCNC: 0.2 MG/DL (ref 0.2–1)
BILIRUB UR QL: NEGATIVE
BUN SERPL-MCNC: 17 MG/DL (ref 7–18)
BUN/CREAT SERPL: 17 (ref 12–20)
CALCIUM SERPL-MCNC: 9.3 MG/DL (ref 8.5–10.1)
CHLORIDE SERPL-SCNC: 108 MMOL/L (ref 100–111)
CO2 SERPL-SCNC: 26 MMOL/L (ref 21–32)
COLOR UR: YELLOW
CREAT SERPL-MCNC: 1.02 MG/DL (ref 0.6–1.3)
DIFFERENTIAL METHOD BLD: ABNORMAL
ECHO BSA: 1.68 M2
EOSINOPHIL # BLD: 0.2 K/UL (ref 0–0.4)
EOSINOPHIL NFR BLD: 2 % (ref 0–5)
EPITH CASTS URNS QL MICRO: ABNORMAL /LPF (ref 0–5)
ERYTHROCYTE [DISTWIDTH] IN BLOOD BY AUTOMATED COUNT: 18.2 % (ref 11.6–14.5)
GLOBULIN SER CALC-MCNC: 5.1 G/DL (ref 2–4)
GLUCOSE SERPL-MCNC: 194 MG/DL (ref 74–99)
GLUCOSE UR STRIP.AUTO-MCNC: NEGATIVE MG/DL
HCT VFR BLD AUTO: 31.6 % (ref 35–45)
HGB BLD-MCNC: 9.6 G/DL (ref 12–16)
HGB UR QL STRIP: ABNORMAL
IMM GRANULOCYTES # BLD AUTO: 0 K/UL (ref 0–0.04)
IMM GRANULOCYTES NFR BLD AUTO: 0 % (ref 0–0.5)
KETONES UR QL STRIP.AUTO: NEGATIVE MG/DL
LEUKOCYTE ESTERASE UR QL STRIP.AUTO: ABNORMAL
LYMPHOCYTES # BLD: 1.3 K/UL (ref 0.9–3.6)
LYMPHOCYTES NFR BLD: 16 % (ref 21–52)
MCH RBC QN AUTO: 24.1 PG (ref 24–34)
MCHC RBC AUTO-ENTMCNC: 30.4 G/DL (ref 31–37)
MCV RBC AUTO: 79.2 FL (ref 78–100)
MONOCYTES # BLD: 0.5 K/UL (ref 0.05–1.2)
MONOCYTES NFR BLD: 7 % (ref 3–10)
NEUTS SEG # BLD: 5.9 K/UL (ref 1.8–8)
NEUTS SEG NFR BLD: 74 % (ref 40–73)
NITRITE UR QL STRIP.AUTO: NEGATIVE
NRBC # BLD: 0 K/UL (ref 0–0.01)
NRBC BLD-RTO: 0 PER 100 WBC
PH UR STRIP: 6 (ref 5–8)
PLATELET # BLD AUTO: 492 K/UL (ref 135–420)
PMV BLD AUTO: 9.4 FL (ref 9.2–11.8)
POTASSIUM SERPL-SCNC: 4.2 MMOL/L (ref 3.5–5.5)
PROT SERPL-MCNC: 8 G/DL (ref 6.4–8.2)
PROT UR STRIP-MCNC: 300 MG/DL
RBC # BLD AUTO: 3.99 M/UL (ref 4.2–5.3)
RBC #/AREA URNS HPF: ABNORMAL /HPF (ref 0–5)
SODIUM SERPL-SCNC: 138 MMOL/L (ref 136–145)
SP GR UR REFRACTOMETRY: 1.02 (ref 1–1.03)
TROPONIN I SERPL HS-MCNC: 7 NG/L (ref 0–54)
UROBILINOGEN UR QL STRIP.AUTO: 1 EU/DL (ref 0.2–1)
WBC # BLD AUTO: 7.9 K/UL (ref 4.6–13.2)
WBC URNS QL MICRO: ABNORMAL /HPF (ref 0–5)

## 2023-04-16 PROCEDURE — 81001 URINALYSIS AUTO W/SCOPE: CPT

## 2023-04-16 PROCEDURE — 96375 TX/PRO/DX INJ NEW DRUG ADDON: CPT | Performed by: EMERGENCY MEDICINE

## 2023-04-16 PROCEDURE — 84484 ASSAY OF TROPONIN QUANT: CPT

## 2023-04-16 PROCEDURE — 87077 CULTURE AEROBIC IDENTIFY: CPT

## 2023-04-16 PROCEDURE — 02HV33Z INSERTION OF INFUSION DEVICE INTO SUPERIOR VENA CAVA, PERCUTANEOUS APPROACH: ICD-10-PCS | Performed by: INTERNAL MEDICINE

## 2023-04-16 PROCEDURE — 87186 SC STD MICRODIL/AGAR DIL: CPT

## 2023-04-16 PROCEDURE — 87086 URINE CULTURE/COLONY COUNT: CPT

## 2023-04-16 PROCEDURE — 85025 COMPLETE CBC W/AUTO DIFF WBC: CPT

## 2023-04-16 PROCEDURE — 72148 MRI LUMBAR SPINE W/O DYE: CPT

## 2023-04-16 PROCEDURE — 93971 EXTREMITY STUDY: CPT

## 2023-04-16 PROCEDURE — 6360000002 HC RX W HCPCS: Performed by: EMERGENCY MEDICINE

## 2023-04-16 PROCEDURE — 99285 EMERGENCY DEPT VISIT HI MDM: CPT | Performed by: EMERGENCY MEDICINE

## 2023-04-16 PROCEDURE — 80053 COMPREHEN METABOLIC PANEL: CPT

## 2023-04-16 RX ORDER — HYDROMORPHONE HYDROCHLORIDE 1 MG/ML
0.25 INJECTION, SOLUTION INTRAMUSCULAR; INTRAVENOUS; SUBCUTANEOUS
Status: COMPLETED | OUTPATIENT
Start: 2023-04-16 | End: 2023-04-16

## 2023-04-16 RX ADMIN — HYDROMORPHONE HYDROCHLORIDE 0.25 MG: 1 INJECTION, SOLUTION INTRAMUSCULAR; INTRAVENOUS; SUBCUTANEOUS at 20:36

## 2023-04-16 ASSESSMENT — PAIN DESCRIPTION - PAIN TYPE: TYPE: ACUTE PAIN

## 2023-04-16 ASSESSMENT — PAIN SCALES - GENERAL: PAINLEVEL_OUTOF10: 6

## 2023-04-16 ASSESSMENT — PAIN - FUNCTIONAL ASSESSMENT: PAIN_FUNCTIONAL_ASSESSMENT: 0-10

## 2023-04-16 ASSESSMENT — PAIN DESCRIPTION - FREQUENCY: FREQUENCY: INTERMITTENT

## 2023-04-16 ASSESSMENT — PAIN DESCRIPTION - LOCATION: LOCATION: CHEST;LEG

## 2023-04-16 ASSESSMENT — PAIN DESCRIPTION - DESCRIPTORS: DESCRIPTORS: PRESSURE;ACHING

## 2023-04-17 ENCOUNTER — APPOINTMENT (OUTPATIENT)
Facility: HOSPITAL | Age: 53
DRG: 347 | End: 2023-04-17
Payer: COMMERCIAL

## 2023-04-17 PROBLEM — E11.21 TYPE 2 DIABETES WITH NEPHROPATHY (HCC): Status: ACTIVE | Noted: 2018-05-22

## 2023-04-17 PROBLEM — N39.0 UTI (URINARY TRACT INFECTION): Status: ACTIVE | Noted: 2023-04-17

## 2023-04-17 PROBLEM — R29.898 WEAKNESS OF LEFT LEG: Status: ACTIVE | Noted: 2023-04-17

## 2023-04-17 LAB
AMPHET UR QL SCN: NEGATIVE
BARBITURATES UR QL SCN: NEGATIVE
BENZODIAZ UR QL: NEGATIVE
CANNABINOIDS UR QL SCN: NEGATIVE
COCAINE UR QL SCN: NEGATIVE
EST. AVERAGE GLUCOSE BLD GHB EST-MCNC: 154 MG/DL
GLUCOSE BLD STRIP.AUTO-MCNC: 286 MG/DL (ref 70–110)
GLUCOSE BLD STRIP.AUTO-MCNC: 308 MG/DL (ref 70–110)
GLUCOSE BLD STRIP.AUTO-MCNC: 337 MG/DL (ref 70–110)
GLUCOSE BLD STRIP.AUTO-MCNC: 349 MG/DL (ref 70–110)
GLUCOSE BLD STRIP.AUTO-MCNC: 399 MG/DL (ref 70–110)
HBA1C MFR BLD: 7 % (ref 4.2–5.6)
IRON SATN MFR SERPL: 11 % (ref 20–50)
IRON SERPL-MCNC: 26 UG/DL (ref 50–175)
Lab: ABNORMAL
METHADONE UR QL: NEGATIVE
OPIATES UR QL: POSITIVE
PCP UR QL: NEGATIVE
TIBC SERPL-MCNC: 239 UG/DL (ref 250–450)
TROPONIN I SERPL HS-MCNC: 7 NG/L (ref 0–54)

## 2023-04-17 PROCEDURE — 82962 GLUCOSE BLOOD TEST: CPT

## 2023-04-17 PROCEDURE — 36415 COLL VENOUS BLD VENIPUNCTURE: CPT

## 2023-04-17 PROCEDURE — 6360000002 HC RX W HCPCS: Performed by: HOSPITALIST

## 2023-04-17 PROCEDURE — 97161 PT EVAL LOW COMPLEX 20 MIN: CPT

## 2023-04-17 PROCEDURE — 96365 THER/PROPH/DIAG IV INF INIT: CPT | Performed by: EMERGENCY MEDICINE

## 2023-04-17 PROCEDURE — 96375 TX/PRO/DX INJ NEW DRUG ADDON: CPT | Performed by: EMERGENCY MEDICINE

## 2023-04-17 PROCEDURE — 2580000003 HC RX 258: Performed by: INTERNAL MEDICINE

## 2023-04-17 PROCEDURE — 70551 MRI BRAIN STEM W/O DYE: CPT

## 2023-04-17 PROCEDURE — 6370000000 HC RX 637 (ALT 250 FOR IP): Performed by: INTERNAL MEDICINE

## 2023-04-17 PROCEDURE — 2580000003 HC RX 258: Performed by: EMERGENCY MEDICINE

## 2023-04-17 PROCEDURE — 6360000002 HC RX W HCPCS: Performed by: INTERNAL MEDICINE

## 2023-04-17 PROCEDURE — 97166 OT EVAL MOD COMPLEX 45 MIN: CPT

## 2023-04-17 PROCEDURE — 83540 ASSAY OF IRON: CPT

## 2023-04-17 PROCEDURE — 97530 THERAPEUTIC ACTIVITIES: CPT

## 2023-04-17 PROCEDURE — 97535 SELF CARE MNGMENT TRAINING: CPT

## 2023-04-17 PROCEDURE — 80307 DRUG TEST PRSMV CHEM ANLYZR: CPT

## 2023-04-17 PROCEDURE — 1100000000 HC RM PRIVATE

## 2023-04-17 PROCEDURE — 83036 HEMOGLOBIN GLYCOSYLATED A1C: CPT

## 2023-04-17 PROCEDURE — 84484 ASSAY OF TROPONIN QUANT: CPT

## 2023-04-17 PROCEDURE — 96376 TX/PRO/DX INJ SAME DRUG ADON: CPT | Performed by: EMERGENCY MEDICINE

## 2023-04-17 PROCEDURE — 97116 GAIT TRAINING THERAPY: CPT

## 2023-04-17 PROCEDURE — 72192 CT PELVIS W/O DYE: CPT

## 2023-04-17 PROCEDURE — C9113 INJ PANTOPRAZOLE SODIUM, VIA: HCPCS | Performed by: INTERNAL MEDICINE

## 2023-04-17 PROCEDURE — A4216 STERILE WATER/SALINE, 10 ML: HCPCS | Performed by: INTERNAL MEDICINE

## 2023-04-17 PROCEDURE — 6370000000 HC RX 637 (ALT 250 FOR IP): Performed by: FAMILY MEDICINE

## 2023-04-17 PROCEDURE — 6370000000 HC RX 637 (ALT 250 FOR IP): Performed by: HOSPITALIST

## 2023-04-17 PROCEDURE — 6360000002 HC RX W HCPCS: Performed by: EMERGENCY MEDICINE

## 2023-04-17 RX ORDER — HYDROMORPHONE HYDROCHLORIDE 1 MG/ML
0.25 INJECTION, SOLUTION INTRAMUSCULAR; INTRAVENOUS; SUBCUTANEOUS EVERY 4 HOURS PRN
Status: DISCONTINUED | OUTPATIENT
Start: 2023-04-17 | End: 2023-04-17

## 2023-04-17 RX ORDER — CYCLOBENZAPRINE HCL 10 MG
10 TABLET ORAL DAILY
Status: ON HOLD | COMMUNITY
End: 2023-04-20 | Stop reason: HOSPADM

## 2023-04-17 RX ORDER — ACETAMINOPHEN 325 MG/1
650 TABLET ORAL EVERY 6 HOURS PRN
Status: DISCONTINUED | OUTPATIENT
Start: 2023-04-17 | End: 2023-04-20 | Stop reason: HOSPADM

## 2023-04-17 RX ORDER — SODIUM CHLORIDE 0.9 % (FLUSH) 0.9 %
5-40 SYRINGE (ML) INJECTION EVERY 12 HOURS SCHEDULED
Status: DISCONTINUED | OUTPATIENT
Start: 2023-04-17 | End: 2023-04-20 | Stop reason: HOSPADM

## 2023-04-17 RX ORDER — DEXAMETHASONE SODIUM PHOSPHATE 10 MG/ML
10 INJECTION, SOLUTION INTRAMUSCULAR; INTRAVENOUS ONCE
Status: COMPLETED | OUTPATIENT
Start: 2023-04-17 | End: 2023-04-17

## 2023-04-17 RX ORDER — SODIUM CHLORIDE 9 MG/ML
INJECTION, SOLUTION INTRAVENOUS PRN
Status: DISCONTINUED | OUTPATIENT
Start: 2023-04-17 | End: 2023-04-20 | Stop reason: HOSPADM

## 2023-04-17 RX ORDER — CELECOXIB 100 MG/1
200 CAPSULE ORAL DAILY
Status: DISCONTINUED | OUTPATIENT
Start: 2023-04-17 | End: 2023-04-20 | Stop reason: HOSPADM

## 2023-04-17 RX ORDER — ONDANSETRON 2 MG/ML
4 INJECTION INTRAMUSCULAR; INTRAVENOUS EVERY 6 HOURS PRN
Status: DISCONTINUED | OUTPATIENT
Start: 2023-04-17 | End: 2023-04-20 | Stop reason: HOSPADM

## 2023-04-17 RX ORDER — MORPHINE SULFATE 2 MG/ML
2 INJECTION, SOLUTION INTRAMUSCULAR; INTRAVENOUS EVERY 4 HOURS PRN
Status: DISCONTINUED | OUTPATIENT
Start: 2023-04-17 | End: 2023-04-18

## 2023-04-17 RX ORDER — INSULIN LISPRO 100 [IU]/ML
0-4 INJECTION, SOLUTION INTRAVENOUS; SUBCUTANEOUS NIGHTLY
Status: DISCONTINUED | OUTPATIENT
Start: 2023-04-17 | End: 2023-04-20 | Stop reason: HOSPADM

## 2023-04-17 RX ORDER — SODIUM CHLORIDE 0.9 % (FLUSH) 0.9 %
5-40 SYRINGE (ML) INJECTION PRN
Status: DISCONTINUED | OUTPATIENT
Start: 2023-04-17 | End: 2023-04-20 | Stop reason: HOSPADM

## 2023-04-17 RX ORDER — METHOCARBAMOL 500 MG/1
750 TABLET, FILM COATED ORAL DAILY
Status: DISCONTINUED | OUTPATIENT
Start: 2023-04-17 | End: 2023-04-20 | Stop reason: HOSPADM

## 2023-04-17 RX ORDER — PANTOPRAZOLE SODIUM 40 MG/1
40 TABLET, DELAYED RELEASE ORAL
Status: DISCONTINUED | OUTPATIENT
Start: 2023-04-18 | End: 2023-04-20 | Stop reason: HOSPADM

## 2023-04-17 RX ORDER — ONDANSETRON 4 MG/1
4 TABLET, ORALLY DISINTEGRATING ORAL EVERY 8 HOURS PRN
Status: DISCONTINUED | OUTPATIENT
Start: 2023-04-17 | End: 2023-04-20 | Stop reason: HOSPADM

## 2023-04-17 RX ORDER — POLYETHYLENE GLYCOL 3350 17 G/17G
17 POWDER, FOR SOLUTION ORAL DAILY PRN
Status: DISCONTINUED | OUTPATIENT
Start: 2023-04-17 | End: 2023-04-20 | Stop reason: HOSPADM

## 2023-04-17 RX ORDER — INSULIN LISPRO 100 [IU]/ML
8 INJECTION, SOLUTION INTRAVENOUS; SUBCUTANEOUS
Status: DISCONTINUED | OUTPATIENT
Start: 2023-04-17 | End: 2023-04-20 | Stop reason: HOSPADM

## 2023-04-17 RX ORDER — PREGABALIN 75 MG/1
75 CAPSULE ORAL 3 TIMES DAILY
Status: DISCONTINUED | OUTPATIENT
Start: 2023-04-17 | End: 2023-04-20 | Stop reason: HOSPADM

## 2023-04-17 RX ORDER — CELECOXIB 200 MG/1
200 CAPSULE ORAL DAILY
COMMUNITY

## 2023-04-17 RX ORDER — ACETAMINOPHEN 650 MG/1
650 SUPPOSITORY RECTAL EVERY 6 HOURS PRN
Status: DISCONTINUED | OUTPATIENT
Start: 2023-04-17 | End: 2023-04-20 | Stop reason: HOSPADM

## 2023-04-17 RX ORDER — INSULIN LISPRO 100 [IU]/ML
0-16 INJECTION, SOLUTION INTRAVENOUS; SUBCUTANEOUS
Status: DISCONTINUED | OUTPATIENT
Start: 2023-04-17 | End: 2023-04-20 | Stop reason: HOSPADM

## 2023-04-17 RX ORDER — DEXTROSE MONOHYDRATE 100 MG/ML
INJECTION, SOLUTION INTRAVENOUS CONTINUOUS PRN
Status: DISCONTINUED | OUTPATIENT
Start: 2023-04-17 | End: 2023-04-20 | Stop reason: HOSPADM

## 2023-04-17 RX ORDER — DEXAMETHASONE SODIUM PHOSPHATE 4 MG/ML
4 INJECTION, SOLUTION INTRA-ARTICULAR; INTRALESIONAL; INTRAMUSCULAR; INTRAVENOUS; SOFT TISSUE EVERY 6 HOURS
Status: DISCONTINUED | OUTPATIENT
Start: 2023-04-17 | End: 2023-04-17

## 2023-04-17 RX ORDER — INSULIN LISPRO 100 [IU]/ML
12 INJECTION, SOLUTION INTRAVENOUS; SUBCUTANEOUS
Status: DISCONTINUED | OUTPATIENT
Start: 2023-04-17 | End: 2023-04-17

## 2023-04-17 RX ORDER — METHOCARBAMOL 750 MG/1
750 TABLET, FILM COATED ORAL DAILY
COMMUNITY

## 2023-04-17 RX ORDER — RIVAROXABAN 10 MG/1
10 TABLET, FILM COATED ORAL DAILY
COMMUNITY

## 2023-04-17 RX ORDER — INSULIN LISPRO 100 [IU]/ML
0-4 INJECTION, SOLUTION INTRAVENOUS; SUBCUTANEOUS NIGHTLY
Status: DISCONTINUED | OUTPATIENT
Start: 2023-04-17 | End: 2023-04-17

## 2023-04-17 RX ORDER — ENOXAPARIN SODIUM 100 MG/ML
40 INJECTION SUBCUTANEOUS DAILY
Status: DISCONTINUED | OUTPATIENT
Start: 2023-04-17 | End: 2023-04-17

## 2023-04-17 RX ORDER — INSULIN GLARGINE 100 [IU]/ML
15 INJECTION, SOLUTION SUBCUTANEOUS DAILY
Status: DISCONTINUED | OUTPATIENT
Start: 2023-04-17 | End: 2023-04-20 | Stop reason: HOSPADM

## 2023-04-17 RX ORDER — HYDROMORPHONE HYDROCHLORIDE 1 MG/ML
0.25 INJECTION, SOLUTION INTRAMUSCULAR; INTRAVENOUS; SUBCUTANEOUS
Status: COMPLETED | OUTPATIENT
Start: 2023-04-17 | End: 2023-04-17

## 2023-04-17 RX ORDER — INSULIN GLARGINE 100 [IU]/ML
10 INJECTION, SOLUTION SUBCUTANEOUS NIGHTLY
Status: DISCONTINUED | OUTPATIENT
Start: 2023-04-17 | End: 2023-04-20 | Stop reason: HOSPADM

## 2023-04-17 RX ORDER — GABAPENTIN 100 MG/1
100 CAPSULE ORAL 3 TIMES DAILY
Status: DISCONTINUED | OUTPATIENT
Start: 2023-04-17 | End: 2023-04-17

## 2023-04-17 RX ORDER — DEXAMETHASONE SODIUM PHOSPHATE 4 MG/ML
4 INJECTION, SOLUTION INTRA-ARTICULAR; INTRALESIONAL; INTRAMUSCULAR; INTRAVENOUS; SOFT TISSUE EVERY 8 HOURS
Status: DISCONTINUED | OUTPATIENT
Start: 2023-04-17 | End: 2023-04-18

## 2023-04-17 RX ADMIN — CEFTRIAXONE 1000 MG: 1 INJECTION, POWDER, FOR SOLUTION INTRAMUSCULAR; INTRAVENOUS at 01:28

## 2023-04-17 RX ADMIN — DEXAMETHASONE SODIUM PHOSPHATE 10 MG: 10 INJECTION, SOLUTION INTRAMUSCULAR; INTRAVENOUS at 01:28

## 2023-04-17 RX ADMIN — PREGABALIN 75 MG: 75 CAPSULE ORAL at 20:52

## 2023-04-17 RX ADMIN — MORPHINE SULFATE 2 MG: 2 INJECTION, SOLUTION INTRAMUSCULAR; INTRAVENOUS at 22:08

## 2023-04-17 RX ADMIN — INSULIN GLARGINE 10 UNITS: 100 INJECTION, SOLUTION SUBCUTANEOUS at 22:09

## 2023-04-17 RX ADMIN — INSULIN LISPRO 8 UNITS: 100 INJECTION, SOLUTION INTRAVENOUS; SUBCUTANEOUS at 12:31

## 2023-04-17 RX ADMIN — PANTOPRAZOLE SODIUM 40 MG: 40 INJECTION, POWDER, FOR SOLUTION INTRAVENOUS at 06:17

## 2023-04-17 RX ADMIN — INSULIN LISPRO 12 UNITS: 100 INJECTION, SOLUTION INTRAVENOUS; SUBCUTANEOUS at 16:46

## 2023-04-17 RX ADMIN — DEXAMETHASONE SODIUM PHOSPHATE 4 MG: 4 INJECTION, SOLUTION INTRAMUSCULAR; INTRAVENOUS at 09:03

## 2023-04-17 RX ADMIN — INSULIN LISPRO 4 UNITS: 100 INJECTION, SOLUTION INTRAVENOUS; SUBCUTANEOUS at 20:53

## 2023-04-17 RX ADMIN — INSULIN LISPRO 12 UNITS: 100 INJECTION, SOLUTION INTRAVENOUS; SUBCUTANEOUS at 09:02

## 2023-04-17 RX ADMIN — INSULIN LISPRO 8 UNITS: 100 INJECTION, SOLUTION INTRAVENOUS; SUBCUTANEOUS at 16:39

## 2023-04-17 RX ADMIN — PREGABALIN 75 MG: 75 CAPSULE ORAL at 14:43

## 2023-04-17 RX ADMIN — MORPHINE SULFATE 2 MG: 2 INJECTION, SOLUTION INTRAMUSCULAR; INTRAVENOUS at 06:12

## 2023-04-17 RX ADMIN — MORPHINE SULFATE 2 MG: 2 INJECTION, SOLUTION INTRAMUSCULAR; INTRAVENOUS at 16:39

## 2023-04-17 RX ADMIN — METHOCARBAMOL TABLETS 750 MG: 500 TABLET, COATED ORAL at 09:03

## 2023-04-17 RX ADMIN — Medication 1 SPRAY: at 22:09

## 2023-04-17 RX ADMIN — HYDROMORPHONE HYDROCHLORIDE 0.25 MG: 1 INJECTION, SOLUTION INTRAMUSCULAR; INTRAVENOUS; SUBCUTANEOUS at 01:28

## 2023-04-17 RX ADMIN — CELECOXIB 200 MG: 100 CAPSULE ORAL at 09:03

## 2023-04-17 RX ADMIN — RIVAROXABAN 10 MG: 10 TABLET, FILM COATED ORAL at 06:12

## 2023-04-17 RX ADMIN — SODIUM CHLORIDE, PRESERVATIVE FREE 10 ML: 5 INJECTION INTRAVENOUS at 16:38

## 2023-04-17 RX ADMIN — DEXAMETHASONE SODIUM PHOSPHATE 4 MG: 4 INJECTION, SOLUTION INTRAMUSCULAR; INTRAVENOUS at 16:42

## 2023-04-17 RX ADMIN — INSULIN GLARGINE 15 UNITS: 100 INJECTION, SOLUTION SUBCUTANEOUS at 08:59

## 2023-04-17 RX ADMIN — MORPHINE SULFATE 2 MG: 2 INJECTION, SOLUTION INTRAMUSCULAR; INTRAVENOUS at 10:46

## 2023-04-17 RX ADMIN — PREGABALIN 75 MG: 75 CAPSULE ORAL at 09:04

## 2023-04-17 RX ADMIN — SODIUM CHLORIDE, PRESERVATIVE FREE 10 ML: 5 INJECTION INTRAVENOUS at 20:52

## 2023-04-17 ASSESSMENT — PAIN DESCRIPTION - DESCRIPTORS
DESCRIPTORS: THROBBING
DESCRIPTORS: ACHING

## 2023-04-17 ASSESSMENT — PAIN SCALES - GENERAL
PAINLEVEL_OUTOF10: 9
PAINLEVEL_OUTOF10: 9
PAINLEVEL_OUTOF10: 10
PAINLEVEL_OUTOF10: 8
PAINLEVEL_OUTOF10: 2

## 2023-04-17 ASSESSMENT — PAIN DESCRIPTION - ORIENTATION
ORIENTATION: LEFT
ORIENTATION: LEFT;LOWER

## 2023-04-17 ASSESSMENT — PAIN DESCRIPTION - LOCATION
LOCATION: HIP
LOCATION: BACK;HIP

## 2023-04-17 NOTE — ED PROVIDER NOTES
Units SubCUTAneous Nightly Eric Elizalde MD   4 Units at 04/17/23 2053    dexamethasone (DECADRON) injection 4 mg  4 mg IntraVENous Q8H Eric Elizalde MD   4 mg at 04/17/23 1642    [START ON 4/18/2023] pantoprazole (PROTONIX) tablet 40 mg  40 mg Oral QAM AC Celia Rangel MD           Past History     Past Medical History:  Past Medical History:   Diagnosis Date    Anxiety     Bipolar disorder ([facility] [address]     Breast cancer ([facility] [address]     breast cancer, right, S/P Mastectomy and chemo, radiation in 2013    Chronic pain     cervical    Depression     Diabetes ([facility] [address]     Drug abuse ([facility] [address]     H/O cocaine use in past    Drug-seeking behavior     Family history of early CAD     Gastrointestinal disorder     cholitis    H/O ETOH abuse     History of chemotherapy 2013    History of radiation therapy 2013    Hyperlipidemia     Hypertension     Malignant neoplasm without specification of site [facility])     Methamphetamine abuse ([facility] [address]      self reported on 1/3/16    Peripheral neuropathy 2022    Psychiatric disorder     multiple personality disorder    Psychiatric disorder     anxiety    Psychiatric disorder     bipolar     Spine injury     Thromboembolus ([facility] [address] 2022    Vitamin D deficiency 05/01/2018       Past Surgical History:  Past Surgical History:   Procedure Laterality Date    BREAST LUMPECTOMY  06/2013    right    BREAST RECONSTRUCTION Bilateral 2018    tissue expanders insertion    BREAST RECONSTRUCTION Bilateral 2018    tissue expanders removed, no implants    COLONOSCOPY N/A 07/18/2016    COLONOSCOPY performed by Dorothea Chu MD at [address] [place] [address] ([address] [place] [address])  2013    LUMBAR FUSION  2020    MASTECTOMY Bilateral 2013    SALPINGO-OOPHORECTOMY Bilateral 2013    TONSILLECTOMY      TUBAL LIGATION  1998    VASCULAR SURGERY      medi port removed    VASCULAR SURGERY  2013    medi port       Family History:  Family History   Problem Relation Age of Onset    Heart Disease Mother     Diabetes Other

## 2023-04-17 NOTE — H&P
Secondary diabetes mellitus ([facility] [address]    Chronic low back pain    Gastroparesis due to DM [facility])    UTI (urinary tract infection)  Resolved Problems:    * No resolved hospital problems. *     1. Intractable back pain  She started on IV Decadron 6  Orthopedic is consulted  PT OT    2. Left leg weakness  Resume neuropathic medication Lyrica increse  PT OT  Check MRI brain and CT hip  as there was a fall     3. UTI  Check urine cultures  IV Rocephin  4. Diabetes  Restart Lantus  High-dose insulin sliding scale due to steroids    5. Chronic pain  Morphine IV  Oxycodone p.o. Urine drug screen    6.   History of cocaine use  UDS pending has been clean  2 months  currently on probation  Does not want dilaudid as that is a trigger for narcotic use for her   DVT/GI Prophylaxis: Lovenox        MD Curry Kapoor MD  4/17/2023 4:16 AM Psychotic disorder

## 2023-04-17 NOTE — PLAN OF CARE
Problem: Pain  Goal: Verbalizes/displays adequate comfort level or baseline comfort level  4/17/2023 0336 by Georgia Phelps RN  Outcome: Progressing  4/17/2023 0305 by Georgia Phelps RN  Outcome: Progressing     Problem: Safety - Adult  Goal: Free from fall injury  4/17/2023 0336 by Georgia Phelps RN  Outcome: Progressing  4/17/2023 0305 by Georgia Phelps RN  Outcome: Progressing

## 2023-04-17 NOTE — PLAN OF CARE
Problem: Pain  Goal: Verbalizes/displays adequate comfort level or baseline comfort level  4/17/2023 1448 by April Barriga RN  Outcome: Progressing  4/17/2023 0336 by Judith Duong RN  Outcome: Progressing  4/17/2023 0305 by Judith Duong RN  Outcome: Progressing     Problem: Safety - Adult  Goal: Free from fall injury  4/17/2023 1448 by April Barriga RN  Outcome: Progressing     Problem: ABCDS Injury Assessment  Goal: Absence of physical injury  Outcome: Progressing     Problem: Skin/Tissue Integrity  Goal: Absence of new skin breakdown  Description: 1. Monitor for areas of redness and/or skin breakdown  2. Assess vascular access sites hourly  3. Every 4-6 hours minimum:  Change oxygen saturation probe site  4. Every 4-6 hours:  If on nasal continuous positive airway pressure, respiratory therapy assess nares and determine need for appliance change or resting period.   Outcome: Progressing     Problem: Chronic Conditions and Co-morbidities  Goal: Patient's chronic conditions and co-morbidity symptoms are monitored and maintained or improved  Outcome: Progressing

## 2023-04-17 NOTE — ED NOTES
Pt report given to SANJUANITA Steve. All questions and concerns addressed. Pt to be transported by this writer.       Kim Marmolejo RN  04/17/23 0606

## 2023-04-18 LAB
GLUCOSE BLD STRIP.AUTO-MCNC: 123 MG/DL (ref 70–110)
GLUCOSE BLD STRIP.AUTO-MCNC: 251 MG/DL (ref 70–110)
GLUCOSE BLD STRIP.AUTO-MCNC: 303 MG/DL (ref 70–110)
GLUCOSE BLD STRIP.AUTO-MCNC: 317 MG/DL (ref 70–110)

## 2023-04-18 PROCEDURE — 6370000000 HC RX 637 (ALT 250 FOR IP): Performed by: INTERNAL MEDICINE

## 2023-04-18 PROCEDURE — 82962 GLUCOSE BLOOD TEST: CPT

## 2023-04-18 PROCEDURE — 6370000000 HC RX 637 (ALT 250 FOR IP): Performed by: HOSPITALIST

## 2023-04-18 PROCEDURE — 97116 GAIT TRAINING THERAPY: CPT

## 2023-04-18 PROCEDURE — 6360000002 HC RX W HCPCS: Performed by: INTERNAL MEDICINE

## 2023-04-18 PROCEDURE — 1100000000 HC RM PRIVATE

## 2023-04-18 PROCEDURE — 6370000000 HC RX 637 (ALT 250 FOR IP): Performed by: FAMILY MEDICINE

## 2023-04-18 PROCEDURE — 2580000003 HC RX 258: Performed by: INTERNAL MEDICINE

## 2023-04-18 PROCEDURE — 6360000002 HC RX W HCPCS: Performed by: HOSPITALIST

## 2023-04-18 RX ORDER — DEXAMETHASONE SODIUM PHOSPHATE 4 MG/ML
4 INJECTION, SOLUTION INTRA-ARTICULAR; INTRALESIONAL; INTRAMUSCULAR; INTRAVENOUS; SOFT TISSUE EVERY 12 HOURS
Status: DISCONTINUED | OUTPATIENT
Start: 2023-04-18 | End: 2023-04-20 | Stop reason: HOSPADM

## 2023-04-18 RX ORDER — MORPHINE SULFATE 2 MG/ML
1 INJECTION, SOLUTION INTRAMUSCULAR; INTRAVENOUS EVERY 4 HOURS PRN
Status: DISCONTINUED | OUTPATIENT
Start: 2023-04-18 | End: 2023-04-20 | Stop reason: HOSPADM

## 2023-04-18 RX ORDER — OXYCODONE HYDROCHLORIDE 5 MG/1
5 TABLET ORAL EVERY 4 HOURS PRN
Status: DISCONTINUED | OUTPATIENT
Start: 2023-04-18 | End: 2023-04-20 | Stop reason: HOSPADM

## 2023-04-18 RX ADMIN — SODIUM CHLORIDE, PRESERVATIVE FREE 10 ML: 5 INJECTION INTRAVENOUS at 01:06

## 2023-04-18 RX ADMIN — MORPHINE SULFATE 1 MG: 2 INJECTION, SOLUTION INTRAMUSCULAR; INTRAVENOUS at 20:55

## 2023-04-18 RX ADMIN — INSULIN LISPRO 12 UNITS: 100 INJECTION, SOLUTION INTRAVENOUS; SUBCUTANEOUS at 13:28

## 2023-04-18 RX ADMIN — PREGABALIN 75 MG: 75 CAPSULE ORAL at 20:54

## 2023-04-18 RX ADMIN — PREGABALIN 75 MG: 75 CAPSULE ORAL at 09:23

## 2023-04-18 RX ADMIN — DEXAMETHASONE SODIUM PHOSPHATE 4 MG: 4 INJECTION, SOLUTION INTRAMUSCULAR; INTRAVENOUS at 09:22

## 2023-04-18 RX ADMIN — INSULIN LISPRO 8 UNITS: 100 INJECTION, SOLUTION INTRAVENOUS; SUBCUTANEOUS at 13:23

## 2023-04-18 RX ADMIN — PANTOPRAZOLE SODIUM 40 MG: 40 TABLET, DELAYED RELEASE ORAL at 09:23

## 2023-04-18 RX ADMIN — INSULIN LISPRO 8 UNITS: 100 INJECTION, SOLUTION INTRAVENOUS; SUBCUTANEOUS at 17:08

## 2023-04-18 RX ADMIN — RIVAROXABAN 10 MG: 10 TABLET, FILM COATED ORAL at 09:22

## 2023-04-18 RX ADMIN — INSULIN LISPRO 8 UNITS: 100 INJECTION, SOLUTION INTRAVENOUS; SUBCUTANEOUS at 09:20

## 2023-04-18 RX ADMIN — DEXAMETHASONE SODIUM PHOSPHATE 4 MG: 4 INJECTION, SOLUTION INTRAMUSCULAR; INTRAVENOUS at 20:54

## 2023-04-18 RX ADMIN — MORPHINE SULFATE 2 MG: 2 INJECTION, SOLUTION INTRAMUSCULAR; INTRAVENOUS at 09:23

## 2023-04-18 RX ADMIN — INSULIN GLARGINE 15 UNITS: 100 INJECTION, SOLUTION SUBCUTANEOUS at 09:20

## 2023-04-18 RX ADMIN — SODIUM CHLORIDE, PRESERVATIVE FREE 10 ML: 5 INJECTION INTRAVENOUS at 20:56

## 2023-04-18 RX ADMIN — DEXAMETHASONE SODIUM PHOSPHATE 4 MG: 4 INJECTION, SOLUTION INTRAMUSCULAR; INTRAVENOUS at 01:06

## 2023-04-18 RX ADMIN — INSULIN GLARGINE 10 UNITS: 100 INJECTION, SOLUTION SUBCUTANEOUS at 22:14

## 2023-04-18 RX ADMIN — CEFTRIAXONE 1000 MG: 1 INJECTION, POWDER, FOR SOLUTION INTRAMUSCULAR; INTRAVENOUS at 01:05

## 2023-04-18 RX ADMIN — CELECOXIB 200 MG: 100 CAPSULE ORAL at 09:23

## 2023-04-18 RX ADMIN — SODIUM CHLORIDE, PRESERVATIVE FREE 10 ML: 5 INJECTION INTRAVENOUS at 09:24

## 2023-04-18 RX ADMIN — MORPHINE SULFATE 2 MG: 2 INJECTION, SOLUTION INTRAMUSCULAR; INTRAVENOUS at 14:47

## 2023-04-18 RX ADMIN — PREGABALIN 75 MG: 75 CAPSULE ORAL at 13:23

## 2023-04-18 RX ADMIN — INSULIN LISPRO 8 UNITS: 100 INJECTION, SOLUTION INTRAVENOUS; SUBCUTANEOUS at 17:09

## 2023-04-18 RX ADMIN — METHOCARBAMOL TABLETS 750 MG: 500 TABLET, COATED ORAL at 09:23

## 2023-04-18 RX ADMIN — INSULIN LISPRO 12 UNITS: 100 INJECTION, SOLUTION INTRAVENOUS; SUBCUTANEOUS at 09:21

## 2023-04-18 ASSESSMENT — PAIN DESCRIPTION - LOCATION
LOCATION: HIP
LOCATION: BACK;BUTTOCKS
LOCATION: ABDOMEN

## 2023-04-18 ASSESSMENT — PAIN SCALES - GENERAL
PAINLEVEL_OUTOF10: 5
PAINLEVEL_OUTOF10: 5
PAINLEVEL_OUTOF10: 8

## 2023-04-18 ASSESSMENT — PAIN DESCRIPTION - ORIENTATION
ORIENTATION: LEFT;LOWER
ORIENTATION: LEFT
ORIENTATION: LEFT

## 2023-04-18 ASSESSMENT — PAIN DESCRIPTION - DESCRIPTORS
DESCRIPTORS: ACHING;SHARP
DESCRIPTORS: ACHING;SORE;THROBBING
DESCRIPTORS: THROBBING

## 2023-04-18 ASSESSMENT — PAIN - FUNCTIONAL ASSESSMENT: PAIN_FUNCTIONAL_ASSESSMENT: ACTIVITIES ARE NOT PREVENTED

## 2023-04-18 NOTE — PLAN OF CARE
Problem: Pain  Goal: Verbalizes/displays adequate comfort level or baseline comfort level  4/17/2023 2245 by Michelle Cook RN  Outcome: Progressing  Flowsheets (Taken 4/17/2023 2050)  Verbalizes/displays adequate comfort level or baseline comfort level:   Encourage patient to monitor pain and request assistance   Assess pain using appropriate pain scale   Administer analgesics based on type and severity of pain and evaluate response   Implement non-pharmacological measures as appropriate and evaluate response   Consider cultural and social influences on pain and pain management   Notify Licensed Independent Practitioner if interventions unsuccessful or patient reports new pain     Problem: Safety - Adult  Goal: Free from fall injury  4/17/2023 2245 by Michelle Cook RN  Outcome: Progressing  Flowsheets (Taken 4/17/2023 2050)  Free From Fall Injury:   Instruct family/caregiver on patient safety   Based on caregiver fall risk screen, instruct family/caregiver to ask for assistance with transferring infant if caregiver noted to have fall risk factors     Problem: ABCDS Injury Assessment  Goal: Absence of physical injury  4/17/2023 2245 by Michelle Cook RN  Outcome: Progressing  Flowsheets (Taken 4/17/2023 2050)  Absence of Physical Injury: Implement safety measures based on patient assessment     Problem: Skin/Tissue Integrity  Goal: Absence of new skin breakdown  Description: 1. Monitor for areas of redness and/or skin breakdown  2. Assess vascular access sites hourly  3. Every 4-6 hours minimum:  Change oxygen saturation probe site  4. Every 4-6 hours:  If on nasal continuous positive airway pressure, respiratory therapy assess nares and determine need for appliance change or resting period.   4/17/2023 2245 by Michelle Cook RN  Outcome: Progressing     Problem: Chronic Conditions and Co-morbidities  Goal: Patient's chronic conditions and co-morbidity symptoms are monitored and maintained

## 2023-04-18 NOTE — PLAN OF CARE
1920 Bedside and Verbal shift change report given to Milo Rick RN (oncoming nurse) by Sheldon Concepcion RN (offgoing nurse). Report included the following information Nurse Handoff Report, Index, Adult Overview, Intake/Output, MAR, and Recent Results. Head to toe to follow. 2050 Pt is A&O x4, NAD, VSS. Lungs are clear bilaterally in RA, no c/o SOB. ICS initiated, pt able to tolerate up to 750, easily encouraged. Pt c/o sore throat, paged Dr. Marvin Mariee for chloraseptic spray, awaiting response. +1 pitting edema BLE, chronic neuropathy and intermittent pain. R hand has trace edema, pt stated it might have been from the fall she had. Abdomen is soft and nontender, active BS throughout, +flatus. Skin is clean and dry, some red rashes on face. Bed placed in lowest position, +alarm. Call bell within reach. 2105 Paged Dr. Marvin Mariee regarding pt's blood sugar of 399 and needing 4 units of humalog per MAR. New order for Lantus 10 units nightly. 2205 Pain rated 10 out of 10, IV morphine given per MAR. Provided education on orthostatic hypotension and encouraged pt to call before getting out of bed, pt verbalized understanding. Safety measures still intact. 1991 No acute changes in pt status, VSS. Pt resting comfortably with eyes closed. Respirations even and unlabored. 0700 Bedside and Verbal shift change report given to French Moore RN (oncoming nurse) by Milo Rick RN (offgoing nurse). Report included the following information Nurse Handoff Report, Index, Adult Overview, Intake/Output, MAR, and Recent Results.

## 2023-04-19 LAB
BACTERIA SPEC CULT: ABNORMAL
BACTERIA SPEC CULT: ABNORMAL
CC UR VC: ABNORMAL
GLUCOSE BLD STRIP.AUTO-MCNC: 203 MG/DL (ref 70–110)
GLUCOSE BLD STRIP.AUTO-MCNC: 206 MG/DL (ref 70–110)
GLUCOSE BLD STRIP.AUTO-MCNC: 313 MG/DL (ref 70–110)
GLUCOSE BLD STRIP.AUTO-MCNC: 318 MG/DL (ref 70–110)
SERVICE CMNT-IMP: ABNORMAL

## 2023-04-19 PROCEDURE — 6370000000 HC RX 637 (ALT 250 FOR IP): Performed by: HOSPITALIST

## 2023-04-19 PROCEDURE — 6360000002 HC RX W HCPCS: Performed by: INTERNAL MEDICINE

## 2023-04-19 PROCEDURE — 82962 GLUCOSE BLOOD TEST: CPT

## 2023-04-19 PROCEDURE — 2580000003 HC RX 258: Performed by: INTERNAL MEDICINE

## 2023-04-19 PROCEDURE — 6370000000 HC RX 637 (ALT 250 FOR IP): Performed by: FAMILY MEDICINE

## 2023-04-19 PROCEDURE — 6360000002 HC RX W HCPCS: Performed by: HOSPITALIST

## 2023-04-19 PROCEDURE — 1100000000 HC RM PRIVATE

## 2023-04-19 PROCEDURE — 6370000000 HC RX 637 (ALT 250 FOR IP): Performed by: INTERNAL MEDICINE

## 2023-04-19 PROCEDURE — 97116 GAIT TRAINING THERAPY: CPT

## 2023-04-19 RX ORDER — DEXAMETHASONE 4 MG/1
TABLET ORAL
Qty: 6 TABLET | Refills: 0 | Status: SHIPPED | OUTPATIENT
Start: 2023-04-19 | End: 2023-04-20 | Stop reason: SDUPTHER

## 2023-04-19 RX ORDER — OXYCODONE HYDROCHLORIDE 5 MG/1
5 TABLET ORAL EVERY 8 HOURS PRN
Qty: 9 TABLET | Refills: 0 | Status: SHIPPED | OUTPATIENT
Start: 2023-04-19 | End: 2023-04-20 | Stop reason: HOSPADM

## 2023-04-19 RX ORDER — PANTOPRAZOLE SODIUM 40 MG/1
40 TABLET, DELAYED RELEASE ORAL
Qty: 30 TABLET | Refills: 0 | Status: SHIPPED | OUTPATIENT
Start: 2023-04-20

## 2023-04-19 RX ADMIN — RIVAROXABAN 10 MG: 10 TABLET, FILM COATED ORAL at 09:35

## 2023-04-19 RX ADMIN — INSULIN LISPRO 8 UNITS: 100 INJECTION, SOLUTION INTRAVENOUS; SUBCUTANEOUS at 13:47

## 2023-04-19 RX ADMIN — CEFTRIAXONE 1000 MG: 1 INJECTION, POWDER, FOR SOLUTION INTRAMUSCULAR; INTRAVENOUS at 02:04

## 2023-04-19 RX ADMIN — PANTOPRAZOLE SODIUM 40 MG: 40 TABLET, DELAYED RELEASE ORAL at 07:22

## 2023-04-19 RX ADMIN — MORPHINE SULFATE 1 MG: 2 INJECTION, SOLUTION INTRAMUSCULAR; INTRAVENOUS at 09:48

## 2023-04-19 RX ADMIN — PREGABALIN 75 MG: 75 CAPSULE ORAL at 15:25

## 2023-04-19 RX ADMIN — INSULIN LISPRO 8 UNITS: 100 INJECTION, SOLUTION INTRAVENOUS; SUBCUTANEOUS at 18:02

## 2023-04-19 RX ADMIN — INSULIN LISPRO 4 UNITS: 100 INJECTION, SOLUTION INTRAVENOUS; SUBCUTANEOUS at 13:46

## 2023-04-19 RX ADMIN — PREGABALIN 75 MG: 75 CAPSULE ORAL at 09:35

## 2023-04-19 RX ADMIN — MORPHINE SULFATE 1 MG: 2 INJECTION, SOLUTION INTRAMUSCULAR; INTRAVENOUS at 21:29

## 2023-04-19 RX ADMIN — PREGABALIN 75 MG: 75 CAPSULE ORAL at 21:25

## 2023-04-19 RX ADMIN — INSULIN LISPRO 8 UNITS: 100 INJECTION, SOLUTION INTRAVENOUS; SUBCUTANEOUS at 09:40

## 2023-04-19 RX ADMIN — INSULIN GLARGINE 10 UNITS: 100 INJECTION, SOLUTION SUBCUTANEOUS at 21:28

## 2023-04-19 RX ADMIN — METHOCARBAMOL TABLETS 750 MG: 500 TABLET, COATED ORAL at 09:34

## 2023-04-19 RX ADMIN — DEXAMETHASONE SODIUM PHOSPHATE 4 MG: 4 INJECTION, SOLUTION INTRAMUSCULAR; INTRAVENOUS at 09:34

## 2023-04-19 RX ADMIN — SODIUM CHLORIDE, PRESERVATIVE FREE 10 ML: 5 INJECTION INTRAVENOUS at 21:30

## 2023-04-19 RX ADMIN — INSULIN LISPRO 12 UNITS: 100 INJECTION, SOLUTION INTRAVENOUS; SUBCUTANEOUS at 17:59

## 2023-04-19 RX ADMIN — MORPHINE SULFATE 1 MG: 2 INJECTION, SOLUTION INTRAMUSCULAR; INTRAVENOUS at 15:26

## 2023-04-19 RX ADMIN — INSULIN LISPRO 4 UNITS: 100 INJECTION, SOLUTION INTRAVENOUS; SUBCUTANEOUS at 09:42

## 2023-04-19 RX ADMIN — MORPHINE SULFATE 1 MG: 2 INJECTION, SOLUTION INTRAMUSCULAR; INTRAVENOUS at 02:03

## 2023-04-19 RX ADMIN — DEXAMETHASONE SODIUM PHOSPHATE 4 MG: 4 INJECTION, SOLUTION INTRAMUSCULAR; INTRAVENOUS at 21:29

## 2023-04-19 RX ADMIN — CELECOXIB 200 MG: 100 CAPSULE ORAL at 09:34

## 2023-04-19 RX ADMIN — INSULIN LISPRO 4 UNITS: 100 INJECTION, SOLUTION INTRAVENOUS; SUBCUTANEOUS at 21:26

## 2023-04-19 RX ADMIN — INSULIN GLARGINE 15 UNITS: 100 INJECTION, SOLUTION SUBCUTANEOUS at 09:42

## 2023-04-19 RX ADMIN — SODIUM CHLORIDE, PRESERVATIVE FREE 10 ML: 5 INJECTION INTRAVENOUS at 09:39

## 2023-04-19 ASSESSMENT — PAIN SCALES - GENERAL
PAINLEVEL_OUTOF10: 0
PAINLEVEL_OUTOF10: 0
PAINLEVEL_OUTOF10: 10
PAINLEVEL_OUTOF10: 10
PAINLEVEL_OUTOF10: 8
PAINLEVEL_OUTOF10: 8

## 2023-04-19 ASSESSMENT — PAIN DESCRIPTION - DESCRIPTORS
DESCRIPTORS: THROBBING
DESCRIPTORS: ACHING;THROBBING

## 2023-04-19 ASSESSMENT — PAIN DESCRIPTION - LOCATION
LOCATION: ABDOMEN
LOCATION: GROIN
LOCATION: HIP
LOCATION: HIP;ABDOMEN

## 2023-04-19 ASSESSMENT — PAIN DESCRIPTION - ORIENTATION
ORIENTATION: LEFT
ORIENTATION: LEFT

## 2023-04-20 ENCOUNTER — APPOINTMENT (OUTPATIENT)
Facility: HOSPITAL | Age: 53
DRG: 347 | End: 2023-04-20
Payer: COMMERCIAL

## 2023-04-20 VITALS
HEIGHT: 62 IN | BODY MASS INDEX: 26.5 KG/M2 | OXYGEN SATURATION: 96 % | HEART RATE: 95 BPM | SYSTOLIC BLOOD PRESSURE: 121 MMHG | RESPIRATION RATE: 18 BRPM | WEIGHT: 144 LBS | DIASTOLIC BLOOD PRESSURE: 68 MMHG | TEMPERATURE: 98.3 F

## 2023-04-20 LAB
ALBUMIN SERPL-MCNC: 2.6 G/DL (ref 3.4–5)
ALBUMIN/GLOB SERPL: 0.6 (ref 0.8–1.7)
ALP SERPL-CCNC: 177 U/L (ref 45–117)
ALT SERPL-CCNC: 26 U/L (ref 13–56)
AST SERPL-CCNC: 13 U/L (ref 10–38)
BILIRUB DIRECT SERPL-MCNC: <0.1 MG/DL (ref 0–0.2)
BILIRUB SERPL-MCNC: 0.1 MG/DL (ref 0.2–1)
EKG ATRIAL RATE: 104 BPM
EKG DIAGNOSIS: NORMAL
EKG P AXIS: 59 DEGREES
EKG P-R INTERVAL: 144 MS
EKG Q-T INTERVAL: 336 MS
EKG QRS DURATION: 72 MS
EKG QTC CALCULATION (BAZETT): 441 MS
EKG R AXIS: -25 DEGREES
EKG T AXIS: 76 DEGREES
EKG VENTRICULAR RATE: 104 BPM
GLOBULIN SER CALC-MCNC: 4.3 G/DL (ref 2–4)
GLUCOSE BLD STRIP.AUTO-MCNC: 222 MG/DL (ref 70–110)
GLUCOSE BLD STRIP.AUTO-MCNC: 230 MG/DL (ref 70–110)
GLUCOSE BLD STRIP.AUTO-MCNC: 246 MG/DL (ref 70–110)
PROT SERPL-MCNC: 6.9 G/DL (ref 6.4–8.2)

## 2023-04-20 PROCEDURE — 6360000002 HC RX W HCPCS: Performed by: INTERNAL MEDICINE

## 2023-04-20 PROCEDURE — 2580000003 HC RX 258: Performed by: INTERNAL MEDICINE

## 2023-04-20 PROCEDURE — 82962 GLUCOSE BLOOD TEST: CPT

## 2023-04-20 PROCEDURE — 36415 COLL VENOUS BLD VENIPUNCTURE: CPT

## 2023-04-20 PROCEDURE — 6370000000 HC RX 637 (ALT 250 FOR IP): Performed by: INTERNAL MEDICINE

## 2023-04-20 PROCEDURE — 6370000000 HC RX 637 (ALT 250 FOR IP): Performed by: HOSPITALIST

## 2023-04-20 PROCEDURE — 80076 HEPATIC FUNCTION PANEL: CPT

## 2023-04-20 PROCEDURE — 6360000002 HC RX W HCPCS: Performed by: STUDENT IN AN ORGANIZED HEALTH CARE EDUCATION/TRAINING PROGRAM

## 2023-04-20 PROCEDURE — 2500000003 HC RX 250 WO HCPCS: Performed by: STUDENT IN AN ORGANIZED HEALTH CARE EDUCATION/TRAINING PROGRAM

## 2023-04-20 PROCEDURE — 6360000002 HC RX W HCPCS: Performed by: HOSPITALIST

## 2023-04-20 PROCEDURE — 76937 US GUIDE VASCULAR ACCESS: CPT

## 2023-04-20 RX ORDER — MORPHINE SULFATE 15 MG/1
15 TABLET ORAL EVERY 8 HOURS PRN
Qty: 15 TABLET | Refills: 0 | Status: SHIPPED | OUTPATIENT
Start: 2023-04-20 | End: 2023-04-25

## 2023-04-20 RX ORDER — HEPARIN SODIUM (PORCINE) LOCK FLUSH IV SOLN 100 UNIT/ML 100 UNIT/ML
500 SOLUTION INTRAVENOUS EVERY 8 HOURS PRN
Status: DISCONTINUED | OUTPATIENT
Start: 2023-04-20 | End: 2023-04-20 | Stop reason: HOSPADM

## 2023-04-20 RX ORDER — DEXAMETHASONE 4 MG/1
TABLET ORAL
Qty: 12 TABLET | Refills: 0 | Status: SHIPPED | OUTPATIENT
Start: 2023-04-20

## 2023-04-20 RX ORDER — HEPARIN SODIUM 200 [USP'U]/100ML
1000 INJECTION, SOLUTION INTRAVENOUS ONCE
Status: COMPLETED | OUTPATIENT
Start: 2023-04-20 | End: 2023-04-20

## 2023-04-20 RX ORDER — NALOXONE HYDROCHLORIDE 4 MG/.1ML
1 SPRAY NASAL PRN
Qty: 1 EACH | Refills: 1 | Status: SHIPPED | OUTPATIENT
Start: 2023-04-20

## 2023-04-20 RX ORDER — LIDOCAINE HYDROCHLORIDE 10 MG/ML
20 INJECTION, SOLUTION INFILTRATION; PERINEURAL ONCE
Status: COMPLETED | OUTPATIENT
Start: 2023-04-20 | End: 2023-04-20

## 2023-04-20 RX ADMIN — MORPHINE SULFATE 1 MG: 2 INJECTION, SOLUTION INTRAMUSCULAR; INTRAVENOUS at 12:47

## 2023-04-20 RX ADMIN — DEXAMETHASONE SODIUM PHOSPHATE 4 MG: 4 INJECTION, SOLUTION INTRAMUSCULAR; INTRAVENOUS at 08:40

## 2023-04-20 RX ADMIN — MORPHINE SULFATE 1 MG: 2 INJECTION, SOLUTION INTRAMUSCULAR; INTRAVENOUS at 18:06

## 2023-04-20 RX ADMIN — RIVAROXABAN 10 MG: 10 TABLET, FILM COATED ORAL at 08:39

## 2023-04-20 RX ADMIN — ERTAPENEM SODIUM 1000 MG: 1 INJECTION, POWDER, LYOPHILIZED, FOR SOLUTION INTRAMUSCULAR; INTRAVENOUS at 10:31

## 2023-04-20 RX ADMIN — PREGABALIN 75 MG: 75 CAPSULE ORAL at 08:39

## 2023-04-20 RX ADMIN — INSULIN LISPRO 4 UNITS: 100 INJECTION, SOLUTION INTRAVENOUS; SUBCUTANEOUS at 08:37

## 2023-04-20 RX ADMIN — PANTOPRAZOLE SODIUM 40 MG: 40 TABLET, DELAYED RELEASE ORAL at 06:12

## 2023-04-20 RX ADMIN — HEPARIN SODIUM (PORCINE) LOCK FLUSH IV SOLN 100 UNIT/ML 500 UNITS: 100 SOLUTION at 11:28

## 2023-04-20 RX ADMIN — INSULIN LISPRO 4 UNITS: 100 INJECTION, SOLUTION INTRAVENOUS; SUBCUTANEOUS at 13:22

## 2023-04-20 RX ADMIN — MORPHINE SULFATE 1 MG: 2 INJECTION, SOLUTION INTRAMUSCULAR; INTRAVENOUS at 08:40

## 2023-04-20 RX ADMIN — INSULIN GLARGINE 15 UNITS: 100 INJECTION, SOLUTION SUBCUTANEOUS at 08:36

## 2023-04-20 RX ADMIN — MORPHINE SULFATE 1 MG: 2 INJECTION, SOLUTION INTRAMUSCULAR; INTRAVENOUS at 04:15

## 2023-04-20 RX ADMIN — INSULIN LISPRO 8 UNITS: 100 INJECTION, SOLUTION INTRAVENOUS; SUBCUTANEOUS at 13:21

## 2023-04-20 RX ADMIN — LIDOCAINE HYDROCHLORIDE 3 ML: 10 INJECTION, SOLUTION INFILTRATION; PERINEURAL at 11:28

## 2023-04-20 RX ADMIN — CELECOXIB 200 MG: 100 CAPSULE ORAL at 08:39

## 2023-04-20 RX ADMIN — INSULIN LISPRO 8 UNITS: 100 INJECTION, SOLUTION INTRAVENOUS; SUBCUTANEOUS at 08:37

## 2023-04-20 RX ADMIN — CEFTRIAXONE 1000 MG: 1 INJECTION, POWDER, FOR SOLUTION INTRAMUSCULAR; INTRAVENOUS at 04:07

## 2023-04-20 RX ADMIN — METHOCARBAMOL TABLETS 750 MG: 500 TABLET, COATED ORAL at 08:39

## 2023-04-20 RX ADMIN — SODIUM CHLORIDE, PRESERVATIVE FREE 10 ML: 5 INJECTION INTRAVENOUS at 08:42

## 2023-04-20 RX ADMIN — INSULIN LISPRO 4 UNITS: 100 INJECTION, SOLUTION INTRAVENOUS; SUBCUTANEOUS at 18:08

## 2023-04-20 RX ADMIN — INSULIN LISPRO 8 UNITS: 100 INJECTION, SOLUTION INTRAVENOUS; SUBCUTANEOUS at 18:07

## 2023-04-20 RX ADMIN — HEPARIN SODIUM 1000 UNITS: 200 INJECTION, SOLUTION INTRAVENOUS at 11:28

## 2023-04-20 RX ADMIN — PREGABALIN 75 MG: 75 CAPSULE ORAL at 18:06

## 2023-04-20 ASSESSMENT — PAIN DESCRIPTION - ORIENTATION
ORIENTATION: RIGHT
ORIENTATION: LEFT
ORIENTATION: RIGHT

## 2023-04-20 ASSESSMENT — PAIN DESCRIPTION - LOCATION
LOCATION: ARM
LOCATION: HIP;ABDOMEN
LOCATION: ABDOMEN
LOCATION: HIP
LOCATION: HIP

## 2023-04-20 ASSESSMENT — PAIN SCALES - GENERAL
PAINLEVEL_OUTOF10: 10
PAINLEVEL_OUTOF10: 9
PAINLEVEL_OUTOF10: 8
PAINLEVEL_OUTOF10: 10
PAINLEVEL_OUTOF10: 0
PAINLEVEL_OUTOF10: 10

## 2023-04-20 ASSESSMENT — PAIN DESCRIPTION - DESCRIPTORS
DESCRIPTORS: ACHING
DESCRIPTORS: ACHING
DESCRIPTORS: ACHING;STABBING
DESCRIPTORS: ACHING

## 2023-04-20 NOTE — PROGRESS NOTES
conducted an initial consultation and spiritual assessment for Verito Elise, who is a 47 y.o.,female. The reason the Patient came to the hospital is:   Patient Active Problem List    Diagnosis Date Noted    Weakness of left leg 04/17/2023    UTI (urinary tract infection) 04/17/2023    DDD (degenerative disc disease), cervical 09/09/2022    Acute deep vein thrombosis (DVT) of right lower extremity ([address] 11/11/2021    Acute deep vein thrombosis (DVT) of popliteal vein of left lower extremity ([address] 11/04/2021    Bilateral leg edema 11/04/2021    Dental infection 09/11/2021    Frequent patient in emergency department 09/07/2021    Painful swelling of joint 08/15/2021    Pain, dental 08/15/2021    Family history of early CAD 07/25/2020    Acquired absence of both breasts 06/07/2018    Type 2 diabetes with nephropathy ([address] 05/22/2018    Vitamin D deficiency 05/01/2018    Malignant neoplasm of upper-outer quadrant of right female breast ([address] 01/25/2017    Peptic ulcer disease 07/28/2016    Chronic low back pain 07/28/2016    Bipolar affective disorder ([address] 07/28/2016    Leukopenia due to antineoplastic chemotherapy ([address] 04/03/2016    Cellulitis of female breast 02/23/2016    History of bilateral mastectomy 11/13/2015    Depression 04/28/2015    Secondary diabetes mellitus ([address] 03/25/2015    Gastroparesis due to DM ([address] 03/25/2015    Hypercholesterolemia 12/01/2014    Essential hypertension 12/01/2014    Other chronic pain 12/26/2013      had a Call-Back from the [facility] who is the Manager of [address] to go visit this patient who was   in emotional distress over what happened in her family after the death of her father.  will visit her again  Tomorrow to see how she is coping. Initiated a relationship of care and support. Explored issues of robert, belief, spirituality and Gnosticist/ritual needs. Listened empathically.   Provided information about Spiritual Care
Assumed patient care. Patient resting quietly on bed. RR above 13. Bed at low position wheels locked  and call light within reach.
Bedside and Verbal shift change report given to Balbir Kahn RN (oncoming nurse) by Lisa العلي RN   (offgoing nurse). Report included the following information Nurse Handoff Report, Intake/Output, MAR, and Recent Results.
CT pelvis complete  Reconstructed images og bilateral hips performed.  4992
Case Management Assessment  Initial Evaluation    Date/Time of Evaluation: 4/18/2023 11:25 AM  Assessment Completed by: Eunice Perez    If patient is discharged prior to next notation, then this note serves as note for discharge by case management. Patient Name: Corbin Gallegos                   YOB: 1970  Diagnosis: Weakness of left leg [R29.898]                   Date / Time: 4/16/2023  7:26 PM    Patient Admission Status: Inpatient   Readmission Risk (Low < 19, Mod (19-27), High > 27): Readmission Risk Score: 13.2    Current PCP: Buffy Stephenson MD  PCP verified by CM? Chart Reviewed: Yes      History Provided by: Patient  Patient Orientation: Alert and Oriented    Patient Cognition: Alert    Hospitalization in the last 30 days (Readmission):  No    If yes, Readmission Assessment in  Navigator will be completed. Advance Directives:      Code Status: Full Code   Patient's Primary Decision Maker is:        Discharge Planning:    Patient lives with: Spouse/Significant Other Type of Home: Trailer/Mobile Home  Primary Care Giver: Spouse  Patient Support Systems include: Spouse/Significant Other   Current Financial resources:    Current community resources:    Current services prior to admission: Durable Medical Equipment            Current DME:              Type of Home Care services:  None    ADLS  Prior functional level: Assistance with the following:  Current functional level: Assistance with the following:    PT AM-PAC:   /24  OT AM-PAC:   /24    Family can provide assistance at DC: Other (comment) (Limited famiy support)  Would you like Case Management to discuss the discharge plan with any other family members/significant others, and if so, who?     Plans to Return to Present Housing: Yes  Other Identified Issues/Barriers to RETURNING to current housing: clinical progression   Potential Assistance needed at discharge: N/A            Potential DME:    Patient expects to
Consult received, will see patient tomorrow. Thanks  Pily Lobo MD  Office #:     [phone]  [id number] #[id number]   Office Fax: [phone]
D/C Plan: [facility] for IVABX    CM has been notified by provider that pt will need IVABX at discharge. Pt is aware and is covered at 100%. Pt will have to have her daily dose of IVABX before she is discharged. Pt's  will transport pt home today after he gets off work. No other care transition needs have been identified. CM remains available to assist as needed.
Hospitalist Progress Note    Patient: Alcide Cogan MRN: [id number]  [facility]: [id number]    YOB: 1970  Age: 47 y.o. Sex: female    DOA: 4/16/2023 LOS:  LOS: 0 days          Admitted this am    1. Intractable back pain  decadron  Orthopedics recs PT/supportive care  PT OT     2. Left leg weakness  Resume neuropathic medication  MRI brain unremarkable     3. UTI  Check urine cultures  IV Rocephin    4. Diabetes  Restart Lantus  High-dose insulin sliding scale due to steroids     5. Chronic pain  Morphine IV  Oxycodone p.o. Urine drug screen     6.   History of cocaine use  UDS done    Continue plan as outlined on admission
IV to PO Conversion     Patient: Hiram Kamara   MRN#: [id number]   Admission Date: [id number]     IV TO PO  Medication(s) Pantoprazole    Oral Meds                       Yes   Diet:                  Regular diet    TEMP 98.3 °F (36.8 °C) (Oral)   WBC Lab Results   Component Value Date/Time    WBC 7.9 04/16/2023 07:40 PM           Pharmacy Dosing Services:  Summary:   Does the patient meet all criteria for IV to PO switch as listed below? yes   Is the patient excluded from IV to PO automatic switch based on criteria listed below? no    Criteria for IV to PO switch - Nonantibiotics   Functioning GI tract   Taking scheduled oral medications  Tolerating diet more advanced than clear liquids  2. No signs/symptoms of shock  No vasopressor support        3. No seizures for >72 hours (antiepileptic medications only)    Exclusion Criteria   Patient is being treated for an infection that requires IV therapy such as: endocarditis, osteomyelitis, meningitis, pancreatitis (antibiotics only)   NG tube with continous suction   Nausea and/or vomiting or severe diarrhea within past 24 hours  Malabsorption syndromes, partial or total gastrectomy, ileus, gastric outlet or bowel obstruction  Active GI bleeding   Significant painful oral ulceration  Unable to swallow  On total parenteral nutrition with a NPO order  NPO  Febrile in last 24 hours (antibiotics only)  Clinically deteriorating or unstable (antibiotics only)      Assessment:    Pantoprazole 40 mg IV daily was changed to Pantoprazole 40 mg PO daily per protocol     This IV to PO conversion is based on the [facility] [employer] approved automatic conversion policy for eligible patients.       Nhi Jessica, [address] [place] [address], [facility]   Clinical Pharmacist  [phone]
Patient complained of left hand swelling and bruising. Said she hit it when she had a fall at home and the swelling has been going on for 2 days. Will continue to monitor.
Patient does not want dilaudid for pain meds. Had history of addiction to said medication. Prefers morphine. Discontinued dilaudid per hospitalist's verbal order.
Patient still for urine culture and drug screen. Purewick in place. Still for MRI. MRI Form done.
Physical Therapy Goals:  Initiated 4/19/2023 to be met within 7-10 days. Short Term Goals  Time Frame for Short Term Goals: 7 days  Short Term Goal 1: Patient will perform supine to/from sit with supervision  Short Term Goal 2: Patient will perform sit to/stand with supervision  Short Term Goal 3: Patient will ambulate 150 ft with RW and supervision  Short Term Goal 4: Patient will negotiate 4 stairs with handrails and SBA    [x]  Patient has met MD terell mcdowell for d/c home   []  Recommend  with 24 hour adult care   []  Benefit from additional acute PT session to address:        PHYSICAL THERAPY TREATMENT    Patient: Dontae Garcia (79 y.o. female)  Date: 4/19/2023  Diagnosis: Weakness of left leg [R29.898] Weakness of left leg    Precautions: Fall Risk,  PLOF: ambulatory with RW    ASSESSMENT:  Assessment  Assessment: Pt in bed upon arrival.  No assistance needed to sit up EOB or for sit to stand. Ambulated 120ft with RW, steady gait, no LOB or path deviations. Negotiated 4 steps holding (B) hand rails. Returned to room and left sitting EOB. CNA arrived for vitals. Activity Tolerance: Patient tolerated treatment well  Discharge Recommendations: Home with Home health PT    Progression toward goals:   [x]      Improving appropriately and progressing toward goals  []      Improving slowly and progressing toward goals  []      Not making progress toward goals and plan of care will be adjusted     PLAN:  Patient continues to benefit from skilled intervention to address the above impairments. Continue treatment per established plan of care. Further Equipment Recommendations for Discharge:RW  Discharge Recommendation: Discharge Recommendations: Home with Home health PT    AMPAC: 20/24    This AMPAC score should be considered in conjunction with interdisciplinary team recommendations to determine the most appropriate discharge setting.  Patient's social support, diagnosis, medical stability, and
Pt dose not take flexril per  RN reported.
Received telephone call from [employer] for Urine culture results positive for ESBL. Spoke to [facility]. Dr Allison Staley paged. Order for contact isolations entered.
Reviewed home medications with patient. Patient taking celebrex recently but unable to give exact dosage.  will provide dosage later today.
TRANSFER - IN REPORT:    Verbal report received from 1220 Jeromy Wolfe RN on Emilie Cancel  being received from ED for routine progression of patient care      Report consisted of patient's Situation, Background, Assessment and   Recommendations(SBAR). Information from the following report(s) Nurse Handoff Report, Index, ED Encounter Summary, ED SBAR, Intake/Output, MAR, Recent Results, and Med Rec Status was reviewed with the receiving nurse. Opportunity for questions and clarification was provided. Assessment completed upon patient's arrival to unit and care assumed.
intervention to address the above impairments. Patient's rehabilitation potential is considered to be Prognosis: Good. Factors which may influence rehabilitation potential include:   []             None noted  []             Mental ability/status  []             Medical condition  [x]             Home/family situation and support systems  [x]             Safety awareness  [x]             Pain tolerance/management  []             Other:      PLAN :  Recommendations and Planned Interventions:   Strengthening;ROM;Balance training;Functional mobility training; Endurance training; Safety education & training;Neuromuscular re-education;Stair training;Patient/Caregiver education & training;Equipment evaluation, education, & procurement;Positioning;Self-Care / ADL; Coordination training    Frequency/Duration: Patient will be followed by occupational therapy to address goals, 1-2 times per day/3-5 days per week to address goals. Further Equipment Recommendations for Discharge:  ,  [facility] wheeled walker     Discharge Recommendation: Discharge Recommendations: Home with Home health OT    AMPAC: 19    This AMPAC score should be considered in conjunction with interdisciplinary team recommendations to determine the most appropriate discharge setting. Patient's social support, diagnosis, medical stability, and prior level of function should also be taken into consideration.      SUBJECTIVE:   Patient stated My hips hurts but ill try  .    OBJECTIVE DATA SUMMARY:     Past Medical History:   Diagnosis Date    Anxiety     Bipolar disorder ([facility]     Breast cancer ([facility]     breast cancer, right, S/P Mastectomy and chemo, radiation in 2013    Chronic pain     cervical    Depression     Diabetes ([facility]     Drug abuse ([facility]     H/O cocaine use in past    Drug-seeking behavior     Family history of early CAD     Gastrointestinal disorder     cholitis    H/O ETOH abuse     History of chemotherapy 2013    History of radiation therapy 2013
need if he/she tried?)   Total (Total A or Dep)   A Lot  (Mod to Max A)   A Little (Sup or Min A)   None (Mod I to I)   Turning from your back to your side while in a flat bed without using bedrails? [] 1 [] 2 [] 3 [x] 4   2. Moving from lying on your back to sitting on the side of a flat bed without using bedrails? [] 1 [] 2 [] 3 [x] 4   3. Moving to and from a bed to a chair (including a wheelchair)? [] 1 [] 2 [x] 3 [] 4   4. Standing up from a chair using your arms (e.g., wheelchair, or bedside chair)? [] 1 [] 2 [] 3 [x] 4   5. Walking in hospital room? [] 1 [] 2 [x] 3 [] 4   6. Climbing 3-5 steps with a railing?+   [] 1 [] 2 [] 3 [] 4   +If stair climbing cannot be assessed, skip item #6. Sum responses from items 1-5. Current research shows that an AM-PAC score of 18 (14 without stairs) or greater is associated with a discharge to the patient's home setting. Based on an AM-PAC score of /24 (or 18/20 if omitting stairs) and their current functional mobility deficits, it is recommended that the patient have 2-3 sessions per week of Physical Therapy at d/c to increase the patient's independence.
EXAMINATION:  MRI BRAIN WO CONTRAST COMPARISON:  CT 9/7/2021 TECHNIQUE:  Multiplanar multisequence acquisition without contrast of the brain. FINDINGS:  Ventricles:  Midline, no hydrocephalus. Brain Parenchyma/Brainstem:  Mild chronic T2 hyperintensities in the supratentorial white matter. No acute infarction. Intracranial Hemorrhage:  None. Basal Cisterns:  Normal. Flow Voids:  Normal. Additional Comments:  N/A. Mild chronic supratentorial white matter signal abnormalities with no acute infarction. Vascular duplex lower extremity venous left    Result Date: 4/16/2023    No evidence of deep vein thrombosis in the left lower extremity. For comparison purposes, the right common femoral vein was briefly interrogated. The vein demonstrates normal color filling and compressibility. Doppler flow was phasic and spontaneous.        Sarah Alicea MD
hyperintensities in the supratentorial white matter. No acute infarction. Intracranial Hemorrhage:  None. Basal Cisterns:  Normal. Flow Voids:  Normal. Additional Comments:  N/A. Mild chronic supratentorial white matter signal abnormalities with no acute infarction. Vascular duplex lower extremity venous left    Result Date: 4/16/2023    No evidence of deep vein thrombosis in the left lower extremity. For comparison purposes, the right common femoral vein was briefly interrogated. The vein demonstrates normal color filling and compressibility. Doppler flow was phasic and spontaneous.        Faustino Sims MD
Walking in hospital room? [] 1 [] 2 [x] 3 [] 4   6. Climbing 3-5 steps with a railing?+   [] 1 [] 2 [] 3 [] 4   +If stair climbing cannot be assessed, skip item #6. Sum responses from items 1-5.

## 2023-04-20 NOTE — CONSULTS
Vitamin D deficiency 05/01/2018       Past Surgical History:   Procedure Laterality Date    BREAST LUMPECTOMY  06/2013    right    BREAST RECONSTRUCTION Bilateral 2018    tissue expanders insertion    BREAST RECONSTRUCTION Bilateral 2018    tissue expanders removed, no implants    COLONOSCOPY N/A 07/18/2016    COLONOSCOPY performed by Maria Sandoval MD at [address] [place] [address] ([facility])  2013    LUMBAR FUSION  2020    MASTECTOMY Bilateral 2013    SALPINGO-OOPHORECTOMY Bilateral 2013    TONSILLECTOMY      TUBAL LIGATION  1998    VASCULAR SURGERY      medi port removed    VASCULAR SURGERY  2013    medi port       Family History   Problem Relation Age of Onset    Heart Disease Mother     Diabetes Other     Hypertension Other     Cancer Maternal Grandmother     Stroke Father     Heart Disease Father 48       Social History     Socioeconomic History    Marital status: Single   Tobacco Use    Smoking status: Never    Smokeless tobacco: Never   Substance and Sexual Activity    Alcohol use: Not Currently     Alcohol/week: 2.0 standard drinks    Drug use: Not Currently     Types: Cocaine, Methamphetamines (Crystal Meth)   Social History Narrative    ** Merged History Encounter **          Prior to Admission medications    Medication Sig Start Date End Date Taking?  Authorizing Provider   rivaroxaban (XARELTO) 10 MG TABS tablet Take 1 tablet by mouth daily   Yes Historical Provider, MD   methocarbamol (ROBAXIN) 750 MG tablet Take 1 tablet by mouth daily   Yes Historical Provider, MD   cyclobenzaprine (FLEXERIL) 10 MG tablet Take 1 tablet by mouth daily   Yes Historical Provider, MD   celecoxib (CELEBREX) 200 MG capsule Take 1 capsule by mouth daily With food   Yes Historical Provider, MD   Lancets MISC Use daily to monitor blood glucose  Patient not taking: Reported on 4/17/2023 1/11/19   Ar Automatic Reconciliation   insulin glargine (LANTUS SOLOSTAR) 100 UNIT/ML injection pen Inject 35 Units into
medi port     Family History   Problem Relation Age of Onset    Heart Disease Mother     Diabetes Other     Hypertension Other     Cancer Maternal Grandmother     Stroke Father     Heart Disease Father 48     Social History     Socioeconomic History    Marital status: Single     Spouse name: Not on file    Number of children: Not on file    Years of education: Not on file    Highest education level: Not on file   Occupational History    Not on file   Tobacco Use    Smoking status: Never    Smokeless tobacco: Never   Substance and Sexual Activity    Alcohol use: Not Currently     Alcohol/week: 2.0 standard drinks    Drug use: Not Currently     Types: Cocaine, Methamphetamines (Crystal Meth)    Sexual activity: Not on file   Other Topics Concern    Not on file   Social History Narrative    ** Merged History Encounter **        Social Determinants of Health     Financial Resource Strain: Not on file   Food Insecurity: Not on file   Transportation Needs: Not on file   Physical Activity: Not on file   Stress: Not on file   Social Connections: Not on file   Intimate Partner Violence: Not on file   Housing Stability: Not on file       Medications:  Current Facility-Administered Medications   Medication Dose Route Frequency Provider Last Rate Last Admin    dexamethasone (DECADRON) injection 4 mg  4 mg IntraVENous Q12H Daniela Gomez MD   4 mg at 04/19/23 2129    morphine (PF) injection 1 mg  1 mg IntraVENous Q4H PRN Daniela Gomez MD   1 mg at 04/20/23 0415    oxyCODONE (ROXICODONE) immediate release tablet 5 mg  5 mg Oral Q4H PRN Daniela Gomez MD        sodium chloride flush 0.9 % injection 5-40 mL  5-40 mL IntraVENous 2 times per day Divina Cooley MD   10 mL at 04/19/23 2130    sodium chloride flush 0.9 % injection 5-40 mL  5-40 mL IntraVENous PRN Divina Cooley MD   10 mL at 04/18/23 0106    0.9 % sodium chloride infusion   IntraVENous PRN Divina Cooley MD        ondansetron (ZOFRAN-ODT) disintegrating tablet 4 mg  4

## 2023-04-20 NOTE — DISCHARGE SUMMARY
[facility] SUMMARY    Name:  Hazel Ruano  MR#:   [id number]  :  1970  ACCOUNT #:  [de-identified]  ADMIT DATE:  2023  DISCHARGE DATE:  2023      Her PCP is Dr. Chau Sheppard. The patient is discharging with home nursing and home physical therapy. Her followup is with Dr. Hansel Mancera on the  at 10:20 a.m. and the patient is going to follow up with Dr. Fleming Gravely in 2 weeks as well. DISCHARGE DIAGNOSES:  1. Left leg pain. 2.  Back pain. 3.  Previous back surgery. 4.  Urinary tract infection. 5.  Diabetes. 6.  Chronic low back pain. HOSPITAL SUMMARY:  This 58-year-old female came in with intractable back pain and weakness. She was started on Decadron. She was seen by Orthopedics. They recommended PT and supportive care. She has been working with Physical Therapy and Occupational Therapy. She had an MRI of the brain which is unremarkable. She has been treated for an ESBL infection of her urinary tract. ID has recommended 7 days of Daren Cummings as an outpatient and recommended a PICC line be placed for that. She has a history of cocaine use, none recently. She has worked with physical therapy here. She was ready for discharge yesterday, but had to wait on recommendations from Infectious Disease and also to get outpatient arrangements as well which has been done for today. The patient has H and H of 9.6 and 31.6. White count was normal on admission. Platelet 746,766. Chemistry shows glucose levels between 100 and 300 since admission. Of course she has had intravenous steroids which have increased the blood sugar level. Her metabolic panel otherwise is normal on admission. She had an MRI of her lumbar spine that was reviewed by Orthopedics. There is evidence for L4-S1 fusion with junctional degenerative changes and changes above the fusion at L3-L4. Again recommendation was for conservative management and continue physical therapy.   The patient has

## 2023-04-24 LAB
EKG ATRIAL RATE: 104 BPM
EKG DIAGNOSIS: NORMAL
EKG P AXIS: 59 DEGREES
EKG P-R INTERVAL: 144 MS
EKG Q-T INTERVAL: 336 MS
EKG QRS DURATION: 72 MS
EKG QTC CALCULATION (BAZETT): 441 MS
EKG R AXIS: -25 DEGREES
EKG T AXIS: 76 DEGREES
EKG VENTRICULAR RATE: 104 BPM

## 2023-05-02 ENCOUNTER — APPOINTMENT (OUTPATIENT)
Facility: HOSPITAL | Age: 53
End: 2023-05-02
Payer: COMMERCIAL

## 2023-05-02 ENCOUNTER — HOSPITAL ENCOUNTER (EMERGENCY)
Facility: HOSPITAL | Age: 53
Discharge: HOME | End: 2023-05-03
Attending: EMERGENCY MEDICINE
Payer: COMMERCIAL

## 2023-05-02 DIAGNOSIS — M54.50 CHRONIC LOW BACK PAIN WITHOUT SCIATICA, UNSPECIFIED BACK PAIN LATERALITY: ICD-10-CM

## 2023-05-02 DIAGNOSIS — G89.29 CHRONIC LOW BACK PAIN WITHOUT SCIATICA, UNSPECIFIED BACK PAIN LATERALITY: ICD-10-CM

## 2023-05-02 DIAGNOSIS — R10.30 LOWER ABDOMINAL PAIN: Primary | ICD-10-CM

## 2023-05-02 LAB
ALBUMIN SERPL-MCNC: 3.1 G/DL (ref 3.4–5)
ALBUMIN/GLOB SERPL: 0.9 (ref 0.8–1.7)
ALP SERPL-CCNC: 229 U/L (ref 45–117)
ALT SERPL-CCNC: 54 U/L (ref 13–56)
ANION GAP SERPL CALC-SCNC: 6 MMOL/L (ref 3–18)
APPEARANCE UR: CLEAR
AST SERPL-CCNC: 34 U/L (ref 10–38)
BACTERIA URNS QL MICRO: ABNORMAL /HPF
BASOPHILS # BLD: 0 K/UL (ref 0–0.1)
BASOPHILS NFR BLD: 0 % (ref 0–2)
BILIRUB SERPL-MCNC: 0.3 MG/DL (ref 0.2–1)
BILIRUB UR QL: NEGATIVE
BUN SERPL-MCNC: 25 MG/DL (ref 7–18)
BUN/CREAT SERPL: 23 (ref 12–20)
CALCIUM SERPL-MCNC: 8 MG/DL (ref 8.5–10.1)
CHLORIDE SERPL-SCNC: 110 MMOL/L (ref 100–111)
CO2 SERPL-SCNC: 24 MMOL/L (ref 21–32)
COLOR UR: YELLOW
CREAT SERPL-MCNC: 1.09 MG/DL (ref 0.6–1.3)
DIFFERENTIAL METHOD BLD: ABNORMAL
EOSINOPHIL # BLD: 0.1 K/UL (ref 0–0.4)
EOSINOPHIL NFR BLD: 1 % (ref 0–5)
ERYTHROCYTE [DISTWIDTH] IN BLOOD BY AUTOMATED COUNT: 23.3 % (ref 11.6–14.5)
FLUAV RNA SPEC QL NAA+PROBE: NOT DETECTED
FLUBV RNA SPEC QL NAA+PROBE: NOT DETECTED
GLOBULIN SER CALC-MCNC: 3.6 G/DL (ref 2–4)
GLUCOSE SERPL-MCNC: 217 MG/DL (ref 74–99)
GLUCOSE UR STRIP.AUTO-MCNC: 100 MG/DL
HCT VFR BLD AUTO: 35.9 % (ref 35–45)
HGB BLD-MCNC: 10.9 G/DL (ref 12–16)
HGB UR QL STRIP: NEGATIVE
IMM GRANULOCYTES # BLD AUTO: 0.1 K/UL (ref 0–0.04)
IMM GRANULOCYTES NFR BLD AUTO: 1 % (ref 0–0.5)
KETONES UR QL STRIP.AUTO: NEGATIVE MG/DL
LACTATE BLD-SCNC: 1.76 MMOL/L (ref 0.4–2)
LACTATE SERPL-SCNC: 1.9 MMOL/L (ref 0.4–2)
LEUKOCYTE ESTERASE UR QL STRIP.AUTO: NEGATIVE
LIPASE SERPL-CCNC: 373 U/L (ref 73–393)
LYMPHOCYTES # BLD: 2.8 K/UL (ref 0.9–3.6)
LYMPHOCYTES NFR BLD: 23 % (ref 21–52)
MCH RBC QN AUTO: 25.8 PG (ref 24–34)
MCHC RBC AUTO-ENTMCNC: 30.4 G/DL (ref 31–37)
MCV RBC AUTO: 85.1 FL (ref 78–100)
MONOCYTES # BLD: 0.6 K/UL (ref 0.05–1.2)
MONOCYTES NFR BLD: 5 % (ref 3–10)
NEUTS SEG # BLD: 8.7 K/UL (ref 1.8–8)
NEUTS SEG NFR BLD: 70 % (ref 40–73)
NITRITE UR QL STRIP.AUTO: NEGATIVE
NRBC # BLD: 0 K/UL (ref 0–0.01)
NRBC BLD-RTO: 0 PER 100 WBC
PH UR STRIP: 7 (ref 5–8)
PLATELET # BLD AUTO: 287 K/UL (ref 135–420)
PLATELET COMMENT: ABNORMAL
PMV BLD AUTO: 9.4 FL (ref 9.2–11.8)
POTASSIUM SERPL-SCNC: 3.9 MMOL/L (ref 3.5–5.5)
PROT SERPL-MCNC: 6.7 G/DL (ref 6.4–8.2)
PROT UR STRIP-MCNC: 300 MG/DL
RBC # BLD AUTO: 4.22 M/UL (ref 4.2–5.3)
RBC #/AREA URNS HPF: ABNORMAL /HPF (ref 0–5)
RBC MORPH BLD: ABNORMAL
RBC MORPH BLD: ABNORMAL
SARS-COV-2 RNA RESP QL NAA+PROBE: NOT DETECTED
SODIUM SERPL-SCNC: 140 MMOL/L (ref 136–145)
SP GR UR REFRACTOMETRY: 1.02 (ref 1–1.03)
TROPONIN I SERPL HS-MCNC: 11 NG/L (ref 0–54)
UROBILINOGEN UR QL STRIP.AUTO: 1 EU/DL (ref 0.2–1)
WBC # BLD AUTO: 12.3 K/UL (ref 4.6–13.2)
WBC URNS QL MICRO: ABNORMAL /HPF (ref 0–5)

## 2023-05-02 PROCEDURE — 85025 COMPLETE CBC W/AUTO DIFF WBC: CPT

## 2023-05-02 PROCEDURE — 87040 BLOOD CULTURE FOR BACTERIA: CPT

## 2023-05-02 PROCEDURE — 83605 ASSAY OF LACTIC ACID: CPT

## 2023-05-02 PROCEDURE — 80053 COMPREHEN METABOLIC PANEL: CPT

## 2023-05-02 PROCEDURE — 99285 EMERGENCY DEPT VISIT HI MDM: CPT

## 2023-05-02 PROCEDURE — 71045 X-RAY EXAM CHEST 1 VIEW: CPT

## 2023-05-02 PROCEDURE — 83690 ASSAY OF LIPASE: CPT

## 2023-05-02 PROCEDURE — 96374 THER/PROPH/DIAG INJ IV PUSH: CPT

## 2023-05-02 PROCEDURE — 2580000003 HC RX 258: Performed by: EMERGENCY MEDICINE

## 2023-05-02 PROCEDURE — 6360000002 HC RX W HCPCS: Performed by: EMERGENCY MEDICINE

## 2023-05-02 PROCEDURE — 81001 URINALYSIS AUTO W/SCOPE: CPT

## 2023-05-02 PROCEDURE — 87636 SARSCOV2 & INF A&B AMP PRB: CPT

## 2023-05-02 PROCEDURE — 93005 ELECTROCARDIOGRAM TRACING: CPT | Performed by: EMERGENCY MEDICINE

## 2023-05-02 PROCEDURE — 84484 ASSAY OF TROPONIN QUANT: CPT

## 2023-05-02 PROCEDURE — 96375 TX/PRO/DX INJ NEW DRUG ADDON: CPT

## 2023-05-02 PROCEDURE — 96376 TX/PRO/DX INJ SAME DRUG ADON: CPT

## 2023-05-02 RX ORDER — MORPHINE SULFATE 2 MG/ML
2 INJECTION, SOLUTION INTRAMUSCULAR; INTRAVENOUS
Status: COMPLETED | OUTPATIENT
Start: 2023-05-02 | End: 2023-05-02

## 2023-05-02 RX ORDER — SODIUM CHLORIDE 9 MG/ML
30 INJECTION, SOLUTION INTRAVENOUS ONCE
Status: COMPLETED | OUTPATIENT
Start: 2023-05-02 | End: 2023-05-02

## 2023-05-02 RX ORDER — ONDANSETRON 2 MG/ML
4 INJECTION INTRAMUSCULAR; INTRAVENOUS
Status: COMPLETED | OUTPATIENT
Start: 2023-05-02 | End: 2023-05-02

## 2023-05-02 RX ADMIN — SODIUM CHLORIDE 30 ML/KG/HR: 9 INJECTION, SOLUTION INTRAVENOUS at 20:26

## 2023-05-02 RX ADMIN — MORPHINE SULFATE 2 MG: 2 INJECTION, SOLUTION INTRAMUSCULAR; INTRAVENOUS at 23:41

## 2023-05-02 RX ADMIN — ONDANSETRON 4 MG: 2 INJECTION INTRAMUSCULAR; INTRAVENOUS at 20:26

## 2023-05-02 RX ADMIN — MORPHINE SULFATE 2 MG: 2 INJECTION, SOLUTION INTRAMUSCULAR; INTRAVENOUS at 20:26

## 2023-05-02 NOTE — ED TRIAGE NOTES
Pt arrived with c.o abd pain, distention and bilat calf pain. Pt reports she was recently admitted for \"UTI and they then sent me home with IV abx and a PICC line\". Pt had PICC line removed 3 days ago. Pt also reports she had a BM yesterday and thought there was blood in her stool.    +vomiting/diarrhea

## 2023-05-03 ENCOUNTER — APPOINTMENT (OUTPATIENT)
Facility: HOSPITAL | Age: 53
End: 2023-05-03
Payer: COMMERCIAL

## 2023-05-03 VITALS
HEIGHT: 62 IN | SYSTOLIC BLOOD PRESSURE: 134 MMHG | DIASTOLIC BLOOD PRESSURE: 73 MMHG | WEIGHT: 141 LBS | RESPIRATION RATE: 21 BRPM | OXYGEN SATURATION: 98 % | BODY MASS INDEX: 25.95 KG/M2 | TEMPERATURE: 98.1 F | HEART RATE: 85 BPM

## 2023-05-03 LAB
EKG ATRIAL RATE: 99 BPM
EKG DIAGNOSIS: NORMAL
EKG P AXIS: 54 DEGREES
EKG P-R INTERVAL: 154 MS
EKG Q-T INTERVAL: 354 MS
EKG QRS DURATION: 74 MS
EKG QTC CALCULATION (BAZETT): 454 MS
EKG R AXIS: -24 DEGREES
EKG T AXIS: 64 DEGREES
EKG VENTRICULAR RATE: 99 BPM

## 2023-05-03 PROCEDURE — 74177 CT ABD & PELVIS W/CONTRAST: CPT

## 2023-05-03 PROCEDURE — 6360000004 HC RX CONTRAST MEDICATION: Performed by: EMERGENCY MEDICINE

## 2023-05-03 RX ADMIN — IOPAMIDOL 100 ML: 612 INJECTION, SOLUTION INTRAVENOUS at 00:47

## 2023-05-03 NOTE — ED PROVIDER NOTES
[facility] EMERGENCY DEPT  EMERGENCY DEPARTMENT ENCOUNTER       Pt Name: Carlos Miller  MRN: [id number]  [place] 1970  Date of evaluation: 5/2/2023  Provider: Josafat García MD   PCP: Josette Dickens MD  Note Started: 8:46 PM 5/2/23     CHIEF COMPLAINT       Chief Complaint   Patient presents with    Abdominal Pain        HISTORY OF PRESENT ILLNESS: 1 or more elements      History From: {AMMKY:[id number]}  History limited by: {History Limitations:86478::\"Nothing\"}     Carlos Miller is a 47 y.o. female who presents ***     Nursing Notes were all reviewed and agreed with or any disagreements were addressed in the HPI. REVIEW OF SYSTEMS      Review of Systems     Positives and Pertinent negatives as per HPI.     PAST HISTORY     Past Medical History:  Past Medical History:   Diagnosis Date    Anxiety     Bipolar disorder ([facility]     Breast cancer ([facility]     breast cancer, right, S/P Mastectomy and chemo, radiation in 2013    Chronic pain     cervical    Depression     Diabetes ([address]     Drug abuse ([address]     H/O cocaine use in past    Drug-seeking behavior     Family history of early CAD     Gastrointestinal disorder     cholitis    H/O ETOH abuse     History of chemotherapy 2013    History of radiation therapy 2013    Hyperlipidemia     Hypertension     Malignant neoplasm without specification of site [facility])     Methamphetamine abuse ([facility]      self reported on 1/3/16    Peripheral neuropathy 2022    Psychiatric disorder     multiple personality disorder    Psychiatric disorder     anxiety    Psychiatric disorder     bipolar     Spine injury     Thromboembolus ([facility] 2022    Vitamin D deficiency 05/01/2018       Past Surgical History:  Past Surgical History:   Procedure Laterality Date    BREAST LUMPECTOMY  06/2013    right    BREAST RECONSTRUCTION Bilateral 2018    tissue expanders insertion    BREAST RECONSTRUCTION Bilateral 2018    tissue expanders removed, no implants    COLONOSCOPY N/A 07/18/2016

## 2023-05-06 LAB
BACTERIA SPEC CULT: NORMAL
BACTERIA SPEC CULT: NORMAL
SERVICE CMNT-IMP: NORMAL
SERVICE CMNT-IMP: NORMAL

## 2023-05-09 ENCOUNTER — HOSPITAL ENCOUNTER (EMERGENCY)
Facility: HOSPITAL | Age: 53
Discharge: HOME | End: 2023-05-09
Attending: EMERGENCY MEDICINE
Payer: COMMERCIAL

## 2023-05-09 ENCOUNTER — APPOINTMENT (OUTPATIENT)
Facility: HOSPITAL | Age: 53
End: 2023-05-09
Payer: COMMERCIAL

## 2023-05-09 VITALS
BODY MASS INDEX: 27.6 KG/M2 | OXYGEN SATURATION: 98 % | TEMPERATURE: 97.3 F | WEIGHT: 150 LBS | RESPIRATION RATE: 18 BRPM | SYSTOLIC BLOOD PRESSURE: 161 MMHG | HEIGHT: 62 IN | HEART RATE: 86 BPM | DIASTOLIC BLOOD PRESSURE: 85 MMHG

## 2023-05-09 DIAGNOSIS — R10.9 ABDOMINAL PAIN, UNSPECIFIED ABDOMINAL LOCATION: ICD-10-CM

## 2023-05-09 DIAGNOSIS — G89.29 CHRONIC LOW BACK PAIN, UNSPECIFIED BACK PAIN LATERALITY, UNSPECIFIED WHETHER SCIATICA PRESENT: ICD-10-CM

## 2023-05-09 DIAGNOSIS — M79.605 LEFT LEG PAIN: Primary | ICD-10-CM

## 2023-05-09 DIAGNOSIS — M48.00 SPINAL STENOSIS, UNSPECIFIED SPINAL REGION: ICD-10-CM

## 2023-05-09 DIAGNOSIS — R30.0 DYSURIA: ICD-10-CM

## 2023-05-09 DIAGNOSIS — M54.50 CHRONIC LOW BACK PAIN, UNSPECIFIED BACK PAIN LATERALITY, UNSPECIFIED WHETHER SCIATICA PRESENT: ICD-10-CM

## 2023-05-09 LAB
APPEARANCE UR: CLEAR
BACTERIA URNS QL MICRO: NEGATIVE /HPF
BILIRUB UR QL: NEGATIVE
COLOR UR: YELLOW
ECHO BSA: 1.73 M2
EPITH CASTS URNS QL MICRO: NORMAL /LPF (ref 0–5)
GLUCOSE UR STRIP.AUTO-MCNC: 100 MG/DL
HGB UR QL STRIP: ABNORMAL
KETONES UR QL STRIP.AUTO: NEGATIVE MG/DL
LEUKOCYTE ESTERASE UR QL STRIP.AUTO: NEGATIVE
NITRITE UR QL STRIP.AUTO: NEGATIVE
PH UR STRIP: 5.5 (ref 5–8)
PROT UR STRIP-MCNC: 300 MG/DL
RBC #/AREA URNS HPF: NORMAL /HPF (ref 0–5)
SP GR UR REFRACTOMETRY: 1.01 (ref 1–1.03)
UROBILINOGEN UR QL STRIP.AUTO: 0.2 EU/DL (ref 0.2–1)
WBC URNS QL MICRO: NORMAL /HPF (ref 0–5)

## 2023-05-09 PROCEDURE — 93971 EXTREMITY STUDY: CPT

## 2023-05-09 PROCEDURE — 6370000000 HC RX 637 (ALT 250 FOR IP): Performed by: NURSE PRACTITIONER

## 2023-05-09 PROCEDURE — 99284 EMERGENCY DEPT VISIT MOD MDM: CPT

## 2023-05-09 PROCEDURE — 81001 URINALYSIS AUTO W/SCOPE: CPT

## 2023-05-09 RX ORDER — DIPHENHYDRAMINE HCL 25 MG
25 CAPSULE ORAL
Status: COMPLETED | OUTPATIENT
Start: 2023-05-09 | End: 2023-05-09

## 2023-05-09 RX ORDER — IBUPROFEN 400 MG/1
800 TABLET ORAL
Status: COMPLETED | OUTPATIENT
Start: 2023-05-09 | End: 2023-05-09

## 2023-05-09 RX ORDER — DIAZEPAM 5 MG/1
5 TABLET ORAL EVERY 6 HOURS PRN
Status: DISCONTINUED | OUTPATIENT
Start: 2023-05-09 | End: 2023-05-10 | Stop reason: HOSPADM

## 2023-05-09 RX ORDER — CYCLOBENZAPRINE HCL 10 MG
10 TABLET ORAL
Status: DISCONTINUED | OUTPATIENT
Start: 2023-05-09 | End: 2023-05-09

## 2023-05-09 RX ADMIN — DIPHENHYDRAMINE HYDROCHLORIDE 25 MG: 25 CAPSULE ORAL at 21:49

## 2023-05-09 RX ADMIN — DIAZEPAM 5 MG: 5 TABLET ORAL at 22:37

## 2023-05-09 RX ADMIN — IBUPROFEN 800 MG: 400 TABLET ORAL at 21:23

## 2023-05-09 RX ADMIN — DIAZEPAM 5 MG: 5 TABLET ORAL at 22:35

## 2023-05-09 ASSESSMENT — PAIN DESCRIPTION - ORIENTATION: ORIENTATION: LEFT

## 2023-05-09 ASSESSMENT — PAIN - FUNCTIONAL ASSESSMENT: PAIN_FUNCTIONAL_ASSESSMENT: 0-10

## 2023-05-09 ASSESSMENT — PAIN DESCRIPTION - LOCATION: LOCATION: LEG

## 2023-05-09 ASSESSMENT — PAIN SCALES - GENERAL: PAINLEVEL_OUTOF10: 10

## 2023-05-10 NOTE — DISCHARGE INSTRUCTIONS
Continue current medications  Follow-up with Ortho tomorrow as scheduled  Follow-up with GI as scheduled  Return to care for new or worsening symptoms to include numbness of groin or bottom, loss of control of bladder or bowels, weakness, fever/chills or other concerning symptoms

## 2023-05-10 NOTE — ED TRIAGE NOTES
Pt reports that left leg pain started over the weekend and has an appt tomorrow to see orthopedics. Pt is ambulatory to triage. Pt took aleve and rub cream on leg around 1830 today.

## 2023-05-10 NOTE — ED NOTES
2144 pt c/o itching after receiving Ibuprofen.  Rash noted to rt arm and chest. Provider notified     Adin Key LPN  02/43/91 [id number]

## 2023-05-10 NOTE — ED PROVIDER NOTES
ibuprofen (ADVIL;MOTRIN) tablet 800 mg (800 mg Oral Given 5/9/23 2123)   diphenhydrAMINE (BENADRYL) capsule 25 mg (25 mg Oral Given 5/9/23 2149)           Records Reviewed (source and summary): Old medical records. Nursing notes. ED COURSE  ED Course as of 05/09/23 2321   Tue May 09, 2023   2100 Discussed case with attending MD (Dr. Ana Shipman) who is in agreement with complete current plan of care to recheck UA and check venous duplex for worsening leg pain without additional investigation into unchanged back pain or abdominal pain at this time. [TC]   2143 Patient reported during initial assessment that she took 400 mg of Advil at home this morning. However after being given p.o. and patient reports mild itching, p.o. Benadryl ordered at this time, will hold Valium. [TC]   2155 Patient assessment: Scattered erythematous papules to arms and trunk noted on exam, lungs clear to auscultation, no oral edema. [TC]      ED Course User Index  [TC] OSWALD Harry - NP       Medial Decision Making:  DDX: Muscle strain, OA, RA, herniated disc, spinal stenosis, sciatica, metastatic cancer, compression fracture, cauda equina, fibromyalgia, UTI, pyelonephritis, interstitial cystitis    47 y.o. female with history of peptic ulcer disease, diabetes, hypercholesterolemia, bipolar, vitamin D deficiency, depression, hypertension, spinal stenosis, lymphadenopathy, sepsis, breast cancer, PE presenting today with ongoing lower back pain (known diagnosis of spinal stenosis, currently in PT, generalized, non traumatic L leg pain, Ortho appointment tomorrow), lower abdominal pain and dysuria (unchanged from last weeks negative ER work-up).  in evaluation of the above differential diagnosis, consideration was given to the following tests and treatments: Did not feel additional work-up is needed at this time for low back pain, abdominal pain due to no change in symptoms and previous negative work-ups, plans of care already in

## 2023-05-17 PROBLEM — N39.0 UTI (URINARY TRACT INFECTION): Status: RESOLVED | Noted: 2023-04-17 | Resolved: 2023-05-17

## 2023-05-22 ENCOUNTER — HOSPITAL ENCOUNTER (EMERGENCY)
Facility: HOSPITAL | Age: 53
Discharge: HOME | End: 2023-05-22
Attending: STUDENT IN AN ORGANIZED HEALTH CARE EDUCATION/TRAINING PROGRAM
Payer: COMMERCIAL

## 2023-05-22 VITALS
HEART RATE: 88 BPM | TEMPERATURE: 97.3 F | BODY MASS INDEX: 27.6 KG/M2 | OXYGEN SATURATION: 98 % | DIASTOLIC BLOOD PRESSURE: 70 MMHG | HEIGHT: 62 IN | RESPIRATION RATE: 18 BRPM | SYSTOLIC BLOOD PRESSURE: 142 MMHG | WEIGHT: 150 LBS

## 2023-05-22 DIAGNOSIS — R60.0 BILATERAL LOWER EXTREMITY EDEMA: Primary | ICD-10-CM

## 2023-05-22 LAB
ANION GAP SERPL CALC-SCNC: 4 MMOL/L (ref 3–18)
BUN SERPL-MCNC: 31 MG/DL (ref 7–18)
BUN/CREAT SERPL: 30 (ref 12–20)
CALCIUM SERPL-MCNC: 8.6 MG/DL (ref 8.5–10.1)
CHLORIDE SERPL-SCNC: 108 MMOL/L (ref 100–111)
CO2 SERPL-SCNC: 26 MMOL/L (ref 21–32)
CREAT SERPL-MCNC: 1.03 MG/DL (ref 0.6–1.3)
GLUCOSE SERPL-MCNC: 124 MG/DL (ref 74–99)
POTASSIUM SERPL-SCNC: 5.6 MMOL/L (ref 3.5–5.5)
SODIUM SERPL-SCNC: 138 MMOL/L (ref 136–145)

## 2023-05-22 PROCEDURE — 99283 EMERGENCY DEPT VISIT LOW MDM: CPT

## 2023-05-22 PROCEDURE — 6370000000 HC RX 637 (ALT 250 FOR IP): Performed by: PHYSICIAN ASSISTANT

## 2023-05-22 PROCEDURE — 80048 BASIC METABOLIC PNL TOTAL CA: CPT

## 2023-05-22 RX ORDER — FUROSEMIDE 20 MG/1
20 TABLET ORAL DAILY
Qty: 7 TABLET | Refills: 0 | Status: SHIPPED | OUTPATIENT
Start: 2023-05-22

## 2023-05-22 RX ORDER — FUROSEMIDE 40 MG/1
20 TABLET ORAL
Status: COMPLETED | OUTPATIENT
Start: 2023-05-22 | End: 2023-05-22

## 2023-05-22 RX ADMIN — FUROSEMIDE 20 MG: 40 TABLET ORAL at 19:50

## 2023-05-22 NOTE — ED TRIAGE NOTES
Pt arrives to ed reporting bilateral leg swelling she noticed worsening. Pt was just discharged from [facility] today and was taking lasix there however they did not give her a prescription.

## 2023-05-22 NOTE — ED PROVIDER NOTES
EMERGENCY DEPARTMENT HISTORY AND PHYSICAL EXAM      Patient Name: Mary Ann Darling  MRN: [id number]  YOB: 1970  Provider: MONTSERRAT Martines  PCP: Carlos Hurst MD   Time/Date of evaluation: 5:21 PM EDT on 5/22/23    History of Presenting Illness     Chief Complaint   Patient presents with    Leg Swelling       History Provided By: Patient     History Jonnathan Casanova):   Mary Ann Darling is a 47 y.o. female with a PMHX of diabetes bilateral leg edema chronic pain, DVT, polysubstance abuse  who presents to the emergency department  by POV C/O bilateral lower extremity pain and swelling. Patient states she was just recently discharged from [facility] behavioral [facility] today. She began having leg swelling bilaterally over the weekend and on Sunday was given a dose of Lasix unsure of the dosage. Patient states that helped with her leg swelling and is requesting a prescription. She denies any chest pain or shortness of breath. She is compliant with her Xarelto.           Past History     Past Medical History:  Past Medical History:   Diagnosis Date    Anxiety     Bipolar disorder ([address]     Breast cancer ([address]     breast cancer, right, S/P Mastectomy and chemo, radiation in 2013    Chronic pain     cervical    Depression     Diabetes ([address]     Drug abuse ([address]     H/O cocaine use in past    Drug-seeking behavior     Family history of early CAD     Gastrointestinal disorder     cholitis    H/O ETOH abuse     History of chemotherapy 2013    History of radiation therapy 2013    Hyperlipidemia     Hypertension     Malignant neoplasm without specification of site [facility])     Methamphetamine abuse ([address]      self reported on 1/3/16    Peripheral neuropathy 2022    Psychiatric disorder     multiple personality disorder    Psychiatric disorder     anxiety    Psychiatric disorder     bipolar     Spine injury     Thromboembolus ([address] 2022    Vitamin D deficiency 05/01/2018       Past Surgical History:  Past Surgical

## 2023-05-29 ENCOUNTER — APPOINTMENT (OUTPATIENT)
Facility: HOSPITAL | Age: 53
End: 2023-05-29
Payer: COMMERCIAL

## 2023-05-29 ENCOUNTER — HOSPITAL ENCOUNTER (EMERGENCY)
Facility: HOSPITAL | Age: 53
Discharge: HOME | End: 2023-05-29
Attending: EMERGENCY MEDICINE
Payer: COMMERCIAL

## 2023-05-29 VITALS
OXYGEN SATURATION: 97 % | TEMPERATURE: 97.2 F | BODY MASS INDEX: 27.6 KG/M2 | SYSTOLIC BLOOD PRESSURE: 147 MMHG | WEIGHT: 150 LBS | DIASTOLIC BLOOD PRESSURE: 83 MMHG | HEIGHT: 62 IN | HEART RATE: 98 BPM | RESPIRATION RATE: 15 BRPM

## 2023-05-29 DIAGNOSIS — M25.562 LEFT KNEE PAIN, UNSPECIFIED CHRONICITY: ICD-10-CM

## 2023-05-29 DIAGNOSIS — K02.9 PAIN DUE TO DENTAL CARIES: Primary | ICD-10-CM

## 2023-05-29 DIAGNOSIS — K04.7 DENTAL INFECTION: ICD-10-CM

## 2023-05-29 PROCEDURE — 99283 EMERGENCY DEPT VISIT LOW MDM: CPT

## 2023-05-29 PROCEDURE — 73562 X-RAY EXAM OF KNEE 3: CPT

## 2023-05-29 PROCEDURE — 6370000000 HC RX 637 (ALT 250 FOR IP): Performed by: PHYSICIAN ASSISTANT

## 2023-05-29 RX ORDER — CLINDAMYCIN HYDROCHLORIDE 150 MG/1
450 CAPSULE ORAL 3 TIMES DAILY
Qty: 63 CAPSULE | Refills: 0 | Status: SHIPPED | OUTPATIENT
Start: 2023-05-29 | End: 2023-06-05

## 2023-05-29 RX ORDER — METHOCARBAMOL 500 MG/1
750 TABLET, FILM COATED ORAL
Status: COMPLETED | OUTPATIENT
Start: 2023-05-29 | End: 2023-05-29

## 2023-05-29 RX ADMIN — METHOCARBAMOL 750 MG: 500 TABLET ORAL at 14:55

## 2023-05-29 ASSESSMENT — PAIN - FUNCTIONAL ASSESSMENT: PAIN_FUNCTIONAL_ASSESSMENT: 0-10

## 2023-05-29 ASSESSMENT — PAIN DESCRIPTION - LOCATION: LOCATION: TEETH

## 2023-05-29 ASSESSMENT — PAIN SCALES - GENERAL: PAINLEVEL_OUTOF10: 10

## 2023-05-29 NOTE — DISCHARGE INSTRUCTIONS
Elevate and ICE the left knee for pain relief, wear ACE wrap for support. Schedule follow-up appointment with dentist and orthopedic specialist as soon as possible. Take antibiotics for your dental infection.

## 2023-05-29 NOTE — ED PROVIDER NOTES
These medications were sent to [address] - [address] [phone]  [facility], [address] [address]      Phone: [phone]   clindamycin 150 MG capsule            I am the Primary Clinician of Record. (Please note that parts of this dictation were completed with voice recognition software. Quite often unanticipated grammatical, syntax, homophones, and other interpretive errors are inadvertently transcribed by the computer software. Please disregards these errors.  Please excuse any errors that have escaped final proofreading.)     Júnior Gudino PA-C  05/30/23 0613

## 2023-05-29 NOTE — ED TRIAGE NOTES
Patient reports she has dental abscess on left side of face.  Had left injection and it hurts ever since

## 2023-06-14 NOTE — ED TRIAGE NOTES
Patient presents with c/o dizziness and post-op pain Azithromycin Counseling:  I discussed with the patient the risks of azithromycin including but not limited to GI upset, allergic reaction, drug rash, diarrhea, and yeast infections.

## 2023-07-05 ENCOUNTER — HOSPITAL ENCOUNTER (EMERGENCY)
Facility: HOSPITAL | Age: 53
Discharge: HOME | End: 2023-07-06
Attending: EMERGENCY MEDICINE
Payer: COMMERCIAL

## 2023-07-05 DIAGNOSIS — Z79.01 ENCOUNTER FOR CURRENT LONG-TERM USE OF ANTICOAGULANTS: ICD-10-CM

## 2023-07-05 DIAGNOSIS — M54.50 ACUTE EXACERBATION OF CHRONIC LOW BACK PAIN: Primary | ICD-10-CM

## 2023-07-05 DIAGNOSIS — G62.9 NEUROPATHY: ICD-10-CM

## 2023-07-05 DIAGNOSIS — G89.29 ACUTE EXACERBATION OF CHRONIC LOW BACK PAIN: Primary | ICD-10-CM

## 2023-07-05 PROCEDURE — 99284 EMERGENCY DEPT VISIT MOD MDM: CPT

## 2023-07-05 PROCEDURE — 96374 THER/PROPH/DIAG INJ IV PUSH: CPT

## 2023-07-05 PROCEDURE — 96375 TX/PRO/DX INJ NEW DRUG ADDON: CPT

## 2023-07-06 VITALS
DIASTOLIC BLOOD PRESSURE: 77 MMHG | TEMPERATURE: 98.7 F | HEART RATE: 91 BPM | BODY MASS INDEX: 27.42 KG/M2 | OXYGEN SATURATION: 100 % | SYSTOLIC BLOOD PRESSURE: 179 MMHG | WEIGHT: 149 LBS | RESPIRATION RATE: 18 BRPM | HEIGHT: 62 IN

## 2023-07-06 LAB
ANION GAP SERPL CALC-SCNC: 4 MMOL/L (ref 3–18)
BASOPHILS # BLD: 0 K/UL (ref 0–0.1)
BASOPHILS NFR BLD: 1 % (ref 0–2)
BUN SERPL-MCNC: 21 MG/DL (ref 7–18)
BUN/CREAT SERPL: 11 (ref 12–20)
CALCIUM SERPL-MCNC: 8.9 MG/DL (ref 8.5–10.1)
CHLORIDE SERPL-SCNC: 103 MMOL/L (ref 100–111)
CK SERPL-CCNC: 20 U/L (ref 26–192)
CO2 SERPL-SCNC: 28 MMOL/L (ref 21–32)
CREAT SERPL-MCNC: 1.85 MG/DL (ref 0.6–1.3)
DIFFERENTIAL METHOD BLD: ABNORMAL
EOSINOPHIL # BLD: 0.2 K/UL (ref 0–0.4)
EOSINOPHIL NFR BLD: 3 % (ref 0–5)
ERYTHROCYTE [DISTWIDTH] IN BLOOD BY AUTOMATED COUNT: 14.6 % (ref 11.6–14.5)
GLUCOSE SERPL-MCNC: 240 MG/DL (ref 74–99)
HCT VFR BLD AUTO: 34.2 % (ref 35–45)
HGB BLD-MCNC: 11.1 G/DL (ref 12–16)
IMM GRANULOCYTES # BLD AUTO: 0 K/UL (ref 0–0.04)
IMM GRANULOCYTES NFR BLD AUTO: 1 % (ref 0–0.5)
LYMPHOCYTES # BLD: 2.3 K/UL (ref 0.9–3.6)
LYMPHOCYTES NFR BLD: 28 % (ref 21–52)
MCH RBC QN AUTO: 27.5 PG (ref 24–34)
MCHC RBC AUTO-ENTMCNC: 32.5 G/DL (ref 31–37)
MCV RBC AUTO: 84.9 FL (ref 78–100)
MONOCYTES # BLD: 0.5 K/UL (ref 0.05–1.2)
MONOCYTES NFR BLD: 6 % (ref 3–10)
NEUTS SEG # BLD: 4.9 K/UL (ref 1.8–8)
NEUTS SEG NFR BLD: 62 % (ref 40–73)
NRBC # BLD: 0 K/UL (ref 0–0.01)
NRBC BLD-RTO: 0 PER 100 WBC
PLATELET # BLD AUTO: 431 K/UL (ref 135–420)
PMV BLD AUTO: 9.2 FL (ref 9.2–11.8)
POTASSIUM SERPL-SCNC: 3.9 MMOL/L (ref 3.5–5.5)
RBC # BLD AUTO: 4.03 M/UL (ref 4.2–5.3)
SODIUM SERPL-SCNC: 135 MMOL/L (ref 136–145)
WBC # BLD AUTO: 8 K/UL (ref 4.6–13.2)

## 2023-07-06 PROCEDURE — 6370000000 HC RX 637 (ALT 250 FOR IP): Performed by: EMERGENCY MEDICINE

## 2023-07-06 PROCEDURE — 96375 TX/PRO/DX INJ NEW DRUG ADDON: CPT

## 2023-07-06 PROCEDURE — 6360000002 HC RX W HCPCS: Performed by: EMERGENCY MEDICINE

## 2023-07-06 PROCEDURE — 82550 ASSAY OF CK (CPK): CPT

## 2023-07-06 PROCEDURE — 85025 COMPLETE CBC W/AUTO DIFF WBC: CPT

## 2023-07-06 PROCEDURE — 99284 EMERGENCY DEPT VISIT MOD MDM: CPT

## 2023-07-06 PROCEDURE — 96374 THER/PROPH/DIAG INJ IV PUSH: CPT

## 2023-07-06 PROCEDURE — 80048 BASIC METABOLIC PNL TOTAL CA: CPT

## 2023-07-06 RX ORDER — ONDANSETRON 4 MG/1
4 TABLET, ORALLY DISINTEGRATING ORAL
Status: COMPLETED | OUTPATIENT
Start: 2023-07-06 | End: 2023-07-06

## 2023-07-06 RX ORDER — QUININE SULFATE 324 MG/1
324 CAPSULE ORAL EVERY 8 HOURS
Qty: 30 CAPSULE | Refills: 0 | Status: SHIPPED | OUTPATIENT
Start: 2023-07-06 | End: 2023-07-16

## 2023-07-06 RX ORDER — DEXAMETHASONE SODIUM PHOSPHATE 4 MG/ML
4 INJECTION, SOLUTION INTRA-ARTICULAR; INTRALESIONAL; INTRAMUSCULAR; INTRAVENOUS; SOFT TISSUE
Status: COMPLETED | OUTPATIENT
Start: 2023-07-06 | End: 2023-07-06

## 2023-07-06 RX ORDER — PREDNISONE 20 MG/1
20 TABLET ORAL 2 TIMES DAILY
Qty: 10 TABLET | Refills: 0 | Status: SHIPPED | OUTPATIENT
Start: 2023-07-06 | End: 2023-07-11

## 2023-07-06 RX ORDER — CYCLOBENZAPRINE HCL 10 MG
10 TABLET ORAL
Status: COMPLETED | OUTPATIENT
Start: 2023-07-06 | End: 2023-07-06

## 2023-07-06 RX ORDER — MORPHINE SULFATE 15 MG/1
15 TABLET, FILM COATED, EXTENDED RELEASE ORAL
Status: COMPLETED | OUTPATIENT
Start: 2023-07-06 | End: 2023-07-06

## 2023-07-06 RX ORDER — FUROSEMIDE 10 MG/ML
20 INJECTION INTRAMUSCULAR; INTRAVENOUS ONCE
Status: COMPLETED | OUTPATIENT
Start: 2023-07-06 | End: 2023-07-06

## 2023-07-06 RX ADMIN — DEXAMETHASONE SODIUM PHOSPHATE 4 MG: 4 INJECTION, SOLUTION INTRAMUSCULAR; INTRAVENOUS at 03:35

## 2023-07-06 RX ADMIN — ONDANSETRON 4 MG: 4 TABLET, ORALLY DISINTEGRATING ORAL at 03:35

## 2023-07-06 RX ADMIN — CYCLOBENZAPRINE 10 MG: 10 TABLET, FILM COATED ORAL at 03:35

## 2023-07-06 RX ADMIN — FUROSEMIDE 20 MG: 10 INJECTION, SOLUTION INTRAMUSCULAR; INTRAVENOUS at 03:35

## 2023-07-06 RX ADMIN — MORPHINE SULFATE 15 MG: 15 TABLET, FILM COATED, EXTENDED RELEASE ORAL at 03:45

## 2023-07-06 NOTE — ED PROVIDER NOTES
EMERGENCY DEPARTMENT HISTORY AND PHYSICAL EXAM    Date: 7/5/2023  Patient Name: Jorge L Deutsch    History of Presenting Illness     Chief Complaint   Patient presents with    Back Pain    Leg Swelling         History Provided By: Patient    Additional History (Context):   2:47 AM EDT  Jorge L Deutsch is a 47 y.o. female with PMHX of chronic pain, peptic ulcer disease, bilateral mastectomy for confirmed malignancy, high cholesterol, low back pain, bipolar affective disorder, family history coronary disease who presents to the emergency department C/O leg pain and swelling generalized malaise. Patient for several days has been having waxing and waning back pain. She is also complaining of a mild swelling to legs (which is most noticeable). She denies any chest pain or difficulty breathing. She is often nauseous without vomiting diarrhea. She has no fevers or chills. She has no changes in urinary output. She denies any traumas or injury. Social History  She denies smoking drinking or drugs, however recently had a positive drug screen for cocaine this year on May the 16th. Family History  Positive see below      PCP: Edith Waite MD    No current facility-administered medications for this encounter. Current Outpatient Medications   Medication Sig Dispense Refill    quiNINE 324 MG capsule Take 1 capsule by mouth in the morning and 1 capsule at noon and 1 capsule in the evening. Do all this for 10 days.  30 capsule 0    predniSONE (DELTASONE) 20 MG tablet Take 1 tablet by mouth 2 times daily for 5 days 10 tablet 0    furosemide (LASIX) 20 MG tablet Take 1 tablet by mouth daily 7 tablet 0    dexamethasone (DECADRON) 4 MG tablet Take 4 mg po twice a day for 2 days , then 2 mg po twice a day for 4 days, then 2 mg daily X 4 days 12 tablet 0    naloxone 4 MG/0.1ML LIQD nasal spray 1 spray by Nasal route as needed for Opioid Reversal 1 each 1    pantoprazole (PROTONIX) 40 MG tablet Take 1 tablet by mouth

## 2023-10-03 ENCOUNTER — APPOINTMENT (OUTPATIENT)
Facility: HOSPITAL | Age: 53
End: 2023-10-03
Attending: EMERGENCY MEDICINE
Payer: COMMERCIAL

## 2023-10-03 ENCOUNTER — HOSPITAL ENCOUNTER (EMERGENCY)
Facility: HOSPITAL | Age: 53
Discharge: HOME | End: 2023-10-04
Attending: EMERGENCY MEDICINE
Payer: COMMERCIAL

## 2023-10-03 DIAGNOSIS — M79.89 LEG SWELLING: Primary | ICD-10-CM

## 2023-10-03 LAB
ECHO BSA: 1.73 M2
GLUCOSE BLD STRIP.AUTO-MCNC: 225 MG/DL (ref 70–110)

## 2023-10-03 PROCEDURE — 99284 EMERGENCY DEPT VISIT MOD MDM: CPT

## 2023-10-03 PROCEDURE — 82962 GLUCOSE BLOOD TEST: CPT

## 2023-10-03 PROCEDURE — 96372 THER/PROPH/DIAG INJ SC/IM: CPT

## 2023-10-03 PROCEDURE — 2500000003 HC RX 250 WO HCPCS: Performed by: EMERGENCY MEDICINE

## 2023-10-03 PROCEDURE — 93971 EXTREMITY STUDY: CPT

## 2023-10-03 RX ORDER — HYDROMORPHONE HYDROCHLORIDE 1 MG/ML
1 INJECTION, SOLUTION INTRAMUSCULAR; INTRAVENOUS; SUBCUTANEOUS
Status: COMPLETED | OUTPATIENT
Start: 2023-10-04 | End: 2023-10-04

## 2023-10-03 RX ORDER — HYDROMORPHONE HYDROCHLORIDE 1 MG/ML
1 INJECTION, SOLUTION INTRAMUSCULAR; INTRAVENOUS; SUBCUTANEOUS
Status: COMPLETED | OUTPATIENT
Start: 2023-10-03 | End: 2023-10-03

## 2023-10-03 RX ADMIN — HYDROMORPHONE HYDROCHLORIDE 1 MG: 1 INJECTION, SOLUTION INTRAMUSCULAR; INTRAVENOUS; SUBCUTANEOUS at 22:14

## 2023-10-03 ASSESSMENT — PAIN - FUNCTIONAL ASSESSMENT: PAIN_FUNCTIONAL_ASSESSMENT: 0-10

## 2023-10-03 ASSESSMENT — PAIN DESCRIPTION - LOCATION: LOCATION: LEG

## 2023-10-03 ASSESSMENT — PAIN DESCRIPTION - ORIENTATION: ORIENTATION: LEFT

## 2023-10-03 ASSESSMENT — PAIN SCALES - GENERAL: PAINLEVEL_OUTOF10: 10

## 2023-10-04 VITALS
SYSTOLIC BLOOD PRESSURE: 168 MMHG | WEIGHT: 150 LBS | HEIGHT: 62 IN | HEART RATE: 78 BPM | DIASTOLIC BLOOD PRESSURE: 88 MMHG | TEMPERATURE: 97.9 F | BODY MASS INDEX: 27.6 KG/M2 | OXYGEN SATURATION: 100 % | RESPIRATION RATE: 16 BRPM

## 2023-10-04 PROCEDURE — 2500000003 HC RX 250 WO HCPCS: Performed by: EMERGENCY MEDICINE

## 2023-10-04 PROCEDURE — 96372 THER/PROPH/DIAG INJ SC/IM: CPT

## 2023-10-04 RX ORDER — PREDNISONE 20 MG/1
20 TABLET ORAL DAILY
Qty: 4 TABLET | Refills: 0 | Status: SHIPPED | OUTPATIENT
Start: 2023-10-04 | End: 2023-10-08

## 2023-10-04 RX ADMIN — HYDROMORPHONE HYDROCHLORIDE 1 MG: 1 INJECTION, SOLUTION INTRAMUSCULAR; INTRAVENOUS; SUBCUTANEOUS at 00:09

## 2023-10-04 ASSESSMENT — PAIN - FUNCTIONAL ASSESSMENT: PAIN_FUNCTIONAL_ASSESSMENT: 0-10

## 2023-10-04 ASSESSMENT — PAIN SCALES - GENERAL
PAINLEVEL_OUTOF10: 10
PAINLEVEL_OUTOF10: 5

## 2023-10-04 NOTE — ED PROVIDER NOTES
[facility] EMERGENCY DEPT  EMERGENCY DEPARTMENT ENCOUNTER    Patient Name: Alisa Garcia  MRN: [id number]  YOB: 1970  Provider: Denilson Sutton MD  PCP: Contreras Nunez MD   Time/Date of evaluation: 9:21 PM EDT on 10/3/23    History of Presenting Illness     Chief Complaint   Patient presents with    Leg Swelling     Patient endorses left lower leg swelling for several hours. Patient has a hx of spinal stenosis and is currently be worked up for possible MS. Patient has a hx of TIA and hx of abnormal ekgs and poor kidney function. Patient has had multiple bilateral lower extremity dvts, patient takes xarelto daily. History Provided by: Patient   History is limited by: Nothing    HISTORY (Narative):   Alisa Garcia is a 47 y.o. female with a PMHX of bipolar,, alcohol abuse, hypertension, hyperlipidemia, DVT  who presents to the emergency department (room 13) by POV C/O left lower extremity swelling onset several hours prior to arrival. Associated sxs include pain to the left lower extremity. Patient denies  any other sxs or complaints. Patient states that she is compliant with her daily Xarelto. Patient states that she was seen and treated at [facility] yesterday for abdominal pain. Nursing Notes were all reviewed and agreed with or any disagreements were addressed in the HPI.     Past History     PAST MEDICAL HISTORY:  Past Medical History:   Diagnosis Date    Anxiety     Bipolar disorder ([address])     Breast cancer ([address])     breast cancer, right, S/P Mastectomy and chemo, radiation in 2013    Chronic pain     cervical    Cocaine abuse ([facility])     Depression     Diabetes ([address])     Drug abuse ([facility])     H/O cocaine use in past    Drug-seeking behavior     Family history of early CAD     Gastrointestinal disorder     cholitis    H/O ETOH abuse     History of chemotherapy 2013    History of radiation therapy 2013    Hyperlipidemia     Hypertension     Malignant neoplasm without specification of site

## 2023-10-25 ENCOUNTER — HOSPITAL ENCOUNTER (EMERGENCY)
Facility: HOSPITAL | Age: 53
Discharge: HOME | End: 2023-10-26
Attending: EMERGENCY MEDICINE
Payer: COMMERCIAL

## 2023-10-25 ENCOUNTER — APPOINTMENT (OUTPATIENT)
Facility: HOSPITAL | Age: 53
End: 2023-10-25
Payer: COMMERCIAL

## 2023-10-25 VITALS
HEART RATE: 89 BPM | HEIGHT: 62 IN | SYSTOLIC BLOOD PRESSURE: 130 MMHG | BODY MASS INDEX: 26.68 KG/M2 | WEIGHT: 145 LBS | RESPIRATION RATE: 18 BRPM | DIASTOLIC BLOOD PRESSURE: 76 MMHG | OXYGEN SATURATION: 100 % | TEMPERATURE: 97.2 F

## 2023-10-25 DIAGNOSIS — N39.0 URINARY TRACT INFECTION WITHOUT HEMATURIA, SITE UNSPECIFIED: Primary | ICD-10-CM

## 2023-10-25 LAB
ALBUMIN SERPL-MCNC: 2.6 G/DL (ref 3.4–5)
ALBUMIN/GLOB SERPL: 0.6 (ref 0.8–1.7)
ALP SERPL-CCNC: 188 U/L (ref 45–117)
ALT SERPL-CCNC: 34 U/L (ref 13–56)
ANION GAP SERPL CALC-SCNC: 9 MMOL/L (ref 3–18)
APPEARANCE UR: ABNORMAL
AST SERPL-CCNC: 17 U/L (ref 10–38)
BACTERIA URNS QL MICRO: ABNORMAL /HPF
BASOPHILS # BLD: 0 K/UL (ref 0–0.1)
BASOPHILS NFR BLD: 0 % (ref 0–2)
BILIRUB SERPL-MCNC: 0.2 MG/DL (ref 0.2–1)
BILIRUB UR QL: NEGATIVE
BUN SERPL-MCNC: 25 MG/DL (ref 7–18)
BUN/CREAT SERPL: 18 (ref 12–20)
CALCIUM SERPL-MCNC: 8.5 MG/DL (ref 8.5–10.1)
CHLORIDE SERPL-SCNC: 104 MMOL/L (ref 100–111)
CHP ED QC CHECK: NORMAL
CO2 SERPL-SCNC: 23 MMOL/L (ref 21–32)
COLOR UR: YELLOW
CREAT SERPL-MCNC: 1.36 MG/DL (ref 0.6–1.3)
DIFFERENTIAL METHOD BLD: ABNORMAL
EOSINOPHIL # BLD: 0.2 K/UL (ref 0–0.4)
EOSINOPHIL NFR BLD: 2 % (ref 0–5)
EPITH CASTS URNS QL MICRO: ABNORMAL /LPF (ref 0–5)
ERYTHROCYTE [DISTWIDTH] IN BLOOD BY AUTOMATED COUNT: 16.6 % (ref 11.6–14.5)
FLUAV RNA SPEC QL NAA+PROBE: NOT DETECTED
FLUBV RNA SPEC QL NAA+PROBE: NOT DETECTED
GLOBULIN SER CALC-MCNC: 4.7 G/DL (ref 2–4)
GLUCOSE BLD STRIP.AUTO-MCNC: 524 MG/DL (ref 70–110)
GLUCOSE SERPL-MCNC: 490 MG/DL (ref 74–99)
GLUCOSE UR STRIP.AUTO-MCNC: >1000 MG/DL
HCT VFR BLD AUTO: 29.5 % (ref 35–45)
HGB BLD-MCNC: 9 G/DL (ref 12–16)
HGB UR QL STRIP: ABNORMAL
IMM GRANULOCYTES # BLD AUTO: 0.1 K/UL (ref 0–0.04)
IMM GRANULOCYTES NFR BLD AUTO: 1 % (ref 0–0.5)
KETONES UR QL STRIP.AUTO: NEGATIVE MG/DL
LACTATE BLD-SCNC: 1.58 MMOL/L (ref 0.4–2)
LEUKOCYTE ESTERASE UR QL STRIP.AUTO: ABNORMAL
LIPASE SERPL-CCNC: 78 U/L (ref 13–75)
LYMPHOCYTES # BLD: 2.6 K/UL (ref 0.9–3.6)
LYMPHOCYTES NFR BLD: 24 % (ref 21–52)
MAGNESIUM SERPL-MCNC: 1.8 MG/DL (ref 1.6–2.6)
MCH RBC QN AUTO: 26.2 PG (ref 24–34)
MCHC RBC AUTO-ENTMCNC: 30.5 G/DL (ref 31–37)
MCV RBC AUTO: 86 FL (ref 78–100)
MONOCYTES # BLD: 0.6 K/UL (ref 0.05–1.2)
MONOCYTES NFR BLD: 6 % (ref 3–10)
NEUTS SEG # BLD: 7.3 K/UL (ref 1.8–8)
NEUTS SEG NFR BLD: 67 % (ref 40–73)
NITRITE UR QL STRIP.AUTO: NEGATIVE
NRBC # BLD: 0 K/UL (ref 0–0.01)
NRBC BLD-RTO: 0 PER 100 WBC
PH UR STRIP: 5 (ref 5–8)
PLATELET # BLD AUTO: 382 K/UL (ref 135–420)
PMV BLD AUTO: 10 FL (ref 9.2–11.8)
POTASSIUM SERPL-SCNC: 3.9 MMOL/L (ref 3.5–5.5)
PROT SERPL-MCNC: 7.3 G/DL (ref 6.4–8.2)
PROT UR STRIP-MCNC: 100 MG/DL
RBC # BLD AUTO: 3.43 M/UL (ref 4.2–5.3)
RBC #/AREA URNS HPF: ABNORMAL /HPF (ref 0–5)
SARS-COV-2 RNA RESP QL NAA+PROBE: NOT DETECTED
SODIUM SERPL-SCNC: 136 MMOL/L (ref 136–145)
SP GR UR REFRACTOMETRY: 1.02 (ref 1–1.03)
TROPONIN I SERPL HS-MCNC: 7 NG/L (ref 0–54)
UROBILINOGEN UR QL STRIP.AUTO: 0.2 EU/DL (ref 0.2–1)
WBC # BLD AUTO: 10.8 K/UL (ref 4.6–13.2)
WBC URNS QL MICRO: ABNORMAL /HPF (ref 0–5)

## 2023-10-25 PROCEDURE — 71045 X-RAY EXAM CHEST 1 VIEW: CPT

## 2023-10-25 PROCEDURE — 96375 TX/PRO/DX INJ NEW DRUG ADDON: CPT

## 2023-10-25 PROCEDURE — 83735 ASSAY OF MAGNESIUM: CPT

## 2023-10-25 PROCEDURE — 96361 HYDRATE IV INFUSION ADD-ON: CPT

## 2023-10-25 PROCEDURE — 83690 ASSAY OF LIPASE: CPT

## 2023-10-25 PROCEDURE — 2580000003 HC RX 258: Performed by: EMERGENCY MEDICINE

## 2023-10-25 PROCEDURE — 82962 GLUCOSE BLOOD TEST: CPT

## 2023-10-25 PROCEDURE — 93005 ELECTROCARDIOGRAM TRACING: CPT | Performed by: EMERGENCY MEDICINE

## 2023-10-25 PROCEDURE — 74177 CT ABD & PELVIS W/CONTRAST: CPT

## 2023-10-25 PROCEDURE — 84484 ASSAY OF TROPONIN QUANT: CPT

## 2023-10-25 PROCEDURE — 87636 SARSCOV2 & INF A&B AMP PRB: CPT

## 2023-10-25 PROCEDURE — 83605 ASSAY OF LACTIC ACID: CPT

## 2023-10-25 PROCEDURE — 81001 URINALYSIS AUTO W/SCOPE: CPT

## 2023-10-25 PROCEDURE — 6360000002 HC RX W HCPCS: Performed by: EMERGENCY MEDICINE

## 2023-10-25 PROCEDURE — 80053 COMPREHEN METABOLIC PANEL: CPT

## 2023-10-25 PROCEDURE — 85025 COMPLETE CBC W/AUTO DIFF WBC: CPT

## 2023-10-25 PROCEDURE — 87040 BLOOD CULTURE FOR BACTERIA: CPT

## 2023-10-25 PROCEDURE — 99285 EMERGENCY DEPT VISIT HI MDM: CPT

## 2023-10-25 PROCEDURE — 6370000000 HC RX 637 (ALT 250 FOR IP): Performed by: EMERGENCY MEDICINE

## 2023-10-25 PROCEDURE — 6360000004 HC RX CONTRAST MEDICATION: Performed by: EMERGENCY MEDICINE

## 2023-10-25 PROCEDURE — 87086 URINE CULTURE/COLONY COUNT: CPT

## 2023-10-25 PROCEDURE — 96374 THER/PROPH/DIAG INJ IV PUSH: CPT

## 2023-10-25 RX ORDER — MORPHINE SULFATE 2 MG/ML
2 INJECTION, SOLUTION INTRAMUSCULAR; INTRAVENOUS ONCE
Status: COMPLETED | OUTPATIENT
Start: 2023-10-25 | End: 2023-10-25

## 2023-10-25 RX ORDER — 0.9 % SODIUM CHLORIDE 0.9 %
2000 INTRAVENOUS SOLUTION INTRAVENOUS ONCE
Status: COMPLETED | OUTPATIENT
Start: 2023-10-25 | End: 2023-10-26

## 2023-10-25 RX ADMIN — MORPHINE SULFATE 2 MG: 2 INJECTION, SOLUTION INTRAMUSCULAR; INTRAVENOUS at 21:44

## 2023-10-25 RX ADMIN — IOPAMIDOL 100 ML: 612 INJECTION, SOLUTION INTRAVENOUS at 22:36

## 2023-10-25 RX ADMIN — CEFTRIAXONE 1000 MG: 1 INJECTION, POWDER, FOR SOLUTION INTRAMUSCULAR; INTRAVENOUS at 23:24

## 2023-10-25 RX ADMIN — SODIUM CHLORIDE 2000 ML: 9 INJECTION, SOLUTION INTRAVENOUS at 21:44

## 2023-10-25 RX ADMIN — INSULIN HUMAN 5 UNITS: 100 INJECTION, SOLUTION PARENTERAL at 21:41

## 2023-10-25 ASSESSMENT — PAIN SCALES - GENERAL: PAINLEVEL_OUTOF10: 10

## 2023-10-26 LAB
EKG ATRIAL RATE: 84 BPM
EKG DIAGNOSIS: NORMAL
EKG P AXIS: 60 DEGREES
EKG P-R INTERVAL: 158 MS
EKG Q-T INTERVAL: 404 MS
EKG QRS DURATION: 84 MS
EKG QTC CALCULATION (BAZETT): 477 MS
EKG R AXIS: -19 DEGREES
EKG T AXIS: 72 DEGREES
EKG VENTRICULAR RATE: 84 BPM
GLUCOSE BLD STRIP.AUTO-MCNC: 358 MG/DL (ref 70–110)

## 2023-10-26 PROCEDURE — 82962 GLUCOSE BLOOD TEST: CPT

## 2023-10-26 RX ORDER — CEPHALEXIN 500 MG/1
500 CAPSULE ORAL 4 TIMES DAILY
Qty: 40 CAPSULE | Refills: 0 | Status: SHIPPED | OUTPATIENT
Start: 2023-10-26 | End: 2023-11-05

## 2023-10-26 RX ORDER — IBUPROFEN 600 MG/1
600 TABLET ORAL 4 TIMES DAILY PRN
Qty: 40 TABLET | Refills: 0 | Status: SHIPPED | OUTPATIENT
Start: 2023-10-26 | End: 2023-11-05

## 2023-10-26 NOTE — ED NOTES
Pt presents c/o cough with chest pain when coughing, UTI symptoms and extremity weakness x 2 weeks. Pt denies an use of medication. Pt states that boyfriend has also been sick.       Michael Daley RN  10/25/23 2040

## 2023-10-26 NOTE — ED PROVIDER NOTES
[facility] EMERGENCY DEPT  EMERGENCY DEPARTMENT ENCOUNTER    Patient Name: Ceferino Solitario  MRN: [id number]  YOB: 1970  Provider: Theodora Vital MD  PCP: Aparna Mcgrath MD   Time/Date of evaluation: 8:58 PM EDT on 10/25/23    History of Presenting Illness     History Provided by: Patient  History is limited by: Nothing     HISTORY:   Ceferino Solitario is a 47 y.o. female presenting with multiple symptoms. She has had about 1 or 2 weeks of cough fatigue body aches, chills. She has had intermittent chest pain abdominal pain diffuse body aches. She has had loose stools as well as vomiting. She is a insulin-dependent diabetic and did not take any of her insulin today because she did not feel well. She does endorse urinary burning and urgency. She denies any vaginal pain or abnormal vaginal discharge. Nursing Notes were all reviewed and agreed with or any disagreements were addressed in the HPI.     Past History     PAST MEDICAL HISTORY:  Past Medical History:   Diagnosis Date    Anxiety     Bipolar disorder ([facility] [address])     Breast cancer ([address])     breast cancer, right, S/P Mastectomy and chemo, radiation in 2013    Chronic pain     cervical    Cocaine abuse ([address])     Depression     Diabetes ([address])     Drug abuse ([address])     H/O cocaine use in past    Drug-seeking behavior     Family history of early CAD     Gastrointestinal disorder     cholitis    H/O ETOH abuse     History of chemotherapy 2013    History of radiation therapy 2013    Hyperlipidemia     Hypertension     Malignant neoplasm without specification of site [facility])     Methamphetamine abuse ([address])      self reported on 1/3/16    Peripheral neuropathy 2022    Psychiatric disorder     multiple personality disorder    Psychiatric disorder     anxiety    Psychiatric disorder     bipolar     Spine injury     Thromboembolus ([address]) 2022    Vitamin D deficiency 05/01/2018       PAST SURGICAL HISTORY:  Past Surgical History:   Procedure Laterality Date

## 2023-10-27 NOTE — RESULT ENCOUNTER NOTE
Urine culture with gram-negative rods. Pending sensitivity. Breath sounds clear and equal bilaterally.

## 2023-10-29 LAB
BACTERIA SPEC CULT: ABNORMAL
BACTERIA SPEC CULT: NORMAL
BACTERIA SPEC CULT: NORMAL
CC UR VC: ABNORMAL
SERVICE CMNT-IMP: ABNORMAL
SERVICE CMNT-IMP: NORMAL
SERVICE CMNT-IMP: NORMAL

## 2023-10-30 ENCOUNTER — HOSPITAL ENCOUNTER (INPATIENT)
Facility: HOSPITAL | Age: 53
LOS: 2 days | Discharge: LEFT BEFORE DISCHARGE | DRG: 463 | End: 2023-11-01
Attending: EMERGENCY MEDICINE | Admitting: HOSPITALIST
Payer: COMMERCIAL

## 2023-10-30 DIAGNOSIS — N30.00 ACUTE CYSTITIS WITHOUT HEMATURIA: Primary | ICD-10-CM

## 2023-10-30 PROBLEM — I82.409 DVT (DEEP VENOUS THROMBOSIS) (HCC): Status: ACTIVE | Noted: 2023-10-30

## 2023-10-30 PROBLEM — I82.432 ACUTE DEEP VEIN THROMBOSIS (DVT) OF POPLITEAL VEIN OF LEFT LOWER EXTREMITY (HCC): Status: RESOLVED | Noted: 2021-11-04 | Resolved: 2023-10-30

## 2023-10-30 PROBLEM — N39.0 COMPLICATED UTI (URINARY TRACT INFECTION): Status: ACTIVE | Noted: 2023-10-30

## 2023-10-30 LAB
ALBUMIN SERPL-MCNC: 2.9 G/DL (ref 3.4–5)
ALBUMIN/GLOB SERPL: 0.5 (ref 0.8–1.7)
ALP SERPL-CCNC: 184 U/L (ref 45–117)
ALT SERPL-CCNC: 21 U/L (ref 13–56)
ANION GAP SERPL CALC-SCNC: 8 MMOL/L (ref 3–18)
APPEARANCE UR: ABNORMAL
AST SERPL-CCNC: 14 U/L (ref 10–38)
BACTERIA SPEC CULT: ABNORMAL
BACTERIA SPEC CULT: ABNORMAL
BACTERIA URNS QL MICRO: ABNORMAL /HPF
BASOPHILS # BLD: 0 K/UL (ref 0–0.1)
BASOPHILS NFR BLD: 0 % (ref 0–2)
BILIRUB SERPL-MCNC: 0.4 MG/DL (ref 0.2–1)
BILIRUB UR QL: NEGATIVE
BUN SERPL-MCNC: 22 MG/DL (ref 7–18)
BUN/CREAT SERPL: 19 (ref 12–20)
CALCIUM SERPL-MCNC: 9.1 MG/DL (ref 8.5–10.1)
CC UR VC: ABNORMAL
CHLORIDE SERPL-SCNC: 103 MMOL/L (ref 100–111)
CO2 SERPL-SCNC: 24 MMOL/L (ref 21–32)
COLOR UR: YELLOW
CREAT SERPL-MCNC: 1.18 MG/DL (ref 0.6–1.3)
DIFFERENTIAL METHOD BLD: ABNORMAL
EOSINOPHIL # BLD: 0.2 K/UL (ref 0–0.4)
EOSINOPHIL NFR BLD: 1 % (ref 0–5)
EPITH CASTS URNS QL MICRO: ABNORMAL /LPF (ref 0–5)
ERYTHROCYTE [DISTWIDTH] IN BLOOD BY AUTOMATED COUNT: 16.3 % (ref 11.6–14.5)
GLOBULIN SER CALC-MCNC: 5.3 G/DL (ref 2–4)
GLUCOSE BLD STRIP.AUTO-MCNC: 187 MG/DL (ref 70–110)
GLUCOSE BLD STRIP.AUTO-MCNC: 221 MG/DL (ref 70–110)
GLUCOSE SERPL-MCNC: 245 MG/DL (ref 74–99)
GLUCOSE UR STRIP.AUTO-MCNC: 500 MG/DL
HCT VFR BLD AUTO: 32.5 % (ref 35–45)
HGB BLD-MCNC: 9.8 G/DL (ref 12–16)
HGB UR QL STRIP: ABNORMAL
IMM GRANULOCYTES # BLD AUTO: 0.1 K/UL (ref 0–0.04)
IMM GRANULOCYTES NFR BLD AUTO: 1 % (ref 0–0.5)
KETONES UR QL STRIP.AUTO: NEGATIVE MG/DL
LACTATE BLD-SCNC: 0.98 MMOL/L (ref 0.4–2)
LEUKOCYTE ESTERASE UR QL STRIP.AUTO: NEGATIVE
LYMPHOCYTES # BLD: 2.1 K/UL (ref 0.9–3.6)
LYMPHOCYTES NFR BLD: 15 % (ref 21–52)
MCH RBC QN AUTO: 26.2 PG (ref 24–34)
MCHC RBC AUTO-ENTMCNC: 30.2 G/DL (ref 31–37)
MCV RBC AUTO: 86.9 FL (ref 78–100)
MONOCYTES # BLD: 0.6 K/UL (ref 0.05–1.2)
MONOCYTES NFR BLD: 5 % (ref 3–10)
NEUTS SEG # BLD: 10.4 K/UL (ref 1.8–8)
NEUTS SEG NFR BLD: 78 % (ref 40–73)
NITRITE UR QL STRIP.AUTO: NEGATIVE
NRBC # BLD: 0 K/UL (ref 0–0.01)
NRBC BLD-RTO: 0 PER 100 WBC
PH UR STRIP: 5.5 (ref 5–8)
PLATELET # BLD AUTO: 421 K/UL (ref 135–420)
PMV BLD AUTO: 10 FL (ref 9.2–11.8)
POTASSIUM SERPL-SCNC: 4.6 MMOL/L (ref 3.5–5.5)
PROT SERPL-MCNC: 8.2 G/DL (ref 6.4–8.2)
PROT UR STRIP-MCNC: 300 MG/DL
RBC # BLD AUTO: 3.74 M/UL (ref 4.2–5.3)
RBC #/AREA URNS HPF: ABNORMAL /HPF (ref 0–5)
SERVICE CMNT-IMP: ABNORMAL
SODIUM SERPL-SCNC: 135 MMOL/L (ref 136–145)
SP GR UR REFRACTOMETRY: 1.02 (ref 1–1.03)
UROBILINOGEN UR QL STRIP.AUTO: 0.2 EU/DL (ref 0.2–1)
WBC # BLD AUTO: 13.3 K/UL (ref 4.6–13.2)
WBC URNS QL MICRO: ABNORMAL /HPF (ref 0–5)

## 2023-10-30 PROCEDURE — 87040 BLOOD CULTURE FOR BACTERIA: CPT

## 2023-10-30 PROCEDURE — 81001 URINALYSIS AUTO W/SCOPE: CPT

## 2023-10-30 PROCEDURE — 85025 COMPLETE CBC W/AUTO DIFF WBC: CPT

## 2023-10-30 PROCEDURE — 83605 ASSAY OF LACTIC ACID: CPT

## 2023-10-30 PROCEDURE — 2580000003 HC RX 258: Performed by: EMERGENCY MEDICINE

## 2023-10-30 PROCEDURE — 6360000002 HC RX W HCPCS: Performed by: EMERGENCY MEDICINE

## 2023-10-30 PROCEDURE — 87086 URINE CULTURE/COLONY COUNT: CPT

## 2023-10-30 PROCEDURE — 6360000002 HC RX W HCPCS: Performed by: HOSPITALIST

## 2023-10-30 PROCEDURE — 99285 EMERGENCY DEPT VISIT HI MDM: CPT

## 2023-10-30 PROCEDURE — 80053 COMPREHEN METABOLIC PANEL: CPT

## 2023-10-30 PROCEDURE — 6370000000 HC RX 637 (ALT 250 FOR IP): Performed by: HOSPITALIST

## 2023-10-30 PROCEDURE — 2580000003 HC RX 258: Performed by: HOSPITALIST

## 2023-10-30 PROCEDURE — 96375 TX/PRO/DX INJ NEW DRUG ADDON: CPT

## 2023-10-30 PROCEDURE — 87077 CULTURE AEROBIC IDENTIFY: CPT

## 2023-10-30 PROCEDURE — 82962 GLUCOSE BLOOD TEST: CPT

## 2023-10-30 PROCEDURE — 1100000000 HC RM PRIVATE

## 2023-10-30 PROCEDURE — 96365 THER/PROPH/DIAG IV INF INIT: CPT

## 2023-10-30 PROCEDURE — 87186 SC STD MICRODIL/AGAR DIL: CPT

## 2023-10-30 RX ORDER — INSULIN LISPRO 100 [IU]/ML
0-4 INJECTION, SOLUTION INTRAVENOUS; SUBCUTANEOUS NIGHTLY
Status: DISCONTINUED | OUTPATIENT
Start: 2023-10-30 | End: 2023-11-02 | Stop reason: HOSPADM

## 2023-10-30 RX ORDER — PREGABALIN 50 MG/1
50 CAPSULE ORAL 2 TIMES DAILY
Status: DISCONTINUED | OUTPATIENT
Start: 2023-10-30 | End: 2023-11-02 | Stop reason: HOSPADM

## 2023-10-30 RX ORDER — HYDROMORPHONE HYDROCHLORIDE 1 MG/ML
0.5 INJECTION, SOLUTION INTRAMUSCULAR; INTRAVENOUS; SUBCUTANEOUS
Status: COMPLETED | OUTPATIENT
Start: 2023-10-30 | End: 2023-10-30

## 2023-10-30 RX ORDER — ONDANSETRON 2 MG/ML
4 INJECTION INTRAMUSCULAR; INTRAVENOUS EVERY 6 HOURS PRN
Status: DISCONTINUED | OUTPATIENT
Start: 2023-10-30 | End: 2023-11-02 | Stop reason: HOSPADM

## 2023-10-30 RX ORDER — ONDANSETRON 2 MG/ML
4 INJECTION INTRAMUSCULAR; INTRAVENOUS
Status: COMPLETED | OUTPATIENT
Start: 2023-10-30 | End: 2023-10-30

## 2023-10-30 RX ORDER — INSULIN LISPRO 100 [IU]/ML
0-16 INJECTION, SOLUTION INTRAVENOUS; SUBCUTANEOUS
Status: DISCONTINUED | OUTPATIENT
Start: 2023-10-30 | End: 2023-11-02 | Stop reason: HOSPADM

## 2023-10-30 RX ORDER — DEXTROSE MONOHYDRATE 100 MG/ML
INJECTION, SOLUTION INTRAVENOUS CONTINUOUS PRN
Status: DISCONTINUED | OUTPATIENT
Start: 2023-10-30 | End: 2023-11-02 | Stop reason: HOSPADM

## 2023-10-30 RX ORDER — ACETAMINOPHEN 650 MG/1
650 SUPPOSITORY RECTAL EVERY 6 HOURS PRN
Status: DISCONTINUED | OUTPATIENT
Start: 2023-10-30 | End: 2023-11-02 | Stop reason: HOSPADM

## 2023-10-30 RX ORDER — INSULIN GLARGINE 100 [IU]/ML
25 INJECTION, SOLUTION SUBCUTANEOUS NIGHTLY
Status: DISCONTINUED | OUTPATIENT
Start: 2023-10-30 | End: 2023-11-02 | Stop reason: HOSPADM

## 2023-10-30 RX ORDER — SODIUM CHLORIDE 0.9 % (FLUSH) 0.9 %
5-40 SYRINGE (ML) INJECTION EVERY 12 HOURS SCHEDULED
Status: DISCONTINUED | OUTPATIENT
Start: 2023-10-30 | End: 2023-11-02 | Stop reason: HOSPADM

## 2023-10-30 RX ORDER — METHOCARBAMOL 500 MG/1
750 TABLET, FILM COATED ORAL DAILY
Status: DISCONTINUED | OUTPATIENT
Start: 2023-10-30 | End: 2023-11-02 | Stop reason: HOSPADM

## 2023-10-30 RX ORDER — ACETAMINOPHEN 325 MG/1
650 TABLET ORAL EVERY 6 HOURS PRN
Status: DISCONTINUED | OUTPATIENT
Start: 2023-10-30 | End: 2023-11-02 | Stop reason: HOSPADM

## 2023-10-30 RX ORDER — FUROSEMIDE 20 MG/1
20 TABLET ORAL DAILY
Status: DISCONTINUED | OUTPATIENT
Start: 2023-10-30 | End: 2023-11-02 | Stop reason: HOSPADM

## 2023-10-30 RX ORDER — 0.9 % SODIUM CHLORIDE 0.9 %
1000 INTRAVENOUS SOLUTION INTRAVENOUS ONCE
Status: COMPLETED | OUTPATIENT
Start: 2023-10-30 | End: 2023-10-30

## 2023-10-30 RX ORDER — POLYETHYLENE GLYCOL 3350 17 G/17G
17 POWDER, FOR SOLUTION ORAL DAILY PRN
Status: DISCONTINUED | OUTPATIENT
Start: 2023-10-30 | End: 2023-11-02 | Stop reason: HOSPADM

## 2023-10-30 RX ORDER — PREGABALIN 150 MG/1
150 CAPSULE ORAL 2 TIMES DAILY
COMMUNITY

## 2023-10-30 RX ORDER — SODIUM CHLORIDE 0.9 % (FLUSH) 0.9 %
5-40 SYRINGE (ML) INJECTION PRN
Status: DISCONTINUED | OUTPATIENT
Start: 2023-10-30 | End: 2023-11-02 | Stop reason: HOSPADM

## 2023-10-30 RX ORDER — SODIUM CHLORIDE 9 MG/ML
INJECTION, SOLUTION INTRAVENOUS PRN
Status: DISCONTINUED | OUTPATIENT
Start: 2023-10-30 | End: 2023-11-02 | Stop reason: HOSPADM

## 2023-10-30 RX ORDER — MORPHINE SULFATE 2 MG/ML
1 INJECTION, SOLUTION INTRAMUSCULAR; INTRAVENOUS EVERY 4 HOURS PRN
Status: DISCONTINUED | OUTPATIENT
Start: 2023-10-30 | End: 2023-11-01

## 2023-10-30 RX ORDER — SODIUM CHLORIDE 9 MG/ML
INJECTION, SOLUTION INTRAVENOUS CONTINUOUS
Status: DISCONTINUED | OUTPATIENT
Start: 2023-10-30 | End: 2023-11-02 | Stop reason: HOSPADM

## 2023-10-30 RX ORDER — ONDANSETRON 4 MG/1
4 TABLET, ORALLY DISINTEGRATING ORAL EVERY 8 HOURS PRN
Status: DISCONTINUED | OUTPATIENT
Start: 2023-10-30 | End: 2023-11-02 | Stop reason: HOSPADM

## 2023-10-30 RX ADMIN — HYDROMORPHONE HYDROCHLORIDE 0.5 MG: 1 INJECTION, SOLUTION INTRAMUSCULAR; INTRAVENOUS; SUBCUTANEOUS at 14:33

## 2023-10-30 RX ADMIN — PREGABALIN 50 MG: 50 CAPSULE ORAL at 21:30

## 2023-10-30 RX ADMIN — SODIUM CHLORIDE, PRESERVATIVE FREE 10 ML: 5 INJECTION INTRAVENOUS at 22:09

## 2023-10-30 RX ADMIN — MORPHINE SULFATE 1 MG: 2 INJECTION, SOLUTION INTRAMUSCULAR; INTRAVENOUS at 17:45

## 2023-10-30 RX ADMIN — SODIUM CHLORIDE: 9 INJECTION, SOLUTION INTRAVENOUS at 17:07

## 2023-10-30 RX ADMIN — MORPHINE SULFATE 1 MG: 2 INJECTION, SOLUTION INTRAMUSCULAR; INTRAVENOUS at 21:53

## 2023-10-30 RX ADMIN — MEROPENEM 1000 MG: 1 INJECTION, POWDER, FOR SOLUTION INTRAVENOUS at 13:54

## 2023-10-30 RX ADMIN — ONDANSETRON HYDROCHLORIDE 4 MG: 2 INJECTION INTRAMUSCULAR; INTRAVENOUS at 13:14

## 2023-10-30 RX ADMIN — SODIUM CHLORIDE 1000 ML: 900 INJECTION, SOLUTION INTRAVENOUS at 13:11

## 2023-10-30 RX ADMIN — METHOCARBAMOL 750 MG: 500 TABLET ORAL at 16:51

## 2023-10-30 RX ADMIN — INSULIN GLARGINE 25 UNITS: 100 INJECTION, SOLUTION SUBCUTANEOUS at 21:55

## 2023-10-30 RX ADMIN — MEROPENEM 1000 MG: 1 INJECTION, POWDER, FOR SOLUTION INTRAVENOUS at 22:02

## 2023-10-30 RX ADMIN — RIVAROXABAN 20 MG: 10 TABLET, FILM COATED ORAL at 17:45

## 2023-10-30 RX ADMIN — FUROSEMIDE 20 MG: 20 TABLET ORAL at 16:51

## 2023-10-30 ASSESSMENT — PAIN SCALES - GENERAL
PAINLEVEL_OUTOF10: 8
PAINLEVEL_OUTOF10: 7
PAINLEVEL_OUTOF10: 10
PAINLEVEL_OUTOF10: 8

## 2023-10-30 ASSESSMENT — PAIN DESCRIPTION - DESCRIPTORS
DESCRIPTORS: ACHING;TINGLING
DESCRIPTORS: ACHING

## 2023-10-30 ASSESSMENT — PAIN DESCRIPTION - ORIENTATION
ORIENTATION: MID;LOWER
ORIENTATION: RIGHT;MID
ORIENTATION: MID;LOWER
ORIENTATION: MID;LOWER
ORIENTATION: LOWER

## 2023-10-30 ASSESSMENT — PAIN DESCRIPTION - LOCATION
LOCATION: ABDOMEN
LOCATION: CHEST;ABDOMEN
LOCATION: SHOULDER;PERINEUM
LOCATION: ABDOMEN;CHEST
LOCATION: ABDOMEN;CHEST

## 2023-10-30 ASSESSMENT — PAIN - FUNCTIONAL ASSESSMENT: PAIN_FUNCTIONAL_ASSESSMENT: 0-10

## 2023-10-30 NOTE — ED TRIAGE NOTES
Pt wheeled to triage c/o being called to come back to the hospital by Mandy Prim[place], [place] for UC results. Pt is feeling worse and having pain. Pt states she is not tolerating the Levaquin.

## 2023-10-30 NOTE — ED PROVIDER NOTES
administration in time range)     Or   acetaminophen (TYLENOL) suppository 650 mg (has no administration in time range)   0.9 % sodium chloride infusion ( IntraVENous New Bag 10/30/23 1707)   meropenem (MERREM) 1,000 mg in sodium chloride 0.9 % 100 mL IVPB (mini-bag) (has no administration in time range)   insulin lispro (HUMALOG) injection vial 0-16 Units (has no administration in time range)   insulin lispro (HUMALOG) injection vial 0-4 Units (has no administration in time range)   glucose chewable tablet 16 g (has no administration in time range)   dextrose bolus 10% 125 mL (has no administration in time range)     Or   dextrose bolus 10% 250 mL (has no administration in time range)   glucagon (rDNA) injection 1 mg (has no administration in time range)   dextrose 10 % infusion (has no administration in time range)   morphine (PF) injection 1 mg (has no administration in time range)   rivaroxaban (XARELTO) tablet 20 mg (has no administration in time range)   sodium chloride 0.9 % bolus 1,000 mL (0 mLs IntraVENous Stopped 10/30/23 1415)   ondansetron (ZOFRAN) injection 4 mg (4 mg IntraVENous Given 10/30/23 1314)   meropenem (MERREM) 1,000 mg in sodium chloride 0.9 % 100 mL IVPB (mini-bag) (0 mg IntraVENous Stopped 10/30/23 1435)   HYDROmorphone HCl PF (DILAUDID) injection 0.5 mg (0.5 mg IntraVENous Given 10/30/23 1433)       ED Course as of 10/30/23 1727   Mon Oct 30, 2023   1330 WBC(!): 13.3 [AK]   1330 Hemoglobin Quant(!): 9.8 [AK]   1330 Hematocrit(!): 32.5 [AK]   1330 Platelet Count(!): 264 [AK]   1330 Leukocyte Esterase, Urine: Negative [AK]   1330 Nitrite, Urine: Negative [AK]   1336 Bacteria, UA(!): 4+ [AK]   1337 Epithelial Cells, UA: 2+ [AK]   1337 RBC, UA: 0 to 3 [AK]   1337 WBC, UA: 4 to 10 [AK]      ED Course User Index  [AK] Dagmar Zee MD       IP CONSULT TO INFECTIOUS DISEASES  IP CONSULT TO HOSPITALIST    Screenings                  CIWA Assessment  BP: (!) 146/74  Pulse: 100       Medical

## 2023-10-30 NOTE — CONSULTS
Called by ED MD- Dr Betty Mathew, Dr Yulissa Padilla    Patient who was seen on 10/25 and had ED treatment, UA and urine culture done on 10/25-- with ESBL Kleb- reportedly she is FQ allergic per MD( not tolerating per note)    She was called back by ED but stated that she is feeling worse and having pain. If she is symptomatic for UTI, will need treatment that covers ESBL bacteria    Labs repeated and being admitted  Will see her tomorrow    Thanks    Pily Rand MD  [facility])  Office: [phone] -Option #8  Office fax:  [phone]

## 2023-10-31 ENCOUNTER — APPOINTMENT (OUTPATIENT)
Facility: HOSPITAL | Age: 53
DRG: 463 | End: 2023-10-31
Payer: COMMERCIAL

## 2023-10-31 LAB
AMPHET UR QL SCN: NEGATIVE
ANION GAP SERPL CALC-SCNC: 6 MMOL/L (ref 3–18)
BACTERIA SPEC CULT: NORMAL
BACTERIA SPEC CULT: NORMAL
BARBITURATES UR QL SCN: NEGATIVE
BENZODIAZ UR QL: NEGATIVE
BUN SERPL-MCNC: 17 MG/DL (ref 7–18)
BUN/CREAT SERPL: 17 (ref 12–20)
CALCIUM SERPL-MCNC: 8.7 MG/DL (ref 8.5–10.1)
CANNABINOIDS UR QL SCN: NEGATIVE
CHLORIDE SERPL-SCNC: 106 MMOL/L (ref 100–111)
CO2 SERPL-SCNC: 23 MMOL/L (ref 21–32)
COCAINE UR QL SCN: NEGATIVE
CREAT SERPL-MCNC: 1 MG/DL (ref 0.6–1.3)
ERYTHROCYTE [DISTWIDTH] IN BLOOD BY AUTOMATED COUNT: 15.8 % (ref 11.6–14.5)
GLUCOSE BLD STRIP.AUTO-MCNC: 137 MG/DL (ref 70–110)
GLUCOSE BLD STRIP.AUTO-MCNC: 160 MG/DL (ref 70–110)
GLUCOSE BLD STRIP.AUTO-MCNC: 221 MG/DL (ref 70–110)
GLUCOSE BLD STRIP.AUTO-MCNC: 237 MG/DL (ref 70–110)
GLUCOSE SERPL-MCNC: 196 MG/DL (ref 74–99)
HCT VFR BLD AUTO: 27.5 % (ref 35–45)
HGB BLD-MCNC: 8.5 G/DL (ref 12–16)
Lab: ABNORMAL
MCH RBC QN AUTO: 26.3 PG (ref 24–34)
MCHC RBC AUTO-ENTMCNC: 30.9 G/DL (ref 31–37)
MCV RBC AUTO: 85.1 FL (ref 78–100)
METHADONE UR QL: ABNORMAL
NRBC # BLD: 0 K/UL (ref 0–0.01)
NRBC BLD-RTO: 0 PER 100 WBC
OPIATES UR QL: POSITIVE
PCP UR QL: NEGATIVE
PLATELET # BLD AUTO: 374 K/UL (ref 135–420)
PMV BLD AUTO: 9.3 FL (ref 9.2–11.8)
POTASSIUM SERPL-SCNC: 4.6 MMOL/L (ref 3.5–5.5)
RBC # BLD AUTO: 3.23 M/UL (ref 4.2–5.3)
SERVICE CMNT-IMP: NORMAL
SERVICE CMNT-IMP: NORMAL
SODIUM SERPL-SCNC: 135 MMOL/L (ref 136–145)
WBC # BLD AUTO: 8.1 K/UL (ref 4.6–13.2)

## 2023-10-31 PROCEDURE — 76937 US GUIDE VASCULAR ACCESS: CPT

## 2023-10-31 PROCEDURE — 1100000000 HC RM PRIVATE

## 2023-10-31 PROCEDURE — 80307 DRUG TEST PRSMV CHEM ANLYZR: CPT

## 2023-10-31 PROCEDURE — 83036 HEMOGLOBIN GLYCOSYLATED A1C: CPT

## 2023-10-31 PROCEDURE — 6370000000 HC RX 637 (ALT 250 FOR IP): Performed by: HOSPITALIST

## 2023-10-31 PROCEDURE — 2580000003 HC RX 258: Performed by: HOSPITALIST

## 2023-10-31 PROCEDURE — 82962 GLUCOSE BLOOD TEST: CPT

## 2023-10-31 PROCEDURE — 80048 BASIC METABOLIC PNL TOTAL CA: CPT

## 2023-10-31 PROCEDURE — 36415 COLL VENOUS BLD VENIPUNCTURE: CPT

## 2023-10-31 PROCEDURE — 02HV33Z INSERTION OF INFUSION DEVICE INTO SUPERIOR VENA CAVA, PERCUTANEOUS APPROACH: ICD-10-PCS | Performed by: STUDENT IN AN ORGANIZED HEALTH CARE EDUCATION/TRAINING PROGRAM

## 2023-10-31 PROCEDURE — 85027 COMPLETE CBC AUTOMATED: CPT

## 2023-10-31 PROCEDURE — 6360000002 HC RX W HCPCS: Performed by: INTERNAL MEDICINE

## 2023-10-31 PROCEDURE — 2500000003 HC RX 250 WO HCPCS: Performed by: INTERNAL MEDICINE

## 2023-10-31 PROCEDURE — 6360000002 HC RX W HCPCS: Performed by: HOSPITALIST

## 2023-10-31 RX ORDER — HEPARIN SODIUM (PORCINE) LOCK FLUSH IV SOLN 100 UNIT/ML 100 UNIT/ML
500 SOLUTION INTRAVENOUS EVERY 8 HOURS PRN
Status: DISCONTINUED | OUTPATIENT
Start: 2023-10-31 | End: 2023-11-02 | Stop reason: HOSPADM

## 2023-10-31 RX ORDER — HEPARIN SODIUM 200 [USP'U]/100ML
1000 INJECTION, SOLUTION INTRAVENOUS ONCE
Status: COMPLETED | OUTPATIENT
Start: 2023-10-31 | End: 2023-10-31

## 2023-10-31 RX ORDER — LIDOCAINE HYDROCHLORIDE 10 MG/ML
20 INJECTION, SOLUTION INFILTRATION; PERINEURAL ONCE
Status: COMPLETED | OUTPATIENT
Start: 2023-10-31 | End: 2023-10-31

## 2023-10-31 RX ADMIN — PREGABALIN 50 MG: 50 CAPSULE ORAL at 09:22

## 2023-10-31 RX ADMIN — PREGABALIN 50 MG: 50 CAPSULE ORAL at 21:58

## 2023-10-31 RX ADMIN — METHOCARBAMOL 750 MG: 500 TABLET ORAL at 09:22

## 2023-10-31 RX ADMIN — MEROPENEM 1000 MG: 1 INJECTION, POWDER, FOR SOLUTION INTRAVENOUS at 13:51

## 2023-10-31 RX ADMIN — SODIUM CHLORIDE, PRESERVATIVE FREE 10 ML: 5 INJECTION INTRAVENOUS at 21:30

## 2023-10-31 RX ADMIN — SODIUM CHLORIDE, PRESERVATIVE FREE 10 ML: 5 INJECTION INTRAVENOUS at 09:23

## 2023-10-31 RX ADMIN — MEROPENEM 1000 MG: 1 INJECTION, POWDER, FOR SOLUTION INTRAVENOUS at 06:17

## 2023-10-31 RX ADMIN — MORPHINE SULFATE 1 MG: 2 INJECTION, SOLUTION INTRAMUSCULAR; INTRAVENOUS at 21:54

## 2023-10-31 RX ADMIN — FUROSEMIDE 20 MG: 20 TABLET ORAL at 09:22

## 2023-10-31 RX ADMIN — RIVAROXABAN 20 MG: 10 TABLET, FILM COATED ORAL at 17:02

## 2023-10-31 RX ADMIN — HEPARIN SODIUM 1000 UNITS: 200 INJECTION, SOLUTION INTRAVENOUS at 11:57

## 2023-10-31 RX ADMIN — MORPHINE SULFATE 1 MG: 2 INJECTION, SOLUTION INTRAMUSCULAR; INTRAVENOUS at 06:42

## 2023-10-31 RX ADMIN — ONDANSETRON 4 MG: 2 INJECTION INTRAMUSCULAR; INTRAVENOUS at 10:54

## 2023-10-31 RX ADMIN — MORPHINE SULFATE 1 MG: 2 INJECTION, SOLUTION INTRAMUSCULAR; INTRAVENOUS at 02:29

## 2023-10-31 RX ADMIN — HEPARIN SODIUM (PORCINE) LOCK FLUSH IV SOLN 100 UNIT/ML 500 UNITS: 100 SOLUTION at 11:57

## 2023-10-31 RX ADMIN — INSULIN GLARGINE 25 UNITS: 100 INJECTION, SOLUTION SUBCUTANEOUS at 21:56

## 2023-10-31 RX ADMIN — MORPHINE SULFATE 1 MG: 2 INJECTION, SOLUTION INTRAMUSCULAR; INTRAVENOUS at 10:53

## 2023-10-31 RX ADMIN — LIDOCAINE HYDROCHLORIDE 5 ML: 10 INJECTION, SOLUTION INFILTRATION; PERINEURAL at 11:56

## 2023-10-31 RX ADMIN — MEROPENEM 1000 MG: 1 INJECTION, POWDER, FOR SOLUTION INTRAVENOUS at 21:54

## 2023-10-31 RX ADMIN — INSULIN LISPRO 4 UNITS: 100 INJECTION, SOLUTION INTRAVENOUS; SUBCUTANEOUS at 12:39

## 2023-10-31 RX ADMIN — MORPHINE SULFATE 1 MG: 2 INJECTION, SOLUTION INTRAMUSCULAR; INTRAVENOUS at 15:11

## 2023-10-31 ASSESSMENT — PAIN DESCRIPTION - DESCRIPTORS
DESCRIPTORS: ACHING;THROBBING
DESCRIPTORS: THROBBING
DESCRIPTORS: ACHING
DESCRIPTORS: ACHING
DESCRIPTORS: SHARP

## 2023-10-31 ASSESSMENT — PAIN SCALES - GENERAL
PAINLEVEL_OUTOF10: 10
PAINLEVEL_OUTOF10: 9
PAINLEVEL_OUTOF10: 9
PAINLEVEL_OUTOF10: 5
PAINLEVEL_OUTOF10: 9
PAINLEVEL_OUTOF10: 5
PAINLEVEL_OUTOF10: 6
PAINLEVEL_OUTOF10: 9
PAINLEVEL_OUTOF10: 6

## 2023-10-31 ASSESSMENT — PAIN DESCRIPTION - FREQUENCY: FREQUENCY: CONTINUOUS

## 2023-10-31 ASSESSMENT — PAIN DESCRIPTION - ORIENTATION
ORIENTATION: RIGHT
ORIENTATION: RIGHT
ORIENTATION: RIGHT;LOWER
ORIENTATION: RIGHT
ORIENTATION: RIGHT

## 2023-10-31 ASSESSMENT — PAIN DESCRIPTION - LOCATION
LOCATION: SHOULDER
LOCATION: SHOULDER
LOCATION: ABDOMEN;ARM
LOCATION: ARM
LOCATION: ABDOMEN;NECK;SHOULDER

## 2023-10-31 ASSESSMENT — PAIN - FUNCTIONAL ASSESSMENT
PAIN_FUNCTIONAL_ASSESSMENT: PREVENTS OR INTERFERES SOME ACTIVE ACTIVITIES AND ADLS
PAIN_FUNCTIONAL_ASSESSMENT: PREVENTS OR INTERFERES SOME ACTIVE ACTIVITIES AND ADLS

## 2023-10-31 ASSESSMENT — PAIN DESCRIPTION - PAIN TYPE: TYPE: CHRONIC PAIN;NEUROPATHIC PAIN

## 2023-10-31 ASSESSMENT — PAIN DESCRIPTION - ONSET: ONSET: ON-GOING

## 2023-10-31 NOTE — PLAN OF CARE
Problem: Pain  Goal: Verbalizes/displays adequate comfort level or baseline comfort level  10/31/2023 1300 by Maryan Gallo RN  Outcome: Progressing  10/31/2023 0115 by Laith Salvador RN  Outcome: Progressing  Flowsheets (Taken 10/31/2023 0115)  Verbalizes/displays adequate comfort level or baseline comfort level:   Encourage patient to monitor pain and request assistance   Assess pain using appropriate pain scale   Administer analgesics based on type and severity of pain and evaluate response   Implement non-pharmacological measures as appropriate and evaluate response   Consider cultural and social influences on pain and pain management   Notify Licensed Independent Practitioner if interventions unsuccessful or patient reports new pain     Problem: Safety - Adult  Goal: Free from fall injury  10/31/2023 1300 by Maryan Gallo RN  Outcome: Progressing  10/31/2023 0115 by Laith Salvador RN  Outcome: Progressing     Problem: ABCDS Injury Assessment  Goal: Absence of physical injury  10/31/2023 0115 by Laith Salvador RN  Outcome: Progressing  Flowsheets (Taken 10/31/2023 0115)  Absence of Physical Injury: Implement safety measures based on patient assessment     Problem: Chronic Conditions and Co-morbidities  Goal: Patient's chronic conditions and co-morbidity symptoms are monitored and maintained or improved  Outcome: Progressing

## 2023-10-31 NOTE — PLAN OF CARE
Problem: Pain  Goal: Verbalizes/displays adequate comfort level or baseline comfort level  Outcome: Progressing  Flowsheets (Taken 10/31/2023 0115)  Verbalizes/displays adequate comfort level or baseline comfort level:   Encourage patient to monitor pain and request assistance   Assess pain using appropriate pain scale   Administer analgesics based on type and severity of pain and evaluate response   Implement non-pharmacological measures as appropriate and evaluate response   Consider cultural and social influences on pain and pain management   Notify Licensed Independent Practitioner if interventions unsuccessful or patient reports new pain     Problem: Safety - Adult  Goal: Free from fall injury  Outcome: Progressing     Problem: ABCDS Injury Assessment  Goal: Absence of physical injury  Outcome: Progressing  Flowsheets (Taken 10/31/2023 0115)  Absence of Physical Injury: Implement safety measures based on patient assessment

## 2023-10-31 NOTE — CONSULTS
[facility]  (A Division of [facility])                                                           Date of Admission: 10/30/2023       Reason for Consult: MDRO UTI  Referring MD: Dr Óscar Scales, ED and Dr Areli Doyle    Thank you for involving me in the care of this patient. Please do not hesitate to contact me on the above number if question or concern. Current Antimicrobials:    Prior Antimicrobials:  Meropenem 10/30 to date   Keflex PO 10/26 to 10/30   Allergy to antibiotics:  PCN  Levofloxacin       Assessment--ID related:     Complicated  UTI-- with MDRO bacteria( ESBL Klebsiella) in poorly controlled DM. Has history of same infection with emphysematous cystitis at [facility]        -- on oral Keflex at home, still with dysuria and lower abdominal pain. Her UA looks better on 10/30 than 10/25--not sure what to make of it- but will treat as symptomatic           Low grade fever/mild leucocytosis- likley to above  Antibiotic allergy- FQ and PCN    DM --poolry controlled      Diagnosis     Complicated UTI (urinary tract infection) [N39.0]     DVT (deep venous thrombosis) ([facility]) [I82.409]     Type 2 diabetes with nephropathy ([address]) [E11.21]     Hypercholesterolemia [E78.00]     Essential hypertension [I10]       History of drug use- remote per patient    Recommendation -- ID related:     PICC Ordered  Cont with meropenem IV  DM tight control needed  Ordered drug screen    DW Dr Areli Doyle- 10/30       HPI:      Lorenzo Macedo is a 47 y.o. female [place] of UTI's-- including complicated UTI in July 8729, treated at [facility] with carbapneme for emphsematous cystitis, DM, biopolar disorder, history of breast cancer treatment-- came in to ED 10/25 with feeling sick- lower abd pain, weakness, body aches. Work up included- blood work/CXR that was normal. UA abn, urine culture later grew ESBL Klebsiella - CT scan didn't show urinary obstruction. CXR clear.   Sent home on

## 2023-11-01 VITALS
SYSTOLIC BLOOD PRESSURE: 128 MMHG | OXYGEN SATURATION: 97 % | HEIGHT: 62 IN | DIASTOLIC BLOOD PRESSURE: 80 MMHG | WEIGHT: 145 LBS | BODY MASS INDEX: 26.68 KG/M2 | TEMPERATURE: 98.6 F | RESPIRATION RATE: 18 BRPM | HEART RATE: 107 BPM

## 2023-11-01 LAB
ANION GAP SERPL CALC-SCNC: 6 MMOL/L (ref 3–18)
BUN SERPL-MCNC: 13 MG/DL (ref 7–18)
BUN/CREAT SERPL: 15 (ref 12–20)
CALCIUM SERPL-MCNC: 8.9 MG/DL (ref 8.5–10.1)
CHLORIDE SERPL-SCNC: 105 MMOL/L (ref 100–111)
CO2 SERPL-SCNC: 28 MMOL/L (ref 21–32)
CREAT SERPL-MCNC: 0.85 MG/DL (ref 0.6–1.3)
ERYTHROCYTE [DISTWIDTH] IN BLOOD BY AUTOMATED COUNT: 15.4 % (ref 11.6–14.5)
EST. AVERAGE GLUCOSE BLD GHB EST-MCNC: 206 MG/DL
GLUCOSE BLD STRIP.AUTO-MCNC: 115 MG/DL (ref 70–110)
GLUCOSE BLD STRIP.AUTO-MCNC: 125 MG/DL (ref 70–110)
GLUCOSE BLD STRIP.AUTO-MCNC: 133 MG/DL (ref 70–110)
GLUCOSE BLD STRIP.AUTO-MCNC: 134 MG/DL (ref 70–110)
GLUCOSE SERPL-MCNC: 122 MG/DL (ref 74–99)
HBA1C MFR BLD: 8.8 % (ref 4.2–5.6)
HCT VFR BLD AUTO: 27.1 % (ref 35–45)
HGB BLD-MCNC: 8.3 G/DL (ref 12–16)
MCH RBC QN AUTO: 25.9 PG (ref 24–34)
MCHC RBC AUTO-ENTMCNC: 30.6 G/DL (ref 31–37)
MCV RBC AUTO: 84.4 FL (ref 78–100)
NRBC # BLD: 0 K/UL (ref 0–0.01)
NRBC BLD-RTO: 0 PER 100 WBC
PLATELET # BLD AUTO: 370 K/UL (ref 135–420)
PMV BLD AUTO: 9.6 FL (ref 9.2–11.8)
POTASSIUM SERPL-SCNC: 4.2 MMOL/L (ref 3.5–5.5)
RBC # BLD AUTO: 3.21 M/UL (ref 4.2–5.3)
SODIUM SERPL-SCNC: 139 MMOL/L (ref 136–145)
WBC # BLD AUTO: 7.3 K/UL (ref 4.6–13.2)

## 2023-11-01 PROCEDURE — 97530 THERAPEUTIC ACTIVITIES: CPT

## 2023-11-01 PROCEDURE — 97162 PT EVAL MOD COMPLEX 30 MIN: CPT

## 2023-11-01 PROCEDURE — 85027 COMPLETE CBC AUTOMATED: CPT

## 2023-11-01 PROCEDURE — 6370000000 HC RX 637 (ALT 250 FOR IP): Performed by: HOSPITALIST

## 2023-11-01 PROCEDURE — 6360000002 HC RX W HCPCS: Performed by: INTERNAL MEDICINE

## 2023-11-01 PROCEDURE — 80048 BASIC METABOLIC PNL TOTAL CA: CPT

## 2023-11-01 PROCEDURE — 6370000000 HC RX 637 (ALT 250 FOR IP): Performed by: INTERNAL MEDICINE

## 2023-11-01 PROCEDURE — 2580000003 HC RX 258: Performed by: INTERNAL MEDICINE

## 2023-11-01 PROCEDURE — 97166 OT EVAL MOD COMPLEX 45 MIN: CPT

## 2023-11-01 PROCEDURE — 82962 GLUCOSE BLOOD TEST: CPT

## 2023-11-01 PROCEDURE — 2580000003 HC RX 258: Performed by: HOSPITALIST

## 2023-11-01 PROCEDURE — 97535 SELF CARE MNGMENT TRAINING: CPT

## 2023-11-01 PROCEDURE — 6360000002 HC RX W HCPCS: Performed by: HOSPITALIST

## 2023-11-01 RX ORDER — OXYCODONE HYDROCHLORIDE 5 MG/1
5 TABLET ORAL EVERY 4 HOURS PRN
Status: DISCONTINUED | OUTPATIENT
Start: 2023-11-01 | End: 2023-11-02 | Stop reason: HOSPADM

## 2023-11-01 RX ADMIN — OXYCODONE HYDROCHLORIDE 5 MG: 5 TABLET ORAL at 19:53

## 2023-11-01 RX ADMIN — RIVAROXABAN 20 MG: 10 TABLET, FILM COATED ORAL at 18:41

## 2023-11-01 RX ADMIN — METHOCARBAMOL 750 MG: 500 TABLET ORAL at 08:41

## 2023-11-01 RX ADMIN — MORPHINE SULFATE 1 MG: 2 INJECTION, SOLUTION INTRAMUSCULAR; INTRAVENOUS at 02:12

## 2023-11-01 RX ADMIN — FUROSEMIDE 20 MG: 20 TABLET ORAL at 08:41

## 2023-11-01 RX ADMIN — PREGABALIN 50 MG: 50 CAPSULE ORAL at 08:43

## 2023-11-01 RX ADMIN — MORPHINE SULFATE 1 MG: 2 INJECTION, SOLUTION INTRAMUSCULAR; INTRAVENOUS at 10:08

## 2023-11-01 RX ADMIN — SODIUM CHLORIDE, PRESERVATIVE FREE 10 ML: 5 INJECTION INTRAVENOUS at 09:00

## 2023-11-01 RX ADMIN — ERTAPENEM SODIUM 1000 MG: 1 INJECTION INTRAMUSCULAR; INTRAVENOUS at 14:24

## 2023-11-01 RX ADMIN — MORPHINE SULFATE 1 MG: 2 INJECTION, SOLUTION INTRAMUSCULAR; INTRAVENOUS at 06:03

## 2023-11-01 RX ADMIN — MEROPENEM 1000 MG: 1 INJECTION, POWDER, FOR SOLUTION INTRAVENOUS at 06:04

## 2023-11-01 RX ADMIN — MORPHINE SULFATE 1 MG: 2 INJECTION, SOLUTION INTRAMUSCULAR; INTRAVENOUS at 14:23

## 2023-11-01 ASSESSMENT — PAIN DESCRIPTION - ORIENTATION
ORIENTATION: LOWER;MID
ORIENTATION: RIGHT
ORIENTATION: LOWER;MID
ORIENTATION: RIGHT;LEFT;LOWER

## 2023-11-01 ASSESSMENT — PAIN SCALES - GENERAL
PAINLEVEL_OUTOF10: 10
PAINLEVEL_OUTOF10: 10
PAINLEVEL_OUTOF10: 6
PAINLEVEL_OUTOF10: 8
PAINLEVEL_OUTOF10: 9
PAINLEVEL_OUTOF10: 7
PAINLEVEL_OUTOF10: 9

## 2023-11-01 ASSESSMENT — PAIN DESCRIPTION - DESCRIPTORS
DESCRIPTORS: ACHING
DESCRIPTORS: THROBBING;SORE
DESCRIPTORS: ACHING
DESCRIPTORS: THROBBING;ACHING

## 2023-11-01 ASSESSMENT — PAIN DESCRIPTION - PAIN TYPE
TYPE: CHRONIC PAIN;ACUTE PAIN
TYPE: ACUTE PAIN;CHRONIC PAIN
TYPE: ACUTE PAIN;CHRONIC PAIN

## 2023-11-01 ASSESSMENT — PAIN DESCRIPTION - FREQUENCY
FREQUENCY: INTERMITTENT

## 2023-11-01 ASSESSMENT — PAIN DESCRIPTION - LOCATION
LOCATION: ABDOMEN
LOCATION: BACK
LOCATION: SHOULDER
LOCATION: ABDOMEN

## 2023-11-01 ASSESSMENT — PAIN - FUNCTIONAL ASSESSMENT
PAIN_FUNCTIONAL_ASSESSMENT: PREVENTS OR INTERFERES SOME ACTIVE ACTIVITIES AND ADLS

## 2023-11-01 NOTE — PLAN OF CARE
Problem: Pain  Goal: Verbalizes/displays adequate comfort level or baseline comfort level  Outcome: Progressing  Flowsheets (Taken 11/1/2023 0107)  Verbalizes/displays adequate comfort level or baseline comfort level:   Encourage patient to monitor pain and request assistance   Assess pain using appropriate pain scale   Administer analgesics based on type and severity of pain and evaluate response   Implement non-pharmacological measures as appropriate and evaluate response   Consider cultural and social influences on pain and pain management   Notify Licensed Independent Practitioner if interventions unsuccessful or patient reports new pain     Problem: Safety - Adult  Goal: Free from fall injury  Outcome: Progressing     Problem: ABCDS Injury Assessment  Goal: Absence of physical injury  Outcome: Progressing  Flowsheets (Taken 11/1/2023 0107)  Absence of Physical Injury: Implement safety measures based on patient assessment     Problem: Chronic Conditions and Co-morbidities  Goal: Patient's chronic conditions and co-morbidity symptoms are monitored and maintained or improved  Outcome: Progressing  Flowsheets (Taken 11/1/2023 0107)  Care Plan - Patient's Chronic Conditions and Co-Morbidity Symptoms are Monitored and Maintained or Improved:   Monitor and assess patient's chronic conditions and comorbid symptoms for stability, deterioration, or improvement   Collaborate with multidisciplinary team to address chronic and comorbid conditions and prevent exacerbation or deterioration   Update acute care plan with appropriate goals if chronic or comorbid symptoms are exacerbated and prevent overall improvement and discharge     Problem: Skin/Tissue Integrity  Goal: Absence of new skin breakdown  Description: 1. Monitor for areas of redness and/or skin breakdown  2. Assess vascular access sites hourly  3. Every 4-6 hours minimum:  Change oxygen saturation probe site  4.   Every 4-6 hours:  If on nasal continuous

## 2023-11-02 LAB
BACTERIA SPEC CULT: ABNORMAL
BACTERIA SPEC CULT: ABNORMAL
CC UR VC: ABNORMAL
SERVICE CMNT-IMP: ABNORMAL

## 2023-11-02 NOTE — DISCHARGE SUMMARY
Charlie Moody is a 47 y.o. female with DM, HTN, DVT, hyperlipidemia, breast ca, is called back to ER as her urine cultures grew klebsiella ESBL. She was seen here on 10/25/2023 for multiple symptoms including urinary burning and urgency. She was discharged on keflex. Her urine cultures grew klebsiella ESBL, sensitive to levaquin and cipro. Patient is allergic to levaquin. She reports she still has urinary symptoms and pain in her lower abdomen. Denies any fever/chills. In ER her WBC 13.3, CT abdomen and pelvis done 10/25/2023 showed No acute intraperitoneal process is identified. There are within the urinary bladder, correlate for recent Gomez catheter placement. Plan was to discharge her to SNF with IV antibiotics.     Per nocternist  Patient left AMA

## 2024-03-29 ENCOUNTER — HOSPITAL ENCOUNTER (EMERGENCY)
Facility: HOSPITAL | Age: 54
Discharge: HOME | End: 2024-03-29
Attending: STUDENT IN AN ORGANIZED HEALTH CARE EDUCATION/TRAINING PROGRAM
Payer: COMMERCIAL

## 2024-03-29 VITALS
HEART RATE: 97 BPM | HEIGHT: 62 IN | DIASTOLIC BLOOD PRESSURE: 85 MMHG | WEIGHT: 150 LBS | RESPIRATION RATE: 18 BRPM | OXYGEN SATURATION: 100 % | BODY MASS INDEX: 27.6 KG/M2 | SYSTOLIC BLOOD PRESSURE: 175 MMHG | TEMPERATURE: 97.6 F

## 2024-03-29 DIAGNOSIS — R25.2 LEG CRAMPS: ICD-10-CM

## 2024-03-29 DIAGNOSIS — E11.65 HYPERGLYCEMIA DUE TO DIABETES MELLITUS (HCC): Primary | ICD-10-CM

## 2024-03-29 LAB
ALBUMIN SERPL-MCNC: 3.2 G/DL (ref 3.4–5)
ALBUMIN/GLOB SERPL: 0.7 (ref 0.8–1.7)
ALP SERPL-CCNC: 148 U/L (ref 45–117)
ALT SERPL-CCNC: 10 U/L (ref 13–56)
ANION GAP SERPL CALC-SCNC: 8 MMOL/L (ref 3–18)
AST SERPL-CCNC: 6 U/L (ref 10–38)
BASOPHILS # BLD: 0 K/UL (ref 0–0.1)
BASOPHILS NFR BLD: 0 % (ref 0–2)
BILIRUB SERPL-MCNC: 0.2 MG/DL (ref 0.2–1)
BUN SERPL-MCNC: 26 MG/DL (ref 7–18)
BUN/CREAT SERPL: 21 (ref 12–20)
CALCIUM SERPL-MCNC: 9.1 MG/DL (ref 8.5–10.1)
CHLORIDE SERPL-SCNC: 105 MMOL/L (ref 100–111)
CO2 SERPL-SCNC: 24 MMOL/L (ref 21–32)
CREAT SERPL-MCNC: 1.24 MG/DL (ref 0.6–1.3)
DIFFERENTIAL METHOD BLD: ABNORMAL
EOSINOPHIL # BLD: 0 K/UL (ref 0–0.4)
EOSINOPHIL NFR BLD: 0 % (ref 0–5)
ERYTHROCYTE [DISTWIDTH] IN BLOOD BY AUTOMATED COUNT: 15.1 % (ref 11.6–14.5)
GLOBULIN SER CALC-MCNC: 4.4 G/DL (ref 2–4)
GLUCOSE SERPL-MCNC: 474 MG/DL (ref 74–99)
HCT VFR BLD AUTO: 32 % (ref 35–45)
HGB BLD-MCNC: 10.2 G/DL (ref 12–16)
IMM GRANULOCYTES # BLD AUTO: 0 K/UL (ref 0–0.04)
IMM GRANULOCYTES NFR BLD AUTO: 0 % (ref 0–0.5)
LYMPHOCYTES # BLD: 1.4 K/UL (ref 0.9–3.6)
LYMPHOCYTES NFR BLD: 14 % (ref 21–52)
MAGNESIUM SERPL-MCNC: 2.2 MG/DL (ref 1.6–2.6)
MCH RBC QN AUTO: 26.1 PG (ref 24–34)
MCHC RBC AUTO-ENTMCNC: 31.9 G/DL (ref 31–37)
MCV RBC AUTO: 81.8 FL (ref 78–100)
MONOCYTES # BLD: 0.2 K/UL (ref 0.05–1.2)
MONOCYTES NFR BLD: 2 % (ref 3–10)
NEUTS SEG # BLD: 8.2 K/UL (ref 1.8–8)
NEUTS SEG NFR BLD: 84 % (ref 40–73)
NRBC # BLD: 0 K/UL (ref 0–0.01)
NRBC BLD-RTO: 0 PER 100 WBC
PLATELET # BLD AUTO: 379 K/UL (ref 135–420)
PMV BLD AUTO: 9.7 FL (ref 9.2–11.8)
POTASSIUM SERPL-SCNC: 4.3 MMOL/L (ref 3.5–5.5)
PROT SERPL-MCNC: 7.6 G/DL (ref 6.4–8.2)
RBC # BLD AUTO: 3.91 M/UL (ref 4.2–5.3)
SODIUM SERPL-SCNC: 137 MMOL/L (ref 136–145)
WBC # BLD AUTO: 9.8 K/UL (ref 4.6–13.2)

## 2024-03-29 PROCEDURE — 99284 EMERGENCY DEPT VISIT MOD MDM: CPT

## 2024-03-29 PROCEDURE — 83735 ASSAY OF MAGNESIUM: CPT

## 2024-03-29 PROCEDURE — 85025 COMPLETE CBC W/AUTO DIFF WBC: CPT

## 2024-03-29 PROCEDURE — 80053 COMPREHEN METABOLIC PANEL: CPT

## 2024-03-29 PROCEDURE — 6370000000 HC RX 637 (ALT 250 FOR IP): Performed by: STUDENT IN AN ORGANIZED HEALTH CARE EDUCATION/TRAINING PROGRAM

## 2024-03-29 RX ORDER — BLOOD-GLUCOSE METER
1 KIT MISCELLANEOUS DAILY
Qty: 1 KIT | Refills: 0 | Status: SHIPPED | OUTPATIENT
Start: 2024-03-29 | End: 2024-03-29

## 2024-03-29 RX ORDER — LANCETS 30 GAUGE
1 EACH MISCELLANEOUS DAILY
Qty: 100 EACH | Refills: 5 | Status: SHIPPED | OUTPATIENT
Start: 2024-03-29 | End: 2024-03-29

## 2024-03-29 RX ORDER — GLUCOSAMINE HCL/CHONDROITIN SU 500-400 MG
CAPSULE ORAL
Qty: 100 STRIP | Refills: 0 | Status: SHIPPED | OUTPATIENT
Start: 2024-03-29 | End: 2024-03-29

## 2024-03-29 RX ORDER — LANCETS 30 GAUGE
1 EACH MISCELLANEOUS DAILY
Qty: 100 EACH | Refills: 5 | Status: SHIPPED | OUTPATIENT
Start: 2024-03-29

## 2024-03-29 RX ORDER — METHOCARBAMOL 750 MG/1
750 TABLET, FILM COATED ORAL 4 TIMES DAILY
Qty: 80 TABLET | Refills: 0 | Status: SHIPPED | OUTPATIENT
Start: 2024-03-29 | End: 2024-03-29

## 2024-03-29 RX ORDER — CYCLOBENZAPRINE HCL 10 MG
10 TABLET ORAL
Status: COMPLETED | OUTPATIENT
Start: 2024-03-29 | End: 2024-03-29

## 2024-03-29 RX ORDER — GLUCOSAMINE HCL/CHONDROITIN SU 500-400 MG
CAPSULE ORAL
Qty: 100 STRIP | Refills: 0 | Status: SHIPPED | OUTPATIENT
Start: 2024-03-29

## 2024-03-29 RX ORDER — BLOOD-GLUCOSE METER
1 KIT MISCELLANEOUS DAILY
Qty: 1 KIT | Refills: 0 | Status: SHIPPED | OUTPATIENT
Start: 2024-03-29

## 2024-03-29 RX ORDER — ACETAMINOPHEN 500 MG
1000 TABLET ORAL
Status: DISCONTINUED | OUTPATIENT
Start: 2024-03-29 | End: 2024-03-29 | Stop reason: HOSPADM

## 2024-03-29 RX ORDER — METHOCARBAMOL 750 MG/1
750 TABLET, FILM COATED ORAL 4 TIMES DAILY
Qty: 80 TABLET | Refills: 0 | Status: SHIPPED | OUTPATIENT
Start: 2024-03-29 | End: 2024-04-18

## 2024-03-29 RX ADMIN — CYCLOBENZAPRINE 10 MG: 10 TABLET, FILM COATED ORAL at 13:36

## 2024-03-29 RX ADMIN — INSULIN HUMAN 15 UNITS: 100 INJECTION, SOLUTION PARENTERAL at 15:05

## 2024-03-29 ASSESSMENT — PAIN DESCRIPTION - DESCRIPTORS: DESCRIPTORS: SHARP

## 2024-03-29 ASSESSMENT — PAIN SCALES - GENERAL: PAINLEVEL_OUTOF10: 10

## 2024-03-29 ASSESSMENT — PAIN DESCRIPTION - LOCATION: LOCATION: OTHER (COMMENT)

## 2024-03-29 NOTE — ED PROVIDER NOTES
TABLET    Take 1 tablet by mouth daily    IBUPROFEN (ADVIL;MOTRIN) 600 MG TABLET    Take 1 tablet by mouth 4 times daily as needed for Pain    INSULIN GLARGINE (LANTUS SOLOSTAR) 100 UNIT/ML INJECTION PEN    Inject 35 Units into the skin    LANCETS MISC    Use daily to monitor blood glucose    METHOCARBAMOL (ROBAXIN) 750 MG TABLET    Take 1 tablet by mouth daily    NALOXONE 4 MG/0.1ML LIQD NASAL SPRAY    1 spray as needed    NALOXONE 4 MG/0.1ML LIQD NASAL SPRAY    1 spray by Nasal route as needed for Opioid Reversal    PANTOPRAZOLE (PROTONIX) 40 MG TABLET    Take 1 tablet by mouth every morning (before breakfast)    PREGABALIN (LYRICA) 150 MG CAPSULE    Take 1 capsule by mouth 2 times daily. Max Daily Amount: 300 mg    PREGABALIN (LYRICA) 50 MG CAPSULE    Take 1 capsule by mouth 2 times daily.    RIVAROXABAN (XARELTO) 10 MG TABS TABLET    Take 1 tablet by mouth daily    RIVAROXABAN (XARELTO) 20 MG TABS TABLET    Take 1 tablet by mouth every morning (before breakfast)       SCREENINGS               No data recorded        PHYSICAL EXAM      ED Triage Vitals [03/29/24 1221]   Enc Vitals Group      BP (!) 175/85      Pulse 97      Respirations 18      Temp 97.6 °F (36.4 °C)      Temp src       SpO2 100 %      Weight - Scale 68 kg (150 lb)      Height 1.575 m (5' 2\")      Head Circumference       Peak Flow       Pain Score       Pain Loc       Pain Edu?       Excl. in GC?               Physical Exam  ***     DIAGNOSTIC RESULTS   LABS:     No results found for this or any previous visit (from the past 24 hour(s)).        EKG: ***     RADIOLOGY:  Non-plain film images such as CT, Ultrasound and MRI are read by the radiologist. Plain radiographic images are visualized and preliminarily interpreted by the ED Provider with the below findings:     ***     Interpretation per the Radiologist below, if available at the time of this note:     No orders to display           PROCEDURES   Unless otherwise noted below,  tablet  Generic drug: rivaroxaban     * Xarelto 20 MG Tabs tablet  Generic drug: rivaroxaban           * This list has 6 medication(s) that are the same as other medications prescribed for you. Read the directions carefully, and ask your doctor or other care provider to review them with you.                   Where to Get Your Medications        These medications were sent to [id number] - [place], [place] - [facility] - P [phone] - F [phone]  [facility] [address]      Phone: [phone]   blood glucose test strips  glucose monitoring kit  Lancets Misc  methocarbamol 750 MG tablet           DISCONTINUED MEDICATIONS:  Discharge Medication List as of 3/29/2024  3:09 PM          I am the Primary Clinician of Record.   Yaakov Leone MD (electronically signed)      (Please note that parts of this dictation were completed with voice recognition software. Quite often unanticipated grammatical, syntax, homophones, and other interpretive errors are inadvertently transcribed by the computer software. Please disregards these errors. Please excuse any errors that have escaped final proofreading.)         Yaakov Leone MD  04/02/24 5211

## 2024-03-29 NOTE — ED NOTES
Pt stated she had recently started taking steroids and said that was the reason her glucose may have been elevated. This nurse educated pt on insulin and glucose monitoring and emphasized follow up care. Pt verbalized understanding.

## 2024-04-18 ENCOUNTER — HOSPITAL ENCOUNTER (EMERGENCY)
Facility: HOSPITAL | Age: 54
Discharge: LEFT BEFORE DISCHARGE | End: 2024-04-18
Attending: EMERGENCY MEDICINE

## 2024-04-18 VITALS
WEIGHT: 145 LBS | SYSTOLIC BLOOD PRESSURE: 147 MMHG | RESPIRATION RATE: 16 BRPM | BODY MASS INDEX: 26.68 KG/M2 | HEART RATE: 98 BPM | TEMPERATURE: 98.1 F | OXYGEN SATURATION: 99 % | HEIGHT: 62 IN | DIASTOLIC BLOOD PRESSURE: 87 MMHG

## 2024-04-18 DIAGNOSIS — K02.9 TOOTH DECAY: Primary | ICD-10-CM

## 2024-04-18 DIAGNOSIS — K04.01 PULPITIS: ICD-10-CM

## 2024-04-18 DIAGNOSIS — S29.019A THORACIC MYOFASCIAL STRAIN, INITIAL ENCOUNTER: ICD-10-CM

## 2024-04-18 PROCEDURE — 4500000002 HC ER NO CHARGE

## 2024-04-18 RX ORDER — LIDOCAINE 50 MG/G
1 PATCH TOPICAL DAILY
Qty: 10 PATCH | Refills: 0 | Status: SHIPPED | OUTPATIENT
Start: 2024-04-18 | End: 2024-04-28

## 2024-04-18 RX ORDER — CLINDAMYCIN HYDROCHLORIDE 300 MG/1
300 CAPSULE ORAL 3 TIMES DAILY
Qty: 21 CAPSULE | Refills: 0 | Status: SHIPPED | OUTPATIENT
Start: 2024-04-18 | End: 2024-04-25

## 2024-04-18 RX ORDER — DIPHENHYDRAMINE HCL 12.5MG/5ML
12.5 LIQUID (ML) ORAL EVERY 6 HOURS PRN
Status: DISCONTINUED | OUTPATIENT
Start: 2024-04-18 | End: 2024-04-18

## 2024-04-18 RX ORDER — CELECOXIB 200 MG/1
200 CAPSULE ORAL 2 TIMES DAILY
Qty: 60 CAPSULE | Refills: 0 | Status: SHIPPED | OUTPATIENT
Start: 2024-04-18

## 2024-04-18 ASSESSMENT — PAIN - FUNCTIONAL ASSESSMENT: PAIN_FUNCTIONAL_ASSESSMENT: 0-10

## 2024-04-18 ASSESSMENT — PAIN DESCRIPTION - LOCATION: LOCATION: TEETH

## 2024-04-18 NOTE — ED TRIAGE NOTES
Patient states that she was eating something hard yesterday and think she broke her tooth yesterday on the right side. Also complaints of left sided neck pain that started 3 days ago.

## 2024-04-19 ENCOUNTER — APPOINTMENT (OUTPATIENT)
Facility: HOSPITAL | Age: 54
End: 2024-04-19
Payer: COMMERCIAL

## 2024-04-19 ENCOUNTER — HOSPITAL ENCOUNTER (EMERGENCY)
Facility: HOSPITAL | Age: 54
Discharge: HOME | End: 2024-04-19
Attending: EMERGENCY MEDICINE
Payer: COMMERCIAL

## 2024-04-19 VITALS
OXYGEN SATURATION: 100 % | HEART RATE: 89 BPM | TEMPERATURE: 98.3 F | DIASTOLIC BLOOD PRESSURE: 59 MMHG | BODY MASS INDEX: 23.3 KG/M2 | WEIGHT: 145 LBS | RESPIRATION RATE: 20 BRPM | HEIGHT: 66 IN | SYSTOLIC BLOOD PRESSURE: 126 MMHG

## 2024-04-19 DIAGNOSIS — M54.2 NECK PAIN: Primary | ICD-10-CM

## 2024-04-19 DIAGNOSIS — M54.50 ACUTE BILATERAL LOW BACK PAIN, UNSPECIFIED WHETHER SCIATICA PRESENT: ICD-10-CM

## 2024-04-19 PROCEDURE — 72070 X-RAY EXAM THORAC SPINE 2VWS: CPT

## 2024-04-19 PROCEDURE — 99283 EMERGENCY DEPT VISIT LOW MDM: CPT

## 2024-04-19 PROCEDURE — 6370000000 HC RX 637 (ALT 250 FOR IP): Performed by: EMERGENCY MEDICINE

## 2024-04-19 RX ORDER — CYCLOBENZAPRINE HCL 10 MG
10 TABLET ORAL
Status: COMPLETED | OUTPATIENT
Start: 2024-04-19 | End: 2024-04-19

## 2024-04-19 RX ORDER — CYCLOBENZAPRINE HCL 10 MG
10 TABLET ORAL NIGHTLY PRN
Qty: 10 TABLET | Refills: 0 | Status: SHIPPED | OUTPATIENT
Start: 2024-04-19 | End: 2024-04-29

## 2024-04-19 RX ORDER — KETOROLAC TROMETHAMINE 15 MG/ML
30 INJECTION, SOLUTION INTRAMUSCULAR; INTRAVENOUS
Status: DISCONTINUED | OUTPATIENT
Start: 2024-04-19 | End: 2024-04-19 | Stop reason: HOSPADM

## 2024-04-19 RX ORDER — LIDOCAINE 50 MG/G
1 PATCH TOPICAL DAILY
Qty: 10 PATCH | Refills: 0 | Status: SHIPPED | OUTPATIENT
Start: 2024-04-19 | End: 2024-04-29

## 2024-04-19 RX ADMIN — CYCLOBENZAPRINE 10 MG: 10 TABLET, FILM COATED ORAL at 11:44

## 2024-04-19 ASSESSMENT — PAIN SCALES - GENERAL
PAINLEVEL_OUTOF10: 10
PAINLEVEL_OUTOF10: 10

## 2024-04-19 ASSESSMENT — PAIN - FUNCTIONAL ASSESSMENT: PAIN_FUNCTIONAL_ASSESSMENT: 0-10

## 2024-04-19 NOTE — ED NOTES
Verified allergies with patient.  Patient states she is allergic to toradol.  Added toradol to allergy list.  Held administration and notified provider.

## 2024-04-19 NOTE — ED TRIAGE NOTES
Pt has hx of chronic pain in neck and back, sts she feels like her arthritis is flaring up.   Is seeing ortho for right knee pain. Has appt for steroid shot at office today at 1045.

## 2024-04-19 NOTE — ED PROVIDER NOTES
tissue expanders insertion    BREAST RECONSTRUCTION Bilateral 2018    tissue expanders removed, no implants    COLONOSCOPY N/A 07/18/2016    COLONOSCOPY performed by Jamal Hernandez MD at [facility]    HYSTERECTOMY (CERVIX STATUS UNKNOWN)  2013    LUMBAR FUSION  2020    MASTECTOMY Bilateral 2013    SALPINGO-OOPHORECTOMY Bilateral 2013    TONSILLECTOMY      TUBAL LIGATION  1998    VASCULAR SURGERY      medi port removed    VASCULAR SURGERY  2013    medi port    XR MIDLINE EQUAL OR GREATER THAN 5 YEARS  2/24/2016    XR MIDLINE EQUAL OR GREATER THAN 5 YEARS 2/24/2016       Family History:  Family History   Problem Relation Age of Onset    Heart Disease Mother     Diabetes Other     Hypertension Other     Cancer Maternal Grandmother     Stroke Father     Heart Disease Father 50       Social History:  Social History     Tobacco Use    Smoking status: Never    Smokeless tobacco: Never   Substance Use Topics    Alcohol use: Not Currently     Alcohol/week: 2.0 standard drinks of alcohol    Drug use: Not Currently     Types: Cocaine, Methamphetamines (Crystal Meth)       Allergies:  Allergies   Allergen Reactions    Latex Hives and Itching    Aspirin Swelling     Other reaction(s): Not Reported This Time  Mouth swells    Beeswax Anaphylaxis    Levofloxacin Swelling     Other reaction(s): Not Reported This Time  Other reaction(s): mild rash/itching    Penicillins Swelling     Other reaction(s): mild rash/itching      Glycopyrrolate Swelling     Pt  States  faciAL  SWELLING   POST  ROBINUL  IV   Pt  States  faciAL  SWELLING   POST  ROBINUL  IV     Hydromorphone Other (See Comments)     Patient had previous addiction to medication and would not like this medication prescribed again.     Toradol [Ketorolac Tromethamine] Swelling    Tramadol Swelling    Adhesive Tape Rash    Macadamia Nut Oil Itching and Rash    Other Rash     Almonds       PCP: Loi Melchor MD    Current Meds:   Current Facility-Administered 
Strong peripheral pulses

## 2024-04-20 NOTE — ED PROVIDER NOTES
mouth every morning (before breakfast)      pregabalin (LYRICA) 150 MG capsule Take 1 capsule by mouth 2 times daily. Max Daily Amount: 300 mg      ibuprofen (ADVIL;MOTRIN) 600 MG tablet Take 1 tablet by mouth 4 times daily as needed for Pain 40 tablet 0    furosemide (LASIX) 20 MG tablet Take 1 tablet by mouth daily 7 tablet 0    dexamethasone (DECADRON) 4 MG tablet Take 4 mg po twice a day for 2 days , then 2 mg po twice a day for 4 days, then 2 mg daily X 4 days (Patient not taking: Reported on 10/3/2023) 12 tablet 0    naloxone 4 MG/0.1ML LIQD nasal spray 1 spray by Nasal route as needed for Opioid Reversal (Patient not taking: Reported on 10/30/2023) 1 each 1    pantoprazole (PROTONIX) 40 MG tablet Take 1 tablet by mouth every morning (before breakfast) (Patient not taking: Reported on 10/3/2023) 30 tablet 0    rivaroxaban (XARELTO) 10 MG TABS tablet Take 1 tablet by mouth daily (Patient not taking: Reported on 10/30/2023)      insulin glargine (LANTUS SOLOSTAR) 100 UNIT/ML injection pen Inject 35 Units into the skin      naloxone 4 MG/0.1ML LIQD nasal spray 1 spray as needed      pregabalin (LYRICA) 50 MG capsule Take 1 capsule by mouth 2 times daily.         Past History     Past Medical History:  Past Medical History:   Diagnosis Date    Anxiety     Bipolar disorder (HCC)     Breast cancer (HCC)     breast cancer, right, S/P Mastectomy and chemo, radiation in 2013    Chronic pain     cervical    Cocaine abuse (HCC)     Depression     Diabetes (HCC)     Drug abuse (HCC)     H/O cocaine use in past    Drug-seeking behavior     Family history of early CAD     Gastrointestinal disorder     cholitis    H/O ETOH abuse     History of chemotherapy 2013    History of radiation therapy 2013    Hyperlipidemia     Hypertension     Malignant neoplasm without specification of site (HCC)     Methamphetamine abuse ([facility])      self reported on 1/3/16    Peripheral neuropathy 2022    Psychiatric disorder     multiple

## 2024-05-28 ENCOUNTER — HOSPITAL ENCOUNTER (EMERGENCY)
Facility: HOSPITAL | Age: 54
Discharge: HOME | End: 2024-05-28
Payer: COMMERCIAL

## 2024-05-28 VITALS
HEIGHT: 62 IN | OXYGEN SATURATION: 96 % | BODY MASS INDEX: 25.76 KG/M2 | SYSTOLIC BLOOD PRESSURE: 132 MMHG | DIASTOLIC BLOOD PRESSURE: 80 MMHG | RESPIRATION RATE: 14 BRPM | WEIGHT: 140 LBS | HEART RATE: 103 BPM | TEMPERATURE: 97.3 F

## 2024-05-28 DIAGNOSIS — N30.01 ACUTE CYSTITIS WITH HEMATURIA: Primary | ICD-10-CM

## 2024-05-28 LAB
APPEARANCE UR: ABNORMAL
BACTERIA URNS QL MICRO: ABNORMAL /HPF
BILIRUB UR QL: NEGATIVE
COLOR UR: YELLOW
EPITH CASTS URNS QL MICRO: ABNORMAL /LPF (ref 0–5)
GLUCOSE UR STRIP.AUTO-MCNC: NEGATIVE MG/DL
HGB UR QL STRIP: ABNORMAL
KETONES UR QL STRIP.AUTO: ABNORMAL MG/DL
LEUKOCYTE ESTERASE UR QL STRIP.AUTO: ABNORMAL
NITRITE UR QL STRIP.AUTO: NEGATIVE
PH UR STRIP: 5.5 (ref 5–8)
PROT UR STRIP-MCNC: >1000 MG/DL
RBC #/AREA URNS HPF: ABNORMAL /HPF (ref 0–5)
SP GR UR REFRACTOMETRY: 1.02 (ref 1–1.03)
UROBILINOGEN UR QL STRIP.AUTO: 0.2 EU/DL (ref 0.2–1)
WBC URNS QL MICRO: ABNORMAL /HPF (ref 0–5)

## 2024-05-28 PROCEDURE — 6370000000 HC RX 637 (ALT 250 FOR IP): Performed by: PHYSICIAN ASSISTANT

## 2024-05-28 PROCEDURE — 81001 URINALYSIS AUTO W/SCOPE: CPT

## 2024-05-28 PROCEDURE — 87186 SC STD MICRODIL/AGAR DIL: CPT

## 2024-05-28 PROCEDURE — 99283 EMERGENCY DEPT VISIT LOW MDM: CPT

## 2024-05-28 PROCEDURE — 87086 URINE CULTURE/COLONY COUNT: CPT

## 2024-05-28 PROCEDURE — 87088 URINE BACTERIA CULTURE: CPT

## 2024-05-28 RX ORDER — CIPROFLOXACIN 250 MG/1
250 TABLET, FILM COATED ORAL 2 TIMES DAILY
Qty: 10 TABLET | Refills: 0 | Status: SHIPPED | OUTPATIENT
Start: 2024-05-28 | End: 2024-06-02

## 2024-05-28 RX ORDER — CIPROFLOXACIN 500 MG/1
250 TABLET, FILM COATED ORAL
Status: COMPLETED | OUTPATIENT
Start: 2024-05-28 | End: 2024-05-28

## 2024-05-28 RX ORDER — PHENAZOPYRIDINE HYDROCHLORIDE 100 MG/1
100 TABLET, FILM COATED ORAL 3 TIMES DAILY PRN
Qty: 6 TABLET | Refills: 0 | Status: SHIPPED | OUTPATIENT
Start: 2024-05-28 | End: 2024-05-31

## 2024-05-28 RX ORDER — PHENAZOPYRIDINE HYDROCHLORIDE 100 MG/1
200 TABLET, FILM COATED ORAL
Status: COMPLETED | OUTPATIENT
Start: 2024-05-28 | End: 2024-05-28

## 2024-05-28 RX ADMIN — PHENAZOPYRIDINE 200 MG: 100 TABLET ORAL at 18:16

## 2024-05-28 RX ADMIN — CIPROFLOXACIN HYDROCHLORIDE 250 MG: 500 TABLET, FILM COATED ORAL at 18:15

## 2024-05-28 ASSESSMENT — PAIN SCALES - GENERAL: PAINLEVEL_OUTOF10: 10

## 2024-05-28 ASSESSMENT — PAIN DESCRIPTION - LOCATION: LOCATION: ABDOMEN

## 2024-05-28 ASSESSMENT — PAIN - FUNCTIONAL ASSESSMENT: PAIN_FUNCTIONAL_ASSESSMENT: 0-10

## 2024-05-28 NOTE — DISCHARGE INSTRUCTIONS
Your urine does show signs of infection, urine culture was sent  Will send home on ciprofloxacin.  1 tablet twice a day for 5 days.  You can take Pyridium to help with the bladder irritation spasm  You can also take Tylenol  Is importantly follow-up with your primary care later this week for further evaluation and treatment  Return to ER if any new or worsening symptoms or new concerns

## 2024-05-28 NOTE — ED PROVIDER NOTES
EMERGENCY DEPARTMENT HISTORY & PHYSICAL EXAM    5/28/24, 4:46 PM EDT    Clinical Impression:  1. Acute cystitis with hematuria        Assessment/Differential Diagnosis:     Ddx UTI, hematuria, vaginal bleeding, trauma, intra-abdominal process, anticoagulated all considered    ED Course:   Initial assessment performed. The patients presenting problems have been discussed, and they are in agreement with the care plan formulated and outlined with them.  I have encouraged them to ask questions as they arise throughout their visit.    Patient comes the ED with her .  Patient states 10 days ago she urinated and when she wiped there was a small amount of blood on the tissue.  This happened for 1 day.since then no blood with wiping, no blood in underwear.  Patient states for the past 2 days she has had intermittent dysuria, and low abdomen pain.  Frequency and urgency.  Bowel movements have been normal.  No abdominal trauma.  No fever or chills.  Review of her records does reveal that she has had Klebsiella ESBL with urine culture in the recent past.  Patient is on anticoagulation due to clots.  Patient states this will be lifelong.  Status post hysterectomy.    Exam with middle-aged female who appears older than stated age.  She appears chronically ill.  She is alert and oriented answering all my questions appropriately.  Initial vitals reveal pulse of 103, it was 91 with me.  Heart regular rate and rhythm without murmur.  Lungs clear to auscultation bilaterally.  Abdomen is soft with good bowel sounds.  She has mild suprapubic tenderness with the sensation to urinate with palpation to this area.  No mass appreciated.    Urine does reveal concern for infection.  Urine culture was sent.  Review of her records reveals last urine culture October 2023 with Klebsiella ESBL, sensitive to Augmentin, Cipro, Levaquin.    Discussed results with patient.  She states that she believes she

## 2024-05-31 LAB
BACTERIA SPEC CULT: ABNORMAL
CC UR VC: ABNORMAL
SERVICE CMNT-IMP: ABNORMAL

## 2024-09-24 ENCOUNTER — HOSPITAL ENCOUNTER (EMERGENCY)
Facility: HOSPITAL | Age: 54
Discharge: HOME | End: 2024-09-24
Attending: EMERGENCY MEDICINE
Payer: COMMERCIAL

## 2024-09-24 VITALS
OXYGEN SATURATION: 100 % | TEMPERATURE: 98.4 F | RESPIRATION RATE: 14 BRPM | SYSTOLIC BLOOD PRESSURE: 120 MMHG | WEIGHT: 150 LBS | DIASTOLIC BLOOD PRESSURE: 59 MMHG | HEIGHT: 62 IN | HEART RATE: 87 BPM | BODY MASS INDEX: 27.6 KG/M2

## 2024-09-24 DIAGNOSIS — R07.89 CHEST WALL PAIN: ICD-10-CM

## 2024-09-24 DIAGNOSIS — S16.1XXA ACUTE STRAIN OF NECK MUSCLE, INITIAL ENCOUNTER: Primary | ICD-10-CM

## 2024-09-24 LAB
ALBUMIN SERPL-MCNC: 2.1 G/DL (ref 3.4–5)
ALBUMIN/GLOB SERPL: 0.4 (ref 0.8–1.7)
ALP SERPL-CCNC: 119 U/L (ref 45–117)
ALT SERPL-CCNC: 8 U/L (ref 13–56)
ANION GAP SERPL CALC-SCNC: 5 MMOL/L (ref 3–18)
AST SERPL-CCNC: 9 U/L (ref 10–38)
BASOPHILS # BLD: 0 K/UL (ref 0–0.1)
BASOPHILS NFR BLD: 0 % (ref 0–2)
BILIRUB SERPL-MCNC: 0.3 MG/DL (ref 0.2–1)
BUN SERPL-MCNC: 7 MG/DL (ref 7–18)
BUN/CREAT SERPL: 8 (ref 12–20)
CALCIUM SERPL-MCNC: 9 MG/DL (ref 8.5–10.1)
CHLORIDE SERPL-SCNC: 103 MMOL/L (ref 100–111)
CO2 SERPL-SCNC: 28 MMOL/L (ref 21–32)
CREAT SERPL-MCNC: 0.87 MG/DL (ref 0.6–1.3)
DIFFERENTIAL METHOD BLD: ABNORMAL
EKG ATRIAL RATE: 101 BPM
EKG DIAGNOSIS: NORMAL
EKG P AXIS: 65 DEGREES
EKG P-R INTERVAL: 164 MS
EKG Q-T INTERVAL: 384 MS
EKG QRS DURATION: 76 MS
EKG QTC CALCULATION (BAZETT): 497 MS
EKG R AXIS: -31 DEGREES
EKG T AXIS: 47 DEGREES
EKG VENTRICULAR RATE: 101 BPM
EOSINOPHIL # BLD: 0.3 K/UL (ref 0–0.4)
EOSINOPHIL NFR BLD: 3 % (ref 0–5)
ERYTHROCYTE [DISTWIDTH] IN BLOOD BY AUTOMATED COUNT: 14.4 % (ref 11.6–14.5)
GLOBULIN SER CALC-MCNC: 5.4 G/DL (ref 2–4)
GLUCOSE SERPL-MCNC: 172 MG/DL (ref 74–99)
HCT VFR BLD AUTO: 28 % (ref 35–45)
HGB BLD-MCNC: 9.1 G/DL (ref 12–16)
IMM GRANULOCYTES # BLD AUTO: 0 K/UL (ref 0–0.04)
IMM GRANULOCYTES NFR BLD AUTO: 0 % (ref 0–0.5)
LYMPHOCYTES # BLD: 1.7 K/UL (ref 0.9–3.6)
LYMPHOCYTES NFR BLD: 20 % (ref 21–52)
MAGNESIUM SERPL-MCNC: 1.7 MG/DL (ref 1.6–2.6)
MCH RBC QN AUTO: 25.3 PG (ref 24–34)
MCHC RBC AUTO-ENTMCNC: 32.5 G/DL (ref 31–37)
MCV RBC AUTO: 78 FL (ref 78–100)
MONOCYTES # BLD: 0.5 K/UL (ref 0.05–1.2)
MONOCYTES NFR BLD: 6 % (ref 3–10)
NEUTS SEG # BLD: 6.1 K/UL (ref 1.8–8)
NEUTS SEG NFR BLD: 71 % (ref 40–73)
NRBC # BLD: 0 K/UL (ref 0–0.01)
NRBC BLD-RTO: 0 PER 100 WBC
PLATELET # BLD AUTO: 419 K/UL (ref 135–420)
PMV BLD AUTO: 8.6 FL (ref 9.2–11.8)
POTASSIUM SERPL-SCNC: 3.3 MMOL/L (ref 3.5–5.5)
PROT SERPL-MCNC: 7.5 G/DL (ref 6.4–8.2)
RBC # BLD AUTO: 3.59 M/UL (ref 4.2–5.3)
SODIUM SERPL-SCNC: 136 MMOL/L (ref 136–145)
TROPONIN I SERPL HS-MCNC: 7 NG/L (ref 0–54)
WBC # BLD AUTO: 8.6 K/UL (ref 4.6–13.2)

## 2024-09-24 PROCEDURE — 99284 EMERGENCY DEPT VISIT MOD MDM: CPT

## 2024-09-24 PROCEDURE — 85025 COMPLETE CBC W/AUTO DIFF WBC: CPT

## 2024-09-24 PROCEDURE — 80053 COMPREHEN METABOLIC PANEL: CPT

## 2024-09-24 PROCEDURE — 84484 ASSAY OF TROPONIN QUANT: CPT

## 2024-09-24 PROCEDURE — 83735 ASSAY OF MAGNESIUM: CPT

## 2024-09-24 PROCEDURE — 6370000000 HC RX 637 (ALT 250 FOR IP): Performed by: EMERGENCY MEDICINE

## 2024-09-24 RX ORDER — DIAZEPAM 5 MG
5 TABLET ORAL EVERY 6 HOURS PRN
Status: DISCONTINUED | OUTPATIENT
Start: 2024-09-24 | End: 2024-09-24 | Stop reason: HOSPADM

## 2024-09-24 RX ORDER — METHOCARBAMOL 750 MG/1
750 TABLET, FILM COATED ORAL 4 TIMES DAILY
Qty: 40 TABLET | Refills: 0 | Status: SHIPPED | OUTPATIENT
Start: 2024-09-24 | End: 2024-10-04

## 2024-09-24 RX ORDER — DIAZEPAM 10 MG/2ML
5 INJECTION, SOLUTION INTRAMUSCULAR; INTRAVENOUS ONCE
Status: DISCONTINUED | OUTPATIENT
Start: 2024-09-24 | End: 2024-09-24 | Stop reason: HOSPADM

## 2024-09-24 RX ORDER — LIDOCAINE 50 MG/G
1 PATCH TOPICAL DAILY
Qty: 10 PATCH | Refills: 0 | Status: SHIPPED | OUTPATIENT
Start: 2024-09-24 | End: 2024-10-04

## 2024-09-24 RX ADMIN — DIAZEPAM 5 MG: 5 TABLET ORAL at 16:19

## 2024-09-24 ASSESSMENT — LIFESTYLE VARIABLES
HOW MANY STANDARD DRINKS CONTAINING ALCOHOL DO YOU HAVE ON A TYPICAL DAY: PATIENT DOES NOT DRINK
HOW OFTEN DO YOU HAVE A DRINK CONTAINING ALCOHOL: NEVER

## 2024-09-30 LAB
EKG ATRIAL RATE: 101 BPM
EKG DIAGNOSIS: NORMAL
EKG P AXIS: 65 DEGREES
EKG P-R INTERVAL: 164 MS
EKG Q-T INTERVAL: 384 MS
EKG QRS DURATION: 76 MS
EKG QTC CALCULATION (BAZETT): 497 MS
EKG R AXIS: -31 DEGREES
EKG T AXIS: 47 DEGREES
EKG VENTRICULAR RATE: 101 BPM

## 2025-02-23 ENCOUNTER — HOSPITAL ENCOUNTER (EMERGENCY)
Facility: HOSPITAL | Age: 55
Discharge: HOME | End: 2025-02-23
Payer: COMMERCIAL

## 2025-02-23 VITALS
TEMPERATURE: 98.5 F | HEART RATE: 98 BPM | DIASTOLIC BLOOD PRESSURE: 75 MMHG | BODY MASS INDEX: 27.6 KG/M2 | OXYGEN SATURATION: 99 % | RESPIRATION RATE: 20 BRPM | SYSTOLIC BLOOD PRESSURE: 143 MMHG | HEIGHT: 62 IN | WEIGHT: 150 LBS

## 2025-02-23 DIAGNOSIS — N30.01 ACUTE CYSTITIS WITH HEMATURIA: Primary | ICD-10-CM

## 2025-02-23 DIAGNOSIS — G89.29 OTHER CHRONIC PAIN: ICD-10-CM

## 2025-02-23 LAB
APPEARANCE UR: ABNORMAL
BACTERIA URNS QL MICRO: ABNORMAL /HPF
BILIRUB UR QL: NEGATIVE
COLOR UR: YELLOW
EPITH CASTS URNS QL MICRO: ABNORMAL /LPF (ref 0–5)
FLUAV RNA SPEC QL NAA+PROBE: NOT DETECTED
FLUBV RNA SPEC QL NAA+PROBE: NOT DETECTED
GLUCOSE UR STRIP.AUTO-MCNC: NEGATIVE MG/DL
HGB UR QL STRIP: ABNORMAL
KETONES UR QL STRIP.AUTO: NEGATIVE MG/DL
LEUKOCYTE ESTERASE UR QL STRIP.AUTO: ABNORMAL
NITRITE UR QL STRIP.AUTO: POSITIVE
PH UR STRIP: 5.5 (ref 5–8)
PROT UR STRIP-MCNC: 300 MG/DL
RBC #/AREA URNS HPF: ABNORMAL /HPF (ref 0–5)
SARS-COV-2 RNA RESP QL NAA+PROBE: NOT DETECTED
SOURCE: NORMAL
SP GR UR REFRACTOMETRY: 1.02 (ref 1–1.03)
UROBILINOGEN UR QL STRIP.AUTO: 0.2 EU/DL (ref 0.2–1)
WBC URNS QL MICRO: ABNORMAL /HPF (ref 0–5)

## 2025-02-23 PROCEDURE — 87186 SC STD MICRODIL/AGAR DIL: CPT

## 2025-02-23 PROCEDURE — 87636 SARSCOV2 & INF A&B AMP PRB: CPT

## 2025-02-23 PROCEDURE — 81001 URINALYSIS AUTO W/SCOPE: CPT

## 2025-02-23 PROCEDURE — 87088 URINE BACTERIA CULTURE: CPT

## 2025-02-23 PROCEDURE — 87086 URINE CULTURE/COLONY COUNT: CPT

## 2025-02-23 PROCEDURE — 6370000000 HC RX 637 (ALT 250 FOR IP)

## 2025-02-23 PROCEDURE — 99284 EMERGENCY DEPT VISIT MOD MDM: CPT

## 2025-02-23 RX ORDER — CEPHALEXIN 500 MG/1
500 CAPSULE ORAL 2 TIMES DAILY
Qty: 14 CAPSULE | Refills: 0 | Status: SHIPPED | OUTPATIENT
Start: 2025-02-23 | End: 2025-03-02

## 2025-02-23 RX ORDER — OXYCODONE HYDROCHLORIDE 5 MG/1
2.5 TABLET ORAL
Status: COMPLETED | OUTPATIENT
Start: 2025-02-23 | End: 2025-02-23

## 2025-02-23 RX ADMIN — OXYCODONE 2.5 MG: 5 TABLET ORAL at 17:10

## 2025-02-23 ASSESSMENT — PAIN DESCRIPTION - LOCATION: LOCATION: LEG

## 2025-02-23 ASSESSMENT — PAIN DESCRIPTION - ORIENTATION: ORIENTATION: LEFT;RIGHT

## 2025-02-23 ASSESSMENT — PAIN - FUNCTIONAL ASSESSMENT: PAIN_FUNCTIONAL_ASSESSMENT: 0-10

## 2025-02-23 ASSESSMENT — PAIN SCALES - GENERAL: PAINLEVEL_OUTOF10: 10

## 2025-02-23 NOTE — ED TRIAGE NOTES
Patient arrived from home via EMS c/o body aches and chills x1 week. She has hx of arthritis and states its worse. She also has concerns for blood clots in her legs and wants to be check for that. When EMS was moving her to stretcher, patient felt weak and thought leg would give out.

## 2025-02-23 NOTE — ED PROVIDER NOTES
[facility]  EMERGENCY DEPARTMENT ENCOUNTER       Pt Name: Kathleen Goldsmith  MRN: [id number]  Birthdate 1970  Date of evaluation: 2/23/2025  PCP: Loi Melchor MD  Note Started: 5:59 PM 2/23/25     CHIEF COMPLAINT       Chief Complaint   Patient presents with    Generalized Body Aches    Chills    Lightheadedness    Leg Pain        HISTORY OF PRESENT ILLNESS: 1 or more elements      History From: Patient  HPI Limitations: None      Kathleen Goldsmith is a 54 y.o. female who presents to ED c/o generalized bodyaches.  Patient reports that she has not felt well for the last 2 days states she has had all over body aches and chills.  Reports that she has been bedbound for the last 2 years and unable to ambulate and they are not sure why.  States that home health comes to her house for physical therapy to help her get stronger.  States that she saw a doctor a few months ago who is the one who put in for home health for her.  Denies chest pain, fever, shortness of breath, abdominal pain.  States that she does have dysuria.  Reports she is diabetic, insulin treated and stays up with her diabetes.  Denies headache, vision changes.      Nursing Notes were all reviewed and agreed with or any disagreements were addressed in the HPI.    PAST HISTORY     Past Medical History:  Past Medical History:   Diagnosis Date    Anxiety     Bipolar disorder (HCC)     Breast cancer (HCC)     breast cancer, right, S/P Mastectomy and chemo, radiation in 2013    Chronic pain     cervical    Cocaine abuse (HCC)     Depression     Diabetes (HCC)     Drug abuse (HCC)     H/O cocaine use in past    Drug-seeking behavior     Family history of early CAD     Gastrointestinal disorder     cholitis    H/O ETOH abuse     History of chemotherapy 2013    History of radiation therapy 2013    Hyperlipidemia     Hypertension     Malignant neoplasm without specification of site (HCC)     Methamphetamine abuse (HCC)      self

## 2025-04-28 ENCOUNTER — HOSPITAL ENCOUNTER (EMERGENCY)
Facility: HOSPITAL | Age: 55
Discharge: HOME | End: 2025-04-29
Payer: COMMERCIAL

## 2025-04-28 ENCOUNTER — APPOINTMENT (OUTPATIENT)
Facility: HOSPITAL | Age: 55
End: 2025-04-28
Payer: COMMERCIAL

## 2025-04-28 DIAGNOSIS — N39.0 UTI (URINARY TRACT INFECTION), UNCOMPLICATED: Primary | ICD-10-CM

## 2025-04-28 LAB
ALBUMIN SERPL-MCNC: 2.3 G/DL (ref 3.4–5)
ALBUMIN/GLOB SERPL: 0.4 (ref 0.8–1.7)
ALP SERPL-CCNC: 167 U/L (ref 45–117)
ALT SERPL-CCNC: 15 U/L (ref 13–56)
ANION GAP SERPL CALC-SCNC: 5 MMOL/L (ref 3–18)
APPEARANCE UR: ABNORMAL
AST SERPL-CCNC: 13 U/L (ref 10–38)
BACTERIA URNS QL MICRO: ABNORMAL /HPF
BASOPHILS # BLD: 0.05 K/UL (ref 0–0.1)
BASOPHILS NFR BLD: 0.5 % (ref 0–2)
BILIRUB SERPL-MCNC: 0.2 MG/DL (ref 0.2–1)
BILIRUB UR QL: NEGATIVE
BUN SERPL-MCNC: 30 MG/DL (ref 7–18)
BUN/CREAT SERPL: 27 (ref 12–20)
CALCIUM SERPL-MCNC: 8.2 MG/DL (ref 8.5–10.1)
CHLORIDE SERPL-SCNC: 105 MMOL/L (ref 100–111)
CO2 SERPL-SCNC: 25 MMOL/L (ref 21–32)
COLOR UR: YELLOW
CREAT SERPL-MCNC: 1.12 MG/DL (ref 0.6–1.3)
DIFFERENTIAL METHOD BLD: ABNORMAL
ECHO BSA: 1.73 M2
EOSINOPHIL # BLD: 0.28 K/UL (ref 0–0.4)
EOSINOPHIL NFR BLD: 2.9 % (ref 0–5)
EPITH CASTS URNS QL MICRO: ABNORMAL /LPF (ref 0–5)
ERYTHROCYTE [DISTWIDTH] IN BLOOD BY AUTOMATED COUNT: 15.2 % (ref 11.6–14.5)
GLOBULIN SER CALC-MCNC: 5.4 G/DL (ref 2–4)
GLUCOSE SERPL-MCNC: 305 MG/DL (ref 74–99)
GLUCOSE UR STRIP.AUTO-MCNC: NEGATIVE MG/DL
HCT VFR BLD AUTO: 27.2 % (ref 35–45)
HGB BLD-MCNC: 8.4 G/DL (ref 12–16)
HGB UR QL STRIP: ABNORMAL
IMM GRANULOCYTES # BLD AUTO: 0.03 K/UL (ref 0–0.04)
IMM GRANULOCYTES NFR BLD AUTO: 0.3 % (ref 0–0.5)
KETONES UR QL STRIP.AUTO: NEGATIVE MG/DL
LACTATE BLD-SCNC: 1.28 MMOL/L (ref 0.4–2)
LEUKOCYTE ESTERASE UR QL STRIP.AUTO: ABNORMAL
LIPASE SERPL-CCNC: 56 U/L (ref 13–75)
LYMPHOCYTES # BLD: 1.86 K/UL (ref 0.9–3.3)
LYMPHOCYTES NFR BLD: 19.3 % (ref 21–52)
MAGNESIUM SERPL-MCNC: 2 MG/DL (ref 1.6–2.6)
MCH RBC QN AUTO: 25.1 PG (ref 24–34)
MCHC RBC AUTO-ENTMCNC: 30.9 G/DL (ref 31–37)
MCV RBC AUTO: 81.2 FL (ref 78–100)
MONOCYTES # BLD: 0.53 K/UL (ref 0.05–1.2)
MONOCYTES NFR BLD: 5.5 % (ref 3–10)
NEUTS SEG # BLD: 6.91 K/UL (ref 1.8–8)
NEUTS SEG NFR BLD: 71.5 % (ref 40–73)
NITRITE UR QL STRIP.AUTO: NEGATIVE
NRBC # BLD: 0 K/UL (ref 0–0.01)
NRBC BLD-RTO: 0 PER 100 WBC
PH UR STRIP: 5 (ref 5–8)
PLATELET # BLD AUTO: 316 K/UL (ref 135–420)
PMV BLD AUTO: 10.2 FL (ref 9.2–11.8)
POTASSIUM SERPL-SCNC: 3.7 MMOL/L (ref 3.5–5.5)
PROT SERPL-MCNC: 7.7 G/DL (ref 6.4–8.2)
PROT UR STRIP-MCNC: 300 MG/DL
RBC # BLD AUTO: 3.35 M/UL (ref 4.2–5.3)
RBC #/AREA URNS HPF: ABNORMAL /HPF (ref 0–5)
SODIUM SERPL-SCNC: 135 MMOL/L (ref 136–145)
SP GR UR REFRACTOMETRY: 1.02 (ref 1–1.03)
TROPONIN I SERPL HS-MCNC: 8 NG/L (ref 0–54)
UROBILINOGEN UR QL STRIP.AUTO: 1 EU/DL (ref 0.2–1)
WBC # BLD AUTO: 9.7 K/UL (ref 4.6–13.2)
WBC URNS QL MICRO: ABNORMAL /HPF (ref 0–5)

## 2025-04-28 PROCEDURE — 80053 COMPREHEN METABOLIC PANEL: CPT

## 2025-04-28 PROCEDURE — 71275 CT ANGIOGRAPHY CHEST: CPT

## 2025-04-28 PROCEDURE — 81001 URINALYSIS AUTO W/SCOPE: CPT

## 2025-04-28 PROCEDURE — 74177 CT ABD & PELVIS W/CONTRAST: CPT

## 2025-04-28 PROCEDURE — 87086 URINE CULTURE/COLONY COUNT: CPT

## 2025-04-28 PROCEDURE — 6360000002 HC RX W HCPCS: Performed by: NURSE PRACTITIONER

## 2025-04-28 PROCEDURE — 83735 ASSAY OF MAGNESIUM: CPT

## 2025-04-28 PROCEDURE — 2500000003 HC RX 250 WO HCPCS: Performed by: NURSE PRACTITIONER

## 2025-04-28 PROCEDURE — 87088 URINE BACTERIA CULTURE: CPT

## 2025-04-28 PROCEDURE — 96375 TX/PRO/DX INJ NEW DRUG ADDON: CPT

## 2025-04-28 PROCEDURE — 6360000004 HC RX CONTRAST MEDICATION: Performed by: NURSE PRACTITIONER

## 2025-04-28 PROCEDURE — 83690 ASSAY OF LIPASE: CPT

## 2025-04-28 PROCEDURE — 84484 ASSAY OF TROPONIN QUANT: CPT

## 2025-04-28 PROCEDURE — 96365 THER/PROPH/DIAG IV INF INIT: CPT

## 2025-04-28 PROCEDURE — 93970 EXTREMITY STUDY: CPT

## 2025-04-28 PROCEDURE — 93005 ELECTROCARDIOGRAM TRACING: CPT | Performed by: NURSE PRACTITIONER

## 2025-04-28 PROCEDURE — 85025 COMPLETE CBC W/AUTO DIFF WBC: CPT

## 2025-04-28 PROCEDURE — 99285 EMERGENCY DEPT VISIT HI MDM: CPT

## 2025-04-28 PROCEDURE — 83605 ASSAY OF LACTIC ACID: CPT

## 2025-04-28 PROCEDURE — 2580000003 HC RX 258: Performed by: NURSE PRACTITIONER

## 2025-04-28 PROCEDURE — 87186 SC STD MICRODIL/AGAR DIL: CPT

## 2025-04-28 PROCEDURE — 87040 BLOOD CULTURE FOR BACTERIA: CPT

## 2025-04-28 RX ORDER — 0.9 % SODIUM CHLORIDE 0.9 %
1000 INTRAVENOUS SOLUTION INTRAVENOUS ONCE
Status: COMPLETED | OUTPATIENT
Start: 2025-04-28 | End: 2025-04-28

## 2025-04-28 RX ORDER — MORPHINE SULFATE 4 MG/ML
4 INJECTION, SOLUTION INTRAMUSCULAR; INTRAVENOUS
Refills: 0 | Status: COMPLETED | OUTPATIENT
Start: 2025-04-28 | End: 2025-04-28

## 2025-04-28 RX ORDER — MORPHINE SULFATE 4 MG/ML
4 INJECTION, SOLUTION INTRAMUSCULAR; INTRAVENOUS
Refills: 0 | Status: COMPLETED | OUTPATIENT
Start: 2025-04-29 | End: 2025-04-29

## 2025-04-28 RX ORDER — IOPAMIDOL 755 MG/ML
100 INJECTION, SOLUTION INTRAVASCULAR
Status: COMPLETED | OUTPATIENT
Start: 2025-04-28 | End: 2025-04-28

## 2025-04-28 RX ORDER — ONDANSETRON 2 MG/ML
4 INJECTION INTRAMUSCULAR; INTRAVENOUS ONCE
Status: COMPLETED | OUTPATIENT
Start: 2025-04-28 | End: 2025-04-28

## 2025-04-28 RX ADMIN — MORPHINE SULFATE 4 MG: 4 INJECTION, SOLUTION INTRAMUSCULAR; INTRAVENOUS at 19:05

## 2025-04-28 RX ADMIN — ONDANSETRON 4 MG: 2 INJECTION, SOLUTION INTRAMUSCULAR; INTRAVENOUS at 19:06

## 2025-04-28 RX ADMIN — AZITHROMYCIN MONOHYDRATE 500 MG: 500 INJECTION, POWDER, LYOPHILIZED, FOR SOLUTION INTRAVENOUS at 19:16

## 2025-04-28 RX ADMIN — SODIUM CHLORIDE 1000 ML: 0.9 INJECTION, SOLUTION INTRAVENOUS at 19:16

## 2025-04-28 RX ADMIN — WATER 1000 MG: 1 INJECTION INTRAMUSCULAR; INTRAVENOUS; SUBCUTANEOUS at 19:02

## 2025-04-28 RX ADMIN — IOPAMIDOL 100 ML: 755 INJECTION, SOLUTION INTRAVENOUS at 20:33

## 2025-04-28 ASSESSMENT — LIFESTYLE VARIABLES
HOW OFTEN DO YOU HAVE A DRINK CONTAINING ALCOHOL: NEVER
HOW MANY STANDARD DRINKS CONTAINING ALCOHOL DO YOU HAVE ON A TYPICAL DAY: PATIENT DOES NOT DRINK

## 2025-04-28 ASSESSMENT — PAIN SCALES - GENERAL: PAINLEVEL_OUTOF10: 10

## 2025-04-28 NOTE — ED PROVIDER NOTES
[id number] - [place], [place] - [facility] - P [phone] - F [phone]  [facility] [address]      Phone: [phone]   cefdinir 300 MG capsule  ibuprofen 600 MG tablet  methocarbamol 750 MG tablet            I am the Primary Clinician of Record.       (Please note that parts of this dictation were completed with voice recognition software. Quite often unanticipated grammatical, syntax, homophones, and other interpretive errors are inadvertently transcribed by the computer software. Please disregards these errors. Please excuse any errors that have escaped final proofreading.)       Jake Ruano, [facility]  05/01/25 1211

## 2025-04-28 NOTE — ED TRIAGE NOTES
Drug-seeking behavior     Gastrointestinal disorder     H/O ETOH abuse     History of chemotherapy 2013    History of radiation therapy 2013    Hyperlipidemia     Hypertension     Malignant neoplasm without specification of site (HCC)     Methamphetamine abuse (HCC)     Peripheral neuropathy 2022    Psychiatric disorder     Psychiatric disorder     Psychiatric disorder     Spine injury     Thromboembolus (HCC) 2022

## 2025-04-29 VITALS
OXYGEN SATURATION: 100 % | HEART RATE: 99 BPM | SYSTOLIC BLOOD PRESSURE: 148 MMHG | WEIGHT: 150 LBS | BODY MASS INDEX: 27.6 KG/M2 | DIASTOLIC BLOOD PRESSURE: 74 MMHG | HEIGHT: 62 IN | RESPIRATION RATE: 16 BRPM | TEMPERATURE: 98 F

## 2025-04-29 LAB
EKG ATRIAL RATE: 106 BPM
EKG DIAGNOSIS: NORMAL
EKG P AXIS: 53 DEGREES
EKG P-R INTERVAL: 152 MS
EKG Q-T INTERVAL: 346 MS
EKG QRS DURATION: 78 MS
EKG QTC CALCULATION (BAZETT): 459 MS
EKG R AXIS: -22 DEGREES
EKG T AXIS: 57 DEGREES
EKG VENTRICULAR RATE: 106 BPM

## 2025-04-29 PROCEDURE — 6360000002 HC RX W HCPCS: Performed by: NURSE PRACTITIONER

## 2025-04-29 PROCEDURE — 96376 TX/PRO/DX INJ SAME DRUG ADON: CPT

## 2025-04-29 PROCEDURE — 96375 TX/PRO/DX INJ NEW DRUG ADDON: CPT

## 2025-04-29 PROCEDURE — 93010 ELECTROCARDIOGRAM REPORT: CPT | Performed by: INTERNAL MEDICINE

## 2025-04-29 RX ORDER — CEFDINIR 300 MG/1
300 CAPSULE ORAL 2 TIMES DAILY
Qty: 14 CAPSULE | Refills: 0 | Status: SHIPPED | OUTPATIENT
Start: 2025-04-29 | End: 2025-05-06

## 2025-04-29 RX ORDER — METHOCARBAMOL 750 MG/1
750 TABLET, FILM COATED ORAL 3 TIMES DAILY
Qty: 30 TABLET | Refills: 0 | Status: SHIPPED | OUTPATIENT
Start: 2025-04-29 | End: 2025-05-09

## 2025-04-29 RX ORDER — IBUPROFEN 600 MG/1
600 TABLET, FILM COATED ORAL 4 TIMES DAILY PRN
Qty: 40 TABLET | Refills: 0 | Status: SHIPPED | OUTPATIENT
Start: 2025-04-29 | End: 2025-05-09

## 2025-04-29 RX ADMIN — MORPHINE SULFATE 4 MG: 4 INJECTION, SOLUTION INTRAMUSCULAR; INTRAVENOUS at 00:09

## 2025-04-29 NOTE — ED NOTES
Patient provided with ice chips. No other needs or concerns identified at this time.  at bedside. Call bell within reach.

## 2025-04-29 NOTE — DISCHARGE INSTRUCTIONS
Thank you for allowing me to take care of you today.    Please follow-up with your primary care provider as we discussed.    Please take medications as discussed.  Please be mindful the Robaxin may make you sleepy or drowsy.    Please do not hesitate to return to the emergency department with any new worsening or concerning signs or symptoms.

## 2025-05-01 LAB
BACTERIA SPEC CULT: ABNORMAL
CC UR VC: ABNORMAL
SERVICE CMNT-IMP: ABNORMAL

## 2025-05-13 ENCOUNTER — HOSPITAL ENCOUNTER (EMERGENCY)
Facility: HOSPITAL | Age: 55
Discharge: HOME | End: 2025-05-13
Payer: COMMERCIAL

## 2025-05-13 VITALS
HEIGHT: 62 IN | BODY MASS INDEX: 27.6 KG/M2 | SYSTOLIC BLOOD PRESSURE: 150 MMHG | OXYGEN SATURATION: 100 % | TEMPERATURE: 99.1 F | RESPIRATION RATE: 16 BRPM | WEIGHT: 150 LBS | HEART RATE: 95 BPM | DIASTOLIC BLOOD PRESSURE: 80 MMHG

## 2025-05-13 DIAGNOSIS — K08.89 PAIN, DENTAL: ICD-10-CM

## 2025-05-13 DIAGNOSIS — R30.0 DYSURIA: Primary | ICD-10-CM

## 2025-05-13 LAB
ALBUMIN SERPL-MCNC: 2.6 G/DL (ref 3.4–5)
ALBUMIN/GLOB SERPL: 0.4 (ref 0.8–1.7)
ALP SERPL-CCNC: 153 U/L (ref 45–117)
ALT SERPL-CCNC: 11 U/L (ref 10–35)
ANION GAP SERPL CALC-SCNC: 10 MMOL/L (ref 3–18)
APPEARANCE UR: CLEAR
AST SERPL-CCNC: 21 U/L (ref 10–38)
BACTERIA URNS QL MICRO: ABNORMAL /HPF
BASOPHILS # BLD: 0.03 K/UL (ref 0–0.1)
BASOPHILS NFR BLD: 0.3 % (ref 0–2)
BILIRUB SERPL-MCNC: 0.3 MG/DL (ref 0.2–1)
BILIRUB UR QL: NEGATIVE
BUN SERPL-MCNC: 13 MG/DL (ref 6–23)
BUN/CREAT SERPL: 17 (ref 12–20)
CALCIUM SERPL-MCNC: 9.2 MG/DL (ref 8.5–10.1)
CHLORIDE SERPL-SCNC: 103 MMOL/L (ref 98–107)
CO2 SERPL-SCNC: 25 MMOL/L (ref 21–32)
COLOR UR: YELLOW
CREAT SERPL-MCNC: 0.77 MG/DL (ref 0.6–1.3)
DIFFERENTIAL METHOD BLD: ABNORMAL
EOSINOPHIL # BLD: 0.14 K/UL (ref 0–0.4)
EOSINOPHIL NFR BLD: 1.6 % (ref 0–5)
EPITH CASTS URNS QL MICRO: ABNORMAL /LPF (ref 0–5)
ERYTHROCYTE [DISTWIDTH] IN BLOOD BY AUTOMATED COUNT: 14.8 % (ref 11.6–14.5)
GLOBULIN SER CALC-MCNC: 5.9 G/DL (ref 2–4)
GLUCOSE SERPL-MCNC: 181 MG/DL (ref 74–108)
GLUCOSE UR STRIP.AUTO-MCNC: 100 MG/DL
HCT VFR BLD AUTO: 30 % (ref 35–45)
HGB BLD-MCNC: 9.3 G/DL (ref 12–16)
HGB UR QL STRIP: ABNORMAL
HYALINE CASTS URNS QL MICRO: ABNORMAL /LPF (ref 0–2)
IMM GRANULOCYTES # BLD AUTO: 0.02 K/UL (ref 0–0.04)
IMM GRANULOCYTES NFR BLD AUTO: 0.2 % (ref 0–0.5)
KETONES UR QL STRIP.AUTO: NEGATIVE MG/DL
LEUKOCYTE ESTERASE UR QL STRIP.AUTO: NEGATIVE
LYMPHOCYTES # BLD: 1.14 K/UL (ref 0.9–3.3)
LYMPHOCYTES NFR BLD: 13.3 % (ref 21–52)
MCH RBC QN AUTO: 25.2 PG (ref 24–34)
MCHC RBC AUTO-ENTMCNC: 31 G/DL (ref 31–37)
MCV RBC AUTO: 81.3 FL (ref 78–100)
MONOCYTES # BLD: 0.28 K/UL (ref 0.05–1.2)
MONOCYTES NFR BLD: 3.3 % (ref 3–10)
NEUTS SEG # BLD: 6.97 K/UL (ref 1.8–8)
NEUTS SEG NFR BLD: 81.3 % (ref 40–73)
NITRITE UR QL STRIP.AUTO: NEGATIVE
NRBC # BLD: 0 K/UL (ref 0–0.01)
NRBC BLD-RTO: 0 PER 100 WBC
PH UR STRIP: 6.5 (ref 5–8)
PLATELET # BLD AUTO: 351 K/UL (ref 135–420)
PMV BLD AUTO: 10.3 FL (ref 9.2–11.8)
POTASSIUM SERPL-SCNC: 3.9 MMOL/L (ref 3.5–5.5)
PROT SERPL-MCNC: 8.5 G/DL (ref 6.4–8.2)
PROT UR STRIP-MCNC: 300 MG/DL
RBC # BLD AUTO: 3.69 M/UL (ref 4.2–5.3)
RBC #/AREA URNS HPF: ABNORMAL /HPF (ref 0–5)
SODIUM SERPL-SCNC: 138 MMOL/L (ref 136–145)
SP GR UR REFRACTOMETRY: 1.02 (ref 1–1.03)
UROBILINOGEN UR QL STRIP.AUTO: 0.2 EU/DL (ref 0.2–1)
WBC # BLD AUTO: 8.6 K/UL (ref 4.6–13.2)
WBC URNS QL MICRO: ABNORMAL /HPF (ref 0–5)

## 2025-05-13 PROCEDURE — 81001 URINALYSIS AUTO W/SCOPE: CPT

## 2025-05-13 PROCEDURE — 99283 EMERGENCY DEPT VISIT LOW MDM: CPT

## 2025-05-13 PROCEDURE — 87086 URINE CULTURE/COLONY COUNT: CPT

## 2025-05-13 PROCEDURE — 85025 COMPLETE CBC W/AUTO DIFF WBC: CPT

## 2025-05-13 PROCEDURE — 80053 COMPREHEN METABOLIC PANEL: CPT

## 2025-05-13 RX ORDER — PHENAZOPYRIDINE HYDROCHLORIDE 100 MG/1
100 TABLET, FILM COATED ORAL 3 TIMES DAILY PRN
Qty: 9 TABLET | Refills: 0 | Status: SHIPPED | OUTPATIENT
Start: 2025-05-13 | End: 2025-05-16

## 2025-05-13 ASSESSMENT — PAIN DESCRIPTION - LOCATION: LOCATION: ABDOMEN;TEETH

## 2025-05-13 ASSESSMENT — PAIN - FUNCTIONAL ASSESSMENT: PAIN_FUNCTIONAL_ASSESSMENT: 0-10

## 2025-05-13 ASSESSMENT — PAIN SCALES - GENERAL: PAINLEVEL_OUTOF10: 10

## 2025-05-13 NOTE — ED TRIAGE NOTES
pain     Cocaine abuse (HCC)     Diabetes (HCC)     Drug abuse (HCC)     Drug-seeking behavior     Gastrointestinal disorder     H/O ETOH abuse     History of chemotherapy 2013    History of radiation therapy 2013    Hyperlipidemia     Hypertension     Malignant neoplasm without specification of site (HCC)     Methamphetamine abuse (HCC)     Peripheral neuropathy 2022    Psychiatric disorder     Psychiatric disorder     Psychiatric disorder     Spine injury     Thromboembolus (HCC) 2022

## 2025-05-13 NOTE — ED PROVIDER NOTES
[facility]  EMERGENCY DEPARTMENT ENCOUNTER       Pt Name: Kathleen Goldsmith  MRN: [id number]  Birthdate 1970  Date of evaluation: 5/13/2025  PCP: Loi Melchor MD  Note Started: 10:04 PM 5/13/25     CHIEF COMPLAINT       Chief Complaint   Patient presents with    Dysuria    Abdominal Pain    Dental Pain        HISTORY OF PRESENT ILLNESS: 1 or more elements      History From: Patient  HPI Limitations: None      Kathleen Goldsmith is a 54 y.o. female who presents to ED c/o dysuria, diffuse abdominal pain and dental pain.  Patient reports that she has been having dysuria for the last few months.  States that she was recently seen here and was diagnosed with urinary tract infection and was treated with antibiotics but that she feels that he urinary tract infection came right back.  States that she has been having frequent episodes of urinary tract infections but has not seen a urologist.  Reports that she is bedbound and unable to ambulate and she thinks that sitting in a diaper is leading to her having frequent urinary tract infections.  Patient denies chest pain, shortness of breath, fever, systemic symptoms.  Also reports diffuse abdominal pain.  Patient has chronic history of abdominal pain.  And is seen in regards to dental pain.  Patient reports that she grinds her teeth at night when she is sleeping and she is having pain on the left side of her mouth from grinding her teeth.  Reports multiple broken teeth.  Denies recently seeing a dentist and states that transportation is an issue for her.  Denies difficulty swallowing, breathing.  Denies jaw pain.     Nursing Notes were all reviewed and agreed with or any disagreements were addressed in the HPI.    PAST HISTORY     Past Medical History:  Past Medical History:   Diagnosis Date    Anxiety     Bipolar disorder (HCC)     Breast cancer (HCC)     breast cancer, right, S/P Mastectomy and chemo, radiation in 2013    Chronic pain

## 2025-05-14 NOTE — DISCHARGE INSTRUCTIONS
DENTAL CLINICS FOR FOLLOW UP:    Dr. Mellisa Benz, DDS   [address] [place], [place] [id number] [phone]    [facility]:  [address] [place] [address] [phone]  Fillings, Cleanings, and Extractions    [facility]  [address] [place] [address] [phone]  Fillings, Cleanings, and Extractions    [facility]  [address] [place] [address] [id number] [phone]  Fillings, Cleaning, and Extractions    [facility]  [address] [place] [address]  [phone]    [facility]  [address] [place], [place] [id number] [phone]  Ages 3-18, if attending [facility]  [address] [place], [place] [id number] [phone]  Extractions, Fillings, Cleanings    [facility]  [address] [place] [address] [phone]  Oral Surgery - $70 required  Cleanings, Fillings, Extractions - $100 Required    [facility]   [address] [place] [address] [phone]  [place] Residents Only    [facility] and [address] [place] [address]  [phone]  Dental Clinic Open September to June       You were seen today for evaluation of dysuria, urine without evidence of infection.  I sent for urine culture if any bacteria grows we will call you regarding needing to start antibiotics.  In the meantime, call the urologist provided on discharge paperwork and follow-up with them regarding recurrent UTIs/urinary frequency/burning during urination.  I prescribed you Pyridium to use for the next 3 days for urinary burning.  Please return to emergency department for reevaluation if symptoms worsen or progress.  In regards to dental pain, you are grinding your teeth at night when you sleep, I recommend that you get mouthguard's to use while sleeping.  No evidence of abscess/infection.  For pain take ibuprofen/Tylenol

## 2025-05-15 LAB
BACTERIA SPEC CULT: ABNORMAL
CC UR VC: ABNORMAL
SERVICE CMNT-IMP: ABNORMAL

## (undated) DEVICE — MASTISOL ADHESIVE LIQ 2/3ML

## (undated) DEVICE — PACK PROCEDURE SURG ANTR CERV DISCECTOMY

## (undated) DEVICE — 3.0MM PRECISION NEURO (MATCH HEAD)

## (undated) DEVICE — GOWN,AURORA,FABRIC-REINFORCED,2XL: Brand: MEDLINE

## (undated) DEVICE — HALTER TRACTION HD W/ TRI COTTON LINING FOAM LTX

## (undated) DEVICE — Device

## (undated) DEVICE — SYRINGE IRRIG 60ML SFT PLIABLE BLB EZ TO GRP 1 HND USE W/

## (undated) DEVICE — DRAIN SURG 7FR END PERF 1/8IN SIL RND SMOOTH HEAT POLISHED

## (undated) DEVICE — GLOVE ORANGE PI 7   MSG9070

## (undated) DEVICE — GARMENT,MEDLINE,DVT,INT,CALF,MED, GEN2: Brand: MEDLINE

## (undated) DEVICE — RESERVOIR,SUCTION,100CC,SILICONE: Brand: MEDLINE

## (undated) DEVICE — SUTURE VCRL + SZ 2-0 L18IN ABSRB VLT CT-2 1/2 CIR TAPERCUT VCP726D

## (undated) DEVICE — LIMB HOLDER, WRIST/ANKLE: Brand: DEROYAL

## (undated) DEVICE — GLOVE SURG SZ 65 THK91MIL LTX FREE SYN POLYISOPRENE

## (undated) DEVICE — GLOVE ORANGE PI 8   MSG9080

## (undated) DEVICE — MARKER,SKIN,WI/RULER AND LABELS: Brand: MEDLINE

## (undated) DEVICE — BULB IRR SYR TY BASIN 50ML --

## (undated) DEVICE — SUTURE PROL SZ 3-0 L18IN NONABSORBABLE BLU L19MM PC-5 3/8 8632G

## (undated) DEVICE — DERMABOND SKIN ADH 0.7ML --

## (undated) DEVICE — GLOVE ORANGE PI 8 1/2   MSG9085

## (undated) DEVICE — SOL IRRIGATION INJ NACL 0.9% 500ML BTL

## (undated) DEVICE — 3M™ TEGADERM™ TRANSPARENT FILM DRESSING FRAME STYLE, 1626W, 4 IN X 4-3/4 IN (10 CM X 12 CM), 50/CT 4CT/CASE: Brand: 3M™ TEGADERM™